# Patient Record
Sex: MALE | Race: WHITE | Employment: UNEMPLOYED | URBAN - METROPOLITAN AREA
[De-identification: names, ages, dates, MRNs, and addresses within clinical notes are randomized per-mention and may not be internally consistent; named-entity substitution may affect disease eponyms.]

---

## 2017-03-09 ENCOUNTER — TELEPHONE (OUTPATIENT)
Dept: FAMILY MEDICINE CLINIC | Age: 57
End: 2017-03-09

## 2017-03-21 ENCOUNTER — OFFICE VISIT (OUTPATIENT)
Dept: FAMILY MEDICINE CLINIC | Age: 57
End: 2017-03-21

## 2017-03-21 VITALS
HEART RATE: 68 BPM | DIASTOLIC BLOOD PRESSURE: 74 MMHG | RESPIRATION RATE: 16 BRPM | HEIGHT: 67 IN | WEIGHT: 213 LBS | SYSTOLIC BLOOD PRESSURE: 120 MMHG | BODY MASS INDEX: 33.43 KG/M2

## 2017-03-21 DIAGNOSIS — Z11.4 SCREENING FOR HIV (HUMAN IMMUNODEFICIENCY VIRUS): ICD-10-CM

## 2017-03-21 DIAGNOSIS — H61.23 BILATERAL IMPACTED CERUMEN: ICD-10-CM

## 2017-03-21 DIAGNOSIS — R73.01 IFG (IMPAIRED FASTING GLUCOSE): ICD-10-CM

## 2017-03-21 DIAGNOSIS — R25.2 MUSCLE CRAMPS: ICD-10-CM

## 2017-03-21 DIAGNOSIS — Z11.59 NEED FOR HEPATITIS C SCREENING TEST: ICD-10-CM

## 2017-03-21 DIAGNOSIS — Z86.010 HISTORY OF COLON POLYPS: ICD-10-CM

## 2017-03-21 DIAGNOSIS — Z87.891 HISTORY OF TOBACCO ABUSE: ICD-10-CM

## 2017-03-21 DIAGNOSIS — H91.93 HEARING LOSS, BILATERAL: Primary | ICD-10-CM

## 2017-03-21 DIAGNOSIS — Z12.5 SCREENING FOR PROSTATE CANCER: ICD-10-CM

## 2017-03-21 DIAGNOSIS — E78.5 HYPERLIPIDEMIA, UNSPECIFIED HYPERLIPIDEMIA TYPE: ICD-10-CM

## 2017-03-21 PROCEDURE — 99203 OFFICE O/P NEW LOW 30 MIN: CPT | Performed by: FAMILY MEDICINE

## 2017-03-22 ASSESSMENT — ENCOUNTER SYMPTOMS
RESPIRATORY NEGATIVE: 1
GASTROINTESTINAL NEGATIVE: 1

## 2017-04-12 ENCOUNTER — OFFICE VISIT (OUTPATIENT)
Dept: FAMILY MEDICINE CLINIC | Age: 57
End: 2017-04-12

## 2017-04-12 VITALS
BODY MASS INDEX: 33.65 KG/M2 | HEART RATE: 92 BPM | RESPIRATION RATE: 16 BRPM | WEIGHT: 214.4 LBS | SYSTOLIC BLOOD PRESSURE: 124 MMHG | DIASTOLIC BLOOD PRESSURE: 72 MMHG | HEIGHT: 67 IN

## 2017-04-12 DIAGNOSIS — E78.5 HYPERLIPIDEMIA, UNSPECIFIED HYPERLIPIDEMIA TYPE: ICD-10-CM

## 2017-04-12 DIAGNOSIS — H91.93 HEARING LOSS, BILATERAL: Primary | ICD-10-CM

## 2017-04-12 PROCEDURE — 99213 OFFICE O/P EST LOW 20 MIN: CPT | Performed by: FAMILY MEDICINE

## 2017-04-12 ASSESSMENT — PATIENT HEALTH QUESTIONNAIRE - PHQ9
2. FEELING DOWN, DEPRESSED OR HOPELESS: 0
SUM OF ALL RESPONSES TO PHQ QUESTIONS 1-9: 0
SUM OF ALL RESPONSES TO PHQ9 QUESTIONS 1 & 2: 0
1. LITTLE INTEREST OR PLEASURE IN DOING THINGS: 0

## 2017-04-12 ASSESSMENT — ENCOUNTER SYMPTOMS
GASTROINTESTINAL NEGATIVE: 1
RESPIRATORY NEGATIVE: 1

## 2017-08-22 ENCOUNTER — OFFICE VISIT (OUTPATIENT)
Dept: FAMILY MEDICINE CLINIC | Age: 57
End: 2017-08-22
Payer: COMMERCIAL

## 2017-08-22 VITALS
DIASTOLIC BLOOD PRESSURE: 80 MMHG | HEIGHT: 66 IN | HEART RATE: 72 BPM | SYSTOLIC BLOOD PRESSURE: 136 MMHG | RESPIRATION RATE: 16 BRPM | WEIGHT: 204.2 LBS | BODY MASS INDEX: 32.82 KG/M2

## 2017-08-22 DIAGNOSIS — R03.0 ELEVATED BP WITHOUT DIAGNOSIS OF HYPERTENSION: Primary | ICD-10-CM

## 2017-08-22 PROCEDURE — 99213 OFFICE O/P EST LOW 20 MIN: CPT | Performed by: FAMILY MEDICINE

## 2017-08-22 RX ORDER — RANITIDINE 150 MG/1
150 CAPSULE ORAL DAILY
COMMUNITY
End: 2018-03-01 | Stop reason: ALTCHOICE

## 2017-08-22 RX ORDER — CETIRIZINE HYDROCHLORIDE 10 MG/1
10 TABLET ORAL DAILY
COMMUNITY
End: 2018-03-01 | Stop reason: ALTCHOICE

## 2017-08-22 ASSESSMENT — ENCOUNTER SYMPTOMS
RESPIRATORY NEGATIVE: 1
GASTROINTESTINAL NEGATIVE: 1

## 2017-08-27 ENCOUNTER — APPOINTMENT (OUTPATIENT)
Dept: CT IMAGING | Age: 57
End: 2017-08-27
Payer: COMMERCIAL

## 2017-08-27 ENCOUNTER — HOSPITAL ENCOUNTER (EMERGENCY)
Age: 57
Discharge: HOME | End: 2017-08-27
Payer: COMMERCIAL

## 2017-08-27 VITALS
TEMPERATURE: 98.7 F | DIASTOLIC BLOOD PRESSURE: 90 MMHG | WEIGHT: 210 LBS | RESPIRATION RATE: 16 BRPM | BODY MASS INDEX: 33.75 KG/M2 | OXYGEN SATURATION: 100 % | SYSTOLIC BLOOD PRESSURE: 140 MMHG | HEIGHT: 66 IN | HEART RATE: 95 BPM
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
## 2017-08-27 PROCEDURE — 72125 CT NECK SPINE W/O DYE: CPT

## 2017-08-27 PROCEDURE — 99284 EMERGENCY DEPT VISIT MOD MDM: CPT

## 2017-08-27 RX ORDER — TRAMADOL HYDROCHLORIDE 50 MG/1
50 TABLET ORAL EVERY 6 HOURS PRN
Qty: 12 TABLET | Refills: 0 | Status: SHIPPED | OUTPATIENT
Start: 2017-08-27 | End: 2017-09-05 | Stop reason: ALTCHOICE

## 2017-08-27 ASSESSMENT — ENCOUNTER SYMPTOMS
DIARRHEA: 0
VOMITING: 0
SHORTNESS OF BREATH: 0
BLOOD IN STOOL: 0
NAUSEA: 0
COUGH: 0
ABDOMINAL PAIN: 0
BACK PAIN: 0
SORE THROAT: 0
WHEEZING: 0

## 2017-08-27 ASSESSMENT — PAIN DESCRIPTION - PAIN TYPE: TYPE: ACUTE PAIN

## 2017-08-27 ASSESSMENT — PAIN DESCRIPTION - DESCRIPTORS: DESCRIPTORS: ACHING

## 2017-08-27 ASSESSMENT — PAIN SCALES - GENERAL: PAINLEVEL_OUTOF10: 7

## 2017-08-27 ASSESSMENT — PAIN DESCRIPTION - FREQUENCY: FREQUENCY: CONTINUOUS

## 2017-08-27 ASSESSMENT — PAIN DESCRIPTION - LOCATION: LOCATION: NECK

## 2017-09-05 ENCOUNTER — OFFICE VISIT (OUTPATIENT)
Dept: FAMILY MEDICINE CLINIC | Age: 57
End: 2017-09-05
Payer: COMMERCIAL

## 2017-09-05 VITALS
TEMPERATURE: 98 F | RESPIRATION RATE: 13 BRPM | DIASTOLIC BLOOD PRESSURE: 70 MMHG | BODY MASS INDEX: 33.62 KG/M2 | SYSTOLIC BLOOD PRESSURE: 118 MMHG | HEART RATE: 67 BPM | HEIGHT: 66 IN | WEIGHT: 209.2 LBS | OXYGEN SATURATION: 98 %

## 2017-09-05 DIAGNOSIS — S29.019A THORACIC MYOFASCIAL STRAIN, INITIAL ENCOUNTER: ICD-10-CM
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
## 2017-09-05 DIAGNOSIS — S16.1XXA CERVICAL STRAIN, INITIAL ENCOUNTER: Primary | ICD-10-CM

## 2017-09-05 PROCEDURE — 99213 OFFICE O/P EST LOW 20 MIN: CPT | Performed by: FAMILY MEDICINE

## 2017-09-05 ASSESSMENT — ENCOUNTER SYMPTOMS
RESPIRATORY NEGATIVE: 1
GASTROINTESTINAL NEGATIVE: 1
BACK PAIN: 1

## 2017-11-13 ENCOUNTER — TELEPHONE (OUTPATIENT)
Dept: FAMILY MEDICINE CLINIC | Age: 57
End: 2017-11-13

## 2017-11-13 RX ORDER — ALPRAZOLAM 0.25 MG/1
0.25 TABLET ORAL DAILY PRN
Qty: 1 TABLET | Refills: 0 | Status: SHIPPED | OUTPATIENT
Start: 2017-11-13 | End: 2018-03-01 | Stop reason: ALTCHOICE

## 2017-11-13 NOTE — TELEPHONE ENCOUNTER
Momo's wife, Hillary Redmond, calls stating he failed his BP reading for his ODOT testing. He has to have it repeated again on 11/21/17. She said they discussed this at his last visit and Dr Meena Bautista said he could give him something to help calm him prior to the ODOT testing because his BP is always fine in the office. They think it is anxiety related by just getting nervous about passing the ODOT testing. Please advise if something can be called in for this or if he needs an appointment. Thank you!       ROMELIA 9/5/17    [place] on [employer]

## 2018-02-14 ENCOUNTER — TELEPHONE (OUTPATIENT)
Dept: FAMILY MEDICINE CLINIC | Age: 58
End: 2018-02-14

## 2018-03-01 ENCOUNTER — PROCEDURE VISIT (OUTPATIENT)
Dept: FAMILY MEDICINE CLINIC | Age: 58
End: 2018-03-01
Payer: COMMERCIAL

## 2018-03-01 VITALS
DIASTOLIC BLOOD PRESSURE: 70 MMHG | HEART RATE: 80 BPM | HEIGHT: 66 IN | WEIGHT: 214.4 LBS | RESPIRATION RATE: 16 BRPM | BODY MASS INDEX: 34.46 KG/M2 | SYSTOLIC BLOOD PRESSURE: 120 MMHG

## 2018-03-01 DIAGNOSIS — L91.8 CUTANEOUS SKIN TAGS: Primary | ICD-10-CM

## 2018-03-01 PROCEDURE — 11200 RMVL SKIN TAGS UP TO&INC 15: CPT | Performed by: FAMILY MEDICINE

## 2018-03-01 ASSESSMENT — ENCOUNTER SYMPTOMS: ROS SKIN COMMENTS: SKIN TAGS

## 2018-03-01 NOTE — PROGRESS NOTES
Subjective:      Patient ID: Raudel Arauz is a 65 y.o. male. HPI:    Chief Complaint   Patient presents with    Procedure     skin tag removal     Pt presents today w/ c/o multiple skin tags \"all over\". Located in the axillary, neck, groin area. Also one on each eyelid. There is no problem list on file for this patient. Past Surgical History:   Procedure Laterality Date    DENTAL SURGERY      25 teeth removed     Prior to Admission medications    Not on File         Review of Systems   Skin:        Skin tags       Objective:   Physical Exam   Skin:   Pt has multiple skin tags in the axillary region, 3 in the neck region, 1 right groin, and 1 on each upper eyelid. Assessment:      1. Cutaneous skin tags  44843 - [place] REMOVAL OF SKIN TAGS, UP TO 15           Plan:      -  Risks/benefits discussed, consent signed. 10 skin tags located on axillary region, neck, groin, eyelids were removed by hyfrecation without difficulty. Each skin tag was cleansed with Chloraprep and anesthetized with 1% Lidocaine and Epinephrine.   Wound care instructions were given to patient

## 2018-04-10 ENCOUNTER — OFFICE VISIT (OUTPATIENT)
Dept: FAMILY MEDICINE CLINIC | Age: 58
End: 2018-04-10
Payer: COMMERCIAL

## 2018-04-10 VITALS
OXYGEN SATURATION: 97 % | HEIGHT: 67 IN | WEIGHT: 216 LBS | BODY MASS INDEX: 33.9 KG/M2 | DIASTOLIC BLOOD PRESSURE: 84 MMHG | HEART RATE: 76 BPM | TEMPERATURE: 97.7 F | RESPIRATION RATE: 16 BRPM | SYSTOLIC BLOOD PRESSURE: 132 MMHG

## 2018-04-10 DIAGNOSIS — K21.9 GASTROESOPHAGEAL REFLUX DISEASE, ESOPHAGITIS PRESENCE NOT SPECIFIED: Primary | ICD-10-CM

## 2018-04-10 PROCEDURE — 1036F TOBACCO NON-USER: CPT | Performed by: FAMILY MEDICINE

## 2018-04-10 PROCEDURE — 3017F COLORECTAL CA SCREEN DOC REV: CPT | Performed by: FAMILY MEDICINE

## 2018-04-10 PROCEDURE — G8427 DOCREV CUR MEDS BY ELIG CLIN: HCPCS | Performed by: FAMILY MEDICINE

## 2018-04-10 PROCEDURE — 99213 OFFICE O/P EST LOW 20 MIN: CPT | Performed by: FAMILY MEDICINE

## 2018-04-10 PROCEDURE — G8417 CALC BMI ABV UP PARAM F/U: HCPCS | Performed by: FAMILY MEDICINE

## 2018-04-10 RX ORDER — PANTOPRAZOLE SODIUM 40 MG/1
40 TABLET, DELAYED RELEASE ORAL
Qty: 30 TABLET | Refills: 1 | Status: SHIPPED | OUTPATIENT
Start: 2018-04-10 | End: 2018-06-04 | Stop reason: SDUPTHER

## 2018-04-10 ASSESSMENT — ENCOUNTER SYMPTOMS
SORE THROAT: 1
RESPIRATORY NEGATIVE: 1
GASTROINTESTINAL NEGATIVE: 1

## 2018-06-04 DIAGNOSIS — K21.9 GASTROESOPHAGEAL REFLUX DISEASE, ESOPHAGITIS PRESENCE NOT SPECIFIED: ICD-10-CM

## 2018-06-05 RX ORDER — PANTOPRAZOLE SODIUM 40 MG/1
TABLET, DELAYED RELEASE ORAL
Qty: 30 TABLET | Refills: 0 | Status: SHIPPED | OUTPATIENT
Start: 2018-06-05 | End: 2018-07-03 | Stop reason: SDUPTHER

## 2018-09-19 ENCOUNTER — OFFICE VISIT (OUTPATIENT)
Dept: FAMILY MEDICINE CLINIC | Age: 58
End: 2018-09-19
Payer: COMMERCIAL

## 2018-09-19 VITALS
HEART RATE: 64 BPM | RESPIRATION RATE: 16 BRPM | SYSTOLIC BLOOD PRESSURE: 124 MMHG | DIASTOLIC BLOOD PRESSURE: 76 MMHG | BODY MASS INDEX: 33.82 KG/M2 | WEIGHT: 210.4 LBS | HEIGHT: 66 IN | OXYGEN SATURATION: 98 %

## 2018-09-19 DIAGNOSIS — R20.2 PARESTHESIA OF LEFT LEG: Primary | ICD-10-CM

## 2018-09-19 DIAGNOSIS — M54.16 LEFT LUMBAR RADICULITIS: ICD-10-CM

## 2018-09-19 PROCEDURE — G8427 DOCREV CUR MEDS BY ELIG CLIN: HCPCS | Performed by: FAMILY MEDICINE

## 2018-09-19 PROCEDURE — G8417 CALC BMI ABV UP PARAM F/U: HCPCS | Performed by: FAMILY MEDICINE

## 2018-09-19 PROCEDURE — 99213 OFFICE O/P EST LOW 20 MIN: CPT | Performed by: FAMILY MEDICINE

## 2018-09-19 PROCEDURE — 1036F TOBACCO NON-USER: CPT | Performed by: FAMILY MEDICINE

## 2018-09-19 PROCEDURE — 3017F COLORECTAL CA SCREEN DOC REV: CPT | Performed by: FAMILY MEDICINE

## 2018-09-19 RX ORDER — PREDNISONE 20 MG/1
TABLET ORAL
Qty: 25 TABLET | Refills: 0 | Status: SHIPPED | OUTPATIENT
Start: 2018-09-19 | End: 2019-03-29

## 2018-09-19 ASSESSMENT — PATIENT HEALTH QUESTIONNAIRE - PHQ9
SUM OF ALL RESPONSES TO PHQ QUESTIONS 1-9: 0
SUM OF ALL RESPONSES TO PHQ9 QUESTIONS 1 & 2: 0
1. LITTLE INTEREST OR PLEASURE IN DOING THINGS: 0
SUM OF ALL RESPONSES TO PHQ QUESTIONS 1-9: 0
2. FEELING DOWN, DEPRESSED OR HOPELESS: 0

## 2018-09-19 ASSESSMENT — ENCOUNTER SYMPTOMS
GASTROINTESTINAL NEGATIVE: 1
RESPIRATORY NEGATIVE: 1

## 2018-09-19 NOTE — PROGRESS NOTES
Visit Information    Have you changed or started any medications since your last visit including any over-the-counter medicines, vitamins, or herbal medicines? no   Are you having any side effects from any of your medications? -  no  Have you stopped taking any of your medications? Is so, why? -  no    Have you seen any other physician or provider since your last visit? No  Have you had any other diagnostic tests since your last visit? No  Have you been seen in the emergency room and/or had an admission to a hospital since we last saw you? No  Have you had your routine dental cleaning in the past 6 months? no    Have you activated your [employer] account? If not, what are your barriers?  Yes     Patient Care Team:  Bryce Castaneda DO as PCP - General (Family Medicine)    Medical History Review  Past Medical, Family, and Social History reviewed and does not contribute to the patient presenting condition    Health Maintenance   Topic Date Due    DTaP/Tdap/Td vaccine (1 - Tdap) 07/26/1980    Shingles Vaccine (1 of 2 - 2 Dose Series) 07/26/2011    Flu vaccine (1) 09/01/2018    Diabetes screen  03/22/2020    Lipid screen  03/22/2022    Colon cancer screen colonoscopy  05/05/2027    Hepatitis C screen  Completed    HIV screen  Completed

## 2018-09-19 NOTE — PATIENT INSTRUCTIONS
You may receive a survey about your visit with us today. The feedback from our patients helps us identify what is working well and where the service to all patients can be enhanced. Thank you! Patient Education        Low Back Pain: Exercises  Your Care Instructions  Here are some examples of typical rehabilitation exercises for your condition. Start each exercise slowly. Ease off the exercise if you start to have pain. Your doctor or physical therapist will tell you when you can start these exercises and which ones will work best for you. How to do the exercises  Press-up    1. Lie on your stomach, supporting your body with your forearms. 2. Press your elbows down into the floor to raise your upper back. As you do this, relax your stomach muscles and allow your back to arch without using your back muscles. As your press up, do not let your hips or pelvis come off the floor. 3. Hold for 15 to 30 seconds, then relax. 4. Repeat 2 to 4 times. Alternate arm and leg (bird dog) exercise    Do this exercise slowly. Try to keep your body straight at all times, and do not let one hip drop lower than the other. 1. Start on the floor, on your hands and knees. 2. Tighten your belly muscles. 3. Raise one leg off the floor, and hold it straight out behind you. Be careful not to let your hip drop down, because that will twist your trunk. 4. Hold for about 6 seconds, then lower your leg and switch to the other leg. 5. Repeat 8 to 12 times on each leg. 6. Over time, work up to holding for 10 to 30 seconds each time. 7. If you feel stable and secure with your leg raised, try raising the opposite arm straight out in front of you at the same time. Knee-to-chest exercise    1. Lie on your back with your knees bent and your feet flat on the floor. 2. Bring one knee to your chest, keeping the other foot flat on the floor (or keeping the other leg straight, whichever feels better on your lower back).   3. Keep your lower back pressed to the floor. Hold for at least 15 to 30 seconds. 4. Relax, and lower the knee to the starting position. 5. Repeat with the other leg. Repeat 2 to 4 times with each leg. 6. To get more stretch, put your other leg flat on the floor while pulling your knee to your chest.  Curl-ups    1. Lie on the floor on your back with your knees bent at a 90-degree angle. Your feet should be flat on the floor, about 12 inches from your buttocks. 2. Cross your arms over your chest. If this bothers your neck, try putting your hands behind your neck (not your head), with your elbows spread apart. 3. Slowly tighten your belly muscles and raise your shoulder blades off the floor. 4. Keep your head in line with your body, and do not press your chin to your chest.  5. Hold this position for 1 or 2 seconds, then slowly lower yourself back down to the floor. 6. Repeat 8 to 12 times. Pelvic tilt exercise    1. Lie on your back with your knees bent. 2. \"Brace\" your stomach. This means to tighten your muscles by pulling in and imagining your belly button moving toward your spine. You should feel like your back is pressing to the floor and your hips and pelvis are rocking back. 3. Hold for about 6 seconds while you breathe smoothly. 4. Repeat 8 to 12 times. Heel dig bridging    1. Lie on your back with both knees bent and your ankles bent so that only your heels are digging into the floor. Your knees should be bent about 90 degrees. 2. Then push your heels into the floor, squeeze your buttocks, and lift your hips off the floor until your shoulders, hips, and knees are all in a straight line. 3. Hold for about 6 seconds as you continue to breathe normally, and then slowly lower your hips back down to the floor and rest for up to 10 seconds. 4. Do 8 to 12 repetitions. Hamstring stretch in doorway    1. Lie on your back in a doorway, with one leg through the open door.   2. Slide your leg up the wall to straighten your

## 2018-09-19 NOTE — PROGRESS NOTES
Subjective:      Patient ID: Alejandra Sue is a 63 y.o. male. HPI:    Chief Complaint   Patient presents with    Leg Pain     upper right thigh-no know injury     Pt c/o left quad pain for the last 2-3 weeks. NKI. No redness or swelling. Taking Aleve with some relief. The pain comes and goes, worse in the am.  Moving seems to help. Pt does have some tingling in the area, \"burning\". No rash. Does have some left-sided hip pain also for the same time frame. No weakness. There is no problem list on file for this patient. Past Surgical History:   Procedure Laterality Date    DENTAL SURGERY      25 teeth removed     Prior to Admission medications    Medication Sig Start Date End Date Taking? Authorizing Provider   pantoprazole (PROTONIX) 40 MG tablet TAKE 1 TABLET BY MOUTH DAILY WITH BREAKFAST 7/3/18  Yes Selwyn Grate, DO       Review of Systems   Constitutional: Negative. HENT: Negative. Respiratory: Negative. Cardiovascular: Negative. Gastrointestinal: Negative. Musculoskeletal: Positive for arthralgias (left hip) and myalgias (left lower leg pain). Neurological: Positive for numbness. Negative for weakness. All other systems reviewed and are negative. Objective:   Physical Exam   Constitutional: He is oriented to person, place, and time. He appears well-developed and well-nourished. HENT:   Head: Normocephalic and atraumatic. Right Ear: Tympanic membrane normal.   Left Ear: Tympanic membrane normal.   Mouth/Throat: Oropharynx is clear and moist and mucous membranes are normal.   Cardiovascular: Normal rate, regular rhythm and normal heart sounds. No murmur heard. Pulmonary/Chest: Effort normal and breath sounds normal.   Abdominal: Soft. Bowel sounds are normal.   Musculoskeletal: He exhibits no edema. Left hip: He exhibits normal range of motion, normal strength and no tenderness.         Legs:  Neurological: He is alert and oriented to

## 2019-03-29 ENCOUNTER — HOSPITAL ENCOUNTER (EMERGENCY)
Age: 59
Discharge: HOME | End: 2019-03-29
Payer: COMMERCIAL

## 2019-03-29 VITALS
DIASTOLIC BLOOD PRESSURE: 88 MMHG | WEIGHT: 210 LBS | TEMPERATURE: 98.2 F | HEART RATE: 96 BPM | SYSTOLIC BLOOD PRESSURE: 144 MMHG | OXYGEN SATURATION: 95 % | RESPIRATION RATE: 18 BRPM | BODY MASS INDEX: 34.41 KG/M2

## 2019-03-29 DIAGNOSIS — R05.9 COUGH: ICD-10-CM

## 2019-03-29 DIAGNOSIS — B34.9 VIRAL ILLNESS: Primary | ICD-10-CM

## 2019-03-29 LAB
FLU A ANTIGEN: NEGATIVE
FLU B ANTIGEN: NEGATIVE

## 2019-03-29 PROCEDURE — 99213 OFFICE O/P EST LOW 20 MIN: CPT

## 2019-03-29 PROCEDURE — 87804 INFLUENZA ASSAY W/OPTIC: CPT

## 2019-03-29 PROCEDURE — 99212 OFFICE O/P EST SF 10 MIN: CPT | Performed by: NURSE PRACTITIONER

## 2019-03-29 SDOH — HEALTH STABILITY: MENTAL HEALTH: HOW OFTEN DO YOU HAVE A DRINK CONTAINING ALCOHOL?: NEVER

## 2019-03-29 ASSESSMENT — ENCOUNTER SYMPTOMS
STRIDOR: 0
CHEST TIGHTNESS: 0
SORE THROAT: 0
EYE DISCHARGE: 0
COUGH: 1
SINUS PRESSURE: 0
WHEEZING: 0
SHORTNESS OF BREATH: 0
SINUS CONGESTION: 0
SINUS PAIN: 0
NAUSEA: 0
VOMITING: 0
RHINORRHEA: 0
DIARRHEA: 0

## 2019-03-29 NOTE — ED PROVIDER NOTES
BHARTI Dai - MAU    (Please note that portions of this note were completed with a voice recognition program. Efforts were made to edit the dictations but occasionally words are mis-transcribed.)         BHARTI Salas - MAU  03/29/19 1940

## 2019-03-29 NOTE — ED TRIAGE NOTES
Pt walked to room 7. Pt here with complaints of a non productive cough, congestion. Started 3 days ago.

## 2019-03-29 NOTE — ED NOTES
Patient understood instructions verbally,  Follow up with PCP with any concerns,  ambulated self to lobby,stable condition.       Angel Murrieta LPN  40/73/90 [id number]

## 2019-04-01 ENCOUNTER — TELEPHONE (OUTPATIENT)
Dept: FAMILY MEDICINE CLINIC | Age: 59
End: 2019-04-01

## 2019-07-03 DIAGNOSIS — K21.9 GASTROESOPHAGEAL REFLUX DISEASE, ESOPHAGITIS PRESENCE NOT SPECIFIED: ICD-10-CM

## 2019-07-03 RX ORDER — PANTOPRAZOLE SODIUM 40 MG/1
TABLET, DELAYED RELEASE ORAL
Qty: 90 TABLET | Refills: 0 | Status: SHIPPED | OUTPATIENT
Start: 2019-07-03 | End: 2019-09-27 | Stop reason: SDUPTHER

## 2019-09-27 DIAGNOSIS — K21.9 GASTROESOPHAGEAL REFLUX DISEASE, ESOPHAGITIS PRESENCE NOT SPECIFIED: ICD-10-CM

## 2019-09-27 RX ORDER — PANTOPRAZOLE SODIUM 40 MG/1
TABLET, DELAYED RELEASE ORAL
Qty: 90 TABLET | Refills: 0 | Status: SHIPPED | OUTPATIENT
Start: 2019-09-27 | End: 2020-07-01

## 2020-07-01 ENCOUNTER — OFFICE VISIT (OUTPATIENT)
Dept: FAMILY MEDICINE CLINIC | Age: 60
End: 2020-07-01

## 2020-07-01 VITALS
TEMPERATURE: 97.8 F | WEIGHT: 204.7 LBS | HEART RATE: 72 BPM | BODY MASS INDEX: 32.9 KG/M2 | DIASTOLIC BLOOD PRESSURE: 66 MMHG | RESPIRATION RATE: 16 BRPM | HEIGHT: 66 IN | SYSTOLIC BLOOD PRESSURE: 130 MMHG

## 2020-07-01 PROCEDURE — 99213 OFFICE O/P EST LOW 20 MIN: CPT | Performed by: FAMILY MEDICINE

## 2020-07-01 RX ORDER — OMEPRAZOLE 20 MG/1
20 TABLET, DELAYED RELEASE ORAL DAILY
Status: ON HOLD | COMMUNITY
End: 2022-02-11 | Stop reason: HOSPADM

## 2020-07-01 SDOH — ECONOMIC STABILITY: TRANSPORTATION INSECURITY
IN THE PAST 12 MONTHS, HAS LACK OF TRANSPORTATION KEPT YOU FROM MEETINGS, WORK, OR FROM GETTING THINGS NEEDED FOR DAILY LIVING?: NO

## 2020-07-01 SDOH — ECONOMIC STABILITY: FOOD INSECURITY: WITHIN THE PAST 12 MONTHS, YOU WORRIED THAT YOUR FOOD WOULD RUN OUT BEFORE YOU GOT MONEY TO BUY MORE.: NEVER TRUE

## 2020-07-01 SDOH — ECONOMIC STABILITY: FOOD INSECURITY: WITHIN THE PAST 12 MONTHS, THE FOOD YOU BOUGHT JUST DIDN'T LAST AND YOU DIDN'T HAVE MONEY TO GET MORE.: NEVER TRUE

## 2020-07-01 SDOH — ECONOMIC STABILITY: INCOME INSECURITY: HOW HARD IS IT FOR YOU TO PAY FOR THE VERY BASICS LIKE FOOD, HOUSING, MEDICAL CARE, AND HEATING?: NOT HARD AT ALL

## 2020-07-01 SDOH — ECONOMIC STABILITY: TRANSPORTATION INSECURITY
IN THE PAST 12 MONTHS, HAS THE LACK OF TRANSPORTATION KEPT YOU FROM MEDICAL APPOINTMENTS OR FROM GETTING MEDICATIONS?: NO

## 2020-07-01 ASSESSMENT — ENCOUNTER SYMPTOMS
NAUSEA: 1
CHEST TIGHTNESS: 0
RESPIRATORY NEGATIVE: 1
SHORTNESS OF BREATH: 0

## 2020-07-01 ASSESSMENT — PATIENT HEALTH QUESTIONNAIRE - PHQ9
SUM OF ALL RESPONSES TO PHQ QUESTIONS 1-9: 0
SUM OF ALL RESPONSES TO PHQ9 QUESTIONS 1 & 2: 0
SUM OF ALL RESPONSES TO PHQ QUESTIONS 1-9: 0
2. FEELING DOWN, DEPRESSED OR HOPELESS: 0
1. LITTLE INTEREST OR PLEASURE IN DOING THINGS: 0

## 2020-07-01 NOTE — PROGRESS NOTES
Subjective:      Patient ID: Jil Humphrey is a 63 y.o. male. HPI:    Chief Complaint   Patient presents with    Hypertension     140's/100-110's at home on automatic cuff     Pt here to discuss his BP. Per pt, he had an episode of vertigo while lying in bed. This episode lasted about 10 minutes. Felt clammy at that time with some nausea. Pt denies CP or tightness at that time. Denies lightheadedness. No palpitations. Feels great now. BPs have been fluctuating on wrist cuff. BP today looks ok. BP Readings from Last 3 Encounters:   07/01/20 130/66   03/29/19 (!) 144/88   09/19/18 124/76       There is no problem list on file for this patient. Past Surgical History:   Procedure Laterality Date    DENTAL SURGERY      25 teeth removed     Prior to Admission medications    Medication Sig Start Date End Date Taking? Authorizing Provider   omeprazole (PRILOSEC OTC) 20 MG tablet Take 20 mg by mouth daily   Yes Historical Provider, MD   pantoprazole (PROTONIX) 40 MG tablet TAKE 1 TABLET BY MOUTH DAILY WITH BREAKFAST  Patient not taking: Reported on 7/1/2020 9/27/19   Bry Cee DO         Review of Systems   Constitutional: Negative. Negative for fever. HENT: Negative. Respiratory: Negative. Negative for chest tightness and shortness of breath. Cardiovascular: Negative. Negative for chest pain and palpitations. Gastrointestinal: Positive for nausea. Musculoskeletal: Negative. Negative for gait problem. Neurological: Positive for dizziness. Negative for facial asymmetry, speech difficulty, light-headedness and headaches. All other systems reviewed and are negative. Objective:   Physical Exam  Vitals signs and nursing note reviewed. Constitutional:       Appearance: He is well-developed. HENT:      Head: Normocephalic and atraumatic.       Right Ear: Tympanic membrane normal.      Left Ear: Tympanic membrane normal.   Eyes:      Extraocular Movements: Extraocular

## 2020-07-01 NOTE — PATIENT INSTRUCTIONS
gone.  When should you call for help? [id number] anytime you think you may need emergency care. For example, call if:  · You have symptoms of a stroke. These may include:  ? Sudden numbness, tingling, weakness, or loss of movement in your face, arm, or leg, especially on only one side of your body. ? Sudden vision changes. ? Sudden trouble speaking. ? Sudden confusion or trouble understanding simple statements. ? Sudden problems with walking or balance. ? A sudden, severe headache that is different from past headaches. Call your doctor now or seek immediate medical care if:  · You have new or worse nausea and vomiting. · You have new symptoms such as hearing loss or roaring in your ears. Watch closely for changes in your health, and be sure to contact your doctor if:  · You are not getting better as expected. · Your vertigo gets worse. Where can you learn more? Go to https://[employer] I[employer]ankoUV[employer]rx [employer]. org and sign in to your [employer] account. Enter  in the 4 the stars box to learn more about \"Benign Paroxysmal Positional Vertigo (BPPV): Care Instructions. \"     If you do not have an account, please click on the \"Sign Up Now\" link. Current as of: July 29, 2019               Content Version: 12.5  © 2442-8842 [employer]wise, Incorporated. Care instructions adapted under license by [facility] ([facility]). If you have questions about a medical condition or this instruction, always ask your healthcare professional. [place] [address] any warranty or liability for your use of this information.

## 2020-07-01 NOTE — PROGRESS NOTES
Visit Information    Have you changed or started any medications since your last visit including any over-the-counter medicines, vitamins, or herbal medicines? no   Are you having any side effects from any of your medications? -  no  Have you stopped taking any of your medications? Is so, why? -  no    Have you seen any other physician or provider since your last visit? No  Have you had any other diagnostic tests since your last visit? No  Have you been seen in the emergency room and/or had an admission to a hospital since we last saw you? No  Have you had your routine dental cleaning in the past 6 months? no    Have you activated your [employer] account? If not, what are your barriers?  Yes     Patient Care Team:  Em Schneider DO as PCP - General (Family Medicine)  Em Schneider DO as PCP - [facility]    Medical History Review  Past Medical, Family, and Social History reviewed and does contribute to the patient presenting condition    Health Maintenance   Topic Date Due    DTaP/Tdap/Td vaccine (1 - Tdap) 07/26/1980    Shingles Vaccine (1 of 2) 07/26/2011    Low dose CT lung screening  07/26/2016    Flu vaccine (1) 09/01/2020    Lipid screen  03/22/2022    Colon cancer screen colonoscopy  05/05/2027    Hepatitis C screen  Completed    HIV screen  Completed    Hepatitis A vaccine  Aged Out    Hepatitis B vaccine  Aged Out    Hib vaccine  Aged Out    Meningococcal (ACWY) vaccine  Aged Out    Pneumococcal 0-64 years Vaccine  Aged Out

## 2021-10-12 ENCOUNTER — VIRTUAL VISIT (OUTPATIENT)
Dept: FAMILY MEDICINE CLINIC | Age: 61
End: 2021-10-12

## 2021-10-12 DIAGNOSIS — J22 LRTI (LOWER RESPIRATORY TRACT INFECTION): Primary | ICD-10-CM

## 2021-10-12 PROCEDURE — 99213 OFFICE O/P EST LOW 20 MIN: CPT | Performed by: FAMILY MEDICINE

## 2021-10-12 RX ORDER — AZITHROMYCIN 250 MG/1
250 TABLET, FILM COATED ORAL SEE ADMIN INSTRUCTIONS
Qty: 6 TABLET | Refills: 0 | Status: SHIPPED | OUTPATIENT
Start: 2021-10-12 | End: 2021-10-17

## 2021-10-12 RX ORDER — PREDNISONE 20 MG/1
20 TABLET ORAL 2 TIMES DAILY
Qty: 10 TABLET | Refills: 0 | Status: SHIPPED | OUTPATIENT
Start: 2021-10-12 | End: 2021-10-17

## 2021-10-12 ASSESSMENT — ENCOUNTER SYMPTOMS
GASTROINTESTINAL NEGATIVE: 1
SINUS PRESSURE: 1
RHINORRHEA: 1
CHEST TIGHTNESS: 0
COUGH: 1
SHORTNESS OF BREATH: 0

## 2021-10-12 NOTE — PROGRESS NOTES
person, place, and time. Psychiatric:         Mood and Affect: Mood normal.         Behavior: Behavior normal.         Thought Content: Thought content normal.         Judgment: Judgment normal.     ASSESSMENT/PLAN:  1. LRTI (lower respiratory tract infection)  -     azithromycin (ZITHROMAX) 250 MG tablet; Take 1 tablet by mouth See Admin Instructions for 5 days 500mg on day 1 followed by 250mg on days 2 - 5, Disp-6 tablet, R-0Normal  -     predniSONE (DELTASONE) 20 MG tablet; Take 1 tablet by mouth 2 times daily for 5 days, Disp-10 tablet, R-0Normal    -  Orders above  -  Pt has been sick for over 2 weeks now, COVID testing unlikely to be helpful at this time  -  OTC decongestant    Return if symptoms worsen or fail to improve. Yolande Neville, was evaluated through a synchronous (real-time) audio-video encounter. The patient (or guardian if applicable) is aware that this is a billable service. Verbal consent to proceed has been obtained within the past 12 months. The visit was conducted pursuant to the emergency declaration under the [address], [address] [place] [address] authority and the [address] and [employer] General Act. Patient identification was verified, and a caregiver was present when appropriate. The patient was located in a state where the provider was credentialed to provide care. An electronic signature was used to authenticate this note.     --Honey Phillips, DO

## 2022-02-10 ENCOUNTER — NURSE TRIAGE (OUTPATIENT)
Dept: OTHER | Facility: CLINIC | Age: 62
End: 2022-02-10

## 2022-02-10 ENCOUNTER — APPOINTMENT (OUTPATIENT)
Dept: GENERAL RADIOLOGY | Age: 62
DRG: 392 | End: 2022-02-10

## 2022-02-10 ENCOUNTER — HOSPITAL ENCOUNTER (INPATIENT)
Age: 62
LOS: 1 days | Discharge: HOME | DRG: 392 | End: 2022-02-11
Attending: EMERGENCY MEDICINE | Admitting: HOSPITALIST
Payer: MEDICAID

## 2022-02-10 DIAGNOSIS — D64.9 ANEMIA, UNSPECIFIED TYPE: Primary | ICD-10-CM

## 2022-02-10 DIAGNOSIS — K92.2 GASTROINTESTINAL HEMORRHAGE, UNSPECIFIED GASTROINTESTINAL HEMORRHAGE TYPE: ICD-10-CM

## 2022-02-10 PROBLEM — R10.9 ABDOMINAL PAIN: Status: ACTIVE | Noted: 2022-02-10

## 2022-02-10 LAB
ALBUMIN SERPL-MCNC: 4.3 G/DL (ref 3.5–5.1)
ALP BLD-CCNC: 84 U/L (ref 38–126)
ALT SERPL-CCNC: 24 U/L (ref 11–66)
ANION GAP SERPL CALCULATED.3IONS-SCNC: 13 MEQ/L (ref 8–16)
AST SERPL-CCNC: 17 U/L (ref 5–40)
BASOPHILS # BLD: 0.1 %
BASOPHILS ABSOLUTE: 0 THOU/MM3 (ref 0–0.1)
BILIRUB SERPL-MCNC: 0.2 MG/DL (ref 0.3–1.2)
BILIRUBIN DIRECT: < 0.2 MG/DL (ref 0–0.3)
BUN BLDV-MCNC: 17 MG/DL (ref 7–22)
CALCIUM SERPL-MCNC: 10.6 MG/DL (ref 8.5–10.5)
CHLORIDE BLD-SCNC: 99 MEQ/L (ref 98–111)
CO2: 24 MEQ/L (ref 23–33)
CREAT SERPL-MCNC: 1 MG/DL (ref 0.4–1.2)
EKG ATRIAL RATE: 76 BPM
EKG ATRIAL RATE: 87 BPM
EKG P AXIS: 48 DEGREES
EKG P AXIS: 53 DEGREES
EKG P-R INTERVAL: 196 MS
EKG P-R INTERVAL: 204 MS
EKG Q-T INTERVAL: 342 MS
EKG Q-T INTERVAL: 366 MS
EKG QRS DURATION: 78 MS
EKG QRS DURATION: 78 MS
EKG QTC CALCULATION (BAZETT): 411 MS
EKG QTC CALCULATION (BAZETT): 411 MS
EKG R AXIS: -4 DEGREES
EKG R AXIS: 0 DEGREES
EKG T AXIS: 16 DEGREES
EKG T AXIS: 46 DEGREES
EKG VENTRICULAR RATE: 76 BPM
EKG VENTRICULAR RATE: 87 BPM
EOSINOPHIL # BLD: 0 %
EOSINOPHILS ABSOLUTE: 0 THOU/MM3 (ref 0–0.4)
ERYTHROCYTE [DISTWIDTH] IN BLOOD BY AUTOMATED COUNT: 14.5 % (ref 11.5–14.5)
ERYTHROCYTE [DISTWIDTH] IN BLOOD BY AUTOMATED COUNT: 43.1 FL (ref 35–45)
FERRITIN: 5 NG/ML (ref 22–322)
FLU A ANTIGEN: NEGATIVE
FLU B ANTIGEN: NEGATIVE
FOLATE: 15.6 NG/ML (ref 4.8–24.2)
GFR SERPL CREATININE-BSD FRML MDRD: 76 ML/MIN/1.73M2
GLUCOSE BLD-MCNC: 127 MG/DL (ref 70–108)
HCT VFR BLD CALC: 28.2 % (ref 42–52)
HCT VFR BLD CALC: 28.5 % (ref 42–52)
HEMOGLOBIN: 8.3 GM/DL (ref 14–18)
HEMOGLOBIN: 8.3 GM/DL (ref 14–18)
IMMATURE GRANS (ABS): 0.1 THOU/MM3 (ref 0–0.07)
IMMATURE GRANULOCYTES: 1.4 %
IRON SATURATION: 4 % (ref 20–50)
IRON: 18 UG/DL (ref 65–195)
LIPASE: 55.2 U/L (ref 5.6–51.3)
LYMPHOCYTES # BLD: 13.7 %
LYMPHOCYTES ABSOLUTE: 1 THOU/MM3 (ref 1–4.8)
MCH RBC QN AUTO: 24.2 PG (ref 26–33)
MCHC RBC AUTO-ENTMCNC: 29.4 GM/DL (ref 32.2–35.5)
MCV RBC AUTO: 82.2 FL (ref 80–94)
MONOCYTES # BLD: 7.4 %
MONOCYTES ABSOLUTE: 0.5 THOU/MM3 (ref 0.4–1.3)
NUCLEATED RED BLOOD CELLS: 0 /100 WBC
OSMOLALITY CALCULATION: 275.1 MOSMOL/KG (ref 275–300)
PLATELET # BLD: 196 THOU/MM3 (ref 130–400)
PMV BLD AUTO: 9.4 FL (ref 9.4–12.4)
POTASSIUM REFLEX MAGNESIUM: 4.2 MEQ/L (ref 3.5–5.2)
PRO-BNP: 51 PG/ML (ref 0–900)
RBC # BLD: 3.43 MILL/MM3 (ref 4.7–6.1)
SARS-COV-2, NAAT: NOT  DETECTED
SEG NEUTROPHILS: 77.4 %
SEGMENTED NEUTROPHILS ABSOLUTE COUNT: 5.5 THOU/MM3 (ref 1.8–7.7)
SODIUM BLD-SCNC: 136 MEQ/L (ref 135–145)
TOTAL IRON BINDING CAPACITY: 416 UG/DL (ref 171–450)
TOTAL PROTEIN: 7.4 G/DL (ref 6.1–8)
TROPONIN T: < 0.01 NG/ML
VITAMIN B-12: 431 PG/ML (ref 211–911)
WBC # BLD: 7.1 THOU/MM3 (ref 4.8–10.8)

## 2022-02-10 PROCEDURE — 2580000003 HC RX 258: Performed by: NURSE PRACTITIONER

## 2022-02-10 PROCEDURE — 99223 1ST HOSP IP/OBS HIGH 75: CPT

## 2022-02-10 PROCEDURE — 87804 INFLUENZA ASSAY W/OPTIC: CPT

## 2022-02-10 PROCEDURE — 93010 ELECTROCARDIOGRAM REPORT: CPT | Performed by: NUCLEAR MEDICINE

## 2022-02-10 PROCEDURE — 85025 COMPLETE CBC W/AUTO DIFF WBC: CPT

## 2022-02-10 PROCEDURE — 99285 EMERGENCY DEPT VISIT HI MDM: CPT

## 2022-02-10 PROCEDURE — 6360000002 HC RX W HCPCS

## 2022-02-10 PROCEDURE — 87635 SARS-COV-2 COVID-19 AMP PRB: CPT

## 2022-02-10 PROCEDURE — 82607 VITAMIN B-12: CPT

## 2022-02-10 PROCEDURE — 6360000002 HC RX W HCPCS: Performed by: NURSE PRACTITIONER

## 2022-02-10 PROCEDURE — 84484 ASSAY OF TROPONIN QUANT: CPT

## 2022-02-10 PROCEDURE — 83540 ASSAY OF IRON: CPT

## 2022-02-10 PROCEDURE — 6370000000 HC RX 637 (ALT 250 FOR IP): Performed by: STUDENT IN AN ORGANIZED HEALTH CARE EDUCATION/TRAINING PROGRAM

## 2022-02-10 PROCEDURE — 2580000003 HC RX 258

## 2022-02-10 PROCEDURE — 85014 HEMATOCRIT: CPT

## 2022-02-10 PROCEDURE — 80048 BASIC METABOLIC PNL TOTAL CA: CPT

## 2022-02-10 PROCEDURE — C9113 INJ PANTOPRAZOLE SODIUM, VIA: HCPCS

## 2022-02-10 PROCEDURE — 71045 X-RAY EXAM CHEST 1 VIEW: CPT

## 2022-02-10 PROCEDURE — 82746 ASSAY OF FOLIC ACID SERUM: CPT

## 2022-02-10 PROCEDURE — 36415 COLL VENOUS BLD VENIPUNCTURE: CPT

## 2022-02-10 PROCEDURE — 80076 HEPATIC FUNCTION PANEL: CPT

## 2022-02-10 PROCEDURE — 1200000003 HC TELEMETRY R&B

## 2022-02-10 PROCEDURE — 83690 ASSAY OF LIPASE: CPT

## 2022-02-10 PROCEDURE — 83880 ASSAY OF NATRIURETIC PEPTIDE: CPT

## 2022-02-10 PROCEDURE — 93005 ELECTROCARDIOGRAM TRACING: CPT

## 2022-02-10 PROCEDURE — 85018 HEMOGLOBIN: CPT

## 2022-02-10 PROCEDURE — 93005 ELECTROCARDIOGRAM TRACING: CPT | Performed by: STUDENT IN AN ORGANIZED HEALTH CARE EDUCATION/TRAINING PROGRAM

## 2022-02-10 PROCEDURE — 82728 ASSAY OF FERRITIN: CPT

## 2022-02-10 PROCEDURE — 83550 IRON BINDING TEST: CPT

## 2022-02-10 RX ORDER — ACETAMINOPHEN 650 MG/1
650 SUPPOSITORY RECTAL EVERY 6 HOURS PRN
Status: DISCONTINUED | OUTPATIENT
Start: 2022-02-10 | End: 2022-02-11 | Stop reason: HOSPADM

## 2022-02-10 RX ORDER — PANTOPRAZOLE SODIUM 40 MG/1
40 TABLET, DELAYED RELEASE ORAL
Status: DISCONTINUED | OUTPATIENT
Start: 2022-02-11 | End: 2022-02-11 | Stop reason: HOSPADM

## 2022-02-10 RX ORDER — PANTOPRAZOLE SODIUM 40 MG/1
40 TABLET, DELAYED RELEASE ORAL ONCE
Status: COMPLETED | OUTPATIENT
Start: 2022-02-10 | End: 2022-02-10

## 2022-02-10 RX ORDER — NITROGLYCERIN 0.4 MG/1
0.4 TABLET SUBLINGUAL ONCE
Status: COMPLETED | OUTPATIENT
Start: 2022-02-10 | End: 2022-02-10

## 2022-02-10 RX ORDER — SODIUM CHLORIDE 9 MG/ML
25 INJECTION, SOLUTION INTRAVENOUS PRN
Status: DISCONTINUED | OUTPATIENT
Start: 2022-02-10 | End: 2022-02-11 | Stop reason: HOSPADM

## 2022-02-10 RX ORDER — SODIUM CHLORIDE 0.9 % (FLUSH) 0.9 %
5-40 SYRINGE (ML) INJECTION PRN
Status: DISCONTINUED | OUTPATIENT
Start: 2022-02-10 | End: 2022-02-11 | Stop reason: HOSPADM

## 2022-02-10 RX ORDER — SODIUM CHLORIDE 9 MG/ML
INJECTION, SOLUTION INTRAVENOUS CONTINUOUS
Status: DISCONTINUED | OUTPATIENT
Start: 2022-02-10 | End: 2022-02-11 | Stop reason: HOSPADM

## 2022-02-10 RX ORDER — ACETAMINOPHEN 325 MG/1
650 TABLET ORAL EVERY 6 HOURS PRN
Status: DISCONTINUED | OUTPATIENT
Start: 2022-02-10 | End: 2022-02-11 | Stop reason: HOSPADM

## 2022-02-10 RX ORDER — ONDANSETRON 4 MG/1
4 TABLET, ORALLY DISINTEGRATING ORAL EVERY 8 HOURS PRN
Status: DISCONTINUED | OUTPATIENT
Start: 2022-02-10 | End: 2022-02-11 | Stop reason: HOSPADM

## 2022-02-10 RX ORDER — SODIUM CHLORIDE 0.9 % (FLUSH) 0.9 %
5-40 SYRINGE (ML) INJECTION EVERY 12 HOURS SCHEDULED
Status: DISCONTINUED | OUTPATIENT
Start: 2022-02-10 | End: 2022-02-11 | Stop reason: HOSPADM

## 2022-02-10 RX ORDER — ONDANSETRON 2 MG/ML
4 INJECTION INTRAMUSCULAR; INTRAVENOUS EVERY 6 HOURS PRN
Status: DISCONTINUED | OUTPATIENT
Start: 2022-02-10 | End: 2022-02-11 | Stop reason: HOSPADM

## 2022-02-10 RX ADMIN — SODIUM CHLORIDE 8 MG/HR: 9 INJECTION, SOLUTION INTRAVENOUS at 14:22

## 2022-02-10 RX ADMIN — SODIUM CHLORIDE 80 MG: 9 INJECTION, SOLUTION INTRAVENOUS at 14:23

## 2022-02-10 RX ADMIN — SODIUM CHLORIDE: 9 INJECTION, SOLUTION INTRAVENOUS at 13:00

## 2022-02-10 RX ADMIN — PANTOPRAZOLE SODIUM 40 MG: 40 TABLET, DELAYED RELEASE ORAL at 09:11

## 2022-02-10 RX ADMIN — NITROGLYCERIN 0.4 MG: 0.4 TABLET, ORALLY DISINTEGRATING SUBLINGUAL at 09:11

## 2022-02-10 RX ADMIN — LIDOCAINE HYDROCHLORIDE: 20 SOLUTION ORAL; TOPICAL at 09:12

## 2022-02-10 RX ADMIN — IRON SUCROSE 300 MG: 20 INJECTION, SOLUTION INTRAVENOUS at 17:08

## 2022-02-10 RX ADMIN — SODIUM CHLORIDE, PRESERVATIVE FREE 5 ML: 5 INJECTION INTRAVENOUS at 20:02

## 2022-02-10 ASSESSMENT — PAIN DESCRIPTION - ONSET: ONSET: ON-GOING

## 2022-02-10 ASSESSMENT — PAIN DESCRIPTION - ORIENTATION: ORIENTATION: MID

## 2022-02-10 ASSESSMENT — PAIN SCALES - GENERAL
PAINLEVEL_OUTOF10: 5
PAINLEVEL_OUTOF10: 0
PAINLEVEL_OUTOF10: 0

## 2022-02-10 ASSESSMENT — ENCOUNTER SYMPTOMS
SORE THROAT: 0
SINUS PAIN: 0
BACK PAIN: 0
SHORTNESS OF BREATH: 1
CHEST TIGHTNESS: 0
COLOR CHANGE: 0
ABDOMINAL DISTENTION: 0
COUGH: 0
BLOOD IN STOOL: 0
CONSTIPATION: 1
SINUS PRESSURE: 0
RHINORRHEA: 0
VOMITING: 1
DIARRHEA: 0
NAUSEA: 0
VOMITING: 0
ABDOMINAL PAIN: 1

## 2022-02-10 ASSESSMENT — PAIN DESCRIPTION - PAIN TYPE: TYPE: ACUTE PAIN

## 2022-02-10 ASSESSMENT — PAIN DESCRIPTION - DESCRIPTORS: DESCRIPTORS: BURNING

## 2022-02-10 ASSESSMENT — PAIN DESCRIPTION - FREQUENCY: FREQUENCY: CONTINUOUS

## 2022-02-10 ASSESSMENT — PAIN DESCRIPTION - LOCATION: LOCATION: CHEST

## 2022-02-10 ASSESSMENT — PAIN DESCRIPTION - PROGRESSION: CLINICAL_PROGRESSION: NOT CHANGED

## 2022-02-10 NOTE — ED NOTES
Pt transported to [facility] on cart in stable condition. Floor contacted before transport. Spoke with Marta Homans.      Megan Bethea  02/10/22 [id number]

## 2022-02-10 NOTE — ED TRIAGE NOTES
Patient arrived to room 18 with c/o chest pain/ gastric reflux. Patient stated this has been going on for the last couple of weeks. Patient stated he has been out of his Prilosec. Patient stated he has tried tums and not having any improvement. Patient stated last night he was unable to sleep due to the burning sensation.
[id number] Comprehensive

## 2022-02-10 NOTE — TELEPHONE ENCOUNTER
Received call from [id number] [facility] with [employer] Complaint. Subjective: Caller states \"It feels like stomach gas/bloating. The last few days I was working I got SOB. I really got out of breath bad and it felt like my lungs were burning. It didn't take a whole lot for me to get out of breath. I have no congestion or signs of a cold. I have even have trouble going to the bathroom, number 2. It feels like a heart burn or a big case of indigestion. \"     Current Symptoms: SOB, abdominal pain and chest pain. Onset: 2 weeks of abdominal pain/chest pain off and on. Pain Severity: pain below rib cage in center: 5/10, mild at this moment. Temperature: no     What has been tried: fleet enema (6 am this morning and had a BM),      Recommended disposition: ER, caller states he will go. Care advice provided, patient verbalizes understanding; denies any other questions or concerns; instructed to call back for any new or worsening symptoms. Patient/caller proceeding to closest Emergency Department     Attention Provider: Thank you for allowing me to participate in the care of your patient. The patient was connected to triage in response to information provided to the ECC/PSC. Please do not respond through this encounter as the response is not directed to a shared pool.       Reason for Disposition   Pain lasting > 10 minutes and over 48years old    Protocols used: ABDOMINAL PAIN - UPPER-ADULT-OH

## 2022-02-10 NOTE — CONSULTS
[address] CONSULT      Patient: Isma Campos  : 1960  Acct#: [de-identified]  Consult seen for:   Isma Campos is a 62 y.o. , male with \"possible UGI bleed\" but came to the emergency room with complaints of chest pain and epigastric pain. He had been on PPI but stopped taking it      ASSESSMENT:     1. Epigastric pain  2. Chest pain which was the reason he came into the emergency room. Normal troponin   3. Anemia; H&H 8.3/28. 2. Normocytic but iron level was low at 18 and saturation 4%  4. Family history of colonoscopy with his mother; patient is due for colonoscopy again in  (May)      PLAN:   1. Patient colonoscopy due May 2022 so he is already in recall status for that to be done  2. Follow labs recommend PPI medication since that helped with his symptoms and his symptoms came back when he stopped taking it  3. Consider EGD tomorrow morning with DR Marion Laughter -I tried to get for tomorrow am, but no availability in Endo. 4. EGD Monday be at office at 12:15 for 1:15 procedure with DR Urena  5. NPO  at midnight. 6. Antiemetics  7. On PPI gtt but if no sign of bleeding, could be po.   8. Needs IV Venofer for Iron studies so low; 4%-ordered   9. Soft diet now-ordered   10. Can go home tomorrow. Feels good at this time. IF anything changes overnight let us know      HISTORY OF PRESENT ILLNESS     patient sitting in bed. Feels good at this time and could go home. Stopped taking ppi prior to hospital. Had vomiting with GI cocktail because he does not like liquid meds. Discussed his iron is low and I am ordering IV iron. He has not had EGD in the past and is agreeable to having it done. I tried to get scheduled in endo but they had no availability. Patient is scheduled at our office/[facility] for procedure Monday. RN put information in Discharge note        Pain location had chest pain and epigastric pain.  Doing fine now      EGD: none   Colonoscopy: May 2017 and is due May 2022 due to mom with colon cancer hx  Family history of GI malignancy:  Mom; colon       PROBLEM LIST    does not have any pertinent problems on file. PAST MEDICAL HISTORY     has a past medical history of GERD (gastroesophageal reflux disease). PAST SURGICAL HISTORY      has a past surgical history that includes Dental surgery. LABS:    CBC: Recent Labs     02/10/22  0935   WBC 7.1   HGB 8.3*        BMP:    Recent Labs     02/10/22  0935      K 4.2   CL 99   CO2 24   BUN 17   CREATININE 1.0   GLUCOSE 127*     Hepatic:   Recent Labs     02/10/22  0935   ALKPHOS 84   ALT 24   AST 17   PROT 7.4   BILITOT 0.2*   BILIDIR <0.2   LABALBU 4.3     Amylase and Lipase:  Recent Labs     02/10/22  0935   LIPASE 55.2*     Lactic Acid: No results for input(s): LACTA in the last 72 hours. Calcium:  Recent Labs     02/10/22  0935   CALCIUM 10.6*     Ionized Calcium:No results for input(s): IONCA in the last 72 hours. Magnesium:No results for input(s): MG in the last 72 hours. Phosphorus:No results for input(s): PHOS in the last 72 hours. Troponin: No results for input(s): CKTOTAL, CKMB, TROPONINI in the last 72 hours. PT/INR:   No results for input(s): PROTIME, INR in the last 72 hours. REVIEW OF SYSTEMS:    GENERAL:  No fever, chills or weight loss. EYES:  No  blurred vision, double vision  CARDIOVASCULAR:  No chest pain or palpitations. RESPIRATORY:  No dyspnea or cough. GI: epigastric pain   MUSCULOSKELETAL:  No new painful or swollen joints or myalgias. :   No dysuria or hematuria. SKIN:  No rashes or jaundice. NEUROLOGIC:   No headaches or  seizures,  numbness or tingling of arms, or legs. PSYCH:  No anxiety or depression. ENDOCRINE:   No polyuria or polydipsia. BLOOD:  ++ new iron deficient anemia, no bleeding disorder, blood or blood product transfusion.           PHYSICAL EXAMINATION:      Vitals:    02/10/22 1515   BP: (!) 157/83   Pulse: 79 Resp: 18   Temp: 98.4 °F (36.9 °C)   SpO2: 95%     GEN: Well nourished, well developed. NOT ill appearing  LUNGS:  Clear to auscultation bilaterally. Chest rises equally on inspiration. CARDIOVASCULAR:  Regular rate and rhythm without murmurs, rubs or gallops. ABDOMEN:  Soft, nontender and nondistended with normal bowel sounds. EYES: SHANNON. ENT:  Ears symmetric, Neck supple, trachea midline, moist mucous membranes     EXTREMITIES:  No cyanosis, clubbing, or edema. DERM:  No rash or jaundice. NEURO:  Alert and oriented x4. Patient moves all extremities and has gross sensation in all extremities. PSYCHIATRIC: calm, pleasant    HOME MEDICATIONS      Prior to Admission medications    Medication Sig Start Date End Date Taking? Authorizing Provider   omeprazole (PRILOSEC OTC) 20 MG tablet Take 20 mg by mouth daily    Historical Provider, MD       MEDICATIONS    Scheduled Meds:   sodium chloride flush  5-40 mL IntraVENous 2 times per day     Continuous Infusions:   sodium chloride      sodium chloride 100 mL/hr at 02/10/22 1300    pantoprazole 8 mg/hr (02/10/22 1422)     PRN Meds:.sodium chloride flush, sodium chloride, ondansetron **OR** ondansetron, acetaminophen **OR** acetaminophen    ALLERGIES   has No Known Allergies.     SOCIAL HISTORY    Social History  Social History     Socioeconomic History    Marital status:      Spouse name: Dwight Lima Number of children: 11    Years of education: 12    Highest education level: High school graduate   Occupational History    Occupation: VENDOR     Comment: [address]   Tobacco Use    Smoking status: Former Smoker     Packs/day: 3.00     Years: 10.00     Pack years: 30.00     Types: Cigarettes     Quit date: 2013     Years since quittin.7    Smokeless tobacco: Never Used   Vaping Use    Vaping Use: None   Substance and Sexual Activity    Alcohol use: Never    Drug use: None    Sexual activity: Yes     Partners: Female Other Topics Concern    None   Social History Narrative    None     Social Determinants of Health     Financial Resource Strain:     Difficulty of Paying Living Expenses: Not on file   Food Insecurity:     Worried About Running Out of Food in the Last Year: Not on file    Brian of Food in the Last Year: Not on file   Transportation Needs:     Lack of Transportation (Medical): Not on file    Lack of Transportation (Non-Medical): Not on file   Physical Activity:     Days of Exercise per Week: Not on file    Minutes of Exercise per Session: Not on file   Stress:     Feeling of Stress : Not on file   Social Connections:     Frequency of Communication with Friends and Family: Not on file    Frequency of Social Gatherings with Friends and Family: Not on file    Attends Jew Services: Not on file    Active Member of [address] [place] [address] [employer] or Organizations: Not on file    Attends Club or Organization Meetings: Not on file    Marital Status: Not on file   Intimate Partner Violence:     Fear of Current or Ex-Partner: Not on file    Emotionally Abused: Not on file    Physically Abused: Not on file    Sexually Abused: Not on file   Housing Stability:     Unable to Pay for Housing in the Last Year: Not on file    Number of [place] in the Last Year: Not on file    Unstable Housing in the Last Year: Not on file       FAMILY HISTORY    family history includes Cancer in his brother; Colon Cancer in his mother; Heart Disease in his father. REVIEW OF DIAGNOSTIC TESTING AND LABS:        Hospitalist/Attending provider notes, consulting physician notes, laboratory results and procedure notes reviewed prior to seeing the patient. Note done in collaboration with DR Jensen Franco.    Electronically signed by BHARTI Roldan CNP on 2/10/22 at 3:43 PM EST

## 2022-02-10 NOTE — H&P
Hospitalist - History & Physical      Patient: Rhiannon Carter    Unit/Bed:18/018A  YOB: 1960  MRN: [id number]   Acct: [de-identified]   PCP: Sara Nicole DO    Date of Service: Pt seen/examined on 02/10/22  and Admitted to Inpatient with expected LOS greater than two midnights due to medical therapy. Chief Complaint:  Worsening stomach and chest pain x 1 week. Assessment and Plan:  1. Chest pain with epigastric abdominal pain:    Possible upper GI bleed. Pt history of GERD, PPI noncompliance x 2 years due to financial situation. Notes dark tarry stools, and presents with Hgb 8.3. VSS stable, no acute distress. Initial troponin and EKG negative. Start PPI IV, IVF. Continue NPO diet. Consult GI for possible EGD. 2. Normocytic Anemia, unknown chronicity:    Hgb 8.3 today. No prior labs to compare. Likely secondary to #1. Trend Hgb Q6H x 1 day. Continue to monitor with daily CBC. Anemia work up ordered. History Of Present Illness:    Virginie Ford is a 52-year-old  male non-smoker with a past medical history of GERD who presents to [facility] ED today for evaluation of worsening stomach pain and chest pain x1 week. The patient states that his pain has been worsening and has now started to affect his sleep, so he wanted to come to the ER to be evaluated. Patient states that he was worried he was having signs of heart attack. He states the pain is right below his chest bone, and radiates up his chest and occasionally to his back. Patient describes pain as constant and sharp. He states the pain is worse with laying down. He has also noticed his stool to be very dark and tarry looking. He states that he has used over-the-counter antacids for his pain with very mild relief. The patient was on a prescribed PPI in the past, but had to stop taking it 2 years ago due to financial reasons. He has had associated fatigue, tightness of breath, constipation, decreased appetite.   He denies fever, chills, nausea, vomiting at home, radiation of pain down his arm, headache change in vision, numbness and tingling in his hands and feet. Patient states he has no home medications that he takes regularly. He states he has never been diagnosed with anemia. Patient states that he normally likes to eat spicy foods, and very late into the evening, right before bed. The patient states that he vomited after receiving GI cocktail done in the ED, however his pain has improved since arriving to the ED and receiving Protonix and nitro. Patient denies alcohol use, smoking, illicit drug use. States he has a family history of colon cancer. Remarks that his last colonoscopy was 10 years ago, and that he has never had an EGD. Past Medical History:    History reviewed. No pertinent past medical history. Past Surgical History:        Procedure Laterality Date    DENTAL SURGERY      25 teeth removed       Home Medications:   No current facility-administered medications on file prior to encounter. Current Outpatient Medications on File Prior to Encounter   Medication Sig Dispense Refill    omeprazole (PRILOSEC OTC) 20 MG tablet Take 20 mg by mouth daily         Allergies:    Patient has no known allergies. Social History:    reports that he quit smoking about 8 years ago. His smoking use included cigarettes. He has a 30.00 pack-year smoking history. He has never used smokeless tobacco. He reports that he does not drink alcohol. Family History:       Problem Relation Age of Onset    Colon Cancer Mother     Heart Disease Father     Cancer Brother         lung       Diet:  No diet orders on file    Review of systems:     Review of Systems   Constitutional: Positive for activity change (fatigue) and fatigue. Negative for appetite change, chills and fever. HENT: Negative for congestion, rhinorrhea and sneezing. Eyes: Negative for visual disturbance.    Respiratory: Positive for shortness of breath. Negative for cough and chest tightness. Cardiovascular: Positive for chest pain. Negative for palpitations and leg swelling. Gastrointestinal: Positive for abdominal pain, constipation and vomiting (1 episode in the ED after given GI cocktail). Negative for abdominal distention, blood in stool (pt remarks his stool has been noticeably dark and tarry looking), diarrhea and nausea. Genitourinary: Negative for difficulty urinating, flank pain, frequency and urgency. Musculoskeletal: Negative for arthralgias, back pain and myalgias. Neurological: Negative for dizziness, weakness, light-headedness, numbness and headaches. PHYSICAL EXAM:  BP (!) 149/79   Pulse 87   Temp 98.1 °F (36.7 °C) (Oral)   Resp 17   Ht 5' 9\" (1.753 m)   Wt 180 lb (81.6 kg)   SpO2 95%   BMI 26.58 kg/m²   General appearance: No apparent distress. Appears stated age and cooperative. Skin: Skin color, texture, turgor normal.  No rashes or lesions. HEENT: Normal cephalic, atraumatic without obvious deformity. Pupils equal, round, and reactive to light. Extra-ocular muscles intact. Conjunctivae/corneas clear. Neck: Trachea midline. Supple, with full range of motion. No jugular venous distention. Cardiovascular: Regular rate and rhythm with normal S1/S2. No murmurs, rubs or gallops. Respiratory: On RA. Normal respiratory effort. Clear to auscultation, bilaterally without rales, wheezes, or rhonchi. Abdomen: Mild tenderness with deep palpation of the epigastric region. Soft, non-tender, non-distended. Normal bowel sounds. Musculoskeletal:  No weakness or instability noted. No edema, erythema, or gross deformity noted. Vascular: Capillary refill brisk,< 3 seconds. Pulses +2 palpable, equal bilaterally. Neurologic:  Neurovascularly intact without any focal sensory/motor deficits. Cranial nerves: II-XII grossly intact.    Psychiatric: Alert and oriented, thought content appropriate, normal insight      Labs: Recent Labs     02/10/22  0935   WBC 7.1   HGB 8.3*   HCT 28.2*        Recent Labs     02/10/22  0935      K 4.2   CL 99   CO2 24   BUN 17   CREATININE 1.0   CALCIUM 10.6*     Recent Labs     02/10/22  0935   AST 17   ALT 24   BILIDIR <0.2   BILITOT 0.2*   ALKPHOS 84     No results for input(s): INR in the last 72 hours. No results for input(s): Melvina [place] in the last 72 hours. Urinalysis:    No results found for: Sherri Young, BACTERIA, RBCUA, BLOODU, SPECGRAV, [address]    Radiology:   XR CHEST PORTABLE   Final Result   No acute intrathoracic process. Pulmonary hyperinflation and chronic findings are discussed. **This report has been created using voice recognition software. It may contain minor errors which are inherent in voice recognition technology. **      Final report electronically signed by Dr Jesika Leonard on 2/10/2022 9:27 AM        XR CHEST PORTABLE    Result Date: 2/10/2022  PROCEDURE: XR CHEST PORTABLE CLINICAL INFORMATION: Chest pain. Shortness of breath. COMPARISON: None. TECHNIQUE: AP portable chest radiograph performed. FINDINGS: Lines/tubes: None. Heart/mediastinum: The heart size is normal. The pulmonary vascularity is unremarkable. Lungs: The lungs are hyperinflated. A benign calcified granuloma projects over the right lower lobe. No focal consolidation, pleural effusion, or pneumothorax is observed. Bones: Multilevel degenerative disc disease is noted in the thoracic spine. The visualized skeletal structures appear intact. No acute intrathoracic process. Pulmonary hyperinflation and chronic findings are discussed. **This report has been created using voice recognition software. It may contain minor errors which are inherent in voice recognition technology. ** Final report electronically signed by Dr Jesika Leonard on 2/10/2022 9:27 AM        EKG: normal sinus rhythm, no blocks or conduction defects, no ischemic changes    Electronically signed by Nina Gomes
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
## 2022-02-10 NOTE — PROGRESS NOTES
Pt admitted to  [address] [place] [address] from ED. Complaints: abdominal pain. IV none. Vital signs obtained. Assessment and data collection initiated. Patient declined skin assessment. Patient states no open areas on skin. Oriented to room. Explained patients right to have family, representative or physician notified of their admission. Patient has Declined for physician to be notified. Patient has Declined for family/representative to be notified. The patient is interested in [facility]. Ishas meds to beds program?:  No    Policies and procedures for [facility] explained. All questions answered with no further questions at this time. Fall prevention and safety brochure discussed with patient. Bed alarm on. Call light in reach.

## 2022-02-10 NOTE — ED NOTES
ED to inpatient nurses report    Chief Complaint   Patient presents with    Chest Pain    Gastroesophageal Reflux      Present to ED from home  LOC: alert and orientated to name, place, date  Vital signs   Vitals:    02/10/22 0836 02/10/22 1028   BP: (!) 192/96 (!) 149/79   Pulse: 89 87   Resp: 18 17   Temp: 98.1 °F (36.7 °C)    TempSrc: Oral    SpO2: 98% 95%   Weight: 180 lb (81.6 kg)    Height: 5' 9\" (1.753 m)       Oxygen Baseline room air     Current needs required none Bipap/Cpap No  LDAs:    Mobility: Independent  Pending ED orders: n/a   Present condition: pt stable at this time    Electronically signed by Janis Jackson RN on 2/10/2022 at [place] [id number]  02/10/22 1047

## 2022-02-10 NOTE — ED NOTES
After giving patient GI Cocktail, he started to vomit. Patient stated after liquid medication he vomits and forgot to mention it to this nurse prior to getting the medication. Patient stated he does feel better since he has vomited. Will update Dr. Akosua Lopez.       Tahir Hoyt, RN  02/10/22 6732

## 2022-02-10 NOTE — ED PROVIDER NOTES
ATTENDING NOTE:    I supervised and discussed the history, physical exam and the management of this patient with the resident. I reviewed the resident's note and agree with the documented findings and plan of care. Please see my additional note. Patient presented for burning substernal and epigastric discomfort which is been ongoing for the past 2 weeks. He has tried Tums over-the-counter without relief. Labs, EKG, and studies reviewed. Patient found to be acutely anemic, question whether he may have bleeding ulcer or some other process. Patient will be admitted due to his anemia for further monitoring and GI evaluation. I personally saw and examined the patient. I have reviewed and agree with the resident's findings, including all diagnostic interpretations and treatment plans as written. I was present for the key portion of any procedures performed and the inclusive time noted in any critical care statement.     Electronically verified by Jodi Viramontes MD  02/10/22 7275

## 2022-02-10 NOTE — ED NOTES
ED nurse-to-nurse bedside report    Chief Complaint   Patient presents with    Chest Pain    Gastroesophageal Reflux      LOC: alert and orientated to name, place, date  Vital signs   Vitals:    02/10/22 0836 02/10/22 1028   BP: (!) 192/96 (!) 149/79   Pulse: 89 87   Resp: 18 17   Temp: 98.1 °F (36.7 °C)    TempSrc: Oral    SpO2: 98% 95%   Weight: 180 lb (81.6 kg)    Height: 5' 9\" (1.753 m)       Pain: 5   Pain Interventions: see mar   Pain Goal: 0   Oxygen: No    Current needs required none    Telemetry: Yes  LDAs:    Continuous Infusions:   Mobility: Independent  Young Fall Risk Score: No flowsheet data found.   Fall Interventions: call light at bedside, fall precautions discussed   Report given to: [facility] LISHA Easton RN  02/10/22 1127

## 2022-02-11 VITALS
SYSTOLIC BLOOD PRESSURE: 143 MMHG | HEIGHT: 69 IN | OXYGEN SATURATION: 97 % | TEMPERATURE: 98 F | BODY MASS INDEX: 26.66 KG/M2 | DIASTOLIC BLOOD PRESSURE: 74 MMHG | RESPIRATION RATE: 18 BRPM | HEART RATE: 87 BPM | WEIGHT: 180 LBS

## 2022-02-11 LAB
ERYTHROCYTE [DISTWIDTH] IN BLOOD BY AUTOMATED COUNT: 14.9 % (ref 11.5–14.5)
ERYTHROCYTE [DISTWIDTH] IN BLOOD BY AUTOMATED COUNT: 44.6 FL (ref 35–45)
HCT VFR BLD CALC: 24.7 % (ref 42–52)
HCT VFR BLD CALC: 26.1 % (ref 42–52)
HCT VFR BLD CALC: 26.2 % (ref 42–52)
HEMOGLOBIN: 7.2 GM/DL (ref 14–18)
HEMOGLOBIN: 7.7 GM/DL (ref 14–18)
HEMOGLOBIN: 7.7 GM/DL (ref 14–18)
IRON: 354 UG/DL (ref 65–195)
MCH RBC QN AUTO: 24.2 PG (ref 26–33)
MCHC RBC AUTO-ENTMCNC: 29.1 GM/DL (ref 32.2–35.5)
MCV RBC AUTO: 82.9 FL (ref 80–94)
PLATELET # BLD: 165 THOU/MM3 (ref 130–400)
PMV BLD AUTO: 9.4 FL (ref 9.4–12.4)
RBC # BLD: 2.98 MILL/MM3 (ref 4.7–6.1)
TOTAL IRON BINDING CAPACITY: < 371 UG/DL (ref 171–450)
TROPONIN T: < 0.01 NG/ML
WBC # BLD: 7.5 THOU/MM3 (ref 4.8–10.8)

## 2022-02-11 PROCEDURE — 36415 COLL VENOUS BLD VENIPUNCTURE: CPT

## 2022-02-11 PROCEDURE — 84484 ASSAY OF TROPONIN QUANT: CPT

## 2022-02-11 PROCEDURE — 99239 HOSP IP/OBS DSCHRG MGMT >30: CPT | Performed by: NURSE PRACTITIONER

## 2022-02-11 PROCEDURE — 83540 ASSAY OF IRON: CPT

## 2022-02-11 PROCEDURE — 83550 IRON BINDING TEST: CPT

## 2022-02-11 PROCEDURE — 85014 HEMATOCRIT: CPT

## 2022-02-11 PROCEDURE — 6370000000 HC RX 637 (ALT 250 FOR IP): Performed by: NURSE PRACTITIONER

## 2022-02-11 PROCEDURE — 85018 HEMOGLOBIN: CPT

## 2022-02-11 PROCEDURE — 85027 COMPLETE CBC AUTOMATED: CPT

## 2022-02-11 PROCEDURE — 2580000003 HC RX 258

## 2022-02-11 RX ORDER — PANTOPRAZOLE SODIUM 40 MG/1
40 TABLET, DELAYED RELEASE ORAL
Qty: 30 TABLET | Refills: 0 | Status: SHIPPED | OUTPATIENT
Start: 2022-02-12 | End: 2022-03-16 | Stop reason: SDUPTHER

## 2022-02-11 RX ADMIN — SODIUM CHLORIDE: 9 INJECTION, SOLUTION INTRAVENOUS at 10:35

## 2022-02-11 RX ADMIN — PANTOPRAZOLE SODIUM 40 MG: 40 TABLET, DELAYED RELEASE ORAL at 05:46

## 2022-02-11 NOTE — DISCHARGE SUMMARY
Hospital Medicine Discharge Summary      Patient Identification:   Isma Campos   : 1960  MRN: [id number]   Account: [de-identified]      Patient's PCP: Дмитрий Mclain DO    Admit Date: 2/10/2022     Discharge Date:   2022    Admitting Physician: Yoly Sesay MD     Discharging Nurse Practitioner: BHARTI Pena - CNP     Discharge Diagnoses with Assessment/Plan:  1. Chest pain/epigastric pain--initial troponin was negative so repeated troponin was negative so ACS ruled out, LFTs normal, lipase only mildly elevated at 55.2; appreciate GI input and patient is due for colonoscopy May 2022, planning EGD  at the office~is unable to get a time and endoscopy today per GI note; on Protonix 40 mg daily~per GI note patient stopped taking Protonix and that is when his symptoms reappeared; EKG showing sinus rhythm no acute ST or T wave changes are noted; resolved, eating without any issues  2. Acute on chronic blood loss anemia--hemoglobin on admission was 8.3 and this morning at 7.2; iron studies reveal a ferritin at 5 and an iron level of 18; received Venofer x1 dose on 2/10; hemodynamically stable; questioning if some could be hemodilution in nature; please see #1 for GI plans; needs to discuss Fe So4 with . Rodrick Zamora spoke with Karine Leger and they hold iron for 5 days with EGD so they will address in the office on Monday; I explained to the wife as well as Dr. Joan Deleon did also that we have strict guidelines for transfusion and his hemoglobin has to be less than 7 or hemodynamically unstable in which he is not, orthostatic vital signs completed today were normal  3.  GERD--patient was on Prilosec and stopped taking it, Protonix 40 mg orally resumed with prescription sent, patient had great results with resolution of his pain with GI cocktail yesterday in the ER     The patient was seen and examined on day of discharge and this discharge summary is in conjunction with any daily progress note from day of discharge. Hospital Course:   Jodee Gutierrez is a 62 y.o. male admitted to [address] [place] [address] on 2/10/2022 for chest and epigastric pain; Per H&P done 2/10: \"Niranjan is a 60-year-old  male non-smoker with a past medical history of GERD who presents to [facility] for evaluation of worsening stomach pain and chest pain x1 week.  The patient states that his pain has been worsening and has now started to affect his sleep, so he wanted to come to the ER to be evaluated. Sahil Mejia states that he was worried he was having signs of heart attack.  [facility] states the pain is right below his chest bone, and radiates up his chest and occasionally to his back.  Patient describes pain as constant and sharp. He states the pain is worse with laying down. [facility] has also noticed his stool to be very dark and tarry looking. [facility] states that he has used over-the-counter antacids for his pain with very mild relief.  The patient was on a prescribed PPI in the past, but had to stop taking it 2 years ago due to financial reasons. [facility] has had associated fatigue, tightness of breath, constipation, decreased appetite.  He denies fever, chills, nausea, vomiting at home, radiation of pain down his arm, headache change in vision, numbness and tingling in his hands and feet.  Patient states he has no home medications that he takes regularly. [facility] states he has never been diagnosed with anemia.  Patient states that he normally likes to eat spicy foods, and very late into the evening, right before bed.  The patient states that he vomited after receiving GI cocktail done in the ED, however his pain has improved since arriving to the ED and receiving Protonix and nitro.     Patient denies alcohol use, smoking, illicit drug use.  States he has a family history of colon cancer.  Remarks that his last colonoscopy was 10 years ago, and that he has never had an EGD. \"     2/11--> hemodynamically stable, hemoglobin noted to be 7.2 this morning down from 8.3 on admission; repeat hemoglobin at 7.7, repeat troponin is normal to rule out ACS with 2 (-) troponins; orthostatic vital signs stable, patient relates to feeling much better; I discussed with Shravan Carlin from GI regarding iron supplementation and that is to be on hold and they will discuss at the appointment on February 14 when he gets his EGD; patient will be discharged in stable condition. This case was discussed in detail with Dr. Aramis Adams and he also went in to assess and evaluate the patient and discuss with the wife the plan of care.           Exam:     Vitals:  Vitals:    02/11/22 0342 02/11/22 0835 02/11/22 1217 02/11/22 1238   BP: 129/72 129/65 (!) 146/71 (!) 143/74   Pulse: 77 90 86 87   Resp: 16 18 18 18   Temp: 98.3 °F (36.8 °C) 96.8 °F (36 °C) 99.3 °F (37.4 °C) 98 °F (36.7 °C)   TempSrc: Oral Oral Oral Oral   SpO2:  93% 97%    Weight:       Height:         Weight: Weight: 180 lb (81.6 kg)     24 hour intake/output:    Intake/Output Summary (Last 24 hours) at 2/11/2022 1246  Last data filed at 2/11/2022 0342  Gross per 24 hour   Intake 296.56 ml   Output --   Net 296.56 ml         General appearance:  No apparent distress, appears stated age and cooperative. HEENT:  Normal cephalic, atraumatic without obvious deformity. Pupils equal, round, and reactive to light. Conjunctivae/corneas clear. Neck: Supple, with full range of motion. No jugular venous distention. Trachea midline. Respiratory:  Normal respiratory effort. Clear to auscultation, bilaterally without Rales/Wheezes/Rhonchi. Cardiovascular:  Regular rate and rhythm with normal S1/S2 without murmurs, rubs or gallops. Abdomen: Soft, non-tender, non-distended with normal bowel sounds. Musculoskeletal:  No clubbing, cyanosis or edema bilaterally. Full range of motion without deformity.   Skin: Skin color, texture, turgor normal.    Neurologic:  Neurovascularly intact without any focal sensory/motor deficits. Cranial nerves: II-XII intact, grossly non-focal.  Psychiatric:  Alert and oriented, thought content appropriate  Capillary Refill: Brisk,< 3 seconds   Peripheral Pulses: +2 palpable, equal bilaterally       Labs: For convenience and continuity at follow-up the following most recent labs are provided:      CBC:    Lab Results   Component Value Date    WBC 7.5 02/11/2022    HGB 7.7 02/11/2022    HCT 26.2 02/11/2022     02/11/2022       Renal:    Lab Results   Component Value Date     02/10/2022    K 4.2 02/10/2022    CL 99 02/10/2022    CO2 24 02/10/2022    BUN 17 02/10/2022    CREATININE 1.0 02/10/2022    CALCIUM 10.6 02/10/2022       Cardiac:   Recent Labs     02/10/22  0935 02/11/22  1107   TROPONINT < 0.010 < 0.010       Significant Diagnostic Studies    Radiology:   XR CHEST PORTABLE   Final Result   No acute intrathoracic process. Pulmonary hyperinflation and chronic findings are discussed. **This report has been created using voice recognition software. It may contain minor errors which are inherent in voice recognition technology. **      Final report electronically signed by Dr Tristin Ashton on 2/10/2022 9:27 AM             Consults:     IP CONSULT TO GI    Disposition:    [x] Home       [] TCU       [] Rehab       [] Psych       [] SNF       [] [place]       [] Other-    Condition at Discharge: Stable    Code Status:  Full Code     Pending tests at discharge:      none    Patient Instructions:    Discharge lab work: none  Activity: activity as tolerated  Diet: ADULT DIET; Easy to Chew      Follow-up visits:   Odell Shultz MD  [address]  [phone]    On 2/14/2022  be at office at noon. nothing to eat or drink after midnight sunday night.  need  to take and bring home    [address] [facility]    In 1 week      Odell Shultz MD  [address] [place] [address]  [phone]      follow up on Monday Feb 14 for EGD         Discharge Medications:        Medication List      START taking these medications    pantoprazole 40 MG tablet  Commonly known as: PROTONIX  Take 1 tablet by mouth every morning (before breakfast)  Start taking on: February 12, 2022        STOP taking these medications    PriLOSEC OTC 20 MG tablet  Generic drug: omeprazole           Where to Get Your Medications      These medications were sent to [place] [address] #[id number] - [facility], [address] [facility] [phone]  . [place] , [facility] [address]    Phone: [phone]   · pantoprazole 40 MG tablet         Time Spent on discharge is more than 45 minutes in the examination, evaluation, counseling and review of medications and discharge plan. Signed: Thank you Sara Nicole DO for the opportunity to be involved in this patient's care.     Electronically signed by BHARTI Woods CNP on 2/11/2022 at 12:46 PM

## 2022-02-11 NOTE — CARE COORDINATION
2/11/22, 8:21 AM EST  DISCHARGE PLANNING EVALUATION:    Nancy Doshi       Admitted: 2/10/2022/ [facility] day: 1   Location: [facility]24/024-A Reason for admit: Abdominal pain [R10.9]  Gastrointestinal hemorrhage, unspecified gastrointestinal hemorrhage type [K92.2]  Anemia, unspecified type [D64.9]   PMH:  has a past medical history of GERD (gastroesophageal reflux disease). To ED for evaluation of 2 weeks worsening epigastric/lower chest pain and shortness of breath. 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 Procedure: na  Barriers to Discharge:  Repeat troponin is negative, GI consulted, possible EGD as OP. Follow hgb/hct closely. PCP: Makayla Jameson DO  Readmission Risk Score: 7.8 ( )%    Patient Goals/Plan/Treatment Preferences: Met with Tad Randle early this am; he lives home with wife. He was independent pta, has PCP, confirmed he has no insurance currently.  to see pt. Transportation/Food Security/Housekeeping Addressed:  No issues identified. 2/11/22, 3:31 PM EST    Patient goals/plan/ treatment preferences discussed by  and . Patient goals/plan/ treatment preferences reviewed with patient/ family. Patient/ family verbalize understanding of discharge plan and are in agreement with goal/plan/treatment preferences. Understanding was demonstrated using the teach back method. AVS provided by RN at time of discharge, which includes all necessary medical information pertaining to the patients current course of illness, treatment, post-discharge goals of care, and treatment preferences.

## 2022-02-11 NOTE — CARE COORDINATION
Meet with family at the request of CNP, explained hospital policy of blood transfusion, patient not symptomatic and not orthostatic, hemodynamically stable, explained that iv venofer does not work over [facility] to raise hgb to normal levels, explained dc instructions and follow up with prescriptions will be given at time of dc.

## 2022-02-14 ENCOUNTER — TELEPHONE (OUTPATIENT)
Dept: FAMILY MEDICINE CLINIC | Age: 62
End: 2022-02-14

## 2022-02-14 NOTE — TELEPHONE ENCOUNTER
[facility] Transitions Initial Follow Up Call    Outreach made within 2 business days of discharge: Yes    Patient: Isma Campos Patient : 1960   MRN: [id number]  Reason for Admission: There are no discharge diagnoses documented for the most recent discharge. Discharge Date: 22       Spoke with: patient    Discharge department/facility: [facility]    TCM Interactive Patient Contact:  Was patient able to fill all prescriptions: Yes  Was patient instructed to bring all medications to the follow-up visit: Yes  Is patient taking all medications as directed in the discharge summary?  Yes  Does patient understand their discharge instructions: Yes  Does patient have questions or concerns that need addressed prior to 7-14 day follow up office visit: no    Scheduled appointment with PCP within 7-14 days    Follow Up  Future Appointments   Date Time Provider Pedro Sorensen   2022 10:15 AM Дмитрий Mclain DO [address] [facility]       Antwon Yu LPN

## 2022-02-14 NOTE — TELEPHONE ENCOUNTER
[facility] Transitions Initial Follow Up Call    Outreach made within 2 business days of discharge: Yes    Patient: Mara Otero Patient : 1960   MRN: [id number]  Reason for Admission: There are no discharge diagnoses documented for the most recent discharge. Discharge Date: 22       Spoke with: KIRTI    Discharge department/facility: [address] [place] [address]        Scheduled appointment with PCP within 7-14 days    Follow Up  No future appointments.     Jonelle Will LPN

## 2022-02-17 ENCOUNTER — CARE COORDINATION (OUTPATIENT)
Dept: CARE COORDINATION | Age: 62
End: 2022-02-17

## 2022-02-17 ENCOUNTER — OFFICE VISIT (OUTPATIENT)
Dept: FAMILY MEDICINE CLINIC | Age: 62
End: 2022-02-17

## 2022-02-17 VITALS
DIASTOLIC BLOOD PRESSURE: 64 MMHG | HEART RATE: 84 BPM | RESPIRATION RATE: 20 BRPM | WEIGHT: 202.3 LBS | SYSTOLIC BLOOD PRESSURE: 124 MMHG | BODY MASS INDEX: 29.87 KG/M2

## 2022-02-17 DIAGNOSIS — K21.9 GASTROESOPHAGEAL REFLUX DISEASE, UNSPECIFIED WHETHER ESOPHAGITIS PRESENT: ICD-10-CM

## 2022-02-17 DIAGNOSIS — D62 ACUTE BLOOD LOSS ANEMIA: ICD-10-CM

## 2022-02-17 DIAGNOSIS — R10.9 ABDOMINAL PAIN, UNSPECIFIED ABDOMINAL LOCATION: Primary | ICD-10-CM

## 2022-02-17 PROCEDURE — 99495 TRANSJ CARE MGMT MOD F2F 14D: CPT | Performed by: FAMILY MEDICINE

## 2022-02-17 PROCEDURE — 1111F DSCHRG MED/CURRENT MED MERGE: CPT | Performed by: FAMILY MEDICINE

## 2022-02-17 RX ORDER — FAMOTIDINE 20 MG/1
20 TABLET, FILM COATED ORAL 2 TIMES DAILY
COMMUNITY

## 2022-02-17 SDOH — ECONOMIC STABILITY: FOOD INSECURITY: WITHIN THE PAST 12 MONTHS, YOU WORRIED THAT YOUR FOOD WOULD RUN OUT BEFORE YOU GOT MONEY TO BUY MORE.: OFTEN TRUE

## 2022-02-17 SDOH — ECONOMIC STABILITY: FOOD INSECURITY: WITHIN THE PAST 12 MONTHS, THE FOOD YOU BOUGHT JUST DIDN'T LAST AND YOU DIDN'T HAVE MONEY TO GET MORE.: SOMETIMES TRUE

## 2022-02-17 ASSESSMENT — SOCIAL DETERMINANTS OF HEALTH (SDOH): HOW HARD IS IT FOR YOU TO PAY FOR THE VERY BASICS LIKE FOOD, HOUSING, MEDICAL CARE, AND HEATING?: NOT HARD AT ALL

## 2022-02-17 NOTE — PROGRESS NOTES
Post-Discharge Transitional Care Management Services      Tala Mock   YOB: 1960    Date of Visit:  2/17/2022  30 Day Post-Discharge Date: 3/11/22  Chief Complaint   Patient presents with    Follow-Up from [facility] 2/12/22     Admit Date: 2/10/2022      Discharge Date:   2/11/2022     Admitting Physician: Carlo Wallis MD     Discharging Nurse Practitioner: BHARTI Puga CNP      Discharge Diagnoses with Assessment/Plan:  1. Chest pain/epigastric pain--initial troponin was negative so repeated troponin was negative so ACS ruled out, LFTs normal, lipase only mildly elevated at 55.2; appreciate GI input and patient is due for colonoscopy May 2022, planning EGD Monday, February 14 at the office~is unable to get a time and endoscopy today per GI note; on Protonix 40 mg daily~per GI note patient stopped taking Protonix and that is when his symptoms reappeared; EKG showing sinus rhythm no acute ST or T wave changes are noted; resolved, eating without any issues  2. Acute on chronic blood loss anemia--hemoglobin on admission was 8.3 and this morning at 7.2; iron studies reveal a ferritin at 5 and an iron level of 18; received Venofer x1 dose on 2/10; hemodynamically stable; questioning if some could be hemodilution in nature; please see #1 for GI plans; needs to discuss Fe So4 with Dr. Torrey Coronado spoke with Alli Pink and they hold iron for 5 days with EGD so they will address in the office on Monday; I explained to the wife as well as Dr. Binu Canales did also that we have strict guidelines for transfusion and his hemoglobin has to be less than 7 or hemodynamically unstable in which he is not, orthostatic vital signs completed today were normal  3.  GERD--patient was on Prilosec and stopped taking it, Protonix 40 mg orally resumed with prescription sent, patient had great results with resolution of his pain with GI cocktail yesterday in the ER     The patient was seen and examined on day of discharge and this discharge summary is in conjunction with any daily progress note from day of discharge.     Hospital Course:   Mabel Perez is a 62 y.o. male admitted to [facility] on 2/10/2022 for chest and epigastric pain; Per H&P done 2/10: \"Niranjan is a 71-year-old  male non-smoker with a past medical history of GERD who presents to [facility] for evaluation of worsening stomach pain and chest pain x1 week.  The patient states that his pain has been worsening and has now started to affect his sleep, so he wanted to come to the ER to be evaluated. Lenniharika Anika states that he was worried he was having signs of heart attack.  Kori Phillipskaren states the pain is right below his chest bone, and radiates up his chest and occasionally to his back.  Patient describes pain as constant and sharp.  He states the pain is worse with laying down. Kori Christy has also noticed his stool to be very dark and tarry looking. Kori Christy states that he has used over-the-counter antacids for his pain with very mild relief.  The patient was on a prescribed PPI in the past, but had to stop taking it 2 years ago due to financial reasons. Kori Christy has had associated fatigue, tightness of breath, constipation, decreased appetite.  He denies fever, chills, nausea, vomiting at home, radiation of pain down his arm, headache change in vision, numbness and tingling in his hands and feet.  Patient states he has no home medications that he takes regularly. Kori Beancelina states he has never been diagnosed with anemia.  Patient states that he normally likes to eat spicy foods, and very late into the evening, right before bed.  The patient states that he vomited after receiving GI cocktail done in the ED, however his pain has improved since arriving to the ED and receiving Protonix and nitro.     Patient denies alcohol use, smoking, illicit drug use.  States he has a family history of colon cancer.  Remarks that his last colonoscopy was 10 years ago, and that he has never had an EGD. \"     2/11--> hemodynamically stable, hemoglobin noted to be 7.2 this morning down from 8.3 on admission; repeat hemoglobin at 7.7, repeat troponin is normal to rule out ACS with 2 (-) troponins; orthostatic vital signs stable, patient relates to feeling much better; I discussed with Brooke Mcgee from GI regarding iron supplementation and that is to be on hold and they will discuss at the appointment on February 14 when he gets his EGD; patient will be discharged in stable condition. This case was discussed in detail with Dr. Fritz Perez and he also went in to assess and evaluate the patient and discuss with the wife the plan of care. BPs and weight stable.         BP Readings from Last 3 Encounters:   02/17/22 124/64   02/11/22 (!) 143/74   07/01/20 130/66     Wt Readings from Last 3 Encounters:   02/17/22 202 lb 4.8 oz (91.8 kg)   02/10/22 180 lb (81.6 kg)   07/01/20 204 lb 11.2 oz (92.9 kg)     He is on iron supplement which is causing him to be constipated. Taking Miralax prn. No Known Allergies  Outpatient Medications Marked as Taking for the 2/17/22 encounter (Office Visit) with Roge Noyola, DO   Medication Sig Dispense Refill    famotidine (PEPCID) 20 MG tablet Take 20 mg by mouth 2 times daily      NONFORMULARY Hema-Plex      pantoprazole (PROTONIX) 40 MG tablet Take 1 tablet by mouth every morning (before breakfast) 30 tablet 0         Vitals:    02/17/22 0957   BP: 124/64   Site: Left Upper Arm   Position: Sitting   Cuff Size: Large Adult   Pulse: 84   Resp: 20   Weight: 202 lb 4.8 oz (91.8 kg)     Body mass index is 29.87 kg/m².      Wt Readings from Last 3 Encounters:   02/17/22 202 lb 4.8 oz (91.8 kg)   02/10/22 180 lb (81.6 kg)   07/01/20 204 lb 11.2 oz (92.9 kg)     BP Readings from Last 3 Encounters:   02/17/22 124/64   02/11/22 (!) 143/74   07/01/20 130/66        Patient was admitted to [facility] from 2/10/22 to 2/11/22 for GI bleed.    Inpatient course: Discharge summary reviewed- see chart. Current status: improved    Review of Systems:  A comprehensive review of systems was negative except for what was noted in the HPI. Physical Exam:  General Appearance: alert and oriented to person, place and time, well developed and well- nourished, in no acute distress  Skin: warm and dry, no rash or erythema  Head: normocephalic and atraumatic  Eyes: pupils equal, round, and reactive to light, extraocular eye movements intact, conjunctivae normal  ENT: tympanic membrane, external ear and ear canal normal bilaterally, nose without deformity, nasal mucosa and turbinates normal without polyps  Neck: supple and non-tender without mass, no thyromegaly or thyroid nodules, no cervical lymphadenopathy  Pulmonary/Chest: clear to auscultation bilaterally- no wheezes, rales or rhonchi, normal air movement, no respiratory distress  Cardiovascular: normal rate, regular rhythm, normal S1 and S2, no murmurs, rubs, clicks, or gallops, distal pulses intact, no carotid bruits  Abdomen: soft, non-tender, non-distended, normal bowel sounds, no masses or organomegaly  Extremities: no cyanosis, clubbing or edema  Musculoskeletal: normal range of motion, no joint swelling, deformity or tenderness  Neurologic: reflexes normal and symmetric, no cranial nerve deficit, gait, coordination and speech normal    Initial post-discharge communication occurred between nurse and LVM on 2/14/22- see documentation in chart: telephone encounter.     Assessment/Plan:  Sheila Ridley was seen today for follow-up from hospital.    Diagnoses and all orders for this visit:    Abdominal pain, unspecified abdominal location    Acute blood loss anemia    Gastroesophageal reflux disease, unspecified whether esophagitis present    -  Doing well at this time  -  S/P EGD with biopsy, awaiting path report  -  Chronic medical problems otherwise stable  -  Continue current medications  -  Follow up with specialists as scheduled  -  RTO prn    Diagnostic test results reviewed: inpatient labs, EKG and chest x-ray    Patient risk of morbidity and mortality: moderate    Medical Decision Making: moderate complexity

## 2022-02-17 NOTE — CARE COORDINATION
Called and spoke with pt. Introduced self and my role. Inquired about pt needs/concens. Pt recently unemployed. Provided pt with the [place] Benefits # and advised him to call. Discussed other needs. Informed pt that I will mail out to him local resource information. Active listening provided. Plan of care:  Support pt in community  Provide resource listing for pt.

## 2022-02-22 ENCOUNTER — CARE COORDINATION (OUTPATIENT)
Dept: CARE COORDINATION | Age: 62
End: 2022-02-22

## 2022-02-22 NOTE — CARE COORDINATION
Mailed pt out [employer], [employer] booklet and local food pantry list.  Included my info and advised pt to contact me with any other needs.

## 2022-02-28 ENCOUNTER — CARE COORDINATION (OUTPATIENT)
Dept: CARE COORDINATION | Age: 62
End: 2022-02-28

## 2022-02-28 NOTE — CARE COORDINATION
Called pt to see if he received my mailing form last week. Pt stated he did receive and was appreciative. Advise pt to call me with any future  needs or concerns. Pt voiced understanding.

## 2022-03-16 NOTE — TELEPHONE ENCOUNTER
The patients wife called in and is requesting a refill on the patients Protonix 40mg to be sent to [address] [place] [address].. Order pended for your signature. The patient will check with Shakira tomorrow morning.

## 2022-03-17 RX ORDER — PANTOPRAZOLE SODIUM 40 MG/1
40 TABLET, DELAYED RELEASE ORAL
Qty: 90 TABLET | Refills: 3 | Status: SHIPPED | OUTPATIENT
Start: 2022-03-17 | End: 2022-10-24 | Stop reason: DRUGHIGH

## 2022-03-28 ENCOUNTER — NURSE ONLY (OUTPATIENT)
Dept: LAB | Age: 62
End: 2022-03-28

## 2022-03-28 ENCOUNTER — PATIENT MESSAGE (OUTPATIENT)
Dept: FAMILY MEDICINE CLINIC | Age: 62
End: 2022-03-28

## 2022-03-28 DIAGNOSIS — D64.9 ANEMIA, UNSPECIFIED TYPE: Primary | ICD-10-CM

## 2022-03-28 DIAGNOSIS — D64.9 ANEMIA, UNSPECIFIED TYPE: ICD-10-CM

## 2022-03-28 LAB
BASOPHILS # BLD: 0.8 %
BASOPHILS ABSOLUTE: 0 THOU/MM3 (ref 0–0.1)
EOSINOPHIL # BLD: 1.5 %
EOSINOPHILS ABSOLUTE: 0.1 THOU/MM3 (ref 0–0.4)
ERYTHROCYTE [DISTWIDTH] IN BLOOD BY AUTOMATED COUNT: 19.2 % (ref 11.5–14.5)
ERYTHROCYTE [DISTWIDTH] IN BLOOD BY AUTOMATED COUNT: 57.5 FL (ref 35–45)
HCT VFR BLD CALC: 46.6 % (ref 42–52)
HEMOGLOBIN: 14.1 GM/DL (ref 14–18)
IMMATURE GRANS (ABS): 0.03 THOU/MM3 (ref 0–0.07)
IMMATURE GRANULOCYTES: 0.5 %
LYMPHOCYTES # BLD: 32.7 %
LYMPHOCYTES ABSOLUTE: 2 THOU/MM3 (ref 1–4.8)
MCH RBC QN AUTO: 25.9 PG (ref 26–33)
MCHC RBC AUTO-ENTMCNC: 30.3 GM/DL (ref 32.2–35.5)
MCV RBC AUTO: 85.7 FL (ref 80–94)
MONOCYTES # BLD: 10.9 %
MONOCYTES ABSOLUTE: 0.7 THOU/MM3 (ref 0.4–1.3)
NUCLEATED RED BLOOD CELLS: 0 /100 WBC
PLATELET # BLD: 173 THOU/MM3 (ref 130–400)
PMV BLD AUTO: 8.7 FL (ref 9.4–12.4)
RBC # BLD: 5.44 MILL/MM3 (ref 4.7–6.1)
SEG NEUTROPHILS: 53.6 %
SEGMENTED NEUTROPHILS ABSOLUTE COUNT: 3.3 THOU/MM3 (ref 1.8–7.7)
WBC # BLD: 6.1 THOU/MM3 (ref 4.8–10.8)

## 2022-03-28 NOTE — TELEPHONE ENCOUNTER
From: Raj Yañez  To: Dr. Lei Baptist: 3/28/2022 11:36 AM EDT  Subject: My blood level    Can I get a blood level test order at [facility],please and thank you

## 2022-04-05 ENCOUNTER — HOSPITAL ENCOUNTER (OUTPATIENT)
Dept: NUCLEAR MEDICINE | Age: 62
Discharge: HOME | End: 2022-04-05

## 2022-04-05 DIAGNOSIS — Z00.6 EXAMINATION FOR NORMAL COMPARISON FOR CLINICAL RESEARCH: ICD-10-CM

## 2022-04-07 PROBLEM — C15.5 CANCER OF DISTAL THIRD OF ESOPHAGUS (HCC): Status: ACTIVE | Noted: 2022-04-07

## 2022-04-08 ENCOUNTER — CLINICAL DOCUMENTATION (OUTPATIENT)
Dept: CASE MANAGEMENT | Age: 62
End: 2022-04-08

## 2022-04-08 ENCOUNTER — HOSPITAL ENCOUNTER (OUTPATIENT)
Dept: RADIATION ONCOLOGY | Age: 62
Discharge: HOME | End: 2022-04-08
Payer: MEDICAID

## 2022-04-08 VITALS
WEIGHT: 208.6 LBS | HEART RATE: 90 BPM | TEMPERATURE: 98.3 F | DIASTOLIC BLOOD PRESSURE: 88 MMHG | HEIGHT: 67 IN | BODY MASS INDEX: 32.74 KG/M2 | SYSTOLIC BLOOD PRESSURE: 164 MMHG | OXYGEN SATURATION: 95 % | RESPIRATION RATE: 18 BRPM

## 2022-04-08 DIAGNOSIS — C15.5 CANCER OF DISTAL THIRD OF ESOPHAGUS (HCC): Primary | ICD-10-CM

## 2022-04-08 DIAGNOSIS — C15.5 CANCER OF DISTAL THIRD OF ESOPHAGUS (HCC): ICD-10-CM

## 2022-04-08 PROCEDURE — 99202 OFFICE O/P NEW SF 15 MIN: CPT | Performed by: RADIOLOGY

## 2022-04-08 PROCEDURE — 99205 OFFICE O/P NEW HI 60 MIN: CPT | Performed by: RADIOLOGY

## 2022-04-08 ASSESSMENT — ENCOUNTER SYMPTOMS
CHEST TIGHTNESS: 0
SHORTNESS OF BREATH: 0
WHEEZING: 0
COUGH: 0
ABDOMINAL PAIN: 0
VOMITING: 0
BLOOD IN STOOL: 0
NAUSEA: 0
SORE THROAT: 0
TROUBLE SWALLOWING: 0
APNEA: 0
BACK PAIN: 0

## 2022-04-08 NOTE — PROGRESS NOTES
[address] [facility] [address]        [place],  [facility] Ee: [phone]        F: [phone]       [email or web address]            INITIAL CONSULTATION    Date of Service: 2022  Patient ID: Kenyon Lewis   : 1960  MRN: [id number]   Acct Number: [de-identified]         Requesting Provider: Teresa Conde PA-C  Reason for request: Evaluation for the potential role of neoadjuvant radiation therapy. CONSULTANT: Migel Mckeon MD MS    CHIEF COMPLAINT: Evaluation for the potential role of neoadjuvant radiation therapy. ASSESSMENT:  Cancer Staging  Cancer of distal third of esophagus ([facility] [address]  Staging form: Esophagus - Adenocarcinoma, AJCC 8th Edition  - Clinical stage from 3/23/2022: Stage JOSE (cT2, cN2, cM0, G3) - Signed by Yael Gould MD on 2022      PLAN:  With regards to radiation to the esophagus (chest/abdomen), I discussed the possible short-term side effects which included skin irritation (causing redness, dryness or peeling), tiredness, low blood counts (causing infection or bleeding), inflammation of the lungs (causing shortness of breath and cough), trouble/pain with swallowing and hair loss in treated area, abdominal pain, nausea/vomiting and diarrhea. Possible long-term side effects discussed included hyperpigmentation of the skin, scarring of the lungs (causing shortness of breath and cough), damage to the nerves (causing numbness, weakness, or paralysis), damage to the heart, damage to the bowels or intestines (resulting in bleeding or obstruction that may require surgery), damage to the kidneys (resulting in decreased function), and damage to the liver (resulting in decreased function.     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 We discussed his most recent pathologic and radiographic findings as well as the indication for neoadjuvant therapy. We discussed the definitive treatment of his disease requires surgical resection, if possible, he has already been seen by thoracic surgery at [facility]. We discussed the logistics of neoadjuvant therapy, which would include approximately 6 weeks of radiotherapy with chemotherapy given concurrently. Will refer to medical oncology Dr. Abby Valle for further discussion of systemic therapy. We discussed the potential role of adjuvant systemic therapy following surgical resection of disease based upon pathologic findings, will defer further discussion to Dr. Maura Melo of medical oncology. The patient and family members present stated they understand this plan of care and are agreeable to move forward. We discussed the risks, benefits, and rationale for proceeding with radiation therapy and all of their questions and concerns were answered and addressed to their satisfaction. Consent was signed at today's visit with CT simulation for treatment planning to be performed in the next 2-3 weeks following initial consultation with medical oncology. They have our clinic number to call with any questions or concerns if they were to have any prior to next visit. Thank you for allowing my assistance in the care of your patient. HISTORY OF PRESENT ILLNESS:  Oncology History Overview Note   As per Thoracic Surgery (Dr. Sanjiv Garibay) 03/30/2022    Deon Henriquez is a 62 y.o. male former smoker with history of HTN, HLD, and GERD who was referred to our service by Dr. Nargis Tejeda for recently diagnosed esophageal adenocarcinoma who presents today for review of PET/CT and EUS for esophageal cancer staging. Patient reports he is doing well overall. He denies fever, hemoptysis, chest pain, or dysphagia.      He is currently on a full diet and is supplementing with one Ensure per day. 62 y.o. male former smoker with history of HTN, HLD, and GERD who was referred to our service by Dr. Nargis Tejeda for recently diagnosed esophageal adenocarcinoma who presents today for review of PET/CT and EUS for esophageal cancer staging. Patient was staged as stage JOSE (uT2N2) on recent EUS. Findings from recent PET/CT correlate with EUS findings. Plan to have patient complete neoadjuvant chemoradiation treatment. We will follow-up with patient in ~6 weeks with a telemedicine visit in order to discuss progress. Imaging  EUS on 3/23/2022:  Impression:              - Partially obstructing, malignant esophageal tumor was found in the lower third of the esophagus.                          - Esophagogastric landmarks identified.                          - Large hiatal hernia. - Normal stomach. - Normal examined duodenum.                          - There was diffuse abnormal echotexture in the left lobe of the liver and in the right lobe of the liver. This was characterized by a hyperechoic appearance. This is consistent with a fatty liver. - There was no sign of significant pathology in the pancreatic head, genu of the pancreas and pancreatic body. - There was no sign of significant pathology in the common bile duct. - There was no sign of significant pathology in the gallbladder body. - A mass was found in the lower third of the esophagus. A tissue diagnosis was obtained prior to this exam. This is consistent with adenocarcinoma. This was staged T2 (based on invasion into) N2 Mx by endosonographic criteria. - Two malignant-appearing lymph nodes were visualized and measured in the lower paraesophageal mediastinum (level 8L) adjacent to the mass.                           - Two malignant-appearing lymph nodes were visualized and measured in the subcarinal mediastinum (level 7). - No specimens collected. PET/CT on 3/30/2022:  IMPRESSION:     Intense hypermetabolic activity within the mid esophagus consistent with primary malignancy. Adjacent hypermetabolic left periesophageal lymph node worrisome for metastatic adenopathy. Cancer of distal third of esophagus ([address]   2022 Initial Diagnosis    Cancer of distal third of esophagus [facility])         Mr. Ilsa Guajardo is a 62 y.o. male with above mentioned oncologic history presenting today for initial consultation regarding the potential role for radiation therapy. Review of Systems   Constitutional: Negative for activity change, appetite change, fatigue and unexpected weight change. HENT: Negative for congestion, sore throat and trouble swallowing. Respiratory: Negative for apnea, cough, chest tightness, shortness of breath and wheezing. Cardiovascular: Negative for chest pain and leg swelling. Gastrointestinal: Negative for abdominal pain, blood in stool, nausea and vomiting. Genitourinary: Negative for dysuria, frequency and urgency. Musculoskeletal: Negative for back pain. Neurological: Negative for dizziness, weakness, numbness and headaches. Psychiatric/Behavioral: Negative for confusion. A complete review of systems was performed and found to be negative except as presented above. Advance Directives     Power of  Living Will ACP-Advance Directive ACP-Power of     Not on File Not on File Not on File Not on File          Chaperone: No    Mediport: no    Pacemaker/ICD: no    Previous XRT: no    PAIN: 0/10    ADDITIONAL COMMENTS: Dentures Upper, 7 teeth on lower.        PHYSICAL EXAMINATION:     VITAL SIGNS: BP (!) 164/88   Pulse 90   Temp 98.3 °F (36.8 °C) (Infrared)   Resp 18   Ht 5' 7\" (1.702 m)   Wt 208 lb 9.6 oz (94.6 kg)   SpO2 95%   BMI 32.67 kg/m²     ECO - Asymptomatic (Fully active, able to carry on all pre-disease activities without restriction)    Physical Exam  Constitutional:       General: He is not in acute distress. Appearance: Normal appearance. HENT:      Head: Normocephalic and atraumatic. Cardiovascular:      Rate and Rhythm: Normal rate and regular rhythm. Pulmonary:      Effort: Pulmonary effort is normal. No respiratory distress. Abdominal:      General: Abdomen is flat. There is no distension. Palpations: Abdomen is soft. Tenderness: There is no abdominal tenderness. Musculoskeletal:         General: Normal range of motion. Neurological:      General: No focal deficit present. Mental Status: He is alert and oriented to person, place, and time. Motor: No weakness.       Gait: Gait normal.         Past Medical History:   Diagnosis Date    Cancer of distal third of esophagus ([address] 2022    GERD (gastroesophageal reflux disease)        Past Surgical History:   Procedure Laterality Date    DENTAL SURGERY      25 teeth removed       Family History   Problem Relation Age of Onset    Colon Cancer Mother     Heart Disease Father     Cancer Brother         lung       Social History     Socioeconomic History    Marital status:      Spouse name: Len Philippe Number of children: 11    Years of education: 15    Highest education level: High school graduate   Occupational History    Occupation: VENDOR     Comment: [employer]   Tobacco Use    Smoking status: Former Smoker     Packs/day: 3.00     Years: 10.00     Pack years: 30.00     Types: Cigarettes     Quit date: 2013     Years since quittin.9    Smokeless tobacco: Never Used   Vaping Use    Vaping Use: Not on file   Substance and Sexual Activity    Alcohol use: Never    Drug use: Never    Sexual activity: Yes     Partners: Female   Other Topics Concern    Not on file   Social History Narrative    Not on file     Social Determinants of Health     Financial Resource Strain: Low Risk     Difficulty of Paying Living Expenses: Not hard at all   Food Insecurity: Food Insecurity Present    Worried About [facility] in the Last Year: Often true    Brian of Food in the Last Year: Sometimes true   Transportation Needs:     Lack of Transportation (Medical): Not on file    Lack of Transportation (Non-Medical): Not on file   Physical Activity:     Days of Exercise per Week: Not on file    Minutes of Exercise per Session: Not on file   Stress:     Feeling of Stress : Not on file   Social Connections:     Frequency of Communication with Friends and Family: Not on file    Frequency of Social Gatherings with Friends and Family: Not on file    Attends Druze Services: Not on file    Active Member of [address] [place] [address] or Organizations: Not on file    Attends Club or Organization Meetings: Not on file    Marital Status: Not on file   Intimate Partner Violence:     Fear of Current or Ex-Partner: Not on file    Emotionally Abused: Not on file    Physically Abused: Not on file    Sexually Abused: Not on file   Housing Stability:     Unable to Pay for Housing in the Last Year: Not on file    Number of [place] in the Last Year: Not on file    Unstable Housing in the Last Year: Not on file       No Known Allergies     Current Outpatient Medications   Medication Sig Dispense Refill    pantoprazole (PROTONIX) 40 MG tablet Take 1 tablet by mouth every morning (before breakfast) 90 tablet 3    famotidine (PEPCID) 20 MG tablet Take 20 mg by mouth 2 times daily      NONFORMULARY Hema-Plex (Iron Multi-vitamin)       No current facility-administered medications for this encounter.        No outpatient medications have been marked as taking for the 4/8/22 encounter [facility] Encounter) with Delsie Harada, MD.       LABORATORY STUDIES:   Onc labs:   Lab Results   Component Value Date    PSA 1.41 03/22/2017       Lab Results   Component Value Date    CREATININE 1.0 02/10/2022     Lab Results   Component Value Date    BUN 17 02/10/2022       PATHOLOGY: As per HPI above. RADIOLOGIC STUDIES: As per HPI above. Electronically signed by Nimo Richards on 4/8/22 at 10:26 AM EDT     ATTESTATION: 60 minutes were spent with the patient at today's visit reviewing pertinent information related to their oncologic diagnosis, including any recent labs, imaging, follow ups and plan of care going forward.     CC:Dr. Rose Hammans ([facility])   ACC:. [facility]

## 2022-04-08 NOTE — PROGRESS NOTES
Name: Rachael Lesches  : 1960  MRN: [id number]    Oncology Navigation- Initial Note:    Intake-  Contact Type: Radiation Oncology    Diagnosis: Head/Neck- malignant- esophageal    Home Disposition: Lives with other who is able to assist  -Ava Gerardo  off from work as  at [employer]; also  work    Patient needs and barriers to care: Coordination of Care, Knowledge deficit and Symptom Management     Referral Source: Outpatient    Receptive to Advanced Care Planning/ Palliative Care:  deferred    Interventions-   General Interventions: Fredy program discussed; welcome folder given & reviewed, including contact information. Education/Screenings:  yes -     Rad ONC discussed pt's dx & recommendations for tx.  -Plan is for XRT approx 30 txs. -Simulation scheduled 22     Referrals: Supportive Therapies  reviewed     Continuum of Care: Diagnosis/Active Treatment    Notes: Fredy is available to assist & support as needed. *Pt has interest in do advanced directives & will alert staff or Fredy on when he wishes to meet with  to complete.       Electronically signed by Frances Higgins RN on 2022 at 1:46 PM

## 2022-04-22 ENCOUNTER — HOSPITAL ENCOUNTER (OUTPATIENT)
Dept: RADIATION ONCOLOGY | Age: 62
Discharge: HOME | End: 2022-04-22
Attending: RADIOLOGY
Payer: MEDICAID

## 2022-04-22 ENCOUNTER — HOSPITAL ENCOUNTER (OUTPATIENT)
Dept: CT IMAGING | Age: 62
Discharge: HOME | End: 2022-04-22

## 2022-04-22 DIAGNOSIS — C15.5 MALIGNANT NEOPLASM OF LOWER THIRD OF ESOPHAGUS (HCC): ICD-10-CM

## 2022-04-22 PROCEDURE — 3209999900 CT GUIDE RADIATION THERAPY NO CHARGE

## 2022-04-22 PROCEDURE — 77470 SPECIAL RADIATION TREATMENT: CPT | Performed by: RADIOLOGY

## 2022-04-22 PROCEDURE — 77263 THER RADIOLOGY TX PLNG CPLX: CPT | Performed by: RADIOLOGY

## 2022-04-22 PROCEDURE — 77334 RADIATION TREATMENT AID(S): CPT | Performed by: RADIOLOGY

## 2022-04-27 ENCOUNTER — OFFICE VISIT (OUTPATIENT)
Dept: ONCOLOGY | Age: 62
End: 2022-04-27
Payer: MEDICAID

## 2022-04-27 ENCOUNTER — HOSPITAL ENCOUNTER (OUTPATIENT)
Dept: INFUSION THERAPY | Age: 62
Discharge: HOME | End: 2022-04-27
Payer: MEDICAID

## 2022-04-27 ENCOUNTER — CLINICAL DOCUMENTATION (OUTPATIENT)
Dept: CASE MANAGEMENT | Age: 62
End: 2022-04-27

## 2022-04-27 VITALS
RESPIRATION RATE: 16 BRPM | DIASTOLIC BLOOD PRESSURE: 87 MMHG | OXYGEN SATURATION: 96 % | HEIGHT: 67 IN | WEIGHT: 210 LBS | HEART RATE: 74 BPM | TEMPERATURE: 98.5 F | SYSTOLIC BLOOD PRESSURE: 157 MMHG | BODY MASS INDEX: 32.96 KG/M2

## 2022-04-27 DIAGNOSIS — D50.0 IRON DEFICIENCY ANEMIA DUE TO CHRONIC BLOOD LOSS: ICD-10-CM

## 2022-04-27 DIAGNOSIS — C15.5 CANCER OF DISTAL THIRD OF ESOPHAGUS (HCC): ICD-10-CM

## 2022-04-27 DIAGNOSIS — C15.5 CANCER OF DISTAL THIRD OF ESOPHAGUS (HCC): Primary | ICD-10-CM

## 2022-04-27 LAB
ABSOLUTE IMMATURE GRANULOCYTE: 0.02 THOU/MM3 (ref 0–0.07)
ALBUMIN SERPL-MCNC: 4.5 G/DL (ref 3.5–5.1)
ALP BLD-CCNC: 74 U/L (ref 38–126)
ALT SERPL-CCNC: 38 U/L (ref 11–66)
AST SERPL-CCNC: 29 U/L (ref 5–40)
BASINOPHIL, AUTOMATED: 0 % (ref 0–3)
BASOPHILS ABSOLUTE: 0 THOU/MM3 (ref 0–0.1)
BILIRUB SERPL-MCNC: 0.3 MG/DL (ref 0.3–1.2)
BILIRUBIN DIRECT: < 0.2 MG/DL (ref 0–0.3)
BUN, WHOLE BLOOD: 21 MG/DL (ref 8–26)
CHLORIDE, WHOLE BLOOD: 107 MEQ/L (ref 98–109)
CREATININE, WHOLE BLOOD: 1 MG/DL (ref 0.5–1.2)
EOSINOPHILS ABSOLUTE: 0.2 THOU/MM3 (ref 0–0.4)
EOSINOPHILS RELATIVE PERCENT: 4 % (ref 0–4)
FERRITIN: 28 NG/ML (ref 22–322)
GFR, ESTIMATED: 81 ML/MIN/1.73M2
GLUCOSE, WHOLE BLOOD: 92 MG/DL (ref 70–108)
HCT VFR BLD CALC: 43.7 % (ref 42–52)
HEMOGLOBIN: 14.3 GM/DL (ref 14–18)
IMMATURE GRANULOCYTES: 0 %
IONIZED CALCIUM, WHOLE BLOOD: 1.14 MMOL/L (ref 1.12–1.32)
IRON SATURATION: 17 % (ref 20–50)
IRON: 58 UG/DL (ref 65–195)
LYMPHOCYTES # BLD: 26 % (ref 15–47)
LYMPHOCYTES ABSOLUTE: 1.6 THOU/MM3 (ref 1–4.8)
MCH RBC QN AUTO: 26.9 PG (ref 26–33)
MCHC RBC AUTO-ENTMCNC: 32.7 GM/DL (ref 32.2–35.5)
MCV RBC AUTO: 82 FL (ref 80–94)
MONOCYTES ABSOLUTE: 0.6 THOU/MM3 (ref 0.4–1.3)
MONOCYTES: 9 % (ref 0–12)
PDW BLD-RTO: 16.8 % (ref 11.5–14.5)
PLATELET # BLD: 156 THOU/MM3 (ref 130–400)
PMV BLD AUTO: 8.3 FL (ref 9.4–12.4)
POTASSIUM, WHOLE BLOOD: 4.3 MEQ/L (ref 3.5–4.9)
RBC # BLD: 5.32 MILL/MM3 (ref 4.7–6.1)
SEG NEUTROPHILS: 61 % (ref 43–75)
SEGMENTED NEUTROPHILS ABSOLUTE COUNT: 3.6 THOU/MM3 (ref 1.8–7.7)
SODIUM, WHOLE BLOOD: 139 MEQ/L (ref 138–146)
TOTAL CO2, WHOLE BLOOD: 24 MEQ/L (ref 23–33)
TOTAL IRON BINDING CAPACITY: 341 UG/DL (ref 171–450)
TOTAL PROTEIN: 7.1 G/DL (ref 6.1–8)
WBC # BLD: 6 THOU/MM3 (ref 4.8–10.8)

## 2022-04-27 PROCEDURE — 99211 OFF/OP EST MAY X REQ PHY/QHP: CPT

## 2022-04-27 PROCEDURE — 85025 COMPLETE CBC W/AUTO DIFF WBC: CPT

## 2022-04-27 PROCEDURE — 83540 ASSAY OF IRON: CPT

## 2022-04-27 PROCEDURE — 36415 COLL VENOUS BLD VENIPUNCTURE: CPT

## 2022-04-27 PROCEDURE — 80047 BASIC METABLC PNL IONIZED CA: CPT

## 2022-04-27 PROCEDURE — 80076 HEPATIC FUNCTION PANEL: CPT

## 2022-04-27 PROCEDURE — 99205 OFFICE O/P NEW HI 60 MIN: CPT | Performed by: INTERNAL MEDICINE

## 2022-04-27 PROCEDURE — 83550 IRON BINDING TEST: CPT

## 2022-04-27 PROCEDURE — 82728 ASSAY OF FERRITIN: CPT

## 2022-04-27 RX ORDER — PROCHLORPERAZINE MALEATE 10 MG
10 TABLET ORAL EVERY 6 HOURS PRN
Qty: 30 TABLET | Refills: 1 | Status: SHIPPED | OUTPATIENT
Start: 2022-04-27

## 2022-04-27 RX ORDER — ONDANSETRON 8 MG/1
8 TABLET, ORALLY DISINTEGRATING ORAL EVERY 8 HOURS PRN
Qty: 10 TABLET | Refills: 1 | Status: SHIPPED | OUTPATIENT
Start: 2022-04-27

## 2022-04-27 NOTE — PROGRESS NOTES
Name: Casandra Sinclair  : 1960  MRN: [id number]    Oncology Navigation Follow-Up Note    Contact Type:  Medical Oncology    Subjective: appt with oncologist    Objective: WBC 6.0  HGB 14.3  Plt 156    K+ 4.3  BUN 21  Crea 1.0     Onc reviewed pathology & recommendations for care. POC:    -PICC  -Chemo teaching  -Radiation with weekly chemo x6-7 weeks.  -Plan chemo start 22    Assistance Needed: denies    Receptive to Advanced Care Planning / Palliative Care:  deferred    Referrals: N/A today    Education: ONC discussed POC     Notes: Fredy following to assist & support as needed.     Electronically signed by Timur Gilman RN on 2022 at 4:13 PM

## 2022-04-27 NOTE — PROGRESS NOTES
[address] CANCER [facility] 200  [address] [address]  Dept: [phone]  Loc: [phone]   Hematology/Oncology Consult (Clinic)        4/27/22     Tamela Jam Madora   1960     Rolanda Hubbard, *   Ana Lucas DO       Reason: Locally advanced adenocarcinoma the distal esophagus referred for neoadjuvant chemotherapy. 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 Clinical stage T2 N2 M0. Mass extends from 34 to 28 cm approximately 6 cm. Staging by EUS 3/23/2022 [facility] (T2 based on invasion and N2 based on 2 malignant appearing lymph nodes visualized and measured in the lower paraesophageal mediastinum level 8L adjacent to the mass. 2 malignant appearing lymph nodes visualized and measured in the subcarinal mediastinum level 7. PET/CT 3/30/2022 [facility]- hypermetabolic activity at mid esophagus consistent with primary malignancy with adjacent hypermetabolic left periesophageal lymph nodes. TREATMENT:  -Seen by Dr. Brianna Hatfield of thoracic surgery at [facility] 3/30/2022. Recommends neoadjuvant chemoradiation to be followed by surgery  -Neoadjuvant chemoradiation with Dr. Vikas Villasenor. Low-dose carboplatinum Taxol weekly x 6-7. Tentative start date: 5/10    PARAMETERS:  -EGD/EUS  -PET/CT    COMORBIDITIES:  Include 30-pack-year history quit in 2013, alcohol none, GERD, hypertension, hyperlipidemia    HPI: Vinicio uHa is a 43-year-old gentleman with a long history of GERD who presented to the ER on February 10 was admitted for 1 day because of chest heaviness. No cardiology because GI was consulted ultimately showed a mass in the distal esophagus. Outpatient EGD requested.   Diagnosed with adenocarcinoma the distal esophagus and referred to Dr. Brianna Hatfield of thoracic surgery at [facility].  EUS shows regional tone involvement. See scans below and ultrasound. No distant mets. Patient has no significant dysphagia and no weight loss. Patient referred back locally for chemoradiation neoadjuvant treatment before surgery. ROS:  Review of Systems 14 point negative except as above. PMH:   Past Medical History:   Diagnosis Date    Cancer of distal third of esophagus ([address] 2022    GERD (gastroesophageal reflux disease)         Social HX:   Social History     Socioeconomic History    Marital status:      Spouse name: Stephanie Faust Number of children: 11    Years of education: 15    Highest education level: High school graduate   Occupational History    Occupation: VENDOR     Comment: [employer]   Tobacco Use    Smoking status: Former Smoker     Packs/day: 3.00     Years: 10.00     Pack years: 30.00     Types: Cigarettes     Quit date: 2013     Years since quittin.9    Smokeless tobacco: Never Used   Vaping Use    Vaping Use: Not on file   Substance and Sexual Activity    Alcohol use: Never    Drug use: Never    Sexual activity: Yes     Partners: Female   Other Topics Concern    Not on file   Social History Narrative    Not on file     Social Determinants of Health     Financial Resource Strain: Low Risk     Difficulty of Paying Living Expenses: Not hard at all   Food Insecurity: [address] Worried About [address] in the Last Year: Often true    Brian of Food in the Last Year: Sometimes true   Transportation Needs:     Lack of Transportation (Medical): Not on file    Lack of Transportation (Non-Medical):  Not on file   Physical Activity:     Days of Exercise per Week: Not on file    Minutes of Exercise per Session: Not on file   Stress:     Feeling of Stress : Not on file   Social Connections:     Frequency of Communication with Friends and Family: Not on file    Frequency of Social Gatherings with Friends and Family: Not on file    Attends Yarsani Services: Not on file    Active Member of Clubs or Organizations: Not on file    Attends Club or Organization Meetings: Not on file    Marital Status: Not on file   Intimate Partner Violence:     Fear of Current or Ex-Partner: Not on file    Emotionally Abused: Not on file    Physically Abused: Not on file    Sexually Abused: Not on file   Housing Stability:     Unable to Pay for Housing in the Last Year: Not on file    Number of [place] in the Last Year: Not on file    Unstable Housing in the Last Year: Not on file        Spouse: Everette Ribeiro  [phone]    Phone: [phone]     [address] [facility]   [address]     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 [employer]    Immunizations:  Immunization History   Administered Date(s) Administered    Influenza Virus Vaccine 09/15/2015        Health Screenings:    C-Scope:  5 years ago  Prostate:   Lab Results   Component Value Date    PSA 1.41 03/22/2017            Health Maintenance Due   Topic Date Due    Pneumococcal 0-64 years Vaccine (1 - PCV) Never done    COVID-19 Vaccine (1) Never done    DTaP/Tdap/Td vaccine (1 - Tdap) Never done    Shingles Vaccine (1 of 2) Never done    Low dose CT lung screening  Never done    Diabetes screen  03/22/2020    Lipids  03/22/2022        Interests:   Cars. music family,    Fam HX:   Family History   Problem Relation Age of Onset    Colon Cancer Mother     Heart Disease Father     Cancer Brother         lung        Hospitalizations:   2/10/22    Allergies:  No Known Allergies     Adult Illness:  Patient Active Problem List   Diagnosis    Abdominal pain    Cancer of distal third of esophagus [facility])        Surgery:  Past Surgical History:   Procedure Laterality Date    DENTAL SURGERY      25 teeth removed        Medications:  Current Outpatient Medications   Medication Sig Dispense Refill    pantoprazole (PROTONIX) 40 MG tablet Take 1 tablet by mouth every morning (before breakfast) 90 tablet 3    famotidine (PEPCID) 20 MG tablet Take 20 mg by mouth 2 times daily      NONFORMULARY Hema-Plex (Iron Multi-vitamin)       No current facility-administered medications for this visit. Over-the-counter iron supplement 3 times a day      EXAM:   height is 5' 7\" (1.702 m) and weight is 210 lb (95.3 kg). His oral temperature is 98.5 °F (36.9 °C). His blood pressure is 157/87 (abnormal) and his pulse is 74. His respiration is 16 and oxygen saturation is 96%. Estimated body surface area is 2.12 meters squared as calculated from the following:    Height as of this encounter: 5' 7\" (1.702 m). Weight as of this encounter: 210 lb (95.3 kg). ECO  General: Non-ill appearing. Very pleasant and relaxed. Appears healthy. HEENT: NC/AT,nonicteric, perrla,eom intact, no mucosal lesions  Neck: normal thyroid, no masses. pulses nl, no bruits,   Nodes: No adenopathy  Lungs/chest: clear, no rales,rhonchi or wheezing, lung bases clear  CV: rrr, no rubs ,gallops or murmurs  Breasts: Not examined  Abd/Rectal: soft, non-tender,bowel sounds normal , no HSM,no masses  Back: normal curvature, No midline tenderness. flanks nontender  : Not Examined  Extremities: no cyanosis,clubbing or edema. Skin: unremarkable  Neuro: A and O x 4, CN exam nonfocal, Motor- no deficits, Sensory- no deficits, gait-nl, speech- fluent, no ataxia.   Devices: none      DATA:    LAB:     CBC with Differential:      Lab Results   Component Value Date    WBC 6.1 2022    RBC 5.44 2022    HGB 14.1 2022    HCT 46.6 2022     2022    MCV 85.7 2022    MCH 25.9 2022    MCHC 30.3 2022    NRBC 0 2022    SEGSPCT 53.6 2022    MONOPCT 10.9 2022    MONOSABS 0.7 2022    LYMPHSABS 2.0 2022    EOSABS 0.1 2022    BASOSABS 0.0 2022     Lab Results   Component Value Date/Time    SEGSABS 3.3 2022 01:39 PM       CMP:    Lab Results   Component Value Date  02/10/2022    K 4.2 02/10/2022    CL 99 02/10/2022    CO2 24 02/10/2022    BUN 17 02/10/2022    CREATININE 1.0 02/10/2022    LABGLOM 76 02/10/2022    GLUCOSE 127 02/10/2022    PROT 7.4 02/10/2022    LABALBU 4.3 02/10/2022    CALCIUM 10.6 02/10/2022    BILITOT 0.2 02/10/2022    ALKPHOS 84 02/10/2022    AST 17 02/10/2022    ALT 24 02/10/2022       BMP:    Lab Results   Component Value Date     02/10/2022    K 4.2 02/10/2022    CL 99 02/10/2022    CO2 24 02/10/2022    BUN 17 02/10/2022    LABALBU 4.3 02/10/2022    CREATININE 1.0 02/10/2022    CALCIUM 10.6 02/10/2022    LABGLOM 76 02/10/2022    GLUCOSE 127 02/10/2022       Magnesium:    Lab Results   Component Value Date    MG 2.2 03/22/2017     PT/INR:  No results found for: PROTIME, INR  TSH:    Lab Results   Component Value Date    TSH 2.800 03/22/2017     VITAMIN B12: No components found for: B12  FOLATE:    Lab Results   Component Value Date    FOLATE 15.6 02/10/2022     IRON:    Lab Results   Component Value Date    IRON 354 02/11/2022     Iron Saturation:  No components found for: PERCENTFE  TIBC:    Lab Results   Component Value Date    TIBC < 371 02/11/2022     FERRITIN:    Lab Results   Component Value Date    FERRITIN 5 02/10/2022     PSA:   Lab Results   Component Value Date    PSA 1.41 03/22/2017            IMAGING:  -PET/CT 3/30/2022  Intense hypermetabolic activity within the mid esophagus consistent with a primary malignancy. Adjacent hypermetabolic left periesophageal lymph node worrisome for metastatic adenopathy. PROCEDURES:  EGD 2/14/2022  Ulcerated mass distal esophagus    EUS 3/23/2022  Partially obstructing malignant esophageal tumor found in the lower third of the esophagus. Large hiatal hernia. Normal stomach and duodenum. Liver most consistent with fatty liver. 2 malignant appearing lymph nodes visualized and measured in the lower paraesophageal mediastinum level 8L adjacent to the mass.   2 malignant appearing lymph nodes visualized and measured in the subcarinal mediastinum level 7. PATHOLOGY:   EGD distal esophageal biopsy path report  Pathologic Diagnosis     Outside Slides  [id number] (02/15/22)  A. Esophagus, distal, biopsy:   · Invasive adenocarcinoma, moderately differentiated. See comment   · Alcian Blue/PAS performed at outside institution and submitted for review highlights Prescott's mucosa in the background      HER2/robina Gene Status: Amplified. GENETICS:  HER2 amplified. MOLECULAR:  None    ASSESSMENT/PLAN:    1: Diagnosis: 62-year-old gentleman with adenocarcinoma of the distal third of the esophagus. Clinical stage T2N2 by EUS and PET scan confirmed. No distant mets. No significant dysphagia. Presented with chest discomfort. Chronic history of GERD. Performance status is excellent. Seen by Dr. Kenny Cash thoracic surgery at [facility] referred back for neoadjuvant chemoradiation before surgery. 2) Prognosis / Disease Status: High risk node positive disease T2N2 clinical stage, locally advanced. HER2 amplified which has no bearing unless he develops metastatic disease. 3) Work-up:    Labs: CBC, CMP   Imaging: Done   Procedures: PICC line   Consults: Nurse PICC line team for PICC placement                               chemo teaching regarding low-dose weekly CarboTaxol x7 with concurrent radiation   Other:    4) Symptom Management: None needed      5) Supportive care provided. Level of care is appropriate. Teaching done today. Reviewed the NCCN guidelines and reviewed the con current regimen of low-dose carboplatin and Taxol including risks and benefits. Main risk of esophagitis from radiation. No pre-existing neuropathy. Did review the risk of a CarboTaxol hypersensitivity reaction. Patient understands and agrees to additional teaching and proceed with treatment.         6) Treatment goal: Cure      Treatment plan:     Neoadjuvant chemoradiation: Low-dose carboplatin and Taxol weekly x6-7 with radiation. Tentative start date for radiation next week on 5/9. Start date for first weekly CarboTaxol is 5/11      7) Medications reviewed. Prescriptions today: Compazine, Zofran ODT            No orders of the defined types were placed in this encounter. OARRS:  Controlled Substance Monitoring:    Acute and Chronic Pain Monitoring:   No flowsheet data found. 8) Research Options:   Not applicable      9) Other:        Follow-up with Dr. Lyudmila Bains after chemoradiation is done for surgery. 10) Follow Up: Follow-up with 5/11/2022 for week 1 of low-dose carboplatin Taxol with radiation. No provider necessary that day to be seen. Follow-up with nurse practitioner 5/17 labs and week 2 of treatment.       Jordin Greene MD

## 2022-05-02 ENCOUNTER — HOSPITAL ENCOUNTER (OUTPATIENT)
Dept: RADIATION ONCOLOGY | Age: 62
Discharge: HOME | End: 2022-05-02
Attending: RADIOLOGY
Payer: MEDICAID

## 2022-05-02 ENCOUNTER — HOSPITAL ENCOUNTER (OUTPATIENT)
Dept: RADIATION ONCOLOGY | Age: 62
End: 2022-05-02
Attending: RADIOLOGY
Payer: MEDICAID

## 2022-05-02 PROCEDURE — 77301 RADIOTHERAPY DOSE PLAN IMRT: CPT | Performed by: RADIOLOGY

## 2022-05-02 PROCEDURE — 77293 RESPIRATOR MOTION MGMT SIMUL: CPT | Performed by: RADIOLOGY

## 2022-05-02 PROCEDURE — 77338 DESIGN MLC DEVICE FOR IMRT: CPT | Performed by: RADIOLOGY

## 2022-05-02 PROCEDURE — 77300 RADIATION THERAPY DOSE PLAN: CPT | Performed by: RADIOLOGY

## 2022-05-03 DIAGNOSIS — C15.5 CANCER OF DISTAL THIRD OF ESOPHAGUS (HCC): Primary | ICD-10-CM

## 2022-05-03 RX ORDER — PALONOSETRON 0.05 MG/ML
0.25 INJECTION, SOLUTION INTRAVENOUS ONCE
Status: CANCELLED | OUTPATIENT
Start: 2022-05-11 | End: 2022-05-10

## 2022-05-03 RX ORDER — SODIUM CHLORIDE 9 MG/ML
5-40 INJECTION INTRAVENOUS PRN
Status: CANCELLED | OUTPATIENT
Start: 2022-05-11

## 2022-05-03 RX ORDER — DIPHENHYDRAMINE HYDROCHLORIDE 50 MG/ML
50 INJECTION INTRAMUSCULAR; INTRAVENOUS ONCE
Status: CANCELLED | OUTPATIENT
Start: 2022-05-11 | End: 2022-05-10

## 2022-05-03 RX ORDER — ACETAMINOPHEN 325 MG/1
650 TABLET ORAL
Status: CANCELLED | OUTPATIENT
Start: 2022-05-11

## 2022-05-03 RX ORDER — SODIUM CHLORIDE 9 MG/ML
5-250 INJECTION, SOLUTION INTRAVENOUS PRN
Status: CANCELLED | OUTPATIENT
Start: 2022-05-11

## 2022-05-03 RX ORDER — ONDANSETRON 2 MG/ML
8 INJECTION INTRAMUSCULAR; INTRAVENOUS
Status: CANCELLED | OUTPATIENT
Start: 2022-05-11

## 2022-05-03 RX ORDER — DIPHENHYDRAMINE HYDROCHLORIDE 50 MG/ML
50 INJECTION INTRAMUSCULAR; INTRAVENOUS
Status: CANCELLED | OUTPATIENT
Start: 2022-05-11

## 2022-05-03 RX ORDER — SODIUM CHLORIDE 9 MG/ML
INJECTION, SOLUTION INTRAVENOUS CONTINUOUS
Status: CANCELLED | OUTPATIENT
Start: 2022-05-11

## 2022-05-03 RX ORDER — FAMOTIDINE 10 MG/ML
20 INJECTION, SOLUTION INTRAVENOUS
Status: CANCELLED | OUTPATIENT
Start: 2022-05-11

## 2022-05-03 RX ORDER — MEPERIDINE HYDROCHLORIDE 50 MG/ML
12.5 INJECTION INTRAMUSCULAR; INTRAVENOUS; SUBCUTANEOUS PRN
Status: CANCELLED | OUTPATIENT
Start: 2022-05-11

## 2022-05-03 RX ORDER — EPINEPHRINE 1 MG/ML
0.3 INJECTION, SOLUTION, CONCENTRATE INTRAVENOUS PRN
Status: CANCELLED | OUTPATIENT
Start: 2022-05-11

## 2022-05-03 RX ORDER — ALBUTEROL SULFATE 90 UG/1
4 AEROSOL, METERED RESPIRATORY (INHALATION) PRN
Status: CANCELLED | OUTPATIENT
Start: 2022-05-11

## 2022-05-03 RX ORDER — SODIUM CHLORIDE 0.9 % (FLUSH) 0.9 %
5-40 SYRINGE (ML) INJECTION PRN
Status: CANCELLED | OUTPATIENT
Start: 2022-05-11

## 2022-05-03 RX ORDER — HEPARIN SODIUM (PORCINE) LOCK FLUSH IV SOLN 100 UNIT/ML 100 UNIT/ML
500 SOLUTION INTRAVENOUS PRN
Status: CANCELLED | OUTPATIENT
Start: 2022-05-11

## 2022-05-03 RX ORDER — FAMOTIDINE 10 MG/ML
20 INJECTION, SOLUTION INTRAVENOUS ONCE
Status: CANCELLED | OUTPATIENT
Start: 2022-05-11 | End: 2022-05-10

## 2022-05-03 NOTE — PROGRESS NOTES
[address] CANCER [address] [place] [address]  Dept: [phone]  Loc: [phone]   Hematology/Oncology Consult (Clinic)        4/27/22     Jennifer Jenkins Madora   1960     No ref. provider found   Vashti Mckeon DO       Reason: Locally advanced adenocarcinoma the distal esophagus referred for neoadjuvant chemotherapy. No chief complaint on file. 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 Clinical stage T2 N2 M0. Mass extends from 34 to 28 cm approximately 6 cm. Staging by EUS 3/23/2022 [facility] (T2 based on invasion and N2 based on 2 malignant appearing lymph nodes visualized and measured in the lower paraesophageal mediastinum level 8L adjacent to the mass. 2 malignant appearing lymph nodes visualized and measured in the subcarinal mediastinum level 7. PET/CT 3/30/2022 [facility]- hypermetabolic activity at mid esophagus consistent with primary malignancy with adjacent hypermetabolic left periesophageal lymph nodes. TREATMENT:  -Seen by Dr. Simone Griffith of thoracic surgery at [place] 3/30/2022. Recommends neoadjuvant chemoradiation to be followed by surgery  -Neoadjuvant chemoradiation with Dr. Candis Cabral. Low-dose carboplatinum Taxol weekly x 6-7. Tentative start date: 5/10    PARAMETERS:  -EGD/EUS  -PET/CT    COMORBIDITIES:  Include 30-pack-year history quit in 2013, alcohol none, GERD, hypertension, hyperlipidemia    HPI: Jo Lentz is a 62-year-old gentleman with a long history of GERD who presented to the ER on February 10 was admitted for 1 day because of chest heaviness. No cardiology because GI was consulted ultimately showed a mass in the distal esophagus. Outpatient EGD requested. Diagnosed with adenocarcinoma the distal esophagus and referred to Dr. Simone Griffith of thoracic surgery at [place].  EUS shows regional tone involvement. See scans below and ultrasound.   No distant mets.  Patient has no significant dysphagia and no weight loss. Patient referred back locally for chemoradiation neoadjuvant treatment before surgery. ROS:  Review of Systems 14 point negative except as above. PMH:   Past Medical History:   Diagnosis Date    Cancer of distal third of esophagus ([address] 2022    GERD (gastroesophageal reflux disease)         Social HX:   Social History     Socioeconomic History    Marital status:      Spouse name: Barnabas Bernheim Number of children: 11    Years of education: 15    Highest education level: High school graduate   Occupational History    Occupation: VENDOR     Comment: [address]   Tobacco Use    Smoking status: Former Smoker     Packs/day: 3.00     Years: 10.00     Pack years: 30.00     Types: Cigarettes     Quit date: 2013     Years since quittin.0    Smokeless tobacco: Never Used   Vaping Use    Vaping Use: Not on file   Substance and Sexual Activity    Alcohol use: Never    Drug use: Never    Sexual activity: Yes     Partners: Female   Other Topics Concern    Not on file   Social History Narrative    Not on file     Social Determinants of Health     Financial Resource Strain: Low Risk     Difficulty of Paying Living Expenses: Not hard at all   Food Insecurity: [address] [employer] Worried About [address] [employer] in the Last Year: Often true    [address] in the Last Year: Sometimes true   Transportation Needs:     Lack of Transportation (Medical): Not on file    Lack of Transportation (Non-Medical):  Not on file   Physical Activity:     Days of Exercise per Week: Not on file    Minutes of Exercise per Session: Not on file   Stress:     Feeling of Stress : Not on file   Social Connections:     Frequency of Communication with Friends and Family: Not on file    Frequency of Social Gatherings with Friends and Family: Not on file    Attends Alevism Services: Not on file   [employer] or Organizations: Not on file    Attends Club or Organization Meetings: Not on file    Marital Status: Not on file   Intimate Partner Violence:     Fear of Current or Ex-Partner: Not on file    Emotionally Abused: Not on file    Physically Abused: Not on file    Sexually Abused: Not on file   Housing Stability:     Unable to Pay for Housing in the Last Year: Not on file    Number of [place] in the Last Year: Not on file    Unstable Housing in the Last Year: Not on file        Spouse: Diana Cueto  [phone]    Phone: [phone]     [address] [place] [address] Dr GANN [address]     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 music family,    Fam HX:   Family History   Problem Relation Age of Onset    Colon Cancer Mother     Heart Disease Father     Cancer Brother         lung        Hospitalizations:   2/10/22    Allergies:  No Known Allergies     Adult Illness:  Patient Active Problem List   Diagnosis    Abdominal pain    Cancer of distal third of esophagus ([address]        Surgery:  Past Surgical History:   Procedure Laterality Date    DENTAL SURGERY      25 teeth removed        Medications:  Current Outpatient Medications   Medication Sig Dispense Refill    ondansetron (ZOFRAN-ODT) 8 MG TBDP disintegrating tablet Place 1 tablet under the tongue every 8 hours as needed for Nausea or Vomiting 10 tablet 1    prochlorperazine (COMPAZINE) 10 MG tablet Take 1 tablet by mouth every 6 hours as needed (nausea) 30 tablet 1    pantoprazole (PROTONIX) 40 MG tablet Take 1 tablet by mouth every morning (before breakfast) 90 tablet 3    famotidine (PEPCID) 20 MG tablet Take 20 mg by mouth 2 times daily      NONFORMULARY Hema-Plex (Iron Multi-vitamin)       No current facility-administered medications for this visit. Over-the-counter iron supplement 3 times a day      EXAM:   vitals were not taken for this visit. Estimated body surface area is 2.12 meters squared as calculated from the following:    Height as of 22: 5' 7\" (1.702 m). Weight as of 22: 210 lb (95.3 kg). ECO  General: Non-ill appearing. Very pleasant and relaxed. Appears healthy. HEENT: NC/AT,nonicteric, perrla,eom intact, no mucosal lesions  Neck: normal thyroid, no masses. pulses nl, no bruits,   Nodes: No adenopathy  Lungs/chest: clear, no rales,rhonchi or wheezing, lung bases clear  CV: rrr, no rubs ,gallops or murmurs  Breasts: Not examined  Abd/Rectal: soft, non-tender,bowel sounds normal , no HSM,no masses  Back: normal curvature, No midline tenderness. flanks nontender  : Not Examined  Extremities: no cyanosis,clubbing or edema. Skin: unremarkable  Neuro: A and O x 4, CN exam nonfocal, Motor- no deficits, Sensory- no deficits, gait-nl, speech- fluent, no ataxia.   Devices: none      DATA:    LAB:     CBC with Differential:      Lab Results   Component Value Date    WBC 6.0 2022    RBC 5.32 2022    HGB 14.3 2022    HCT 43.7 2022     2022    MCV 82 2022    MCH 26.9 2022    MCHC 32.7 2022    RDW 16.8 2022    NRBC 0 2022    SEGSPCT 53.6 2022    MONOPCT 10.9 2022    MONOSABS 0.6 2022    LYMPHSABS 1.6 2022    EOSABS 0.2 2022    BASOSABS 0.0 2022     Lab Results   Component Value Date/Time    SEGSABS 3.6 2022 01:47 PM       CMP: Lab Results   Component Value Date     04/27/2022     02/10/2022    K 4.3 04/27/2022    K 4.2 02/10/2022    CL 99 02/10/2022    CO2 24 02/10/2022    BUN 17 02/10/2022    CREATININE 1.0 04/27/2022    CREATININE 1.0 02/10/2022    LABGLOM 76 02/10/2022    GLUCOSE 127 02/10/2022    PROT 7.1 04/27/2022    LABALBU 4.5 04/27/2022    CALCIUM 10.6 02/10/2022    BILITOT 0.3 04/27/2022    ALKPHOS 74 04/27/2022    AST 29 04/27/2022    ALT 38 04/27/2022       BMP:    Lab Results   Component Value Date     04/27/2022     02/10/2022    K 4.3 04/27/2022    K 4.2 02/10/2022    CL 99 02/10/2022    CO2 24 02/10/2022    BUN 17 02/10/2022    LABALBU 4.5 04/27/2022    CREATININE 1.0 04/27/2022    CREATININE 1.0 02/10/2022    CALCIUM 10.6 02/10/2022    LABGLOM 76 02/10/2022    GLUCOSE 127 02/10/2022       Magnesium:    Lab Results   Component Value Date    MG 2.2 03/22/2017     PT/INR:  No results found for: PROTIME, INR  TSH:    Lab Results   Component Value Date    TSH 2.800 03/22/2017     VITAMIN B12: No components found for: B12  FOLATE:    Lab Results   Component Value Date    FOLATE 15.6 02/10/2022     IRON:    Lab Results   Component Value Date    IRON 58 04/27/2022     Iron Saturation:  No components found for: PERCENTFE  TIBC:    Lab Results   Component Value Date    TIBC 341 04/27/2022     FERRITIN:    Lab Results   Component Value Date    FERRITIN 28 04/27/2022     PSA:   Lab Results   Component Value Date    PSA 1.41 03/22/2017            IMAGING:  -PET/CT 3/30/2022  Intense hypermetabolic activity within the mid esophagus consistent with a primary malignancy. Adjacent hypermetabolic left periesophageal lymph node worrisome for metastatic adenopathy. PROCEDURES:  EGD 2/14/2022  Ulcerated mass distal esophagus    EUS 3/23/2022  Partially obstructing malignant esophageal tumor found in the lower third of the esophagus. Large hiatal hernia. Normal stomach and duodenum.   Liver most consistent with fatty liver. 2 malignant appearing lymph nodes visualized and measured in the lower paraesophageal mediastinum level 8L adjacent to the mass. 2 malignant appearing lymph nodes visualized and measured in the subcarinal mediastinum level 7. PATHOLOGY:   EGD distal esophageal biopsy path report  Pathologic Diagnosis     Outside Slides  [id number] (02/15/22)  A. Esophagus, distal, biopsy:   · Invasive adenocarcinoma, moderately differentiated. See comment   · Alcian Blue/PAS performed at outside institution and submitted for review highlights Prescott's mucosa in the background      HER2/robina Gene Status: Amplified. GENETICS:  HER2 amplified. MOLECULAR:  None    ASSESSMENT/PLAN:    1: Diagnosis: 81-year-old gentleman with adenocarcinoma of the distal third of the esophagus. Clinical stage T2N2 by EUS and PET scan confirmed. No distant mets. No significant dysphagia. Presented with chest discomfort. Chronic history of GERD. Performance status is excellent. Seen by Dr. Zander Acuna thoracic surgery at [address] [place] [address] referred back for neoadjuvant chemoradiation before surgery. 2) Prognosis / Disease Status: High risk node positive disease T2N2 clinical stage, locally advanced. HER2 amplified which has no bearing unless he develops metastatic disease. 3) Work-up:    Labs: CBC, CMP   Imaging: Done   Procedures: PICC line   Consults: Nurse PICC line team for PICC placement                               chemo teaching regarding low-dose weekly CarboTaxol x7 with concurrent radiation   Other:    4) Symptom Management: None needed      5) Supportive care provided. Level of care is appropriate. Teaching done today. Reviewed the NCCN guidelines and reviewed the con current regimen of low-dose carboplatin and Taxol including risks and benefits. Main risk of esophagitis from radiation. No pre-existing neuropathy. Did review the risk of a CarboTaxol hypersensitivity reaction.   Patient understands and agrees to additional teaching and proceed with treatment. 6) Treatment goal: Cure      Treatment plan:     Neoadjuvant chemoradiation: Low-dose carboplatin and Taxol weekly x6-7 with radiation. Tentative start date for radiation next week on 5/9. Start date for first weekly CarboTaxol is 5/11      7) Medications reviewed. Prescriptions today: Compazine, Zofran ODT            No orders of the defined types were placed in this encounter. OARRS:  Controlled Substance Monitoring:    Acute and Chronic Pain Monitoring:   No flowsheet data found. 8) Research Options:   Not applicable      9) Other:        Follow-up with Dr. Titus Fischer after chemoradiation is done for surgery. 10) Follow Up: Follow-up with 5/11/2022 for week 1 of low-dose carboplatin Taxol with radiation. No provider necessary that day to be seen. Follow-up with nurse practitioner 5/17 labs and week 2 of treatment.       Carolyn Garrison MD

## 2022-05-04 ENCOUNTER — HOSPITAL ENCOUNTER (OUTPATIENT)
Dept: OTHER | Age: 62
Discharge: HOME | End: 2022-05-04
Payer: MEDICAID

## 2022-05-04 ENCOUNTER — HOSPITAL ENCOUNTER (OUTPATIENT)
Dept: GENERAL RADIOLOGY | Age: 62
Discharge: HOME | End: 2022-05-04
Payer: MEDICAID

## 2022-05-04 VITALS
RESPIRATION RATE: 16 BRPM | OXYGEN SATURATION: 97 % | WEIGHT: 210 LBS | HEIGHT: 67 IN | DIASTOLIC BLOOD PRESSURE: 84 MMHG | BODY MASS INDEX: 32.96 KG/M2 | HEART RATE: 75 BPM | TEMPERATURE: 98.3 F | SYSTOLIC BLOOD PRESSURE: 148 MMHG

## 2022-05-04 LAB
CREAT SERPL-MCNC: 1 MG/DL (ref 0.4–1.2)
GFR SERPL CREATININE-BSD FRML MDRD: 76 ML/MIN/1.73M2

## 2022-05-04 PROCEDURE — 71045 X-RAY EXAM CHEST 1 VIEW: CPT

## 2022-05-04 PROCEDURE — C1751 CATH, INF, PER/CENT/MIDLINE: HCPCS

## 2022-05-04 PROCEDURE — 82565 ASSAY OF CREATININE: CPT

## 2022-05-04 PROCEDURE — 36569 INSJ PICC 5 YR+ W/O IMAGING: CPT

## 2022-05-04 PROCEDURE — 76937 US GUIDE VASCULAR ACCESS: CPT

## 2022-05-04 RX ORDER — LIDOCAINE HYDROCHLORIDE 10 MG/ML
5 INJECTION, SOLUTION EPIDURAL; INFILTRATION; INTRACAUDAL; PERINEURAL ONCE
Status: DISCONTINUED | OUTPATIENT
Start: 2022-05-04 | End: 2022-05-05 | Stop reason: HOSPADM

## 2022-05-04 RX ORDER — SODIUM CHLORIDE 9 MG/ML
25 INJECTION, SOLUTION INTRAVENOUS PRN
Status: DISCONTINUED | OUTPATIENT
Start: 2022-05-04 | End: 2022-05-04

## 2022-05-04 RX ORDER — SODIUM CHLORIDE 0.9 % (FLUSH) 0.9 %
10 SYRINGE (ML) INJECTION PRN
Status: DISCONTINUED | OUTPATIENT
Start: 2022-05-04 | End: 2022-05-05 | Stop reason: HOSPADM

## 2022-05-04 RX ORDER — SODIUM CHLORIDE 0.9 % (FLUSH) 0.9 %
10 SYRINGE (ML) INJECTION EVERY 12 HOURS SCHEDULED
Status: DISCONTINUED | OUTPATIENT
Start: 2022-05-04 | End: 2022-05-04

## 2022-05-04 NOTE — PROGRESS NOTES
0800: Patient arrived ambulatory for picc line placement. Wife at bedside. Patient rights and responsibilities offered to patient. Blood work collected and sent to lab. Waiting on results for creatinine clearance for picc line placement. 4924: Blood work results called to ALDARIUS Banerjee in IV therapy, states she will be done shortly for placement. 0319Therejnnifer Owens with IV team at bedside for picc placement. 5615: Picc in place, xray called to check a portable for placement. Wife back at bedside. 1015: Xray at bedside. 1100: Xray results called to Vazquez Sharif with IV team stated she will come down now to pull catheter back. 1130: Repeat xray placed. 1150: Received call from [place] with IV team states picc is good and ok to be discharged. AVS reviewed with patient, voiced understanding. Patient discharged ambulatory.                                  _m___ Safety:       (Environmental)   [place] to environment   Ensure ID band is correct and in place/ allergy band as needed   Assess for fall risk   Initiate fall precautions as applicable (fall band, side rails, etc.)   Call light within reach   Bed in low position/ wheels locked    _m___ Pain:        Assess pain level and characteristics   Administer analgesics as ordered   Assess effectiveness of pain management and report to MD as needed    _m___ Knowledge Deficit:   Assess baseline knowledge   Provide teaching at level of understanding   Provide teaching via preferred learning method   Evaluate teaching effectiveness    _m___ Hemodynamic/Respiratory Status:       (Pre and Post Procedure Monitoring)   Assess/Monitor vital signs and LOC   Assess Baseline SpO2 prior to any sedation   Obtain weight/height   Assess vital signs/ LOC until patient meets discharge criteria   Monitor procedure site and notify MD of any issues

## 2022-05-04 NOTE — PROGRESS NOTES
New chemotherapy validation note:    Diagnosis for chemotherapy: Cancer of distal third of esophagus    Regimen ordered: WEEKLY CARBOPLATIN AUC 2 + PACLITAXEL 45MG/M2 WITH CONCURRENT RADIATION FOR 7 CYCLES          Reference or literature used for validation: NCCN     Date literature or guideline last updated 3/31/2021     Deviation from literature or guideline used: N/A    Summary of any verbal or telephone information obtained: Shaista Zhou, [address] [facility], PharmD, BCPS  Clinical Pharmacist   5/4/2022 1:45 PM

## 2022-05-04 NOTE — PROGRESS NOTES
PICC Procedure Note    Alexander Zhang   Admitted- 5/4/2022  7:44 AM  Admission diagnosis- No admission diagnoses are documented for this encounter.       Attending Physician- No att. providers found  Ordering Physician-Dr Tanner Stephens  Indication for Insertion: Chemotherapy    Catheter Insertion Date- 5/4/2022   Lot Number- [id number]  Gauge-4  Lumen-single    Insertion Site- RAH Basilic  Vein Diameter- 6.80 cm  Catheter Length- 46 cm  Internal Length- 46 cm  Exposed Catheter Length- 0cm   Upper Arm Circumference- 32cm  Tip Confirmation System Bundle met- No: chest xray needed  If X-ray required, Tip Location- [place]  Radiologist- Dr Natalya Campos    PICC insertion successful- Yes  Ultrasound- yes    Okay To Use PICC- Yes    Electronically signed by Em Schulz, RN, RN on 5/4/2022 at 10:27 AM

## 2022-05-05 ENCOUNTER — HOSPITAL ENCOUNTER (OUTPATIENT)
Dept: INFUSION THERAPY | Age: 62
Discharge: HOME | End: 2022-05-05
Payer: MEDICAID

## 2022-05-05 VITALS
OXYGEN SATURATION: 97 % | RESPIRATION RATE: 18 BRPM | HEART RATE: 83 BPM | DIASTOLIC BLOOD PRESSURE: 95 MMHG | SYSTOLIC BLOOD PRESSURE: 136 MMHG | TEMPERATURE: 98 F

## 2022-05-05 PROCEDURE — 99212 OFFICE O/P EST SF 10 MIN: CPT

## 2022-05-05 NOTE — PROGRESS NOTES
Chemotherapy/Immunotherapy Teaching Checklist    Treatment Plan: taxol and carboplatin concurrent with radiation  Frequency: every week  Patient accompanied by wife and daughter      Day of chemo instructions:  [x] Must have    [x] Eat light breakfast  [x] Bring pain medications/other routine medications scheduled during appointment time  [] Hold blood pressure medications morning of treatment    Treatment process:  [x] Flow of appointment  [x] IV access Peripheral start  or  PORT access process [x] EMLA cream  [x] Premedication/ Hydration  [x] Length of treatment  [x] Tour of clinic    Diet and hydration:  [x] Discuss [place] diet    [x] Eating Hints book provided  [x] Importance of hydration 48- 64 ounces daily   [x] Hydration handout provided     Side Effects:  [x] Chemotherapy and you book provided  [x] Side effects and management discussed   [x] Diarrhea[x]Nausea/Vomiting [x]home antiemetic [x]hair loss[x]Neuropathy[x] Constipation[x] Fatigue[x]Mouth sores[x] Dehydration[x] Skin/Nail Changes []Pneumonitis []Colitis [] Hepatitis []Thyroid changes  []Fertility issues (birth control, sperm/egg banking issues)    Labs:  [x]BMP- renal function and electrolytes discussed  [x] CBC- RBC, WBC with ANC, platelet counts discussed  [x]Understanding your Blood hand out given   [x]Neutropenia handout/Reduce infection handout [x]Thrombocytopenia handout   [x]Mariano discussed    Complications that require immediate attention from physician:  [x]Fever 100.3 [x]signs of infection- chills, fever, burning with urination, worsening cough   [x]Uncontrolled vomiting [x]Uncontrolled diarrhea/constipation   [x]Unable to drink 48 ounces [x]Uncontrolled pain  [x] When to notify provider handout given  [x] After hours contact process discussed    Support Services:  [x] Financial navigator   []RN navigator explained  []Local cancer society  []     Patient and family verbalized understanding and all  questions answered. Encouraged to call for any concerns. Approximately 40 minutes spent with patient and family. Discharged in satisfactory condition. Ambulated off unit per self.

## 2022-05-05 NOTE — PLAN OF CARE
Problem: Chronic Conditions and Co-morbidities  Goal: Patient's chronic conditions and co-morbidity symptoms are monitored and maintained or improved  Outcome: Adequate for Discharge  Flowsheets (Taken 5/5/2022 1444)  Care Plan - Patient's Chronic Conditions and Co-Morbidity Symptoms are Monitored and Maintained or Improved:   Monitor and assess patient's chronic conditions and comorbid symptoms for stability, deterioration, or improvement   Update acute care plan with appropriate goals if chronic or comorbid symptoms are exacerbated and prevent overall improvement and discharge  Note: Patient verbalizes understanding to verbal information given on taxol and carboplatin ,action and possible side effects. Aware to call MD if develop complications. Chemotherapy Teaching     What is Chemotherapy   Drug action [x]   Method of Administration [x]   Handouts given [x]     Side Effects  Nausea/vomiting [x]   Diarrhea [x]   Fatigue [x]   Signs / Symptoms of infection [x]   Neutropenia [x]   Thrombocytopenia [x]   Alopecia [x]   neuropathy [x]   [place] diet &  the importance of fluids [x]       Micellaneous  Importance of nutrition [x]   Importance of oral hygiene [x]   When to call the MD [x]   Monitoring labs [x]   Use of supportive services [x]     Explanation of Drug Regimen / Frequency  Taxol and carboplatin     Comments  Verbalized understanding to drug,action,side effects and when to call MD         Problem: Safety - Adult  Goal: Free from fall injury  Outcome: Adequate for Discharge  Flowsheets (Taken 5/5/2022 1444)  Free From Fall Injury: Instruct family/caregiver on patient safety  Note: Fall risks assessed. Precautions discussed.       Problem: Discharge Planning  Goal: Discharge to home or other facility with appropriate resources  Outcome: Adequate for Discharge  Flowsheets (Taken 5/5/2022 1444)  Discharge to home or other facility with appropriate resources:   Identify barriers to discharge with patient and caregiver   Arrange for needed discharge resources and transportation as appropriate  Note: Patient verbalizes understanding of discharge instructions, follow up appointment, and when to call physician if needed      Care plan reviewed with patient. Patient verbalizes understanding of the plan of care and contributes to goal setting.

## 2022-05-09 ENCOUNTER — HOSPITAL ENCOUNTER (OUTPATIENT)
Dept: INFUSION THERAPY | Age: 62
Discharge: HOME | End: 2022-05-09
Payer: MEDICAID

## 2022-05-09 VITALS
TEMPERATURE: 98.6 F | OXYGEN SATURATION: 96 % | SYSTOLIC BLOOD PRESSURE: 137 MMHG | RESPIRATION RATE: 16 BRPM | HEART RATE: 99 BPM | DIASTOLIC BLOOD PRESSURE: 93 MMHG

## 2022-05-09 DIAGNOSIS — C15.5 CANCER OF DISTAL THIRD OF ESOPHAGUS (HCC): Primary | ICD-10-CM

## 2022-05-09 PROCEDURE — 2580000003 HC RX 258: Performed by: INTERNAL MEDICINE

## 2022-05-09 PROCEDURE — 99212 OFFICE O/P EST SF 10 MIN: CPT

## 2022-05-09 RX ORDER — SODIUM CHLORIDE 9 MG/ML
25 INJECTION, SOLUTION INTRAVENOUS PRN
Status: CANCELLED | OUTPATIENT
Start: 2022-05-09

## 2022-05-09 RX ORDER — SODIUM CHLORIDE 0.9 % (FLUSH) 0.9 %
5-40 SYRINGE (ML) INJECTION PRN
Status: DISCONTINUED | OUTPATIENT
Start: 2022-05-09 | End: 2022-05-10 | Stop reason: HOSPADM

## 2022-05-09 RX ORDER — HEPARIN SODIUM (PORCINE) LOCK FLUSH IV SOLN 100 UNIT/ML 100 UNIT/ML
500 SOLUTION INTRAVENOUS PRN
Status: CANCELLED | OUTPATIENT
Start: 2022-05-09

## 2022-05-09 RX ORDER — SODIUM CHLORIDE 0.9 % (FLUSH) 0.9 %
5-40 SYRINGE (ML) INJECTION PRN
Status: CANCELLED | OUTPATIENT
Start: 2022-05-09

## 2022-05-09 RX ADMIN — SODIUM CHLORIDE, PRESERVATIVE FREE 20 ML: 5 INJECTION INTRAVENOUS at 12:00

## 2022-05-09 NOTE — PLAN OF CARE
Problem: Chronic Conditions and Co-morbidities  Goal: Patient's chronic conditions and co-morbidity symptoms are monitored and maintained or improved  Outcome: Adequate for Discharge  Flowsheets (Taken 5/9/2022 1158)  Care Plan - Patient's Chronic Conditions and Co-Morbidity Symptoms are Monitored and Maintained or Improved: Monitor and assess patient's chronic conditions and comorbid symptoms for stability, deterioration, or improvement  Note: Patient verbalizes understanding to verbal information given on picc dressing change,action and possible side effects. Aware to call MD if develop complications. Problem: Safety - Adult  Goal: Free from fall injury  Outcome: Adequate for Discharge  Flowsheets (Taken 5/9/2022 1158)  Free From Fall Injury: Instruct family/caregiver on patient safety  Note: No falls occurred with visit today. Problem: Discharge Planning  Goal: Discharge to home or other facility with appropriate resources  Outcome: Adequate for Discharge  Flowsheets (Taken 5/9/2022 1158)  Discharge to home or other facility with appropriate resources: Identify barriers to discharge with patient and caregiver  Note: Verbalize understanding of discharge instructions, follow up appointments, and when to call Physician. Care plan reviewed with patient. Patient verbalizes understanding of the plan of care and contributes to goal setting.

## 2022-05-09 NOTE — PROGRESS NOTES
Patient tolerated  PICC  flush without any complications. Discharge instructions given to patient-verbalizes understanding. Ambulated off unit per self with belongings.

## 2022-05-11 ENCOUNTER — HOSPITAL ENCOUNTER (OUTPATIENT)
Dept: RADIATION ONCOLOGY | Age: 62
Discharge: HOME | End: 2022-05-11
Attending: RADIOLOGY
Payer: MEDICAID

## 2022-05-11 ENCOUNTER — HOSPITAL ENCOUNTER (OUTPATIENT)
Dept: INFUSION THERAPY | Age: 62
Discharge: HOME | End: 2022-05-11
Payer: MEDICAID

## 2022-05-11 VITALS
OXYGEN SATURATION: 95 % | HEART RATE: 66 BPM | TEMPERATURE: 97.9 F | HEIGHT: 67 IN | BODY MASS INDEX: 32.2 KG/M2 | RESPIRATION RATE: 16 BRPM | DIASTOLIC BLOOD PRESSURE: 78 MMHG | SYSTOLIC BLOOD PRESSURE: 126 MMHG | WEIGHT: 205.13 LBS

## 2022-05-11 DIAGNOSIS — C15.5 CANCER OF DISTAL THIRD OF ESOPHAGUS (HCC): Primary | ICD-10-CM

## 2022-05-11 LAB
ABSOLUTE IMMATURE GRANULOCYTE: 0.02 THOU/MM3 (ref 0–0.07)
ALBUMIN SERPL-MCNC: 4.2 G/DL (ref 3.5–5.1)
ALP BLD-CCNC: 83 U/L (ref 38–126)
ALT SERPL-CCNC: 31 U/L (ref 11–66)
AST SERPL-CCNC: 27 U/L (ref 5–40)
BASINOPHIL, AUTOMATED: 1 % (ref 0–3)
BASOPHILS ABSOLUTE: 0 THOU/MM3 (ref 0–0.1)
BILIRUB SERPL-MCNC: 0.3 MG/DL (ref 0.3–1.2)
BILIRUBIN DIRECT: < 0.2 MG/DL (ref 0–0.3)
BUN, WHOLE BLOOD: 12 MG/DL (ref 8–26)
CHLORIDE, WHOLE BLOOD: 104 MEQ/L (ref 98–109)
CREATININE, WHOLE BLOOD: 1 MG/DL (ref 0.5–1.2)
EOSINOPHILS ABSOLUTE: 0.1 THOU/MM3 (ref 0–0.4)
EOSINOPHILS RELATIVE PERCENT: 2 % (ref 0–4)
GFR, ESTIMATED: 81 ML/MIN/1.73M2
GLUCOSE, WHOLE BLOOD: 96 MG/DL (ref 70–108)
HCT VFR BLD CALC: 41.6 % (ref 42–52)
HEMOGLOBIN: 13.7 GM/DL (ref 14–18)
IMMATURE GRANULOCYTES: 0 %
IONIZED CALCIUM, WHOLE BLOOD: 1.18 MMOL/L (ref 1.12–1.32)
LYMPHOCYTES # BLD: 30 % (ref 15–47)
LYMPHOCYTES ABSOLUTE: 1.8 THOU/MM3 (ref 1–4.8)
MCH RBC QN AUTO: 27.3 PG (ref 26–33)
MCHC RBC AUTO-ENTMCNC: 32.9 GM/DL (ref 32.2–35.5)
MCV RBC AUTO: 83 FL (ref 80–94)
MONOCYTES ABSOLUTE: 0.6 THOU/MM3 (ref 0.4–1.3)
MONOCYTES: 10 % (ref 0–12)
PDW BLD-RTO: 15.3 % (ref 11.5–14.5)
PLATELET # BLD: 150 THOU/MM3 (ref 130–400)
PMV BLD AUTO: 8.3 FL (ref 9.4–12.4)
POTASSIUM, WHOLE BLOOD: 4.2 MEQ/L (ref 3.5–4.9)
RBC # BLD: 5.02 MILL/MM3 (ref 4.7–6.1)
SEG NEUTROPHILS: 58 % (ref 43–75)
SEGMENTED NEUTROPHILS ABSOLUTE COUNT: 3.5 THOU/MM3 (ref 1.8–7.7)
SODIUM, WHOLE BLOOD: 140 MEQ/L (ref 138–146)
TOTAL CO2, WHOLE BLOOD: 26 MEQ/L (ref 23–33)
TOTAL PROTEIN: 7.2 G/DL (ref 6.1–8)
WBC # BLD: 6 THOU/MM3 (ref 4.8–10.8)

## 2022-05-11 PROCEDURE — 77014 PR CT GUIDANCE PLACEMENT RAD THERAPY FIELDS: CPT | Performed by: RADIOLOGY

## 2022-05-11 PROCEDURE — 2580000003 HC RX 258: Performed by: INTERNAL MEDICINE

## 2022-05-11 PROCEDURE — 96367 TX/PROPH/DG ADDL SEQ IV INF: CPT

## 2022-05-11 PROCEDURE — A4216 STERILE WATER/SALINE, 10 ML: HCPCS | Performed by: INTERNAL MEDICINE

## 2022-05-11 PROCEDURE — 2500000003 HC RX 250 WO HCPCS: Performed by: INTERNAL MEDICINE

## 2022-05-11 PROCEDURE — 96375 TX/PRO/DX INJ NEW DRUG ADDON: CPT

## 2022-05-11 PROCEDURE — 80076 HEPATIC FUNCTION PANEL: CPT

## 2022-05-11 PROCEDURE — 80047 BASIC METABLC PNL IONIZED CA: CPT

## 2022-05-11 PROCEDURE — 77386 HC NTSTY MODUL RAD TX DLVR CPLX: CPT | Performed by: RADIOLOGY

## 2022-05-11 PROCEDURE — 36592 COLLECT BLOOD FROM PICC: CPT

## 2022-05-11 PROCEDURE — 85025 COMPLETE CBC W/AUTO DIFF WBC: CPT

## 2022-05-11 PROCEDURE — 6360000002 HC RX W HCPCS: Performed by: INTERNAL MEDICINE

## 2022-05-11 PROCEDURE — 96413 CHEMO IV INFUSION 1 HR: CPT

## 2022-05-11 PROCEDURE — 96417 CHEMO IV INFUS EACH ADDL SEQ: CPT

## 2022-05-11 PROCEDURE — 99212 OFFICE O/P EST SF 10 MIN: CPT

## 2022-05-11 RX ORDER — DIPHENHYDRAMINE HYDROCHLORIDE 50 MG/ML
50 INJECTION INTRAMUSCULAR; INTRAVENOUS ONCE
Status: COMPLETED | OUTPATIENT
Start: 2022-05-11 | End: 2022-05-11

## 2022-05-11 RX ORDER — SODIUM CHLORIDE 9 MG/ML
25 INJECTION, SOLUTION INTRAVENOUS PRN
Status: CANCELLED | OUTPATIENT
Start: 2022-05-11

## 2022-05-11 RX ORDER — SODIUM CHLORIDE 0.9 % (FLUSH) 0.9 %
5-40 SYRINGE (ML) INJECTION PRN
Status: CANCELLED | OUTPATIENT
Start: 2022-05-11

## 2022-05-11 RX ORDER — SODIUM CHLORIDE 9 MG/ML
5-250 INJECTION, SOLUTION INTRAVENOUS PRN
Status: DISCONTINUED | OUTPATIENT
Start: 2022-05-11 | End: 2022-05-12 | Stop reason: HOSPADM

## 2022-05-11 RX ORDER — HEPARIN SODIUM (PORCINE) LOCK FLUSH IV SOLN 100 UNIT/ML 100 UNIT/ML
500 SOLUTION INTRAVENOUS PRN
Status: CANCELLED | OUTPATIENT
Start: 2022-05-11

## 2022-05-11 RX ORDER — PALONOSETRON 0.05 MG/ML
0.25 INJECTION, SOLUTION INTRAVENOUS ONCE
Status: COMPLETED | OUTPATIENT
Start: 2022-05-11 | End: 2022-05-11

## 2022-05-11 RX ORDER — SODIUM CHLORIDE 0.9 % (FLUSH) 0.9 %
5-40 SYRINGE (ML) INJECTION PRN
Status: DISCONTINUED | OUTPATIENT
Start: 2022-05-11 | End: 2022-05-12 | Stop reason: HOSPADM

## 2022-05-11 RX ADMIN — DIPHENHYDRAMINE HYDROCHLORIDE 50 MG: 50 INJECTION INTRAMUSCULAR; INTRAVENOUS at 10:07

## 2022-05-11 RX ADMIN — SODIUM CHLORIDE, PRESERVATIVE FREE 10 ML: 5 INJECTION INTRAVENOUS at 12:34

## 2022-05-11 RX ADMIN — SODIUM CHLORIDE, PRESERVATIVE FREE 20 ML: 5 INJECTION INTRAVENOUS at 09:31

## 2022-05-11 RX ADMIN — PALONOSETRON 0.25 MG: 0.05 INJECTION, SOLUTION INTRAVENOUS at 10:09

## 2022-05-11 RX ADMIN — SODIUM CHLORIDE 20 ML/HR: 9 INJECTION, SOLUTION INTRAVENOUS at 09:59

## 2022-05-11 RX ADMIN — SODIUM CHLORIDE, PRESERVATIVE FREE 10 ML: 5 INJECTION INTRAVENOUS at 09:30

## 2022-05-11 RX ADMIN — CARBOPLATIN 220.8 MG: 10 INJECTION, SOLUTION INTRAVENOUS at 11:55

## 2022-05-11 RX ADMIN — DEXAMETHASONE SODIUM PHOSPHATE 12 MG: 4 INJECTION, SOLUTION INTRAMUSCULAR; INTRAVENOUS at 10:14

## 2022-05-11 RX ADMIN — SODIUM CHLORIDE, PRESERVATIVE FREE 10 ML: 5 INJECTION INTRAVENOUS at 09:58

## 2022-05-11 RX ADMIN — PACLITAXEL 96 MG: 6 INJECTION, SOLUTION, CONCENTRATE INTRAVENOUS at 10:42

## 2022-05-11 RX ADMIN — FAMOTIDINE 20 MG: 10 INJECTION, SOLUTION INTRAVENOUS at 10:03

## 2022-05-11 NOTE — PROGRESS NOTES
[facility] WERNER Menendez [address], [address]  Phone: [phone]   Toll Free: [phone]   Fax: [phone]    RADIATION ONCOLOGY EDUCATION    CHIEF COMPLAINT: Sabiha presents to radiation oncology today for education regarding treatment to the Esophagus. PLAN:   1. Expected and potential side effects were discussed in detail, along with written handouts. 2. Skin care and moisturization instructions were discussed in detail. 3. Patient was agreeable to physical therapy referral.   4. Patient was informed of dietician that is available weekly and as needed. 5. Educated on weekly on treatment visit to meet with Physician to monitor side effects and treatment course. 6. Informed patient that Physician is available at any time to discuss radiation side effects/concerns through out treatment course. 7. Sabiha had the opportunity to ask questions, and indicated that all questions were satisfactorily addressed.

## 2022-05-11 NOTE — PLAN OF CARE
Problem: Safety - Adult  Goal: Absence of falls  Outcome: Adequate for Discharge  Note: No falls occurred with visit today. Intervention: Provide fall prevention measures  Note: Verbalized understanding of fall prevention to ask for assistance with ambulation. Call light within reach. Problem: Discharge Planning  Goal: Knowledge of discharge instructions  Description: Knowledge of discharge instructions  Outcome: Adequate for Discharge  Note: Verbalized understanding of discharge instructions, follow-up appointments, and when to call the physician. Intervention: PROVIDE DISCHARGE TEACHING  Note: Discuss understanding of discharge instructions,follow-up appointments, and when to call the physician. Care plan reviewed with patient and family. Patient and family verbalize understanding of the plan of care and contribute to goal setting.

## 2022-05-11 NOTE — PLAN OF CARE
Problem: Infection - Adult  Goal: Will show no infection signs and symptoms  Description: Will show no infection signs and symptoms  Outcome: Adequate for Discharge  Note: Mediport site with no redness or warmth. Skin over port site intact with no signs of breakdown noted. Patient verbalizes signs/symptoms of port infection and when to notify the physician. Intervention: EDUCATE PATIENT ABOUT SIGNS AND SYMPTOMS OF INFECTION  Note: Discuss port maintenance,infection prevention, sign of infection and when to call MD.      Problem: Hematologic - Adult  Goal: Patient/Family Education  Outcome: Adequate for Discharge  Note: Patient verbalizes understanding to verbal information given on Taxol/Carboplatin,action and possible side effects. Aware to call MD if develop complications. Intervention: EDUCATE PATIENT/FAMILY ON MEDICATION REGIMEN  Note: Chemotherapy Teaching     What is Chemotherapy   Drug action [x]   Method of Administration [x]   Handouts given []     Side Effects  Nausea/vomiting [x]   Diarrhea [x]   Fatigue [x]   Signs / Symptoms of infection [x]   Neutropenia [x]   Thrombocytopenia [x]   Alopecia [x]   neuropathy [x]   Smith diet &  the importance of fluids [x]       Micellaneous  Importance of nutrition [x]   Importance of oral hygiene [x]   When to call the MD [x]   Monitoring labs [x]   Use of supportive services []     Explanation of Drug Regimen / Frequency  Taxol/Carboplatin     Comments  Verbalized understanding to drug,action,side effects and when to call MD       Care plan reviewed with patient and family. Patient and family verbalize understanding of the plan of care and contribute to goal setting.

## 2022-05-12 ENCOUNTER — HOSPITAL ENCOUNTER (OUTPATIENT)
Dept: RADIATION ONCOLOGY | Age: 62
Discharge: HOME | End: 2022-05-12
Attending: RADIOLOGY
Payer: MEDICAID

## 2022-05-12 VITALS
DIASTOLIC BLOOD PRESSURE: 82 MMHG | HEART RATE: 90 BPM | SYSTOLIC BLOOD PRESSURE: 145 MMHG | TEMPERATURE: 98.7 F | OXYGEN SATURATION: 97 % | WEIGHT: 206.13 LBS | BODY MASS INDEX: 32.28 KG/M2 | RESPIRATION RATE: 18 BRPM

## 2022-05-12 PROCEDURE — 77014 PR CT GUIDANCE PLACEMENT RAD THERAPY FIELDS: CPT | Performed by: RADIOLOGY

## 2022-05-12 PROCEDURE — 77386 HC NTSTY MODUL RAD TX DLVR CPLX: CPT | Performed by: RADIOLOGY

## 2022-05-12 NOTE — PROGRESS NOTES
[address] [facility] [address], [address]        [place] [facility] Porch: [phone]        F: [phone]       [facility]            Dr. Stephanie Gonzalez MD MS          Dr. Magnolia Redmond MD PhD    ON TREATMENT VISIT (OTV) NOTE     Date of Service: 2022  Patient ID: Jamal Stroud   : 1960  MRN: [id number]   Acct Number: [de-identified]     RADIATION ONCOLOGY ATTENDING:  Tod Prado MD MS    DIAGNOSIS:  Cancer Staging  Cancer of distal third of esophagus [facility])  Staging form: Esophagus - Adenocarcinoma, AJCC 8th Edition  - Clinical stage from 3/23/2022: Stage JOSE (cT2, cN2, cM0, G3) - Signed by Raj Veliz MD on 2022      Treatment Area: Thoracic    Current Total Dose(cGy): 360  Current Fraction:   Final/Cumulative Rx. Dose (cGy): 5040    Patient was seen today for weekly visit.      Wt Readings from Last 3 Encounters:   22 206 lb 2.1 oz (93.5 kg)   22 205 lb 2 oz (93 kg)   22 210 lb (95.3 kg)       BP (!) 145/82   Pulse 90   Temp 98.7 °F (37.1 °C) (Infrared)   Resp 18   Wt 206 lb 2.1 oz (93.5 kg)   SpO2 97%   BMI 32.28 kg/m²     Lab Results   Component Value Date    WBC 6.0 2022     2022       Comfort Alteration  Fatigue:None, able to perform daily activities    Pain Location: n/a  Pain Intensity (Current): 0 No Pain  Pain Treatment: No treatment scheduled  Pain Relief: n/a    Emotional Alteration:   Coping: effective    Nutritional Alteration  Anorexia: none   Nausea: No nausea noted  Vomiting: No vomiting   Dyspepsia/Heartburn: None  Dysphagia/Esophagitis: None    Skin Alteration   Skin reaction: No changes noted  Alopecia: No loss    Mucous Membrane Alteration  Mucositis XRT Related: None  Pharynx and Esophagus- Acute: No change over baseline  Voice Changes: Normal    Ventilation Alteration  Cough: None  Hemoptysis: None  Dyspnea: Normal  Mucous Quantity/Quality: n/a    Additional Comments: Started using lotion last night. Baking soda mouth rinses given. MEDICATIONS:     Current Outpatient Medications   Medication Sig Dispense Refill    ondansetron (ZOFRAN-ODT) 8 MG TBDP disintegrating tablet Place 1 tablet under the tongue every 8 hours as needed for Nausea or Vomiting 10 tablet 1    prochlorperazine (COMPAZINE) 10 MG tablet Take 1 tablet by mouth every 6 hours as needed (nausea) 30 tablet 1    pantoprazole (PROTONIX) 40 MG tablet Take 1 tablet by mouth every morning (before breakfast) 90 tablet 3    famotidine (PEPCID) 20 MG tablet Take 20 mg by mouth 2 times daily      NONFORMULARY Hema-Plex (Iron Multi-vitamin)       No current facility-administered medications for this encounter. * New    PHYSICAL EXAM:       ECO - Asymptomatic (Fully active, able to carry on all pre-disease activities without restriction)     General: NAD, AO x 3, Mentation is clear with appropriate affect. Chemotherapy Update: Concurrent chemotherapy    Treatment Imaging: CBCT    ASSESSMENT: No significant radiation side effects. Responding appropriately to symptomatic management. New medications, diagnostic results: Not applicable    PLAN: Again reviewed potential side effects of radiation for the patient's treatment. Continue local/topical care. Continue current radiation course as prescribed.

## 2022-05-13 ENCOUNTER — HOSPITAL ENCOUNTER (OUTPATIENT)
Dept: RADIATION ONCOLOGY | Age: 62
Discharge: HOME | End: 2022-05-13
Attending: RADIOLOGY
Payer: MEDICAID

## 2022-05-13 ENCOUNTER — CLINICAL DOCUMENTATION (OUTPATIENT)
Dept: NUTRITION | Age: 62
End: 2022-05-13

## 2022-05-13 PROCEDURE — 77386 HC NTSTY MODUL RAD TX DLVR CPLX: CPT | Performed by: RADIOLOGY

## 2022-05-13 PROCEDURE — 77014 PR CT GUIDANCE PLACEMENT RAD THERAPY FIELDS: CPT | Performed by: RADIOLOGY

## 2022-05-13 NOTE — PROGRESS NOTES
Nutrition Assessment    Type and Reason for Visit:   5/13/22 - cancer of distal third esophagus    Nutrition Recommendations:   Continue soft foods  Continue water for hydration  ONS BID  Weekly RD visits    Malnutrition Assessment:   Malnutrition Status: no malnutriton  Context: acute illness  Findings of the 6 clinical characteristics of malnutrition (minimum of 2 out of 6 clinical characteristics is required to make the dx of moderate or severe Protein Calorie Malnutrition based on AND/ASPEN Guidelines):   1. Energy Intake: good intake   2. Weight Loss: no loss   3. Fat Loss: no loss   4. Muscle Loss: no loss   5. Fluid Accumulation: none noted   6.  Strength: not measured    Nutrition Diagnosis:   Problem: increased nutrient needs  Etiology: catabolic illness  Signs and Symptoms: head and neck cancer    Nutrition Assessment:   Subjective: (former smoker)  5/13/22 - Patient seen and reports that his appetite is good. Patient does mention that he has changed his eating habits some. For example, patient does not eat spicy foods and is eating more soft foods (steamed veggies, fruits). Patient also mentions that he is chewing foods well and is drinking water after every other bite of food. Patient also reports that he used to drink coffee all day long but he now only drinks 1 cup in the morning and drinks water throughout the day. Patient says that he is drinking a couple ensure or boost daily. Patient admits that he feels better now then he has for awhile and contributes some of that to stress/anxiety regarding starting treatment. Patient mentions that he had chemo Wed 5/11/22 and feels well. Patient reports that now that he knows what to expect with treatment he feels much better. Patient also reports that he has a good outlook and is faithful.    Current Nutrition:   Oral Diet:    General/soft   Oral Diet Intake:    good    Oral Nutrition Supplement (ONS): ensure or boost   ONS intake: BID  Anthropometric Measures:   Ht:   5'7\"   Current Weight:   206# (EHR, 5/12/22)   Usual Weight:   202-208# noted in EHR   Ideal Weight:   148#        Nutrition Interventions:    -Encouraged to continue soft foods, to chew well, and continue to drink after with meals  -Encouraged to continue water throughout the day for hydration  -Continue ONS BID and also provided other ONS samples to try for variety (ensure clear and boost soothe). -ONS coupons provided for home use  -Patient agrees to weekly RD visits  -RD contact information provided and encouraged patient to call with needs     Nutrition Evaluation:          Evaluation: Goals set       Goals: Patient will consume 75% or greater of normal intake and maintain weight throughout treatment.          Monitoring: Oral intake, diet tolerance, weight, hydration, swallowing, ONS use, need for texture modification    Signed: Electronically signed by Leodan Bishop RD, LD on 5/13/2022 at 10:33 AM    Contact number: [phone]

## 2022-05-16 ENCOUNTER — HOSPITAL ENCOUNTER (OUTPATIENT)
Dept: RADIATION ONCOLOGY | Age: 62
Discharge: HOME | End: 2022-05-16
Attending: RADIOLOGY
Payer: MEDICAID

## 2022-05-16 PROCEDURE — 77014 PR CT GUIDANCE PLACEMENT RAD THERAPY FIELDS: CPT | Performed by: RADIOLOGY

## 2022-05-16 PROCEDURE — 77386 HC NTSTY MODUL RAD TX DLVR CPLX: CPT | Performed by: RADIOLOGY

## 2022-05-16 PROCEDURE — 77336 RADIATION PHYSICS CONSULT: CPT | Performed by: RADIOLOGY

## 2022-05-17 ENCOUNTER — HOSPITAL ENCOUNTER (OUTPATIENT)
Dept: RADIATION ONCOLOGY | Age: 62
Discharge: HOME | End: 2022-05-17
Attending: RADIOLOGY
Payer: MEDICAID

## 2022-05-17 DIAGNOSIS — C15.5 CANCER OF DISTAL THIRD OF ESOPHAGUS (HCC): Primary | ICD-10-CM

## 2022-05-17 PROCEDURE — 77014 PR CT GUIDANCE PLACEMENT RAD THERAPY FIELDS: CPT | Performed by: RADIOLOGY

## 2022-05-17 PROCEDURE — 77386 HC NTSTY MODUL RAD TX DLVR CPLX: CPT | Performed by: RADIOLOGY

## 2022-05-17 PROCEDURE — 77427 RADIATION TX MANAGEMENT X5: CPT | Performed by: RADIOLOGY

## 2022-05-18 ENCOUNTER — OFFICE VISIT (OUTPATIENT)
Dept: ONCOLOGY | Age: 62
End: 2022-05-18
Payer: MEDICAID

## 2022-05-18 ENCOUNTER — HOSPITAL ENCOUNTER (OUTPATIENT)
Dept: RADIATION ONCOLOGY | Age: 62
Discharge: HOME | End: 2022-05-18
Attending: RADIOLOGY
Payer: MEDICAID

## 2022-05-18 ENCOUNTER — CLINICAL DOCUMENTATION (OUTPATIENT)
Dept: CASE MANAGEMENT | Age: 62
End: 2022-05-18

## 2022-05-18 ENCOUNTER — HOSPITAL ENCOUNTER (OUTPATIENT)
Dept: INFUSION THERAPY | Age: 62
Discharge: HOME | End: 2022-05-18
Payer: MEDICAID

## 2022-05-18 VITALS
HEIGHT: 67 IN | RESPIRATION RATE: 18 BRPM | OXYGEN SATURATION: 95 % | DIASTOLIC BLOOD PRESSURE: 83 MMHG | BODY MASS INDEX: 32.33 KG/M2 | TEMPERATURE: 97.8 F | HEART RATE: 76 BPM | SYSTOLIC BLOOD PRESSURE: 156 MMHG | WEIGHT: 206 LBS

## 2022-05-18 VITALS
RESPIRATION RATE: 18 BRPM | SYSTOLIC BLOOD PRESSURE: 150 MMHG | HEIGHT: 67 IN | TEMPERATURE: 97.9 F | WEIGHT: 206 LBS | HEART RATE: 69 BPM | BODY MASS INDEX: 32.33 KG/M2 | OXYGEN SATURATION: 97 % | DIASTOLIC BLOOD PRESSURE: 89 MMHG

## 2022-05-18 DIAGNOSIS — C15.5 CANCER OF DISTAL THIRD OF ESOPHAGUS (HCC): Primary | ICD-10-CM

## 2022-05-18 DIAGNOSIS — C15.5 CANCER OF DISTAL THIRD OF ESOPHAGUS (HCC): ICD-10-CM

## 2022-05-18 LAB
ABSOLUTE IMMATURE GRANULOCYTE: 0.05 THOU/MM3 (ref 0–0.07)
ALBUMIN SERPL-MCNC: 4.2 G/DL (ref 3.5–5.1)
ALP BLD-CCNC: 74 U/L (ref 38–126)
ALT SERPL-CCNC: 33 U/L (ref 11–66)
AST SERPL-CCNC: 25 U/L (ref 5–40)
BASINOPHIL, AUTOMATED: 0 % (ref 0–3)
BASOPHILS ABSOLUTE: 0 THOU/MM3 (ref 0–0.1)
BILIRUB SERPL-MCNC: 0.3 MG/DL (ref 0.3–1.2)
BILIRUBIN DIRECT: < 0.2 MG/DL (ref 0–0.3)
BUN, WHOLE BLOOD: 16 MG/DL (ref 8–26)
CHLORIDE, WHOLE BLOOD: 103 MEQ/L (ref 98–109)
CREATININE, WHOLE BLOOD: 0.9 MG/DL (ref 0.5–1.2)
EOSINOPHILS ABSOLUTE: 0.1 THOU/MM3 (ref 0–0.4)
EOSINOPHILS RELATIVE PERCENT: 1 % (ref 0–4)
GFR, ESTIMATED: > 90 ML/MIN/1.73M2
GLUCOSE, WHOLE BLOOD: 84 MG/DL (ref 70–108)
HCT VFR BLD CALC: 41.1 % (ref 42–52)
HEMOGLOBIN: 13.4 GM/DL (ref 14–18)
IMMATURE GRANULOCYTES: 1 %
IONIZED CALCIUM, WHOLE BLOOD: 1.16 MMOL/L (ref 1.12–1.32)
LYMPHOCYTES # BLD: 21 % (ref 15–47)
LYMPHOCYTES ABSOLUTE: 0.9 THOU/MM3 (ref 1–4.8)
MCH RBC QN AUTO: 27.2 PG (ref 26–33)
MCHC RBC AUTO-ENTMCNC: 32.6 GM/DL (ref 32.2–35.5)
MCV RBC AUTO: 83 FL (ref 80–94)
MONOCYTES ABSOLUTE: 0.4 THOU/MM3 (ref 0.4–1.3)
MONOCYTES: 10 % (ref 0–12)
PDW BLD-RTO: 15.2 % (ref 11.5–14.5)
PLATELET # BLD: 157 THOU/MM3 (ref 130–400)
PMV BLD AUTO: 8.2 FL (ref 9.4–12.4)
POTASSIUM, WHOLE BLOOD: 4.2 MEQ/L (ref 3.5–4.9)
RBC # BLD: 4.93 MILL/MM3 (ref 4.7–6.1)
SEG NEUTROPHILS: 66 % (ref 43–75)
SEGMENTED NEUTROPHILS ABSOLUTE COUNT: 2.8 THOU/MM3 (ref 1.8–7.7)
SODIUM, WHOLE BLOOD: 139 MEQ/L (ref 138–146)
TOTAL CO2, WHOLE BLOOD: 26 MEQ/L (ref 23–33)
TOTAL PROTEIN: 7.1 G/DL (ref 6.1–8)
WBC # BLD: 4.2 THOU/MM3 (ref 4.8–10.8)

## 2022-05-18 PROCEDURE — 96376 TX/PRO/DX INJ SAME DRUG ADON: CPT

## 2022-05-18 PROCEDURE — 36592 COLLECT BLOOD FROM PICC: CPT

## 2022-05-18 PROCEDURE — 96367 TX/PROPH/DG ADDL SEQ IV INF: CPT

## 2022-05-18 PROCEDURE — 80076 HEPATIC FUNCTION PANEL: CPT

## 2022-05-18 PROCEDURE — 96375 TX/PRO/DX INJ NEW DRUG ADDON: CPT

## 2022-05-18 PROCEDURE — 96417 CHEMO IV INFUS EACH ADDL SEQ: CPT

## 2022-05-18 PROCEDURE — 6360000002 HC RX W HCPCS: Performed by: INTERNAL MEDICINE

## 2022-05-18 PROCEDURE — 80047 BASIC METABLC PNL IONIZED CA: CPT

## 2022-05-18 PROCEDURE — 99213 OFFICE O/P EST LOW 20 MIN: CPT | Performed by: INTERNAL MEDICINE

## 2022-05-18 PROCEDURE — 85025 COMPLETE CBC W/AUTO DIFF WBC: CPT

## 2022-05-18 PROCEDURE — 77014 PR CT GUIDANCE PLACEMENT RAD THERAPY FIELDS: CPT | Performed by: RADIOLOGY

## 2022-05-18 PROCEDURE — 96413 CHEMO IV INFUSION 1 HR: CPT

## 2022-05-18 PROCEDURE — 2500000003 HC RX 250 WO HCPCS: Performed by: INTERNAL MEDICINE

## 2022-05-18 PROCEDURE — 77386 HC NTSTY MODUL RAD TX DLVR CPLX: CPT | Performed by: RADIOLOGY

## 2022-05-18 PROCEDURE — 99213 OFFICE O/P EST LOW 20 MIN: CPT

## 2022-05-18 PROCEDURE — A4216 STERILE WATER/SALINE, 10 ML: HCPCS | Performed by: INTERNAL MEDICINE

## 2022-05-18 PROCEDURE — 2580000003 HC RX 258: Performed by: INTERNAL MEDICINE

## 2022-05-18 RX ORDER — SODIUM CHLORIDE 9 MG/ML
5-250 INJECTION, SOLUTION INTRAVENOUS PRN
Status: CANCELLED | OUTPATIENT
Start: 2022-05-18

## 2022-05-18 RX ORDER — SODIUM CHLORIDE 9 MG/ML
INJECTION, SOLUTION INTRAVENOUS CONTINUOUS
Status: CANCELLED | OUTPATIENT
Start: 2022-05-18

## 2022-05-18 RX ORDER — DIPHENHYDRAMINE HYDROCHLORIDE 50 MG/ML
50 INJECTION INTRAMUSCULAR; INTRAVENOUS ONCE
Status: CANCELLED | OUTPATIENT
Start: 2022-05-18 | End: 2022-05-18

## 2022-05-18 RX ORDER — ONDANSETRON 2 MG/ML
8 INJECTION INTRAMUSCULAR; INTRAVENOUS
Status: CANCELLED | OUTPATIENT
Start: 2022-05-18

## 2022-05-18 RX ORDER — HEPARIN SODIUM (PORCINE) LOCK FLUSH IV SOLN 100 UNIT/ML 100 UNIT/ML
500 SOLUTION INTRAVENOUS PRN
Status: CANCELLED | OUTPATIENT
Start: 2022-05-18

## 2022-05-18 RX ORDER — MEPERIDINE HYDROCHLORIDE 50 MG/ML
12.5 INJECTION INTRAMUSCULAR; INTRAVENOUS; SUBCUTANEOUS PRN
Status: CANCELLED | OUTPATIENT
Start: 2022-05-18

## 2022-05-18 RX ORDER — DIPHENHYDRAMINE HYDROCHLORIDE 50 MG/ML
50 INJECTION INTRAMUSCULAR; INTRAVENOUS ONCE
Status: COMPLETED | OUTPATIENT
Start: 2022-05-18 | End: 2022-05-18

## 2022-05-18 RX ORDER — ALBUTEROL SULFATE 90 UG/1
4 AEROSOL, METERED RESPIRATORY (INHALATION) PRN
Status: CANCELLED | OUTPATIENT
Start: 2022-05-18

## 2022-05-18 RX ORDER — SODIUM CHLORIDE 9 MG/ML
5-250 INJECTION, SOLUTION INTRAVENOUS PRN
Status: DISCONTINUED | OUTPATIENT
Start: 2022-05-18 | End: 2022-05-19 | Stop reason: HOSPADM

## 2022-05-18 RX ORDER — DIPHENHYDRAMINE HYDROCHLORIDE 50 MG/ML
50 INJECTION INTRAMUSCULAR; INTRAVENOUS
Status: COMPLETED | OUTPATIENT
Start: 2022-05-18 | End: 2022-05-18

## 2022-05-18 RX ORDER — SODIUM CHLORIDE 9 MG/ML
25 INJECTION, SOLUTION INTRAVENOUS PRN
Status: CANCELLED | OUTPATIENT
Start: 2022-05-18

## 2022-05-18 RX ORDER — EPINEPHRINE 1 MG/ML
0.3 INJECTION, SOLUTION, CONCENTRATE INTRAVENOUS PRN
Status: CANCELLED | OUTPATIENT
Start: 2022-05-18

## 2022-05-18 RX ORDER — SODIUM CHLORIDE 0.9 % (FLUSH) 0.9 %
5-40 SYRINGE (ML) INJECTION PRN
Status: DISCONTINUED | OUTPATIENT
Start: 2022-05-18 | End: 2022-05-19 | Stop reason: HOSPADM

## 2022-05-18 RX ORDER — SODIUM CHLORIDE 9 MG/ML
5-40 INJECTION INTRAVENOUS PRN
Status: CANCELLED | OUTPATIENT
Start: 2022-05-18

## 2022-05-18 RX ORDER — PALONOSETRON 0.05 MG/ML
0.25 INJECTION, SOLUTION INTRAVENOUS ONCE
Status: CANCELLED | OUTPATIENT
Start: 2022-05-18 | End: 2022-05-18

## 2022-05-18 RX ORDER — FAMOTIDINE 10 MG/ML
20 INJECTION, SOLUTION INTRAVENOUS ONCE
Status: CANCELLED | OUTPATIENT
Start: 2022-05-18 | End: 2022-05-18

## 2022-05-18 RX ORDER — ACETAMINOPHEN 325 MG/1
650 TABLET ORAL
Status: CANCELLED | OUTPATIENT
Start: 2022-05-18

## 2022-05-18 RX ORDER — DIPHENHYDRAMINE HYDROCHLORIDE 50 MG/ML
50 INJECTION INTRAMUSCULAR; INTRAVENOUS
Status: CANCELLED | OUTPATIENT
Start: 2022-05-18

## 2022-05-18 RX ORDER — FAMOTIDINE 10 MG/ML
20 INJECTION, SOLUTION INTRAVENOUS
Status: CANCELLED | OUTPATIENT
Start: 2022-05-18

## 2022-05-18 RX ORDER — SODIUM CHLORIDE 0.9 % (FLUSH) 0.9 %
5-40 SYRINGE (ML) INJECTION PRN
Status: CANCELLED | OUTPATIENT
Start: 2022-05-18

## 2022-05-18 RX ORDER — PALONOSETRON 0.05 MG/ML
0.25 INJECTION, SOLUTION INTRAVENOUS ONCE
Status: COMPLETED | OUTPATIENT
Start: 2022-05-18 | End: 2022-05-18

## 2022-05-18 RX ADMIN — SODIUM CHLORIDE, PRESERVATIVE FREE 10 ML: 5 INJECTION INTRAVENOUS at 15:15

## 2022-05-18 RX ADMIN — PALONOSETRON 0.25 MG: 0.05 INJECTION, SOLUTION INTRAVENOUS at 12:26

## 2022-05-18 RX ADMIN — SODIUM CHLORIDE, PRESERVATIVE FREE 10 ML: 5 INJECTION INTRAVENOUS at 10:30

## 2022-05-18 RX ADMIN — SODIUM CHLORIDE, PRESERVATIVE FREE 20 ML: 5 INJECTION INTRAVENOUS at 10:31

## 2022-05-18 RX ADMIN — CARBOPLATIN 240.2 MG: 10 INJECTION, SOLUTION INTRAVENOUS at 14:26

## 2022-05-18 RX ADMIN — FAMOTIDINE 20 MG: 10 INJECTION, SOLUTION INTRAVENOUS at 12:21

## 2022-05-18 RX ADMIN — DEXAMETHASONE SODIUM PHOSPHATE 12 MG: 4 INJECTION, SOLUTION INTRAMUSCULAR; INTRAVENOUS at 12:30

## 2022-05-18 RX ADMIN — PACLITAXEL 96 MG: 6 INJECTION, SOLUTION, CONCENTRATE INTRAVENOUS at 12:49

## 2022-05-18 RX ADMIN — DIPHENHYDRAMINE HYDROCHLORIDE 50 MG: 50 INJECTION, SOLUTION INTRAMUSCULAR; INTRAVENOUS at 13:00

## 2022-05-18 RX ADMIN — DIPHENHYDRAMINE HYDROCHLORIDE 50 MG: 50 INJECTION, SOLUTION INTRAMUSCULAR; INTRAVENOUS at 12:24

## 2022-05-18 RX ADMIN — SODIUM CHLORIDE, PRESERVATIVE FREE 10 ML: 5 INJECTION INTRAVENOUS at 12:19

## 2022-05-18 RX ADMIN — SODIUM CHLORIDE 20 ML/HR: 9 INJECTION, SOLUTION INTRAVENOUS at 12:21

## 2022-05-18 RX ADMIN — HYDROCORTISONE SODIUM SUCCINATE 100 MG: 100 INJECTION, POWDER, FOR SOLUTION INTRAMUSCULAR; INTRAVENOUS at 13:00

## 2022-05-18 NOTE — PATIENT INSTRUCTIONS
Proceed with week #2 of low-dose carboplatin Taxol with radiation  Follow-up next week May 25 for labs and week 3. Does not need to see a provider that day.   Follow-up June 1 with me labs and week for treatment

## 2022-05-18 NOTE — PROGRESS NOTES
Name: Derrick Ko  : 1960  MRN: [id number]    Oncology Navigation Follow-Up Note    Contact Type:  Medical Oncology    Subjective: appt with ONC & due for next chemo    Objective: WBC 4.2  HGB 13.4  Plt 157  ANC  2.8      Pt started neoadjuvant low dose chemo: Taxol/Carbo on  with XRT. Pt due for #2 today. Pt reports tolerating 1st cycle without any difficulty. ONC Plan is for 6-7 cycles. ONC POC:   -Proceed with cycle #2 today   -Cont XRT  -Return to OP Onc on  for Cycle #3  -See ONC  prior to cycle #4. Assistance Needed: denies    Receptive to Advanced Care Planning / Palliative Care:  deferred    Referrals: N/A today    Education: POC reinforced    Notes: Fredy available to assist as needed.     Electronically signed by Gretchen Perla RN on 2022 at 12:33 PM

## 2022-05-18 NOTE — PROGRESS NOTES
Patient called out stating he is having sever lower back pain. Taxol stopped. Iv .9NS infusing at 100ml/hr. Vitals 151/93-70-18 pulse ox 98%. Reaction medications benadryl and solu cortef given. Dr Cesar Ley at chairside. Patient states pain is easing up. Orders given to resume Taxol 30 minutes after medications given. Patient resting comfortably in chair. Son at chairside.

## 2022-05-18 NOTE — PLAN OF CARE
Problem: Safety - Adult  Goal: Absence of falls  Outcome: Adequate for Discharge  Note: No falls occurred with visit today. Intervention: Provide fall prevention measures  Note: Verbalized understanding of fall prevention to ask for assistance with ambulation. Call light within reach. Problem: Discharge Planning  Goal: Discharge to home or other facility with appropriate resources  Outcome: Adequate for Discharge  Goal: Knowledge of discharge instructions  Description: Knowledge of discharge instructions  Outcome: Adequate for Discharge  Note: Verbalized understanding of discharge instructions, follow-up appointments, and when to call the physician. Intervention: PROVIDE DISCHARGE TEACHING  Note: Discuss understanding of discharge instructions,follow-up appointments, and when to call the physician. Problem: Infection - Adult  Goal: Absence of infection at discharge  Outcome: Adequate for Discharge  Goal: Will show no infection signs and symptoms  Description: Will show no infection signs and symptoms  Outcome: Adequate for Discharge  Note: PICC site with no redness,warmth or drainage. Patient verbalizes signs/symptoms of port infection and when to notify the physician. Intervention: EDUCATE PATIENT ABOUT SIGNS AND SYMPTOMS OF INFECTION  Note: Discuss port maintenance,infection prevention, sign of infection and when to call MD.      Problem: Hematologic - Adult  Goal: Maintains hematologic stability  Outcome: Adequate for Discharge  Goal: Patient/Family Education  Outcome: Adequate for Discharge  Note: Patient verbalizes understanding to verbal information given on Taxol/Carboplatin,action and possible side effects. Aware to call MD if develop complications.     Intervention: EDUCATE PATIENT/FAMILY ON MEDICATION REGIMEN  Note: Chemotherapy Teaching     What is Chemotherapy   Drug action [x]   Method of Administration [x]   Handouts given []     Side Effects  Nausea/vomiting [x]   Diarrhea [x] Fatigue [x]   Signs / Symptoms of infection [x]   Neutropenia [x]   Thrombocytopenia [x]   Alopecia [x]   neuropathy [x]   [place] diet &  the importance of fluids [x]       Micellaneous  Importance of nutrition [x]   Importance of oral hygiene [x]   When to call the MD [x]   Monitoring labs [x]   Use of supportive services []     Explanation of Drug Regimen / Frequency  Taxol/Carboplatin     Comments  Verbalized understanding to drug,action,side effects and when to call MD      Care plan reviewed with patient and son. Patient and son verbalize understanding of the plan of care and contribute to goal setting.

## 2022-05-18 NOTE — PROGRESS NOTES
Patient assessed for the following post chemotherapy:    Dizziness   No  Lightheadedness  No      Acute nausea/vomiting No  Headache   No  Chest pain/pressure  No  Rash/itching   No  Shortness of breath  No    Patient kept for 20 minutes observation post infusion chemotherapy. Patient tolerated chemotherapy treatment Taxol/Carboplatin without any complications. Last vital signs:   BP (!) 156/83   Pulse 76   Temp 97.8 °F (36.6 °C) (Oral)   Resp 18   Ht 5' 7\" (1.702 m)   Wt 206 lb (93.4 kg)   SpO2 95%   BMI 32.26 kg/m²     Attending physician notified of Taxol reaction, orders received: {YES - resumed without any problems    Patient instructed if experience any of the above symptoms following today's infusion,he/she is to notify MD immediately or go to the emergency department. Discharge instructions given to patient. Verbalizes understanding. Ambulated off unit per self with belongings accompanied by son.

## 2022-05-18 NOTE — PROGRESS NOTES
[address] CANCER [address] [place] [address]  Dept: [phone]  Loc: [phone]   Hematology/Oncology Consult (Clinic)        4/27/22     Laurel Yañez   1960     No ref. provider found   Kanwal Lara DO       Reason: Locally advanced adenocarcinoma the distal esophagus referred for neoadjuvant chemotherapy. Chief Complaint   Patient presents with    Follow-up     Cancer of distal third of esophagus ([facility] [address]      DIAGNOSIS:  -Invasive adenocarcinoma moderately differentiated of the distal third of the esophagus. Clinical stage T2 N2 M0. Mass extends from 34 to 28 cm approximately 6 cm. Staging by EUS 3/23/2022 [facility] (T2 based on invasion and N2 based on 2 malignant appearing lymph nodes visualized and measured in the lower paraesophageal mediastinum level 8L adjacent to the mass. 2 malignant appearing lymph nodes visualized and measured in the subcarinal mediastinum level 7. PET/CT 3/30/2022 [facility]- hypermetabolic activity at mid esophagus consistent with primary malignancy with adjacent hypermetabolic left periesophageal lymph nodes. TREATMENT:  -Seen by Dr. Sb Ford of thoracic surgery at [facility] 3/30/2022. Recommends neoadjuvant chemoradiation to be followed by surgery  -Neoadjuvant chemoradiation with Dr. Card Foster. Low-dose carboplatinum Taxol weekly x 6-7. Chemo  Start date: 5/11. Day 1 XRT 5/11 5/18/2022 week #2 due    PARAMETERS:  -EGD/EUS  -PET/CT    COMORBIDITIES:  Include 30-pack-year history quit in 2013, alcohol none, GERD, hypertension, hyperlipidemia    SUBJECTIVE: Patient began chemoradiation for his esophageal cancer on May 11. He is here today due for week #2 of low-dose carboplatin Taxol with ongoing radiation. Very well-tolerated with no complaints.       HPI: Rick Blecher is a 64-year-old gentleman with a long history of GERD who presented to the ER on February 10 was admitted for 1 day because of chest heaviness. No cardiology because GI was consulted ultimately showed a mass in the distal esophagus. Outpatient EGD requested. Diagnosed with adenocarcinoma the distal esophagus and referred to Dr. Jeffrey Diggs of thoracic surgery at [facility].  EUS shows regional tone involvement. See scans below and ultrasound. No distant mets. Patient has no significant dysphagia and no weight loss. Patient referred back locally for chemoradiation neoadjuvant treatment before surgery. ROS:  Review of Systems 14 point negative except as above. PMH:   Past Medical History:   Diagnosis Date    Cancer of distal third of esophagus ([address] 2022    GERD (gastroesophageal reflux disease)         Social HX:   Social History     Socioeconomic History    Marital status:      Spouse name: Milton Johnson Number of children: 11    Years of education: 15    Highest education level: High school graduate   Occupational History    Occupation: VENDOR     Comment: [employer]   Tobacco Use    Smoking status: Former Smoker     Packs/day: 3.00     Years: 10.00     Pack years: 30.00     Types: Cigarettes     Quit date: 2013     Years since quittin.0    Smokeless tobacco: Never Used    Tobacco comment: does not smoke   Vaping Use    Vaping Use: Not on file   Substance and Sexual Activity    Alcohol use: Never    Drug use: Never    Sexual activity: Yes     Partners: Female   Other Topics Concern    Not on file   Social History Narrative    Not on file     Social Determinants of Health     Financial Resource Strain: Low Risk     Difficulty of Paying Living Expenses: Not hard at all   Food Insecurity: Food Insecurity Present    Worried About [address] in the Last Year: Often true    Brian of Food in the Last Year: Sometimes true   Transportation Needs:     Lack of Transportation (Medical): Not on file    Lack of Transportation (Non-Medical):  Not on file   Physical Activity:     Days of Exercise per [facility])        Surgery:  Past Surgical History:   Procedure Laterality Date    DENTAL SURGERY      25 teeth removed        Medications:  Current Outpatient Medications   Medication Sig Dispense Refill    pantoprazole (PROTONIX) 40 MG tablet Take 1 tablet by mouth every morning (before breakfast) 90 tablet 3    famotidine (PEPCID) 20 MG tablet Take 20 mg by mouth 2 times daily      NONFORMULARY Hema-Plex (Iron Multi-vitamin)      ondansetron (ZOFRAN-ODT) 8 MG TBDP disintegrating tablet Place 1 tablet under the tongue every 8 hours as needed for Nausea or Vomiting (Patient not taking: Reported on 2022) 10 tablet 1    prochlorperazine (COMPAZINE) 10 MG tablet Take 1 tablet by mouth every 6 hours as needed (nausea) (Patient not taking: Reported on 2022) 30 tablet 1     No current facility-administered medications for this visit. Facility-Administered Medications Ordered in Other Visits   Medication Dose Route Frequency Provider Last Rate Last Admin    sodium chloride flush 0.9 % injection 5-40 mL  5-40 mL IntraVENous PRN Shira Ely MD   20 mL at 22 1031   Over-the-counter iron supplement 3 times a day      EXAM:   height is 5' 7\" (1.702 m) and weight is 206 lb (93.4 kg). His oral temperature is 97.9 °F (36.6 °C). His blood pressure is 150/89 (abnormal) and his pulse is 69. His respiration is 18 and oxygen saturation is 97%. Estimated body surface area is 2.1 meters squared as calculated from the following:    Height as of this encounter: 5' 7\" (1.702 m). Weight as of this encounter: 206 lb (93.4 kg). ECO  General: Non-ill appearing. Very pleasant and relaxed. Appears healthy. HEENT: NC/AT,nonicteric, perrla,eom intact, no mucosal lesions  Neck: normal thyroid, no masses. pulses nl, no bruits,   Nodes: No adenopathy  Lungs/chest: clear, no rales,rhonchi or wheezing, lung bases clear  CV: rrr, no rubs ,gallops or murmurs  Breasts: Not examined  Abd/Rectal: soft, non-tender,bowel sounds normal , no HSM,no masses  Back: normal curvature, No midline tenderness. flanks nontender  : Not Examined  Extremities: no cyanosis,clubbing or edema. Skin: unremarkable  Neuro: A and O x 4, CN exam nonfocal, Motor- no deficits, Sensory- no deficits, gait-nl, speech- fluent, no ataxia. Devices: PICC line in right arm site unremarkable.       DATA:    LAB:     CBC with Differential:      Lab Results   Component Value Date    WBC 4.2 05/18/2022    RBC 4.93 05/18/2022    HGB 13.4 05/18/2022    HCT 41.1 05/18/2022     05/18/2022    MCV 83 05/18/2022    MCH 27.2 05/18/2022    MCHC 32.6 05/18/2022    RDW 15.2 05/18/2022    NRBC 0 03/28/2022    SEGSPCT 53.6 03/28/2022    MONOPCT 10.9 03/28/2022    MONOSABS 0.4 05/18/2022    LYMPHSABS 0.9 05/18/2022    EOSABS 0.1 05/18/2022    BASOSABS 0.0 05/18/2022     Lab Results   Component Value Date/Time    SEGSABS 2.8 05/18/2022 10:35 AM       CMP:    Lab Results   Component Value Date     05/18/2022     02/10/2022    K 4.2 05/18/2022    K 4.2 02/10/2022    CL 99 02/10/2022    CO2 24 02/10/2022    BUN 17 02/10/2022    CREATININE 0.9 05/18/2022    CREATININE 1.0 05/04/2022    LABGLOM 76 05/04/2022    GLUCOSE 127 02/10/2022    PROT 7.2 05/11/2022    LABALBU 4.2 05/11/2022    CALCIUM 10.6 02/10/2022    BILITOT 0.3 05/11/2022    ALKPHOS 83 05/11/2022    AST 27 05/11/2022    ALT 31 05/11/2022       BMP:    Lab Results   Component Value Date     05/18/2022     02/10/2022    K 4.2 05/18/2022    K 4.2 02/10/2022    CL 99 02/10/2022    CO2 24 02/10/2022    BUN 17 02/10/2022    LABALBU 4.2 05/11/2022    CREATININE 0.9 05/18/2022    CREATININE 1.0 05/04/2022    CALCIUM 10.6 02/10/2022    LABGLOM 76 05/04/2022    GLUCOSE 127 02/10/2022       Magnesium:    Lab Results   Component Value Date    MG 2.2 03/22/2017     PT/INR:  No results found for: PROTIME, INR  TSH:    Lab Results   Component Value Date    TSH 2.800 03/22/2017     VITAMIN B12: No components found for: B12  FOLATE:    Lab Results   Component Value Date    FOLATE 15.6 02/10/2022     IRON:    Lab Results   Component Value Date    IRON 58 04/27/2022     Iron Saturation:  No components found for: PERCENTFE  TIBC:    Lab Results   Component Value Date    TIBC 341 04/27/2022     FERRITIN:    Lab Results   Component Value Date    FERRITIN 28 04/27/2022     PSA:   Lab Results   Component Value Date    PSA 1.41 03/22/2017            IMAGING:  -PET/CT 3/30/2022  Intense hypermetabolic activity within the mid esophagus consistent with a primary malignancy. Adjacent hypermetabolic left periesophageal lymph node worrisome for metastatic adenopathy. PROCEDURES:  EGD 2/14/2022  Ulcerated mass distal esophagus    EUS 3/23/2022  Partially obstructing malignant esophageal tumor found in the lower third of the esophagus. Large hiatal hernia. Normal stomach and duodenum. Liver most consistent with fatty liver. 2 malignant appearing lymph nodes visualized and measured in the lower paraesophageal mediastinum level 8L adjacent to the mass. 2 malignant appearing lymph nodes visualized and measured in the subcarinal mediastinum level 7. PATHOLOGY:   EGD distal esophageal biopsy path report  Pathologic Diagnosis     Outside Slides  [id number] (02/15/22)  A. Esophagus, distal, biopsy:   · Invasive adenocarcinoma, moderately differentiated. See comment   · Alcian Blue/PAS performed at outside institution and submitted for review highlights Prescott's mucosa in the background      HER2/robina Gene Status: Amplified. GENETICS:  HER2 amplified. MOLECULAR:  None    ASSESSMENT/PLAN:    1: Diagnosis: 67-year-old gentleman with adenocarcinoma of the distal third of the esophagus. Clinical stage T2N2 by EUS and PET scan confirmed. No distant mets. No significant dysphagia. Presented with chest discomfort. Chronic history of GERD. Performance status is excellent.   Seen by Dr. Dugan Stains thoracic surgery at [facility] referred back for neoadjuvant chemoradiation before surgery. 2) Prognosis / Disease Status: High risk node positive disease T2N2 clinical stage, locally advanced. HER2 amplified which has no bearing unless he develops metastatic disease. 3) Work-up:    Labs: CBC, CMP   Imaging: Done   Procedures: PICC line   Consults: None new   Other:    4) Symptom Management: None needed      5) Supportive care provided. Level of care is appropriate. 6) Treatment goal: Cure      Treatment plan:     Neoadjuvant chemoradiation: Low-dose carboplatin and Taxol weekly x6-7 with radiation. Tentative start date for radiation next week on 5/9. Start date for first weekly CarboTaxol-5/11. Today 5/18 due for week #2 low-dose carboplatin Taxol with radiation      7) Medications reviewed. Prescriptions today: None, but recently Compazine, Zofran ODT            No orders of the defined types were placed in this encounter. OARRS:  Controlled Substance Monitoring:    Acute and Chronic Pain Monitoring:   No flowsheet data found. 8) Research Options:   Not applicable      9) Other:        Follow-up with Dr. Piter Mcarthur after chemoradiation is done for surgery. 10) Follow Up: Follow-up  5/25/2022 for week 3 of low-dose carboplatin Taxol with radiation. Does not need to see a provider that day. Follow-up with me will be June 1 for week 4 of treatment and labs.       Valentina Ingram MD

## 2022-05-19 ENCOUNTER — HOSPITAL ENCOUNTER (OUTPATIENT)
Dept: RADIATION ONCOLOGY | Age: 62
Discharge: HOME | End: 2022-05-19
Attending: RADIOLOGY
Payer: MEDICAID

## 2022-05-19 VITALS
TEMPERATURE: 98.6 F | OXYGEN SATURATION: 96 % | WEIGHT: 204.59 LBS | RESPIRATION RATE: 18 BRPM | BODY MASS INDEX: 32.04 KG/M2 | HEART RATE: 95 BPM | DIASTOLIC BLOOD PRESSURE: 85 MMHG | SYSTOLIC BLOOD PRESSURE: 151 MMHG

## 2022-05-19 PROCEDURE — 77386 HC NTSTY MODUL RAD TX DLVR CPLX: CPT | Performed by: RADIOLOGY

## 2022-05-19 PROCEDURE — 77014 PR CT GUIDANCE PLACEMENT RAD THERAPY FIELDS: CPT | Performed by: RADIOLOGY

## 2022-05-19 NOTE — PROGRESS NOTES
Alessandro Mccall [address] [place] [address] [address]        [place] [facility]        Cindi Asa: [phone]        F: [phone]       [email or web address]            Dr. Sarah Morataya MD MS          Dr. Vivian Del Cid MD PhD    ON TREATMENT VISIT (OTV) NOTE     Date of Service: 2022  Patient ID: Jaret Gonzalez   : 1960  MRN: [id number]   Acct Number: [de-identified]     RADIATION ONCOLOGY ATTENDING:  Deisy Gipson MD MS    DIAGNOSIS:  Cancer Staging  Cancer of distal third of esophagus [facility])  Staging form: Esophagus - Adenocarcinoma, AJCC 8th Edition  - Clinical stage from 3/23/2022: Stage JOSE (cT2, cN2, cM0, G3) - Signed by Lynsey Wagner MD on 2022      Treatment Area: Thoracic    Current Total Dose(cGy): 1260  Current Fraction:   Final/Cumulative Rx. Dose (cGy): 5040    Patient was seen today for weekly visit.      Wt Readings from Last 3 Encounters:   22 204 lb 9.4 oz (92.8 kg)   22 206 lb (93.4 kg)   22 206 lb (93.4 kg)       BP (!) 151/85   Pulse 95   Temp 98.6 °F (37 °C) (Infrared)   Resp 18   Wt 204 lb 9.4 oz (92.8 kg)   SpO2 96%   BMI 32.04 kg/m²     Lab Results   Component Value Date    WBC 4.2 (L) 2022     2022       Comfort Alteration  Fatigue:None, able to perform daily activities    Pain Location: n/a  Pain Intensity (Current): 0 No Pain  Pain Treatment: No treatment scheduled  Pain Relief: n/a    Emotional Alteration:   Coping: effective    Nutritional Alteration  Anorexia: none   Nausea: No nausea noted  Vomiting: No vomiting   Dyspepsia/Heartburn: None  Dysphagia/Esophagitis: None    Skin Alteration   Skin reaction: No changes noted  Alopecia: No loss    Mucous Membrane Alteration  Mucositis XRT Related: None  Pharynx and Esophagus- Acute: No change over baseline  Voice Changes: Mild or intermittent hoarseness    Ventilation Alteration  Cough: Productive  Hemoptysis:

## 2022-05-20 ENCOUNTER — CLINICAL DOCUMENTATION (OUTPATIENT)
Dept: CASE MANAGEMENT | Age: 62
End: 2022-05-20

## 2022-05-20 ENCOUNTER — CLINICAL DOCUMENTATION (OUTPATIENT)
Dept: SPIRITUAL SERVICES | Facility: CLINIC | Age: 62
End: 2022-05-20

## 2022-05-20 ENCOUNTER — HOSPITAL ENCOUNTER (OUTPATIENT)
Dept: RADIATION ONCOLOGY | Age: 62
Discharge: HOME | End: 2022-05-20
Attending: RADIOLOGY
Payer: MEDICAID

## 2022-05-20 DIAGNOSIS — C15.5 CANCER OF DISTAL THIRD OF ESOPHAGUS (HCC): Primary | ICD-10-CM

## 2022-05-20 PROCEDURE — 77386 HC NTSTY MODUL RAD TX DLVR CPLX: CPT | Performed by: RADIOLOGY

## 2022-05-20 PROCEDURE — 77014 PR CT GUIDANCE PLACEMENT RAD THERAPY FIELDS: CPT | Performed by: RADIOLOGY

## 2022-05-20 RX ORDER — LIDOCAINE HYDROCHLORIDE 20 MG/ML
15 SOLUTION OROPHARYNGEAL PRN
Qty: 100 ML | Refills: 0 | Status: SHIPPED | OUTPATIENT
Start: 2022-05-20 | End: 2022-10-24

## 2022-05-20 NOTE — PROGRESS NOTES
Name: Jil Gordon  : 1960  MRN: [id number]    Oncology Navigation Follow-Up Note    Contact Type:  Telephone    Notes:   Covering for Soledad RN Navigator:  Received a call from Kurtis Vargas dietician, that Bladimir Keller had spoke with her and she could let Soledad know that they were ready to talk to [address] [place] [address] regarding POA. Left message with [facility]. Left message with Bladimir Keller and that if he doesn't hear back by first part of week to let Soledad know, she will return on Monday.     Electronically signed by Scott Benavidez RN on 2022 at 10:24 AM

## 2022-05-20 NOTE — PROGRESS NOTES
Nutrition Assessment     Type and Reason for Visit:   5/20/22 - on treatment follow-up  5/13/22 - cancer of distal third esophagus     Nutrition Recommendations:   Continue soft foods  Chew well  Nutrition therapy for sore throat  Continue water for hydration  ONS BID  Weekly RD visits     Malnutrition Assessment: (5/13/22)  Malnutrition Status: no malnutriton  Context: acute illness  Findings of the 6 clinical characteristics of malnutrition (minimum of 2 out of 6 clinical characteristics is required to make the dx of moderate or severe Protein Calorie Malnutrition based on AND/ASPEN Guidelines):              1. Energy Intake: good intake              2. Weight Loss: no loss              3. Fat Loss: no loss              4. Muscle Loss: no loss              5. Fluid Accumulation: none noted              6.  Strength: not measured     Nutrition Diagnosis:   Problem: increased nutrient needs  Etiology: catabolic illness  Signs and Symptoms: head and neck cancer     Nutrition Assessment:   Subjective: (former smoker)  5/20/22 - Patient seen with wife. Patient reports that his throat is starting to get sore with swallowing. Patient aware to eat more soft foods and says that he is trying to chew well. Patient mentions that he did like the boost soothe samples. Note 2# weight loss in 2 weeks. 5/13/22 - Patient seen and reports that his appetite is good. Patient does mention that he has changed his eating habits some. For example, patient does not eat spicy foods and is eating more soft foods (steamed veggies, fruits). Patient also mentions that he is chewing foods well and is drinking water after every other bite of food. Patient also reports that he used to drink coffee all day long but he now only drinks 1 cup in the morning and drinks water throughout the day. Patient says that he is drinking a couple ensure or boost daily.  Patient admits that he feels better now then he has for awhile and contributes some of that to stress/anxiety regarding starting treatment. Patient mentions that he had chemo Wed 5/11/22 and feels well. Patient reports that now that he knows what to expect with treatment he feels much better. Patient also reports that he has a good outlook and is faithful. Current Nutrition:              Oral Diet:    General/soft              Oral Diet Intake:    good              Oral Nutrition Supplement (ONS): ensure or boost (likes boost soothe)              ONS intake:    BID  Anthropometric Measures:              Ht:   5'7\"              Current Weight:   206# (EHR, 5/12/22)              Usual Weight:   202-208# noted in EHR              Ideal Weight:   148#         Nutrition Interventions:    5/20/22:  -Encouraged to continue soft foods  -Continue to chew well  -Provided more boost soothe samples for home use  -Provided recipes for sore throat  -Provided and reviewed Nutrition Therapy handout for sore throat, including foods to choose list.  -Left message with nurse navigator per wife request to discuss their will.   -Continue weekly RD visit  5/13/22:  -Encouraged to continue soft foods, to chew well, and continue to drink after with meals  -Encouraged to continue water throughout the day for hydration  -Continue ONS BID and also provided other ONS samples to try for variety (ensure clear and boost soothe). -ONS coupons provided for home use  -Patient agrees to weekly RD visits  -RD contact information provided and encouraged patient to call with needs     Nutrition Evaluation:          Evaluation: limited progress toward goals       Goals: Patient will consume 75% or greater of normal intake and maintain weight throughout treatment.          Monitoring: Oral intake, diet tolerance, weight, hydration, swallowing, ONS use, need for texture modification     Signed: Electronically signed by Tomi Beard RD, LD on 5/20/2022 at 10:11 AM     Contact number: [phone]

## 2022-05-20 NOTE — ACP (ADVANCE CARE PLANNING)
Advance Care Planning   Ambulatory ACP Specialist Patient Outreach    Date:  5/20/2022  ACP Specialist:  Ace Aguilar    Outreach call to patient in follow-up to ACP Specialist referral from: Keshav Ribeiro RN    [] PCP  [] Provider   [] Ambulatory Care Management [x] Other for Reason:    [x] Advance Directive Assistance  [] Code Status Discussion  [] Complete Portable DNR Order  [x] Discuss Goals of Care  [] Complete POST/MOST  [] Early ACP Decision-Making  [] Other    Date Referral Received: 5/20/2022    Today's Outreach:  [x] First   [] Second  [] Third                               Third outreach made by []  phone  [] email []   MyChart     Intervention:  [x] Spoke with Patient  [] Left VM requesting return call      Outcome: Josémakenzie Isaiah made an initial attempt to contact Nahomi Banda via telephone call , as requested, to offer support for the completion of Advance Directives documents as part of an [address] conversation. Merced Darling was a referral via Oncology Nurse Navigator - Keshav Ribeiro RN. * Momo confirmed his desire for the completion of documents for self, as well as for his wife Isma Kwan. *  briefly explained documents for clarity and greater understanding, as well as information needed for completion. * An appointment is scheduled for both on 5/27 at [facility]). Next Step:   [x] ACP scheduled conversation  [] Outreach again in one week               [] Email / Mail ACP Info Sheets  [] Email / Mail Advance Directive            [] Close Referral. Routing closure to referring provider/staff and to ACP Specialist .      Thank you for this referral.

## 2022-05-23 ENCOUNTER — HOSPITAL ENCOUNTER (OUTPATIENT)
Dept: RADIATION ONCOLOGY | Age: 62
Discharge: HOME | End: 2022-05-23
Payer: MEDICAID

## 2022-05-23 PROCEDURE — 77014 PR CT GUIDANCE PLACEMENT RAD THERAPY FIELDS: CPT | Performed by: RADIOLOGY

## 2022-05-23 PROCEDURE — 77336 RADIATION PHYSICS CONSULT: CPT | Performed by: RADIOLOGY

## 2022-05-23 PROCEDURE — 77386 HC NTSTY MODUL RAD TX DLVR CPLX: CPT | Performed by: RADIOLOGY

## 2022-05-24 ENCOUNTER — HOSPITAL ENCOUNTER (OUTPATIENT)
Dept: RADIATION ONCOLOGY | Age: 62
Discharge: HOME | End: 2022-05-24
Payer: MEDICAID

## 2022-05-24 PROCEDURE — 77427 RADIATION TX MANAGEMENT X5: CPT | Performed by: RADIOLOGY

## 2022-05-24 PROCEDURE — 77386 HC NTSTY MODUL RAD TX DLVR CPLX: CPT | Performed by: RADIOLOGY

## 2022-05-24 PROCEDURE — 77014 PR CT GUIDANCE PLACEMENT RAD THERAPY FIELDS: CPT | Performed by: RADIOLOGY

## 2022-05-25 ENCOUNTER — HOSPITAL ENCOUNTER (OUTPATIENT)
Dept: RADIATION ONCOLOGY | Age: 62
Discharge: HOME | End: 2022-05-25
Payer: MEDICAID

## 2022-05-25 ENCOUNTER — HOSPITAL ENCOUNTER (OUTPATIENT)
Dept: INFUSION THERAPY | Age: 62
Discharge: HOME | End: 2022-05-25
Payer: MEDICAID

## 2022-05-25 VITALS
TEMPERATURE: 98.6 F | HEART RATE: 89 BPM | HEIGHT: 67 IN | WEIGHT: 206 LBS | RESPIRATION RATE: 16 BRPM | BODY MASS INDEX: 32.33 KG/M2 | DIASTOLIC BLOOD PRESSURE: 83 MMHG | OXYGEN SATURATION: 97 % | SYSTOLIC BLOOD PRESSURE: 139 MMHG

## 2022-05-25 DIAGNOSIS — C15.5 CANCER OF DISTAL THIRD OF ESOPHAGUS (HCC): Primary | ICD-10-CM

## 2022-05-25 LAB
ABSOLUTE IMMATURE GRANULOCYTE: 0.03 THOU/MM3 (ref 0–0.07)
ALBUMIN SERPL-MCNC: 4.2 G/DL (ref 3.5–5.1)
ALP BLD-CCNC: 85 U/L (ref 38–126)
ALT SERPL-CCNC: 38 U/L (ref 11–66)
AST SERPL-CCNC: 27 U/L (ref 5–40)
BASINOPHIL, AUTOMATED: 0 % (ref 0–3)
BASOPHILS ABSOLUTE: 0 THOU/MM3 (ref 0–0.1)
BILIRUB SERPL-MCNC: 0.2 MG/DL (ref 0.3–1.2)
BILIRUBIN DIRECT: < 0.2 MG/DL (ref 0–0.3)
BUN, WHOLE BLOOD: 13 MG/DL (ref 8–26)
CHLORIDE, WHOLE BLOOD: 104 MEQ/L (ref 98–109)
CREATININE, WHOLE BLOOD: 1 MG/DL (ref 0.5–1.2)
EOSINOPHILS ABSOLUTE: 0 THOU/MM3 (ref 0–0.4)
EOSINOPHILS RELATIVE PERCENT: 1 % (ref 0–4)
GFR, ESTIMATED: 81 ML/MIN/1.73M2
GLUCOSE, WHOLE BLOOD: 98 MG/DL (ref 70–108)
HCT VFR BLD CALC: 41.3 % (ref 42–52)
HEMOGLOBIN: 13.5 GM/DL (ref 14–18)
IMMATURE GRANULOCYTES: 1 %
IONIZED CALCIUM, WHOLE BLOOD: 1.21 MMOL/L (ref 1.12–1.32)
LYMPHOCYTES # BLD: 14 % (ref 15–47)
LYMPHOCYTES ABSOLUTE: 0.5 THOU/MM3 (ref 1–4.8)
MCH RBC QN AUTO: 27.4 PG (ref 26–33)
MCHC RBC AUTO-ENTMCNC: 32.7 GM/DL (ref 32.2–35.5)
MCV RBC AUTO: 84 FL (ref 80–94)
MONOCYTES ABSOLUTE: 0.4 THOU/MM3 (ref 0.4–1.3)
MONOCYTES: 11 % (ref 0–12)
PDW BLD-RTO: 15 % (ref 11.5–14.5)
PLATELET # BLD: 108 THOU/MM3 (ref 130–400)
PMV BLD AUTO: 8.5 FL (ref 9.4–12.4)
POTASSIUM, WHOLE BLOOD: 4.3 MEQ/L (ref 3.5–4.9)
RBC # BLD: 4.92 MILL/MM3 (ref 4.7–6.1)
SEG NEUTROPHILS: 73 % (ref 43–75)
SEGMENTED NEUTROPHILS ABSOLUTE COUNT: 2.6 THOU/MM3 (ref 1.8–7.7)
SODIUM, WHOLE BLOOD: 139 MEQ/L (ref 138–146)
TOTAL CO2, WHOLE BLOOD: 26 MEQ/L (ref 23–33)
TOTAL PROTEIN: 7 G/DL (ref 6.1–8)
WBC # BLD: 3.6 THOU/MM3 (ref 4.8–10.8)

## 2022-05-25 PROCEDURE — 96417 CHEMO IV INFUS EACH ADDL SEQ: CPT

## 2022-05-25 PROCEDURE — 2580000003 HC RX 258: Performed by: INTERNAL MEDICINE

## 2022-05-25 PROCEDURE — 2500000003 HC RX 250 WO HCPCS: Performed by: INTERNAL MEDICINE

## 2022-05-25 PROCEDURE — 85025 COMPLETE CBC W/AUTO DIFF WBC: CPT

## 2022-05-25 PROCEDURE — 96375 TX/PRO/DX INJ NEW DRUG ADDON: CPT

## 2022-05-25 PROCEDURE — 36592 COLLECT BLOOD FROM PICC: CPT

## 2022-05-25 PROCEDURE — 77386 HC NTSTY MODUL RAD TX DLVR CPLX: CPT | Performed by: RADIOLOGY

## 2022-05-25 PROCEDURE — 80076 HEPATIC FUNCTION PANEL: CPT

## 2022-05-25 PROCEDURE — 80047 BASIC METABLC PNL IONIZED CA: CPT

## 2022-05-25 PROCEDURE — 96367 TX/PROPH/DG ADDL SEQ IV INF: CPT

## 2022-05-25 PROCEDURE — 96413 CHEMO IV INFUSION 1 HR: CPT

## 2022-05-25 PROCEDURE — 77014 PR CT GUIDANCE PLACEMENT RAD THERAPY FIELDS: CPT | Performed by: RADIOLOGY

## 2022-05-25 PROCEDURE — 6360000002 HC RX W HCPCS: Performed by: INTERNAL MEDICINE

## 2022-05-25 RX ORDER — MEPERIDINE HYDROCHLORIDE 25 MG/ML
12.5 INJECTION INTRAMUSCULAR; INTRAVENOUS; SUBCUTANEOUS PRN
Status: CANCELLED | OUTPATIENT
Start: 2022-05-25

## 2022-05-25 RX ORDER — SODIUM CHLORIDE 9 MG/ML
INJECTION, SOLUTION INTRAVENOUS CONTINUOUS
Status: CANCELLED | OUTPATIENT
Start: 2022-05-25

## 2022-05-25 RX ORDER — SODIUM CHLORIDE 0.9 % (FLUSH) 0.9 %
5-40 SYRINGE (ML) INJECTION PRN
Status: CANCELLED | OUTPATIENT
Start: 2022-05-25

## 2022-05-25 RX ORDER — HEPARIN SODIUM (PORCINE) LOCK FLUSH IV SOLN 100 UNIT/ML 100 UNIT/ML
500 SOLUTION INTRAVENOUS PRN
Status: CANCELLED | OUTPATIENT
Start: 2022-05-25

## 2022-05-25 RX ORDER — DIPHENHYDRAMINE HYDROCHLORIDE 50 MG/ML
50 INJECTION INTRAMUSCULAR; INTRAVENOUS
Status: CANCELLED | OUTPATIENT
Start: 2022-05-25

## 2022-05-25 RX ORDER — ACETAMINOPHEN 325 MG/1
650 TABLET ORAL
Status: CANCELLED | OUTPATIENT
Start: 2022-05-25

## 2022-05-25 RX ORDER — DIPHENHYDRAMINE HYDROCHLORIDE 50 MG/ML
50 INJECTION INTRAMUSCULAR; INTRAVENOUS ONCE
Status: COMPLETED | OUTPATIENT
Start: 2022-05-25 | End: 2022-05-25

## 2022-05-25 RX ORDER — HEPARIN SODIUM (PORCINE) LOCK FLUSH IV SOLN 100 UNIT/ML 100 UNIT/ML
500 SOLUTION INTRAVENOUS PRN
Status: DISCONTINUED | OUTPATIENT
Start: 2022-05-25 | End: 2022-05-26 | Stop reason: HOSPADM

## 2022-05-25 RX ORDER — SODIUM CHLORIDE 9 MG/ML
5-250 INJECTION, SOLUTION INTRAVENOUS PRN
Status: CANCELLED | OUTPATIENT
Start: 2022-05-25

## 2022-05-25 RX ORDER — PALONOSETRON 0.05 MG/ML
0.25 INJECTION, SOLUTION INTRAVENOUS ONCE
Status: COMPLETED | OUTPATIENT
Start: 2022-05-25 | End: 2022-05-25

## 2022-05-25 RX ORDER — SODIUM CHLORIDE 9 MG/ML
5-40 INJECTION INTRAVENOUS PRN
Status: CANCELLED | OUTPATIENT
Start: 2022-05-25

## 2022-05-25 RX ORDER — SODIUM CHLORIDE 0.9 % (FLUSH) 0.9 %
5-40 SYRINGE (ML) INJECTION PRN
Status: DISCONTINUED | OUTPATIENT
Start: 2022-05-25 | End: 2022-05-26 | Stop reason: HOSPADM

## 2022-05-25 RX ORDER — METHYLPREDNISOLONE SODIUM SUCCINATE 125 MG/2ML
125 INJECTION, POWDER, LYOPHILIZED, FOR SOLUTION INTRAMUSCULAR; INTRAVENOUS DAILY
Status: DISCONTINUED | OUTPATIENT
Start: 2022-05-25 | End: 2022-05-26 | Stop reason: HOSPADM

## 2022-05-25 RX ORDER — SODIUM CHLORIDE 9 MG/ML
5-250 INJECTION, SOLUTION INTRAVENOUS PRN
Status: DISCONTINUED | OUTPATIENT
Start: 2022-05-25 | End: 2022-05-26 | Stop reason: HOSPADM

## 2022-05-25 RX ORDER — SODIUM CHLORIDE 9 MG/ML
25 INJECTION, SOLUTION INTRAVENOUS PRN
Status: CANCELLED | OUTPATIENT
Start: 2022-05-25

## 2022-05-25 RX ORDER — ONDANSETRON 2 MG/ML
8 INJECTION INTRAMUSCULAR; INTRAVENOUS
Status: CANCELLED | OUTPATIENT
Start: 2022-05-25

## 2022-05-25 RX ORDER — ALBUTEROL SULFATE 90 UG/1
4 AEROSOL, METERED RESPIRATORY (INHALATION) PRN
Status: CANCELLED | OUTPATIENT
Start: 2022-05-25

## 2022-05-25 RX ADMIN — SODIUM CHLORIDE 20 ML/HR: 9 INJECTION, SOLUTION INTRAVENOUS at 10:29

## 2022-05-25 RX ADMIN — CARBOPLATIN 221.2 MG: 10 INJECTION, SOLUTION INTRAVENOUS at 12:40

## 2022-05-25 RX ADMIN — SODIUM CHLORIDE, PRESERVATIVE FREE 10 ML: 5 INJECTION INTRAVENOUS at 14:00

## 2022-05-25 RX ADMIN — PACLITAXEL 96 MG: 6 INJECTION, SOLUTION, CONCENTRATE INTRAVENOUS at 11:28

## 2022-05-25 RX ADMIN — SODIUM CHLORIDE, PRESERVATIVE FREE 20 ML: 5 INJECTION INTRAVENOUS at 09:59

## 2022-05-25 RX ADMIN — METHYLPREDNISOLONE SODIUM SUCCINATE 125 MG: 125 INJECTION, POWDER, FOR SOLUTION INTRAMUSCULAR; INTRAVENOUS at 11:00

## 2022-05-25 RX ADMIN — DIPHENHYDRAMINE HYDROCHLORIDE 50 MG: 50 INJECTION INTRAMUSCULAR; INTRAVENOUS at 10:38

## 2022-05-25 RX ADMIN — DEXAMETHASONE SODIUM PHOSPHATE 12 MG: 4 INJECTION, SOLUTION INTRAMUSCULAR; INTRAVENOUS at 10:41

## 2022-05-25 RX ADMIN — PALONOSETRON 0.25 MG: 0.05 INJECTION, SOLUTION INTRAVENOUS at 10:33

## 2022-05-25 RX ADMIN — SODIUM CHLORIDE, PRESERVATIVE FREE 10 ML: 5 INJECTION INTRAVENOUS at 09:58

## 2022-05-25 RX ADMIN — SODIUM CHLORIDE, PRESERVATIVE FREE 20 MG: 5 INJECTION INTRAVENOUS at 10:31

## 2022-05-25 NOTE — PROGRESS NOTES
Patient tolerated Taxol and carboplatin without any complications. Patient kept for 20 minuets observation post chemotherapy. Denies dizziness, lightheadedness, acute nausea or vomiting, headache, heart palpitations, rash/itching or increased SOB. Last vital signs  /83   Pulse 89   Temp 98.6 °F (37 °C) (Oral)   Resp 16   Ht 5' 7\" (1.702 m)   Wt 206 lb (93.4 kg)   SpO2 97%   BMI 32.26 kg/m²     Patient instructed if they experience any of the above symptoms following today's visit, he/she is to notify the Physician or go to the Emergency Dept. Discharge instructions given to patient, Verbalizes understanding. Ambulated off unit per self in stable condition with all belongings.

## 2022-05-25 NOTE — PLAN OF CARE
Problem: Safety - Adult  Goal: Free from fall injury  Outcome: Adequate for Discharge  Flowsheets (Taken 5/25/2022 1720)  Free From Fall Injury: Instruct family/caregiver on patient safety  Note: Free from falls while in O.P. Oncology. Goal: Absence of falls  Outcome: Adequate for Discharge  Intervention: Provide fall prevention measures  Note: Discussed the need to use the call light for assistance when getting up to ambulate. Problem: Discharge Planning  Goal: Discharge to home or other facility with appropriate resources  Outcome: Adequate for Discharge  Goal: Knowledge of discharge instructions  Description: Knowledge of discharge instructions  Note: Verbalize understanding of discharge instructions, follow up appointments, and when to call Physician. Intervention: PROVIDE DISCHARGE TEACHING  Note: Discuss understanding of discharge instructions, follow up appointments and when to call Physician. Problem: Infection - Adult  Goal: Absence of infection at discharge  Outcome: Adequate for Discharge  Flowsheets (Taken 5/25/2022 1720)  Absence of infection at discharge: Assess and monitor for signs and symptoms of infection  Note: Discuss port maintenance,infection prevention, sign of infection and when to call MD.   Goal: Will show no infection signs and symptoms  Description: Will show no infection signs and symptoms  Outcome: Adequate for Discharge  Intervention: EDUCATE PATIENT ABOUT SIGNS AND SYMPTOMS OF INFECTION  Note: PICC site with no redness,warmth or drainage. Patient verbalizes signs/symptoms of port infection and when to notify the physician. Problem: Hematologic - Adult  Goal: Maintains hematologic stability  Outcome: Adequate for Discharge  Note: Patient verbalizes understanding to verbal information given on Taxol and Carboplatin,action and possible side effects. Aware to call MD if develop complications.     Goal: Patient/Family Education  Outcome: Adequate for Discharge  Intervention: EDUCATE PATIENT/FAMILY ON MEDICATION REGIMEN  Note: Chemotherapy Teaching     What is Chemotherapy   Drug action [x]   Method of Administration [x]   Handouts given []     Side Effects  Nausea/vomiting [x]   Diarrhea [x]   Fatigue [x]   Signs / Symptoms of infection [x]   Neutropenia [x]   Thrombocytopenia [x]   Alopecia [x]   neuropathy [x]   Buchanan diet &  the importance of fluids [x]       Micellaneous  Importance of nutrition [x]   Importance of oral hygiene [x]   When to call the MD [x]   Monitoring labs [x]   Use of supportive services []     Explanation of Drug Regimen / Frequency  Taxol and carboplatin     Comments  Verbalized understanding to drug,action,side effects and when to call MD       Care plan reviewed with patient and family. Patient and family verbalize understanding of the plan of care and contribute to goal setting.

## 2022-05-25 NOTE — ONCOLOGY
Chemotherapy Administration    Pre-assessment Data: Antineoplastic Agents  See toxicity flow sheet for assessment                                          [x]         Interventions:   Chemotherapy SQ injection given []   Taxol administered-VS per protocol []   Blood pressure meds held 12 hours prior to Rituxan/Ruxience []   Rituxan/Ruxience administered- VS and precautions per guidelines []   Emergency drugs available as appropriate [x]   Anaphylaxis assessment completed [x]   Pre-medications administered as ordered [x]   Blood return noted upon initiation of chemotherapy [x]   Blood return noted each 1-2ml of a vesicant medication if given IV push []   Navelbine, Vincristine and Velban given as a monitored wide open drip, blood return noted before during and after infusion.  []   Blood return noted each 2-3ml of a non-vesicant medication if given IV push []   Patient aware of potential Immunotherapy toxicities []   Monitor for signs / symptoms of hypersensitivity reaction [x]   Chemotherapy orders (drug/dose/rate) verified by 2 Chemo certified RNs [x]   Monitor IV site and blood return throughout the infusion of the medication [x]   Document IV site checks on the IV assessment form [x]   Document chemotherapy teaching on the Patient Education tab [x]   Document patient verbalizes understanding of medications being administered- Taxol and Carboplatin [x]   If IV infiltration, see ONS Guidelines []   Other:      []

## 2022-05-26 ENCOUNTER — HOSPITAL ENCOUNTER (OUTPATIENT)
Dept: RADIATION ONCOLOGY | Age: 62
Discharge: HOME | End: 2022-05-26
Attending: RADIOLOGY
Payer: MEDICAID

## 2022-05-26 ENCOUNTER — HOSPITAL ENCOUNTER (OUTPATIENT)
Dept: RADIATION ONCOLOGY | Age: 62
Discharge: HOME | End: 2022-05-26
Payer: MEDICAID

## 2022-05-26 VITALS
SYSTOLIC BLOOD PRESSURE: 145 MMHG | HEART RATE: 94 BPM | TEMPERATURE: 98.7 F | RESPIRATION RATE: 16 BRPM | DIASTOLIC BLOOD PRESSURE: 82 MMHG | OXYGEN SATURATION: 96 % | BODY MASS INDEX: 32.66 KG/M2 | WEIGHT: 208.56 LBS

## 2022-05-26 DIAGNOSIS — C15.5 CANCER OF DISTAL THIRD OF ESOPHAGUS (HCC): Primary | ICD-10-CM

## 2022-05-26 PROCEDURE — 77014 PR CT GUIDANCE PLACEMENT RAD THERAPY FIELDS: CPT | Performed by: RADIOLOGY

## 2022-05-26 PROCEDURE — 77386 HC NTSTY MODUL RAD TX DLVR CPLX: CPT | Performed by: RADIOLOGY

## 2022-05-26 RX ORDER — SODIUM CHLORIDE 9 MG/ML
5-250 INJECTION, SOLUTION INTRAVENOUS PRN
Status: CANCELLED | OUTPATIENT
Start: 2022-06-02

## 2022-05-26 RX ORDER — DIPHENHYDRAMINE HYDROCHLORIDE 50 MG/ML
50 INJECTION INTRAMUSCULAR; INTRAVENOUS ONCE
Status: CANCELLED | OUTPATIENT
Start: 2022-06-02 | End: 2022-06-02

## 2022-05-26 RX ORDER — ONDANSETRON 2 MG/ML
8 INJECTION INTRAMUSCULAR; INTRAVENOUS
Status: CANCELLED | OUTPATIENT
Start: 2022-06-02

## 2022-05-26 RX ORDER — FAMOTIDINE 10 MG/ML
20 INJECTION, SOLUTION INTRAVENOUS
Status: CANCELLED | OUTPATIENT
Start: 2022-06-02

## 2022-05-26 RX ORDER — DIPHENHYDRAMINE HYDROCHLORIDE 50 MG/ML
50 INJECTION INTRAMUSCULAR; INTRAVENOUS
Status: CANCELLED | OUTPATIENT
Start: 2022-06-02

## 2022-05-26 RX ORDER — SODIUM CHLORIDE 9 MG/ML
INJECTION, SOLUTION INTRAVENOUS CONTINUOUS
Status: CANCELLED | OUTPATIENT
Start: 2022-06-02

## 2022-05-26 RX ORDER — SODIUM CHLORIDE 0.9 % (FLUSH) 0.9 %
5-40 SYRINGE (ML) INJECTION PRN
Status: CANCELLED | OUTPATIENT
Start: 2022-06-02

## 2022-05-26 RX ORDER — ALBUTEROL SULFATE 90 UG/1
4 AEROSOL, METERED RESPIRATORY (INHALATION) PRN
Status: CANCELLED | OUTPATIENT
Start: 2022-06-02

## 2022-05-26 RX ORDER — ACETAMINOPHEN 325 MG/1
650 TABLET ORAL
Status: CANCELLED | OUTPATIENT
Start: 2022-06-02

## 2022-05-26 RX ORDER — MEPERIDINE HYDROCHLORIDE 50 MG/ML
12.5 INJECTION INTRAMUSCULAR; INTRAVENOUS; SUBCUTANEOUS PRN
Status: CANCELLED | OUTPATIENT
Start: 2022-06-02

## 2022-05-26 RX ORDER — SODIUM CHLORIDE 9 MG/ML
5-40 INJECTION INTRAVENOUS PRN
Status: CANCELLED | OUTPATIENT
Start: 2022-06-02

## 2022-05-26 RX ORDER — EPINEPHRINE 1 MG/ML
0.3 INJECTION, SOLUTION, CONCENTRATE INTRAVENOUS PRN
Status: CANCELLED | OUTPATIENT
Start: 2022-06-02

## 2022-05-26 RX ORDER — FAMOTIDINE 10 MG/ML
20 INJECTION, SOLUTION INTRAVENOUS ONCE
Status: CANCELLED | OUTPATIENT
Start: 2022-06-02 | End: 2022-06-02

## 2022-05-26 RX ORDER — HEPARIN SODIUM (PORCINE) LOCK FLUSH IV SOLN 100 UNIT/ML 100 UNIT/ML
500 SOLUTION INTRAVENOUS PRN
Status: CANCELLED | OUTPATIENT
Start: 2022-06-02

## 2022-05-26 RX ORDER — PALONOSETRON 0.05 MG/ML
0.25 INJECTION, SOLUTION INTRAVENOUS ONCE
Status: CANCELLED | OUTPATIENT
Start: 2022-06-02 | End: 2022-06-02

## 2022-05-26 NOTE — PROGRESS NOTES
[place] [address] [facility]          [address], [address] [facility],[address]        [address] [facility], [address] [place] [address]        Carlos Eduardo Oliver: [phone]        F: [phone]       [place]            Dr. Jade Rose MD MS          Dr. Carli Bolivar MD PhD    ON TREATMENT VISIT (OTV) NOTE     Date of Service: 2022  Patient ID: Derrick Ko   : 1960  MRN: [id number]   Acct Number: [de-identified]     RADIATION ONCOLOGY ATTENDING:  Pauline Faust MD MS    DIAGNOSIS:  Cancer Staging  Cancer of distal third of esophagus [facility])  Staging form: Esophagus - Adenocarcinoma, AJCC 8th Edition  - Clinical stage from 3/23/2022: Stage JOSE (cT2, cN2, cM0, G3) - Signed by Delsie Harada, MD on 2022      Treatment Area: Thoracic    Current Total Dose(cGy): 2160  Current Fraction:   Final/Cumulative Rx. Dose (cGy): 5040    Patient was seen today for weekly visit.      Wt Readings from Last 3 Encounters:   22 208 lb 8.9 oz (94.6 kg)   22 206 lb (93.4 kg)   22 204 lb 9.4 oz (92.8 kg)       BP (!) 145/82   Pulse 94   Temp 98.7 °F (37.1 °C) (Infrared)   Resp 16   Wt 208 lb 8.9 oz (94.6 kg)   SpO2 96%   BMI 32.66 kg/m²     Lab Results   Component Value Date    WBC 3.6 (L) 2022     (L) 2022       Comfort Alteration  Fatigue:Able to perform daily activities with rest periods    Pain Location: n/a  Pain Intensity (Current): 0 No Pain  Pain Treatment: No treatment scheduled  Pain Relief: n/a    Emotional Alteration:   Coping: effective    Nutritional Alteration  Anorexia: none   Nausea: No nausea noted  Vomiting: No vomiting   Dyspepsia/Heartburn: None  Dysphagia/Esophagitis: None    Skin Alteration   Skin reaction: Faint or dull erythema; follicular reaction  Alopecia: No loss    Mucous Membrane Alteration  Mucositis XRT Related: None  Pharynx and Esophagus- Acute: No change over baseline  Voice Changes: Mild or intermittent hoarseness    Ventilation Alteration  Cough: Nonproductive  Hemoptysis: None  Dyspnea: Normal  Mucous Quantity/Quality: n/a    Additional Comments: CeraVe daily, encouraged increasing to BID. Baking soda mouth when remembers. MEDICATIONS:     Current Outpatient Medications   Medication Sig Dispense Refill    lidocaine viscous hcl (XYLOCAINE) 2 % SOLN solution Take 15 mLs by mouth as needed for Irritation (As needed for irritation related to chemoradiation treatment) Follow instructions given in clinic. 100 mL 0    ondansetron (ZOFRAN-ODT) 8 MG TBDP disintegrating tablet Place 1 tablet under the tongue every 8 hours as needed for Nausea or Vomiting (Patient not taking: Reported on 2022) 10 tablet 1    prochlorperazine (COMPAZINE) 10 MG tablet Take 1 tablet by mouth every 6 hours as needed (nausea) (Patient not taking: Reported on 2022) 30 tablet 1    pantoprazole (PROTONIX) 40 MG tablet Take 1 tablet by mouth every morning (before breakfast) 90 tablet 3    famotidine (PEPCID) 20 MG tablet Take 20 mg by mouth 2 times daily      NONFORMULARY Hema-Plex (Iron Multi-vitamin)       No current facility-administered medications for this encounter. * New    PHYSICAL EXAM:       ECO - Symptomatic but completely ambulatory (Restricted in physically strenuous activity but ambulatory and able to carry out work of a light or sedentary nature. For example, light housework, office work)     General: NAD, AO x 3, Mentation is clear with appropriate affect. HEENT: Normocephalic, atraumatic  Thorax:  Unlabored  Abdomen:  Non-distended  Neuro:  Cranial nerves grossly intact; no focal deficits    Chemotherapy Update: Concurrent chemotherapy    Treatment Imaging: CBCT and All imaging reviewed and approved by Dr. Aric Medina: Minimal radiation side effects. Responding appropriately to symptomatic management.     New medications, diagnostic results: Not applicable    PLAN: Again reviewed potential side effects of radiation for the patient's treatment. Continue local/topical care. Continue current radiation course as prescribed.

## 2022-05-27 ENCOUNTER — HOSPITAL ENCOUNTER (OUTPATIENT)
Dept: RADIATION ONCOLOGY | Age: 62
Discharge: HOME | End: 2022-05-27
Payer: MEDICAID

## 2022-05-27 ENCOUNTER — CLINICAL DOCUMENTATION (OUTPATIENT)
Dept: NUTRITION | Age: 62
End: 2022-05-27

## 2022-05-27 ENCOUNTER — CLINICAL DOCUMENTATION (OUTPATIENT)
Dept: SPIRITUAL SERVICES | Facility: CLINIC | Age: 62
End: 2022-05-27

## 2022-05-27 PROCEDURE — 77014 PR CT GUIDANCE PLACEMENT RAD THERAPY FIELDS: CPT | Performed by: RADIOLOGY

## 2022-05-27 PROCEDURE — 77386 HC NTSTY MODUL RAD TX DLVR CPLX: CPT | Performed by: RADIOLOGY

## 2022-05-27 NOTE — ACP (ADVANCE CARE PLANNING)
Advance Care Planning   Advance Care Planning Note  Ambulatory Spiritual Care Services    Date:  5/27/2022    Received request from IDT member. Consultation conversation participants:   Patient who understands ACP conversation  Healthcare Decision Maker  Spouse     Goals of ACP Conversation:  Discuss advance care planning documents  Facilitate a discussion related to patient's goals of care as they align with the patient's values and beliefs. Health Care Decision Makers:      Primary Decision Maker: Laina Nevarez - Spouse - [phone]    Secondary Decision Maker: Kaleb Romo - Child - [phone]    Supplemental (Other) Decision Maker: Drew Tyler - Child - [phone]     Summary:  Completed New Documents  Updated Healthcare Decision Maker    Advance Care Planning Documents (Patient Wishes)  Currently on file:   Healthcare Power of /Advance Directive Appointment of [address]  Living Will/Advance Directive    Assessment:    met with Janiya Bailon, as well as his wife - Sussy Lambert, to provide support for the completion of Advance Directives documents as part of an Advance Care Planning conversation. * He was alert and oriented with decision-making capacity to express his wishes at this time. Interventions:  Provided education on documents for clarity and greater understanding  Assisted in the completion of documents according to patient's wishes at this time  Encouraged ongoing ACP conversation with future decision makers and loved ones    Care Preferences Communicated:  Ventilation:   If the patient, in their present state of health, suddenly became very ill and unable to breathe on their own,     the patient would desire the use of a ventilator (breathing machine). If their health worsens and it becomes clear that the change of recovery is unlikely,     the patient would desire the use of a ventilator (breathing machine).     Resuscitation:  In the event the patient's heart stopped as a result of an underlying serious health condition, the patient communicates a preference for      resuscitative attempts (CPR). Outcomes:  ACP Discussion: Completed  New advance directive completed. Returned original document(s) to patient, as well as copies for distribution to appointed agents  Copy of advance directive given to staff to scan into medical record. Routed ACP note to attending provider or other IDT member.     Electronically signed by Merari Loya on 5/27/2022 at 2:00 PM. Fall precautions/Side rails

## 2022-05-27 NOTE — PROGRESS NOTES
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 Energy Intake: good intake              2. Weight Loss: no loss              3. Fat Loss: no loss              4. Muscle Loss: no loss              5. Fluid Accumulation: none noted              6.  Strength: not measured     Nutrition Diagnosis:   Problem: increased nutrient needs  Etiology: catabolic illness  Signs and Symptoms: head and neck cancer     Nutrition Assessment:   Subjective: (former smoker)  5/27/22 - Patient seen with wife. Patient remains positive and continues to have a good outlook. Patient states that he is having a little pain in throat that lasted about 10 minutes. Patient has bottle of water with him and sips on water throughout visit. Patient reports that he continues to eat well and that his weight is stable. Patient is drinking ONS. Patient denies questions/concerns today. 5/20/22 - Patient seen with wife. Patient reports that his throat is starting to get sore with swallowing. Patient aware to eat more soft foods and says that he is trying to chew well. Patient mentions that he did like the boost soothe samples. Note 2# weight loss in 2 weeks. 5/13/22 - Patient seen and reports that his appetite is good. Patient does mention that he has changed his eating habits some.  For example, patient does not eat spicy foods and is eating more soft foods (steamed veggies, fruits). Patient also mentions that he is chewing foods well and is drinking water after every other bite of food. Patient also reports that he used to drink coffee all day long but he now only drinks 1 cup in the morning and drinks water throughout the day. Patient says that he is drinking a couple ensure or boost daily. Patient admits that he feels better now then he has for awhile and contributes some of that to stress/anxiety regarding starting treatment. Patient mentions that he had chemo Wed 5/11/22 and feels well. Patient reports that now that he knows what to expect with treatment he feels much better. Patient also reports that he has a good outlook and is faithful.   Current Nutrition:              EQSF Diet:    General/soft              Oral Diet Intake:    good              Oral Nutrition Supplement (ONS): ensure or boost (likes boost soothe)              ONS intake:    BID  Anthropometric Measures:              Ht:   5'7\"              DEVBHRK Weight:   208# (EHR, 5/26/22), 206# (EHR, 5/12/22)              Usual Weight:   202-208# noted in EHR              Ideal Weight:   148#         Nutrition Interventions:    5/27/22:  -Encouraged to continue PO at best efforts  -Provided more boost soothe and ensure complete samples and encouraged use of ONS. -Encouraged to continue good nutrition for energy, healing, and in preparation for planned surgery.   5/20/22:  -Encouraged to continue soft foods  -Continue to chew well  -Provided more boost soothe samples for home use  -Provided recipes for sore throat  -Provided and reviewed Nutrition Therapy handout for sore throat, including foods to choose list.  -Left message with nurse navigator per wife request to discuss their will.   -Continue weekly RD visit  5/13/22:  -Encouraged to continue soft foods, to chew well, and continue to drink after with meals  -Encouraged to continue water throughout the day for hydration  -Continue ONS BID and also provided other ONS samples to try for variety (ensure clear and boost soothe).    -ONS coupons provided for home use  -Patient agrees to weekly RD visits  -RD contact information provided and encouraged patient to call with needs     Nutrition Evaluation:          Evaluation: progressing toward goals       Goals: Patient will consume 75% or greater of normal intake and maintain weight throughout treatment.         Monitoring: Oral intake, diet tolerance, weight, hydration, swallowing, ONS use, need for texture modification     Signed: Electronically signed by Parvez Salas RD, LD on 5/27/2022 at 11:11 AM       Contact number: [phone]

## 2022-05-31 ENCOUNTER — HOSPITAL ENCOUNTER (OUTPATIENT)
Dept: RADIATION ONCOLOGY | Age: 62
Discharge: HOME | End: 2022-05-31
Payer: MEDICAID

## 2022-05-31 PROCEDURE — 77014 PR CT GUIDANCE PLACEMENT RAD THERAPY FIELDS: CPT | Performed by: RADIOLOGY

## 2022-05-31 PROCEDURE — 77336 RADIATION PHYSICS CONSULT: CPT | Performed by: RADIOLOGY

## 2022-05-31 PROCEDURE — 77386 HC NTSTY MODUL RAD TX DLVR CPLX: CPT | Performed by: RADIOLOGY

## 2022-06-01 ENCOUNTER — HOSPITAL ENCOUNTER (OUTPATIENT)
Dept: RADIATION ONCOLOGY | Age: 62
Discharge: HOME | End: 2022-06-01
Payer: MEDICAID

## 2022-06-01 PROCEDURE — 77427 RADIATION TX MANAGEMENT X5: CPT | Performed by: RADIOLOGY

## 2022-06-01 PROCEDURE — 77014 PR CT GUIDANCE PLACEMENT RAD THERAPY FIELDS: CPT | Performed by: RADIOLOGY

## 2022-06-01 PROCEDURE — 77386 HC NTSTY MODUL RAD TX DLVR CPLX: CPT | Performed by: RADIOLOGY

## 2022-06-02 ENCOUNTER — HOSPITAL ENCOUNTER (OUTPATIENT)
Dept: RADIATION ONCOLOGY | Age: 62
Discharge: HOME | End: 2022-06-02
Payer: MEDICAID

## 2022-06-02 ENCOUNTER — OFFICE VISIT (OUTPATIENT)
Dept: ONCOLOGY | Age: 62
End: 2022-06-02
Payer: MEDICAID

## 2022-06-02 ENCOUNTER — HOSPITAL ENCOUNTER (OUTPATIENT)
Dept: INFUSION THERAPY | Age: 62
Discharge: HOME | End: 2022-06-02
Payer: MEDICAID

## 2022-06-02 VITALS
SYSTOLIC BLOOD PRESSURE: 135 MMHG | RESPIRATION RATE: 16 BRPM | HEIGHT: 67 IN | DIASTOLIC BLOOD PRESSURE: 79 MMHG | HEART RATE: 77 BPM | OXYGEN SATURATION: 96 % | TEMPERATURE: 98.4 F | WEIGHT: 206 LBS | BODY MASS INDEX: 32.33 KG/M2

## 2022-06-02 VITALS
TEMPERATURE: 98.2 F | HEIGHT: 67 IN | SYSTOLIC BLOOD PRESSURE: 129 MMHG | OXYGEN SATURATION: 97 % | WEIGHT: 206 LBS | BODY MASS INDEX: 32.33 KG/M2 | RESPIRATION RATE: 18 BRPM | HEART RATE: 94 BPM | DIASTOLIC BLOOD PRESSURE: 81 MMHG

## 2022-06-02 VITALS
OXYGEN SATURATION: 97 % | WEIGHT: 206.13 LBS | BODY MASS INDEX: 32.28 KG/M2 | HEART RATE: 77 BPM | SYSTOLIC BLOOD PRESSURE: 138 MMHG | TEMPERATURE: 98.1 F | DIASTOLIC BLOOD PRESSURE: 77 MMHG | RESPIRATION RATE: 18 BRPM

## 2022-06-02 DIAGNOSIS — C15.5 CANCER OF DISTAL THIRD OF ESOPHAGUS (HCC): Primary | ICD-10-CM

## 2022-06-02 LAB
ABSOLUTE IMMATURE GRANULOCYTE: 0.05 THOU/MM3 (ref 0–0.07)
ALBUMIN SERPL-MCNC: 4.3 G/DL (ref 3.5–5.1)
ALP BLD-CCNC: 82 U/L (ref 38–126)
ALT SERPL-CCNC: 43 U/L (ref 11–66)
AST SERPL-CCNC: 37 U/L (ref 5–40)
BASINOPHIL, AUTOMATED: 0 % (ref 0–3)
BASOPHILS ABSOLUTE: 0 THOU/MM3 (ref 0–0.1)
BILIRUB SERPL-MCNC: 0.3 MG/DL (ref 0.3–1.2)
BILIRUBIN DIRECT: < 0.2 MG/DL (ref 0–0.3)
BUN, WHOLE BLOOD: 15 MG/DL (ref 8–26)
CHLORIDE, WHOLE BLOOD: 106 MEQ/L (ref 98–109)
CREATININE, WHOLE BLOOD: 1.1 MG/DL (ref 0.5–1.2)
EOSINOPHILS ABSOLUTE: 0 THOU/MM3 (ref 0–0.4)
EOSINOPHILS RELATIVE PERCENT: 1 % (ref 0–4)
GFR, ESTIMATED: 72 ML/MIN/1.73M2
GLUCOSE, WHOLE BLOOD: 117 MG/DL (ref 70–108)
HCT VFR BLD CALC: 41.9 % (ref 42–52)
HEMOGLOBIN: 13.8 GM/DL (ref 14–18)
IMMATURE GRANULOCYTES: 2 %
IONIZED CALCIUM, WHOLE BLOOD: 1.15 MMOL/L (ref 1.12–1.32)
LYMPHOCYTES # BLD: 15 % (ref 15–47)
LYMPHOCYTES ABSOLUTE: 0.4 THOU/MM3 (ref 1–4.8)
MCH RBC QN AUTO: 28.2 PG (ref 26–33)
MCHC RBC AUTO-ENTMCNC: 32.9 GM/DL (ref 32.2–35.5)
MCV RBC AUTO: 86 FL (ref 80–94)
MONOCYTES ABSOLUTE: 0.5 THOU/MM3 (ref 0.4–1.3)
MONOCYTES: 17 % (ref 0–12)
PDW BLD-RTO: 15.3 % (ref 11.5–14.5)
PLATELET # BLD: 75 THOU/MM3 (ref 130–400)
PMV BLD AUTO: 8.6 FL (ref 9.4–12.4)
POTASSIUM, WHOLE BLOOD: 3.9 MEQ/L (ref 3.5–4.9)
RBC # BLD: 4.89 MILL/MM3 (ref 4.7–6.1)
SEG NEUTROPHILS: 65 % (ref 43–75)
SEGMENTED NEUTROPHILS ABSOLUTE COUNT: 1.8 THOU/MM3 (ref 1.8–7.7)
SODIUM, WHOLE BLOOD: 138 MEQ/L (ref 138–146)
TOTAL CO2, WHOLE BLOOD: 23 MEQ/L (ref 23–33)
TOTAL PROTEIN: 6.9 G/DL (ref 6.1–8)
WBC # BLD: 2.7 THOU/MM3 (ref 4.8–10.8)

## 2022-06-02 PROCEDURE — 2500000003 HC RX 250 WO HCPCS: Performed by: INTERNAL MEDICINE

## 2022-06-02 PROCEDURE — 99213 OFFICE O/P EST LOW 20 MIN: CPT

## 2022-06-02 PROCEDURE — 96367 TX/PROPH/DG ADDL SEQ IV INF: CPT

## 2022-06-02 PROCEDURE — 6360000002 HC RX W HCPCS: Performed by: INTERNAL MEDICINE

## 2022-06-02 PROCEDURE — 80076 HEPATIC FUNCTION PANEL: CPT

## 2022-06-02 PROCEDURE — 2580000003 HC RX 258: Performed by: INTERNAL MEDICINE

## 2022-06-02 PROCEDURE — 96417 CHEMO IV INFUS EACH ADDL SEQ: CPT

## 2022-06-02 PROCEDURE — 77014 PR CT GUIDANCE PLACEMENT RAD THERAPY FIELDS: CPT | Performed by: RADIOLOGY

## 2022-06-02 PROCEDURE — 80047 BASIC METABLC PNL IONIZED CA: CPT

## 2022-06-02 PROCEDURE — 36592 COLLECT BLOOD FROM PICC: CPT

## 2022-06-02 PROCEDURE — 96375 TX/PRO/DX INJ NEW DRUG ADDON: CPT

## 2022-06-02 PROCEDURE — 99213 OFFICE O/P EST LOW 20 MIN: CPT | Performed by: INTERNAL MEDICINE

## 2022-06-02 PROCEDURE — A4216 STERILE WATER/SALINE, 10 ML: HCPCS | Performed by: INTERNAL MEDICINE

## 2022-06-02 PROCEDURE — 77386 HC NTSTY MODUL RAD TX DLVR CPLX: CPT | Performed by: RADIOLOGY

## 2022-06-02 PROCEDURE — 96413 CHEMO IV INFUSION 1 HR: CPT

## 2022-06-02 PROCEDURE — 85025 COMPLETE CBC W/AUTO DIFF WBC: CPT

## 2022-06-02 RX ORDER — LOSARTAN POTASSIUM 100 MG/1
100 TABLET ORAL DAILY
COMMUNITY
End: 2022-06-02

## 2022-06-02 RX ORDER — DIPHENHYDRAMINE HYDROCHLORIDE 50 MG/ML
50 INJECTION INTRAMUSCULAR; INTRAVENOUS ONCE
Status: COMPLETED | OUTPATIENT
Start: 2022-06-02 | End: 2022-06-02

## 2022-06-02 RX ORDER — ASPIRIN 325 MG
325 TABLET ORAL DAILY
COMMUNITY
End: 2022-09-13

## 2022-06-02 RX ORDER — AMOXICILLIN 500 MG/1
500 CAPSULE ORAL 2 TIMES DAILY
COMMUNITY
End: 2022-06-02

## 2022-06-02 RX ORDER — AMLODIPINE BESYLATE 5 MG/1
5 TABLET ORAL DAILY
COMMUNITY
End: 2022-06-02

## 2022-06-02 RX ORDER — SODIUM CHLORIDE 9 MG/ML
25 INJECTION, SOLUTION INTRAVENOUS PRN
Status: CANCELLED | OUTPATIENT
Start: 2022-06-02

## 2022-06-02 RX ORDER — HEPARIN SODIUM (PORCINE) LOCK FLUSH IV SOLN 100 UNIT/ML 100 UNIT/ML
500 SOLUTION INTRAVENOUS PRN
Status: CANCELLED | OUTPATIENT
Start: 2022-06-02

## 2022-06-02 RX ORDER — SODIUM CHLORIDE 0.9 % (FLUSH) 0.9 %
5-40 SYRINGE (ML) INJECTION PRN
Status: CANCELLED | OUTPATIENT
Start: 2022-06-02

## 2022-06-02 RX ORDER — SODIUM CHLORIDE 9 MG/ML
5-250 INJECTION, SOLUTION INTRAVENOUS PRN
Status: DISCONTINUED | OUTPATIENT
Start: 2022-06-02 | End: 2022-06-03 | Stop reason: HOSPADM

## 2022-06-02 RX ORDER — SODIUM CHLORIDE 0.9 % (FLUSH) 0.9 %
5-40 SYRINGE (ML) INJECTION PRN
Status: DISCONTINUED | OUTPATIENT
Start: 2022-06-02 | End: 2022-06-03 | Stop reason: HOSPADM

## 2022-06-02 RX ORDER — PALONOSETRON 0.05 MG/ML
0.25 INJECTION, SOLUTION INTRAVENOUS ONCE
Status: COMPLETED | OUTPATIENT
Start: 2022-06-02 | End: 2022-06-02

## 2022-06-02 RX ADMIN — SODIUM CHLORIDE 20 ML/HR: 9 INJECTION, SOLUTION INTRAVENOUS at 13:30

## 2022-06-02 RX ADMIN — CARBOPLATIN 205.6 MG: 10 INJECTION, SOLUTION INTRAVENOUS at 15:14

## 2022-06-02 RX ADMIN — SODIUM CHLORIDE, PRESERVATIVE FREE 10 ML: 5 INJECTION INTRAVENOUS at 13:29

## 2022-06-02 RX ADMIN — DIPHENHYDRAMINE HYDROCHLORIDE 50 MG: 50 INJECTION, SOLUTION INTRAMUSCULAR; INTRAVENOUS at 13:35

## 2022-06-02 RX ADMIN — FAMOTIDINE 20 MG: 10 INJECTION, SOLUTION INTRAVENOUS at 13:32

## 2022-06-02 RX ADMIN — SODIUM CHLORIDE, PRESERVATIVE FREE 20 ML: 5 INJECTION INTRAVENOUS at 11:41

## 2022-06-02 RX ADMIN — DEXAMETHASONE SODIUM PHOSPHATE 12 MG: 4 INJECTION, SOLUTION INTRAMUSCULAR; INTRAVENOUS at 13:42

## 2022-06-02 RX ADMIN — PALONOSETRON 0.25 MG: 0.05 INJECTION, SOLUTION INTRAVENOUS at 13:38

## 2022-06-02 RX ADMIN — SODIUM CHLORIDE, PRESERVATIVE FREE 10 ML: 5 INJECTION INTRAVENOUS at 11:40

## 2022-06-02 RX ADMIN — PACLITAXEL 96 MG: 6 INJECTION, SOLUTION, CONCENTRATE INTRAVENOUS at 14:11

## 2022-06-02 RX ADMIN — SODIUM CHLORIDE, PRESERVATIVE FREE 20 ML: 5 INJECTION INTRAVENOUS at 16:02

## 2022-06-02 NOTE — PROGRESS NOTES
Patient assessed for the following post chemotherapy:    Dizziness   No  Lightheadedness  No      Acute nausea/vomiting No  Headache   No  Chest pain/pressure  No  Rash/itching   No  Shortness of breath  No    Patient kept for 20 minutes observation post infusion chemotherapy. Patient tolerated chemotherapy treatment Taxol/Carboplatin without any complications. Last vital signs:   /79   Pulse 77   Temp 98.4 °F (36.9 °C) (Oral)   Resp 16   Ht 5' 7\" (1.702 m)   Wt 206 lb (93.4 kg)   SpO2 96%   BMI 32.26 kg/m²     Physician instructed patients:  Today 6/2 due week #4 low dose carboplatin/taxol with radiation. Follow-up next week with nurse practitioner and labs for week 5 of treatment. Follow-up 2 weeks with me for week 6. Patient instructed if experience any of the above symptoms following today's infusion, he is to notify MD immediately or go to the emergency department. Discharge instructions given to patient. Verbalizes understanding. Ambulated off unit per self, accompanied by family, with belongings.

## 2022-06-02 NOTE — PLAN OF CARE
Problem: Safety - Adult  Goal: Free from fall injury  Outcome: Adequate for Discharge  Flowsheets (Taken 6/2/2022 1636)  Free From Fall Injury: Instruct family/caregiver on patient safety  Note: Patient verbalizes understanding of fall precautions. Patient free from falls this visit. Goal: Absence of falls  Outcome: Adequate for Discharge  Note: Discussed fall precautions, call light within reach. Problem: Discharge Planning  Goal: Discharge to home or other facility with appropriate resources  Outcome: Adequate for Discharge  Flowsheets (Taken 6/2/2022 1636)  Discharge to home or other facility with appropriate resources: Identify barriers to discharge with patient and caregiver  Note: Verbalized understanding of discharge instructions, follow-up appointments, and when to call the physician. Goal: Knowledge of discharge instructions  Description: Knowledge of discharge instructions  Outcome: Adequate for Discharge  Intervention: PROVIDE DISCHARGE TEACHING  Note: Discuss discharge instructions, follow ups, and when to call the doctor. Problem: Infection - Adult  Goal: Absence of infection at discharge  Outcome: Adequate for Discharge  Flowsheets (Taken 6/2/2022 1636)  Absence of infection at discharge: Assess and monitor for signs and symptoms of infection  Note: Picc site with no redness or warmth. Patient verbalizes signs/symptoms of picc infection and when to notify the physician. Goal: Will show no infection signs and symptoms  Description: Will show no infection signs and symptoms  Outcome: Adequate for Discharge  Note: Discussed picc maintenance, infection prevention, signs and when to call the doctor     Problem: Hematologic - Adult  Goal: Maintains hematologic stability  Outcome: Adequate for Discharge  Note: Patient verbalizes understanding to verbal information given on taxol and carboplatin,action and possible side effects. Aware to call MD if develop complications.      Goal: Patient/Family Education  Outcome: Adequate for Discharge  Intervention: EDUCATE PATIENT/FAMILY ON MEDICATION REGIMEN  Note: Chemotherapy Teaching     What is Chemotherapy   Drug action [x]   Method of Administration [x]   Handouts given []     Side Effects  Nausea/vomiting [x]   Diarrhea [x]   Fatigue [x]   Signs / Symptoms of infection [x]   Neutropenia [x]   Thrombocytopenia [x]   Alopecia [x]   neuropathy [x]   Tensas diet &  the importance of fluids [x]       Micellaneous  Importance of nutrition [x]   Importance of oral hygiene [x]   When to call the MD [x]   Monitoring labs [x]   Use of supportive services []     Explanation of Drug Regimen / Frequency  Taxol and carboplatin     Comments  Verbalized understanding to drug,action,side effects and when to call MD      Care plan reviewed with patient. Patient verbalizes understanding of the plan of care and contribute to goal setting.

## 2022-06-02 NOTE — PROGRESS NOTES
Lab draw completed from PICC on arrival to unit. Patient off the unit at this time to office appointment, accompanied by office staff, per self.

## 2022-06-02 NOTE — PROGRESS NOTES
[place] [address] [facility] [address], [address]        [facility], [address] [facility]        [facility]: [phone]        F: [phone]       [facility]            Dr. Hanna Hernandez MD MS          Dr. Nolan Ulrich MD PhD    ON TREATMENT VISIT (OTV) NOTE     Date of Service: 2022  Patient ID: Odell Tobias   : 1960  MRN: [id number]   Acct Number: [de-identified]     RADIATION ONCOLOGY ATTENDING:  Nishant Mendes MD MS    DIAGNOSIS:  Cancer Staging  Cancer of distal third of esophagus [facility])  Staging form: Esophagus - Adenocarcinoma, AJCC 8th Edition  - Clinical stage from 3/23/2022: Stage JOSE (cT2, cN2, cM0, G3) - Signed by Brendan Harden MD on 2022      Treatment Area: Thoracic    Current Total Dose(cGy): 7997  Current Fraction:   Final/Cumulative Rx. Dose (cGy): 5040    Patient was seen today for weekly visit.      Wt Readings from Last 3 Encounters:   22 206 lb 2.1 oz (93.5 kg)   22 208 lb 8.9 oz (94.6 kg)   22 206 lb (93.4 kg)       /77   Pulse 77   Temp 98.1 °F (36.7 °C) (Infrared)   Resp 18   Wt 206 lb 2.1 oz (93.5 kg)   SpO2 97%   BMI 32.28 kg/m²     Lab Results   Component Value Date    WBC 3.6 (L) 2022     (L) 2022       Comfort Alteration  Fatigue:Able to perform daily activities with rest periods    Pain Location: n/a  Pain Intensity (Current): 0 No Pain  Pain Treatment: No treatment scheduled  Pain Relief: n/a    Emotional Alteration:   Coping: effective    Nutritional Alteration  Anorexia: none   Nausea: No nausea noted  Vomiting: No vomiting   Dyspepsia/Heartburn: None  Dysphagia/Esophagitis: None    Skin Alteration   Skin reaction: Faint or dull erythema; follicular reaction  Alopecia: No loss    Mucous Membrane Alteration  Mucositis XRT Related: None  Pharynx and Esophagus- Acute: No change over baseline  Voice Changes: Mild or intermittent hoarseness    Ventilation Alteration  Cough: Nonproductive  Hemoptysis: None  Dyspnea: Normal  Mucous Quantity/Quality: n/a    Additional Comments: CeraVe BID. Baking soda mouth when remembers. MEDICATIONS:     Current Outpatient Medications   Medication Sig Dispense Refill    lidocaine viscous hcl (XYLOCAINE) 2 % SOLN solution Take 15 mLs by mouth as needed for Irritation (As needed for irritation related to chemoradiation treatment) Follow instructions given in clinic. 100 mL 0    ondansetron (ZOFRAN-ODT) 8 MG TBDP disintegrating tablet Place 1 tablet under the tongue every 8 hours as needed for Nausea or Vomiting (Patient not taking: Reported on 2022) 10 tablet 1    prochlorperazine (COMPAZINE) 10 MG tablet Take 1 tablet by mouth every 6 hours as needed (nausea) (Patient not taking: Reported on 2022) 30 tablet 1    pantoprazole (PROTONIX) 40 MG tablet Take 1 tablet by mouth every morning (before breakfast) 90 tablet 3    famotidine (PEPCID) 20 MG tablet Take 20 mg by mouth 2 times daily      NONFORMULARY Hema-Plex (Iron Multi-vitamin)       No current facility-administered medications for this encounter. * New    PHYSICAL EXAM:       ECO - Symptomatic but completely ambulatory (Restricted in physically strenuous activity but ambulatory and able to carry out work of a light or sedentary nature. For example, light housework, office work)     General: NAD, AO x 3, Mentation is clear with appropriate affect. HEENT: Normocephalic, atraumatic  Thorax:  Unlabored  Abdomen:  Non-distended  Neuro:  Cranial nerves grossly intact; no focal deficits  Skin - treatment portal: SEE above. Skin intact with no treatment effects noted. Chemotherapy Update: Concurrent chemotherapy    Treatment Imaging: CBCT and All imaging reviewed and approved by Dr. Juma Blum: Minimal radiation side effects. Responding appropriately to symptomatic management.     New medications, diagnostic results: Not applicable    PLAN: Again reviewed potential side effects of radiation for the patient's treatment. Continue local/topical care. Continue current radiation course as prescribed.

## 2022-06-02 NOTE — ONCOLOGY
Chemotherapy Administration    Pre-assessment Data: Antineoplastic Agents  See toxicity flow sheet for assessment                                          [x]         Interventions:   Chemotherapy SQ injection given []   Taxol administered-VS per protocol [x]   Blood pressure meds held 12 hours prior to Rituxan/Ruxience []   Rituxan/Ruxience administered- VS and precautions per guidelines []   Emergency drugs available as appropriate [x]   Anaphylaxis assessment completed [x]   Pre-medications administered as ordered [x]   Blood return noted upon initiation of chemotherapy [x]   Blood return noted each 1-2ml of a vesicant medication if given IV push []   Navelbine, Vincristine and Velban given as a monitored wide open drip, blood return noted before during and after infusion.  []   Blood return noted each 2-3ml of a non-vesicant medication if given IV push []   Patient aware of potential Immunotherapy toxicities []   Monitor for signs / symptoms of hypersensitivity reaction [x]   Chemotherapy orders (drug/dose/rate) verified by 2 Chemo certified RNs [x]   Monitor IV site and blood return throughout the infusion of the medication [x]   Document IV site checks on the IV assessment form [x]   Document chemotherapy teaching on the Patient Education tab [x]   Document patient verbalizes understanding of medications being administered [x]   If IV infiltration, see ONS Guidelines []   Other:     Taxol/Carboplatin []

## 2022-06-02 NOTE — PROGRESS NOTES
[address] CANCER [address] [place] [address]  Dept: [phone]  Loc: [phone]   Hematology/Oncology Consult (Clinic)        6/2/22     Francy Joana Villeladora   1960     No ref. 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 Clinical stage T2 N2 M0. Mass extends from 34 to 28 cm approximately 6 cm. Staging by EUS 3/23/2022 [facility] (T2 based on invasion and N2 based on 2 malignant appearing lymph nodes visualized and measured in the lower paraesophageal mediastinum level 8L adjacent to the mass. 2 malignant appearing lymph nodes visualized and measured in the subcarinal mediastinum level 7. PET/CT 3/30/2022 [facility]- hypermetabolic activity at mid esophagus consistent with primary malignancy with adjacent hypermetabolic left periesophageal lymph nodes. TREATMENT:  -Seen by Dr. Kailee Cantu of thoracic surgery at [facility] 3/30/2022. Recommends neoadjuvant chemoradiation to be followed by surgery  -Neoadjuvant chemoradiation with Dr. Ramos Greene. Low-dose carboplatinum Taxol weekly x 6-7. Chemo  Start date: 5/11. Day 1 XRT 5/11 6/2/2022 week #4 due. PARAMETERS:  -EGD/EUS  -PET/CT    COMORBIDITIES:  Include 30-pack-year history quit in 2013, alcohol none, GERD, hypertension, hyperlipidemia    SUBJECTIVE: Patient began chemoradiation for his esophageal cancer on May 11. He is here today due for week #4 of low-dose carboplatin Taxol with ongoing radiation. Very well-tolerated with no complaints. No GI complaints no neuropathy. Denies any esophagitis. Appetite and weight remain stable. HPI: Michelle Ashford is a 81-year-old gentleman with a long history of GERD who presented to the ER on February 10 was admitted for 1 day because of chest heaviness. No cardiology because GI was consulted ultimately showed a mass in the distal esophagus.   Outpatient EGD requested. Diagnosed with adenocarcinoma the distal esophagus and referred to Dr. Rosario Benavides of thoracic surgery at [facility].  EUS shows regional tone involvement. See scans below and ultrasound. No distant mets. Patient has no significant dysphagia and no weight loss. Patient referred back locally for chemoradiation neoadjuvant treatment before surgery. ROS:  Review of Systems 14 point negative except as above. PMH:   Past Medical History:   Diagnosis Date    Cancer of distal third of esophagus ([address] 2022    GERD (gastroesophageal reflux disease)         Social HX:   Social History     Socioeconomic History    Marital status:      Spouse name: Joshua Hutton Number of children: 11    Years of education: 15    Highest education level: High school graduate   Occupational History    Occupation: VENDOR     Comment: [address]   Tobacco Use    Smoking status: Former Smoker     Packs/day: 3.00     Years: 10.00     Pack years: 30.00     Types: Cigarettes     Quit date: 2013     Years since quittin.0    Smokeless tobacco: Never Used    Tobacco comment: does not smoke   Vaping Use    Vaping Use: Not on file   Substance and Sexual Activity    Alcohol use: Never    Drug use: Never    Sexual activity: Yes     Partners: Female   Other Topics Concern    Not on file   Social History Narrative    Not on file     Social Determinants of Health     Financial Resource Strain: Low Risk     Difficulty of Paying Living Expenses: Not hard at all   Food Insecurity: Food Insecurity Present    Worried About [address] in the Last Year: Often true    Brian of Food in the Last Year: Sometimes true   Transportation Needs:     Lack of Transportation (Medical): Not on file    Lack of Transportation (Non-Medical):  Not on file   Physical Activity:     Days of Exercise per Week: Not on file    Minutes of Exercise per Session: Not on file   Stress:     Feeling of Stress : Not on file Social Connections:     Frequency of Communication with Friends and Family: Not on file    Frequency of Social Gatherings with Friends and Family: Not on file    Attends Congregation Services: Not on file    Active Member of Clubs or Organizations: Not on file    Attends Club or Organization Meetings: Not on file    Marital Status: Not on file   Intimate Partner Violence:     Fear of Current or Ex-Partner: Not on file    Emotionally Abused: Not on file    Physically Abused: Not on file    Sexually Abused: Not on file   Housing Stability:     Unable to Pay for Housing in the Last Year: Not on file    Number of [place] in the Last Year: Not on file    Unstable Housing in the Last Year: Not on file        Spouse: Diana Cueto  [phone]    Phone: [phone]     [address] [place] [address] Dr GANN [address]     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    Immunizations:  Immunization History   Administered Date(s) Administered    Influenza Virus Vaccine 09/15/2015        Health Screenings:    C-Scope:  5 years ago  Prostate:   Lab Results   Component Value Date    PSA 1.41 03/22/2017            Health Maintenance Due   Topic Date Due    Pneumococcal 0-64 years Vaccine (1 - PCV) Never done    COVID-19 Vaccine (1) Never done    DTaP/Tdap/Td vaccine (1 - Tdap) Never done    Shingles vaccine (1 of 2) Never done    Low dose CT lung screening  Never done    Prostate Specific Antigen (PSA) Screening or Monitoring  03/22/2018    Diabetes screen  03/22/2020    Lipids  03/22/2022        Interests:   Cars. music family,    Fam HX:   Family History   Problem Relation Age of Onset    Colon Cancer Mother     Heart Disease Father     Cancer Brother         lung        Hospitalizations:   2/10/22    Allergies:   Allergies   Allergen Reactions    Paclitaxel Other (See Comments)     Severe lower back pain- able to resume after medications given        Adult Illness:  Patient Active Problem List Diagnosis    Abdominal pain    Cancer of distal third of esophagus [facility])        Surgery:  Past Surgical History:   Procedure Laterality Date    DENTAL SURGERY      25 teeth removed        Medications:  Current Outpatient Medications   Medication Sig Dispense Refill    aspirin 325 mg tablet Take 325 mg by mouth daily      lidocaine viscous hcl (XYLOCAINE) 2 % SOLN solution Take 15 mLs by mouth as needed for Irritation (As needed for irritation related to chemoradiation treatment) Follow instructions given in clinic. 100 mL 0    pantoprazole (PROTONIX) 40 MG tablet Take 1 tablet by mouth every morning (before breakfast) 90 tablet 3    ondansetron (ZOFRAN-ODT) 8 MG TBDP disintegrating tablet Place 1 tablet under the tongue every 8 hours as needed for Nausea or Vomiting (Patient not taking: Reported on 2022) 10 tablet 1    prochlorperazine (COMPAZINE) 10 MG tablet Take 1 tablet by mouth every 6 hours as needed (nausea) (Patient not taking: Reported on 2022) 30 tablet 1    famotidine (PEPCID) 20 MG tablet Take 20 mg by mouth 2 times daily (Patient not taking: Reported on 2022)      NONFORMULARY Hema-Plex (Iron Multi-vitamin)       No current facility-administered medications for this visit. Facility-Administered Medications Ordered in Other Visits   Medication Dose Route Frequency Provider Last Rate Last Admin    sodium chloride flush 0.9 % injection 5-40 mL  5-40 mL IntraVENous PRN Clemente Huerta MD   20 mL at 22 1141   Over-the-counter iron supplement 3 times a day      EXAM:   height is 5' 7\" (1.702 m) and weight is 206 lb (93.4 kg). His oral temperature is 98.2 °F (36.8 °C). His blood pressure is 129/81 and his pulse is 94. His respiration is 18 and oxygen saturation is 97%. Estimated body surface area is 2.1 meters squared as calculated from the following:    Height as of this encounter: 5' 7\" (1.702 m). Weight as of this encounter: 206 lb (93.4 kg).      ECO  General: Non-ill appearing. Very pleasant and relaxed. Appears healthy. HEENT: NC/AT,nonicteric, perrla,eom intact, no mucosal lesions  Neck: normal thyroid, no masses. pulses nl, no bruits,   Nodes: No adenopathy  Lungs/chest: clear, no rales,rhonchi or wheezing, lung bases clear  CV: rrr, no rubs ,gallops or murmurs  Breasts: Not examined  Abd/Rectal: soft, non-tender,bowel sounds normal , no HSM,no masses  Back: normal curvature, No midline tenderness. flanks nontender  : Not Examined  Extremities: no cyanosis,clubbing or edema. Skin: unremarkable  Neuro: A and O x 4, CN exam nonfocal, Motor- no deficits, Sensory- no deficits, gait-nl, speech- fluent, no ataxia. Devices: PICC line in right arm site unremarkable.       DATA:    LAB:     CBC with Differential:      Lab Results   Component Value Date    WBC 2.7 06/02/2022    RBC 4.89 06/02/2022    HGB 13.8 06/02/2022    HCT 41.9 06/02/2022    PLT 75 06/02/2022    MCV 86 06/02/2022    MCH 28.2 06/02/2022    MCHC 32.9 06/02/2022    RDW 15.3 06/02/2022    NRBC 0 03/28/2022    SEGSPCT 53.6 03/28/2022    MONOPCT 10.9 03/28/2022    MONOSABS 0.5 06/02/2022    LYMPHSABS 0.4 06/02/2022    EOSABS 0.0 06/02/2022    BASOSABS 0.0 06/02/2022     Lab Results   Component Value Date/Time    SEGSABS 1.8 06/02/2022 11:41 AM       CMP:    Lab Results   Component Value Date     06/02/2022     02/10/2022    K 3.9 06/02/2022    K 4.2 02/10/2022    CL 99 02/10/2022    CO2 24 02/10/2022    BUN 17 02/10/2022    CREATININE 1.1 06/02/2022    CREATININE 1.0 05/04/2022    LABGLOM 76 05/04/2022    GLUCOSE 127 02/10/2022    PROT 7.0 05/25/2022    LABALBU 4.2 05/25/2022    CALCIUM 10.6 02/10/2022    BILITOT 0.2 05/25/2022    ALKPHOS 85 05/25/2022    AST 27 05/25/2022    ALT 38 05/25/2022       BMP:    Lab Results   Component Value Date     06/02/2022     02/10/2022    K 3.9 06/02/2022    K 4.2 02/10/2022    CL 99 02/10/2022    CO2 24 02/10/2022    BUN 17 02/10/2022    LABALBU 4.2 05/25/2022    CREATININE 1.1 06/02/2022    CREATININE 1.0 05/04/2022    CALCIUM 10.6 02/10/2022    LABGLOM 76 05/04/2022    GLUCOSE 127 02/10/2022       Magnesium:    Lab Results   Component Value Date    MG 2.2 03/22/2017     PT/INR:  No results found for: PROTIME, INR  TSH:    Lab Results   Component Value Date    TSH 2.800 03/22/2017     VITAMIN B12: No components found for: B12  FOLATE:    Lab Results   Component Value Date    FOLATE 15.6 02/10/2022     IRON:    Lab Results   Component Value Date    IRON 58 04/27/2022     Iron Saturation:  No components found for: PERCENTFE  TIBC:    Lab Results   Component Value Date    TIBC 341 04/27/2022     FERRITIN:    Lab Results   Component Value Date    FERRITIN 28 04/27/2022     PSA:   Lab Results   Component Value Date    PSA 1.41 03/22/2017            IMAGING:  -PET/CT 3/30/2022  Intense hypermetabolic activity within the mid esophagus consistent with a primary malignancy. Adjacent hypermetabolic left periesophageal lymph node worrisome for metastatic adenopathy. PROCEDURES:  EGD 2/14/2022  Ulcerated mass distal esophagus    EUS 3/23/2022  Partially obstructing malignant esophageal tumor found in the lower third of the esophagus. Large hiatal hernia. Normal stomach and duodenum. Liver most consistent with fatty liver. 2 malignant appearing lymph nodes visualized and measured in the lower paraesophageal mediastinum level 8L adjacent to the mass. 2 malignant appearing lymph nodes visualized and measured in the subcarinal mediastinum level 7. PATHOLOGY:   EGD distal esophageal biopsy path report  Pathologic Diagnosis     Outside Slides  [id number] (02/15/22)  A. Esophagus, distal, biopsy:   · Invasive adenocarcinoma, moderately differentiated. See comment   · Alcian Blue/PAS performed at outside institution and submitted for review highlights Prescott's mucosa in the background      HER2/robina Gene Status: Amplified.     GENETICS:  HER2 amplified. MOLECULAR:  None    ASSESSMENT/PLAN:    1: Diagnosis: 56-year-old gentleman with adenocarcinoma of the distal third of the esophagus. Clinical stage T2N2 by EUS and PET scan confirmed. No distant mets. No significant dysphagia. Presented with chest discomfort. Chronic history of GERD. Performance status is excellent. Seen by Dr. Homero Portillo thoracic surgery at [facility] referred back for neoadjuvant chemoradiation before surgery. 2) Prognosis / Disease Status: High risk node positive disease T2N2 clinical stage, locally advanced. HER2 amplified which has no bearing unless he develops metastatic disease. 3) Work-up:    Labs: CBC, CMP   Imaging: Done   Procedures: PICC line   Consults: None new   Other:    4) Symptom Management: None needed      5) Supportive care provided. Level of care is appropriate. 6) Treatment goal: Cure      Treatment plan:     Neoadjuvant chemoradiation: Low-dose carboplatin and Taxol weekly x6-7 with radiation. Patient start date for radiation next week on 5/9. Start date for first weekly CarboTaxol-5/11. Today 6/2 due for week #4 low-dose carboplatin Taxol with radiation      7) Medications reviewed. Prescriptions today: None, but recently Compazine, Zofran ODT            No orders of the defined types were placed in this encounter. OARRS:  Controlled Substance Monitoring:    Acute and Chronic Pain Monitoring:   No flowsheet data found. 8) Research Options:   Not applicable      9) Other:        Follow-up with Dr. Homero Portillo after chemoradiation is done for surgery. 10) Follow Up: Follow-up next week with nurse practitioner and labs for week 5 of treatment. Follow-up 2 weeks with me for week 6.     Zahira Winters MD

## 2022-06-03 ENCOUNTER — HOSPITAL ENCOUNTER (OUTPATIENT)
Dept: RADIATION ONCOLOGY | Age: 62
Discharge: HOME | End: 2022-06-03
Payer: MEDICAID

## 2022-06-03 PROCEDURE — 77386 HC NTSTY MODUL RAD TX DLVR CPLX: CPT | Performed by: RADIOLOGY

## 2022-06-03 PROCEDURE — 77014 PR CT GUIDANCE PLACEMENT RAD THERAPY FIELDS: CPT | Performed by: RADIOLOGY

## 2022-06-06 ENCOUNTER — HOSPITAL ENCOUNTER (OUTPATIENT)
Dept: RADIATION ONCOLOGY | Age: 62
Discharge: HOME | End: 2022-06-06
Payer: MEDICAID

## 2022-06-06 PROCEDURE — 77336 RADIATION PHYSICS CONSULT: CPT | Performed by: RADIOLOGY

## 2022-06-06 PROCEDURE — 77014 PR CT GUIDANCE PLACEMENT RAD THERAPY FIELDS: CPT | Performed by: RADIOLOGY

## 2022-06-06 PROCEDURE — 77386 HC NTSTY MODUL RAD TX DLVR CPLX: CPT | Performed by: RADIOLOGY

## 2022-06-07 ENCOUNTER — HOSPITAL ENCOUNTER (OUTPATIENT)
Dept: RADIATION ONCOLOGY | Age: 62
Discharge: HOME | End: 2022-06-07
Payer: MEDICAID

## 2022-06-07 PROCEDURE — 77386 HC NTSTY MODUL RAD TX DLVR CPLX: CPT | Performed by: RADIOLOGY

## 2022-06-07 PROCEDURE — 77014 PR CT GUIDANCE PLACEMENT RAD THERAPY FIELDS: CPT | Performed by: RADIOLOGY

## 2022-06-08 ENCOUNTER — HOSPITAL ENCOUNTER (OUTPATIENT)
Dept: RADIATION ONCOLOGY | Age: 62
Discharge: HOME | End: 2022-06-08
Payer: MEDICAID

## 2022-06-08 PROCEDURE — 77427 RADIATION TX MANAGEMENT X5: CPT | Performed by: RADIOLOGY

## 2022-06-08 PROCEDURE — 77386 HC NTSTY MODUL RAD TX DLVR CPLX: CPT | Performed by: RADIOLOGY

## 2022-06-08 PROCEDURE — 77014 PR CT GUIDANCE PLACEMENT RAD THERAPY FIELDS: CPT | Performed by: RADIOLOGY

## 2022-06-09 ENCOUNTER — HOSPITAL ENCOUNTER (OUTPATIENT)
Dept: RADIATION ONCOLOGY | Age: 62
Discharge: HOME | End: 2022-06-09
Payer: MEDICAID

## 2022-06-09 ENCOUNTER — HOSPITAL ENCOUNTER (OUTPATIENT)
Dept: INFUSION THERAPY | Age: 62
Discharge: HOME | End: 2022-06-09
Payer: MEDICAID

## 2022-06-09 ENCOUNTER — OFFICE VISIT (OUTPATIENT)
Dept: ONCOLOGY | Age: 62
End: 2022-06-09
Payer: MEDICAID

## 2022-06-09 VITALS
RESPIRATION RATE: 18 BRPM | BODY MASS INDEX: 32.18 KG/M2 | DIASTOLIC BLOOD PRESSURE: 83 MMHG | OXYGEN SATURATION: 94 % | SYSTOLIC BLOOD PRESSURE: 130 MMHG | HEART RATE: 86 BPM | WEIGHT: 205 LBS | TEMPERATURE: 98.4 F | HEIGHT: 67 IN

## 2022-06-09 VITALS
TEMPERATURE: 98.1 F | RESPIRATION RATE: 18 BRPM | OXYGEN SATURATION: 97 % | HEIGHT: 67 IN | WEIGHT: 205 LBS | SYSTOLIC BLOOD PRESSURE: 134 MMHG | DIASTOLIC BLOOD PRESSURE: 85 MMHG | HEART RATE: 86 BPM | BODY MASS INDEX: 32.18 KG/M2

## 2022-06-09 VITALS
BODY MASS INDEX: 32.18 KG/M2 | OXYGEN SATURATION: 96 % | TEMPERATURE: 98.5 F | SYSTOLIC BLOOD PRESSURE: 128 MMHG | DIASTOLIC BLOOD PRESSURE: 87 MMHG | HEART RATE: 92 BPM | RESPIRATION RATE: 18 BRPM | WEIGHT: 205.47 LBS

## 2022-06-09 DIAGNOSIS — C15.5 CANCER OF DISTAL THIRD OF ESOPHAGUS (HCC): Primary | ICD-10-CM

## 2022-06-09 DIAGNOSIS — D72.819 LEUKOPENIA, UNSPECIFIED TYPE: ICD-10-CM

## 2022-06-09 DIAGNOSIS — D69.6 THROMBOCYTOPENIA (HCC): ICD-10-CM

## 2022-06-09 DIAGNOSIS — Z51.11 ENCOUNTER FOR CHEMOTHERAPY MANAGEMENT: ICD-10-CM

## 2022-06-09 LAB
ABSOLUTE IMMATURE GRANULOCYTE: 0.06 THOU/MM3 (ref 0–0.07)
ALBUMIN SERPL-MCNC: 4 G/DL (ref 3.5–5.1)
ALP BLD-CCNC: 68 U/L (ref 38–126)
ALT SERPL-CCNC: 36 U/L (ref 11–66)
AST SERPL-CCNC: 24 U/L (ref 5–40)
BASINOPHIL, AUTOMATED: 0 % (ref 0–3)
BASOPHILS ABSOLUTE: 0 THOU/MM3 (ref 0–0.1)
BILIRUB SERPL-MCNC: 0.4 MG/DL (ref 0.3–1.2)
BILIRUBIN DIRECT: < 0.2 MG/DL (ref 0–0.3)
BUN, WHOLE BLOOD: 14 MG/DL (ref 8–26)
CHLORIDE, WHOLE BLOOD: 102 MEQ/L (ref 98–109)
CREATININE, WHOLE BLOOD: 1 MG/DL (ref 0.5–1.2)
EOSINOPHILS ABSOLUTE: 0 THOU/MM3 (ref 0–0.4)
EOSINOPHILS RELATIVE PERCENT: 0 % (ref 0–4)
GFR, ESTIMATED: 81 ML/MIN/1.73M2
GLUCOSE, WHOLE BLOOD: 104 MG/DL (ref 70–108)
HCT VFR BLD CALC: 41.6 % (ref 42–52)
HEMOGLOBIN: 14 GM/DL (ref 14–18)
IMMATURE GRANULOCYTES: 2 %
IONIZED CALCIUM, WHOLE BLOOD: 1.17 MMOL/L (ref 1.12–1.32)
LYMPHOCYTES # BLD: 12 % (ref 15–47)
LYMPHOCYTES ABSOLUTE: 0.3 THOU/MM3 (ref 1–4.8)
MAGNESIUM: 2 MG/DL (ref 1.6–2.4)
MCH RBC QN AUTO: 28.3 PG (ref 26–33)
MCHC RBC AUTO-ENTMCNC: 33.7 GM/DL (ref 32.2–35.5)
MCV RBC AUTO: 84 FL (ref 80–94)
MONOCYTES ABSOLUTE: 0.4 THOU/MM3 (ref 0.4–1.3)
MONOCYTES: 17 % (ref 0–12)
PDW BLD-RTO: 15.3 % (ref 11.5–14.5)
PLATELET # BLD: 69 THOU/MM3 (ref 130–400)
PMV BLD AUTO: 8.5 FL (ref 9.4–12.4)
POTASSIUM, WHOLE BLOOD: 4.3 MEQ/L (ref 3.5–4.9)
RBC # BLD: 4.95 MILL/MM3 (ref 4.7–6.1)
SEG NEUTROPHILS: 68 % (ref 43–75)
SEGMENTED NEUTROPHILS ABSOLUTE COUNT: 1.7 THOU/MM3 (ref 1.8–7.7)
SODIUM, WHOLE BLOOD: 136 MEQ/L (ref 138–146)
TOTAL CO2, WHOLE BLOOD: 24 MEQ/L (ref 23–33)
TOTAL PROTEIN: 7 G/DL (ref 6.1–8)
WBC # BLD: 2.6 THOU/MM3 (ref 4.8–10.8)

## 2022-06-09 PROCEDURE — 96375 TX/PRO/DX INJ NEW DRUG ADDON: CPT

## 2022-06-09 PROCEDURE — 99213 OFFICE O/P EST LOW 20 MIN: CPT

## 2022-06-09 PROCEDURE — 83735 ASSAY OF MAGNESIUM: CPT

## 2022-06-09 PROCEDURE — 36592 COLLECT BLOOD FROM PICC: CPT

## 2022-06-09 PROCEDURE — 85025 COMPLETE CBC W/AUTO DIFF WBC: CPT

## 2022-06-09 PROCEDURE — 80047 BASIC METABLC PNL IONIZED CA: CPT

## 2022-06-09 PROCEDURE — 6360000002 HC RX W HCPCS: Performed by: INTERNAL MEDICINE

## 2022-06-09 PROCEDURE — 96367 TX/PROPH/DG ADDL SEQ IV INF: CPT

## 2022-06-09 PROCEDURE — 2500000003 HC RX 250 WO HCPCS: Performed by: INTERNAL MEDICINE

## 2022-06-09 PROCEDURE — 77014 PR CT GUIDANCE PLACEMENT RAD THERAPY FIELDS: CPT | Performed by: RADIOLOGY

## 2022-06-09 PROCEDURE — 96417 CHEMO IV INFUS EACH ADDL SEQ: CPT

## 2022-06-09 PROCEDURE — 2580000003 HC RX 258: Performed by: INTERNAL MEDICINE

## 2022-06-09 PROCEDURE — 99214 OFFICE O/P EST MOD 30 MIN: CPT | Performed by: NURSE PRACTITIONER

## 2022-06-09 PROCEDURE — 36591 DRAW BLOOD OFF VENOUS DEVICE: CPT

## 2022-06-09 PROCEDURE — 77386 HC NTSTY MODUL RAD TX DLVR CPLX: CPT | Performed by: RADIOLOGY

## 2022-06-09 PROCEDURE — 99211 OFF/OP EST MAY X REQ PHY/QHP: CPT

## 2022-06-09 PROCEDURE — 96413 CHEMO IV INFUSION 1 HR: CPT

## 2022-06-09 PROCEDURE — 80076 HEPATIC FUNCTION PANEL: CPT

## 2022-06-09 PROCEDURE — A4216 STERILE WATER/SALINE, 10 ML: HCPCS | Performed by: INTERNAL MEDICINE

## 2022-06-09 RX ORDER — FAMOTIDINE 10 MG/ML
20 INJECTION, SOLUTION INTRAVENOUS
Status: CANCELLED | OUTPATIENT
Start: 2022-06-09

## 2022-06-09 RX ORDER — SODIUM CHLORIDE 9 MG/ML
5-40 INJECTION INTRAVENOUS PRN
Status: CANCELLED | OUTPATIENT
Start: 2022-06-23

## 2022-06-09 RX ORDER — HEPARIN SODIUM (PORCINE) LOCK FLUSH IV SOLN 100 UNIT/ML 100 UNIT/ML
500 SOLUTION INTRAVENOUS PRN
Status: CANCELLED | OUTPATIENT
Start: 2022-06-23

## 2022-06-09 RX ORDER — EPINEPHRINE 1 MG/ML
0.3 INJECTION, SOLUTION, CONCENTRATE INTRAVENOUS PRN
Status: CANCELLED | OUTPATIENT
Start: 2022-06-23

## 2022-06-09 RX ORDER — MEPERIDINE HYDROCHLORIDE 50 MG/ML
12.5 INJECTION INTRAMUSCULAR; INTRAVENOUS; SUBCUTANEOUS PRN
Status: CANCELLED | OUTPATIENT
Start: 2022-06-16

## 2022-06-09 RX ORDER — MEPERIDINE HYDROCHLORIDE 50 MG/ML
12.5 INJECTION INTRAMUSCULAR; INTRAVENOUS; SUBCUTANEOUS PRN
Status: CANCELLED | OUTPATIENT
Start: 2022-06-09

## 2022-06-09 RX ORDER — EPINEPHRINE 1 MG/ML
0.3 INJECTION, SOLUTION, CONCENTRATE INTRAVENOUS PRN
Status: CANCELLED | OUTPATIENT
Start: 2022-06-16

## 2022-06-09 RX ORDER — DIPHENHYDRAMINE HYDROCHLORIDE 50 MG/ML
50 INJECTION INTRAMUSCULAR; INTRAVENOUS ONCE
Status: CANCELLED | OUTPATIENT
Start: 2022-06-16 | End: 2022-06-16

## 2022-06-09 RX ORDER — PALONOSETRON 0.05 MG/ML
0.25 INJECTION, SOLUTION INTRAVENOUS ONCE
Status: CANCELLED | OUTPATIENT
Start: 2022-06-23 | End: 2022-06-23

## 2022-06-09 RX ORDER — SODIUM CHLORIDE 9 MG/ML
5-250 INJECTION, SOLUTION INTRAVENOUS PRN
Status: CANCELLED | OUTPATIENT
Start: 2022-06-09

## 2022-06-09 RX ORDER — ACETAMINOPHEN 325 MG/1
650 TABLET ORAL
Status: CANCELLED | OUTPATIENT
Start: 2022-06-23

## 2022-06-09 RX ORDER — SODIUM CHLORIDE 0.9 % (FLUSH) 0.9 %
5-40 SYRINGE (ML) INJECTION PRN
Status: DISCONTINUED | OUTPATIENT
Start: 2022-06-09 | End: 2022-06-10 | Stop reason: HOSPADM

## 2022-06-09 RX ORDER — SODIUM CHLORIDE 0.9 % (FLUSH) 0.9 %
5-40 SYRINGE (ML) INJECTION PRN
Status: CANCELLED | OUTPATIENT
Start: 2022-06-09

## 2022-06-09 RX ORDER — FAMOTIDINE 10 MG/ML
20 INJECTION, SOLUTION INTRAVENOUS ONCE
Status: CANCELLED | OUTPATIENT
Start: 2022-06-23 | End: 2022-06-23

## 2022-06-09 RX ORDER — PALONOSETRON 0.05 MG/ML
0.25 INJECTION, SOLUTION INTRAVENOUS ONCE
Status: CANCELLED | OUTPATIENT
Start: 2022-06-09 | End: 2022-06-09

## 2022-06-09 RX ORDER — DIPHENHYDRAMINE HYDROCHLORIDE 50 MG/ML
50 INJECTION INTRAMUSCULAR; INTRAVENOUS ONCE
Status: COMPLETED | OUTPATIENT
Start: 2022-06-09 | End: 2022-06-09

## 2022-06-09 RX ORDER — DIPHENHYDRAMINE HYDROCHLORIDE 50 MG/ML
50 INJECTION INTRAMUSCULAR; INTRAVENOUS
Status: CANCELLED | OUTPATIENT
Start: 2022-06-09

## 2022-06-09 RX ORDER — ONDANSETRON 2 MG/ML
8 INJECTION INTRAMUSCULAR; INTRAVENOUS
Status: CANCELLED | OUTPATIENT
Start: 2022-06-16

## 2022-06-09 RX ORDER — SODIUM CHLORIDE 9 MG/ML
25 INJECTION, SOLUTION INTRAVENOUS PRN
OUTPATIENT
Start: 2022-06-09

## 2022-06-09 RX ORDER — ALBUTEROL SULFATE 90 UG/1
4 AEROSOL, METERED RESPIRATORY (INHALATION) PRN
Status: CANCELLED | OUTPATIENT
Start: 2022-06-16

## 2022-06-09 RX ORDER — SODIUM CHLORIDE 0.9 % (FLUSH) 0.9 %
5-40 SYRINGE (ML) INJECTION PRN
Status: CANCELLED | OUTPATIENT
Start: 2022-06-16

## 2022-06-09 RX ORDER — SODIUM CHLORIDE 9 MG/ML
5-250 INJECTION, SOLUTION INTRAVENOUS PRN
Status: CANCELLED | OUTPATIENT
Start: 2022-06-23

## 2022-06-09 RX ORDER — HEPARIN SODIUM (PORCINE) LOCK FLUSH IV SOLN 100 UNIT/ML 100 UNIT/ML
500 SOLUTION INTRAVENOUS PRN
OUTPATIENT
Start: 2022-06-09

## 2022-06-09 RX ORDER — HEPARIN SODIUM (PORCINE) LOCK FLUSH IV SOLN 100 UNIT/ML 100 UNIT/ML
500 SOLUTION INTRAVENOUS PRN
Status: DISCONTINUED | OUTPATIENT
Start: 2022-06-09 | End: 2022-06-10 | Stop reason: HOSPADM

## 2022-06-09 RX ORDER — DIPHENHYDRAMINE HYDROCHLORIDE 50 MG/ML
50 INJECTION INTRAMUSCULAR; INTRAVENOUS
Status: CANCELLED | OUTPATIENT
Start: 2022-06-16

## 2022-06-09 RX ORDER — ACETAMINOPHEN 325 MG/1
650 TABLET ORAL
Status: CANCELLED | OUTPATIENT
Start: 2022-06-16

## 2022-06-09 RX ORDER — HEPARIN SODIUM (PORCINE) LOCK FLUSH IV SOLN 100 UNIT/ML 100 UNIT/ML
500 SOLUTION INTRAVENOUS PRN
Status: CANCELLED | OUTPATIENT
Start: 2022-06-09

## 2022-06-09 RX ORDER — ONDANSETRON 2 MG/ML
8 INJECTION INTRAMUSCULAR; INTRAVENOUS
Status: CANCELLED | OUTPATIENT
Start: 2022-06-09

## 2022-06-09 RX ORDER — PALONOSETRON 0.05 MG/ML
0.25 INJECTION, SOLUTION INTRAVENOUS ONCE
Status: COMPLETED | OUTPATIENT
Start: 2022-06-09 | End: 2022-06-09

## 2022-06-09 RX ORDER — ALBUTEROL SULFATE 90 UG/1
4 AEROSOL, METERED RESPIRATORY (INHALATION) PRN
Status: CANCELLED | OUTPATIENT
Start: 2022-06-23

## 2022-06-09 RX ORDER — ACETAMINOPHEN 325 MG/1
650 TABLET ORAL
Status: CANCELLED | OUTPATIENT
Start: 2022-06-09

## 2022-06-09 RX ORDER — FAMOTIDINE 10 MG/ML
20 INJECTION, SOLUTION INTRAVENOUS ONCE
Status: CANCELLED | OUTPATIENT
Start: 2022-06-09 | End: 2022-06-09

## 2022-06-09 RX ORDER — SODIUM CHLORIDE 0.9 % (FLUSH) 0.9 %
5-40 SYRINGE (ML) INJECTION PRN
Status: CANCELLED | OUTPATIENT
Start: 2022-06-23

## 2022-06-09 RX ORDER — ONDANSETRON 2 MG/ML
8 INJECTION INTRAMUSCULAR; INTRAVENOUS
Status: CANCELLED | OUTPATIENT
Start: 2022-06-23

## 2022-06-09 RX ORDER — SODIUM CHLORIDE 9 MG/ML
5-40 INJECTION INTRAVENOUS PRN
Status: CANCELLED | OUTPATIENT
Start: 2022-06-09

## 2022-06-09 RX ORDER — SODIUM CHLORIDE 9 MG/ML
5-250 INJECTION, SOLUTION INTRAVENOUS PRN
Status: DISCONTINUED | OUTPATIENT
Start: 2022-06-09 | End: 2022-06-10 | Stop reason: HOSPADM

## 2022-06-09 RX ORDER — MEPERIDINE HYDROCHLORIDE 50 MG/ML
12.5 INJECTION INTRAMUSCULAR; INTRAVENOUS; SUBCUTANEOUS PRN
Status: CANCELLED | OUTPATIENT
Start: 2022-06-23

## 2022-06-09 RX ORDER — SODIUM CHLORIDE 9 MG/ML
5-40 INJECTION INTRAVENOUS PRN
Status: CANCELLED | OUTPATIENT
Start: 2022-06-16

## 2022-06-09 RX ORDER — DIPHENHYDRAMINE HYDROCHLORIDE 50 MG/ML
50 INJECTION INTRAMUSCULAR; INTRAVENOUS
Status: CANCELLED | OUTPATIENT
Start: 2022-06-23

## 2022-06-09 RX ORDER — FAMOTIDINE 10 MG/ML
20 INJECTION, SOLUTION INTRAVENOUS
Status: CANCELLED | OUTPATIENT
Start: 2022-06-16

## 2022-06-09 RX ORDER — DIPHENHYDRAMINE HYDROCHLORIDE 50 MG/ML
50 INJECTION INTRAMUSCULAR; INTRAVENOUS ONCE
Status: CANCELLED | OUTPATIENT
Start: 2022-06-23 | End: 2022-06-23

## 2022-06-09 RX ORDER — PALONOSETRON 0.05 MG/ML
0.25 INJECTION, SOLUTION INTRAVENOUS ONCE
Status: CANCELLED | OUTPATIENT
Start: 2022-06-16 | End: 2022-06-16

## 2022-06-09 RX ORDER — SODIUM CHLORIDE 9 MG/ML
INJECTION, SOLUTION INTRAVENOUS CONTINUOUS
Status: CANCELLED | OUTPATIENT
Start: 2022-06-16

## 2022-06-09 RX ORDER — SODIUM CHLORIDE 9 MG/ML
INJECTION, SOLUTION INTRAVENOUS CONTINUOUS
Status: CANCELLED | OUTPATIENT
Start: 2022-06-09

## 2022-06-09 RX ORDER — FAMOTIDINE 10 MG/ML
20 INJECTION, SOLUTION INTRAVENOUS ONCE
Status: CANCELLED | OUTPATIENT
Start: 2022-06-16 | End: 2022-06-16

## 2022-06-09 RX ORDER — SODIUM CHLORIDE 9 MG/ML
5-250 INJECTION, SOLUTION INTRAVENOUS PRN
Status: CANCELLED | OUTPATIENT
Start: 2022-06-16

## 2022-06-09 RX ORDER — FAMOTIDINE 10 MG/ML
20 INJECTION, SOLUTION INTRAVENOUS
Status: CANCELLED | OUTPATIENT
Start: 2022-06-23

## 2022-06-09 RX ORDER — DIPHENHYDRAMINE HYDROCHLORIDE 50 MG/ML
50 INJECTION INTRAMUSCULAR; INTRAVENOUS ONCE
Status: CANCELLED | OUTPATIENT
Start: 2022-06-09 | End: 2022-06-09

## 2022-06-09 RX ORDER — SODIUM CHLORIDE 9 MG/ML
INJECTION, SOLUTION INTRAVENOUS CONTINUOUS
Status: CANCELLED | OUTPATIENT
Start: 2022-06-23

## 2022-06-09 RX ORDER — HEPARIN SODIUM (PORCINE) LOCK FLUSH IV SOLN 100 UNIT/ML 100 UNIT/ML
500 SOLUTION INTRAVENOUS PRN
Status: CANCELLED | OUTPATIENT
Start: 2022-06-16

## 2022-06-09 RX ORDER — EPINEPHRINE 1 MG/ML
0.3 INJECTION, SOLUTION, CONCENTRATE INTRAVENOUS PRN
Status: CANCELLED | OUTPATIENT
Start: 2022-06-09

## 2022-06-09 RX ORDER — ALBUTEROL SULFATE 90 UG/1
4 AEROSOL, METERED RESPIRATORY (INHALATION) PRN
Status: CANCELLED | OUTPATIENT
Start: 2022-06-09

## 2022-06-09 RX ADMIN — DEXAMETHASONE SODIUM PHOSPHATE 12 MG: 4 INJECTION, SOLUTION INTRAMUSCULAR; INTRAVENOUS at 10:46

## 2022-06-09 RX ADMIN — SODIUM CHLORIDE 20 ML/HR: 9 INJECTION, SOLUTION INTRAVENOUS at 10:42

## 2022-06-09 RX ADMIN — FAMOTIDINE 20 MG: 10 INJECTION, SOLUTION INTRAVENOUS at 11:06

## 2022-06-09 RX ADMIN — PACLITAXEL 96 MG: 6 INJECTION, SOLUTION, CONCENTRATE INTRAVENOUS at 11:29

## 2022-06-09 RX ADMIN — CARBOPLATIN 220.8 MG: 10 INJECTION, SOLUTION INTRAVENOUS at 12:37

## 2022-06-09 RX ADMIN — DIPHENHYDRAMINE HYDROCHLORIDE 50 MG: 50 INJECTION, SOLUTION INTRAMUSCULAR; INTRAVENOUS at 11:07

## 2022-06-09 RX ADMIN — SODIUM CHLORIDE, PRESERVATIVE FREE 20 ML: 5 INJECTION INTRAVENOUS at 10:13

## 2022-06-09 RX ADMIN — SODIUM CHLORIDE, PRESERVATIVE FREE 10 ML: 5 INJECTION INTRAVENOUS at 10:12

## 2022-06-09 RX ADMIN — PALONOSETRON 0.25 MG: 0.05 INJECTION, SOLUTION INTRAVENOUS at 11:04

## 2022-06-09 NOTE — PROGRESS NOTES
Patient assessed for the following post chemotherapy:    Dizziness   No  Lightheadedness  No      Acute nausea/vomiting No  Headache   No  Chest pain/pressure  No  Rash/itching   No  Shortness of breath  No    Patient kept for 20 minutes observation post infusion chemotherapy. Patient tolerated chemotherapy treatment Carbo and Taxol without any complications. Last vital signs:   /83   Pulse 86   Temp 98.4 °F (36.9 °C) (Oral)   Resp 18   Ht 5' 7\" (1.702 m)   Wt 205 lb (93 kg)   SpO2 94%   BMI 32.11 kg/m²       Patient instructed if experience any of the above symptoms following today's infusion,he/she is to notify MD immediately or go to the emergency department. Discharge instructions given to patient. Verbalizes understanding. Ambulated off unit per self with belongings.

## 2022-06-09 NOTE — PROGRESS NOTES
[facility], [address] [facility] [address]  [address] [address]  Dept: [phone]  Dept Fax: [phone] Loc: [phone]      Visit Date:6/9/2022     Jason Morataya is a 62 y.o. male who presents today for:   Chief Complaint   Patient presents with    Follow-up     Cancer of distal third of esophagus        HPI:   Jason Morataya is a 62 y.o. male who follows in our office with Dr. Leary Kanner with esophageal cancer. HPI per Dr. Leary Kanner' note on 6/2/2022:  Roberto Vega is a 61-year-old gentleman with a long history of GERD who presented to the ER on February 10 was admitted for 1 day because of chest heaviness. No cardiology because GI was consulted ultimately showed a mass in the distal esophagus. Outpatient EGD requested. Diagnosed with adenocarcinoma the distal esophagus and referred to Dr. Rosario Benavides of thoracic surgery at [facility].  EUS shows regional tone involvement. See scans below and ultrasound. No distant mets. Patient has no significant dysphagia and no weight loss. Patient referred back locally for chemoradiation neoadjuvant treatment before surgery. Interval History 6/9/2022:   The patient presents to the office today for follow up and evaluation of esophageal cancer, as well as next cycle of treatment with carboplatin/paclitaxel. The patient reports feeling some irritation in esophagus from radiation like \"lumps\". He denies fever/chills, s/s infections, headaches, dizziness, cough, SOB, CP, heart palpitations, abdominal pain, N/V/C/D, peripheral edema, peripheral neuropathy, s/s bleeding, mouth sores, skin rash. Platelet count 69 today, after discussion with Dr. Leary Kanner ok to proceed with treatment today. Weight stable, eating/drinking ok at home. PMH, SH, and FH:  I reviewed the patient's medication and allergy lists as noted on the electronic medical record. The PMH, SH, and FH were also reviewed as noted on the EMR.         Past Medical History:   Diagnosis Date    Cancer of distal third of esophagus ([facility] 2022    GERD (gastroesophageal reflux disease)       Past Surgical History:   Procedure Laterality Date    DENTAL SURGERY      25 teeth removed      Family History   Problem Relation Age of Onset    Colon Cancer Mother     Heart Disease Father     Cancer Brother         lung      Social History     Tobacco Use    Smoking status: Former Smoker     Packs/day: 3.00     Years: 10.00     Pack years: 30.00     Types: Cigarettes     Quit date: 2013     Years since quittin.1    Smokeless tobacco: Never Used    Tobacco comment: does not smoke   Substance Use Topics    Alcohol use: Never      Current Outpatient Medications   Medication Sig Dispense Refill    aspirin 325 mg tablet Take 325 mg by mouth daily      lidocaine viscous hcl (XYLOCAINE) 2 % SOLN solution Take 15 mLs by mouth as needed for Irritation (As needed for irritation related to chemoradiation treatment) Follow instructions given in clinic. 100 mL 0    pantoprazole (PROTONIX) 40 MG tablet Take 1 tablet by mouth every morning (before breakfast) 90 tablet 3    NONFORMULARY Hema-Plex (Iron Multi-vitamin)      ondansetron (ZOFRAN-ODT) 8 MG TBDP disintegrating tablet Place 1 tablet under the tongue every 8 hours as needed for Nausea or Vomiting (Patient not taking: Reported on 2022) 10 tablet 1    prochlorperazine (COMPAZINE) 10 MG tablet Take 1 tablet by mouth every 6 hours as needed (nausea) (Patient not taking: Reported on 2022) 30 tablet 1    famotidine (PEPCID) 20 MG tablet Take 20 mg by mouth 2 times daily (Patient not taking: Reported on 2022)       No current facility-administered medications for this visit.      Facility-Administered Medications Ordered in Other Visits   Medication Dose Route Frequency Provider Last Rate Last Admin    sodium chloride flush 0.9 % injection 5-40 mL  5-40 mL IntraVENous PRN Timoteo Renteria MD   20 mL at 22 1013    heparin flush 100 UNIT/ML injection 500 Units  500 Units IntraCATHeter PRN Clemetine MD Moi          Allergies   Allergen Reactions    Paclitaxel Other (See Comments)     Severe lower back pain- able to resume after medications given          Review of Systems:   Review of Systems   Pertinent review of systems noted in HPI, all other ROS negative. Objective:   Physical Exam   /85 (Site: Left Upper Arm, Position: Sitting, Cuff Size: Medium Adult)   Pulse 86   Temp 98.1 °F (36.7 °C) (Oral)   Resp 18   Ht 5' 7\" (1.702 m)   Wt 205 lb (93 kg)   SpO2 97%   BMI 32.11 kg/m²    General appearance: No apparent distress, calm and cooperative. HEENT: Pupils equal, round, and reactive to light. Conjunctivae/corneas clear. Oral mucosa moist.  Neck: Supple, with full range of motion. Trachea midline. Respiratory:  Normal respiratory effort. Clear to auscultation all lung fields. Cardiovascular:  RRR, S1/S2. Abdomen: Soft, non-tender, non-distended with active BS x 4. Musculoskeletal: No clubbing, cyanosis or edema bilaterally. He is able to ambulate in office. Skin: Skin color, texture, turgor normal.  No visible rashes or lesions. Neurologic:  Neurovascularly intact without any focal sensory/motor deficits.  Cranial nerves: II-XII intact, grossly non-focal.  Psychiatric: Alert and oriented x 3, thought content appropriate, normal insight  Capillary Refill: < 3 seconds   Peripheral Pulses: +2 palpable, equal bilaterally       Imaging Studies and Labs:   CBC:   Lab Results   Component Value Date    WBC 2.6 (L) 06/09/2022    HGB 14.0 06/09/2022    HCT 41.6 (L) 06/09/2022    MCV 84 06/09/2022    PLT 69 (L) 06/09/2022     BMP:   Lab Results   Component Value Date     06/09/2022     02/10/2022    K 4.3 06/09/2022    K 4.2 02/10/2022    CL 99 02/10/2022    CO2 24 02/10/2022    BUN 17 02/10/2022    CREATININE 1.0 06/09/2022    CREATININE 1.0 05/04/2022    GLUCOSE 127 02/10/2022    CALCIUM 10.6 02/10/2022      LFT: Lab Results   Component Value Date    ALT 43 06/02/2022    AST 37 06/02/2022    ALKPHOS 82 06/02/2022    BILITOT 0.3 06/02/2022         Assessment and Plan:     1. 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 Clinical stage T2 N2 M0. Mass extends from 34 to 28 cm approximately 6 cm. Staging by EUS 3/23/2022 [facility] (T2 based on invasion and N2 based on 2 malignant appearing lymph nodes visualized and measured in the lower paraesophageal mediastinum level 8L adjacent to the mass. 2 malignant appearing lymph nodes visualized and measured in the subcarinal mediastinum level 7. PET/CT 3/30/2022 [facility]- hypermetabolic activity at mid esophagus consistent with primary malignancy with adjacent hypermetabolic left periesophageal lymph nodes. 2. Encounter for chemotherapy management  Current treatment recommendations include concurrent chemoradiation with carbo/taxol. He tolerates treatment well without significant side effects. Labs today:  2.6, ANC ~ 1768. H/H 14/41. 6. Platelets 69. He denies s/s bleeding. After discussion with Dr. Maura Mleo, ok to proceed with treatment today. 3. Leukopenia, unspecified type  WBC 2.6, ANC ~ 1768. He denies fever/chills, s/s infections. Trend. 4. Thrombocytopenia ([address]  Platelets 69 today. He denies s/s bleeding. Chemotherapy related. Trend. No follow-ups on file. All patient questions answered. Pt voiced understanding. Patient agreed with treatment plan. Follow up as directed. Patient instructed to call for questions or concerns.       Electronically signed by   BHARTI Sumner - KENROY

## 2022-06-09 NOTE — PLAN OF CARE
Problem: Safety - Adult  Goal: Free from fall injury  Outcome: Adequate for Discharge  Flowsheets (Taken 6/9/2022 1632)  Free From Fall Injury:   Instruct family/caregiver on patient safety   Based on caregiver fall risk screen, instruct family/caregiver to ask for assistance with transferring infant if caregiver noted to have fall risk factors  Note: Free from falls while in O.P. Oncology. Goal: Absence of falls  Outcome: Adequate for Discharge  Note: Discussed the need to use the call light for assistance when getting up to ambulate. Problem: Discharge Planning  Goal: Discharge to home or other facility with appropriate resources  Outcome: Adequate for Discharge  Flowsheets (Taken 6/9/2022 1632)  Discharge to home or other facility with appropriate resources:   Identify barriers to discharge with patient and caregiver   Arrange for needed discharge resources and transportation as appropriate   Identify discharge learning needs (meds, wound care, etc)  Note: Verbalize understanding of discharge instructions, follow up appointments, and when to call Physician. Goal: Knowledge of discharge instructions  Description: Knowledge of discharge instructions  Outcome: Adequate for Discharge  Note: Discuss understanding of discharge instructions, follow up appointments and when to call Physician. Problem: Infection - Adult  Goal: Absence of infection at discharge  Outcome: Adequate for Discharge  Flowsheets (Taken 6/9/2022 1632)  Absence of infection at discharge:   Assess and monitor for signs and symptoms of infection   Monitor lab/diagnostic results   Monitor all insertion sites i.e., indwelling lines, tubes and drains  Note: Mediport site with no redness or warmth. Skin over port site intact with no signs of breakdown noted. Patient verbalizes signs/symptoms of port infection and when to notify the physician.    Goal: Will show no infection signs and symptoms  Description: Will show no infection signs and symptoms  Outcome: Adequate for Discharge  Note: Discuss port maintenance,infection prevention, sign of infection and when to call MD.      Problem: Chronic Conditions and Co-morbidities  Goal: Patient's chronic conditions and co-morbidity symptoms are monitored and maintained or improved  Outcome: Adequate for Discharge  Flowsheets (Taken 6/9/2022 1632)  Care Plan - Patient's Chronic Conditions and Co-Morbidity Symptoms are Monitored and Maintained or Improved:   Monitor and assess patient's chronic conditions and comorbid symptoms for stability, deterioration, or improvement   Collaborate with multidisciplinary team to address chronic and comorbid conditions and prevent exacerbation or deterioration  Note: Patient verbalizes understanding to verbal information given on Taxol and carboplatin,action and possible side effects. Aware to call MD if develop complications. Care plan reviewed with patient. Patient  verbalize understanding of the plan of care and contribute to goal setting.

## 2022-06-09 NOTE — PATIENT INSTRUCTIONS
Chitra for treatment today   2. Continue weekly treatment  3.   Return to clinic as scheduled with Dr. Lc Coreas

## 2022-06-09 NOTE — PROGRESS NOTES
Lionel Ron [address] [facility] WERNER [address], [address]        [address] [facility], [address] [place] [address]        Reina Gonzalez: [phone]        F: [phone]       [employer]            Dr. Reinaldo Potter MD MS          Dr. Romie Barragan MD PhD    ON TREATMENT VISIT (OTV) NOTE     Date of Service: 2022  Patient ID: Rock Sánchez   : 1960  MRN: [id number]   Acct Number: [de-identified]     RADIATION ONCOLOGY ATTENDING:  Kaylen Bradley MD MS    DIAGNOSIS:  Cancer Staging  Cancer of distal third of esophagus [facility])  Staging form: Esophagus - Adenocarcinoma, AJCC 8th Edition  - Clinical stage from 3/23/2022: Stage JOSE (cT2, cN2, cM0, G3) - Signed by Valery Ruiz MD on 2022      Treatment Area: Thoracic    Current Total Dose(cGy): 3780  Current Fraction:   Final/Cumulative Rx. Dose (cGy): 5040    Patient was seen today for weekly visit.      Wt Readings from Last 3 Encounters:   22 205 lb 7.5 oz (93.2 kg)   22 206 lb 2.1 oz (93.5 kg)   22 206 lb (93.4 kg)       /87   Pulse 92   Temp 98.5 °F (36.9 °C) (Infrared)   Resp 18   Wt 205 lb 7.5 oz (93.2 kg)   SpO2 96%   BMI 32.18 kg/m²     Lab Results   Component Value Date    WBC 2.7 (L) 2022    PLT 75 (L) 2022       Comfort Alteration  Fatigue:Able to perform daily activities with rest periods    Pain Location: n/a  Pain Intensity (Current): 0 No Pain  Pain Treatment: No treatment scheduled  Pain Relief: n/a    Emotional Alteration:   Coping: effective    Nutritional Alteration  Anorexia: none   Nausea: No nausea noted  Vomiting: No vomiting   Dyspepsia/Heartburn: None  Dysphagia/Esophagitis: None    Skin Alteration   Skin reaction: Faint or dull erythema; follicular reaction  Alopecia: No loss    Mucous Membrane Alteration  Mucositis XRT Related: None  Pharynx and Esophagus- Acute: No change over baseline  Voice Changes: Mild or intermittent hoarseness    Ventilation Alteration  Cough: None  Hemoptysis: None  Dyspnea: Normal  Mucous Quantity/Quality: n/a    Additional Comments: CeraVe BID. Baking soda mouth when remembers. MEDICATIONS:     Current Outpatient Medications   Medication Sig Dispense Refill    aspirin 325 mg tablet Take 325 mg by mouth daily      lidocaine viscous hcl (XYLOCAINE) 2 % SOLN solution Take 15 mLs by mouth as needed for Irritation (As needed for irritation related to chemoradiation treatment) Follow instructions given in clinic. 100 mL 0    ondansetron (ZOFRAN-ODT) 8 MG TBDP disintegrating tablet Place 1 tablet under the tongue every 8 hours as needed for Nausea or Vomiting (Patient not taking: Reported on 2022) 10 tablet 1    prochlorperazine (COMPAZINE) 10 MG tablet Take 1 tablet by mouth every 6 hours as needed (nausea) (Patient not taking: Reported on 2022) 30 tablet 1    pantoprazole (PROTONIX) 40 MG tablet Take 1 tablet by mouth every morning (before breakfast) 90 tablet 3    famotidine (PEPCID) 20 MG tablet Take 20 mg by mouth 2 times daily (Patient not taking: Reported on 2022)      NONFORMULARY Hema-Plex (Iron Multi-vitamin)       No current facility-administered medications for this encounter. * New    PHYSICAL EXAM:       ECO - Symptomatic but completely ambulatory (Restricted in physically strenuous activity but ambulatory and able to carry out work of a light or sedentary nature. For example, light housework, office work)     General: NAD, AO x 3, Mentation is clear with appropriate affect. HEENT: Normocephalic, atraumatic  Thorax:  Unlabored  Abdomen:  Non-distended  Neuro:  Cranial nerves grossly intact; no focal deficits  Skin - treatment portal: SEE above. Skin intact with no treatment effects noted. Chemotherapy Update: Concurrent chemotherapy    Treatment Imaging: CBCT and All imaging reviewed and approved by Dr. Elaina Delgado: Minimal radiation side effects.  Responding appropriately to symptomatic management. New medications, diagnostic results: Not applicable    PLAN: Again reviewed potential side effects of radiation for the patient's treatment. Continue local/topical care. Continue current radiation course as prescribed.

## 2022-06-10 ENCOUNTER — HOSPITAL ENCOUNTER (OUTPATIENT)
Dept: RADIATION ONCOLOGY | Age: 62
Discharge: HOME | End: 2022-06-10
Payer: MEDICAID

## 2022-06-10 PROCEDURE — 77014 PR CT GUIDANCE PLACEMENT RAD THERAPY FIELDS: CPT | Performed by: RADIOLOGY

## 2022-06-10 PROCEDURE — 77386 HC NTSTY MODUL RAD TX DLVR CPLX: CPT | Performed by: RADIOLOGY

## 2022-06-13 ENCOUNTER — HOSPITAL ENCOUNTER (OUTPATIENT)
Dept: RADIATION ONCOLOGY | Age: 62
Discharge: HOME | End: 2022-06-13
Payer: MEDICAID

## 2022-06-13 ENCOUNTER — HOSPITAL ENCOUNTER (OUTPATIENT)
Dept: PHYSICAL THERAPY | Age: 62
Setting detail: THERAPIES SERIES
Discharge: HOME | End: 2022-06-13
Payer: MEDICAID

## 2022-06-13 PROCEDURE — 77386 HC NTSTY MODUL RAD TX DLVR CPLX: CPT | Performed by: RADIOLOGY

## 2022-06-13 PROCEDURE — 77014 PR CT GUIDANCE PLACEMENT RAD THERAPY FIELDS: CPT | Performed by: RADIOLOGY

## 2022-06-13 PROCEDURE — 97161 PT EVAL LOW COMPLEX 20 MIN: CPT

## 2022-06-13 PROCEDURE — 77336 RADIATION PHYSICS CONSULT: CPT | Performed by: RADIOLOGY

## 2022-06-13 NOTE — DISCHARGE SUMMARY
** PLEASE SIGN, DATE AND TIME CERTIFICATION BELOW AND RETURN TO [facility] OUTPATIENT REHABILITATION (FAX #: [phone]). ATTEST/CO-SIGN IF ACCESSING VIA [employer] - [place]. THANK YOU.**    I certify that I have examined the patient below and determined that Physical Medicine and Rehabilitation service is necessary and that I approve the established plan of care for up to 90 days or as specifically noted. Attestation, signature or co-signature of physician indicates approval of certification requirements.    ________________________ ____________ __________  Physician Signature   Date   Time  [facility]  PHYSICAL THERAPY  [x] EVALUATION and DISCHARGE    [x]  [facility]     Date: 2022  Patient Name:  Ilsa Guajardo  : 1960  MRN: [id number]  CSN: [id number]      Referring Practitioner Sabrina Barnes, *   Diagnosis Malignant neoplasm of lower third of esophagus [C15.5]    Treatment Diagnosis Esophageal cancer   Date of Evaluation 22    Additional Pertinent History Esophagus Cancer       Functional Outcome Measure Used O: QuickDASH   Functional Outcome Score 0 (22)       Insurance: Primary: Payor: [place]ICA[place] /  /  / ,   Secondary:    Authorization Information: Traditional Medicaid - Unlimited Physical/Occupational/Speech Therapy Benefits. FCE-covered, no precert is required  Benefit will not cover maintenance or preventative treatment. AQUATIC THERAPY COVERED:   Yes  MODALITIES COVERED:  Yes. Hot/Cold Packs are not covered. TELEHEALTH COVERED: Yes   Visit # 1, 1/10 for progress note   Visits Allowed: Unlimited   Recertification Date: 3/42/14   Physician Follow-Up: 22   Physician Orders: Allegra Pilling and treat   History of Present Illness: Patient was diagnosed with Esophageal cancer in 2022. He has undergone chemo and radiation which will be ending next week.   He reports he has been doing well, is able to [facility] grass, paint rooms. On July 22nd he goes back to Oncologist in [place]. If things are going the way they should he may have surgery to have part of esophagus removed. SUBJECTIVE: Patient was diagnosed with Esophageal cancer in February 2022. He has undergone chemo and radiation which will be ending next week. He reports he has been doing well, is able to mow grass, paint rooms. On July 22nd he goes back to Oncologist in [place]. If things are going the way they should he may have surgery to have part of esophagus removed. Reports he is having no difficulties with things and is not sure why he is being sent to therapy. Social/Functional History and Current Status:  Medications and Allergies have been reviewed and are listed on Medical History Questionnaire. Shana Guajardo lives with spouse in a single story home with stairs and a handrail to enter. Task Previous Current   ADLs  Independent Independent   IADL's Independent Independent   Ambulation Independent Independent   Transfers Independent Independent   Recreation Independent Independent   Community Integration Independent Independent   Driving Active  Active    Work Unemployed  Unemployed     OBJECTIVE:   Posture: Good    Range of Motion/Strength (Range of Motion in degrees)    Right Left Comments   Shoulder Flexion PR[facility]    Shoulder ABDuction PROM [facility]    Shoulder Strength 5/5 5/5    Elbow Strength 5/5 5/5    Hip Flexion AROM [facility]    Hip Strength 5/5 5/5    Knee Extension/Flexion AROM [facility]    Knee Strength 5/5 5/5    Ankle AROM [facility]    Ankle Strength 5/5 5/5        Brief Fatigue Inventory: 0.22 (0: none, 1-3: mild, 4-6: moderate, 7-10: severe)  Quick Dash: 0% impaired    Treatment Initiated: No treatment initiated. No further treatment needed.       TREATMENT   Precautions: Esophageal cancer 2/2022   Pain: No pain    X in shaded column indicates activity completed today   Modalities Parameters/  Location  Notes         Manual Therapy Time/Technique  Notes         Exercise/Intervention   Notes              Specific Interventions Next Treatment: No further treatment. Cassidy Morgan is at [facility]. Activity Tolerance: Patient tolerated treatment well    ASSESSMENT:  Assessment: 62year old male presents to therapy after diagnosis of Esophageal Cancer. He is doing well, scored well on Brief Fatigue Inventory and QuickDASH. Is demonstrating good ROM and strength. At this time he does not feel like he needs therapy as he is able to do everything he always did. Plan for discharge today. Body Structures/Functions/Activity Limitations:No further therapy needed at this time. He is demonstrating good ROM, strength, walking. Good score on BFI and QuickDASH. May be having surgery at a later date, but no concerns at this time. Prognosis: good    GOALS:  Patient Goal: NA-No further therapy needed at this time      Short Term Goals to be met in NA weeks:  1. NA    Long Term Goals to be met in NA weeks:   1. NA    Patient Education: Plan of care, goals. See \"Treatment Initiated\" for further details. Education Outcome: Verbalized understanding  Education Barriers: None    PLAN:  Treatment Recommendations: No further therapy needed at this time. Please see above for further details. Plan of care initiated. Plan to see patient 0 times per week for 0 weeks to address the treatment planned outlined above.     Time In 0945   Time Out 1025   Timed Code Minutes: 0 min   Total Treatment Time: 40 min       Electronically Signed by: Naif Wood, PT   6/13/2022

## 2022-06-14 ENCOUNTER — HOSPITAL ENCOUNTER (OUTPATIENT)
Dept: RADIATION ONCOLOGY | Age: 62
Discharge: HOME | End: 2022-06-14
Payer: MEDICAID

## 2022-06-14 PROCEDURE — 77014 PR CT GUIDANCE PLACEMENT RAD THERAPY FIELDS: CPT | Performed by: RADIOLOGY

## 2022-06-14 PROCEDURE — 77386 HC NTSTY MODUL RAD TX DLVR CPLX: CPT | Performed by: RADIOLOGY

## 2022-06-15 ENCOUNTER — HOSPITAL ENCOUNTER (OUTPATIENT)
Dept: RADIATION ONCOLOGY | Age: 62
Discharge: HOME | End: 2022-06-15
Payer: MEDICAID

## 2022-06-15 PROCEDURE — 77427 RADIATION TX MANAGEMENT X5: CPT | Performed by: RADIOLOGY

## 2022-06-15 PROCEDURE — 77386 HC NTSTY MODUL RAD TX DLVR CPLX: CPT | Performed by: RADIOLOGY

## 2022-06-15 PROCEDURE — 77014 PR CT GUIDANCE PLACEMENT RAD THERAPY FIELDS: CPT | Performed by: RADIOLOGY

## 2022-06-16 ENCOUNTER — HOSPITAL ENCOUNTER (OUTPATIENT)
Dept: INFUSION THERAPY | Age: 62
Discharge: HOME | End: 2022-06-16
Payer: MEDICAID

## 2022-06-16 ENCOUNTER — HOSPITAL ENCOUNTER (OUTPATIENT)
Dept: RADIATION ONCOLOGY | Age: 62
Discharge: HOME | End: 2022-06-16
Payer: MEDICAID

## 2022-06-16 ENCOUNTER — OFFICE VISIT (OUTPATIENT)
Dept: ONCOLOGY | Age: 62
End: 2022-06-16
Payer: MEDICAID

## 2022-06-16 VITALS
SYSTOLIC BLOOD PRESSURE: 127 MMHG | HEART RATE: 89 BPM | BODY MASS INDEX: 31.55 KG/M2 | DIASTOLIC BLOOD PRESSURE: 82 MMHG | OXYGEN SATURATION: 95 % | HEIGHT: 67 IN | TEMPERATURE: 98.2 F | RESPIRATION RATE: 18 BRPM | WEIGHT: 201 LBS

## 2022-06-16 VITALS
TEMPERATURE: 97.6 F | SYSTOLIC BLOOD PRESSURE: 135 MMHG | RESPIRATION RATE: 18 BRPM | HEART RATE: 96 BPM | BODY MASS INDEX: 31.73 KG/M2 | OXYGEN SATURATION: 94 % | DIASTOLIC BLOOD PRESSURE: 87 MMHG | WEIGHT: 202.6 LBS

## 2022-06-16 VITALS
BODY MASS INDEX: 31.55 KG/M2 | HEART RATE: 90 BPM | SYSTOLIC BLOOD PRESSURE: 141 MMHG | WEIGHT: 201 LBS | TEMPERATURE: 98.9 F | OXYGEN SATURATION: 95 % | HEIGHT: 67 IN | RESPIRATION RATE: 18 BRPM | DIASTOLIC BLOOD PRESSURE: 87 MMHG

## 2022-06-16 DIAGNOSIS — C15.5 CANCER OF DISTAL THIRD OF ESOPHAGUS (HCC): Primary | ICD-10-CM

## 2022-06-16 LAB
ABSOLUTE IMMATURE GRANULOCYTE: 0.02 THOU/MM3 (ref 0–0.07)
ALBUMIN SERPL-MCNC: 4.2 G/DL (ref 3.5–5.1)
ALP BLD-CCNC: 71 U/L (ref 38–126)
ALT SERPL-CCNC: 34 U/L (ref 11–66)
AST SERPL-CCNC: 26 U/L (ref 5–40)
BASINOPHIL, AUTOMATED: 0 % (ref 0–3)
BASOPHILS ABSOLUTE: 0 THOU/MM3 (ref 0–0.1)
BILIRUB SERPL-MCNC: 0.7 MG/DL (ref 0.3–1.2)
BILIRUBIN DIRECT: < 0.2 MG/DL (ref 0–0.3)
BUN, WHOLE BLOOD: 13 MG/DL (ref 8–26)
CHLORIDE, WHOLE BLOOD: 101 MEQ/L (ref 98–109)
CREATININE, WHOLE BLOOD: 1 MG/DL (ref 0.5–1.2)
EOSINOPHILS ABSOLUTE: 0 THOU/MM3 (ref 0–0.4)
EOSINOPHILS RELATIVE PERCENT: 1 % (ref 0–4)
GFR, ESTIMATED: 81 ML/MIN/1.73M2
GLUCOSE, WHOLE BLOOD: 112 MG/DL (ref 70–108)
HCT VFR BLD CALC: 39.8 % (ref 42–52)
HEMOGLOBIN: 13.7 GM/DL (ref 14–18)
IMMATURE GRANULOCYTES: 1 %
IONIZED CALCIUM, WHOLE BLOOD: 1.17 MMOL/L (ref 1.12–1.32)
LYMPHOCYTES # BLD: 10 % (ref 15–47)
LYMPHOCYTES ABSOLUTE: 0.2 THOU/MM3 (ref 1–4.8)
MCH RBC QN AUTO: 29 PG (ref 26–33)
MCHC RBC AUTO-ENTMCNC: 34.4 GM/DL (ref 32.2–35.5)
MCV RBC AUTO: 84 FL (ref 80–94)
MONOCYTES ABSOLUTE: 0.4 THOU/MM3 (ref 0.4–1.3)
MONOCYTES: 17 % (ref 0–12)
PDW BLD-RTO: 15.3 % (ref 11.5–14.5)
PLATELET # BLD: 66 THOU/MM3 (ref 130–400)
PMV BLD AUTO: 8.4 FL (ref 9.4–12.4)
POTASSIUM, WHOLE BLOOD: 4.1 MEQ/L (ref 3.5–4.9)
RBC # BLD: 4.73 MILL/MM3 (ref 4.7–6.1)
SEG NEUTROPHILS: 71 % (ref 43–75)
SEGMENTED NEUTROPHILS ABSOLUTE COUNT: 1.7 THOU/MM3 (ref 1.8–7.7)
SODIUM, WHOLE BLOOD: 135 MEQ/L (ref 138–146)
TOTAL CO2, WHOLE BLOOD: 25 MEQ/L (ref 23–33)
TOTAL PROTEIN: 7.2 G/DL (ref 6.1–8)
WBC # BLD: 2.3 THOU/MM3 (ref 4.8–10.8)

## 2022-06-16 PROCEDURE — 80076 HEPATIC FUNCTION PANEL: CPT

## 2022-06-16 PROCEDURE — A4216 STERILE WATER/SALINE, 10 ML: HCPCS | Performed by: INTERNAL MEDICINE

## 2022-06-16 PROCEDURE — 96417 CHEMO IV INFUS EACH ADDL SEQ: CPT

## 2022-06-16 PROCEDURE — 6360000002 HC RX W HCPCS: Performed by: INTERNAL MEDICINE

## 2022-06-16 PROCEDURE — 80047 BASIC METABLC PNL IONIZED CA: CPT

## 2022-06-16 PROCEDURE — 96413 CHEMO IV INFUSION 1 HR: CPT

## 2022-06-16 PROCEDURE — 2580000003 HC RX 258: Performed by: INTERNAL MEDICINE

## 2022-06-16 PROCEDURE — 99214 OFFICE O/P EST MOD 30 MIN: CPT | Performed by: INTERNAL MEDICINE

## 2022-06-16 PROCEDURE — 96375 TX/PRO/DX INJ NEW DRUG ADDON: CPT

## 2022-06-16 PROCEDURE — 96367 TX/PROPH/DG ADDL SEQ IV INF: CPT

## 2022-06-16 PROCEDURE — 77427 RADIATION TX MANAGEMENT X5: CPT | Performed by: RADIOLOGY

## 2022-06-16 PROCEDURE — 36592 COLLECT BLOOD FROM PICC: CPT

## 2022-06-16 PROCEDURE — 77014 PR CT GUIDANCE PLACEMENT RAD THERAPY FIELDS: CPT | Performed by: RADIOLOGY

## 2022-06-16 PROCEDURE — 85025 COMPLETE CBC W/AUTO DIFF WBC: CPT

## 2022-06-16 PROCEDURE — 2500000003 HC RX 250 WO HCPCS: Performed by: INTERNAL MEDICINE

## 2022-06-16 PROCEDURE — 99212 OFFICE O/P EST SF 10 MIN: CPT

## 2022-06-16 PROCEDURE — 77386 HC NTSTY MODUL RAD TX DLVR CPLX: CPT | Performed by: RADIOLOGY

## 2022-06-16 RX ORDER — PALONOSETRON 0.05 MG/ML
0.25 INJECTION, SOLUTION INTRAVENOUS ONCE
Status: COMPLETED | OUTPATIENT
Start: 2022-06-16 | End: 2022-06-16

## 2022-06-16 RX ORDER — DIPHENHYDRAMINE HYDROCHLORIDE 50 MG/ML
50 INJECTION INTRAMUSCULAR; INTRAVENOUS ONCE
Status: COMPLETED | OUTPATIENT
Start: 2022-06-16 | End: 2022-06-16

## 2022-06-16 RX ORDER — HEPARIN SODIUM (PORCINE) LOCK FLUSH IV SOLN 100 UNIT/ML 100 UNIT/ML
500 SOLUTION INTRAVENOUS PRN
OUTPATIENT
Start: 2022-06-16

## 2022-06-16 RX ORDER — SODIUM CHLORIDE 0.9 % (FLUSH) 0.9 %
5-40 SYRINGE (ML) INJECTION PRN
OUTPATIENT
Start: 2022-06-16

## 2022-06-16 RX ORDER — SODIUM CHLORIDE 9 MG/ML
5-250 INJECTION, SOLUTION INTRAVENOUS PRN
Status: DISCONTINUED | OUTPATIENT
Start: 2022-06-16 | End: 2022-06-17 | Stop reason: HOSPADM

## 2022-06-16 RX ORDER — SODIUM CHLORIDE 9 MG/ML
25 INJECTION, SOLUTION INTRAVENOUS PRN
OUTPATIENT
Start: 2022-06-16

## 2022-06-16 RX ORDER — SODIUM CHLORIDE 0.9 % (FLUSH) 0.9 %
5-40 SYRINGE (ML) INJECTION PRN
Status: DISCONTINUED | OUTPATIENT
Start: 2022-06-16 | End: 2022-06-17 | Stop reason: HOSPADM

## 2022-06-16 RX ADMIN — SODIUM CHLORIDE, PRESERVATIVE FREE 10 ML: 5 INJECTION INTRAVENOUS at 10:05

## 2022-06-16 RX ADMIN — DIPHENHYDRAMINE HYDROCHLORIDE 50 MG: 50 INJECTION, SOLUTION INTRAMUSCULAR; INTRAVENOUS at 11:13

## 2022-06-16 RX ADMIN — PALONOSETRON 0.25 MG: 0.25 INJECTION, SOLUTION INTRAVENOUS at 11:11

## 2022-06-16 RX ADMIN — PACLITAXEL 96 MG: 6 INJECTION, SOLUTION, CONCENTRATE INTRAVENOUS at 11:42

## 2022-06-16 RX ADMIN — SODIUM CHLORIDE, PRESERVATIVE FREE 10 ML: 5 INJECTION INTRAVENOUS at 11:04

## 2022-06-16 RX ADMIN — SODIUM CHLORIDE 20 ML/HR: 9 INJECTION, SOLUTION INTRAVENOUS at 11:04

## 2022-06-16 RX ADMIN — DEXAMETHASONE SODIUM PHOSPHATE 12 MG: 4 INJECTION, SOLUTION INTRAMUSCULAR; INTRAVENOUS at 11:15

## 2022-06-16 RX ADMIN — CARBOPLATIN 219.2 MG: 10 INJECTION, SOLUTION INTRAVENOUS at 12:52

## 2022-06-16 RX ADMIN — SODIUM CHLORIDE, PRESERVATIVE FREE 20 ML: 5 INJECTION INTRAVENOUS at 10:06

## 2022-06-16 RX ADMIN — SODIUM CHLORIDE, PRESERVATIVE FREE 10 ML: 5 INJECTION INTRAVENOUS at 13:35

## 2022-06-16 RX ADMIN — FAMOTIDINE 20 MG: 10 INJECTION, SOLUTION INTRAVENOUS at 11:07

## 2022-06-16 NOTE — PATIENT INSTRUCTIONS
Today is week 6 which is his last week of weekly carboplatin with Taxol. Last day of radiation will be Monday, June 20. Keep follow-up with Dr. Roverto Sage thoracic surgery [facility] July 22. Please remove PICC line today after treatment is done. Follow-up 2 weeks with nurse practitioner on June 29 and labs  Follow-up with me in 3 months.

## 2022-06-16 NOTE — PROGRESS NOTES
Laney Money [address] [facility]          [address] [place] [address], [address] [place] [address],[address]        [facility] BERNADETTEJESSANDRY JOSÉ LUIS HANLEY II.VIERTEL,  [facility]        Kei Obrien: [phone]        F: [phone]       [email or web address]            Dr. Willow St MD MS          Dr. Nalini Patten MD PhD    ON TREATMENT VISIT (OTV) NOTE     Date of Service: 2022  Patient ID: Flores Fernandez   : 1960  MRN: [id number]   Acct Number: [de-identified]     RADIATION ONCOLOGY ATTENDING:  Niranjan Zaragoza MD MS    DIAGNOSIS:  Cancer Staging  Cancer of distal third of esophagus [facility])  Staging form: Esophagus - Adenocarcinoma, AJCC 8th Edition  - Clinical stage from 3/23/2022: Stage JOSE (cT2, cN2, cM0, G3) - Signed by Te Galdamez MD on 2022      Treatment Area: Thoracic    Current Total Dose(cGy): 4680  Current Fraction:   Final/Cumulative Rx. Dose (cGy): 5040    Patient was seen today for weekly visit.      Wt Readings from Last 3 Encounters:   22 202 lb 9.6 oz (91.9 kg)   22 205 lb 7.5 oz (93.2 kg)   22 205 lb (93 kg)       /87   Pulse 96   Temp 97.6 °F (36.4 °C) (Infrared)   Resp 18   Wt 202 lb 9.6 oz (91.9 kg)   SpO2 94%   BMI 31.73 kg/m²     Lab Results   Component Value Date    WBC 2.6 (L) 2022    PLT 69 (L) 2022       Comfort Alteration  Fatigue:Able to perform daily activities with rest periods    Pain Location: n/a  Pain Intensity (Current): 0 No Pain  Pain Treatment: No treatment scheduled  Pain Relief: n/a    Emotional Alteration:   Coping: effective    Nutritional Alteration  Anorexia: none   Nausea: No nausea noted  Vomiting: No vomiting   Dyspepsia/Heartburn: None  Dysphagia/Esophagitis: Dysphagia, requires pureed, soft or liquid diet    Skin Alteration   Skin reaction: Faint or dull erythema; follicular reaction  Alopecia: No loss    Mucous Membrane Alteration  Mucositis XRT Related: None  Pharynx and Esophagus- Acute: Mild dysphagia or odynophagia, topical anesthetic, soft diet  Voice Changes: Mild or intermittent hoarseness    Ventilation Alteration  Cough: Productive  Hemoptysis: None  Dyspnea: Normal  Mucous Quantity/Quality: Clear Pleghm    Additional Comments: CeraVe BID. Baking soda mouth when remembers. MEDICATIONS:     Current Outpatient Medications   Medication Sig Dispense Refill    aspirin 325 mg tablet Take 325 mg by mouth daily      lidocaine viscous hcl (XYLOCAINE) 2 % SOLN solution Take 15 mLs by mouth as needed for Irritation (As needed for irritation related to chemoradiation treatment) Follow instructions given in clinic. 100 mL 0    ondansetron (ZOFRAN-ODT) 8 MG TBDP disintegrating tablet Place 1 tablet under the tongue every 8 hours as needed for Nausea or Vomiting (Patient not taking: Reported on 2022) 10 tablet 1    prochlorperazine (COMPAZINE) 10 MG tablet Take 1 tablet by mouth every 6 hours as needed (nausea) (Patient not taking: Reported on 2022) 30 tablet 1    pantoprazole (PROTONIX) 40 MG tablet Take 1 tablet by mouth every morning (before breakfast) 90 tablet 3    famotidine (PEPCID) 20 MG tablet Take 20 mg by mouth 2 times daily (Patient not taking: Reported on 2022)      NONFORMULARY Hema-Plex (Iron Multi-vitamin)       No current facility-administered medications for this encounter. * New    PHYSICAL EXAM:       ECO - Symptomatic but completely ambulatory (Restricted in physically strenuous activity but ambulatory and able to carry out work of a light or sedentary nature. For example, light housework, office work)     General: NAD, AO x 3, Mentation is clear with appropriate affect. HEENT: Normocephalic, atraumatic  Thorax:  Unlabored  Abdomen:  Non-distended  Neuro:  Cranial nerves grossly intact; no focal deficits  Skin - treatment portal: SEE above. Skin intact with no treatment effects noted.     Chemotherapy Update: Concurrent chemotherapy    Treatment Imaging: CBCT and All imaging reviewed and approved by  Naomi/Hubbard    ASSESSMENT: Minimal radiation side effects. Responding appropriately to symptomatic management. New medications, diagnostic results: Not applicable    PLAN: Again reviewed potential side effects of radiation for the patient's treatment. Continue local/topical care. Continue current radiation course as prescribed.

## 2022-06-16 NOTE — PLAN OF CARE
Problem: Hematologic - Adult  Goal: Patient/Family Education  Outcome: Adequate for Discharge  Note: Patient verbalizes understanding to verbal information given on Taxol/Carboplatin,action and possible side effects. Aware to call MD if develop complications. Intervention: EDUCATE PATIENT/FAMILY ON MEDICATION REGIMEN  Note: Chemotherapy Teaching     What is Chemotherapy   Drug action [x]   Method of Administration [x]   Handouts given []     Side Effects  Nausea/vomiting [x]   Diarrhea [x]   Fatigue [x]   Signs / Symptoms of infection [x]   Neutropenia [x]   Thrombocytopenia [x]   Alopecia [x]   neuropathy [x]   [place] diet &  the importance of fluids [x]       Micellaneous  Importance of nutrition [x]   Importance of oral hygiene [x]   When to call the MD [x]   Monitoring labs [x]   Use of supportive services []     Explanation of Drug Regimen / Frequency  Taxol/Carboplatin     Comments  Verbalized understanding to drug,action,side effects and when to call MD       Care plan reviewed with patient and spouse. Patient and spouse verbalize understanding of the plan of care and contribute to goal setting.

## 2022-06-16 NOTE — PROGRESS NOTES
The patient is a 71y Male complaining of epistaxis. for 1 day because of chest heaviness. No cardiology because GI was consulted ultimately showed a mass in the distal esophagus. Outpatient EGD requested. Diagnosed with adenocarcinoma the distal esophagus and referred to Dr. Gutierrez Jack of thoracic surgery at [address] [place] [address].  EUS shows regional tone involvement. See scans below and ultrasound. No distant mets. Patient has no significant dysphagia and no weight loss. Patient referred back locally for chemoradiation neoadjuvant treatment before surgery. ROS:  Review of Systems 14 point negative except as above. PMH:   Past Medical History:   Diagnosis Date    Cancer of distal third of esophagus ([address] 2022    GERD (gastroesophageal reflux disease)         Social HX:   Social History     Socioeconomic History    Marital status:      Spouse name: Kiah Hoang Number of children: 11    Years of education: 15    Highest education level: High school graduate   Occupational History    Occupation: VENDOR     Comment: [address]   Tobacco Use    Smoking status: Former Smoker     Packs/day: 3.00     Years: 10.00     Pack years: 30.00     Types: Cigarettes     Quit date: 2013     Years since quittin.1    Smokeless tobacco: Never Used    Tobacco comment: does not smoke   Vaping Use    Vaping Use: Not on file   Substance and Sexual Activity    Alcohol use: Never    Drug use: Never    Sexual activity: Yes     Partners: Female   Other Topics Concern    Not on file   Social History Narrative    Not on file     Social Determinants of Health     Financial Resource Strain: Low Risk     Difficulty of Paying Living Expenses: Not hard at all   Food Insecurity: [address] Worried About [address] [employer] in the Last Year: Often true    Brian of Food in the Last Year: Sometimes true   Transportation Needs:     Lack of Transportation (Medical): Not on file    Lack of Transportation (Non-Medical):  Not on file   Physical Activity:  Days of Exercise per Week: Not on file    Minutes of Exercise per Session: Not on file   Stress:     Feeling of Stress : Not on file   Social Connections:     Frequency of Communication with Friends and Family: Not on file    Frequency of Social Gatherings with Friends and Family: Not on file    Attends Judaism Services: Not on file    Active Member of [address] [place] [address] or Organizations: Not on file    Attends Club or Organization Meetings: Not on file    Marital Status: Not on file   Intimate Partner Violence:     Fear of Current or Ex-Partner: Not on file    Emotionally Abused: Not on file    Physically Abused: Not on file    Sexually Abused: Not on file   Housing Stability:     Unable to Pay for Housing in the Last Year: Not on file    Number of [place] in the Last Year: Not on file    Unstable Housing in the Last Year: Not on file        Spouse: Harshad Emmanuel  [phone]    Phone: [phone]     [address] [place] [address] Dr GANN [address]     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 [employer]    Immunizations:  Immunization History   Administered Date(s) Administered    Influenza Virus Vaccine 09/15/2015        Health Screenings:    C-Scope:  5 years ago  Prostate:   Lab Results   Component Value Date    PSA 1.41 03/22/2017            Health Maintenance Due   Topic Date Due    Pneumococcal 0-64 years Vaccine (1 - PCV) Never done    COVID-19 Vaccine (1) Never done    DTaP/Tdap/Td vaccine (1 - Tdap) Never done    Shingles vaccine (1 of 2) Never done    Low dose CT lung screening  Never done    Prostate Specific Antigen (PSA) Screening or Monitoring  03/22/2018    Diabetes screen  03/22/2020    Lipids  03/22/2022        Interests:   Cars. music family,    Fam HX:   Family History   Problem Relation Age of Onset    Colon Cancer Mother     Heart Disease Father     Cancer Brother         lung        Hospitalizations:   2/10/22    Allergies:   Allergies   Allergen Reactions    Paclitaxel Other (See Comments)     Severe lower back pain- able to resume after medications given        Adult Illness:  Patient Active Problem List   Diagnosis    Abdominal pain    Cancer of distal third of esophagus [facility])        Surgery:  Past Surgical History:   Procedure Laterality Date    DENTAL SURGERY      25 teeth removed        Medications:  Current Outpatient Medications   Medication Sig Dispense Refill    pantoprazole (PROTONIX) 40 MG tablet Take 1 tablet by mouth every morning (before breakfast) 90 tablet 3    famotidine (PEPCID) 20 MG tablet Take 20 mg by mouth 2 times daily       NONFORMULARY Hema-Plex (Iron Multi-vitamin)      aspirin 325 mg tablet Take 325 mg by mouth daily (Patient not taking: Reported on 6/16/2022)      lidocaine viscous hcl (XYLOCAINE) 2 % SOLN solution Take 15 mLs by mouth as needed for Irritation (As needed for irritation related to chemoradiation treatment) Follow instructions given in clinic. (Patient not taking: Reported on 6/16/2022) 100 mL 0    ondansetron (ZOFRAN-ODT) 8 MG TBDP disintegrating tablet Place 1 tablet under the tongue every 8 hours as needed for Nausea or Vomiting (Patient not taking: Reported on 5/18/2022) 10 tablet 1    prochlorperazine (COMPAZINE) 10 MG tablet Take 1 tablet by mouth every 6 hours as needed (nausea) (Patient not taking: Reported on 5/18/2022) 30 tablet 1     No current facility-administered medications for this visit. Facility-Administered Medications Ordered in Other Visits   Medication Dose Route Frequency Provider Last Rate Last Admin    sodium chloride flush 0.9 % injection 5-40 mL  5-40 mL IntraVENous PRN Alessio Ray MD   20 mL at 06/16/22 1006   Over-the-counter iron supplement 3 times a day      EXAM:   height is 5' 7\" (1.702 m) and weight is 201 lb (91.2 kg). His oral temperature is 98.9 °F (37.2 °C). His blood pressure is 141/87 (abnormal) and his pulse is 90. His respiration is 18 and oxygen saturation is 95%. Estimated body surface area is 2.08 meters squared as calculated from the following:    Height as of this encounter: 5' 7\" (1.702 m). Weight as of this encounter: 201 lb (91.2 kg). ECO  General: Non-ill appearing. Very pleasant and relaxed. Appears healthy. HEENT: NC/AT,nonicteric, perrla,eom intact, no mucosal lesions  Neck: normal thyroid, no masses. pulses nl, no bruits,   Nodes: No adenopathy  Lungs/chest: clear, no rales,rhonchi or wheezing, lung bases clear  CV: rrr, no rubs ,gallops or murmurs  Breasts: Not examined  Abd/Rectal: soft, non-tender,bowel sounds normal , no HSM,no masses  Back: normal curvature, No midline tenderness. flanks nontender  : Not Examined  Extremities: no cyanosis,clubbing or edema. Skin: unremarkable  Neuro: A and O x 4, CN exam nonfocal, Motor- no deficits, Sensory- no deficits, gait-nl, speech- fluent, no ataxia. Devices: PICC line in right arm site unremarkable.       DATA:    LAB:     CBC with Differential:      Lab Results   Component Value Date    WBC 2.3 2022    RBC 4.73 2022    HGB 13.7 2022    HCT 39.8 2022    PLT 66 2022    MCV 84 2022    MCH 29.0 2022    MCHC 34.4 2022    RDW 15.3 2022    NRBC 0 2022    SEGSPCT 53.6 2022    MONOPCT 10.9 2022    MONOSABS 0.4 2022    LYMPHSABS 0.2 2022    EOSABS 0.0 2022    BASOSABS 0.0 2022     Lab Results   Component Value Date/Time    SEGSABS 1.7 (L) 2022 10:08 AM       CMP:    Lab Results   Component Value Date     2022     02/10/2022    K 4.1 2022    K 4.2 02/10/2022    CL 99 02/10/2022    CO2 24 02/10/2022    BUN 17 02/10/2022    CREATININE 1.0 2022    CREATININE 1.0 2022    LABGLOM 76 2022    GLUCOSE 127 02/10/2022    PROT 7.0 2022    LABALBU 4.0 2022    CALCIUM 10.6 02/10/2022    BILITOT 0.4 2022    ALKPHOS 68 2022    AST 24 2022    ALT 36 06/09/2022       BMP:    Lab Results   Component Value Date     06/16/2022     02/10/2022    K 4.1 06/16/2022    K 4.2 02/10/2022    CL 99 02/10/2022    CO2 24 02/10/2022    BUN 17 02/10/2022    LABALBU 4.0 06/09/2022    CREATININE 1.0 06/16/2022    CREATININE 1.0 05/04/2022    CALCIUM 10.6 02/10/2022    LABGLOM 76 05/04/2022    GLUCOSE 127 02/10/2022       Magnesium:    Lab Results   Component Value Date    MG 2.0 06/09/2022     PT/INR:  No results found for: PROTIME, INR  TSH:    Lab Results   Component Value Date    TSH 2.800 03/22/2017     VITAMIN B12: No components found for: B12  FOLATE:    Lab Results   Component Value Date    FOLATE 15.6 02/10/2022     IRON:    Lab Results   Component Value Date    IRON 58 04/27/2022     Iron Saturation:  No components found for: PERCENTFE  TIBC:    Lab Results   Component Value Date    TIBC 341 04/27/2022     FERRITIN:    Lab Results   Component Value Date    FERRITIN 28 04/27/2022     PSA:   Lab Results   Component Value Date    PSA 1.41 03/22/2017            IMAGING:  -PET/CT 3/30/2022  Intense hypermetabolic activity within the mid esophagus consistent with a primary malignancy. Adjacent hypermetabolic left periesophageal lymph node worrisome for metastatic adenopathy. PROCEDURES:  EGD 2/14/2022  Ulcerated mass distal esophagus    EUS 3/23/2022  Partially obstructing malignant esophageal tumor found in the lower third of the esophagus. Large hiatal hernia. Normal stomach and duodenum. Liver most consistent with fatty liver. 2 malignant appearing lymph nodes visualized and measured in the lower paraesophageal mediastinum level 8L adjacent to the mass. 2 malignant appearing lymph nodes visualized and measured in the subcarinal mediastinum level 7. PATHOLOGY:   EGD distal esophageal biopsy path report  Pathologic Diagnosis     Outside Slides  [id number] (02/15/22)  A. Esophagus, distal, biopsy:   · Invasive adenocarcinoma, moderately differentiated. See comment   · Alcian Blue/PAS performed at outside institution and submitted for review highlights Prescott's mucosa in the background      HER2/robina Gene Status: Amplified. GENETICS:  HER2 amplified. MOLECULAR:  None    ASSESSMENT/PLAN:    1: Diagnosis: 52-year-old gentleman with adenocarcinoma of the distal third of the esophagus. Clinical stage T2N2 by EUS and PET scan confirmed. No distant mets. No significant dysphagia. Presented with chest discomfort. Chronic history of GERD. Performance status is excellent. Seen by Dr. Vijaya Cortez thoracic surgery at [facility] referred back for neoadjuvant chemoradiation before surgery. 2) Prognosis / Disease Status: High risk node positive disease T2N2 clinical stage, locally advanced. HER2 amplified which has no bearing unless he develops metastatic disease. 3) Work-up:    Labs: CBC, CMP   Imaging: Done   Procedures: PICC line can be removed today after today's treatment 6/16-week #6 chemo with radiation   Consults: None new   Other:    4) Symptom Management: None needed      5) Supportive care provided. Level of care is appropriate. 6) Treatment goal: Cure      Treatment plan:     Neoadjuvant chemoradiation: Low-dose carboplatin and Taxol weekly x6-7 with radiation. Patient start date for radiation next week on 5/9. Start date for first weekly CarboTaxol-5/11. Today 6/16 due for week # 6 low-dose carboplatin Taxol with radiation. This will complete his chemotherapy today. Radiation will be completed 6/20  Follow-up with Dr. Vijaya Cortez late July [facility]      7) Medications reviewed. Prescriptions today: None            No orders of the defined types were placed in this encounter. OARRS:  Controlled Substance Monitoring:    Acute and Chronic Pain Monitoring:   No flowsheet data found. 8) Research Options:   Not applicable      9) Other:        Follow-up with Dr. Vijaya Cortez after chemoradiation is done for surgery.   Appointment is 7/20    10) Follow Up: Follow-up 2 weeks with nurse practitioner recheck posttreatment labs. Keep follow-up with Dr. Inez Kyle of thoracic surgery 7/27 and scheduling of esophagectomy  Follow-up with me routinely in 3 months.     Jamila Canales MD

## 2022-06-16 NOTE — PLAN OF CARE
Problem: Safety - Adult  Goal: Absence of falls  Outcome: Adequate for Discharge  Note: No falls occurred with visit today. Intervention: Provide fall prevention measures  Note: Verbalized understanding of fall prevention to ask for assistance with ambulation. Call light within reach. Problem: Discharge Planning  Goal: Discharge to home or other facility with appropriate resources  Outcome: Adequate for Discharge  Goal: Knowledge of discharge instructions  Description: Knowledge of discharge instructions  Outcome: Adequate for Discharge  Note: Verbalized understanding of discharge instructions, follow-up appointments, and when to call the physician. Intervention: PROVIDE DISCHARGE TEACHING  Note: Discuss understanding of discharge instructions,follow-up appointments, and when to call the physician. Problem: Infection - Adult  Goal: Absence of infection at discharge  Outcome: Adequate for Discharge  Goal: Will show no infection signs and symptoms  Description: Will show no infection signs and symptoms  Outcome: Adequate for Discharge  Note: PICC site with no redness,warmth or drainage. Patient verbalizes signs/symptoms of port infection and when to notify the physician. Intervention: EDUCATE PATIENT ABOUT SIGNS AND SYMPTOMS OF INFECTION  Note: Picc line removed as ordered. Care plan reviewed with patient and spouse. Patient and spouse verbalize understanding of the plan of care and contribute to goal setting.

## 2022-06-16 NOTE — PROGRESS NOTES
Patient assessed for the following post chemotherapy:    Dizziness   No  Lightheadedness  No      Acute nausea/vomiting No  Headache   No  Chest pain/pressure  No  Rash/itching   No  Shortness of breath  No    Patient kept for 20 minutes observation post infusion chemotherapy. Patient tolerated chemotherapy treatment Taxol/Carboplatin without any complications. Last vital signs:   /82   Pulse 89   Temp 98.2 °F (36.8 °C) (Oral)   Resp 18   Ht 5' 7\" (1.702 m)   Wt 201 lb (91.2 kg)   SpO2 95%   BMI 31.48 kg/m²         Patient instructed if experience any of the above symptoms following today's infusion,he/she is to notify MD immediately or go to the emergency department. Discharge instructions given to patient. Verbalizes understanding. Ambulated off unit per self with belongings accompanied by spouse.

## 2022-06-17 ENCOUNTER — HOSPITAL ENCOUNTER (OUTPATIENT)
Dept: RADIATION ONCOLOGY | Age: 62
Discharge: HOME | End: 2022-06-17
Payer: MEDICAID

## 2022-06-17 ENCOUNTER — CLINICAL DOCUMENTATION (OUTPATIENT)
Dept: NUTRITION | Age: 62
End: 2022-06-17

## 2022-06-17 PROCEDURE — 77386 HC NTSTY MODUL RAD TX DLVR CPLX: CPT | Performed by: RADIOLOGY

## 2022-06-17 PROCEDURE — 77014 PR CT GUIDANCE PLACEMENT RAD THERAPY FIELDS: CPT | Performed by: RADIOLOGY

## 2022-06-17 NOTE — PROGRESS NOTES
Nutrition Assessment     Type and Reason for Visit:   6/17/22 - on treatment follow-up  5/27/22 - on treatment follow-up  5/20/22 - on treatment follow-up  5/13/22 - cancer of distal third esophagus     Nutrition Recommendations:   Continue soft foods  Continue to chew well  Nutrition therapy for sore throat   Continue water for hydration  ONS BID  Weekly RD visits     Malnutrition Assessment: (5/13/22)  Malnutrition Status: no malnutriton  Context: acute illness  Findings of the 6 clinical characteristics of malnutrition (minimum of 2 out of 6 clinical characteristics is required to make the dx of moderate or severe Protein Calorie Malnutrition based on AND/ASPEN Guidelines):              1. Energy Intake: good intake              2. Weight Loss: no loss              3. Fat Loss: no loss              4. Muscle Loss: no loss              5. Fluid Accumulation: none noted              6.  Strength: not measured     Nutrition Diagnosis:   Problem: increased nutrient needs  Etiology: catabolic illness  Signs and Symptoms: head and neck cancer     Nutrition Assessment:   Subjective: (former smoker)  6/17/22 - Patient seen with wife. Patient reports that he is sticking with soft and slippery foods. Patient does like the Boost Soothe ONS. Patient is aware of recent weight loss (7# in 3 weeks). Patient says that his last week has hit him hard. Patient states that chemo is complete. Patient remains positive and pleasant. 5/27/22 - Patient seen with wife. Patient remains positive and continues to have a good outlook. Patient states that he is having a little pain in throat that lasted about 10 minutes. Patient has bottle of water with him and sips on water throughout visit. Patient reports that he continues to eat well and that his weight is stable. Patient is drinking ONS. Patient denies questions/concerns today. 5/20/22 - Patient seen with wife.  Patient reports that his throat is starting to get sore with nutrition for energy, healing, and in preparation for planned surgery. 5/20/22:  -Encouraged to continue soft foods  -Continue to chew well  -Provided more boost soothe samples for home use  -Provided recipes for sore throat  -Provided and reviewed Nutrition Therapy handout for sore throat, including foods to choose list.  -Left message with nurse navigator per wife request to discuss their will.   -Continue weekly RD visit  5/13/22:  -Encouraged to continue soft foods, to chew well, and continue to drink after with meals  -Encouraged to continue water throughout the day for hydration  -Continue ONS BID and also provided other ONS samples to try for variety (ensure clear and boost soothe).    -ONS coupons provided for home use  -Patient agrees to weekly RD visits  -RD contact information provided and encouraged patient to call with needs     Nutrition Evaluation:          Evaluation: no progress toward goals       Goals: Patient will consume 75% or greater of normal intake and maintain weight throughout treatment.         Monitoring: Oral intake, diet tolerance, weight, hydration, swallowing, ONS use, need for texture modification     Signed: Electronically signed by Aida Dsouza RD, LD on 6/17/2022      Contact number: [phone]

## 2022-06-20 ENCOUNTER — HOSPITAL ENCOUNTER (OUTPATIENT)
Dept: RADIATION ONCOLOGY | Age: 62
Discharge: HOME | End: 2022-06-20
Payer: MEDICAID

## 2022-06-20 DIAGNOSIS — C15.5 CANCER OF DISTAL THIRD OF ESOPHAGUS (HCC): Primary | ICD-10-CM

## 2022-06-20 PROCEDURE — 77014 PR CT GUIDANCE PLACEMENT RAD THERAPY FIELDS: CPT | Performed by: RADIOLOGY

## 2022-06-20 PROCEDURE — 77386 HC NTSTY MODUL RAD TX DLVR CPLX: CPT | Performed by: RADIOLOGY

## 2022-06-20 PROCEDURE — 77336 RADIATION PHYSICS CONSULT: CPT | Performed by: RADIOLOGY

## 2022-06-20 RX ORDER — HYDROCODONE BITARTRATE AND ACETAMINOPHEN 5; 325 MG/1; MG/1
1 TABLET ORAL EVERY 4 HOURS PRN
Qty: 42 TABLET | Refills: 0 | Status: SHIPPED | OUTPATIENT
Start: 2022-06-20 | End: 2022-06-27

## 2022-06-23 NOTE — PROGRESS NOTES
[address] [facility] [address], [address]        [place],  [facility]        Beth Walton: [phone]        F: [phone]       [email or web address]     END OF TREATMENT SUMMARY    Date of Service: 2022  Patient ID: Jose Nichols   : 1960  MRN: [id number]   Acct Number: [de-identified]           DIAGNOSIS:   Cancer Staging  Cancer of distal third of esophagus [facility])  Staging form: Esophagus - Adenocarcinoma, AJCC 8th Edition  - Clinical stage from 3/23/2022: Stage JOSE (cT2, cN2, cM0, G3) - Signed by Rico Parker MD on 2022      HISTORY OF PRESENT ILLNESS:  Oncology History Overview Note   As per Thoracic Surgery (Dr. Hector Medrano) 2022    Jose Nichols is a 62 y.o. male former smoker with history of HTN, HLD, and GERD who was referred to our service by Dr. Amparo Patel for recently diagnosed esophageal adenocarcinoma who presents today for review of PET/CT and EUS for esophageal cancer staging. Patient reports he is doing well overall. He denies fever, hemoptysis, chest pain, or dysphagia. He is currently on a full diet and is supplementing with one Ensure per day. 62 y.o. male former smoker with history of HTN, HLD, and GERD who was referred to our service by Dr. Amparo Patel for recently diagnosed esophageal adenocarcinoma who presents today for review of PET/CT and EUS for esophageal cancer staging. Patient was staged as stage JOSE (uT2N2) on recent EUS. Findings from recent PET/CT correlate with EUS findings. Plan to have patient complete neoadjuvant chemoradiation treatment. We will follow-up with patient in ~6 weeks with a telemedicine visit in order to discuss progress.       Imaging  EUS on 3/23/2022:  Impression:              - Partially obstructing, malignant esophageal tumor was found in the lower third of the esophagus.                          - Esophagogastric landmarks identified.                          - Large hiatal hernia. - Normal stomach. - Normal examined duodenum.                          - There was diffuse abnormal echotexture in the left lobe of the liver and in the right lobe of the liver. This was characterized by a hyperechoic appearance. This is consistent with a fatty liver. - There was no sign of significant pathology in the pancreatic head, genu of the pancreas and pancreatic body. - There was no sign of significant pathology in the common bile duct. - There was no sign of significant pathology in the gallbladder body. - A mass was found in the lower third of the esophagus. A tissue diagnosis was obtained prior to this exam. This is consistent with adenocarcinoma. This was staged T2 (based on invasion into) N2 Mx by endosonographic criteria. - Two malignant-appearing lymph nodes were visualized and measured in the lower paraesophageal mediastinum (level 8L) adjacent to the mass. - Two malignant-appearing lymph nodes were visualized and measured in the subcarinal mediastinum (level 7). - No specimens collected. PET/CT on 3/30/2022:  IMPRESSION:     Intense hypermetabolic activity within the mid esophagus consistent with primary malignancy. Adjacent hypermetabolic left periesophageal lymph node worrisome for metastatic adenopathy.       Cancer of distal third of esophagus ([address]   4/7/2022 Initial Diagnosis    Cancer of distal third of esophagus ([address]     5/11/2022 -  Chemotherapy    OP CARBOplatin AUC 2 + PACLitaxel 45 mg/m2 Q7D  Plan Provider: Lavinia Aceves MD  Treatment goal: Neoadjuvant  Line of treatment: Neoadjuvant     5/11/2022 - 6/20/2022 Radiation    Treatment Course Number: 1    Treatment Site (s) Modality Dose (cGy) Fractions Elapsed Days   Esophageal Primary, regional lymph nodes IMRT 5040 28 40     Cumulative Dose: 5040 cGy    Radiation therapy delivered under the care of Dr. Fawn Mccall MD MS ([facility])           Treatment Tolerance/Response:     Comfort Alteration  Fatigue:Able to perform daily activities with rest periods    Pain Location: n/a  Pain Intensity (Current): 0 No Pain  Pain Treatment: No treatment scheduled  Pain Relief: n/a    Emotional Alteration:   Coping: effective    Nutritional Alteration  Anorexia: none   Nausea: No nausea noted  Vomiting: No vomiting   Dyspepsia/Heartburn: None  Dysphagia/Esophagitis: Dysphagia, requires pureed, soft or liquid diet    Skin Alteration   Skin reaction: Faint or dull erythema; follicular reaction  Alopecia: No loss    Mucous Membrane Alteration  Mucositis XRT Related: None  Pharynx and Esophagus- Acute: Mild dysphagia or odynophagia, topical anesthetic, soft diet  Voice Changes: Mild or intermittent hoarseness    Ventilation Alteration  Cough: Productive  Hemoptysis: None  Dyspnea: Normal  Mucous Quantity/Quality: Clear Pleghm    Additional Comments: CeraVe BID. Baking soda mouth when remembers. ECO - Symptomatic but completely ambulatory (Restricted in physically strenuous activity but ambulatory and able to carry out work of a light or sedentary nature. For example, light housework, office work)    [facility] Cords tolerated radiation therapy well with only mild treatment related symptoms as detailed above. Radiation therapy was completed as planned without any significant treatment breaks or suspensions. Any treatment effects experienced at completion of therapy should improve or resolve in the next 2-3 weeks. Continue symptom management, if required, as instructed on the last day of treatment. Systemic Therapy: Concurrent chemotherapy      Follow Up Plan: Patient tolerated radiation therapy well overall with only mild side effects noted above.  Continue regular follow up with all other treating physicians. The patient will return to clinic in 1 month or sooner if clinically indicated. They have our clinic number to call with any questions or concerns if needed. Thank you for allowing us to be a part of their care.     Electronically signed by Katie Brown MD on 6/23/2022 at 5:50 PM  Radiation Oncology    CC:  Dr. Larry Fajardo ([facility])   ACC: Tumor Registry: [facility]

## 2022-06-28 ENCOUNTER — TELEPHONE (OUTPATIENT)
Dept: ONCOLOGY | Age: 62
End: 2022-06-28

## 2022-06-30 ENCOUNTER — OFFICE VISIT (OUTPATIENT)
Dept: ONCOLOGY | Age: 62
End: 2022-06-30
Payer: MEDICAID

## 2022-06-30 ENCOUNTER — HOSPITAL ENCOUNTER (OUTPATIENT)
Dept: INFUSION THERAPY | Age: 62
Discharge: HOME | End: 2022-06-30
Payer: MEDICAID

## 2022-06-30 VITALS
RESPIRATION RATE: 16 BRPM | WEIGHT: 196.6 LBS | SYSTOLIC BLOOD PRESSURE: 148 MMHG | HEART RATE: 90 BPM | BODY MASS INDEX: 30.86 KG/M2 | OXYGEN SATURATION: 97 % | HEIGHT: 67 IN | DIASTOLIC BLOOD PRESSURE: 79 MMHG | TEMPERATURE: 98.1 F

## 2022-06-30 VITALS
DIASTOLIC BLOOD PRESSURE: 79 MMHG | TEMPERATURE: 98.1 F | SYSTOLIC BLOOD PRESSURE: 148 MMHG | RESPIRATION RATE: 16 BRPM | HEART RATE: 90 BPM

## 2022-06-30 DIAGNOSIS — C15.5 CANCER OF DISTAL THIRD OF ESOPHAGUS (HCC): ICD-10-CM

## 2022-06-30 DIAGNOSIS — C15.5 CANCER OF DISTAL THIRD OF ESOPHAGUS (HCC): Primary | ICD-10-CM

## 2022-06-30 LAB
ABSOLUTE IMMATURE GRANULOCYTE: 0.06 THOU/MM3 (ref 0–0.07)
ALBUMIN SERPL-MCNC: 4.2 G/DL (ref 3.5–5.1)
ALP BLD-CCNC: 94 U/L (ref 38–126)
ALT SERPL-CCNC: 31 U/L (ref 11–66)
AST SERPL-CCNC: 25 U/L (ref 5–40)
BASINOPHIL, AUTOMATED: 0 % (ref 0–3)
BASOPHILS ABSOLUTE: 0 THOU/MM3 (ref 0–0.1)
BILIRUB SERPL-MCNC: 0.5 MG/DL (ref 0.3–1.2)
BILIRUBIN DIRECT: < 0.2 MG/DL (ref 0–0.3)
BUN, WHOLE BLOOD: 7 MG/DL (ref 8–26)
CHLORIDE, WHOLE BLOOD: 104 MEQ/L (ref 98–109)
CREATININE, WHOLE BLOOD: 1 MG/DL (ref 0.5–1.2)
EOSINOPHILS ABSOLUTE: 0 THOU/MM3 (ref 0–0.4)
EOSINOPHILS RELATIVE PERCENT: 0 % (ref 0–4)
GFR, ESTIMATED: 81 ML/MIN/1.73M2
GLUCOSE, WHOLE BLOOD: 97 MG/DL (ref 70–108)
HCT VFR BLD CALC: 39.9 % (ref 42–52)
HEMOGLOBIN: 13.6 GM/DL (ref 14–18)
IMMATURE GRANULOCYTES: 2 %
IONIZED CALCIUM, WHOLE BLOOD: 1.19 MMOL/L (ref 1.12–1.32)
LYMPHOCYTES # BLD: 12 % (ref 15–47)
LYMPHOCYTES ABSOLUTE: 0.4 THOU/MM3 (ref 1–4.8)
MCH RBC QN AUTO: 29.4 PG (ref 26–33)
MCHC RBC AUTO-ENTMCNC: 34.1 GM/DL (ref 32.2–35.5)
MCV RBC AUTO: 86 FL (ref 80–94)
MONOCYTES ABSOLUTE: 0.5 THOU/MM3 (ref 0.4–1.3)
MONOCYTES: 17 % (ref 0–12)
PDW BLD-RTO: 17.2 % (ref 11.5–14.5)
PLATELET # BLD: 122 THOU/MM3 (ref 130–400)
PMV BLD AUTO: 8.5 FL (ref 9.4–12.4)
POTASSIUM, WHOLE BLOOD: 3.9 MEQ/L (ref 3.5–4.9)
RBC # BLD: 4.62 MILL/MM3 (ref 4.7–6.1)
SEG NEUTROPHILS: 68 % (ref 43–75)
SEGMENTED NEUTROPHILS ABSOLUTE COUNT: 2 THOU/MM3 (ref 1.8–7.7)
SODIUM, WHOLE BLOOD: 141 MEQ/L (ref 138–146)
TOTAL CO2, WHOLE BLOOD: 26 MEQ/L (ref 23–33)
TOTAL PROTEIN: 7.3 G/DL (ref 6.1–8)
WBC # BLD: 3 THOU/MM3 (ref 4.8–10.8)

## 2022-06-30 PROCEDURE — 99211 OFF/OP EST MAY X REQ PHY/QHP: CPT

## 2022-06-30 PROCEDURE — 99213 OFFICE O/P EST LOW 20 MIN: CPT | Performed by: PHYSICIAN ASSISTANT

## 2022-06-30 PROCEDURE — 36415 COLL VENOUS BLD VENIPUNCTURE: CPT

## 2022-06-30 PROCEDURE — 80047 BASIC METABLC PNL IONIZED CA: CPT

## 2022-06-30 PROCEDURE — 85025 COMPLETE CBC W/AUTO DIFF WBC: CPT

## 2022-06-30 PROCEDURE — 80076 HEPATIC FUNCTION PANEL: CPT

## 2022-06-30 NOTE — PROGRESS NOTES
week #6 due/last treatment. Last XRT 6/20.     PARAMETERS:  -EGD/EUS  -PET/CT     COMORBIDITIES:  Include 30-pack-year history quit in 2013, alcohol none, GERD, hypertension, hyperlipidemia      Interval History 6/30/2022:   The patient is here for follow-up evaluation. He was evaluated by Dr. Lina Israel on 6/16/2022 and received last dose of weekly Taxol and carbo. He is here for supportive care visit. The patient states he has been feeling well. He affirms fatigue. Denies fever, chills, or signs/symptoms of infection. Denies chest pain, SOB, cough, abdominal pain, vomiting, bowel or urinary changes. Denies peripheral edema or neuropathy. He has follow-up scheduled with Dr. Diana Garcia at [facility] on 7/22/2022      PMH, SH, and FH:  I reviewed the patients medication list and allergy list as noted on the electronic medical record. The PMH, SH and FH were also reviewed as noted on the EMR. Review of Systems:   Review of Systems   Pertinent review of systems noted in HPI, all other ROS negative. Objective:   Physical Exam   BP (!) 148/79 (Site: Left Upper Arm, Position: Sitting, Cuff Size: Medium Adult)   Pulse 90   Temp 98.1 °F (36.7 °C) (Oral)   Resp 16   Ht 5' 7\" (1.702 m)   Wt 196 lb 9.6 oz (89.2 kg)   SpO2 97%   BMI 30.79 kg/m²    General appearance: No apparent distress, well developed, and cooperative. HEENT: Pupils equal, round, and reactive to light. Conjunctivae/corneas clear. Oral mucosa moist.  Neck: Supple, with full range of motion. Trachea midline. Respiratory:  Normal respiratory effort. Clear to auscultation, bilaterally without Rales/Wheezes/Rhonchi. Cardiovascular: Regular rate and rhythm with normal S1/S2   Abdomen: Soft, active bowel sounds. Musculoskeletal: No clubbing, cyanosis or edema bilaterally. Skin: Skin color, texture, turgor normal.  No visible rashes or lesions. Neurologic:  Neurovascularly intact without any focal sensory/motor deficits.    Psychiatric: Alert and oriented    Imaging Studies and Labs:   CBC:   Lab Results   Component Value Date    WBC 3.0 (L) 06/30/2022    HGB 13.6 (L) 06/30/2022    HCT 39.9 (L) 06/30/2022    MCV 86 06/30/2022     (L) 06/30/2022     BMP:   Lab Results   Component Value Date/Time     06/30/2022 12:47 PM     02/10/2022 09:35 AM    K 3.9 06/30/2022 12:47 PM    K 4.2 02/10/2022 09:35 AM    CL 99 02/10/2022 09:35 AM    CO2 24 02/10/2022 09:35 AM    BUN 17 02/10/2022 09:35 AM    CREATININE 1.0 06/30/2022 12:47 PM    CREATININE 1.0 05/04/2022 08:10 AM    GLUCOSE 127 02/10/2022 09:35 AM    CALCIUM 10.6 02/10/2022 09:35 AM      LFT:   Lab Results   Component Value Date    ALT 34 06/16/2022    AST 26 06/16/2022    ALKPHOS 71 06/16/2022    BILITOT 0.7 06/16/2022         Assessment and Plan:   1. 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 No distant metastasis. Presented with chest discomfort. Hx of chronic GERD. Evaluated by [facility] thoracic surgery, Dr. Dionna Barth, and recommended neoadjuvant chemoradiation. 2.  Neoadjuvant concurrent chemoradiation  He began weekly chemotherapy on 5/11/2022 and finished with cycle #6 of carboplatin and Taxol on 6/16/22. He completed radiation therapy on 6/20/22 with total cumulative dose 5040 cGy. Patient is doing well following completion of neoadjuvant concurrent chemoradiation. Vitals and labs reviewed with patient. No need for supplemental IV fluids today. CBC shows improvement in counts with platelet 107,231, Hgb 13.6, and WBC count 3.0. Instructed to continue to monitor for signs/symptoms of infection. Follow up as planned with Dr. Dionna Barth at [facility] on 7/22/22  Return to clinic as planned 9/13/22. All patient questions answered. Pt voiced understanding. Patient agreed with treatment plan. Follow up as directed. Patient instructed to call for questions or concerns.           Electronically signed by   Darwin Vincent KIMBERLY

## 2022-07-29 ENCOUNTER — HOSPITAL ENCOUNTER (OUTPATIENT)
Dept: RADIATION ONCOLOGY | Age: 62
End: 2022-07-29
Attending: RADIOLOGY
Payer: MEDICAID

## 2022-08-12 ENCOUNTER — HOSPITAL ENCOUNTER (OUTPATIENT)
Dept: RADIATION ONCOLOGY | Age: 62
Discharge: HOME | End: 2022-08-12
Attending: RADIOLOGY
Payer: MEDICAID

## 2022-08-12 VITALS
OXYGEN SATURATION: 95 % | WEIGHT: 200 LBS | BODY MASS INDEX: 31.32 KG/M2 | SYSTOLIC BLOOD PRESSURE: 128 MMHG | DIASTOLIC BLOOD PRESSURE: 81 MMHG | TEMPERATURE: 98.2 F | HEART RATE: 99 BPM | RESPIRATION RATE: 18 BRPM

## 2022-08-12 PROCEDURE — 99212 OFFICE O/P EST SF 10 MIN: CPT | Performed by: RADIOLOGY

## 2022-08-12 PROCEDURE — 99213 OFFICE O/P EST LOW 20 MIN: CPT | Performed by: RADIOLOGY

## 2022-08-12 ASSESSMENT — ENCOUNTER SYMPTOMS
BLOOD IN STOOL: 0
VOMITING: 0
NAUSEA: 0
COUGH: 0
WHEEZING: 0
BACK PAIN: 0
TROUBLE SWALLOWING: 0
ABDOMINAL PAIN: 0
SORE THROAT: 0
CHEST TIGHTNESS: 0
APNEA: 0
SHORTNESS OF BREATH: 0

## 2022-08-12 NOTE — PROGRESS NOTES
[facility] [address], [address]        [address] [facility], [address] [place] [address]        Jesse Lee: [phone]        F: [phone]       [email or web address]            FOLLOW UP NOTE    Date of Service: 2022  Patient ID: Keshia Montana   : 1960  MRN: [id number]   Acct Number: [de-identified]       DATE OF SERVICE: 2022   LOCATION: [facility]  PROVIDER: Sherry Ashford MD MS    FOLLOW UP PHYSICIANS: Dr. Juan Carlos Tucker, Leonel Haley PA-C, Tiny Smith PA-C, Dr. Yo Barrera:  Cancer Staging  Cancer of distal third of esophagus [facility])  Staging form: Esophagus - Adenocarcinoma, AJCC 8th Edition  - Clinical stage from 3/23/2022: Stage JOSE (cT2, cN2, cM0, G3) - Signed by Ceasar Borrero MD on 2022    Mr. Keira Dyer presents today for follow-up for his adenocarcinoma of the esophagus. The patient reports he has been doing well since his last radiation treatment and is able to eat all foods without difficulty. His weight has stabilized and he denies recent sore throat, dysphagia, abdominal pain, odynophagia, hoarseness, regurgitation, nausea, vomiting, hematemesis, shortness of breath, cough, chest pain, fatigue, fevers, and new pain. No concerning findings on exam today. The patient is scheduled for EGD and J-tube placement on 22 and then laparoscopy and esophagectomy on 22 through [facility] Thoracic Surgery. The patient will continue to follow with medical oncology (Tiny Smith PA-C). We will defer to [facility] for continued ordering of imaging, labs, and EGD for surveillance. The patient will return to clinic in 3 months or sooner if clinically indicated. They have our clinic number to call with any questions or concerns if needed. Thank you for allowing us to be a part of their care.     HPI:  Oncology History Overview Note   As per Thoracic Surgery (Dr. Anderson Rhodes) 2022    Keshia Montana is a 62 y.o. male former smoker with history of HTN, HLD, and GERD who was referred to our service by Dr. Rosalind Hinson for recently diagnosed esophageal adenocarcinoma who presents today for review of PET/CT and EUS for esophageal cancer staging. Patient reports he is doing well overall. He denies fever, hemoptysis, chest pain, or dysphagia. He is currently on a full diet and is supplementing with one Ensure per day. 62 y.o. male former smoker with history of HTN, HLD, and GERD who was referred to our service by Dr. Rosalind Hinson for recently diagnosed esophageal adenocarcinoma who presents today for review of PET/CT and EUS for esophageal cancer staging. Patient was staged as stage JOSE (uT2N2) on recent EUS. Findings from recent PET/CT correlate with EUS findings. Plan to have patient complete neoadjuvant chemoradiation treatment. We will follow-up with patient in ~6 weeks with a telemedicine visit in order to discuss progress. Imaging  EUS on 3/23/2022:  Impression:              - Partially obstructing, malignant esophageal tumor was found in the lower third of the esophagus.                          - Esophagogastric landmarks identified.                          - Large hiatal hernia. - Normal stomach. - Normal examined duodenum.                          - There was diffuse abnormal echotexture in the left lobe of the liver and in the right lobe of the liver. This was characterized by a hyperechoic appearance. This is consistent with a fatty liver. - There was no sign of significant pathology in the pancreatic head, genu of the pancreas and pancreatic body. - There was no sign of significant pathology in the common bile duct. - There was no sign of significant pathology in the gallbladder body. - A mass was found in the lower third of the esophagus.  A tissue diagnosis was obtained prior to this exam. This is consistent with adenocarcinoma. This was staged T2 (based on invasion into) N2 Mx by endosonographic criteria. - Two malignant-appearing lymph nodes were visualized and measured in the lower paraesophageal mediastinum (level 8L) adjacent to the mass. - Two malignant-appearing lymph nodes were visualized and measured in the subcarinal mediastinum (level 7). - No specimens collected. PET/CT on 3/30/2022:  IMPRESSION:     Intense hypermetabolic activity within the mid esophagus consistent with primary malignancy. Adjacent hypermetabolic left periesophageal lymph node worrisome for metastatic adenopathy. 07/20/2022 PET SCAN WHOLE BODY       7/20/2022 As per thoracic surgery,  Plan to proceed with a two staged operation with an EGD and robotic-assisted gastric devascularization with possible J-tube placement followed ~2 weeks later by a robotic-assisted laparoscopy and right thoracoscopy for minimally invasive [place] esophagectomy. Cancer of distal third of esophagus ([address]   2/14/2022 Surgery         4/7/2022 Initial Diagnosis    Cancer of distal third of esophagus ([address]     5/11/2022 -  Chemotherapy    OP CARBOplatin AUC 2 + PACLitaxel 45 mg/m2 Q7D  Plan Provider: Tawnya Lee MD  Treatment goal: Neoadjuvant  Line of treatment: Neoadjuvant       5/11/2022 - 6/20/2022 Radiation    Treatment Course Number: 1    Treatment Site (s) Modality Dose (cGy) Fractions Elapsed Days   Esophageal Primary, regional lymph nodes IMRT 5040 28 40   Cumulative Dose: 5040 cGy    Radiation therapy delivered under the care of Dr. Hollie Juan MD MS ([facility])           INTERVAL HISTORY:  Martha Casarez is a 65 y.o. male is presenting today for regularly scheduled follow up regarding the above mentioned oncologic history.     Review of Systems   Constitutional:  Negative for activity change, appetite change, fatigue and unexpected weight change. HENT:  Negative for congestion, sore throat and trouble swallowing. Respiratory:  Negative for apnea, cough, chest tightness, shortness of breath and wheezing. Cardiovascular:  Negative for chest pain and leg swelling. Gastrointestinal:  Negative for abdominal pain, blood in stool, nausea and vomiting. Genitourinary:  Negative for dysuria, frequency and urgency. Musculoskeletal:  Negative for back pain. Neurological:  Negative for dizziness, weakness, numbness and headaches. Psychiatric/Behavioral:  Negative for confusion. A complete review of systems was performed and found to be negative except as presented above. CHAPERONE: N/A    PAIN: 0/10    LABORATORY STUDIES:  Onc labs:   Lab Results   Component Value Date/Time    PSA 1.41 03/22/2017 07:45 AM       MEDICATIONS:   Current Outpatient Medications   Medication Sig Dispense Refill    aspirin 325 mg tablet Take 325 mg by mouth daily (Patient not taking: Reported on 6/16/2022)      lidocaine viscous hcl (XYLOCAINE) 2 % SOLN solution Take 15 mLs by mouth as needed for Irritation (As needed for irritation related to chemoradiation treatment) Follow instructions given in clinic. 100 mL 0    ondansetron (ZOFRAN-ODT) 8 MG TBDP disintegrating tablet Place 1 tablet under the tongue every 8 hours as needed for Nausea or Vomiting (Patient not taking: Reported on 5/18/2022) 10 tablet 1    prochlorperazine (COMPAZINE) 10 MG tablet Take 1 tablet by mouth every 6 hours as needed (nausea) (Patient not taking: Reported on 5/18/2022) 30 tablet 1    pantoprazole (PROTONIX) 40 MG tablet Take 1 tablet by mouth every morning (before breakfast) 90 tablet 3    famotidine (PEPCID) 20 MG tablet Take 20 mg by mouth 2 times daily       NONFORMULARY Hema-Plex (Iron Multi-vitamin)       No current facility-administered medications for this encounter.        PHYSICAL EXAMINATION:  VITAL SIGNS: /81   Pulse 99   Temp 98.2 °F (36.8 °C) (Infrared)   Resp 18   Wt 200 lb (90.7 kg)   SpO2 95%   BMI 31.32 kg/m²     ECO - Asymptomatic (Fully active, able to carry on all pre-disease activities without restriction)    Physical Exam  Constitutional:       General: He is not in acute distress. Appearance: Normal appearance. HENT:      Head: Normocephalic and atraumatic. Mouth/Throat:      Comments: Oral mucosa moist, without sores or lesions  Pulmonary:      Effort: Pulmonary effort is normal. No respiratory distress. Abdominal:      General: Abdomen is flat. There is no distension. Palpations: Abdomen is soft. Tenderness: There is no abdominal tenderness. Musculoskeletal:         General: Normal range of motion. Neurological:      General: No focal deficit present. Mental Status: He is alert and oriented to person, place, and time. Motor: No weakness. Gait: Gait normal.       Electronically signed by Philomena Khan MD MS on 22 at 12:59 PM EDT     ATTESTATION: 20 minutes were spent with the patient at today's visit reviewing pertinent information related to their oncologic diagnosis, including any recent labs, imaging, follow ups and plan of care going forward.     CC: Dr. Jose Daniel Starks, Annemarie Hernandez PA-C, Julieann Severe PA-C, Dr. Rico Ibarra  ACC:[facility]

## 2022-09-13 ENCOUNTER — CLINICAL DOCUMENTATION (OUTPATIENT)
Dept: CASE MANAGEMENT | Age: 62
End: 2022-09-13

## 2022-09-13 ENCOUNTER — OFFICE VISIT (OUTPATIENT)
Dept: ONCOLOGY | Age: 62
End: 2022-09-13
Payer: MEDICAID

## 2022-09-13 ENCOUNTER — HOSPITAL ENCOUNTER (OUTPATIENT)
Dept: INFUSION THERAPY | Age: 62
Discharge: HOME | End: 2022-09-13
Payer: MEDICAID

## 2022-09-13 VITALS
SYSTOLIC BLOOD PRESSURE: 130 MMHG | RESPIRATION RATE: 16 BRPM | DIASTOLIC BLOOD PRESSURE: 64 MMHG | HEART RATE: 72 BPM | TEMPERATURE: 98.3 F

## 2022-09-13 VITALS
BODY MASS INDEX: 29.51 KG/M2 | SYSTOLIC BLOOD PRESSURE: 130 MMHG | HEART RATE: 72 BPM | RESPIRATION RATE: 16 BRPM | WEIGHT: 188 LBS | HEIGHT: 67 IN | OXYGEN SATURATION: 98 % | DIASTOLIC BLOOD PRESSURE: 64 MMHG | TEMPERATURE: 98.3 F

## 2022-09-13 DIAGNOSIS — C15.5 CANCER OF DISTAL THIRD OF ESOPHAGUS (HCC): ICD-10-CM

## 2022-09-13 DIAGNOSIS — G47.30 SLEEP APNEA, UNSPECIFIED TYPE: Primary | ICD-10-CM

## 2022-09-13 LAB
ABSOLUTE IMMATURE GRANULOCYTE: 0.02 THOU/MM3 (ref 0–0.07)
ALBUMIN SERPL-MCNC: 4.1 G/DL (ref 3.5–5.1)
ALP BLD-CCNC: 85 U/L (ref 38–126)
ALT SERPL-CCNC: 23 U/L (ref 11–66)
AST SERPL-CCNC: 26 U/L (ref 5–40)
BASINOPHIL, AUTOMATED: 1 % (ref 0–3)
BASOPHILS ABSOLUTE: 0 THOU/MM3 (ref 0–0.1)
BILIRUB SERPL-MCNC: 0.4 MG/DL (ref 0.3–1.2)
BILIRUBIN DIRECT: < 0.2 MG/DL (ref 0–0.3)
BUN, WHOLE BLOOD: 12 MG/DL (ref 8–26)
CHLORIDE, WHOLE BLOOD: 104 MEQ/L (ref 98–109)
CREATININE, WHOLE BLOOD: 1 MG/DL (ref 0.5–1.2)
EOSINOPHILS ABSOLUTE: 0.4 THOU/MM3 (ref 0–0.4)
EOSINOPHILS RELATIVE PERCENT: 8 % (ref 0–4)
GFR, ESTIMATED: 81 ML/MIN/1.73M2
GLUCOSE, WHOLE BLOOD: 121 MG/DL (ref 70–108)
HCT VFR BLD CALC: 38 % (ref 42–52)
HEMOGLOBIN: 12.4 GM/DL (ref 14–18)
IMMATURE GRANULOCYTES: 0 %
IONIZED CALCIUM, WHOLE BLOOD: 1.18 MMOL/L (ref 1.12–1.32)
LYMPHOCYTES # BLD: 9 % (ref 15–47)
LYMPHOCYTES ABSOLUTE: 0.5 THOU/MM3 (ref 1–4.8)
MCH RBC QN AUTO: 31.2 PG (ref 26–33)
MCHC RBC AUTO-ENTMCNC: 32.6 GM/DL (ref 32.2–35.5)
MCV RBC AUTO: 96 FL (ref 80–94)
MONOCYTES ABSOLUTE: 0.3 THOU/MM3 (ref 0.4–1.3)
MONOCYTES: 5 % (ref 0–12)
PDW BLD-RTO: 12 % (ref 11.5–14.5)
PLATELET # BLD: 194 THOU/MM3 (ref 130–400)
PMV BLD AUTO: 8.5 FL (ref 9.4–12.4)
POTASSIUM, WHOLE BLOOD: 4.3 MEQ/L (ref 3.5–4.9)
RBC # BLD: 3.97 MILL/MM3 (ref 4.7–6.1)
SEG NEUTROPHILS: 78 % (ref 43–75)
SEGMENTED NEUTROPHILS ABSOLUTE COUNT: 4.5 THOU/MM3 (ref 1.8–7.7)
SODIUM, WHOLE BLOOD: 138 MEQ/L (ref 138–146)
TOTAL CO2, WHOLE BLOOD: 24 MEQ/L (ref 23–33)
TOTAL PROTEIN: 7.3 G/DL (ref 6.1–8)
WBC # BLD: 5.7 THOU/MM3 (ref 4.8–10.8)

## 2022-09-13 PROCEDURE — 85025 COMPLETE CBC W/AUTO DIFF WBC: CPT

## 2022-09-13 PROCEDURE — 80047 BASIC METABLC PNL IONIZED CA: CPT

## 2022-09-13 PROCEDURE — 36415 COLL VENOUS BLD VENIPUNCTURE: CPT

## 2022-09-13 PROCEDURE — 99211 OFF/OP EST MAY X REQ PHY/QHP: CPT

## 2022-09-13 PROCEDURE — 99214 OFFICE O/P EST MOD 30 MIN: CPT | Performed by: INTERNAL MEDICINE

## 2022-09-13 PROCEDURE — 80076 HEPATIC FUNCTION PANEL: CPT

## 2022-09-13 RX ORDER — OXYCODONE HYDROCHLORIDE 5 MG/1
5 TABLET ORAL EVERY 6 HOURS PRN
COMMUNITY
Start: 2022-09-05 | End: 2022-10-24

## 2022-09-13 RX ORDER — ASPIRIN 81 MG/1
81 TABLET ORAL DAILY
COMMUNITY
End: 2022-10-24

## 2022-09-13 RX ORDER — AMIODARONE HYDROCHLORIDE 200 MG/1
200 TABLET ORAL DAILY
COMMUNITY
Start: 2022-09-06 | End: 2022-10-24

## 2022-09-13 NOTE — PROGRESS NOTES
Name: Julia Bell  : 1960  MRN: [id number]    Oncology Navigation Follow-Up Note    Contact Type:  Medical Oncology    Subjective: appt with ONC; surgery at [facility] 22- able to eat liquid diet. Objective: WBC 5.7 HGB 12.4    ANC 4.5  BUN 12 Crea 1.0    ONC POC:  -Pathology from esophagectomy at [facility] is not currently available. -Onc discussed NCCN guidelines with pt & family  -Discussed possible need for Nivolumab, pending path results  -Keep FU appt at [facility] with Dr Maral Sainz  -Get a referral for sleep study for possible sleep apnea  -Return in 2 weeks to review surgical path & determine next step for care. Assistance Needed: denies    Receptive to Advanced Care Planning / Palliative Care:  deferred    Referrals: N/A today    Education: POC reiterated    Notes: Fredy following to assist & support as needed.     Electronically signed by Seth Mensah RN on 2022 at 6:21 PM

## 2022-09-13 NOTE — PROGRESS NOTES
[address] CANCER [address] [place] [address]  Dept: [phone]  Loc: [phone]   Hematology/Oncology Consult (Clinic)        9/13/22     Paula Yañez   1960     No ref. 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 Clinical stage T2 N2 M0. Mass extends from 34 to 28 cm approximately 6 cm. Staging by EUS 3/23/2022 [facility] (T2 based on invasion and N2 based on 2 malignant appearing lymph nodes visualized and measured in the lower paraesophageal mediastinum level 8L adjacent to the mass. 2 malignant appearing lymph nodes visualized and measured in the subcarinal mediastinum level 7. PET/CT 3/30/2022 [facility]- hypermetabolic activity at mid esophagus consistent with primary malignancy with adjacent hypermetabolic left periesophageal lymph nodes. TREATMENT:  -Seen by Dr. Sergo Colin of thoracic surgery at [facility] 3/30/2022. Recommends neoadjuvant chemoradiation to be followed by surgery  -Neoadjuvant chemoradiation with Dr. Yohan Donahue. Low-dose carboplatinum Taxol weekly x 6-7. Chemo  Start date: 5/11. Day 1 XRT 5/11 6/16/2022 week #6 due/last treatment. Last XRT 6/20/22.  -Robotic assisted laparoscopic and right thoracoscopic Kwesi Ruma Kehr esophagectomy ;   additional gastric margin, gastropathic lymph nodes and hernia sac per Dr. Sergo Colin 8/30/2022  No evidence of peritoneal metastatic disease. (Path report requested and pending)    PARAMETERS:  -EGD/EUS  -PET/CT    COMORBIDITIES:  Include 30-pack-year history quit in 2013, alcohol none, GERD, hypertension, hyperlipidemia    SUBJECTIVE: Patient underwent his esophagectomy 2 weeks ago on August 30 and has healed very nicely. He is up and walking denies any significant pain. Path report is still not out and he has follow-up with Dr. Sergo Colin of surgery tomorrow 14 September.   Reviewed NCCN guidelines for possible therapy after his esophagectomy which largely depends on the results of his path report which is not yet available. Still on a liquid diet. HPI: Jane Christie is a 80-year-old gentleman with a long history of GERD who presented to the ER on February 10 was admitted for 1 day because of chest heaviness. No cardiology because GI was consulted ultimately showed a mass in the distal esophagus. Outpatient EGD requested. Diagnosed with adenocarcinoma the distal esophagus and referred to Dr. Maxim Wilde of thoracic surgery at [facility].  EUS shows regional tone involvement. See scans below and ultrasound. No distant mets. Patient has no significant dysphagia and no weight loss. Patient referred back locally for chemoradiation neoadjuvant treatment before surgery. ROS:  Review of Systems 14 point negative except as above.     PMH:   Past Medical History:   Diagnosis Date    Atrial fibrillation ([facility]     Cancer of distal third of esophagus ([facility] 2022    GERD (gastroesophageal reflux disease)     Sleep apnea         Social HX:   Social History     Socioeconomic History    Marital status:      Spouse name: Lashell Chaudhari    Number of children: 5    Years of education: 12    Highest education level: High school graduate   Occupational History    Occupation: VENDOR     Comment: [address]   Tobacco Use    Smoking status: Former     Packs/day: 3.00     Years: 10.00     Pack years: 30.00     Types: Cigarettes     Quit date: 2013     Years since quittin.3    Smokeless tobacco: Never    Tobacco comments:     does not smoke   Vaping Use    Vaping Use: Not on file   Substance and Sexual Activity    Alcohol use: Never    Drug use: Never    Sexual activity: Yes     Partners: Female   Other Topics Concern    Not on file   Social History Narrative    Not on file     Social Determinants of Health     Financial Resource Strain: Low Risk     Difficulty of Paying Living Expenses: Not hard at all Food Insecurity: Food Insecurity Present    Worried About Running Out of Food in the Last Year: Often true    Ran Out of Food in the Last Year: Sometimes true   Transportation Needs: Not on file   Physical Activity: Not on file   Stress: Not on file   Social Connections: Not on file   Intimate Partner Violence: Not on file   Housing Stability: Not on file        Cecy Ambrose  [phone]    Phone: [phone]     [address] [place] [address] [address]     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 [employer] and [employer]ing [employer]    Immunizations:  Immunization History   Administered Date(s) Administered    Influenza Virus Vaccine 09/15/2015        Health Screenings:    C-Scope:  5 years ago  Prostate:   Lab Results   Component Value Date    PSA 1.41 03/22/2017            Health Maintenance Due   Topic Date Due    COVID-19 Vaccine (1) Never done    Pneumococcal 0-64 years Vaccine (1 - PCV) Never done    DTaP/Tdap/Td vaccine (1 - Tdap) Never done    Shingles vaccine (1 of 2) Never done    Low dose CT lung screening  Never done    Diabetes screen  03/22/2020    Lipids  03/22/2022    Flu vaccine (1) 09/01/2022        Interests:   Cars. [employer] family,    Fam HX:   Family History   Problem Relation Age of Onset    Colon Cancer Mother     Heart Disease Father     Cancer Brother         lung        Hospitalizations:   2/10/22    Allergies:   Allergies   Allergen Reactions    Paclitaxel Other (See Comments)     Severe lower back pain- able to resume after medications given    Aleve [Naproxen] Hives        Adult Illness:  Patient Active Problem List   Diagnosis    Abdominal pain    Cancer of distal third of esophagus [facility])        Surgery:  Past Surgical History:   Procedure Laterality Date    ABDOMEN SURGERY  08/16/2022    GASTRIC DEVASCULARIZATON-[facility]    ABDOMEN SURGERY  08/30/2022    ESOPHAGOGASTRODUODENOSCOPY TRANSORAL DIAGNOSTIC ESOPHAGECTOMY W/ THORACOTOMY-[facility]    DENTAL SURGERY      25 teeth removed        Medications:  Current Outpatient Medications   Medication Sig Dispense Refill    aspirin 81 MG EC tablet Take 81 mg by mouth daily      amiodarone (CORDARONE) 200 MG tablet Take 200 mg by mouth daily      oxyCODONE (ROXICODONE) 5 MG immediate release tablet Take 5 mg by mouth every 6 hours as needed. famotidine (PEPCID) 20 MG tablet Take 20 mg by mouth 2 times daily       lidocaine viscous hcl (XYLOCAINE) 2 % SOLN solution Take 15 mLs by mouth as needed for Irritation (As needed for irritation related to chemoradiation treatment) Follow instructions given in clinic. (Patient not taking: Reported on 2022) 100 mL 0    ondansetron (ZOFRAN-ODT) 8 MG TBDP disintegrating tablet Place 1 tablet under the tongue every 8 hours as needed for Nausea or Vomiting (Patient not taking: No sig reported) 10 tablet 1    prochlorperazine (COMPAZINE) 10 MG tablet Take 1 tablet by mouth every 6 hours as needed (nausea) (Patient not taking: No sig reported) 30 tablet 1    pantoprazole (PROTONIX) 40 MG tablet Take 1 tablet by mouth every morning (before breakfast) (Patient not taking: Reported on 2022) 90 tablet 3    NONFORMULARY Hema-Plex (Iron Multi-vitamin) (Patient not taking: Reported on 2022)       No current facility-administered medications for this visit. Over-the-counter iron supplement 3 times a day      EXAM:   height is 5' 7\" (1.702 m) and weight is 188 lb (85.3 kg). His oral temperature is 98.3 °F (36.8 °C). His blood pressure is 130/64 and his pulse is 72. His respiration is 16 and oxygen saturation is 98%. Estimated body surface area is 2.01 meters squared as calculated from the following:    Height as of this encounter: 5' 7\" (1.702 m). Weight as of this encounter: 188 lb (85.3 kg). ECO  General: Non-ill appearing. Very pleasant and relaxed. Appears healthy. HEENT: NC/AT,nonicteric, perrla,eom intact, no mucosal lesions  Neck: normal thyroid, no masses. pulses nl, no bruits,   Nodes: No adenopathy  Lungs/chest: clear, no rales,rhonchi or wheezing, lung bases clear  CV: rrr, no rubs ,gallops or murmurs  Breasts: Not examined  Abd/Rectal: Soft, nontender ,multiple puncture sites from his recent esophagectomy all healing nicely. Back: normal curvature, No midline tenderness. flanks nontender  : Not Examined  Extremities: no cyanosis,clubbing or edema. Skin: unremarkable  Neuro: A and O x 4, CN exam nonfocal, Motor- no deficits, Sensory- no deficits, gait-nl, speech- fluent, no ataxia.   Devices: None      DATA:    LAB:     CBC with Differential:      Lab Results   Component Value Date/Time    WBC 5.7 09/13/2022 10:24 AM    RBC 3.97 09/13/2022 10:24 AM    HGB 12.4 09/13/2022 10:24 AM    HCT 38.0 09/13/2022 10:24 AM     09/13/2022 10:24 AM    MCV 96 09/13/2022 10:24 AM    MCH 31.2 09/13/2022 10:24 AM    MCHC 32.6 09/13/2022 10:24 AM    RDW 12.0 09/13/2022 10:24 AM    NRBC 0 03/28/2022 01:39 PM    SEGSPCT 53.6 03/28/2022 01:39 PM    MONOPCT 10.9 03/28/2022 01:39 PM    MONOSABS 0.3 09/13/2022 10:24 AM    LYMPHSABS 0.5 09/13/2022 10:24 AM    EOSABS 0.4 09/13/2022 10:24 AM    BASOSABS 0.0 09/13/2022 10:24 AM     Lab Results   Component Value Date/Time    SEGSABS 4.5 09/13/2022 10:24 AM       CMP:    Lab Results   Component Value Date/Time     06/30/2022 12:47 PM     02/10/2022 09:35 AM    K 3.9 06/30/2022 12:47 PM    K 4.2 02/10/2022 09:35 AM    CL 99 02/10/2022 09:35 AM    CO2 24 02/10/2022 09:35 AM    BUN 17 02/10/2022 09:35 AM    CREATININE 1.0 06/30/2022 12:47 PM    CREATININE 1.0 05/04/2022 08:10 AM    LABGLOM 76 05/04/2022 08:10 AM    GLUCOSE 127 02/10/2022 09:35 AM    PROT 7.3 06/30/2022 12:47 PM    LABALBU 4.2 06/30/2022 12:47 PM    CALCIUM 10.6 02/10/2022 09:35 AM    BILITOT 0.5 06/30/2022 12:47 PM    ALKPHOS 94 06/30/2022 12:47 PM    AST 25 06/30/2022 12:47 PM    ALT 31 06/30/2022 12:47 PM       BMP:    Lab Results   Component Value Date/Time     06/30/2022 12:47 PM Outside Slides  [id number] (02/15/22)  A. Esophagus, distal, biopsy:   Invasive adenocarcinoma, moderately differentiated. See comment   Alcian Blue/PAS performed at outside institution and submitted for review highlights Prescott's mucosa in the background      HER2/robina Gene Status: Amplified. ___________________________________________________________    8/30/2022 [facility] esophagectomy pathology-pending    GENETICS:  HER2 amplified. MOLECULAR:  None    ASSESSMENT/PLAN:    1: Diagnosis: 61-year-old gentleman with adenocarcinoma of the distal third of the esophagus. Clinical stage T2N2 by EUS and PET scan confirmed. No distant mets. No significant dysphagia. Presented with chest discomfort. Chronic history of GERD. Performance status is excellent. Seen by Dr. Wallie Goodpasture thoracic surgery at [address] [place] [address] and status post neoadjuvant chemoradiation followed by recent esophagectomy on 8/30/2022. Path report pending. 2) Prognosis / Disease Status: High risk node positive disease T2N2 clinical stage, locally advanced. HER2 amplified which has no bearing unless he develops metastatic disease. 3) Work-up:    Labs: CBC, CMP   Imaging: Done   Procedures: 8/30/2022 esophagectomy. See above. Consults: None new   Other: Liquid diet. Advance per Dr. Wallie Goodpasture    4) Symptom Management: None needed      5) Supportive care provided. Level of care is appropriate. 6) Treatment goal: Cure      Treatment plan:     Neoadjuvant chemoradiation: Low-dose carboplatin and Taxol weekly x 6-7 with radiation. Radiation will be completed 6/20 8/30/2022 esophagectomy at [address] [place] [address] per Dr. Wallie Goodpasture . Path Report not yet available and has been requested. NCCN guidelines reviewed for patients with adenocarcinoma the esophagus who have received preoperative chemoradiation.   -R0 resection i.e. no cancer resection margins is YPT0N0 options include category 1 chemotherapy  -If YP T+) positive recommendations category 1 is nivolumab in patients who received preoperative chemoradiation. Other options is chemotherapy if received preoperatively or even observation. I would recommend nivolumab if he fits in this category. If so he will need a port. We will review data on duration of nivolumab which is likely a year. 7) Medications reviewed. Prescriptions today: None            No orders of the defined types were placed in this encounter. OARRS:  Controlled Substance Monitoring:    Acute and Chronic Pain Monitoring:   No flowsheet data found. 8) Research Options:   Not applicable      9) Other:        Follow-up with Dr. Alverto Ibarra after chemoradiation is done for surgery. Appointment is 7/20    10) Follow Up: Follow-up 2 weeks .     Jame Patten MD

## 2022-09-13 NOTE — PATIENT INSTRUCTIONS
Keep follow-up with Dr. Delmy Cook at [facility] surgery tomorrow 9/14  Follow-up with me in 2 weeks. A path report from August 30 to decide on future therapy.

## 2022-09-26 ENCOUNTER — TELEPHONE (OUTPATIENT)
Dept: CASE MANAGEMENT | Age: 62
End: 2022-09-26

## 2022-09-26 NOTE — TELEPHONE ENCOUNTER
Fredy call to Dr Ben Sabillon's ofce with request for Path report from 8/30/22 surgical procedure. Path report being faxed in preparation for pt's appt tomorrow with [facility].

## 2022-09-26 NOTE — TELEPHONE ENCOUNTER
Fredy received return call from [facility] at Dr Víctor Monique she has no path report to fax, & that it is still considered, \"in process from 8/30\". Fredy requested [facility] Pathology dept number: [phone]    Fredy phoned [facility] Pathology Dept; dept closed, VM message left requesting return phone call in reference to the timeline for a final pathology report from pt's 8/30/22 surgical procedure at [facility]. Fredy updated Priscilla HINOJOSA Triage of the above. Pt has an appt with Dr Isai Jones tomorrow @ 3pm.  Appt may need rescheduled IF [facility] has not finalized the path report for [facility] reference.

## 2022-09-28 ENCOUNTER — TELEPHONE (OUTPATIENT)
Dept: CASE MANAGEMENT | Age: 62
End: 2022-09-28

## 2022-09-28 NOTE — TELEPHONE ENCOUNTER
Fredy to [facility] Pathology for 8/30/22 pathology report status. Spoke with Kalee Bauman. She provided Dr Mariah Marrero- pathologist email for communication. Fredy sent email query to Dr Олег Stallworth with request for prelim path report for Dr Kerline Leroy reference for consideration of adjuvant tx. Dr. Олег Stallworth responded promptly, informing final path will be ready momentarily, while also offering [facility] the ability to call him directly on mobile:  [phone]    The above info shared with Dr. Kerline Leroy.

## 2022-09-29 ENCOUNTER — TELEPHONE (OUTPATIENT)
Dept: CASE MANAGEMENT | Age: 62
End: 2022-09-29

## 2022-09-29 NOTE — TELEPHONE ENCOUNTER
Fredy received notification from Dr Monica Meier at [facility], pt's pathology case is completed; report accessed. Report shared with [facility]. Requested pt be scheduled next week. Pt's appt changed to Monday 10-3 at 1020. Fredy phoned pt & updated him accordingly.

## 2022-10-03 ENCOUNTER — OFFICE VISIT (OUTPATIENT)
Dept: ONCOLOGY | Age: 62
End: 2022-10-03
Payer: MEDICAID

## 2022-10-03 ENCOUNTER — INITIAL CONSULT (OUTPATIENT)
Dept: PULMONOLOGY | Age: 62
End: 2022-10-03
Payer: MEDICAID

## 2022-10-03 ENCOUNTER — HOSPITAL ENCOUNTER (OUTPATIENT)
Dept: INFUSION THERAPY | Age: 62
Discharge: HOME | End: 2022-10-03
Payer: MEDICAID

## 2022-10-03 VITALS
OXYGEN SATURATION: 99 % | RESPIRATION RATE: 16 BRPM | HEIGHT: 67 IN | DIASTOLIC BLOOD PRESSURE: 78 MMHG | SYSTOLIC BLOOD PRESSURE: 120 MMHG | HEART RATE: 90 BPM | TEMPERATURE: 98.3 F | WEIGHT: 178 LBS | BODY MASS INDEX: 27.94 KG/M2

## 2022-10-03 VITALS
SYSTOLIC BLOOD PRESSURE: 120 MMHG | HEART RATE: 81 BPM | RESPIRATION RATE: 16 BRPM | TEMPERATURE: 97.8 F | DIASTOLIC BLOOD PRESSURE: 78 MMHG

## 2022-10-03 VITALS
BODY MASS INDEX: 28.09 KG/M2 | OXYGEN SATURATION: 98 % | HEART RATE: 81 BPM | TEMPERATURE: 97.8 F | WEIGHT: 179 LBS | HEIGHT: 67 IN

## 2022-10-03 DIAGNOSIS — R06.83 LOUD SNORING: ICD-10-CM

## 2022-10-03 DIAGNOSIS — G47.19 EXCESSIVE DAYTIME SLEEPINESS: ICD-10-CM

## 2022-10-03 DIAGNOSIS — R06.81 WITNESSED EPISODE OF APNEA: Primary | ICD-10-CM

## 2022-10-03 DIAGNOSIS — C15.5 MALIGNANT NEOPLASM OF LOWER THIRD OF ESOPHAGUS (HCC): ICD-10-CM

## 2022-10-03 DIAGNOSIS — C15.5 CANCER OF DISTAL THIRD OF ESOPHAGUS (HCC): Primary | ICD-10-CM

## 2022-10-03 PROCEDURE — 99213 OFFICE O/P EST LOW 20 MIN: CPT | Performed by: INTERNAL MEDICINE

## 2022-10-03 PROCEDURE — 99211 OFF/OP EST MAY X REQ PHY/QHP: CPT

## 2022-10-03 PROCEDURE — 99203 OFFICE O/P NEW LOW 30 MIN: CPT | Performed by: INTERNAL MEDICINE

## 2022-10-03 NOTE — PATIENT INSTRUCTIONS
Follow-up with me October 13. Is make copy of the path report and give this to the patient. Return the original to me please.

## 2022-10-03 NOTE — PROGRESS NOTES
New Sleep Patient H/P    Presentation:  Andrey Pereira is referred by Dr Harini Crews for witnessed episodes of apnea during sleep. Alaina Brown unfortunately was diagnosed with esophageal adenocarcinoma, during the perioperative period Alaina Brown was noted to stop breathing during sleep and sedation, his wife Kim Cruz  is present endorses that Alaina Brown has done that for a few years. Momo snores loudly and falls sleep during the day. Has hospital bed and sleeps with HOB elevated  Has lost over 30 lbs since diagnosis of esophageal cancer  H/O PAF        Time in Bed:   Bedtime: 11p.m. Awakens  10 a.m. Different on weekends? No       Niranjan falls asleep in 5  minutes. Any awakenings? Yes  Difficulty Falling back to sleep? No  Symptoms include: snoring, periods of not breathing, excessive daytime sleepiness    Previous evaluation and treatment? No        He denies any history of sleep walking or sleep talking. No history of seizures activity. No history suggestive of restless legs syndrome. No history of bruxism. No history of head injury. Naps:  Any naps? Yes and are they helpful Yes    Snoring and Apneas:  Do you snore or been told you a snore? Yes  How long have known about your snoring? years  Any witnessed apneas? No  Any awakenings with choking or gasping? Yes    Dreams:  Any recurring dreams? No  Hallucinations? No  Sleep Paralysis? No  Symptoms of Cataplexy? No    Driving History:  Do you have a CDL or drive long distances for work? No  Any driving accidents in the past year? No  Any sleepiness while driving? No    Weight:  Any change in weight over the past year? Yes   How about past 5 years? Yes  How much?  30 ls lighter       Past Medical History:   Diagnosis Date    Atrial fibrillation ([address]     Cancer of distal third of esophagus ([facility] [address] 04/07/2022    GERD (gastroesophageal reflux disease)     Sleep apnea        Past Surgical History:   Procedure Laterality Date    ABDOMEN SURGERY 2022    [facility]IC [facility]IZATON[facility]    ABDOMEN SURGERY  2022    ESOPHAGOGASTRODUODENOSCOPY TRANSORAL DIAGNOSTIC ESOPHAGECTOMY W/ THORACOTOMY-[facility]    DENTAL SURGERY      25 teeth removed       Social History     Tobacco Use    Smoking status: Former     Packs/day: 3.00     Years: 10.00     Pack years: 30.00     Types: Cigarettes     Quit date: 2013     Years since quittin.4    Smokeless tobacco: Never    Tobacco comments:     does not smoke   Substance Use Topics    Alcohol use: Never    Drug use: Never       Allergies   Allergen Reactions    Paclitaxel Other (See Comments)     Severe lower back pain- able to resume after medications given    Aleve [Naproxen] Hives       Current Outpatient Medications   Medication Sig Dispense Refill    aspirin 81 MG EC tablet Take 81 mg by mouth daily      amiodarone (CORDARONE) 200 MG tablet Take 200 mg by mouth daily      oxyCODONE (ROXICODONE) 5 MG immediate release tablet Take 5 mg by mouth every 6 hours as needed. lidocaine viscous hcl (XYLOCAINE) 2 % SOLN solution Take 15 mLs by mouth as needed for Irritation (As needed for irritation related to chemoradiation treatment) Follow instructions given in clinic. 100 mL 0    ondansetron (ZOFRAN-ODT) 8 MG TBDP disintegrating tablet Place 1 tablet under the tongue every 8 hours as needed for Nausea or Vomiting 10 tablet 1    prochlorperazine (COMPAZINE) 10 MG tablet Take 1 tablet by mouth every 6 hours as needed (nausea) 30 tablet 1    pantoprazole (PROTONIX) 40 MG tablet Take 1 tablet by mouth every morning (before breakfast) 90 tablet 3    famotidine (PEPCID) 20 MG tablet Take 20 mg by mouth 2 times daily       NONFORMULARY Hema-Plex (Iron Multi-vitamin)       No current facility-administered medications for this visit.        Family History   Problem Relation Age of Onset    Colon Cancer Mother     Heart Disease Father     Cancer Brother         lung        Any family history of any sleep problems or any one in your family on CPAP? Yes    Social History     Tobacco Use    Smoking status: Former     Packs/day: 3.00     Years: 10.00     Pack years: 30.00     Types: Cigarettes     Quit date: 2013     Years since quittin.4    Smokeless tobacco: Never    Tobacco comments:     does not smoke   Substance Use Topics    Alcohol use: Never    Drug use: Never     Caffeine Intake: How much soda (pop), coffee, tea, power drinks do you ingest per day? 1 per day. Employment History:  Where do you work? Retired      Review of Systems:   General/Constitutional: No recent loss of weight or appetite changes. No fever or chills. HENT: Negative. Eyes: Negative. Upper respiratory tract: No nasal stuffiness or post nasal drip. Lower respiratory tract/ lungs: No cough or sputum production. No hemoptysis. Cardiovascular: No palpitations or chest pain. Gastrointestinal: No nausea or vomiting. Neurological: No focal neurologiacal weakness. Extremities: No edema. Musculoskeletal: No complaints. Genitourinary: No complaints. Hematological: Negative. Psychiatric/Behavioral: Negative. Skin: No itching. Physical Exam:    HEIGHTHeight: 5' 7\" (170.2 cm) WEIGHTWeight: 179 lb (81.2 kg)    BMI:  There is no height or weight on file to calculate BMI. Neck Size: 15    ESS: 5  Vitals:   Vitals:    10/03/22 1523   Pulse: 81   Temp: 97.8 °F (36.6 °C)   SpO2: 98%   Weight: 179 lb (81.2 kg)   Height: 5' 7\" (1.702 m)         Mallampati Score: 2    Physical Exam :  Constitutional: BMI 28  HENT:   Head: Normocephalic and atraumatic. Mouth/Throat: Oropharynx is clear and moist. No oral thrush. Mallampati   Mallampati class 2  Eyes: Conjunctivae are normal. PERRLA. No scleral icterus. Neck: Neck supple. No JVD present. No tracheal deviation present. 15 in circumference  Cardiovascular: Normal rate, regular rhythm, normal heart sounds. No murmur heard. Pulmonary/Chest: Effort normal and breath sounds normal. No stridor.  No respiratory distress. No wheezes. No rales. Abdominal: Soft. No distension. No tenderness. Musculoskeletal: Normal range of motion. Lymphadenopathy:  No cervical adenopathy. Neurological: Alert and oriented to person, place, and time. No focal deficits. Skin: Skin is warm and dry. Patient is not diaphoretic. Psychiatric: Normal behavior with normal mood and affect. Diagnostic Data:    Assessment    Diagnosis Orders   1. Witnessed episode of apnea  Home Sleep Study      2. Excessive daytime sleepiness  Home Sleep Study      3. Loud snoring  Home Sleep Study      4. Malignant neoplasm of lower third of esophagus ([address]  Home Sleep Study          Plan     Orders Placed This Encounter   Procedures    Home Sleep Study     Standing Status:   Future     Standing Expiration Date:   10/3/2023     Order Specific Question:   Location For Sleep Study     Answer:   [facility]     Order Specific Question:   Select Sleep Lab Location     Answer:   [address]          Mask Desensitization and Pre study teaching? No  Weight Loss Information Given? No  Sleep Hygiene Discussed? No    -He was advised to call [facility] regarding supplies if needed.  -He call my office for earlier appointment if needed for worsening of sleep symptoms.  -Ángela Reyes [address] educated about my impression and plan. Patient verbalizes understanding.

## 2022-10-03 NOTE — PROGRESS NOTES
[address] CANCER [address] [place] [address]  Dept: [phone]  Loc: [phone]   Hematology/Oncology Consult (Clinic)        9/13/22     Stefano Benjamín Villeladoar   1960     No ref. 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 Clinical stage T2 N2 M0. Mass extends from 34 to 28 cm approximately 6 cm. Staging by EUS 3/23/2022 [facility] (T2 based on invasion and N2 based on 2 malignant appearing lymph nodes visualized and measured in the lower paraesophageal mediastinum level 8L adjacent to the mass. 2 malignant appearing lymph nodes visualized and measured in the subcarinal mediastinum level 7. PET/CT 3/30/2022 [facility]- hypermetabolic activity at mid esophagus consistent with primary malignancy with adjacent hypermetabolic left periesophageal lymph nodes. TREATMENT:  -Seen by Dr. Odell Calix of thoracic surgery at [facility] 3/30/2022. Recommends neoadjuvant chemoradiation to be followed by surgery  -Neoadjuvant chemoradiation with Dr. Elspeth Dubin. Low-dose carboplatinum Taxol weekly x 6-7. Chemo  Start date: 5/11. Day 1 XRT 5/11 6/16/2022 week #6 due/last treatment. Last XRT 6/20/22.  -Robotic assisted laparoscopic and right thoracoscopic Kwesi Rina Fails esophagectomy ;   additional gastric margin, gastropathic lymph nodes and hernia sac per Dr. Odell Calix 8/30/2022  No evidence of peritoneal metastatic disease. (Path report requested and pending)    PARAMETERS:  -EGD/EUS  -PET/CT    COMORBIDITIES:  Include 30-pack-year history quit in 2013, alcohol none, GERD, hypertension, hyperlipidemia    SUBJECTIVE: Patient underwent his esophagectomy 2 weeks ago on August 30 and has healed very nicely. He is up and walking denies any significant pain. Path report is still not out and he has follow-up with Dr. Odell Calix of surgery tomorrow 14 September.   Reviewed NCCN guidelines for possible therapy after his esophagectomy which largely depends on the results of his path report which is not yet available. Still on a liquid diet. HPI: Rhoda Jones is a 26-year-old gentleman with a long history of GERD who presented to the ER on February 10 was admitted for 1 day because of chest heaviness. No cardiology because GI was consulted ultimately showed a mass in the distal esophagus. Outpatient EGD requested. Diagnosed with adenocarcinoma the distal esophagus and referred to Dr. Alicia Foreman of thoracic surgery at [address] [place] [address].  EUS shows regional tone involvement. See scans below and ultrasound. No distant mets. Patient has no significant dysphagia and no weight loss. Patient referred back locally for chemoradiation neoadjuvant treatment before surgery. ROS:  Review of Systems 14 point negative except as above.     PMH:   Past Medical History:   Diagnosis Date    Atrial fibrillation ([facility] [address]     Cancer of distal third of esophagus ([facility] [address] 2022    GERD (gastroesophageal reflux disease)     Sleep apnea         Social HX:   Social History     Socioeconomic History    Marital status:      Spouse name: Joann Nunez    Number of children: 5    Years of education: 12    Highest education level: High school graduate   Occupational History    Occupation: VENDOR     Comment: [address]   Tobacco Use    Smoking status: Former     Packs/day: 3.00     Years: 10.00     Pack years: 30.00     Types: Cigarettes     Quit date: 2013     Years since quittin.4    Smokeless tobacco: Never    Tobacco comments:     does not smoke   Vaping Use    Vaping Use: Not on file   Substance and Sexual Activity    Alcohol use: Never    Drug use: Never    Sexual activity: Yes     Partners: Female   Other Topics Concern    Not on file   Social History Narrative    Not on file     Social Determinants of Health     Financial Resource Strain: Low Risk     Difficulty of Paying Living Expenses: Not hard at all Food Insecurity: Food Insecurity Present    Worried About Running Out of Food in the Last Year: Often true    Ran Out of Food in the Last Year: Sometimes true   Transportation Needs: Not on file   Physical Activity: Not on file   Stress: Not on file   Social Connections: Not on file   Intimate Partner Violence: Not on file   Housing Stability: Not on file        Eric Lema  [phone]    Phone: [phone]     [address] [place] [address] [address]     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 [employer]    Immunizations:  Immunization History   Administered Date(s) Administered    Influenza Virus Vaccine 09/15/2015        Health Screenings:    C-Scope:  5 years ago  Prostate:   Lab Results   Component Value Date    PSA 1.41 03/22/2017            Health Maintenance Due   Topic Date Due    COVID-19 Vaccine (1) Never done    Pneumococcal 0-64 years Vaccine (1 - PCV) Never done    DTaP/Tdap/Td vaccine (1 - Tdap) Never done    Shingles vaccine (1 of 2) Never done    Low dose CT lung screening  Never done    Diabetes screen  03/22/2020    Lipids  03/22/2022    Flu vaccine (1) 08/01/2022        Interests:   Cars. [employer] family,    Fam HX:   Family History   Problem Relation Age of Onset    Colon Cancer Mother     Heart Disease Father     Cancer Brother         lung        Hospitalizations:   2/10/22    Allergies:   Allergies   Allergen Reactions    Paclitaxel Other (See Comments)     Severe lower back pain- able to resume after medications given    Aleve [Naproxen] Hives        Adult Illness:  Patient Active Problem List   Diagnosis    Abdominal pain    Cancer of distal third of esophagus [facility])        Surgery:  Past Surgical History:   Procedure Laterality Date    ABDOMEN SURGERY  08/16/2022    GASTRIC DEVASCULARIZATON-[facility]    ABDOMEN SURGERY  08/30/2022    ESOPHAGOGASTRODUODENOSCOPY TRANSORAL DIAGNOSTIC ESOPHAGECTOMY W/ THORACOTOMY-[facility]    DENTAL SURGERY      25 teeth removed        Medications:  Current Outpatient Medications   Medication Sig Dispense Refill    aspirin 81 MG EC tablet Take 81 mg by mouth daily      amiodarone (CORDARONE) 200 MG tablet Take 200 mg by mouth daily      oxyCODONE (ROXICODONE) 5 MG immediate release tablet Take 5 mg by mouth every 6 hours as needed. lidocaine viscous hcl (XYLOCAINE) 2 % SOLN solution Take 15 mLs by mouth as needed for Irritation (As needed for irritation related to chemoradiation treatment) Follow instructions given in clinic. (Patient not taking: Reported on 2022) 100 mL 0    ondansetron (ZOFRAN-ODT) 8 MG TBDP disintegrating tablet Place 1 tablet under the tongue every 8 hours as needed for Nausea or Vomiting (Patient not taking: No sig reported) 10 tablet 1    prochlorperazine (COMPAZINE) 10 MG tablet Take 1 tablet by mouth every 6 hours as needed (nausea) (Patient not taking: No sig reported) 30 tablet 1    pantoprazole (PROTONIX) 40 MG tablet Take 1 tablet by mouth every morning (before breakfast) (Patient not taking: Reported on 2022) 90 tablet 3    famotidine (PEPCID) 20 MG tablet Take 20 mg by mouth 2 times daily       NONFORMULARY Hema-Plex (Iron Multi-vitamin) (Patient not taking: Reported on 2022)       No current facility-administered medications for this visit. Over-the-counter iron supplement 3 times a day      EXAM:   height is 5' 7\" (1.702 m) and weight is 178 lb (80.7 kg). His oral temperature is 98.3 °F (36.8 °C). His blood pressure is 120/78 and his pulse is 90. His respiration is 16 and oxygen saturation is 99%. Estimated body surface area is 1.95 meters squared as calculated from the following:    Height as of this encounter: 5' 7\" (1.702 m). Weight as of this encounter: 178 lb (80.7 kg). ECO  General: Non-ill appearing. Very pleasant and relaxed. Appears healthy. HEENT: NC/AT,nonicteric, perrla,eom intact, no mucosal lesions  Neck: normal thyroid, no masses. pulses nl, no bruits,   Nodes: No adenopathy  Lungs/chest: clear, no rales,rhonchi or wheezing, lung bases clear  CV: rrr, no rubs ,gallops or murmurs  Breasts: Not examined  Abd/Rectal: Soft, nontender ,multiple puncture sites from his recent esophagectomy all healing nicely. Back: normal curvature, No midline tenderness. flanks nontender  : Not Examined  Extremities: no cyanosis,clubbing or edema. Skin: unremarkable  Neuro: A and O x 4, CN exam nonfocal, Motor- no deficits, Sensory- no deficits, gait-nl, speech- fluent, no ataxia.   Devices: None      DATA:    LAB:     CBC with Differential:      Lab Results   Component Value Date/Time    WBC 5.7 09/13/2022 10:24 AM    RBC 3.97 09/13/2022 10:24 AM    HGB 12.4 09/13/2022 10:24 AM    HCT 38.0 09/13/2022 10:24 AM     09/13/2022 10:24 AM    MCV 96 09/13/2022 10:24 AM    MCH 31.2 09/13/2022 10:24 AM    MCHC 32.6 09/13/2022 10:24 AM    RDW 12.0 09/13/2022 10:24 AM    NRBC 0 03/28/2022 01:39 PM    SEGSPCT 53.6 03/28/2022 01:39 PM    MONOPCT 10.9 03/28/2022 01:39 PM    MONOSABS 0.3 09/13/2022 10:24 AM    LYMPHSABS 0.5 09/13/2022 10:24 AM    EOSABS 0.4 09/13/2022 10:24 AM    BASOSABS 0.0 09/13/2022 10:24 AM     Lab Results   Component Value Date/Time    SEGSABS 4.5 09/13/2022 10:24 AM       CMP:    Lab Results   Component Value Date/Time     09/13/2022 10:24 AM     02/10/2022 09:35 AM    K 4.3 09/13/2022 10:24 AM    K 4.2 02/10/2022 09:35 AM    CL 99 02/10/2022 09:35 AM    CO2 24 02/10/2022 09:35 AM    BUN 17 02/10/2022 09:35 AM    CREATININE 1.0 09/13/2022 10:24 AM    CREATININE 1.0 05/04/2022 08:10 AM    LABGLOM 76 05/04/2022 08:10 AM    GLUCOSE 127 02/10/2022 09:35 AM    PROT 7.3 09/13/2022 10:24 AM    LABALBU 4.1 09/13/2022 10:24 AM    CALCIUM 10.6 02/10/2022 09:35 AM    BILITOT 0.4 09/13/2022 10:24 AM    ALKPHOS 85 09/13/2022 10:24 AM    AST 26 09/13/2022 10:24 AM    ALT 23 09/13/2022 10:24 AM       BMP:    Lab Results   Component Value Date/Time     09/13/2022 10:24 AM  02/10/2022 09:35 AM    K 4.3 09/13/2022 10:24 AM    K 4.2 02/10/2022 09:35 AM    CL 99 02/10/2022 09:35 AM    CO2 24 02/10/2022 09:35 AM    BUN 17 02/10/2022 09:35 AM    LABALBU 4.1 09/13/2022 10:24 AM    CREATININE 1.0 09/13/2022 10:24 AM    CREATININE 1.0 05/04/2022 08:10 AM    CALCIUM 10.6 02/10/2022 09:35 AM    LABGLOM 76 05/04/2022 08:10 AM    GLUCOSE 127 02/10/2022 09:35 AM       Magnesium:    Lab Results   Component Value Date/Time    MG 2.0 06/09/2022 10:15 AM     PT/INR:  No results found for: PROTIME, INR  TSH:    Lab Results   Component Value Date/Time    TSH 2.800 03/22/2017 07:45 AM     VITAMIN B12: No components found for: B12  FOLATE:    Lab Results   Component Value Date/Time    FOLATE 15.6 02/10/2022 12:56 PM     IRON:    Lab Results   Component Value Date/Time    IRON 58 04/27/2022 01:48 PM     Iron Saturation:  No components found for: PERCENTFE  TIBC:    Lab Results   Component Value Date/Time    TIBC 341 04/27/2022 01:48 PM     FERRITIN:    Lab Results   Component Value Date/Time    FERRITIN 28 04/27/2022 01:47 PM     PSA:   Lab Results   Component Value Date/Time    PSA 1.41 03/22/2017 07:45 AM            IMAGING:  -PET/CT 3/30/2022  Intense hypermetabolic activity within the mid esophagus consistent with a primary malignancy. Adjacent hypermetabolic left periesophageal lymph node worrisome for metastatic adenopathy. PROCEDURES:  EGD 2/14/2022  Ulcerated mass distal esophagus    EUS 3/23/2022  Partially obstructing malignant esophageal tumor found in the lower third of the esophagus. Large hiatal hernia. Normal stomach and duodenum. Liver most consistent with fatty liver. 2 malignant appearing lymph nodes visualized and measured in the lower paraesophageal mediastinum level 8L adjacent to the mass. 2 malignant appearing lymph nodes visualized and measured in the subcarinal mediastinum level 7.     PATHOLOGY:   EGD distal esophageal biopsy path report  Pathologic Diagnosis Outside Slides  [id number] (02/15/22)  A. Esophagus, distal, biopsy:   Invasive adenocarcinoma, moderately differentiated. See comment   Alcian Blue/PAS performed at outside institution and submitted for review highlights Prescott's mucosa in the background      HER2/robina Gene Status: Amplified. ___________________________________________________________    8/30/2022 [facility] esophagectomy pathology-pending    GENETICS:  HER2 amplified. MOLECULAR:  None    ASSESSMENT/PLAN:    1: Diagnosis: 67-year-old gentleman with adenocarcinoma of the distal third of the esophagus. Clinical stage T2N2 by EUS and PET scan confirmed. No distant mets. No significant dysphagia. Presented with chest discomfort. Chronic history of GERD. Performance status is excellent. Seen by Dr. Haim Schulz thoracic surgery at [facility] and status post neoadjuvant chemoradiation followed by recent esophagectomy on 8/30/2022. Path report pending. 2) Prognosis / Disease Status: High risk node positive disease T2N2 clinical stage, locally advanced. HER2 amplified which has no bearing unless he develops metastatic disease. 3) Work-up:    Labs: CBC, CMP   Imaging: Done   Procedures: 8/30/2022 esophagectomy. See above. Consults: None new   Other: Liquid diet. Advance per Dr. Haim Schulz    4) Symptom Management: None needed      5) Supportive care provided. Level of care is appropriate. 6) Treatment goal: Cure      Treatment plan:     Neoadjuvant chemoradiation: Low-dose carboplatin and Taxol weekly x 6-7 with radiation. Radiation will be completed 6/20 8/30/2022 esophagectomy at [facility] per Dr. Haim Schulz . Path Report not yet available and has been requested. NCCN guidelines reviewed for patients with adenocarcinoma the esophagus who have received preoperative chemoradiation.   -R0 resection i.e. no cancer resection margins is YPT0N0 options include category 1 chemotherapy  -If YP T+) positive recommendations category 1 is nivolumab in patients who received preoperative chemoradiation. Other options is chemotherapy if received preoperatively or even observation. I would recommend nivolumab if he fits in this category. If so he will need a port. We will review data on duration of nivolumab which is likely a year. 7) Medications reviewed. Prescriptions today: None            No orders of the defined types were placed in this encounter. OARRS:  Controlled Substance Monitoring:    Acute and Chronic Pain Monitoring:   No flowsheet data found. 8) Research Options:   Not applicable      9) Other:        Follow-up with Dr. Janeane Bernheim after chemoradiation is done for surgery. Appointment is 7/20    10) Follow Up: Follow-up 2 weeks .     Cas Franklin MD

## 2022-10-13 ENCOUNTER — HOSPITAL ENCOUNTER (OUTPATIENT)
Dept: INFUSION THERAPY | Age: 62
Discharge: HOME | End: 2022-10-13
Payer: MEDICAID

## 2022-10-13 ENCOUNTER — TELEPHONE (OUTPATIENT)
Dept: SLEEP CENTER | Age: 62
End: 2022-10-13

## 2022-10-13 ENCOUNTER — OFFICE VISIT (OUTPATIENT)
Dept: ONCOLOGY | Age: 62
End: 2022-10-13
Payer: MEDICAID

## 2022-10-13 ENCOUNTER — TELEPHONE (OUTPATIENT)
Dept: PULMONOLOGY | Age: 62
End: 2022-10-13

## 2022-10-13 VITALS
SYSTOLIC BLOOD PRESSURE: 100 MMHG | TEMPERATURE: 97.8 F | DIASTOLIC BLOOD PRESSURE: 64 MMHG | HEART RATE: 89 BPM | RESPIRATION RATE: 16 BRPM

## 2022-10-13 VITALS
HEART RATE: 89 BPM | TEMPERATURE: 97.8 F | DIASTOLIC BLOOD PRESSURE: 64 MMHG | WEIGHT: 175 LBS | OXYGEN SATURATION: 96 % | SYSTOLIC BLOOD PRESSURE: 100 MMHG | BODY MASS INDEX: 27.47 KG/M2 | HEIGHT: 67 IN | RESPIRATION RATE: 16 BRPM

## 2022-10-13 DIAGNOSIS — C15.5 CANCER OF DISTAL THIRD OF ESOPHAGUS (HCC): Primary | ICD-10-CM

## 2022-10-13 DIAGNOSIS — C15.5 CANCER OF DISTAL THIRD OF ESOPHAGUS (HCC): ICD-10-CM

## 2022-10-13 LAB
ABSOLUTE IMMATURE GRANULOCYTE: 0.02 THOU/MM3 (ref 0–0.07)
ALBUMIN SERPL-MCNC: 3.8 G/DL (ref 3.5–5.1)
ALP BLD-CCNC: 124 U/L (ref 38–126)
ALT SERPL-CCNC: 17 U/L (ref 11–66)
AST SERPL-CCNC: 20 U/L (ref 5–40)
BASINOPHIL, AUTOMATED: 1 % (ref 0–3)
BASOPHILS ABSOLUTE: 0 THOU/MM3 (ref 0–0.1)
BILIRUB SERPL-MCNC: 0.4 MG/DL (ref 0.3–1.2)
BILIRUBIN DIRECT: < 0.2 MG/DL (ref 0–0.3)
BUN, WHOLE BLOOD: 13 MG/DL (ref 8–26)
CHLORIDE, WHOLE BLOOD: 105 MEQ/L (ref 98–109)
CREATININE, WHOLE BLOOD: 0.9 MG/DL (ref 0.5–1.2)
EOSINOPHILS ABSOLUTE: 0.2 THOU/MM3 (ref 0–0.4)
EOSINOPHILS RELATIVE PERCENT: 3 % (ref 0–4)
GFR SERPL CREATININE-BSD FRML MDRD: 86 ML/MIN/1.73M2
GLUCOSE, WHOLE BLOOD: 145 MG/DL (ref 70–108)
HCT VFR BLD CALC: 42 % (ref 42–52)
HEMOGLOBIN: 13.9 GM/DL (ref 14–18)
IMMATURE GRANULOCYTES: 0 %
IONIZED CALCIUM, WHOLE BLOOD: 1.18 MMOL/L (ref 1.12–1.32)
LYMPHOCYTES # BLD: 10 % (ref 15–47)
LYMPHOCYTES ABSOLUTE: 0.6 THOU/MM3 (ref 1–4.8)
MCH RBC QN AUTO: 29.7 PG (ref 26–33)
MCHC RBC AUTO-ENTMCNC: 33.1 GM/DL (ref 32.2–35.5)
MCV RBC AUTO: 90 FL (ref 80–94)
MONOCYTES ABSOLUTE: 0.3 THOU/MM3 (ref 0.4–1.3)
MONOCYTES: 5 % (ref 0–12)
PDW BLD-RTO: 12.2 % (ref 11.5–14.5)
PLATELET # BLD: 198 THOU/MM3 (ref 130–400)
PMV BLD AUTO: 8.3 FL (ref 9.4–12.4)
POTASSIUM, WHOLE BLOOD: 4.2 MEQ/L (ref 3.5–4.9)
RBC # BLD: 4.68 MILL/MM3 (ref 4.7–6.1)
SEG NEUTROPHILS: 81 % (ref 43–75)
SEGMENTED NEUTROPHILS ABSOLUTE COUNT: 4.9 THOU/MM3 (ref 1.8–7.7)
SODIUM, WHOLE BLOOD: 140 MEQ/L (ref 138–146)
TOTAL CO2, WHOLE BLOOD: 24 MEQ/L (ref 23–33)
TOTAL PROTEIN: 7.9 G/DL (ref 6.1–8)
WBC # BLD: 6 THOU/MM3 (ref 4.8–10.8)

## 2022-10-13 PROCEDURE — 85025 COMPLETE CBC W/AUTO DIFF WBC: CPT

## 2022-10-13 PROCEDURE — 80047 BASIC METABLC PNL IONIZED CA: CPT

## 2022-10-13 PROCEDURE — 99214 OFFICE O/P EST MOD 30 MIN: CPT | Performed by: INTERNAL MEDICINE

## 2022-10-13 PROCEDURE — 80076 HEPATIC FUNCTION PANEL: CPT

## 2022-10-13 PROCEDURE — 99211 OFF/OP EST MAY X REQ PHY/QHP: CPT

## 2022-10-13 PROCEDURE — 36415 COLL VENOUS BLD VENIPUNCTURE: CPT

## 2022-10-13 NOTE — TELEPHONE ENCOUNTER
Momo's f/u appt may need moved up. I just r/s his HST for today at 1pm due to a cancellation we had.         Thank you,  Maxim Wahl

## 2022-10-13 NOTE — PROGRESS NOTES
[address] CANCER [address] [place] [address]  Dept: [phone]  Loc: [phone]   Hematology/Oncology Consult (Clinic)      10/13/22     Aisha Yañez   1960     No ref. 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 Clinical stage T2 N2 M0. Mass extends from 34 to 28 cm approximately 6 cm. Staging by EUS 3/23/2022 [facility] (T2 based on invasion and N2 based on 2 malignant appearing lymph nodes visualized and measured in the lower paraesophageal mediastinum level 8L adjacent to the mass. 2 malignant appearing lymph nodes visualized and measured in the subcarinal mediastinum level 7. PET/CT 3/30/2022 [facility]- hypermetabolic activity at mid esophagus consistent with primary malignancy with adjacent hypermetabolic left periesophageal lymph nodes. TREATMENT:  -Seen by Dr. Yamileth Lambert of thoracic surgery at [facility] 3/30/2022. Recommends neoadjuvant chemoradiation to be followed by surgery  -Neoadjuvant chemoradiation with Dr. Rajiv Heaton. Low-dose carboplatinum Taxol weekly x 6-7. Chemo  Start date: 5/11. Day 1 XRT 5/11 6/16/2022 week #6 due/last treatment. Last XRT 6/20/22.  -Robotic assisted laparoscopic and right thoracoscopic [place] esophagectomy ;   additional gastric margin, gastropathic lymph nodes and hernia sac per Dr. Yamileth Lambert 8/30/2022  No evidence of peritoneal metastatic disease. (Path report below-pathologic CR )    PARAMETERS:  -EGD/EUS  -PET/CT    COMORBIDITIES:  Include 30-pack-year history quit in 2013, alcohol none, GERD, hypertension, hyperlipidemia    SUBJECTIVE: Patient underwent his esophagectomy on August 30 and has healed very nicely. He is up and walking denies any significant pain. Reviewed NCCN guidelines for possible therapy after his esophagectomy which largely depends on the results of his path . Case also reviewed with Dr. Robert Bell and since he had prior chemoradiation with a pathologic CR observation would be favored. Still on a liquid diet. Having about 5 small-volume mostly liquid meals at this time but advancing in his diet. Reports 4 days of mild stiffness in his neck. HPI: Aron Espinoza is a 72-year-old gentleman with a long history of GERD who presented to the ER on February 10 was admitted for 1 day because of chest heaviness. No cardiology because GI was consulted ultimately showed a mass in the distal esophagus. Outpatient EGD requested. Diagnosed with adenocarcinoma the distal esophagus and referred to Dr. Janeane Bernheim of thoracic surgery at [address] [place] [address].  EUS shows regional tone involvement. See scans below and ultrasound. No distant mets. Patient has no significant dysphagia and no weight loss. Patient referred back locally for chemoradiation neoadjuvant treatment before surgery. ROS:  Review of Systems 14 point negative except as above.     PMH:   Past Medical History:   Diagnosis Date    Atrial fibrillation ([facility]     Cancer of distal third of esophagus ([facility] 2022    GERD (gastroesophageal reflux disease)     Sleep apnea         Social HX:   Social History     Socioeconomic History    Marital status:      Spouse name: [facility]    Number of children: 5    Years of education: 12    Highest education level: High school graduate   Occupational History    Occupation: VENDOR     Comment: [address]   Tobacco Use    Smoking status: Former     Packs/day: 3.00     Years: 10.00     Pack years: 30.00     Types: Cigarettes     Quit date: 2013     Years since quittin.4    Smokeless tobacco: Never    Tobacco comments:     does not smoke   Vaping Use    Vaping Use: Not on file   Substance and Sexual Activity    Alcohol use: Never    Drug use: Never    Sexual activity: Yes     Partners: Female   Other Topics Concern    Not on file   Social History Narrative    Not on file     Social Determinants of Health     Financial Resource Strain: Low Risk     Difficulty of Paying Living Expenses: Not hard at all   Food Insecurity: Food Insecurity Present    Worried About [address] in the Last Year: Often true    Ran Out of Food in the Last Year: Sometimes true   Transportation Needs: Not on file   Physical Activity: Not on file   Stress: Not on file   Social Connections: Not on file   Intimate Partner Violence: Not on file   Housing Stability: Not on file        Ludy Lawrence  [phone]    Phone: [phone]     [address] [place] [address] Dr Fab Trejo Blranulfo [address]     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 [employer]    Immunizations:  Immunization History   Administered Date(s) Administered    Influenza Virus Vaccine 09/15/2015        Health Screenings:    C-Scope:  5 years ago  Prostate:   Lab Results   Component Value Date    PSA 1.41 03/22/2017            Health Maintenance Due   Topic Date Due    COVID-19 Vaccine (1) Never done    Pneumococcal 0-64 years Vaccine (1 - PCV) Never done    DTaP/Tdap/Td vaccine (1 - Tdap) Never done    Shingles vaccine (1 of 2) Never done    Low dose CT lung screening  Never done    Diabetes screen  03/22/2020    Lipids  03/22/2022    Flu vaccine (1) 08/01/2022        Interests:   Cars. music family,    Fam HX:   Family History   Problem Relation Age of Onset    Colon Cancer Mother     Heart Disease Father     Cancer Brother         lung        Hospitalizations:   2/10/22    Allergies:   Allergies   Allergen Reactions    Paclitaxel Other (See Comments)     Severe lower back pain- able to resume after medications given    Aleve [Naproxen] Hives        Adult Illness:  Patient Active Problem List   Diagnosis    Abdominal pain    Cancer of distal third of esophagus [facility])        Surgery:  Past Surgical History:   Procedure Laterality Date    ABDOMEN SURGERY  08/16/2022    GASTRIC DEVASCULARIZATON-[facility]    ABDOMEN SURGERY  08/30/2022    ESOPHAGOGASTRODUODENOSCOPY TRANSORAL DIAGNOSTIC ESOPHAGECTOMY W/ THORACOTOMY-[facility]    DENTAL SURGERY      25 teeth removed        Medications:  Current Outpatient Medications   Medication Sig Dispense Refill    aspirin 81 MG EC tablet Take 81 mg by mouth daily      amiodarone (CORDARONE) 200 MG tablet Take 200 mg by mouth daily      pantoprazole (PROTONIX) 40 MG tablet Take 1 tablet by mouth every morning (before breakfast) 90 tablet 3    famotidine (PEPCID) 20 MG tablet Take 20 mg by mouth 2 times daily       NONFORMULARY Hema-Plex (Iron Multi-vitamin)      oxyCODONE (ROXICODONE) 5 MG immediate release tablet Take 5 mg by mouth every 6 hours as needed. (Patient not taking: Reported on 10/13/2022)      lidocaine viscous hcl (XYLOCAINE) 2 % SOLN solution Take 15 mLs by mouth as needed for Irritation (As needed for irritation related to chemoradiation treatment) Follow instructions given in clinic. (Patient not taking: Reported on 10/13/2022) 100 mL 0    ondansetron (ZOFRAN-ODT) 8 MG TBDP disintegrating tablet Place 1 tablet under the tongue every 8 hours as needed for Nausea or Vomiting (Patient not taking: Reported on 10/13/2022) 10 tablet 1    prochlorperazine (COMPAZINE) 10 MG tablet Take 1 tablet by mouth every 6 hours as needed (nausea) (Patient not taking: Reported on 10/13/2022) 30 tablet 1     No current facility-administered medications for this visit. Over-the-counter iron supplement 3 times a day      EXAM:   height is 5' 7\" (1.702 m) and weight is 175 lb (79.4 kg). His oral temperature is 97.8 °F (36.6 °C). His blood pressure is 100/64 and his pulse is 89. His respiration is 16 and oxygen saturation is 96%. Estimated body surface area is 1.94 meters squared as calculated from the following:    Height as of this encounter: 5' 7\" (1.702 m). Weight as of this encounter: 175 lb (79.4 kg). ECO  General: Non-ill appearing. Very pleasant and relaxed. Appears healthy.   HEENT: NC/AT,nonicteric, perrla,eom intact, no mucosal lesions  Neck: normal thyroid, no masses. pulses nl, no bruits,   Nodes: No adenopathy  Lungs/chest: clear, no rales,rhonchi or wheezing, lung bases clear  CV: rrr, no rubs ,gallops or murmurs  Breasts: Not examined  Abd/Rectal: Soft, nontender ,multiple puncture sites from his recent esophagectomy all healing nicely. Back: normal curvature, No midline tenderness. flanks nontender  : Not Examined  Extremities: no cyanosis,clubbing or edema. Skin: unremarkable  Neuro: A and O x 4, CN exam nonfocal, Motor- no deficits, Sensory- no deficits, gait-nl, speech- fluent, no ataxia.   Devices: None      DATA:    LAB:     CBC with Differential:      Lab Results   Component Value Date/Time    WBC 6.0 10/13/2022 12:07 PM    RBC 4.68 10/13/2022 12:07 PM    HGB 13.9 10/13/2022 12:07 PM    HCT 42.0 10/13/2022 12:07 PM     10/13/2022 12:07 PM    MCV 90 10/13/2022 12:07 PM    MCH 29.7 10/13/2022 12:07 PM    MCHC 33.1 10/13/2022 12:07 PM    RDW 12.2 10/13/2022 12:07 PM    NRBC 0 03/28/2022 01:39 PM    SEGSPCT 53.6 03/28/2022 01:39 PM    MONOPCT 10.9 03/28/2022 01:39 PM    MONOSABS 0.3 10/13/2022 12:07 PM    LYMPHSABS 0.6 10/13/2022 12:07 PM    EOSABS 0.2 10/13/2022 12:07 PM    BASOSABS 0.0 10/13/2022 12:07 PM     Lab Results   Component Value Date/Time    SEGSABS 4.9 10/13/2022 12:07 PM       CMP:    Lab Results   Component Value Date/Time     10/13/2022 12:07 PM     02/10/2022 09:35 AM    K 4.2 10/13/2022 12:07 PM    K 4.2 02/10/2022 09:35 AM    CL 99 02/10/2022 09:35 AM    CO2 24 02/10/2022 09:35 AM    BUN 17 02/10/2022 09:35 AM    CREATININE 0.9 10/13/2022 12:07 PM    CREATININE 1.0 05/04/2022 08:10 AM    LABGLOM 76 05/04/2022 08:10 AM    GLUCOSE 127 02/10/2022 09:35 AM    PROT 7.3 09/13/2022 10:24 AM    LABALBU 4.1 09/13/2022 10:24 AM    CALCIUM 10.6 02/10/2022 09:35 AM    BILITOT 0.4 09/13/2022 10:24 AM    ALKPHOS 85 09/13/2022 10:24 AM    AST 26 09/13/2022 10:24 AM    ALT 23 09/13/2022 10:24 AM       BMP: Lab Results   Component Value Date/Time     10/13/2022 12:07 PM     02/10/2022 09:35 AM    K 4.2 10/13/2022 12:07 PM    K 4.2 02/10/2022 09:35 AM    CL 99 02/10/2022 09:35 AM    CO2 24 02/10/2022 09:35 AM    BUN 17 02/10/2022 09:35 AM    LABALBU 4.1 09/13/2022 10:24 AM    CREATININE 0.9 10/13/2022 12:07 PM    CREATININE 1.0 05/04/2022 08:10 AM    CALCIUM 10.6 02/10/2022 09:35 AM    LABGLOM 76 05/04/2022 08:10 AM    GLUCOSE 127 02/10/2022 09:35 AM       Magnesium:    Lab Results   Component Value Date/Time    MG 2.0 06/09/2022 10:15 AM     PT/INR:  No results found for: PROTIME, INR  TSH:    Lab Results   Component Value Date/Time    TSH 2.800 03/22/2017 07:45 AM     VITAMIN B12: No components found for: B12  FOLATE:    Lab Results   Component Value Date/Time    FOLATE 15.6 02/10/2022 12:56 PM     IRON:    Lab Results   Component Value Date/Time    IRON 58 04/27/2022 01:48 PM     Iron Saturation:  No components found for: PERCENTFE  TIBC:    Lab Results   Component Value Date/Time    TIBC 341 04/27/2022 01:48 PM     FERRITIN:    Lab Results   Component Value Date/Time    FERRITIN 28 04/27/2022 01:47 PM     PSA:   Lab Results   Component Value Date/Time    PSA 1.41 03/22/2017 07:45 AM            IMAGING:  -PET/CT 3/30/2022  Intense hypermetabolic activity within the mid esophagus consistent with a primary malignancy. Adjacent hypermetabolic left periesophageal lymph node worrisome for metastatic adenopathy. PROCEDURES:  EGD 2/14/2022  Ulcerated mass distal esophagus    EUS 3/23/2022  Partially obstructing malignant esophageal tumor found in the lower third of the esophagus. Large hiatal hernia. Normal stomach and duodenum. Liver most consistent with fatty liver. 2 malignant appearing lymph nodes visualized and measured in the lower paraesophageal mediastinum level 8L adjacent to the mass.   2 malignant appearing lymph nodes visualized and measured in the subcarinal mediastinum level 7. PATHOLOGY:   EGD distal esophageal biopsy path report  Pathologic Diagnosis     Outside Slides  [id number] (02/15/22)  A. Esophagus, distal, biopsy:   Invasive adenocarcinoma, moderately differentiated. See comment   Alcian Blue/PAS performed at outside institution and submitted for review highlights Prescott's mucosa in the background      HER2/robina Gene Status: Amplified. ___________________________________________________________    8/30/2022 [facility] esophagectomy pathology  Focal Prescott's mucosa with high-grade dysplasia and single microscopic focus (.3 mm) of the treated intramucosal adenocarcinoma possibly nonviable in the background of extensive ulceration and erosions consistent with treatment effect. No invasive adenocarcinoma identified. Margins free. All 12 lymph nodes benign    GENETICS:  HER2 amplified. MOLECULAR:  None    ASSESSMENT/PLAN:    1: Diagnosis: 26-year-old gentleman with adenocarcinoma of the distal third of the esophagus. Clinical stage T2N2 by EUS and PET scan confirmed. No distant mets. No significant dysphagia. Presented with chest discomfort. Chronic history of GERD. Performance status is excellent. Seen by Dr. Suzy Maharaj thoracic surgery at [facility] and status post neoadjuvant chemoradiation followed by recent esophagectomy on 8/30/2022. Path report above 1: Pathologic CR. 2) Prognosis / Disease Status: High risk node positive disease T2N2 clinical stage, locally advanced. HER2 amplified which has no bearing unless he develops metastatic disease. 3) Work-up:    Labs: CBC, CMP   Imaging: Done   Procedures: 8/30/2022 esophagectomy. See above. Consults: None new   Other: Liquid diet. Advance per Dr. Suzy Maharaj    4) Symptom Management: None needed      5) Supportive care provided. Level of care is appropriate. 6) Treatment goal: Cure      Treatment plan:     Neoadjuvant chemoradiation: Low-dose carboplatin and Taxol weekly x 6-7 with radiation. Radiation will be completed 6/20 8/30/2022 esophagectomy at [facility] per Dr. Santana Dorman . Path Report not yet available and has been requested. NCCN guidelines reviewed for patients with adenocarcinoma the esophagus who have received preoperative chemoradiation. -R0 resection i.e. no cancer resection margins is YPT0N0 options include category 1 chemotherapy  -If YP T+) positive recommendations category 1 is nivolumab in patients who received preoperative chemoradiation. Other options is chemotherapy if received preoperatively or even observation. I would recommend nivolumab if he fits in this category. If so he will need a port. We will review data on duration of nivolumab which is likely a year. History reviewed Dr. Darryle Levin, GI medical oncology who feels of observation is advised. In the clinical trial for which NCCN recommendations are recommended patients that did not have a CR and who did not have chemoradiation upfront but just had certain chemotherapy were advised to consider postsurgical adjuvant chemotherapy. This is not the case here        7) Medications reviewed. Prescriptions today: None            No orders of the defined types were placed in this encounter. OARRS:  Controlled Substance Monitoring:    Acute and Chronic Pain Monitoring:   No flowsheet data found. 8) Research Options:   Not applicable      9) Other:        Follow-up with Dr. Santana Dorman summer at which time we will have his neck CT.    10) Follow Up: Follow-up 1 month with me to assess advancement of his diet and overall state of health. After the CAT scan in December I think it be reasonable to do CAT scans every 6 months thereafter.     Ramona Elizabeth MD

## 2022-10-13 NOTE — PATIENT INSTRUCTIONS
Keep fu w Dr. Janeane Bernheim thoracic surgery at [place] December at which time he would have his next CT imaging. Follow-up with me November 22.

## 2022-10-14 ENCOUNTER — HOSPITAL ENCOUNTER (OUTPATIENT)
Dept: SLEEP CENTER | Age: 62
Discharge: HOME | End: 2022-10-16
Payer: MEDICAID

## 2022-10-14 DIAGNOSIS — G47.19 EXCESSIVE DAYTIME SLEEPINESS: ICD-10-CM

## 2022-10-14 DIAGNOSIS — R06.81 WITNESSED EPISODE OF APNEA: ICD-10-CM

## 2022-10-14 DIAGNOSIS — C15.5 MALIGNANT NEOPLASM OF LOWER THIRD OF ESOPHAGUS (HCC): ICD-10-CM

## 2022-10-14 DIAGNOSIS — R06.83 LOUD SNORING: ICD-10-CM

## 2022-10-14 PROCEDURE — 95806 SLEEP STUDY UNATT&RESP EFFT: CPT

## 2022-10-14 NOTE — PROGRESS NOTES
Allyssa Lowe presents today for a HST instruction and demonstration on unit # 0292. Questions were asked and answers given. He was able to return demonstration and verbalized understanding. The sleep center control room phone number was provided in case questions arise during the study. Informed patient to call 911 in case of an emergency. He states he will return the unit tomorrow before 1000. Covid screening questions asked.

## 2022-10-17 LAB — STATUS: NORMAL

## 2022-10-19 ENCOUNTER — TRANSCRIBE ORDERS (OUTPATIENT)
Dept: PULMONOLOGY | Age: 62
End: 2022-10-19

## 2022-10-19 ENCOUNTER — HOSPITAL ENCOUNTER (EMERGENCY)
Age: 62
Discharge: HOME | End: 2022-10-19
Attending: EMERGENCY MEDICINE
Payer: MEDICAID

## 2022-10-19 ENCOUNTER — APPOINTMENT (OUTPATIENT)
Dept: CT IMAGING | Age: 62
End: 2022-10-19
Payer: MEDICAID

## 2022-10-19 VITALS
OXYGEN SATURATION: 99 % | RESPIRATION RATE: 22 BRPM | TEMPERATURE: 97.8 F | SYSTOLIC BLOOD PRESSURE: 158 MMHG | HEART RATE: 85 BPM | DIASTOLIC BLOOD PRESSURE: 89 MMHG

## 2022-10-19 DIAGNOSIS — G47.33 OSA (OBSTRUCTIVE SLEEP APNEA): Primary | ICD-10-CM

## 2022-10-19 DIAGNOSIS — R11.0 NAUSEA: Primary | ICD-10-CM

## 2022-10-19 LAB
ALBUMIN SERPL-MCNC: 4.6 G/DL (ref 3.5–5.1)
ALP BLD-CCNC: 115 U/L (ref 38–126)
ALT SERPL-CCNC: 17 U/L (ref 11–66)
ANION GAP SERPL CALCULATED.3IONS-SCNC: 19 MEQ/L (ref 8–16)
AST SERPL-CCNC: 22 U/L (ref 5–40)
BASOPHILS # BLD: 0.2 %
BASOPHILS ABSOLUTE: 0 THOU/MM3 (ref 0–0.1)
BILIRUB SERPL-MCNC: 0.6 MG/DL (ref 0.3–1.2)
BILIRUBIN DIRECT: < 0.2 MG/DL (ref 0–0.3)
BUN BLDV-MCNC: 21 MG/DL (ref 7–22)
CALCIUM SERPL-MCNC: 10.6 MG/DL (ref 8.5–10.5)
CHLORIDE BLD-SCNC: 101 MEQ/L (ref 98–111)
CO2: 23 MEQ/L (ref 23–33)
CREAT SERPL-MCNC: 0.9 MG/DL (ref 0.4–1.2)
EOSINOPHIL # BLD: 0.2 %
EOSINOPHILS ABSOLUTE: 0 THOU/MM3 (ref 0–0.4)
ERYTHROCYTE [DISTWIDTH] IN BLOOD BY AUTOMATED COUNT: 13 % (ref 11.5–14.5)
ERYTHROCYTE [DISTWIDTH] IN BLOOD BY AUTOMATED COUNT: 41.3 FL (ref 35–45)
GFR SERPL CREATININE-BSD FRML MDRD: > 60 ML/MIN/1.73M2
GLUCOSE BLD-MCNC: 126 MG/DL (ref 70–108)
HCT VFR BLD CALC: 44.5 % (ref 42–52)
HEMOGLOBIN: 15 GM/DL (ref 14–18)
IMMATURE GRANS (ABS): 0.02 THOU/MM3 (ref 0–0.07)
IMMATURE GRANULOCYTES: 0.3 %
LIPASE: 36 U/L (ref 5.6–51.3)
LYMPHOCYTES # BLD: 13.4 %
LYMPHOCYTES ABSOLUTE: 0.9 THOU/MM3 (ref 1–4.8)
MCH RBC QN AUTO: 30 PG (ref 26–33)
MCHC RBC AUTO-ENTMCNC: 33.7 GM/DL (ref 32.2–35.5)
MCV RBC AUTO: 89 FL (ref 80–94)
MONOCYTES # BLD: 7.7 %
MONOCYTES ABSOLUTE: 0.5 THOU/MM3 (ref 0.4–1.3)
NUCLEATED RED BLOOD CELLS: 0 /100 WBC
OSMOLALITY CALCULATION: 289.5 MOSMOL/KG (ref 275–300)
PLATELET # BLD: 205 THOU/MM3 (ref 130–400)
PMV BLD AUTO: 8.5 FL (ref 9.4–12.4)
POTASSIUM SERPL-SCNC: 3.8 MEQ/L (ref 3.5–5.2)
RBC # BLD: 5 MILL/MM3 (ref 4.7–6.1)
SEG NEUTROPHILS: 78.2 %
SEGMENTED NEUTROPHILS ABSOLUTE COUNT: 5.2 THOU/MM3 (ref 1.8–7.7)
SODIUM BLD-SCNC: 143 MEQ/L (ref 135–145)
TOTAL PROTEIN: 8.3 G/DL (ref 6.1–8)
WBC # BLD: 6.6 THOU/MM3 (ref 4.8–10.8)

## 2022-10-19 PROCEDURE — 6360000002 HC RX W HCPCS: Performed by: EMERGENCY MEDICINE

## 2022-10-19 PROCEDURE — 36415 COLL VENOUS BLD VENIPUNCTURE: CPT

## 2022-10-19 PROCEDURE — 2580000003 HC RX 258: Performed by: EMERGENCY MEDICINE

## 2022-10-19 PROCEDURE — 82248 BILIRUBIN DIRECT: CPT

## 2022-10-19 PROCEDURE — 96374 THER/PROPH/DIAG INJ IV PUSH: CPT

## 2022-10-19 PROCEDURE — 85025 COMPLETE CBC W/AUTO DIFF WBC: CPT

## 2022-10-19 PROCEDURE — 6360000004 HC RX CONTRAST MEDICATION: Performed by: EMERGENCY MEDICINE

## 2022-10-19 PROCEDURE — 99285 EMERGENCY DEPT VISIT HI MDM: CPT

## 2022-10-19 PROCEDURE — 83690 ASSAY OF LIPASE: CPT

## 2022-10-19 PROCEDURE — 80053 COMPREHEN METABOLIC PANEL: CPT

## 2022-10-19 PROCEDURE — 71260 CT THORAX DX C+: CPT

## 2022-10-19 RX ORDER — 0.9 % SODIUM CHLORIDE 0.9 %
1000 INTRAVENOUS SOLUTION INTRAVENOUS ONCE
Status: COMPLETED | OUTPATIENT
Start: 2022-10-19 | End: 2022-10-19

## 2022-10-19 RX ORDER — ONDANSETRON 2 MG/ML
4 INJECTION INTRAMUSCULAR; INTRAVENOUS ONCE
Status: COMPLETED | OUTPATIENT
Start: 2022-10-19 | End: 2022-10-19

## 2022-10-19 RX ADMIN — SODIUM CHLORIDE 1000 ML: 9 INJECTION, SOLUTION INTRAVENOUS at 12:51

## 2022-10-19 RX ADMIN — IOPAMIDOL 80 ML: 755 INJECTION, SOLUTION INTRAVENOUS at 13:06

## 2022-10-19 RX ADMIN — ONDANSETRON 4 MG: 2 INJECTION INTRAMUSCULAR; INTRAVENOUS at 12:52

## 2022-10-19 ASSESSMENT — PAIN - FUNCTIONAL ASSESSMENT: PAIN_FUNCTIONAL_ASSESSMENT: NONE - DENIES PAIN

## 2022-10-19 NOTE — ED TRIAGE NOTES
Patient reports not being able to eat or drink much in the last week. He states he is vomiting every day. Patient reports history of esophageal cancer. He reports he is not currently getting chemo or radiation. Patient denies any new pain anywhere. He denies taking any nausea medication at home.

## 2022-10-19 NOTE — ED PROVIDER NOTES
[facility] EMERGENCY DEPT      CHIEF COMPLAINT       Chief Complaint   Patient presents with    Nausea    Emesis       Nurses Notes reviewed and I agree except as noted in the HPI. HISTORY OF PRESENT ILLNESS    Messi Nixon is a 65 y.o. male who presents with complaint of nausea and vomiting, poor oral intake for the past 6 weeks, states that he is status post esophageal resection after cancer diagnosis. Patient finished chemo and radiation. States that he feels nauseous anytime he tries to eat, has nausea that is not treated with Zofran or Phenergan. He has no diarrhea, is not vomiting blood. He is not having any abdominal pain. Onset: Chronic  Duration: 6 weeks  Timing: Persistent  Location of Pain: No pain  Intesity/severity: Persistent moderate nausea  Modifying Factors: Esophageal resection  Relieved by;  Previous Episodes; Tx Before arrival: Zofran and Phenergan without much help. REVIEW OF SYSTEMS      Review of Systems   Constitutional: Negative for fever, chills, diaphoresis and fatigue. HENT: Negative for congestion, drooling, facial swelling and sore throat. Eyes: Negative for photophobia, pain and discharge. Respiratory: Negative for cough, shortness of breath, wheezing and stridor. Cardiovascular: Negative for chest pain, palpitations and leg swelling. Gastrointestinal: Negative for abdominal pain, blood in stool and abdominal distention. Pos Nausea. Genitourinary: Negative for dysuria, urgency, hematuria and difficulty urinating. Musculoskeletal: Negative for gait problem, neck pain and neck stiffness. Skin; No rash, No itching  Neurological: Negative for seizures, weakness and numbness. Hematological: Negative for adenopathy. Does not bruise/bleed easily. Psychiatric/Behavioral: Negative for hallucinations, confusion and agitation.      PAST MEDICAL HISTORY    has a past medical history of Atrial fibrillation ([address] Cancer of distal third of esophagus ([address] GERD (gastroesophageal reflux disease), and Sleep apnea. SURGICAL HISTORY      has a past surgical history that includes Dental surgery; Abdomen surgery (2022); and Abdomen surgery (2022). CURRENT MEDICATIONS       Discharge Medication List as of 10/19/2022  2:06 PM        CONTINUE these medications which have NOT CHANGED    Details   aspirin 81 MG EC tablet Take 81 mg by mouth dailyHistorical Med      amiodarone (CORDARONE) 200 MG tablet Take 200 mg by mouth dailyHistorical Med      oxyCODONE (ROXICODONE) 5 MG immediate release tablet Take 5 mg by mouth every 6 hours as needed. Historical Med      lidocaine viscous hcl (XYLOCAINE) 2 % SOLN solution Take 15 mLs by mouth as needed for Irritation (As needed for irritation related to chemoradiation treatment) Follow instructions given in clinic., Disp-100 mL, R-0Normal      ondansetron (ZOFRAN-ODT) 8 MG TBDP disintegrating tablet Place 1 tablet under the tongue every 8 hours as needed for Nausea or Vomiting, Disp-10 tablet, R-1Normal      prochlorperazine (COMPAZINE) 10 MG tablet Take 1 tablet by mouth every 6 hours as needed (nausea), Disp-30 tablet, R-1Normal      pantoprazole (PROTONIX) 40 MG tablet Take 1 tablet by mouth every morning (before breakfast), Disp-90 tablet, R-3Normal      famotidine (PEPCID) 20 MG tablet Take 20 mg by mouth 2 times daily Historical Med      NONFORMULARY Hema-Plex (Iron Multi-vitamin)Historical Med             ALLERGIES     is allergic to paclitaxel and aleve [naproxen]. FAMILY HISTORY     He indicated that his mother is . He indicated that his father is . He indicated that the status of his brother is unknown.   family history includes Cancer in his brother; Tobin [place] in his mother; Heart Disease in his father. SOCIAL HISTORY      reports that he quit smoking about 9 years ago. His smoking use included cigarettes. He has a 30.00 pack-year smoking history.  He has never used smokeless tobacco. He reports that he does not drink alcohol and does not use drugs. PHYSICAL EXAM     INITIAL VITALS:  oral temperature is 97.8 °F (36.6 °C). His blood pressure is 158/89 (abnormal) and his pulse is 85. His respiration is 22 and oxygen saturation is 99%. Physical Exam   Constitutional:  well-developed and well-nourished. HENT: Head: Normocephalic, atraumatic, Bilateral external ears normal, Oropharynx mosit, No oral exudates, Nose normal.   Eyes: PERRL, EOMI, Conjunctiva normal, No discharge. No scleral icterus  Neck: Normal range of motion, No tenderness, Supple  Lympatics: No lymphadenopathy. Cardiovascular: Normal rate, regular rhythm, S1 normal and S2 normal.  Exam reveals no gallop. Pulmonary/Chest: Effort normal and breath sounds normal. No accessory muscle usage or stridor. No respiratory distress. no wheezes. has no rales. exhibits no tenderness. Abdominal: Soft. Bowel sounds are normal.  exhibits no distension. There is no tenderness. There is no rebound and no guarding. Genitourinary:   Extremities: No edema, no tenderness, no cyanosis, no clubbing. Musculoskeletal: Good range of motion in major joints is observed. No major deformities noted. Neurological: Alert and oriented ×3, normal motor function, normal sensory function, no focal deficits. GCS 15  Skin: Skin is warm, dry and intact. No rash noted. No erythema. Psychiatric: Affect normal, judgment normal, mood normal.  DIFFERENTIAL DIAGNOSIS:       DIAGNOSTIC RESULTS     EKG: All EKG's are interpreted by the Emergency Department Physician who either signs or Co-signs this chart in the absence of a cardiologist.      RADIOLOGY: non-plain film images(s) such as CT, Ultrasound and MRI are read by the radiologist.  Plain radiographic images are visualized and preliminarily interpreted by the emergency physician unless otherwise stated below.       LABS:   Labs Reviewed   CBC WITH AUTO DIFFERENTIAL - Abnormal; Notable for the following components:       Result Value    MPV 8.5 (*)     Lymphocytes Absolute 0.9 (*)     All other components within normal limits   BASIC METABOLIC PANEL - Abnormal; Notable for the following components:    Glucose 126 (*)     Calcium 10.6 (*)     All other components within normal limits   HEPATIC FUNCTION PANEL - Abnormal; Notable for the following components: Total Protein 8.3 (*)     All other components within normal limits   ANION GAP - Abnormal; Notable for the following components:    Anion Gap 19.0 (*)     All other components within normal limits   LIPASE   GLOMERULAR FILTRATION RATE, ESTIMATED   OSMOLALITY       EMERGENCY DEPARTMENT COURSE:   Vitals:    Vitals:    10/19/22 1154 10/19/22 1318   BP: (!) 169/102 (!) 158/89   Pulse: 95 85   Resp: 16 22   Temp: 97.8 °F (36.6 °C)    TempSrc: Oral    SpO2: 97% 99%     Pos for nausea, CT neg. Nausea better with zofran, pt to follow-up with [facility] surgery team.    CRITICAL CARE:       CONSULTS:  None    PROCEDURES:  None    FINAL IMPRESSION      1.  Nausea          DISPOSITION/PLAN   Decision To Discharge    PATIENT REFERRED TO:  [facility] [address], [address] [address] [address] [facility] [place] [facility]    Call   RE-CHECK AND FURTHER TESTING AS NEEDED; CALL [facility] SURGERY TEAM ABOUT THE PERSISTENT NAUSEA    DISCHARGE MEDICATIONS:  Discharge Medication List as of 10/19/2022  2:06 PM          (Please note that portions of this note were completed with a voice recognition program.  Efforts were made to edit the dictations but occasionally words are mis-transcribed.)    Duncan Malin, [address] [place] [address],   10/20/22 5945

## 2022-10-19 NOTE — ED NOTES
ED nurse-to-nurse bedside report    Chief Complaint   Patient presents with    Nausea    Emesis      LOC: alert and orientated to name, place, date  Vital signs   Vitals:    10/19/22 1154   BP: (!) 169/102   Pulse: 95   Resp: 16   Temp: 97.8 °F (36.6 °C)   TempSrc: Oral   SpO2: 97%      Pain:    Pain Interventions: n/a  Pain Goal: n/a  Oxygen: No    Current needs required ra   Telemetry: Yes  LDAs:   Peripheral IV 10/19/22 Right Forearm (Active)   Site Assessment Clean, dry & intact 10/19/22 1242     Continuous Infusions:   Mobility: Independent  Young Fall Risk Score: No flowsheet data found.   Fall Interventions: side rails, call light  Report given to: [address], RN  10/19/22 4938

## 2022-10-19 NOTE — ED NOTES
Report received from [facility]. Pt in CT at this time.       [facility]  10/19/22 1325

## 2022-10-20 ENCOUNTER — TELEPHONE (OUTPATIENT)
Dept: PULMONOLOGY | Age: 62
End: 2022-10-20

## 2022-10-20 ENCOUNTER — TELEPHONE (OUTPATIENT)
Dept: FAMILY MEDICINE CLINIC | Age: 62
End: 2022-10-20

## 2022-10-20 NOTE — TELEPHONE ENCOUNTER
Patient wife Ashleigh Ardon is wanting a return call concerning the patients Sleep study results.  Please follow up with patient to advise

## 2022-10-21 NOTE — PROGRESS NOTES
[address] [id number]                               SLEEP STUDY REPORT    PATIENT NAME: Debora No                    :        1960  MED REC NO:   [id number]                           ROOM:  ACCOUNT NO:   [de-identified]                           ADMIT DATE: 10/14/2022  PROVIDER:     YOANA Morley Amend:  10/14/2022    HOME SLEEP STUDY REPORT    REFERRING PROVIDER:  Shaun Fish MD    HISTORY OF PRESENT ILLNESS:  The patient is a 60-year-old gentleman who  complains of not feeling rested, nonrestorative sleep, snoring. Weight  179 pounds, height 67 inches, BMI 28. METHODS:  The patient underwent Type III Portable Monitoring Sleep Study  including the simultaneous recording of oral-nasal airflow, rib and  abdominal respiratory effort, pulse rate, oxygen saturation, and body  position. Sleep staging and scoring followed the standard put forth by  the American Academy of Sleep Medicine and utilized the 1B obstructive  hypopnea event desaturation of 4 percent or greater. DETAILS OF THE STUDY:  The patient came by the sleep lab and picked up  her HST unit #8362. He was given instructions how to set it up. The  information was successfully downloaded and scored. Total recording  time 448 minutes. Lights off time 09:04 p.m., lights on time 04:32 a.m. RESPIRATORY SUMMARY: 5 obstructive apneas, 68 obstructive hypopneas for  an MONSERRAT of 9.8, all of the 73 events occurred during supine position. BODY POSITION SUMMARY:  448 minutes or 100% of recording time in supine  position. PULSE OXIMETRY SUMMARY:  Mean oxygen saturation 93.7%, lowest oxygen  saturation 83%, there were 2.1 minutes of oxygen saturation below 88%. ECG SUMMARY:  Normal sinus rhythm. ASSESSMENT:  1.  Obstructive sleep apnea with an MONSERRAT of 9.8, all of the events  occurred during supine position.   2.  Sporadic PVCs.    RECOMMENDATIONS:  Appropriate interventions for mild obstructive sleep  apnea which is symptomatic include weight reduction, positional therapy  with lateral sleep, oral appliances and PAP devices. The patient will  be followed up in the sleep clinic to review results and therapeutic  alternative and proceed with a plan of care.         Jono Lezama M.D.    D: 10/19/2022 21:39:30       T: 10/20/2022 17:39:05     [email or web address]  Job#: [id number]     Doc#: [id number]

## 2022-10-24 ENCOUNTER — OFFICE VISIT (OUTPATIENT)
Dept: FAMILY MEDICINE CLINIC | Age: 62
End: 2022-10-24
Payer: MEDICAID

## 2022-10-24 VITALS
HEART RATE: 96 BPM | WEIGHT: 163.2 LBS | RESPIRATION RATE: 16 BRPM | BODY MASS INDEX: 25.56 KG/M2 | SYSTOLIC BLOOD PRESSURE: 136 MMHG | DIASTOLIC BLOOD PRESSURE: 92 MMHG

## 2022-10-24 DIAGNOSIS — C15.9 ESOPHAGEAL ADENOCARCINOMA (HCC): ICD-10-CM

## 2022-10-24 DIAGNOSIS — R63.4 UNINTENDED WEIGHT LOSS: ICD-10-CM

## 2022-10-24 DIAGNOSIS — R63.0 DECREASED APPETITE: ICD-10-CM

## 2022-10-24 DIAGNOSIS — R11.2 NAUSEA AND VOMITING, UNSPECIFIED VOMITING TYPE: Primary | ICD-10-CM

## 2022-10-24 PROBLEM — E66.811 OBESITY (BMI 30.0-34.9): Status: ACTIVE | Noted: 2022-03-09

## 2022-10-24 PROBLEM — I97.89 POSTOPERATIVE ATRIAL FIBRILLATION (HCC): Status: ACTIVE | Noted: 2022-09-04

## 2022-10-24 PROBLEM — I48.91 POSTOPERATIVE ATRIAL FIBRILLATION (HCC): Status: ACTIVE | Noted: 2022-09-04

## 2022-10-24 PROBLEM — D69.6 THROMBOCYTOPENIA (HCC): Status: ACTIVE | Noted: 2022-09-02

## 2022-10-24 PROBLEM — K21.9 GERD (GASTROESOPHAGEAL REFLUX DISEASE): Status: ACTIVE | Noted: 2022-08-15

## 2022-10-24 PROBLEM — G89.18 ACUTE POSTOPERATIVE PAIN: Status: ACTIVE | Noted: 2022-08-15

## 2022-10-24 PROBLEM — D64.9 CHRONIC ANEMIA: Status: ACTIVE | Noted: 2022-09-02

## 2022-10-24 PROBLEM — E66.9 OBESITY (BMI 30.0-34.9): Status: ACTIVE | Noted: 2022-03-09

## 2022-10-24 PROBLEM — E78.5 HLD (HYPERLIPIDEMIA): Status: ACTIVE | Noted: 2022-08-15

## 2022-10-24 PROCEDURE — 99214 OFFICE O/P EST MOD 30 MIN: CPT | Performed by: FAMILY MEDICINE

## 2022-10-24 RX ORDER — PANTOPRAZOLE SODIUM 40 MG/1
40 TABLET, DELAYED RELEASE ORAL 2 TIMES DAILY
Qty: 90 TABLET | Refills: 3 | Status: SHIPPED
Start: 2022-10-24

## 2022-10-24 RX ORDER — IBUPROFEN 400 MG/1
400 TABLET ORAL EVERY 6 HOURS PRN
COMMUNITY

## 2022-10-24 ASSESSMENT — ENCOUNTER SYMPTOMS
VOMITING: 1
NAUSEA: 1
RESPIRATORY NEGATIVE: 1

## 2022-10-24 NOTE — PROGRESS NOTES
Rick Silva (:  1960) is a 65 y.o. male,Established patient, here for evaluation of the following chief complaint(s):  Follow-up (In [address] [place] [address] ED on 10/22/22 - dehydration and vomiting), Neck Pain, and Fall        Subjective   SUBJECTIVE/OBJECTIVE:  HPI:    Chief Complaint   Patient presents with    Follow-up     In [address] [place] [address] ED on 10/22/22 - dehydration and vomiting    Neck Pain    Fall     ED follow up. Consult Notes  - documented in this encounter  Trent Solis MD - 10/22/2022 6:08 PM EDT  Associated Order(s): IP CONSULT TO SURGERY - THORACIC  Formatting of this note is different from the original.  [address] [place] [address] NOTE     Patient: Rcik Silva  MRN: [id number]  Date: 10/22/2022 6:08 PM  Attending Physician: Collin Granados MD     Reason for consult: emesis s/p esophagectomy    HPI: Rick Silva is a 65 y.o. male with history of HTN, HLD, GERD, and stage JOSE (uT2N2) mid esophageal adenocarcinoma s/p [place] alan esophagectomy with Dr. Aj Harris on 2022 presenting with 16 days of daily nausea and vomiting with meals. He states he has no difficulty or discomfort with swallowing. He gets nauseated within an hour of eating 2-3 times per day and vomits. After emesis he feels better. No chest pain, shortness of breath or abdominal pain. He has a normal appetite. The emesis has been clear up until 3 days ago, and now it is more brown in color intermittently, but no dark color or red/blood in the emesis. He is not coughing or having hemoptysis. Wife states that sometimes he overeats. Currently on a soft diet, eating apple sauce and mash potatoes. He was seen at [facility]SH in [facility] 3 days ago where he had a reassuring CT chest, however, his vomiting has been worsening over the past 3 days, and has been unable to tolerate PO, and so is re-presenting to [address] [place] [address] ED.      Past Medical/Surgical History  He has a past medical history of Anemia, Essential hypertension, benign, Exposure to hazardous chemical, GERD (gastroesophageal reflux disease), GIB (gastrointestinal bleeding) (2022), History of cancer, and Hyperlipidemia. He has no past medical history of Arrhythmia, Asthma, Bleeding disorder, CAD (coronary artery disease), Congestive heart failure, COPD (chronic obstructive pulmonary disease), Depression, Diabetes mellitus, Hepatitis, HIV (human immunodeficiency virus infection), Hyperthyroidism, Hypothyroidism, Liver disease, MI (myocardial infarction), Pacemaker, Renal disease, Seizure, or Stroke. He has a past surgical history that includes extraction tooth ; egd w/ ultrasound (N/A, 3/23/2022); [id number] artery four-vessel stomach (gastric ei (N/A, 8/16/2022); egd diagnostic (N/A, 8/16/2022); and esophagectomy w/ thoracotomy & abdominal approach robotic (Right, 8/30/2022). Family History  His family history includes Cancer- Other in his brother; Colorectal Cancer in his mother; Diabetes in his paternal uncle; Heart Disease - Other in his father. Social History  He reports that he quit smoking about 14 years ago. His smoking use included cigarettes. He started smoking about 20 years ago. He smoked an average of 2.5 packs per day. He has never used smokeless tobacco. He reports that he does not drink alcohol and does not use drugs. Medications  He has a current medication list which includes the following prescription(s): acetaminophen, amiodarone, aspirin, famotidine, metoclopramide, oxycodone, ranitidine, sennosides, and [DISCONTINUED] pantoprazole, and the following Facility-Administered Medications: lidocaine 1% (pf) and pantoprazole.     Allergies/Immunizations  Allergies: Paclitaxel and Aleve [naproxen]  Immunizations:   Immunization History   Administered Date(s) Administered    Influenza Vaccine 0.25ml 09/15/2015    Influenza Vaccine 0.5ml 09/15/2015       Review of Systems   (Positive findings are in bold)  Constitutional: fever, chills,  Cardiovascular: chest pain, palpitations  Respiratory: cough, sputum production, shortness of breath, hemoptysis  Gastrointestinal: abdominal pain, constipation, diarrhea, bloody stools, nausea, vomiting, loss of appetite  Genitourinary: bladder incontinence, dysuria, urgency, frequency, hesitancy  Musculoskeletal: back pain, joint pain, falls, edema  Neurological: dizziness, numbness/tingling, focal weakness, loss of consciousness, headaches    OBJECTIVE:  Physical Exam   Blood pressure 151/84, pulse 71, temperature 97.9 °F (36.6 °C), temperature source Infrared, resp. rate 19, height 1.702 m (5' 7\"), SpO2 96 %. Constitutional: Lying in bed in no acute distress   HEENT: Normocephalic, atraumatic,  Neurological: alert, follows commands, moves all extremities to commands, no gross motor or sensory deficits  Cardiovascular: Normal rate, HDS. Pulmonary: Respirations easy and regular, no accessory muscle use, room air. GI: Abdomen rounded, soft, non-distended, non-tender to palpation - no guarding or peritonitis. : No suprapubic tenderness with palpation. Musculoskeletal: No deformities noted  Integumentary: Skin, pink, warm and dry  Psych: Normal affect, asks appropriate questions. Diagnostic Testing:  CT chest with oral contrast recommended     Assessment and Plan   Ole Gao is a 65 y.o. male with history of HTN, HLD, GERD, and stage JOSE (uT2N2) mid esophageal adenocarcinoma s/p [place] alan esophagectomy with Dr. Alicia Foreman on 8/30/2022 presenting with 16 days of nausea and vomiting with meals. Chemistry and CBC are reassuring. No electrolyte abnormalities. Hemodynamically stable. Breathing comfortably on room air. Benign abdominal exam with no pain. Recommendations:  -CT chest with oral contrast which will assess anastomosis and for stricture or other abnormalities. Plan was discussed with thoracic surgery fellow, Dr. Merari Loera and thoracic surgery attending, Dr. Dk Dunn.      Thank you for allowing us to participate in the care of your patient. Should you have any further questions please do not hesitate to contact the surgery consult team.    Blanca Mosley MD  PGY-1 General Surgery      Electronically signed by Sosa Harrison MD at 10/24/2022 7:52 AM EDT    Associated attestation - Stephanie Stanton MD - 10/24/2022 7:52 AM EDT  Formatting of this note might be different from the original.  Thoracic Surgery Attending Note    I have reviewed all pertinent data and imaging. I agree with the history, examination, assessment and plan. Please see below for further details. Recommendations:   66 yo male s/p KAILEY on 8/30/22 who presents with emesis. I reviewed the CT chest and there is no evidence of leak/ stricture/ torsion of the conduit or paraesophageal hernia. Patient can resume full liquid diet and advance as tolerated. Thank you for the opportunity to participate in the care of this patient. Please do not hesitate to call with questions. Still with daily nausea and vomiting, worse in the am.    Wt Readings from Last 3 Encounters:   10/24/22 163 lb 3.2 oz (74 kg)   10/13/22 175 lb (79.4 kg)   10/03/22 179 lb (81.2 kg)       Patient Active Problem List   Diagnosis    Abdominal pain    Cancer of distal third of esophagus (HCC)    Acute postoperative pain    Chronic anemia    Esophageal adenocarcinoma (HCC)    GERD (gastroesophageal reflux disease)    HLD (hyperlipidemia)    Obesity (BMI 30.0-34. 9)    Postoperative atrial fibrillation (HCC)    Thrombocytopenia (HCC)     Past Surgical History:   Procedure Laterality Date    ABDOMEN SURGERY  08/16/2022    GASTRIC DEVASCULARIZATON-[facility]    ABDOMEN SURGERY  08/30/2022    ESOPHAGOGASTRODUODENOSCOPY TRANSORAL DIAGNOSTIC ESOPHAGECTOMY W/ THORACOTOMY-[facility]    DENTAL SURGERY      25 teeth removed     Social History     Tobacco Use    Smoking status: Former     Packs/day: 3.00     Years: 10.00     Pack years: 30.00     Types: Cigarettes     Quit date: 5/1/2013     Years since quittin.4    Smokeless tobacco: Never    Tobacco comments:     does not smoke   Substance Use Topics    Alcohol use: Never    Drug use: Never       Review of Systems   Constitutional:  Positive for appetite change, fatigue and unexpected weight change. HENT: Negative. Respiratory: Negative. Cardiovascular: Negative. Gastrointestinal:  Positive for nausea and vomiting. Musculoskeletal: Negative. Neurological:  Positive for weakness. All other systems reviewed and are negative. Objective   Physical Exam  Vitals and nursing note reviewed. Constitutional:       General: He is not in acute distress. Appearance: Normal appearance. He is well-developed. HENT:      Head: Normocephalic and atraumatic. Right Ear: Tympanic membrane normal.      Left Ear: Tympanic membrane normal.   Eyes:      Conjunctiva/sclera: Conjunctivae normal.   Cardiovascular:      Rate and Rhythm: Normal rate and regular rhythm. Heart sounds: Normal heart sounds. No murmur heard. Pulmonary:      Effort: Pulmonary effort is normal.      Breath sounds: Normal breath sounds. No wheezing, rhonchi or rales. Abdominal:      General: There is no distension. Musculoskeletal:      Cervical back: Neck supple. Skin:     General: Skin is warm and dry. Findings: No rash (on exposed surfaces). Neurological:      General: No focal deficit present. Mental Status: He is alert. Psychiatric:         Attention and Perception: Attention normal.         Mood and Affect: Mood normal.         Speech: Speech normal.         Behavior: Behavior normal. Behavior is cooperative. Thought Content: Thought content normal.         Judgment: Judgment normal.             ASSESSMENT/PLAN:  1. Nausea and vomiting, unspecified vomiting type  2. Decreased appetite  3. Unintended weight loss  4.  Esophageal adenocarcinoma ([facility] [address]    -  ED reports reviewed  -  Increase Protonix to BID  -  Continue Pepcid 40 mg QHS  - Follow with Surgeon as scheduled    Return if symptoms worsen or fail to improve. An electronic signature was used to authenticate this note.     --Anibal Cartwright, DO

## 2022-10-27 ENCOUNTER — TELEPHONE (OUTPATIENT)
Dept: PULMONOLOGY | Age: 62
End: 2022-10-27

## 2022-11-07 ENCOUNTER — TELEPHONE (OUTPATIENT)
Dept: FAMILY MEDICINE CLINIC | Age: 62
End: 2022-11-07

## 2022-11-07 NOTE — TELEPHONE ENCOUNTER
[facility] Transitions Initial Follow Up Call    Outreach made within 2 business days of discharge: Yes    Patient: Abiola Menjivar Patient : 1960   MRN: [id number]  Reason for Admission: There are no discharge diagnoses documented for the most recent discharge. Discharge Date: 10/19/22       Spoke with: Wife Andrea Pinto on  HIPPA, permission given from patient    Discharge department/facility: [facility]    TCM Interactive Patient Contact:  Was patient able to fill all prescriptions: Yes  Was patient instructed to bring all medications to the follow-up visit: Yes  Is patient taking all medications as directed in the discharge summary?  Yes  Does patient understand their discharge instructions: Yes  Does patient have questions or concerns that need addressed prior to 7-14 day follow up office visit: no    Scheduled appointment with PCP within 7-14 days    Follow Up  Future Appointments   Date Time Provider Pedro Sorensen   2022 11:30 AM Christine Canseco DO [address] [facility]   11/10/2022  9:20 AM BHARTI Linares - [facility] - [facility] [address]   2022  9:30 AM Jovita Kanner, MD [address] [facility]   2022 11:20 AM MD [facility] - Emili Petit LPN

## 2022-11-08 ENCOUNTER — OFFICE VISIT (OUTPATIENT)
Dept: FAMILY MEDICINE CLINIC | Age: 62
End: 2022-11-08
Payer: MEDICAID

## 2022-11-08 VITALS
HEART RATE: 54 BPM | OXYGEN SATURATION: 98 % | DIASTOLIC BLOOD PRESSURE: 72 MMHG | SYSTOLIC BLOOD PRESSURE: 118 MMHG | WEIGHT: 154.2 LBS | RESPIRATION RATE: 14 BRPM | TEMPERATURE: 97.7 F | BODY MASS INDEX: 24.15 KG/M2

## 2022-11-08 DIAGNOSIS — Z98.890 S/P CRANIOTOMY: ICD-10-CM

## 2022-11-08 DIAGNOSIS — G93.89 BRAIN MASS: Primary | ICD-10-CM

## 2022-11-08 DIAGNOSIS — C15.5 CANCER OF DISTAL THIRD OF ESOPHAGUS (HCC): ICD-10-CM

## 2022-11-08 DIAGNOSIS — R26.81 UNSTABLE GAIT: ICD-10-CM

## 2022-11-08 DIAGNOSIS — R11.2 NAUSEA AND VOMITING, UNSPECIFIED VOMITING TYPE: ICD-10-CM

## 2022-11-08 DIAGNOSIS — C15.9 ESOPHAGEAL ADENOCARCINOMA (HCC): ICD-10-CM

## 2022-11-08 DIAGNOSIS — D64.9 ANEMIA, UNSPECIFIED TYPE: ICD-10-CM

## 2022-11-08 PROCEDURE — 99215 OFFICE O/P EST HI 40 MIN: CPT | Performed by: FAMILY MEDICINE

## 2022-11-08 RX ORDER — OXYCODONE HYDROCHLORIDE 5 MG/1
5 TABLET ORAL EVERY 4 HOURS PRN
COMMUNITY
Start: 2022-11-06 | End: 2022-11-13

## 2022-11-08 RX ORDER — MIRTAZAPINE 15 MG/1
15 TABLET, ORALLY DISINTEGRATING ORAL NIGHTLY
Status: ON HOLD | COMMUNITY
Start: 2022-11-02 | End: 2022-12-01 | Stop reason: HOSPADM

## 2022-11-08 RX ORDER — POLYETHYLENE GLYCOL 3350 17 G/17G
17 POWDER, FOR SOLUTION ORAL NIGHTLY
COMMUNITY
Start: 2022-11-06 | End: 2022-11-20

## 2022-11-08 NOTE — PROGRESS NOTES
Post-Discharge Transitional Care Management Services      David Valladares   YOB: 1960    Date of Visit:  11/8/2022  30 Day Post-Discharge Date: 11/6/22  Chief Complaint   Patient presents with    Follow-Up from [address] [place] [address] 11/6/22 - dx of brain lesion- per pt/wife 48 lesions,  1 was biopsy. Primary esophagus cancer. PET scan clear 7/2022-     Orders     BP kit manual, stethoscope and pulse OX- will go to [address] [place] [address] follow up. Feeling much better after his brain surgery. NV much improved. PET scan clear in July, now has 48 different spots on his scan. They biopsied 1 lesion upon resection, path report pending. Plan is for radiation if metastatic. BP Readings from Last 3 Encounters:   11/08/22 118/72   10/24/22 (!) 136/92   10/19/22 (!) 158/89     Was down to 140 lbs. Wt Readings from Last 3 Encounters:   11/08/22 154 lb 3.2 oz (69.9 kg)   10/24/22 163 lb 3.2 oz (74 kg)   10/13/22 175 lb (79.4 kg)       Allergies   Allergen Reactions    Paclitaxel Other (See Comments)     Severe lower back pain- able to resume after medications given    Aleve [Naproxen] Hives     Outpatient Medications Marked as Taking for the 11/8/22 encounter (Office Visit) with Aris Corrales, DO   Medication Sig Dispense Refill    mirtazapine (REMERON SOL-TAB) 15 MG disintegrating tablet Take 15 mg by mouth nightly      oxyCODONE (ROXICODONE) 5 MG immediate release tablet Take 5 mg by mouth every 4 hours as needed. polyethylene glycol (GLYCOLAX) 17 GM/SCOOP powder Take 17 g by mouth nightly      Blood Pressure Monitoring (BP MONITOR-STETHOSCOPE) KIT Dx: HTN 1 kit 0    Misc.  Devices (PULSE OXIMETER FOR FINGER) MISC Dx:  hypoxemia 1 each 0    ibuprofen (ADVIL;MOTRIN) 400 MG tablet Take 400 mg by mouth every 6 hours as needed for Pain      pantoprazole (PROTONIX) 40 MG tablet Take 1 tablet by mouth in the morning and at bedtime 90 tablet 3    ondansetron (ZOFRAN-ODT) 8 MG TBDP disintegrating tablet Place 1 tablet under the tongue every 8 hours as needed for Nausea or Vomiting 10 tablet 1    prochlorperazine (COMPAZINE) 10 MG tablet Take 1 tablet by mouth every 6 hours as needed (nausea) 30 tablet 1    famotidine (PEPCID) 20 MG tablet Take 20 mg by mouth 2 times daily       NONFORMULARY Hema-Plex (Iron Multi-vitamin)           Vitals:    11/08/22 1131   BP: 118/72   Pulse: 54   Resp: 14   Temp: 97.7 °F (36.5 °C)   TempSrc: Oral   SpO2: 98%   Weight: 154 lb 3.2 oz (69.9 kg)     Body mass index is 24.15 kg/m². Wt Readings from Last 3 Encounters:   11/08/22 154 lb 3.2 oz (69.9 kg)   10/24/22 163 lb 3.2 oz (74 kg)   10/13/22 175 lb (79.4 kg)     BP Readings from Last 3 Encounters:   11/08/22 118/72   10/24/22 (!) 136/92   10/19/22 (!) 158/89        Patient was admitted to [address] [place] [address] from 10/31/22 to 11/6/22 for brain mass. Inpatient course: Discharge summary reviewed- see chart. Current status: improving    Review of Systems:  A comprehensive review of systems was negative except for what was noted in the HPI.     Physical Exam:  General Appearance: alert and oriented to person, place and time, well developed and well- nourished, in no acute distress  Skin: warm and dry, no rash or erythema  Head: normocephalic and atraumatic  Eyes: pupils equal, round, and reactive to light, extraocular eye movements intact, conjunctivae normal  ENT: tympanic membrane, external ear and ear canal normal bilaterally, nose without deformity, nasal mucosa and turbinates normal without polyps  Neck: supple and non-tender without mass, no thyromegaly or thyroid nodules, no cervical lymphadenopathy  Pulmonary/Chest: clear to auscultation bilaterally- no wheezes, rales or rhonchi, normal air movement, no respiratory distress  Cardiovascular: normal rate, regular rhythm, normal S1 and S2, no murmurs, rubs, clicks, or gallops, distal pulses intact, no carotid bruits  Abdomen: soft, non-tender, non-distended, normal bowel sounds, no masses or organomegaly  Extremities: no cyanosis, clubbing or edema  Musculoskeletal: normal range of motion, no joint swelling, deformity or tenderness  Neurologic: reflexes normal and symmetric, no cranial nerve deficit, gait, coordination and speech normal    Initial post-discharge communication occurred between nurse and  wife  on 11/7/22- see documentation in chart: telephone encounter. Assessment/Plan:  Kamala La was seen today for follow-up from hospital and orders. Diagnoses and all orders for this visit:    Brain mass    Unstable gait  -     Walker rolling    Nausea and vomiting, unspecified vomiting type    Anemia, unspecified type    Cancer of distal third of esophagus ([address]    Esophageal adenocarcinoma ([address]    S/P craniotomy    Other orders  -     Blood Pressure Monitoring (BP MONITOR-STETHOSCOPE) KIT; Dx: HTN  -     Misc. Devices (PULSE OXIMETER FOR FINGER) MISC; Dx:  hypoxemia      -  Chronic medical problems stable  -  Continue current medications  -  Follow up with specialists as scheduled  -  rx for BP monitor given. Momo needs a blood pressure monitor to monitor his BP on a regular basis  -  Michaela Belts was evaluated today and a DME order was entered for a wheeled walker with seat because he requires this to successfully complete daily living tasks of eating, bathing, toileting, personal cares, ambulating, and grooming. A wheeled walker with seat is necessary due to the patient's unsteady gait, upper body weakness, inability to  and ambulation device, ambulating only short distances by pushing a walker, and the need to sit for a short time before resuming ambulation. These tasks cannot be completed with a lesser ambulation device such as a cane, crutch, or standard walker.   The need for this equipment was discussed with the patient and he understands and is in agreement  -  RTO prn      Diagnostic test results reviewed: inpatient labs, EKG, CT-head, and MRI-brain    Patient risk of morbidity and mortality: moderate    Medical Decision Making: high complexity

## 2022-11-09 RX ORDER — ESCITALOPRAM OXALATE 10 MG/1
10 TABLET ORAL DAILY
Qty: 30 TABLET | Refills: 1 | Status: SHIPPED | OUTPATIENT
Start: 2022-11-09

## 2022-11-10 ENCOUNTER — OFFICE VISIT (OUTPATIENT)
Dept: PULMONOLOGY | Age: 62
End: 2022-11-10
Payer: MEDICAID

## 2022-11-10 VITALS
SYSTOLIC BLOOD PRESSURE: 132 MMHG | HEART RATE: 68 BPM | TEMPERATURE: 97.6 F | DIASTOLIC BLOOD PRESSURE: 66 MMHG | HEIGHT: 67 IN | WEIGHT: 155.4 LBS | OXYGEN SATURATION: 100 % | BODY MASS INDEX: 24.39 KG/M2

## 2022-11-10 DIAGNOSIS — C15.9 ESOPHAGEAL ADENOCARCINOMA (HCC): ICD-10-CM

## 2022-11-10 DIAGNOSIS — G93.9 BRAIN LESION: ICD-10-CM

## 2022-11-10 DIAGNOSIS — G47.33 OSA (OBSTRUCTIVE SLEEP APNEA): Primary | ICD-10-CM

## 2022-11-10 DIAGNOSIS — E43 SEVERE PROTEIN-CALORIE MALNUTRITION (HCC): ICD-10-CM

## 2022-11-10 PROBLEM — K30 DELAYED GASTRIC EMPTYING: Status: ACTIVE | Noted: 2022-10-31

## 2022-11-10 PROBLEM — R79.89 SERUM CREATININE RAISED: Status: ACTIVE | Noted: 2022-10-31

## 2022-11-10 PROBLEM — I10 BENIGN HYPERTENSION: Status: ACTIVE | Noted: 2022-11-03

## 2022-11-10 PROCEDURE — 99214 OFFICE O/P EST MOD 30 MIN: CPT | Performed by: NURSE PRACTITIONER

## 2022-11-10 PROCEDURE — 3074F SYST BP LT 130 MM HG: CPT | Performed by: NURSE PRACTITIONER

## 2022-11-10 PROCEDURE — 3078F DIAST BP <80 MM HG: CPT | Performed by: NURSE PRACTITIONER

## 2022-11-10 ASSESSMENT — ENCOUNTER SYMPTOMS
DIARRHEA: 0
CHEST TIGHTNESS: 0
APNEA: 1
SHORTNESS OF BREATH: 0
EYES NEGATIVE: 1
VOMITING: 0
COUGH: 0
NAUSEA: 0
ABDOMINAL PAIN: 0
WHEEZING: 0

## 2022-11-17 NOTE — PROGRESS NOTES
[address] [place] [address]  Dept: [phone]  Loc: [phone]   Hematology/Oncology Consult (Clinic)        11/22/22     Juanpablo Yañez   1960     No ref. 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 Mass extends from 34 to 28 cm approximately 6 cm. Staging by EUS 3/23/2022 [facility] (T2 based on invasion and N2 based on 2 malignant appearing lymph nodes visualized and measured in the lower paraesophageal mediastinum level 8L adjacent to the mass. 2 malignant appearing lymph nodes visualized and measured in the subcarinal mediastinum level 7. PET/CT 3/30/2022 [facility]- hypermetabolic activity at mid esophagus consistent with primary malignancy with adjacent hypermetabolic left periesophageal lymph nodes. Brain Mets; extensive and bulky. 10/31/22. On Decadron 2 mg bid 11/22/22    (11/21/22) F1 and PDL-1 requested for me  by Dr Jocelyne Tam rad Onc at [place] [address].:  -Seen by Dr. Sherry Ho of thoracic surgery at [facility] 3/30/2022. Recommends neoadjuvant chemoradiation to be followed by surgery  -Neoadjuvant chemoradiation with Dr. Margot Renteria. Low-dose carboplatinum Taxol weekly x 6-7. Chemo  Start date: 5/11. Day 1 XRT 5/11 6/16/2022 week #6 due/last treatment. Last XRT 6/20/22.  -Robotic assisted laparoscopic and right thoracoscopic [place] [facility] esophagectomy ;   additional gastric margin, gastropathic lymph nodes and hernia sac per Dr. Sherry Ho 8/30/2022  No evidence of peritoneal metastatic disease. (Path report requested and pending)  - Craniotomy [facility] debulked largest cerebellar met  - WB XRT soon per Dr Tatyana Hernandze ( Dr Pablo Falcon)  - 1st Line Metastatic regimen: pending WB xrt completion and NGS results from [facility] brain Bx.   Day 1 whole brain XRT- 11/22/22.  -Herceptin FOLFOX tentative start date 12/13 if radiation to the brain complete    PARAMETERS:  -EGD/EUS  -PET/CT    COMORBIDITIES:  Include 30-pack-year history quit in 2013, alcohol none, GERD, hypertension, hyperlipidemia    SUBJECTIVE: Patient underwent his esophagectomy  on August 30 . Now with extensive brain mets. He Will urgently start WB XRT by Dr Tatyana Hernandez today 11/22/   Retage with PET. GERD. Systemic tx to follow; F1/PDL!-1  to be done at [facility] and requested 11/21/22 by Dr Nish Holden. We will plan to start Herceptin with FOLFOX on 12/13 once whole brain radiation is completed. Current Decadron dose today 11/22 was 2 mg twice daily. Patient is smoking some marijuana. Overall sense of wellbeing is fairly decent. He denies any pain. Briefly reviewed risks and benefits of the proposed Herceptin FOLFOX regimen including neuropathy. Toxicity. Agrees to get echocardiogram and teaching. HPI: Cary Navarrete is a 69-year-old gentleman with a long history of GERD who presented to the ER on February 10 was admitted for 1 day because of chest heaviness. No cardiology because GI was consulted ultimately showed a mass in the distal esophagus. Outpatient EGD requested. Diagnosed with adenocarcinoma the distal esophagus and referred to Dr. Sherry Ho of thoracic surgery at [facility].  EUS shows regional tone involvement. See scans below and ultrasound. No distant mets. Patient has no significant dysphagia and no weight loss. Patient referred back locally for chemoradiation neoadjuvant treatment before surgery. ROS:  Review of Systems 14 point negative except as above.     PMH:   Past Medical History:   Diagnosis Date    Atrial fibrillation ([address]     Benign hypertension 11/3/2022    Cancer of distal third of esophagus ([address] 04/07/2022    GERD (gastroesophageal reflux disease)     HLD (hyperlipidemia) 8/15/2022    Sleep apnea         Social HX:   Social History     Socioeconomic History    Marital status:      Spouse name: Arley Amezcua    Number of children: 5    Years of education: 12    Highest education level: High school graduate   Occupational History    Occupation: VENDOR     Comment: [address] [employer]   Tobacco Use    Smoking status: Former     Packs/day: 3.00     Years: 10.00     Pack years: 30.00     Types: Cigarettes     Quit date: 2013     Years since quittin.5    Smokeless tobacco: Never    Tobacco comments:     does not smoke, quit    Vaping Use    Vaping Use: Never used   Substance and Sexual Activity    Alcohol use: Never    Drug use: Never    Sexual activity: Yes     Partners: Female   Other Topics Concern    Not on file   Social History Narrative    Not on file     Social Determinants of Health     Financial Resource Strain: Low Risk     Difficulty of Paying Living Expenses: Not hard at all   Food Insecurity: Food Insecurity Present    Worried About [employer] in the Last Year: Often true    Ran Out of Food in the Last Year: Sometimes true   Transportation Needs: Not on file   Physical Activity: Not on file   Stress: Not on file   Social Connections: Not on file   Intimate Partner Violence: Not on file   Housing Stability: Not on file        SpouseDachrao Zhang  [phone]    Phone: [phone]     [address] [place] [address] [address] GANN [address] [employer] [facility]     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 music family,    Fam HX:   Family History   Problem Relation Age of Onset    Colon Cancer Mother     Heart Disease Father     Cancer Brother         lung        Hospitalizations:   2/10/22    Allergies: Allergies   Allergen Reactions    Paclitaxel Other (See Comments)     Severe lower back pain- able to resume after medications given    Aleve [Naproxen] Hives        Adult Illness:  Patient Active Problem List   Diagnosis    Abdominal pain    Cancer of distal third of esophagus (HCC)    Acute postoperative pain    Chronic anemia    Esophageal adenocarcinoma (HCC)    GERD (gastroesophageal reflux disease)    HLD (hyperlipidemia)    Obesity (BMI 30.0-34. 9)    Postoperative atrial fibrillation (HCC)    Thrombocytopenia (HCC)    Brain lesion    Delayed gastric emptying    Serum creatinine raised    Severe protein-calorie malnutrition (HCC)    Benign hypertension        Surgery:  Past Surgical History:   Procedure Laterality Date    ABDOMEN SURGERY  08/16/2022    GASTRIC DEVASCULARIZATON-[facility]    ABDOMEN SURGERY  08/30/2022    ESOPHAGOGASTRODUODENOSCOPY TRANSORAL DIAGNOSTIC ESOPHAGECTOMY W/ THORACOTOMY-[facility]    DENTAL SURGERY      25 teeth removed        Medications:  Current Outpatient Medications   Medication Sig Dispense Refill    dexamethasone (DECADRON) 4 MG tablet Take 2 mg by mouth 2 times daily (with meals)      escitalopram (LEXAPRO) 10 MG tablet Take 1 tablet by mouth daily 30 tablet 1    ibuprofen (ADVIL;MOTRIN) 400 MG tablet Take 400 mg by mouth every 6 hours as needed for Pain      pantoprazole (PROTONIX) 40 MG tablet Take 1 tablet by mouth in the morning and at bedtime 90 tablet 3    NONFORMULARY Hema-Plex (Iron Multi-vitamin)      mirtazapine (REMERON SOL-TAB) 15 MG disintegrating tablet Take 15 mg by mouth nightly (Patient not taking: Reported on 11/22/2022)      Blood Pressure Monitoring (BP MONITOR-STETHOSCOPE) KIT Dx: HTN 1 kit 0    Misc.  Devices (PULSE OXIMETER FOR FINGER) MISC Dx:  hypoxemia 1 each 0 ondansetron (ZOFRAN-ODT) 8 MG TBDP disintegrating tablet Place 1 tablet under the tongue every 8 hours as needed for Nausea or Vomiting (Patient not taking: Reported on 2022) 10 tablet 1    prochlorperazine (COMPAZINE) 10 MG tablet Take 1 tablet by mouth every 6 hours as needed (nausea) (Patient not taking: Reported on 2022) 30 tablet 1    famotidine (PEPCID) 20 MG tablet Take 20 mg by mouth 2 times daily  (Patient not taking: Reported on 2022)       No current facility-administered medications for this visit. Over-the-counter iron supplement 3 times a day      EXAM:   height is 5' 7\" (1.702 m) and weight is 156 lb 14.4 oz (71.2 kg). His oral temperature is 97.8 °F (36.6 °C). His blood pressure is 108/60 and his pulse is 47 (abnormal). His respiration is 18 and oxygen saturation is 99%. Estimated body surface area is 1.83 meters squared as calculated from the following:    Height as of this encounter: 5' 7\" (1.702 m). Weight as of this encounter: 156 lb 14.4 oz (71.2 kg). ECO  General: Non-ill appearing. Very pleasant and relaxed. HEENT: NC/AT,nonicteric, perrla,eom intact, no mucosal lesions, no thrush. Neck: normal thyroid, no masses. pulses nl, no bruits,   Nodes: No adenopathy  Lungs/chest: clear, no rales,rhonchi or wheezing, lung bases clear  CV: rrr, no rubs ,gallops or murmurs  Breasts: Not examined  Abd/Rectal: Soft, nontender ,multiple puncture sites from his recent esophagectomy all healing nicely. Back: normal curvature, No midline tenderness. flanks nontender  : Not Examined  Extremities: no cyanosis,clubbing or edema. Skin: unremarkable  Neuro: A and O x 4, CN exam nonfocal, Motor- no deficits, Sensory- no deficits, gait-nl, speech- fluent, no ataxia.   Devices: None      DATA:    LAB:     CBC with Differential:      Lab Results   Component Value Date/Time    WBC 6.7 2022 12:24 PM    RBC 4.28 2022 12:24 PM    HGB 12.8 2022 12:24 PM    HCT 38.5 11/22/2022 12:24 PM    PLT 89 11/22/2022 12:24 PM    MCV 90 11/22/2022 12:24 PM    MCH 29.9 11/22/2022 12:24 PM    MCHC 33.2 11/22/2022 12:24 PM    RDW 16.2 11/22/2022 12:24 PM    NRBC 0 10/19/2022 12:00 PM    SEGSPCT 78.2 10/19/2022 12:00 PM    MONOPCT 7.7 10/19/2022 12:00 PM    MONOSABS 0.4 11/22/2022 12:24 PM    LYMPHSABS 0.4 11/22/2022 12:24 PM    EOSABS 0.0 11/22/2022 12:24 PM    BASOSABS 0.0 11/22/2022 12:24 PM     Lab Results   Component Value Date/Time    SEGSABS 5.8 11/22/2022 12:24 PM       CMP:    Lab Results   Component Value Date/Time     11/22/2022 12:24 PM     10/19/2022 12:00 PM    K 4.1 11/22/2022 12:24 PM    K 3.8 10/19/2022 12:00 PM    K 4.2 02/10/2022 09:35 AM     10/19/2022 12:00 PM    CO2 23 10/19/2022 12:00 PM    BUN 21 10/19/2022 12:00 PM    CREATININE 0.8 11/22/2022 12:24 PM    CREATININE 0.9 10/19/2022 12:00 PM    LABGLOM >60 11/22/2022 12:23 PM    GLUCOSE 126 10/19/2022 12:00 PM    PROT 8.3 10/19/2022 12:00 PM    LABALBU 4.6 10/19/2022 12:00 PM    CALCIUM 10.6 10/19/2022 12:00 PM    BILITOT 0.6 10/19/2022 12:00 PM    ALKPHOS 115 10/19/2022 12:00 PM    AST 22 10/19/2022 12:00 PM    ALT 17 10/19/2022 12:00 PM       BMP:    Lab Results   Component Value Date/Time     11/22/2022 12:24 PM     10/19/2022 12:00 PM    K 4.1 11/22/2022 12:24 PM    K 3.8 10/19/2022 12:00 PM    K 4.2 02/10/2022 09:35 AM     10/19/2022 12:00 PM    CO2 23 10/19/2022 12:00 PM    BUN 21 10/19/2022 12:00 PM    LABALBU 4.6 10/19/2022 12:00 PM    CREATININE 0.8 11/22/2022 12:24 PM    CREATININE 0.9 10/19/2022 12:00 PM    CALCIUM 10.6 10/19/2022 12:00 PM    LABGLOM >60 11/22/2022 12:23 PM    GLUCOSE 126 10/19/2022 12:00 PM       Magnesium:    Lab Results   Component Value Date/Time    MG 2.0 06/09/2022 10:15 AM     PT/INR:  No results found for: PROTIME, INR  TSH:    Lab Results   Component Value Date/Time    TSH 2.800 03/22/2017 07:45 AM     VITAMIN B12: No components found for: B12  FOLATE:    Lab Results   Component Value Date/Time    FOLATE 15.6 02/10/2022 12:56 PM     IRON:    Lab Results   Component Value Date/Time    IRON 58 04/27/2022 01:48 PM     Iron Saturation:  No components found for: PERCENTFE  TIBC:    Lab Results   Component Value Date/Time    TIBC 341 04/27/2022 01:48 PM     FERRITIN:    Lab Results   Component Value Date/Time    FERRITIN 28 04/27/2022 01:47 PM     PSA:   Lab Results   Component Value Date/Time    PSA 1.41 03/22/2017 07:45 AM            IMAGING:    MRI Brain [facility]    -PET/CT 3/30/2022  Intense hypermetabolic activity within the mid esophagus consistent with a primary malignancy. Adjacent hypermetabolic left periesophageal lymph node worrisome for metastatic adenopathy. PROCEDURES:  EGD 2/14/2022  Ulcerated mass distal esophagus    EUS 3/23/2022  Partially obstructing malignant esophageal tumor found in the lower third of the esophagus. Large hiatal hernia. Normal stomach and duodenum. Liver most consistent with fatty liver. 2 malignant appearing lymph nodes visualized and measured in the lower paraesophageal mediastinum level 8L adjacent to the mass. 2 malignant appearing lymph nodes visualized and measured in the subcarinal mediastinum level 7. PATHOLOGY:   EGD distal esophageal biopsy path report  Pathologic Diagnosis     Outside Slides  [id number] (02/15/22)  A. Esophagus, distal, biopsy:   Invasive adenocarcinoma, moderately differentiated. See comment   Alcian Blue/PAS performed at outside institution and submitted for review highlights Prescott's mucosa in the background      HER2/robina Gene Status: Amplified. ___________________________________________________________    8/30/2022 [facility] esophagectomy pathology-pending    Nov 2022- Craniotomy [facility]- Brain mets- REQUESTED    GENETICS:  HER2 amplified. On original esoph bx.     MOLECULAR:  Requested by phone conversation on 11/21/22 that Dr Lucho Bhatia order Manolo Howell and PDFL-1 on the recent Brain bx.    ASSESSMENT/PLAN:    1: Diagnosis: 72-year-old gentleman with adenocarcinoma of the distal third of the esophagus. Clinical stage T2N2 by EUS and PET scan confirmed. No distant mets. No significant dysphagia. Presented with chest discomfort. Chronic history of GERD. Performance status is excellent. Seen by Dr. Flor Stevens thoracic surgery at [facility] and status post neoadjuvant chemoradiation followed by recent esophagectomy on 8/30/2022. Essentially a near Path CR. Now with extensive Brain mets. 2) Prognosis / Disease Status: High risk node positive disease T2N2 clinical stage, locally advanced. HER2 amplified which now has bearing with developing  metastatic disease. 3) Work-up:    Labs: CBC, CMP   Imaging: Get PET/CT (ordered). Pretx Echocardiogram ordered   Procedures: 8/30/2022 esophagectomy. See above. Consults:Dr Salgado XRT for WB XRT                             Consult surgery for port                             Chemo teaching regarding Herceptin with FOLFOX   Other: Cont decadron current dose is 2 mg twice daily effective 11/22/2022    4) Symptom Management: None needed      5) Supportive care provided. Level of care is appropriate. 6) Treatment goal: Cure      Treatment plan:     Neoadjuvant chemoradiation: Low-dose carboplatin and Taxol weekly x 6-7 with radiation. Radiation will be completed 6/20 8/30/2022 esophagectomy at [facility] per Dr. Flor Stevens . Path Report not yet available and has been requested. NCCN guidelines reviewed for patients with adenocarcinoma the esophagus who have received preoperative chemoradiation. -R0 resection i.e. no cancer resection margins is YPT0N0 options include category 1 chemotherapy  -If YP T+) positive recommendations category 1 is nivolumab in patients who received preoperative chemoradiation. Other options is chemotherapy if received preoperatively or even observation. I would recommend nivolumab if he fits in this category. If so he will need a port. We will review data on duration of nivolumab which is likely a year. Urgent WB XRT per Dr Yolanda Perkins. Day 80/22/20    Restage w PET and await NGS etc and plan first line met systemic tx. Previously HER2 amplified thus genet up a Herceptin based regimen . Start once WB xrt done. -Modified Folfox q 2 weeks + Herceptin 6 mk/kg LD on day 1 then, 4mg/kg iv q 2 weeks. Preliminary plan entered by me 11/21/22; Tentative start- 12/13/22  Need Pharmacy to add Herceptin to the plan. 7) Medications reviewed. Prescriptions today: None. Patient already has Compazine and Zofran at home            No orders of the defined types were placed in this encounter. OARRS:  Controlled Substance Monitoring:    Acute and Chronic Pain Monitoring:   No flowsheet data found. 8) Research Options:   Not applicable      9) Other:        Case discussed by me with Dr Ronaldo Bashir on 11/21/22. I d/w Dr Yolanda Perkins as well. 10) Follow Up: Follow-up 12/13 with nurse practitioner and tentative start date of Herceptin with Isela Rosa MD     Patient was hospitalized at [facility] from October 31 and discharged on November 6. Admitted by Dr. Esme Jerry thoracic surgery who did his esophagectomy in August.  Admitted with intractable nausea and vomiting. He was found to have multifocal CNS mets with obstructive hydrocephalus. On October 31 he underwent a right craniotomy and had at least 3 foci of tumor removed including a cerebellar lesion. Discharged on Decadron 4 mg twice daily with instructions for taper. Patient has multiple other lesions after his craniotomy the plan is for XRT  for these lesions. Patient seen by Dr. Abner Kruse radiation oncology  Pt wants tx close to me and will see Dr Stacey Riley w me on 11/22/22. WB XRT asap. .    Seen by me 10/13 having undergone his esophagectomy August 30 at [facility] per Dr. Otto Browning.   At that time he was doing quite well and had still been on a liquid diet but denied any nausea or vomiting at that time. Interestingly at the time of his esophagectomy in August 30 he did have a pathologic CR. He was HER2 amplified. This now of significance with his metastatic disease to brain. I did review the case with Dr. Hellen Simons who felt that that he did not need adjuvant therapy at that point. Clearly now things have changed and he is a candidate for systemic therapy certainly if restaging shows disease elsewhere. I would get a PET/CT to look for disease outside the brain. And I would request foundation 1 next generation sequencing please be done on the brain tissue biopsy at [address] [place] [address]. Dr Rhoda Arauz said he would order 11/21/22 at [address] [place] [address] on the recent brain tissue.

## 2022-11-21 DIAGNOSIS — C15.5 CANCER OF DISTAL THIRD OF ESOPHAGUS (HCC): Primary | ICD-10-CM

## 2022-11-21 RX ORDER — DEXTROSE MONOHYDRATE 50 MG/ML
5-250 INJECTION, SOLUTION INTRAVENOUS PRN
OUTPATIENT
Start: 2022-12-13

## 2022-11-21 RX ORDER — HEPARIN SODIUM (PORCINE) LOCK FLUSH IV SOLN 100 UNIT/ML 100 UNIT/ML
500 SOLUTION INTRAVENOUS PRN
OUTPATIENT
Start: 2022-12-13

## 2022-11-21 RX ORDER — ONDANSETRON 2 MG/ML
8 INJECTION INTRAMUSCULAR; INTRAVENOUS
OUTPATIENT
Start: 2022-12-13

## 2022-11-21 RX ORDER — MEPERIDINE HYDROCHLORIDE 50 MG/ML
12.5 INJECTION INTRAMUSCULAR; INTRAVENOUS; SUBCUTANEOUS PRN
OUTPATIENT
Start: 2022-12-13

## 2022-11-21 RX ORDER — SODIUM CHLORIDE 9 MG/ML
5-40 INJECTION INTRAVENOUS PRN
OUTPATIENT
Start: 2022-12-13

## 2022-11-21 RX ORDER — EPINEPHRINE 1 MG/ML
0.3 INJECTION, SOLUTION, CONCENTRATE INTRAVENOUS PRN
OUTPATIENT
Start: 2022-12-13

## 2022-11-21 RX ORDER — SODIUM CHLORIDE 9 MG/ML
5-250 INJECTION, SOLUTION INTRAVENOUS PRN
OUTPATIENT
Start: 2022-12-13

## 2022-11-21 RX ORDER — FLUOROURACIL 50 MG/ML
400 INJECTION, SOLUTION INTRAVENOUS ONCE
OUTPATIENT
Start: 2022-12-13 | End: 2022-12-13

## 2022-11-21 RX ORDER — ACETAMINOPHEN 325 MG/1
650 TABLET ORAL
OUTPATIENT
Start: 2022-12-13

## 2022-11-21 RX ORDER — HEPARIN SODIUM (PORCINE) LOCK FLUSH IV SOLN 100 UNIT/ML 100 UNIT/ML
500 SOLUTION INTRAVENOUS PRN
OUTPATIENT
Start: 2022-12-15

## 2022-11-21 RX ORDER — FAMOTIDINE 10 MG/ML
20 INJECTION, SOLUTION INTRAVENOUS
OUTPATIENT
Start: 2022-12-13

## 2022-11-21 RX ORDER — SODIUM CHLORIDE 9 MG/ML
INJECTION, SOLUTION INTRAVENOUS CONTINUOUS
OUTPATIENT
Start: 2022-12-13

## 2022-11-21 RX ORDER — SODIUM CHLORIDE 9 MG/ML
5-40 INJECTION INTRAVENOUS PRN
OUTPATIENT
Start: 2022-12-15

## 2022-11-21 RX ORDER — SODIUM CHLORIDE 9 MG/ML
5-250 INJECTION, SOLUTION INTRAVENOUS PRN
OUTPATIENT
Start: 2022-12-15

## 2022-11-21 RX ORDER — ALBUTEROL SULFATE 90 UG/1
4 AEROSOL, METERED RESPIRATORY (INHALATION) PRN
OUTPATIENT
Start: 2022-12-13

## 2022-11-21 RX ORDER — DIPHENHYDRAMINE HYDROCHLORIDE 50 MG/ML
50 INJECTION INTRAMUSCULAR; INTRAVENOUS
OUTPATIENT
Start: 2022-12-13

## 2022-11-21 RX ORDER — PALONOSETRON 0.05 MG/ML
0.25 INJECTION, SOLUTION INTRAVENOUS ONCE
OUTPATIENT
Start: 2022-12-13 | End: 2022-12-13

## 2022-11-22 ENCOUNTER — HOSPITAL ENCOUNTER (OUTPATIENT)
Dept: CT IMAGING | Age: 62
Discharge: HOME | End: 2022-11-22

## 2022-11-22 ENCOUNTER — HOSPITAL ENCOUNTER (OUTPATIENT)
Dept: RADIATION ONCOLOGY | Age: 62
Discharge: HOME | End: 2022-11-22
Payer: MEDICAID

## 2022-11-22 ENCOUNTER — HOSPITAL ENCOUNTER (OUTPATIENT)
Dept: INFUSION THERAPY | Age: 62
Discharge: HOME | End: 2022-11-22
Payer: MEDICAID

## 2022-11-22 ENCOUNTER — OFFICE VISIT (OUTPATIENT)
Dept: ONCOLOGY | Age: 62
End: 2022-11-22
Payer: MEDICAID

## 2022-11-22 ENCOUNTER — HOSPITAL ENCOUNTER (OUTPATIENT)
Dept: MRI IMAGING | Age: 62
Discharge: HOME | End: 2022-11-22

## 2022-11-22 VITALS
DIASTOLIC BLOOD PRESSURE: 60 MMHG | TEMPERATURE: 97.8 F | HEART RATE: 47 BPM | BODY MASS INDEX: 24.55 KG/M2 | OXYGEN SATURATION: 99 % | WEIGHT: 156.4 LBS | SYSTOLIC BLOOD PRESSURE: 108 MMHG | RESPIRATION RATE: 18 BRPM | HEIGHT: 67 IN

## 2022-11-22 VITALS
RESPIRATION RATE: 18 BRPM | SYSTOLIC BLOOD PRESSURE: 108 MMHG | OXYGEN SATURATION: 99 % | BODY MASS INDEX: 24.55 KG/M2 | HEART RATE: 47 BPM | DIASTOLIC BLOOD PRESSURE: 60 MMHG | WEIGHT: 156.4 LBS | TEMPERATURE: 97.8 F | HEIGHT: 67 IN

## 2022-11-22 VITALS
RESPIRATION RATE: 18 BRPM | WEIGHT: 156.9 LBS | DIASTOLIC BLOOD PRESSURE: 60 MMHG | SYSTOLIC BLOOD PRESSURE: 108 MMHG | HEART RATE: 47 BPM | BODY MASS INDEX: 24.63 KG/M2 | OXYGEN SATURATION: 99 % | TEMPERATURE: 97.8 F | HEIGHT: 67 IN

## 2022-11-22 DIAGNOSIS — C79.9 METASTASIS FROM ESOPHAGEAL CANCER (HCC): Primary | ICD-10-CM

## 2022-11-22 DIAGNOSIS — C15.5 CANCER OF DISTAL THIRD OF ESOPHAGUS (HCC): ICD-10-CM

## 2022-11-22 DIAGNOSIS — C79.31 SECONDARY MALIGNANT NEOPLASM OF BRAIN (HCC): ICD-10-CM

## 2022-11-22 DIAGNOSIS — Z00.6 EXAMINATION FOR NORMAL COMPARISON OR CONTROL IN CLINICAL RESEARCH: ICD-10-CM

## 2022-11-22 DIAGNOSIS — C15.9 METASTASIS FROM ESOPHAGEAL CANCER (HCC): Primary | ICD-10-CM

## 2022-11-22 LAB
ABSOLUTE IMMATURE GRANULOCYTE: 0.04 THOU/MM3 (ref 0–0.07)
ALBUMIN SERPL-MCNC: 4.1 G/DL (ref 3.5–5.1)
ALP BLD-CCNC: 76 U/L (ref 38–126)
ALT SERPL-CCNC: 42 U/L (ref 11–66)
AST SERPL-CCNC: 25 U/L (ref 5–40)
BASINOPHIL, AUTOMATED: 0 % (ref 0–3)
BASOPHILS ABSOLUTE: 0 THOU/MM3 (ref 0–0.1)
BILIRUB SERPL-MCNC: 0.5 MG/DL (ref 0.3–1.2)
BILIRUBIN DIRECT: < 0.2 MG/DL (ref 0–0.3)
BUN, WHOLE BLOOD: 18 MG/DL (ref 8–26)
CHLORIDE, WHOLE BLOOD: 103 MEQ/L (ref 98–109)
CREATININE, WHOLE BLOOD: 0.8 MG/DL (ref 0.5–1.2)
EOSINOPHILS ABSOLUTE: 0 THOU/MM3 (ref 0–0.4)
EOSINOPHILS RELATIVE PERCENT: 0 % (ref 0–4)
GFR SERPL CREATININE-BSD FRML MDRD: > 60 ML/MIN/1.73M2
GLUCOSE, WHOLE BLOOD: 97 MG/DL (ref 70–108)
HCT VFR BLD CALC: 38.5 % (ref 42–52)
HEMOGLOBIN: 12.8 GM/DL (ref 14–18)
IMMATURE GRANULOCYTES: 1 %
IONIZED CALCIUM, WHOLE BLOOD: 1.18 MMOL/L (ref 1.12–1.32)
LYMPHOCYTES # BLD: 6 % (ref 15–47)
LYMPHOCYTES ABSOLUTE: 0.4 THOU/MM3 (ref 1–4.8)
MCH RBC QN AUTO: 29.9 PG (ref 26–33)
MCHC RBC AUTO-ENTMCNC: 33.2 GM/DL (ref 32.2–35.5)
MCV RBC AUTO: 90 FL (ref 80–94)
MONOCYTES ABSOLUTE: 0.4 THOU/MM3 (ref 0.4–1.3)
MONOCYTES: 6 % (ref 0–12)
PDW BLD-RTO: 16.2 % (ref 11.5–14.5)
PLATELET # BLD: 89 THOU/MM3 (ref 130–400)
PMV BLD AUTO: 8.5 FL (ref 9.4–12.4)
POTASSIUM, WHOLE BLOOD: 4.1 MEQ/L (ref 3.5–4.9)
RBC # BLD: 4.28 MILL/MM3 (ref 4.7–6.1)
SEG NEUTROPHILS: 88 % (ref 43–75)
SEGMENTED NEUTROPHILS ABSOLUTE COUNT: 5.8 THOU/MM3 (ref 1.8–7.7)
SODIUM, WHOLE BLOOD: 136 MEQ/L (ref 138–146)
TOTAL CO2, WHOLE BLOOD: 24 MEQ/L (ref 23–33)
TOTAL PROTEIN: 6.8 G/DL (ref 6.1–8)
WBC # BLD: 6.7 THOU/MM3 (ref 4.8–10.8)

## 2022-11-22 PROCEDURE — 36415 COLL VENOUS BLD VENIPUNCTURE: CPT

## 2022-11-22 PROCEDURE — 77300 RADIATION THERAPY DOSE PLAN: CPT | Performed by: RADIOLOGY

## 2022-11-22 PROCEDURE — 85025 COMPLETE CBC W/AUTO DIFF WBC: CPT

## 2022-11-22 PROCEDURE — 77334 RADIATION TREATMENT AID(S): CPT | Performed by: RADIOLOGY

## 2022-11-22 PROCEDURE — 80047 BASIC METABLC PNL IONIZED CA: CPT

## 2022-11-22 PROCEDURE — 3209999900 CT GUIDE RADIATION THERAPY NO CHARGE

## 2022-11-22 PROCEDURE — 99211 OFF/OP EST MAY X REQ PHY/QHP: CPT

## 2022-11-22 PROCEDURE — 99215 OFFICE O/P EST HI 40 MIN: CPT | Performed by: INTERNAL MEDICINE

## 2022-11-22 PROCEDURE — 3078F DIAST BP <80 MM HG: CPT | Performed by: INTERNAL MEDICINE

## 2022-11-22 PROCEDURE — 77280 THER RAD SIMULAJ FIELD SMPL: CPT | Performed by: RADIOLOGY

## 2022-11-22 PROCEDURE — 77412 RADIATION TX DELIVERY LVL 3: CPT | Performed by: RADIOLOGY

## 2022-11-22 PROCEDURE — 3074F SYST BP LT 130 MM HG: CPT | Performed by: INTERNAL MEDICINE

## 2022-11-22 PROCEDURE — 77290 THER RAD SIMULAJ FIELD CPLX: CPT | Performed by: RADIOLOGY

## 2022-11-22 PROCEDURE — 80076 HEPATIC FUNCTION PANEL: CPT

## 2022-11-22 RX ORDER — DEXAMETHASONE 4 MG/1
2 TABLET ORAL 2 TIMES DAILY WITH MEALS
COMMUNITY

## 2022-11-22 ASSESSMENT — ENCOUNTER SYMPTOMS
TROUBLE SWALLOWING: 0
ABDOMINAL PAIN: 0
SHORTNESS OF BREATH: 0
RECTAL PAIN: 0
SORE THROAT: 0
COUGH: 0
FACIAL SWELLING: 0
BLOOD IN STOOL: 0
NAUSEA: 0

## 2022-11-22 NOTE — PATIENT INSTRUCTIONS
Follow-up December 13 with nurse practitioner and start FOLFOX with Herceptin  Pharmacy to add Herceptin to FOLFOX backbone. Schedule PET/CT  Brain XRT will start today 11/22 per Dr. Bernard Truong today include CBC and CMP  Port by surgery; consult.   Pre Herceptin echocardiogram.  NP teaching re herceptin- folfox  [facility] the [facility] brain biopsy path report

## 2022-11-22 NOTE — PROGRESS NOTES
Radiation Oncology Extended Follow Up  Encounter Date: 2022     Mr. Delmy Woodard is a 65 y.o. male  : 1960  MRN: [id number]  [facility]  Requesting Provider: Dr. 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 The diagnosis was reviewed as were representative images. For non-limited brain metastases, the recommendation is for whole brain radiation therapy using hippocampal avoidance when possible, with memantine for 6 months. At least on the right side, there are multiple lesions that appear to be within 5 mm of the hippocampus. The MRI that is currently available was 4 mm slice thickness and thin slice MRI would be required for hippocampal avoidance and this may reveal additional lesions that preclude hippocampal avoidance technique. I reviewed various aspects of whole brain radiation therapy with or without hippocampal avoidance technique. This included review of the nature/mode of action of external beam radiation therapy multiple steps involved in set up/simulation, planning, initiation and performance of the treatment. I reviewed logistics including expected number of treatments (10) and amount of time usually spent in the department for each treatment. Having previously undergone neoadjuvant radiation therapy for his esophageal primary, Adonay Cooley is familiar with the overall process of treatment.   I further reviewed the amount of time Required for planning standard whole brain radiation therapy versus planning for hippocampal avoidance technique which utilizes intensity modulated radiation therapy. Allyssa Lowe and his family members had the opportunity to ask questions and indicated their questions were satisfactorily addressed. After considering his current situation (he remains remarkably asymptomatic), clinical practice guidelines and other factors, they have elected to proceed with standard whole brain radiation therapy with the addition of Namenda. PLAN:  Schedule CT simulation  Initiate radiation therapy (whole brain) after completion of treatment planning  Schedule teaching  Provide instructions for Namenda (Memantine), start medication when prescription picked up  Continue care with other physicians/providers  Continue surveillance and basic/supportive care in accordance with clinical practice guidelines      RADIATION THERAPY TREATMENT HISTORY:  Treatment Course Number: 1    Treatment Site (s) Modality Dose (cGy) From To Fractions/  Elapsed Days   Esophageal Primary, Mediastinal Lymph Nodes 6MV photons 5040 cGy 5/11/2022 6/20/2022 28/ 40         HISTORY OF PRESENT ILLNESS:   Sondra Mendez is a 76-year-old gentleman known to Radiation Oncology. He was seen for his initial radiation oncology consultation with Dr. Lupe Ruiz in April 2022. According to Dr. Verneita Mohs initial consultation:  As per Thoracic Surgery (Dr. Solomon Chung) 03/30/2022     Dyan Perez is a 62 y.o. male former smoker with history of HTN, HLD, and GERD who was referred to our service by Dr. Skinner [facility] for recently diagnosed esophageal adenocarcinoma who presents today for review of PET/CT and EUS for esophageal cancer staging. Patient reports he is doing well overall. He denies fever, hemoptysis, chest pain, or dysphagia. He is currently on a full diet and is supplementing with one Ensure per day.        62 y.o. male former smoker with history of HTN, HLD, and GERD who was referred to our service by Dr. Wilfred Murphy for recently diagnosed esophageal adenocarcinoma who presents today for review of PET/CT and EUS for esophageal cancer staging. Patient was staged as stage JOSE (uT2N2) on recent EUS. Findings from recent PET/CT correlate with EUS findings. Plan to have patient complete neoadjuvant chemoradiation treatment. We will follow-up with patient in ~6 weeks with a telemedicine visit in order to discuss progress. Imaging  EUS on 3/23/2022:  Impression:              - Partially obstructing, malignant esophageal tumor was found in the lower third of the esophagus.                          - Esophagogastric landmarks identified.                          - Large hiatal hernia. - Normal stomach. - Normal examined duodenum.                          - There was diffuse abnormal echotexture in the left lobe of the liver and in the right lobe of the liver. This was characterized by a hyperechoic appearance. This is consistent with a fatty liver. - There was no sign of significant pathology in the pancreatic head, genu of the pancreas and pancreatic body. - There was no sign of significant pathology in the common bile duct. - There was no sign of significant pathology in the gallbladder body. - A mass was found in the lower third of the esophagus. A tissue diagnosis was obtained prior to this exam. This is consistent with adenocarcinoma. This was staged T2 (based on invasion into) N2 Mx by endosonographic criteria. - Two malignant-appearing lymph nodes were visualized and measured in the lower paraesophageal mediastinum (level 8L) adjacent to the mass. - Two malignant-appearing lymph nodes were visualized and measured in the subcarinal mediastinum (level 7). - No specimens collected. PET/CT on 3/30/2022:  IMPRESSION:     Intense hypermetabolic activity within the mid esophagus consistent with primary malignancy. Adjacent hypermetabolic left periesophageal lymph node worrisome for metastatic adenopathy. After reviewing the diagnosis and treatment recommendations with Dr. Mayela Quinn was agreeable to proceeding with radiation therapy. He completed his course of treatment in June 2022 as shown above. Anni Vidal was seen for radiation oncology posttreatment check in August 2022. He was feeling well at that time and able to eat without dysphagia. He underwent esophagectomy 8/30/2022 With final pathology showing a 0.3 mm focus of intramucosal adenocarcinoma with background of extensive ulceration and erosion consistent with treatment effect. 12 lymph nodes were sampled none of which had evidence of metastatic adenocarcinoma. Surgical margins were free of malignancy. Anni Vidal subsequently underwent Botox injection of the pylorus due to pyloric stenosis. INTERVAL HISTORY:  Anni Vidal was seen for follow-up at [facility] 11/3/2022. He had a history of recent fall so Brain imaging was obtained (see reports and representative images below). CT scan showed multiple heterogeneous supratentorial and infratentorial masses highly suspicious for metastatic disease. MRI scan of the brain Confirmed the presence of intracranial metastatic disease. The largest metastatic deposit in the posterior fossa was resected 11/4/2022 providing histologic confirmation of metastatic adenocarcinoma. He received a recommendation for hippocampal avoidance whole brain radiation therapy with \"boost\" to the operative bed at [facility]. Anni Vidal wishes to undergo his radiation therapy closer to home. He is being seen to late 11/22/2022 for further evaluation and discussion regarding the role of palliative brain irradiation in the management of his metastatic esophageal adenocarcinoma.       Past Medical History:   Diagnosis Date    Atrial fibrillation ([facility]     Benign hypertension 11/3/2022    Cancer of distal third of esophagus ([facility] 2022    GERD (gastroesophageal reflux disease)     HLD (hyperlipidemia) 8/15/2022    Sleep apnea         Past Surgical History:   Procedure Laterality Date    ABDOMEN SURGERY  2022    GASTRIC DEVASCULARIZATON-[facility]    ABDOMEN SURGERY  2022    ESOPHAGOGASTRODUODENOSCOPY TRANSORAL DIAGNOSTIC ESOPHAGECTOMY W/ THORACOTOMY-[facility]    DENTAL SURGERY      25 teeth removed       Family History   Problem Relation Age of Onset    Colon Cancer Mother     Heart Disease Father     Cancer Brother         lung       Social History     Socioeconomic History    Marital status:      Spouse name: Jhonatan Devi    Number of children: 5    Years of education: 12    Highest education level: High school graduate   Occupational History    Occupation: VENDOR     Comment: [employer]   Tobacco Use    Smoking status: Former     Packs/day: 3.00     Years: 10.00     Pack years: 30.00     Types: Cigarettes     Quit date: 2013     Years since quittin.5    Smokeless tobacco: Never    Tobacco comments:     does not smoke, quit    Vaping Use    Vaping Use: Never used   Substance and Sexual Activity    Alcohol use: Never    Drug use: Never    Sexual activity: Yes     Partners: Female   Other Topics Concern    Not on file   Social History Narrative    Not on file     Social Determinants of Health     Financial Resource Strain: Low Risk     Difficulty of Paying Living Expenses: Not hard at all   Food Insecurity: Food Insecurity Present    Worried About Running Out of Food in the Last Year: Often true    Ran Out of Food in the Last Year: Sometimes true   Transportation Needs: Not on file   Physical Activity: Not on file   Stress: Not on file   Social Connections: Not on file   Intimate Partner Violence: Not on file   Housing Stability: Not on file     Exposure to Industrial/ environmental Carcinogens: no      ALLERGIES:   Allergies   Allergen Reactions    Paclitaxel Other (See Comments)     Severe lower back pain- able to resume after medications given    Aleve [Naproxen] Hives          Current Outpatient Medications   Medication Sig Dispense Refill    escitalopram (LEXAPRO) 10 MG tablet Take 1 tablet by mouth daily 30 tablet 1    mirtazapine (REMERON SOL-TAB) 15 MG disintegrating tablet Take 15 mg by mouth nightly      Blood Pressure Monitoring (BP MONITOR-STETHOSCOPE) KIT Dx: HTN 1 kit 0    Misc. Devices (PULSE OXIMETER FOR FINGER) MISC Dx:  hypoxemia 1 each 0    ibuprofen (ADVIL;MOTRIN) 400 MG tablet Take 400 mg by mouth every 6 hours as needed for Pain      pantoprazole (PROTONIX) 40 MG tablet Take 1 tablet by mouth in the morning and at bedtime 90 tablet 3    ondansetron (ZOFRAN-ODT) 8 MG TBDP disintegrating tablet Place 1 tablet under the tongue every 8 hours as needed for Nausea or Vomiting 10 tablet 1    prochlorperazine (COMPAZINE) 10 MG tablet Take 1 tablet by mouth every 6 hours as needed (nausea) 30 tablet 1    famotidine (PEPCID) 20 MG tablet Take 20 mg by mouth 2 times daily       NONFORMULARY Hema-Plex (Iron Multi-vitamin)       No current facility-administered medications for this encounter.      No outpatient medications have been marked as taking for the 11/22/22 encounter Psychiatric Encounter) with Jj Terry MD.          LABORATORY STUDIES:   11/6/2022: CBC:   Ref Range & Units 2 wk ago   WBC Count 3.73 - 10.10 K/uL 8.62    RBC Count 4.38 - 5.83 M/uL 3.89 Low     Hemoglobin 13.4 - 16.8 g/dL 11.3 Low     Hematocrit 39.6 - 48.8 % 34.7 Low     Mean Cell Volume 79.0 - 94.5 fL 89.2    Mean Cell Hgb 26.1 - 33.3 pg 29.0    Mean Cell Hgb Conc 31.9 - 36.5 g/dL 32.6    RBC Distribution 10.9 - 14.3 % 14.1    Platelet Count 168 - 337 K/uL 106 Low     Mean Platelet Volume 8.7 - 12.3 fL 9.6     11/6/2022: Chem-7:   Ref Range & Units 2 wk ago   Sodium 135 - 145 mmol/L 136 Potassium 3.5 - 5.0 mmol/L 3.8    Chloride 98 - 108 mmol/L 106    CO2 21 - 31 mmol/L 20 Low     Glucose 70 - 99 mg/dL 95    BUN 7 - 25 mg/dL 15    Creatinine 0.70 - 1.30 mg/dL 0.68 Low     Bun/Crea Ratio  22    Osmolality (Calculated) 278 - 305 mOsm/kg 285    Anion Gap 7 - 17 mmol/L 14    eGFR, CKD-EPI, Male >=60 mL/min/1.73m2 >90          PATHOLOGY:   11/4/2022: Surgical pathology:  Pathologic Diagnosis     A. Posterior fossa tumor, excision:  Metastatic adenocarcinoma, See Comment     Diagnosis Comments     We note the patient's history of metastatic esophageal adenocarcinoma. The current sample shows an epithelial neoplasm composed of cells with prominent nucleoli, nuclear pleomorphism, and vesicular chromatin. Frequent mitoses and necrosis are noted. Immunohistochemistry on block A1 shows the neoplastic cells are focally positive for CK7 while negative for CK20. The histologic and immunophenotypic findings support the above diagnosis. 8/30/2022: Surgical pathology:  Pathologic Diagnosis     A. Gastrohepatic lymph node #1, excision:  Benign fibroadipose tissue  No lymph nodes identified      B. Gastrohepatic lymph node #2, excision:   Benign fibroadipose tissue  No lymph nodes identified      C. Soft tissue, Hernia sac, repair:   Fibroadipose tissue with partial mesothelial lining consistent with hernia sac     D. Esophagus, excision:  Ulcerated esophageal wall with detached strips of columnar epithelium  No evidence of dysplasia or malignancy      E. Gastric margin, resection:   Segment of gastric wall lined by eroded gastric oxyntic mucosa  No evidence of dysplasia or malignancy. F. Additional gastric margin, resection:   Segment of gastric wall lined by gastric oxyntic mucosa with reactive changes  No evidence of dysplasia or malignancy.       G. Esophagus and stomach, esophagogastrectomy:     Focal Prescott's mucosa with high grade dysplasia and single microscopic focus (0.3 mm) of treated intramucosal adenocarcinoma (possibly non viable) in background of extensive ulceration and erosions, consistent with treatment effect  No invasive adenocarcinoma identified  Margins are free of dysplasia and adenocarcinoma   Twelve benign lymph nodes 0/12 2/14/2022: Surgical pathology:        RADIOLOGIC STUDIES:   11/3/2022: CT head without contrast:  IMPRESSION:   Multiple heterogeneous supratentorial and infratentorial masses very   suspicious for metastatic disease. The larger lesions, such as in the right   cerebellum, are associated with significant vasogenic edema. No focal parenchymal hematomas are present, though a suspected metastasis in   the left frontoparietal junction may be slightly hemorrhagic. Partially obstructed fourth ventricle with early obstructive hydrocephalus,   neurosurgical consultation is advised. Critical finding of intracranial mass lesions and obstructive hydrocephalus   was discussed with Arun Dobson NP at 10:16 AM on November 3rd, 2022.     11/3/2022: MRI brain with and without contrast:  IMPRESSION: Numerous supratentorial and infratentorial enhancing parenchymal   lesions (approximately 45), likely related to metastatic disease with the   largest lesion in the right cerebellum. Significant edema in the] and left   cerebellar hemispheres, with mass effect, partial effacement of the fourth   ventricle and moderate obstructive hydrocephalus. Several of the lesions   demonstrate intralesional blood products. 11/5/2022: CT head without contrast:  IMPRESSION:   Postoperative changes following right cerebellar mass resection with similar   blood products and pneumocephalus. Stable hemorrhagic metastatic lesion in the   left cerebellum. Compared to the preoperative examination, mass effect on the fourth ventricle   is improved and compared from the earlier CT examination the ventricles are   slightly smaller.      Numerous other brain metastases, some with some internal hemorrhage, better   seen on prior CT                Review of Systems   Constitutional:  Negative for activity change, appetite change, fatigue and unexpected weight change. HENT:  Negative for ear pain, facial swelling, hearing loss, sore throat, tinnitus and trouble swallowing. Eyes:  Negative for visual disturbance. Respiratory:  Negative for cough and shortness of breath. Cardiovascular:  Negative for chest pain. Gastrointestinal:  Negative for abdominal pain, blood in stool, nausea and rectal pain. Genitourinary:  Negative for difficulty urinating, frequency, hematuria and urgency. Musculoskeletal:  Negative for arthralgias and myalgias. Neurological:  Negative for dizziness, tremors, seizures, syncope, facial asymmetry, speech difficulty, weakness, light-headedness, numbness and headaches. Psychiatric/Behavioral:  Negative for confusion. A complete review of systems was performed and found to be negative except as presented above. PHYSICAL EXAMINATION:   VITAL SIGNS: /60   Pulse (!) 47   Temp 97.8 °F (36.6 °C)   Resp 18   Ht 5' 7\" (1.702 m)   Wt 156 lb 6.4 oz (70.9 kg)   SpO2 99%   BMI 24.50 kg/m²   ECOG PERFORMANCE STATUS: 1  PAIN RATIN  GENERAL: Pleasant well-developed adult male. In no acute distress. Alert and oriented ×3; clear mentation with appropriate affect  SKIN: Warm and dry, without jaundice, ecchymoses, or petechiae. HEENT: Normocephalic, atraumatic. PERRL/EOMI; ears, nose and lips unremarkable on external examination; oral cavity/oropharyngeal mucosa moist without lesion or exudate. Craniotomy incision well-healed with out any findings suggestive of postoperative infection or other surgical complication  NECK:  No JVD; no palpable cervical adenopathy.   THORAX: No palpable supraclavicular or axillary adenopathy;  LUNGS: clear   CARDIAC: Constitutional bradycardia, mild; Regular   ABDOMEN: Soft, nontender, bowel sounds present  EXTREMITIES: No clubbing or cyanosis  NEUROLOGIC: No grossly apparent focal deficits. Cranial nerves grossly intact      Jj Terry PhD MD  Radiation Oncology     ATTESTATION: 60 minutes face-to-face time,  >50% spent in counseling/discussion/education. CC: Rashad Khan; Bing Trejo  ACC: Tumor Registry: Dahiana Gomez [address]      This document was created using a voice-recognition program.  Computer generated transcription errors may be present.

## 2022-11-23 ENCOUNTER — HOSPITAL ENCOUNTER (OUTPATIENT)
Dept: RADIATION ONCOLOGY | Age: 62
Discharge: HOME | End: 2022-11-23
Payer: MEDICAID

## 2022-11-23 VITALS
WEIGHT: 157.63 LBS | SYSTOLIC BLOOD PRESSURE: 123 MMHG | RESPIRATION RATE: 18 BRPM | BODY MASS INDEX: 24.69 KG/M2 | TEMPERATURE: 97.8 F | DIASTOLIC BLOOD PRESSURE: 72 MMHG | OXYGEN SATURATION: 99 % | HEART RATE: 59 BPM

## 2022-11-23 PROCEDURE — 77387 GUIDANCE FOR RADJ TX DLVR: CPT | Performed by: RADIOLOGY

## 2022-11-23 PROCEDURE — 77412 RADIATION TX DELIVERY LVL 3: CPT | Performed by: RADIOLOGY

## 2022-11-23 NOTE — PROGRESS NOTES
[facility] [address], [address]        [facility] [facility], [address] [place] [address]        Harinder Randall: [phone]        F: [phone]       [email or web address]            Dr. William Haynes MD MS          Dr. Sai Wheeler MD PhD    ON TREATMENT VISIT (OTV) NOTE     Date of Service: 2022  Patient ID: Eric Cueto   : 1960  MRN: [id number]   Acct Number: [de-identified]     RADIATION ONCOLOGY ATTENDING:  Ritika Ponce MD MS    DIAGNOSIS:  Cancer Staging  Cancer of distal third of esophagus [facility])  Staging form: Esophagus - Adenocarcinoma, AJCC 8th Edition  - Clinical stage from 3/23/2022: Stage JOSE (cT2, cN2, cM0, G3) - Signed by José Miguel Croft MD on 2022      Treatment Area: Whole Brain    Current Total Dose(cGy): 600  Current Fraction: 2/10  Final/Cumulative Rx. Dose (cGy): 3000    Patient was seen today for weekly visit. Wt Readings from Last 3 Encounters:   22 156 lb 6.4 oz (70.9 kg)   22 156 lb 6.4 oz (70.9 kg)   22 156 lb 14.4 oz (71.2 kg)       There were no vitals taken for this visit.     Lab Results   Component Value Date    WBC 6.7 2022    PLT 89 (L) 2022       Comfort Alteration  Fatigue:Able to perform daily activities with rest periods    Pain Location: n/a  Pain Intensity (Current): 0 No Pain  Pain Treatment: N/A  Pain Relief: n/a    Emotional Alteration:   Coping: effective    Nutritional Alteration  Anorexia: none   Nausea: 1-2 episodes of nausea in 24 hours  Vomitin episode in 24 hours  Salivary Glands- Acute: No changes over baseline  Taste Disturbance (Dysgeusia): Normal   Dyspepsia/Heartburn: None  Dysphagia/Esophagitis: None    Skin Alteration   Skin reaction: No changes noted  Alopecia: No loss    Mucous Membrane Alteration  Mucositis XRT Related: None  Pharynx and Esophagus- Acute: No change over baseline  Voice Changes: Normal    Ventilation Alteration  Cough: None  Hemoptysis: None  Dyspnea: Normal  Mucous Quantity/Quality:     CNS Alteration  Level of Consciousness: Alert, responds briskly, appropriately with minimal stimulus  Seizure Activity: None  Insomnia: Normal   Orientation: Oriented in 3 spheres of person, place, and time  Comment:   Speech Impairment: No  Ataxia: Normal    Additional Comments: Started RT yesterday. Will have teaching done next week. MEDICATIONS:     Current Outpatient Medications   Medication Sig Dispense Refill    dexamethasone (DECADRON) 4 MG tablet Take 2 mg by mouth 2 times daily (with meals)      escitalopram (LEXAPRO) 10 MG tablet Take 1 tablet by mouth daily 30 tablet 1    mirtazapine (REMERON SOL-TAB) 15 MG disintegrating tablet Take 15 mg by mouth nightly (Patient not taking: Reported on 2022)      Blood Pressure Monitoring (BP MONITOR-STETHOSCOPE) KIT Dx: HTN 1 kit 0    Misc. Devices (PULSE OXIMETER FOR FINGER) MISC Dx:  hypoxemia 1 each 0    ibuprofen (ADVIL;MOTRIN) 400 MG tablet Take 400 mg by mouth every 6 hours as needed for Pain      pantoprazole (PROTONIX) 40 MG tablet Take 1 tablet by mouth in the morning and at bedtime 90 tablet 3    ondansetron (ZOFRAN-ODT) 8 MG TBDP disintegrating tablet Place 1 tablet under the tongue every 8 hours as needed for Nausea or Vomiting (Patient not taking: Reported on 2022) 10 tablet 1    prochlorperazine (COMPAZINE) 10 MG tablet Take 1 tablet by mouth every 6 hours as needed (nausea) (Patient not taking: Reported on 2022) 30 tablet 1    famotidine (PEPCID) 20 MG tablet Take 20 mg by mouth 2 times daily  (Patient not taking: Reported on 2022)      NONFORMULARY Hema-Plex (Iron Multi-vitamin)       No current facility-administered medications for this encounter. PHYSICAL EXAM:       ECO - Asymptomatic (Fully active, able to carry on all pre-disease activities without restriction)    General: NAD, AO x 3, Mentation is clear with appropriate affect.   HEENT:  Healing surgical scar of the posterior right scalp   Thorax:  Unlabored  Abdomen:  Non-distended  Neuro:  Cranial nerves grossly intact. Gait symmetric. Skin - treatment portal: SEE above. Chemotherapy Update: None    Treatment Imaging: Kv Pair, Port Film, and All imaging reviewed and approved by Dr. Lui Old: No significant radiation side effects. Responding appropriately to symptomatic management. Patient denies any headaches, nausea, vomiting, syncope, loss of appetite, weakness, numbness/tingling, gait instability, seizures, vision changes since his cerebellar surgery. Wife has noted only mild memory deficits and repetitive speech since surgery. Patient saw neurosurgery Monday and staples were removed    New medications, diagnostic results: Continue treatment as planned    PLAN: Again reviewed potential side effects of radiation for the patient's treatment. Continue local/topical care. Continue decadron. Discussed in detail his treatment schedule and the potential for treatment over the holiday period. At this time the patient and his family would prefer to have the holiday period to spend time with family and would prefer to have their next treatment next Monday after thanksgiving holiday. Continue current radiation course as prescribed.

## 2022-11-28 ENCOUNTER — HOSPITAL ENCOUNTER (OUTPATIENT)
Dept: RADIATION ONCOLOGY | Age: 62
Discharge: HOME | End: 2022-11-28
Payer: MEDICAID

## 2022-11-28 ENCOUNTER — OFFICE VISIT (OUTPATIENT)
Dept: SURGERY | Age: 62
End: 2022-11-28
Payer: MEDICAID

## 2022-11-28 ENCOUNTER — TELEPHONE (OUTPATIENT)
Dept: SURGERY | Age: 62
End: 2022-11-28

## 2022-11-28 VITALS
BODY MASS INDEX: 23.89 KG/M2 | RESPIRATION RATE: 20 BRPM | WEIGHT: 152.2 LBS | OXYGEN SATURATION: 99 % | DIASTOLIC BLOOD PRESSURE: 62 MMHG | HEART RATE: 52 BPM | TEMPERATURE: 96.6 F | SYSTOLIC BLOOD PRESSURE: 120 MMHG | HEIGHT: 67 IN

## 2022-11-28 DIAGNOSIS — I10 BENIGN HYPERTENSION: Primary | ICD-10-CM

## 2022-11-28 DIAGNOSIS — C15.5 CANCER OF DISTAL THIRD OF ESOPHAGUS (HCC): ICD-10-CM

## 2022-11-28 DIAGNOSIS — G93.9 BRAIN LESION: ICD-10-CM

## 2022-11-28 DIAGNOSIS — K21.00 GASTROESOPHAGEAL REFLUX DISEASE WITH ESOPHAGITIS WITHOUT HEMORRHAGE: ICD-10-CM

## 2022-11-28 DIAGNOSIS — D64.9 CHRONIC ANEMIA: ICD-10-CM

## 2022-11-28 PROCEDURE — 77387 GUIDANCE FOR RADJ TX DLVR: CPT | Performed by: RADIOLOGY

## 2022-11-28 PROCEDURE — 77412 RADIATION TX DELIVERY LVL 3: CPT | Performed by: RADIOLOGY

## 2022-11-28 PROCEDURE — 99203 OFFICE O/P NEW LOW 30 MIN: CPT | Performed by: SURGERY

## 2022-11-28 PROCEDURE — 77336 RADIATION PHYSICS CONSULT: CPT | Performed by: RADIOLOGY

## 2022-11-28 PROCEDURE — 3074F SYST BP LT 130 MM HG: CPT | Performed by: SURGERY

## 2022-11-28 PROCEDURE — 3078F DIAST BP <80 MM HG: CPT | Performed by: SURGERY

## 2022-11-28 ASSESSMENT — ENCOUNTER SYMPTOMS
WHEEZING: 0
CONSTIPATION: 0
BLOOD IN STOOL: 0
CHEST TIGHTNESS: 0
ABDOMINAL DISTENTION: 0
TROUBLE SWALLOWING: 0
BACK PAIN: 0
CHOKING: 0
NAUSEA: 1
STRIDOR: 0
SORE THROAT: 0
EYE REDNESS: 0
RECTAL PAIN: 0
DIARRHEA: 0
APNEA: 0
ABDOMINAL PAIN: 0
VOMITING: 1
SINUS PRESSURE: 0
FACIAL SWELLING: 0
PHOTOPHOBIA: 0
ANAL BLEEDING: 0
VOICE CHANGE: 0
RHINORRHEA: 0
EYE PAIN: 0
COLOR CHANGE: 0
EYE DISCHARGE: 0
EYE ITCHING: 0
SHORTNESS OF BREATH: 0
COUGH: 0

## 2022-11-28 NOTE — TELEPHONE ENCOUNTER
Patient scheduled for surgery with Dr. Carol Sheppard on 12- at 12:00pm with an arrival of 10:30am.  (Dr. Carol Sheppard scheduled this surgery)  Surgery instructions and antibacterial soap given. Surgery consent signed.

## 2022-11-28 NOTE — PROGRESS NOTES
New chemotherapy validation note:    Diagnosis for chemotherapy: Metastatic Esophageal Cancer (HER2+)     Regimen ordered: Trastuzumab + mFOLFOX       Reference or literature used for validation: NCCN      Date literature or guideline last updated 9/2022     Deviation from literature or guideline used: N/A    Summary of any verbal or telephone information obtained: Maribel Winchester [facility], PharmD, BCPS  Clinical Pharmacist   11/28/2022 9:08 AM

## 2022-11-28 NOTE — LETTER
[address] [facility] Surgery  [place] [id number] [facility] [address]  Phone: [phone]  Fax: [phone]           Niya Mishra MD      November 28, 2022     Patient: Sujatha Maki   MR Number: [id number]   YOB: 1960   Date of Visit: 11/28/2022       Dear Dr. Dominguez Pitch: Thank you for referring Jojo Resendez to me for evaluation/treatment. Below are the relevant portions of my assessment and plan of care. Problem List Items Addressed This Visit       Cancer of distal third of esophagus ([address]    Relevant Orders    INSERTION CENTRAL VENOUS ACCESS DEVICE W/ SUBCUTANEOUS PORT    FL VASCULAR ACCESS GUIDANCE    Chronic anemia    GERD (gastroesophageal reflux disease)    Benign hypertension - Primary    Brain lesion     Karley Sawyer  has a past medical history of Atrial fibrillation ([address] Benign hypertension, Cancer of distal third of esophagus ([address] GERD (gastroesophageal reflux disease), HLD (hyperlipidemia), and Sleep apnea. Schedule Karley Sawyer for Left  single Smart Port  Status: Outpatient  Planned anesthesia: MAC  He will undergo pre-operative clearance per anesthesia guidelines with risk factors listed under the past medical history diagnosis & problem list.  Perioperative discontinuation of ASA, Plavix, warfarin, Brillinta, Effient, Pradaxa, Eliquis, and Xarelto. Continuation of 81 mg Aspirin is acceptable. Perioperative medical clearance is not required   Techniques for long term IV access were discussed. Risks, complications and benefits of each were reviewed including bleeding, infection, thrombosis and lung injury were reviewed. The patient was given the opportunity to ask questions. Once answered, informed consent was obtained and the patient scheduled for the procedure.     Orders Placed This Encounter:  Orders Placed This Encounter   Procedures    INSERTION CENTRAL VENOUS ACCESS DEVICE W/ SUBCUTANEOUS PORT     Standing Status:   Future     Standing Expiration Date:   11/28/2023    [facility] VASCULAR ACCESS GUIDANCE     Standing Status:   Future     Standing Expiration Date:   11/28/2023         If you have questions, please do not hesitate to call me. I look forward to following Adonay Cooley along with you.     Sincerely,        Drew Cosme MD    CC providers:  MD Denisse Hernández [address]  [address] [address]  [place] [place]

## 2022-11-28 NOTE — PROGRESS NOTES
Darnell Dinero MD [facility]  General Surgery  New Patient Evaluation in Office  Pt Name: Rachel Aaron  Date of Birth 1960   Today's Date: 11/28/2022  Medical Record Number: [id number]  Referring Provider: Elizabeth Gilliland MD  Primary Care Provider: Cortney Smallwood DO  Chief Complaint   Patient presents with    Surgical Consult     New patient-referred by Dr Lopez-Metastatic esophageal cancer-Needs port placement     ASSESSMENT      Problem List Items Addressed This Visit       Cancer of distal third of esophagus [facility])    Relevant Orders    INSERTION CENTRAL VENOUS ACCESS DEVICE W/ SUBCUTANEOUS PORT    FL VASCULAR ACCESS GUIDANCE    Chronic anemia    GERD (gastroesophageal reflux disease)    Benign hypertension - Primary    Brain lesion     Anni Vidal  has a past medical history of Atrial fibrillation ([address] Benign hypertension, Cancer of distal third of es[address] [address] GERD (gastroesophageal reflux disease), HLD (hyperlipidemia), and Sleep apnea. PLANS      Schedule Anni Vidal for Left  single Smart Port  Status: Outpatient  Planned anesthesia: MAC  He will undergo pre-operative clearance per anesthesia guidelines with risk factors listed under the past medical history diagnosis & problem list.  Perioperative discontinuation of ASA, Plavix, warfarin, Brillinta, Effient, Pradaxa, Eliquis, and Xarelto. Continuation of 81 mg Aspirin is acceptable. Perioperative medical clearance is not required   Techniques for long term IV access were discussed. Risks, complications and benefits of each were reviewed including bleeding, infection, thrombosis and lung injury were reviewed. The patient was given the opportunity to ask questions. Once answered, informed consent was obtained and the patient scheduled for the procedure.     Orders Placed This Encounter:  Orders Placed This Encounter   Procedures    INSERTION CENTRAL VENOUS ACCESS DEVICE W/ SUBCUTANEOUS PORT     Standing Status:   Future     Standing Expiration Date:   11/28/2023    [facility] VASCULAR ACCESS GUIDANCE     Standing Status:   Future     Standing Expiration Date:   11/28/2023          Abby Hicks is a 65 y.o. male seen in the office for evaluation for single lumen powerport placement. He has a diagnosis of esophageal adenocarcinoma with brain lesion and requires semi permanent IV access for planned therapy. He denies any previous history of vascular injury, procedures or medical problems involving the upper extremities. He denies history of clavicle fracture. He is right hand dominant. Pt denies any history of bleeding problems. Past Medical History  Past Medical History:   Diagnosis Date    Atrial fibrillation ([facility] [address]     Benign hypertension 11/3/2022    Cancer of distal third of esophagus ([facility] [address] 04/07/2022    GERD (gastroesophageal reflux disease)     HLD (hyperlipidemia) 8/15/2022    Sleep apnea      Past Surgical History  Past Surgical History:   Procedure Laterality Date    ABDOMEN SURGERY  08/16/2022    GASTRIC DEVASCULARIZATON-[facility]    ABDOMEN SURGERY  08/30/2022    ESOPHAGOGASTRODUODENOSCOPY TRANSORAL DIAGNOSTIC ESOPHAGECTOMY W/ THORACOTOMY-[facility]    DENTAL SURGERY      25 teeth removed     Medications  Current Outpatient Medications on File Prior to Visit   Medication Sig Dispense Refill    dexamethasone (DECADRON) 4 MG tablet Take 2 mg by mouth 2 times daily (with meals)      escitalopram (LEXAPRO) 10 MG tablet Take 1 tablet by mouth daily 30 tablet 1    mirtazapine (REMERON SOL-TAB) 15 MG disintegrating tablet Take 15 mg by mouth nightly      Blood Pressure Monitoring (BP MONITOR-STETHOSCOPE) KIT Dx: HTN 1 kit 0    Misc.  Devices (PULSE OXIMETER FOR FINGER) MISC Dx:  hypoxemia 1 each 0    ibuprofen (ADVIL;MOTRIN) 400 MG tablet Take 400 mg by mouth every 6 hours as needed for Pain      pantoprazole (PROTONIX) 40 MG tablet Take 1 tablet by mouth in the morning and at bedtime 90 tablet 3    ondansetron (ZOFRAN-ODT) 8 MG TBDP disintegrating tablet Place 1 tablet under the tongue every 8 hours as needed for Nausea or Vomiting 10 tablet 1    prochlorperazine (COMPAZINE) 10 MG tablet Take 1 tablet by mouth every 6 hours as needed (nausea) 30 tablet 1    famotidine (PEPCID) 20 MG tablet Take 20 mg by mouth 2 times daily      NONFORMULARY Hema-Plex (Iron Multi-vitamin)       No current facility-administered medications on file prior to visit.      Allergies  Allergies   Allergen Reactions    Paclitaxel Other (See Comments)     Severe lower back pain- able to resume after medications given    Aleve [Naproxen] Hives     Family History  Family History   Problem Relation Age of Onset    Colon Cancer Mother     Heart Disease Father     Cancer Brother         lung     Social History  Social History     Socioeconomic History    Marital status:      Spouse name: Melody Morel    Number of children: 5    Years of education: 12    Highest education level: High school graduate   Occupational History    Occupation: VENDOR     Comment: [employer]   Tobacco Use    Smoking status: Former     Packs/day: 3.00     Years: 10.00     Pack years: 30.00     Types: Cigarettes     Quit date: 2013     Years since quittin.5    Smokeless tobacco: Never    Tobacco comments:     does not smoke, quit    Vaping Use    Vaping Use: Never used   Substance and Sexual Activity    Alcohol use: Never    Drug use: Never    Sexual activity: Yes     Partners: Female   Other Topics Concern    Not on file   Social History Narrative    Not on file     Social Determinants of Health     Financial Resource Strain: Low Risk     Difficulty of Paying Living Expenses: Not hard at all   Food Insecurity: Food Insecurity Present    Worried About [address] [employer] in the Last Year: Often true    Ran Out of Food in the Last Year: Sometimes true   Transportation Needs: Not on file   Physical Activity: Not on file   Stress: Not on file   Social Connections: Not on file   Intimate Partner Violence: Not on file   Housing Stability: Not on file     [address] Maintenance   Topic Date Due    COVID-19 Vaccine (1) Never done    Pneumococcal 0-64 years Vaccine (1 - PCV) Never done    DTaP/Tdap/Td vaccine (1 - Tdap) Never done    Shingles vaccine (1 of 2) Never done    Low dose CT lung screening  Never done    Lipids  03/22/2022    Flu vaccine (1) 08/01/2022    Depression Screen  02/17/2023    Colorectal Cancer Screen  05/05/2027    Hepatitis C screen  Completed    HIV screen  Completed    Hepatitis A vaccine  Aged Out    Hib vaccine  Aged Out    Meningococcal (ACWY) vaccine  Aged Out     Review of Systems  Constitutional:  Negative for activity change, appetite change, chills, diaphoresis, fatigue, fever and unexpected weight change. HENT:  Negative for congestion, dental problem, drooling, ear discharge, ear pain, facial swelling, hearing loss, mouth sores, nosebleeds, postnasal drip, rhinorrhea, sinus pressure, sneezing, sore throat, tinnitus, trouble swallowing and voice change. Eyes:  Negative for photophobia, pain, discharge, redness, itching and visual disturbance. Respiratory:  Negative for apnea, cough, choking, chest tightness, shortness of breath, wheezing and stridor. Cardiovascular:  Negative for chest pain, palpitations and leg swelling. Gastrointestinal:  Positive for nausea and vomiting. Negative for abdominal distention, abdominal pain, anal bleeding, blood in stool, constipation, diarrhea and rectal pain. Endocrine: Negative for cold intolerance, heat intolerance, polydipsia, polyphagia and polyuria. Genitourinary:  Negative for decreased urine volume, difficulty urinating, dysuria, enuresis, flank pain, frequency, genital sores, hematuria and urgency. Musculoskeletal:  Negative for arthralgias, back pain, gait problem, joint swelling, myalgias, neck pain and neck stiffness. Skin:  Negative for color change, pallor, rash and wound.    Allergic/Immunologic: Negative for environmental allergies, food allergies and immunocompromised state. Neurological:  Negative for dizziness, tremors, seizures, syncope, facial asymmetry, speech difficulty, weakness, light-headedness, numbness and headaches. Hematological:  Negative for adenopathy. Does not bruise/bleed easily. Psychiatric/Behavioral:  Negative for agitation, behavioral problems, confusion, decreased concentration, dysphoric mood, hallucinations, self-injury, sleep disturbance and suicidal ideas. The patient is not nervous/anxious and is not hyperactive. OBJECTIVE      VITALS:  height is 5' 7\" (1.702 m) and weight is 152 lb 3.2 oz (69 kg). His temporal temperature is 96.6 °F (35.9 °C) (abnormal). His blood pressure is 120/62 and his pulse is 52. His respiration is 20 and oxygen saturation is 99%. CONSTITUTIONAL: Alert and oriented times 3, no acute distress and cooperative to examination with proper mood and affect. SKIN: Skin color, texture, turgor normal. No rashes or lesions. LYMPH: no cervical nodes, no inguinal nodes  HEENT: Head is normocephalic, atraumatic. EOMI, PERRLA. NECK: Supple, symmetrical, trachea midline, no adenopathy, thyroid symmetric, not enlarged and no tenderness, skin normal.  CHEST/LUNGS: chest symmetric with normal A/P diameter, normal respiratory rate and rhythm, lungs clear to auscultation without wheezes, rales or rhonchi. No accessory muscle use. Scars None   CARDIOVASCULAR: Heart sounds are normal.  Regular rate and rhythm without murmur, gallop or rub. Normal S1 and S2. . Carotid and femoral pulses 2+/4 and equal bilaterally. ABDOMEN: Normal shape. large midline scar scar(s) present. Normal bowel sounds. No bruits. Celestia Addi \"soft, nontender, nondistended, no masses or organomegaly. Percussion: Normal without hepatosplenomegally. Tenderness: absent. RECTAL: deferred, not clinically indicated  NEUROLOGIC: There are no focalizing motor or sensory deficits. CN II-XII are grossly intact. Celestia Addi EXTREMITIES: no cyanosis, no clubbing, and no edema.              Lacie Waterman MD FACS  Electronically signed by Lacie Waterman MD on 11/28/2022 at 4:49 PM

## 2022-11-28 NOTE — PROGRESS NOTES
Subjective:      Patient ID: Rachel Aaron is a 65 y.o. male. Chief Complaint   Patient presents with    Surgical Consult     New patient-referred by Dr Lopez-Metastatic esophageal cancer-Needs port placement       HPI    Review of Systems   Constitutional:  Negative for activity change, appetite change, chills, diaphoresis, fatigue, fever and unexpected weight change. HENT:  Negative for congestion, dental problem, drooling, ear discharge, ear pain, facial swelling, hearing loss, mouth sores, nosebleeds, postnasal drip, rhinorrhea, sinus pressure, sneezing, sore throat, tinnitus, trouble swallowing and voice change. Eyes:  Negative for photophobia, pain, discharge, redness, itching and visual disturbance. Respiratory:  Negative for apnea, cough, choking, chest tightness, shortness of breath, wheezing and stridor. Cardiovascular:  Negative for chest pain, palpitations and leg swelling. Gastrointestinal:  Positive for nausea and vomiting. Negative for abdominal distention, abdominal pain, anal bleeding, blood in stool, constipation, diarrhea and rectal pain. Endocrine: Negative for cold intolerance, heat intolerance, polydipsia, polyphagia and polyuria. Genitourinary:  Negative for decreased urine volume, difficulty urinating, dysuria, enuresis, flank pain, frequency, genital sores, hematuria and urgency. Musculoskeletal:  Negative for arthralgias, back pain, gait problem, joint swelling, myalgias, neck pain and neck stiffness. Skin:  Negative for color change, pallor, rash and wound. Allergic/Immunologic: Negative for environmental allergies, food allergies and immunocompromised state. Neurological:  Negative for dizziness, tremors, seizures, syncope, facial asymmetry, speech difficulty, weakness, light-headedness, numbness and headaches. Hematological:  Negative for adenopathy. Does not bruise/bleed easily.    Psychiatric/Behavioral:  Negative for agitation, behavioral problems, confusion, decreased concentration, dysphoric mood, hallucinations, self-injury, sleep disturbance and suicidal ideas. The patient is not nervous/anxious and is not hyperactive.     /62 (Site: Right Upper Arm, Position: Sitting, Cuff Size: Medium Adult)   Pulse 52   Temp (!) 96.6 °F (35.9 °C) (Temporal)   Resp 20   Ht 5' 7\" (1.702 m)   Wt 152 lb 3.2 oz (69 kg)   SpO2 99%   BMI 23.84 kg/m²     Objective:   Physical Exam    Assessment:            Plan:              Starla Robertson LPN

## 2022-11-29 ENCOUNTER — HOSPITAL ENCOUNTER (OUTPATIENT)
Dept: RADIATION ONCOLOGY | Age: 62
Discharge: HOME | End: 2022-11-29
Payer: MEDICAID

## 2022-11-29 PROCEDURE — 77387 GUIDANCE FOR RADJ TX DLVR: CPT | Performed by: RADIOLOGY

## 2022-11-29 PROCEDURE — 77412 RADIATION TX DELIVERY LVL 3: CPT | Performed by: RADIOLOGY

## 2022-11-29 NOTE — H&P
Timothy Hughes MD [facility]  General Surgery  H and P for Surgery   Pt Name: David Valladares  Date of Birth 1960   Today's Date: 11/29/2022  Medical Record Number: [id number]  Referring Provider: No ref. provider found  Primary Care Provider: Aris Corrales DO  No chief complaint on file. ASSESSMENT      Problem List Items Addressed This Visit    None    Ivanna Cousins  has a past medical history of Atrial fibrillation ([address] Benign hypertension, Cancer of distal third of esophagus ([address] GERD (gastroesophageal reflux disease), HLD (hyperlipidemia), and Sleep apnea. PLANS      Schedule Davyue Cousins for Left  single Smart Port  Status: Outpatient  Planned anesthesia: MAC  He will undergo pre-operative clearance per anesthesia guidelines with risk factors listed under the past medical history diagnosis & problem list.  Perioperative discontinuation of ASA, Plavix, warfarin, Brillinta, Effient, Pradaxa, Eliquis, and Xarelto. Continuation of 81 mg Aspirin is acceptable. Perioperative medical clearance is not required   Techniques for long term IV access were discussed. Risks, complications and benefits of each were reviewed including bleeding, infection, thrombosis and lung injury were reviewed. The patient was given the opportunity to ask questions. Once answered, informed consent was obtained and the patient scheduled for the procedure. Orders Placed This Encounter:  No orders of the defined types were placed in this encounter. Wayne Augustine is a 65 y.o. male seen in the office for evaluation for single lumen powerport placement. He has a diagnosis of esophageal adenocarcinoma with brain lesion and requires semi permanent IV access for planned therapy. He denies any previous history of vascular injury, procedures or medical problems involving the upper extremities. He denies history of clavicle fracture. He is right hand dominant. Pt denies any history of bleeding problems.   Past Medical History  Past Medical History:   Diagnosis Date    Atrial fibrillation ([address]     Benign hypertension 11/3/2022    Cancer of distal third of esophagus ([address] 04/07/2022    GERD (gastroesophageal reflux disease)     HLD (hyperlipidemia) 8/15/2022    Sleep apnea      Past Surgical History  Past Surgical History:   Procedure Laterality Date    ABDOMEN SURGERY  08/16/2022    [facility]IC [facility]IZATON[facility]    ABDOMEN SURGERY  08/30/2022    ESOPHAGOGASTRODUODENOSCOPY TRANSORAL DIAGNOSTIC ESOPHAGECTOMY W/ THORACOTOMY-[facility]    DENTAL SURGERY      25 teeth removed     Medications  No current facility-administered medications on file prior to encounter. Current Outpatient Medications on File Prior to Encounter   Medication Sig Dispense Refill    dexamethasone (DECADRON) 4 MG tablet Take 2 mg by mouth 2 times daily (with meals)      escitalopram (LEXAPRO) 10 MG tablet Take 1 tablet by mouth daily 30 tablet 1    mirtazapine (REMERON SOL-TAB) 15 MG disintegrating tablet Take 15 mg by mouth nightly      Blood Pressure Monitoring (BP MONITOR-STETHOSCOPE) KIT Dx: HTN 1 kit 0    Misc.  Devices (PULSE OXIMETER FOR FINGER) MISC Dx:  hypoxemia 1 each 0    ibuprofen (ADVIL;MOTRIN) 400 MG tablet Take 400 mg by mouth every 6 hours as needed for Pain      pantoprazole (PROTONIX) 40 MG tablet Take 1 tablet by mouth in the morning and at bedtime 90 tablet 3    ondansetron (ZOFRAN-ODT) 8 MG TBDP disintegrating tablet Place 1 tablet under the tongue every 8 hours as needed for Nausea or Vomiting 10 tablet 1    prochlorperazine (COMPAZINE) 10 MG tablet Take 1 tablet by mouth every 6 hours as needed (nausea) 30 tablet 1    famotidine (PEPCID) 20 MG tablet Take 20 mg by mouth 2 times daily      NONFORMULARY Hema-Plex (Iron Multi-vitamin)       Allergies  Allergies   Allergen Reactions    Paclitaxel Other (See Comments)     Severe lower back pain- able to resume after medications given    Aleve [Naproxen] Hives     Family History  Family History Problem Relation Age of Onset    Colon Cancer Mother     Heart Disease Father     Cancer Brother         lung     Social History  Social History     Socioeconomic History    Marital status:      Spouse name: Andrea Pinto    Number of children: 5    Years of education: 12    Highest education level: High school graduate   Occupational History    Occupation: VENDOR     Comment: [employer] [address] [employer]   Tobacco Use    Smoking status: Former     Packs/day: 3.00     Years: 10.00     Pack years: 30.00     Types: Cigarettes     Quit date: 2013     Years since quittin.5    Smokeless tobacco: Never    Tobacco comments:     does not smoke, quit    Vaping Use    Vaping Use: Never used   Substance and Sexual Activity    Alcohol use: Never    Drug use: Never    Sexual activity: Yes     Partners: Female   Other Topics Concern    Not on file   Social History Narrative    Not on file     Social Determinants of Health     Financial Resource Strain: Low Risk     Difficulty of Paying Living Expenses: Not hard at all   Food Insecurity: Food Insecurity Present    Worried About [address] in the Last Year: Often true    Ran Out of Food in the Last Year: Sometimes true   Transportation Needs: Not on file   Physical Activity: Not on file   Stress: Not on file   Social Connections: Not on file   Intimate Partner Violence: Not on file   Housing Stability: Not on [address] Maintenance   Topic Date Due    COVID-19 Vaccine (1) Never done    Pneumococcal 0-64 years Vaccine (1 - PCV) Never done    DTaP/Tdap/Td vaccine (1 - Tdap) Never done    Shingles vaccine (1 of 2) Never done    Low dose CT lung screening  Never done    Lipids  2022    Flu vaccine (1) 2022    Depression Screen  2023    Colorectal Cancer Screen  2027    Hepatitis C screen  Completed    HIV screen  Completed    Hepatitis A vaccine  Aged Out    Hib vaccine  Aged Out    Meningococcal (ACWY) vaccine  Aged Out     Review of Systems  Constitutional:  Negative for activity change, appetite change, chills, diaphoresis, fatigue, fever and unexpected weight change. HENT:  Negative for congestion, dental problem, drooling, ear discharge, ear pain, facial swelling, hearing loss, mouth sores, nosebleeds, postnasal drip, rhinorrhea, sinus pressure, sneezing, sore throat, tinnitus, trouble swallowing and voice change. Eyes:  Negative for photophobia, pain, discharge, redness, itching and visual disturbance. Respiratory:  Negative for apnea, cough, choking, chest tightness, shortness of breath, wheezing and stridor. Cardiovascular:  Negative for chest pain, palpitations and leg swelling. Gastrointestinal:  Positive for nausea and vomiting. Negative for abdominal distention, abdominal pain, anal bleeding, blood in stool, constipation, diarrhea and rectal pain. Endocrine: Negative for cold intolerance, heat intolerance, polydipsia, polyphagia and polyuria. Genitourinary:  Negative for decreased urine volume, difficulty urinating, dysuria, enuresis, flank pain, frequency, genital sores, hematuria and urgency. Musculoskeletal:  Negative for arthralgias, back pain, gait problem, joint swelling, myalgias, neck pain and neck stiffness. Skin:  Negative for color change, pallor, rash and wound. Allergic/Immunologic: Negative for environmental allergies, food allergies and immunocompromised state. Neurological:  Negative for dizziness, tremors, seizures, syncope, facial asymmetry, speech difficulty, weakness, light-headedness, numbness and headaches. Hematological:  Negative for adenopathy. Does not bruise/bleed easily. Psychiatric/Behavioral:  Negative for agitation, behavioral problems, confusion, decreased concentration, dysphoric mood, hallucinations, self-injury, sleep disturbance and suicidal ideas. The patient is not nervous/anxious and is not hyperactive.      OBJECTIVE      VITALS:  vitals were not taken for this visit. CONSTITUTIONAL: Alert and oriented times 3, no acute distress and cooperative to examination with proper mood and affect. SKIN: Skin color, texture, turgor normal. No rashes or lesions. LYMPH: no cervical nodes, no inguinal nodes  HEENT: Head is normocephalic, atraumatic. EOMI, PERRLA. NECK: Supple, symmetrical, trachea midline, no adenopathy, thyroid symmetric, not enlarged and no tenderness, skin normal.  CHEST/LUNGS: chest symmetric with normal A/P diameter, normal respiratory rate and rhythm, lungs clear to auscultation without wheezes, rales or rhonchi. No accessory muscle use. Scars None   CARDIOVASCULAR: Heart sounds are normal.  Regular rate and rhythm without murmur, gallop or rub. Normal S1 and S2. . Carotid and femoral pulses 2+/4 and equal bilaterally. ABDOMEN: Normal shape. large midline scar scar(s) present. Normal bowel sounds. No bruits. Santiago Potters \"soft, nontender, nondistended, no masses or organomegaly. Percussion: Normal without hepatosplenomegally. Tenderness: absent. RECTAL: deferred, not clinically indicated  NEUROLOGIC: There are no focalizing motor or sensory deficits. CN II-XII are grossly intact. Santiago Potters EXTREMITIES: no cyanosis, no clubbing, and no edema.              Pankaj Lane MD FACS  Electronically signed by Pankaj Lane MD on 11/29/2022 at 3:09 PM

## 2022-11-30 ENCOUNTER — HOSPITAL ENCOUNTER (OUTPATIENT)
Dept: RADIATION ONCOLOGY | Age: 62
Discharge: HOME | End: 2022-11-30
Payer: MEDICAID

## 2022-11-30 PROCEDURE — 77387 GUIDANCE FOR RADJ TX DLVR: CPT | Performed by: RADIOLOGY

## 2022-11-30 PROCEDURE — 77412 RADIATION TX DELIVERY LVL 3: CPT | Performed by: RADIOLOGY

## 2022-12-01 ENCOUNTER — APPOINTMENT (OUTPATIENT)
Dept: GENERAL RADIOLOGY | Age: 62
End: 2022-12-01
Attending: SURGERY
Payer: MEDICAID

## 2022-12-01 ENCOUNTER — HOSPITAL ENCOUNTER (OUTPATIENT)
Dept: GENERAL RADIOLOGY | Age: 62
Setting detail: OUTPATIENT SURGERY
Discharge: HOME | End: 2022-12-01
Attending: SURGERY
Payer: MEDICAID

## 2022-12-01 ENCOUNTER — HOSPITAL ENCOUNTER (OUTPATIENT)
Age: 62
Setting detail: OUTPATIENT SURGERY
Discharge: HOME | End: 2022-12-01
Attending: SURGERY | Admitting: SURGERY
Payer: MEDICAID

## 2022-12-01 ENCOUNTER — ANESTHESIA (OUTPATIENT)
Dept: OPERATING ROOM | Age: 62
End: 2022-12-01
Payer: MEDICAID

## 2022-12-01 ENCOUNTER — HOSPITAL ENCOUNTER (OUTPATIENT)
Dept: RADIATION ONCOLOGY | Age: 62
Discharge: HOME | End: 2022-12-01
Payer: MEDICAID

## 2022-12-01 ENCOUNTER — ANESTHESIA EVENT (OUTPATIENT)
Dept: OPERATING ROOM | Age: 62
End: 2022-12-01
Payer: MEDICAID

## 2022-12-01 VITALS
HEIGHT: 67 IN | RESPIRATION RATE: 16 BRPM | TEMPERATURE: 97.1 F | HEART RATE: 55 BPM | BODY MASS INDEX: 23.54 KG/M2 | SYSTOLIC BLOOD PRESSURE: 116 MMHG | DIASTOLIC BLOOD PRESSURE: 72 MMHG | WEIGHT: 150 LBS | OXYGEN SATURATION: 98 %

## 2022-12-01 VITALS
OXYGEN SATURATION: 99 % | TEMPERATURE: 97.9 F | HEART RATE: 52 BPM | SYSTOLIC BLOOD PRESSURE: 132 MMHG | WEIGHT: 151.68 LBS | DIASTOLIC BLOOD PRESSURE: 72 MMHG | RESPIRATION RATE: 18 BRPM | BODY MASS INDEX: 23.76 KG/M2

## 2022-12-01 DIAGNOSIS — C15.5 CANCER OF DISTAL THIRD OF ESOPHAGUS (HCC): ICD-10-CM

## 2022-12-01 DIAGNOSIS — Z45.2 ENCOUNTER FOR INSERTION OF VENOUS ACCESS PORT: Primary | ICD-10-CM

## 2022-12-01 LAB — GLUCOSE BLD-MCNC: 91 MG/DL (ref 70–108)

## 2022-12-01 PROCEDURE — 7100000010 HC PHASE II RECOVERY - FIRST 15 MIN: Performed by: SURGERY

## 2022-12-01 PROCEDURE — 6360000002 HC RX W HCPCS: Performed by: SURGERY

## 2022-12-01 PROCEDURE — 3700000001 HC ADD 15 MINUTES (ANESTHESIA): Performed by: SURGERY

## 2022-12-01 PROCEDURE — 77412 RADIATION TX DELIVERY LVL 3: CPT | Performed by: RADIOLOGY

## 2022-12-01 PROCEDURE — 77387 GUIDANCE FOR RADJ TX DLVR: CPT | Performed by: RADIOLOGY

## 2022-12-01 PROCEDURE — 2580000003 HC RX 258: Performed by: SURGERY

## 2022-12-01 PROCEDURE — 82948 REAGENT STRIP/BLOOD GLUCOSE: CPT

## 2022-12-01 PROCEDURE — 3700000000 HC ANESTHESIA ATTENDED CARE: Performed by: SURGERY

## 2022-12-01 PROCEDURE — 7100000011 HC PHASE II RECOVERY - ADDTL 15 MIN: Performed by: SURGERY

## 2022-12-01 PROCEDURE — 77001 FLUOROGUIDE FOR VEIN DEVICE: CPT

## 2022-12-01 PROCEDURE — 2500000003 HC RX 250 WO HCPCS: Performed by: NURSE ANESTHETIST, CERTIFIED REGISTERED

## 2022-12-01 PROCEDURE — 71045 X-RAY EXAM CHEST 1 VIEW: CPT

## 2022-12-01 PROCEDURE — 36561 INSERT TUNNELED CV CATH: CPT | Performed by: SURGERY

## 2022-12-01 PROCEDURE — 2709999900 HC NON-CHARGEABLE SUPPLY: Performed by: SURGERY

## 2022-12-01 PROCEDURE — 2500000003 HC RX 250 WO HCPCS: Performed by: SURGERY

## 2022-12-01 PROCEDURE — 3600000002 HC SURGERY LEVEL 2 BASE: Performed by: SURGERY

## 2022-12-01 PROCEDURE — 6370000000 HC RX 637 (ALT 250 FOR IP): Performed by: SURGERY

## 2022-12-01 PROCEDURE — 3600000012 HC SURGERY LEVEL 2 ADDTL 15MIN: Performed by: SURGERY

## 2022-12-01 PROCEDURE — C1788 PORT, INDWELLING, IMP: HCPCS | Performed by: SURGERY

## 2022-12-01 PROCEDURE — 6360000002 HC RX W HCPCS: Performed by: NURSE ANESTHETIST, CERTIFIED REGISTERED

## 2022-12-01 PROCEDURE — 77001 FLUOROGUIDE FOR VEIN DEVICE: CPT | Performed by: SURGERY

## 2022-12-01 DEVICE — PORT INFUS 6FR SLIM POLYUR ATTCH OPN END SGL LUMN VEN CATH: Type: IMPLANTABLE DEVICE | Site: SUBCLAVIAN | Status: FUNCTIONAL

## 2022-12-01 RX ORDER — LIDOCAINE HYDROCHLORIDE 20 MG/ML
INJECTION, SOLUTION EPIDURAL; INFILTRATION; INTRACAUDAL; PERINEURAL PRN
Status: DISCONTINUED | OUTPATIENT
Start: 2022-12-01 | End: 2022-12-01 | Stop reason: SDUPTHER

## 2022-12-01 RX ORDER — ACETAMINOPHEN 500 MG
1000 TABLET ORAL ONCE
Status: COMPLETED | OUTPATIENT
Start: 2022-12-01 | End: 2022-12-01

## 2022-12-01 RX ORDER — SODIUM CHLORIDE 9 MG/ML
INJECTION, SOLUTION INTRAVENOUS CONTINUOUS
Status: DISCONTINUED | OUTPATIENT
Start: 2022-12-01 | End: 2022-12-01 | Stop reason: HOSPADM

## 2022-12-01 RX ORDER — MIDAZOLAM HYDROCHLORIDE 1 MG/ML
INJECTION INTRAMUSCULAR; INTRAVENOUS PRN
Status: DISCONTINUED | OUTPATIENT
Start: 2022-12-01 | End: 2022-12-01 | Stop reason: SDUPTHER

## 2022-12-01 RX ORDER — SODIUM CHLORIDE 9 MG/ML
INJECTION, SOLUTION INTRAVENOUS PRN
Status: DISCONTINUED | OUTPATIENT
Start: 2022-12-01 | End: 2022-12-01 | Stop reason: HOSPADM

## 2022-12-01 RX ORDER — HYDROCODONE BITARTRATE AND ACETAMINOPHEN 5; 325 MG/1; MG/1
1 TABLET ORAL EVERY 6 HOURS PRN
Qty: 20 TABLET | Refills: 0 | Status: SHIPPED | OUTPATIENT
Start: 2022-12-01 | End: 2022-12-06

## 2022-12-01 RX ORDER — PROPOFOL 10 MG/ML
INJECTION, EMULSION INTRAVENOUS CONTINUOUS PRN
Status: DISCONTINUED | OUTPATIENT
Start: 2022-12-01 | End: 2022-12-01 | Stop reason: SDUPTHER

## 2022-12-01 RX ORDER — SODIUM CHLORIDE 0.9 % (FLUSH) 0.9 %
5-40 SYRINGE (ML) INJECTION EVERY 12 HOURS SCHEDULED
Status: DISCONTINUED | OUTPATIENT
Start: 2022-12-01 | End: 2022-12-01 | Stop reason: HOSPADM

## 2022-12-01 RX ORDER — SODIUM CHLORIDE 0.9 % (FLUSH) 0.9 %
5-40 SYRINGE (ML) INJECTION PRN
Status: DISCONTINUED | OUTPATIENT
Start: 2022-12-01 | End: 2022-12-01 | Stop reason: HOSPADM

## 2022-12-01 RX ORDER — FENTANYL CITRATE 50 UG/ML
INJECTION, SOLUTION INTRAMUSCULAR; INTRAVENOUS PRN
Status: DISCONTINUED | OUTPATIENT
Start: 2022-12-01 | End: 2022-12-01 | Stop reason: SDUPTHER

## 2022-12-01 RX ORDER — HEPARIN SODIUM (PORCINE) LOCK FLUSH IV SOLN 100 UNIT/ML 100 UNIT/ML
SOLUTION INTRAVENOUS PRN
Status: DISCONTINUED | OUTPATIENT
Start: 2022-12-01 | End: 2022-12-01 | Stop reason: ALTCHOICE

## 2022-12-01 RX ADMIN — ACETAMINOPHEN 1000 MG: 500 TABLET, FILM COATED ORAL at 10:37

## 2022-12-01 RX ADMIN — LIDOCAINE HYDROCHLORIDE 3 ML: 20 INJECTION, SOLUTION EPIDURAL; INFILTRATION; INTRACAUDAL; PERINEURAL at 11:20

## 2022-12-01 RX ADMIN — FENTANYL CITRATE 50 MCG: 50 INJECTION, SOLUTION INTRAMUSCULAR; INTRAVENOUS at 11:20

## 2022-12-01 RX ADMIN — SODIUM CHLORIDE: 9 INJECTION, SOLUTION INTRAVENOUS at 10:37

## 2022-12-01 RX ADMIN — CEFAZOLIN 2000 MG: 10 INJECTION, POWDER, FOR SOLUTION INTRAVENOUS at 11:26

## 2022-12-01 RX ADMIN — MIDAZOLAM 2 MG: 1 INJECTION INTRAMUSCULAR; INTRAVENOUS at 11:20

## 2022-12-01 RX ADMIN — PROPOFOL 100 MCG/KG/MIN: 10 INJECTION, EMULSION INTRAVENOUS at 11:20

## 2022-12-01 ASSESSMENT — PAIN SCALES - GENERAL
PAINLEVEL_OUTOF10: 0

## 2022-12-01 NOTE — PROGRESS NOTES
[facility] [address], [address]        [address], [address] [place] [address]        Joanna Pillait: [phone]        F: [phone]       [employer] [facility]            Dr. Loly Mccabe MD MS          Dr. Rodney Yusuf MD PhD    ON TREATMENT VISIT (OTV) NOTE     Date of Service: 2022  Patient ID: Pedro Anderson   : 1960  MRN: [id number]   Acct Number: [de-identified]     RADIATION ONCOLOGY ATTENDING:  Mary Perez MD MS    DIAGNOSIS:  Cancer Staging  Cancer of distal third of esophagus [facility])  Staging form: Esophagus - Adenocarcinoma, AJCC 8th Edition  - Clinical stage from 3/23/2022: Stage JOSE (cT2, cN2, cM0, G3) - Signed by Yoon Noyola MD on 2022      Treatment Area: Whole Brain    Current Total Dose(cGy): 1800  Current Fraction: 6/10  Final/Cumulative Rx. Dose (cGy): 3000    Patient was seen today for weekly visit.      Wt Readings from Last 3 Encounters:   22 151 lb 10.8 oz (68.8 kg)   22 150 lb (68 kg)   22 152 lb 3.2 oz (69 kg)       /72   Pulse 52   Temp 97.9 °F (36.6 °C) (Infrared)   Resp 18   Wt 151 lb 10.8 oz (68.8 kg)   SpO2 99%   BMI 23.76 kg/m²     Lab Results   Component Value Date    WBC 6.7 2022    PLT 89 (L) 2022       Comfort Alteration  Fatigue:Able to perform daily activities with rest periods    Pain Location: n/a  Pain Intensity (Current): 0 No Pain  Pain Treatment: N/A  Pain Relief: n/a    Emotional Alteration:   Coping: effective    Nutritional Alteration  Anorexia: Loss of appetite but able to eat smaller portions of food and/or liquids  Nausea: No nausea noted  Vomiting: No vomiting   Salivary Glands- Acute: No changes over baseline  Taste Disturbance (Dysgeusia): Normal   Dyspepsia/Heartburn: None  Dysphagia/Esophagitis: None    Skin Alteration   Skin reaction: No changes noted  Alopecia: No loss    Mucous Membrane Alteration  Mucositis XRT Related: None  Pharynx and Esophagus- Acute: No change over baseline  Voice Changes: Normal    Ventilation Alteration  Cough: None  Hemoptysis: None  Dyspnea: Normal  Mucous Quantity/Quality:     CNS Alteration  Level of Consciousness: Alert, responds briskly, appropriately with minimal stimulus  Seizure Activity: None  Insomnia: Normal   Orientation: Oriented in 3 spheres of person, place, and time  Comment:   Speech Impairment: No  Ataxia: Normal    Additional Comments: Had port placed today. POC Glucose reading taken today after treatment - 91 mg/dl. MEDICATIONS:     Current Outpatient Medications   Medication Sig Dispense Refill    HYDROcodone-acetaminophen (NORCO) 5-325 MG per tablet Take 1 tablet by mouth every 6 hours as needed for Pain for up to 5 days. Intended supply: 5 days. Take lowest dose possible to manage pain 20 tablet 0    dexamethasone (DECADRON) 4 MG tablet Take 2 mg by mouth 2 times daily (with meals)      escitalopram (LEXAPRO) 10 MG tablet Take 1 tablet by mouth daily 30 tablet 1    Blood Pressure Monitoring (BP MONITOR-STETHOSCOPE) KIT Dx: HTN 1 kit 0    Misc. Devices (PULSE OXIMETER FOR FINGER) MISC Dx:  hypoxemia 1 each 0    ibuprofen (ADVIL;MOTRIN) 400 MG tablet Take 400 mg by mouth every 6 hours as needed for Pain      pantoprazole (PROTONIX) 40 MG tablet Take 1 tablet by mouth in the morning and at bedtime 90 tablet 3    famotidine (PEPCID) 20 MG tablet Take 20 mg by mouth 2 times daily      NONFORMULARY Hema-Plex (Iron Multi-vitamin)       No current facility-administered medications for this encounter. PHYSICAL EXAM:       ECO - Asymptomatic (Fully active, able to carry on all pre-disease activities without restriction)    General: NAD, AO x 3, Mentation is clear with appropriate affect. HEENT:  Healing surgical scar of the posterior right scalp   Thorax:  Unlabored  Abdomen:  Non-distended  Neuro:  Cranial nerves grossly intact. Skin - treatment portal: SEE above.        Chemotherapy Update: None    Treatment Imaging: Kv Pair, Port Film, and All imaging reviewed and approved by Dr. Candis Rice: No significant radiation side effects. Responding appropriately to symptomatic management. On discussion of possible prescription of Namenda, patient declined at this juncture as he does not want to take any more medications    New medications, diagnostic results: Continue treatment as planned    PLAN: Again reviewed potential side effects of radiation for the patient's treatment. Continue local/topical care. Continue decadron. Continue current radiation course as prescribed.

## 2022-12-01 NOTE — H&P
[facility]  History and Physical Update    Pt Name: Pedro Anderson  MRN: [id number]  YOB: 1960  Date of evaluation: 12/1/2022    [x] I have examined the patient and reviewed the H&P/Consult and there are no changes to the patient or plans.     [] I have examined the patient and reviewed the H&P/Consult and have noted the following changes:        Delilah Cuevas MD  Electronically signed 12/1/2022 at 11:49 AM

## 2022-12-01 NOTE — ANESTHESIA POSTPROCEDURE EVALUATION
Department of Anesthesiology  Postprocedure Note    Patient: Eric Cueto  MRN: [id number]  YOB: 1960  Date of evaluation: 12/1/2022      Procedure Summary     Date: 12/01/22 Room / Location: [address] MARTHA Pierce    Anesthesia Start: 1119 Anesthesia Stop: 5398    Procedure: LEFT SINGLE LUMEN MEDIPORT (Left: Chest) Diagnosis:       Malignant neoplasm of esophagus, unspecified location ([address]      (Malignant neoplasm of esophagus, unspecified location ([address] [C15.9])    Surgeons: Marshall Osborne MD Responsible Provider: Nick Mock DO    Anesthesia Type: General, MAC ASA Status: 2          Anesthesia Type: General, MAC    Patsy Phase I: Patsy Score: 10    Patsy Phase II:        Anesthesia Post Evaluation    Patient location during evaluation: bedside  Patient participation: complete - patient participated  Level of consciousness: awake  Airway patency: patent  Nausea & Vomiting: no nausea  Complications: no  Cardiovascular status: hemodynamically stable  Respiratory status: acceptable  Hydration status: stable

## 2022-12-01 NOTE — PROGRESS NOTES
Patient admitted to [facility] with family at bedside. Bed in low position side rails up call light in reach. Patient denies questions at this time.

## 2022-12-01 NOTE — ANESTHESIA PRE PROCEDURE
Department of Anesthesiology  Preprocedure Note       Name:  Messi Nixon   Age:  65 y.o.  :  1960                                          MRN:  [id number]         Date:  2022      Surgeon: Mindy Heath):  Jessica Verduzco MD    Procedure: Procedure(s):  LEFT SINGLE LUMEN MEDIPORT    Medications prior to admission:   Prior to Admission medications    Medication Sig Start Date End Date Taking? Authorizing Provider   dexamethasone (DECADRON) 4 MG tablet Take 2 mg by mouth 2 times daily (with meals)    Historical Provider, MD   escitalopram (LEXAPRO) 10 MG tablet Take 1 tablet by mouth daily 22   Amari Starkey,    mirtazapine (REMERON SOL-TAB) 15 MG disintegrating tablet Take 15 mg by mouth nightly  Patient not taking: Reported on 2022   Historical Provider, MD   Blood Pressure Monitoring (BP MONITOR-STETHOSCOPE) KIT Dx: HTN 22   Amari Starkey DO   Misc.  Devices (PULSE OXIMETER FOR FINGER) MISC Dx:  hypoxemia 22   Amari Starkey DO   ibuprofen (ADVIL;MOTRIN) 400 MG tablet Take 400 mg by mouth every 6 hours as needed for Pain    Historical Provider, MD   pantoprazole (PROTONIX) 40 MG tablet Take 1 tablet by mouth in the morning and at bedtime 10/24/22   Amari Starkey DO   ondansetron (ZOFRAN-ODT) 8 MG TBDP disintegrating tablet Place 1 tablet under the tongue every 8 hours as needed for Nausea or Vomiting  Patient not taking: Reported on 2022   Luther Guy MD   prochlorperazine (COMPAZINE) 10 MG tablet Take 1 tablet by mouth every 6 hours as needed (nausea)  Patient not taking: Reported on 2022   Luther Guy MD   famotidine (PEPCID) 20 MG tablet Take 20 mg by mouth 2 times daily    Historical Provider, MD   NONFORMULARY Hema-Plex (Iron Multi-vitamin)    Historical Provider, MD       Current medications:    Current Facility-Administered Medications   Medication Dose Route Frequency Provider Last Rate Last Admin    0.9 % sodium chloride infusion   IntraVENous Continuous Precious Sal  mL/hr at 12/01/22 1037 New Bag at 12/01/22 1037    sodium chloride flush 0.9 % injection 5-40 mL  5-40 mL IntraVENous 2 times per day Precious Sal MD        sodium chloride flush 0.9 % injection 5-40 mL  5-40 mL IntraVENous PRN Precious Sal MD        0.9 % sodium chloride infusion   IntraVENous PRN Precious Sal MD        ceFAZolin (ANCEF) 2000 mg in dextrose 5 % 50 mL IVPB  2,000 mg IntraVENous On Call to MD Pari           Allergies: Allergies   Allergen Reactions    Paclitaxel Other (See Comments)     Severe lower back pain- able to resume after medications given    Aleve [Naproxen] Hives       Problem List:    Patient Active Problem List   Diagnosis Code    Abdominal pain R10.9    Cancer of distal third of esophagus ([address] C15.5    Acute postoperative pain G89.18    Chronic anemia D64.9    Esophageal adenocarcinoma ([facility] C15.9    GERD (gastroesophageal reflux disease) K21.9    HLD (hyperlipidemia) E78.5    Obesity (BMI 30.0-34. 9) E66.9    Postoperative atrial fibrillation (HCC) I97.89, I48.91    Thrombocytopenia (HCC) D69.6    Brain lesion G93.9    Delayed gastric emptying K30    Serum creatinine raised R79.89    Severe protein-calorie malnutrition (HCC) E43    Benign hypertension I10       Past Medical History:        Diagnosis Date    Atrial fibrillation ([address]     Benign hypertension 11/3/2022    Cancer of distal third of esophagus ([facility] 04/07/2022    GERD (gastroesophageal reflux disease)     HLD (hyperlipidemia) 8/15/2022    Sleep apnea        Past Surgical History:        Procedure Laterality Date    ABDOMEN SURGERY  08/16/2022    [facility]    ABDOMEN SURGERY  08/30/2022    ESOPHAGOGASTRODUODENOSCOPY TRANSORAL DIAGNOSTIC ESOPHAGECTOMY W/ [facility]ACOTOMY-[facility]    DENTAL SURGERY      25 teeth removed       Social History:    Social History     Tobacco Use    Smoking status: Former     Packs/day: 3.00     Years: 10.00     Pack years: 30.00     Types: Cigarettes     Quit date: 2013     Years since quittin.5    Smokeless tobacco: Never    Tobacco comments:     does not smoke, quit    Substance Use Topics    Alcohol use: Never                                Counseling given: Not Answered  Tobacco comments: does not smoke, quit       Vital Signs (Current):   Vitals:    22 1014   BP: 127/83   Pulse: 60   Resp: 16   Temp: 96.8 °F (36 °C)   TempSrc: Temporal   SpO2: 98%   Weight: 150 lb (68 kg)   Height: 5' 7\" (1.702 m)                                              BP Readings from Last 3 Encounters:   22 127/83   22 120/62   22 123/72       NPO Status: Time of last liquid consumption:                         Time of last solid consumption:                         Date of last liquid consumption: 22                        Date of last solid food consumption: 22    BMI:   Wt Readings from Last 3 Encounters:   22 150 lb (68 kg)   22 152 lb 3.2 oz (69 kg)   22 157 lb 10.1 oz (71.5 kg)     Body mass index is 23.49 kg/m².     CBC:   Lab Results   Component Value Date/Time    WBC 6.7 2022 12:24 PM    RBC 4.28 2022 12:24 PM    HGB 12.8 2022 12:24 PM    HCT 38.5 2022 12:24 PM    MCV 90 2022 12:24 PM    RDW 16.2 2022 12:24 PM    PLT 89 2022 12:24 PM       CMP:   Lab Results   Component Value Date/Time     2022 12:24 PM     10/19/2022 12:00 PM    K 4.1 2022 12:24 PM    K 3.8 10/19/2022 12:00 PM    K 4.2 02/10/2022 09:35 AM     10/19/2022 12:00 PM    CO2 23 10/19/2022 12:00 PM    BUN 21 10/19/2022 12:00 PM    CREATININE 0.8 2022 12:24 PM    CREATININE 0.9 10/19/2022 12:00 PM    LABGLOM >60 2022 12:23 PM    GLUCOSE 126 10/19/2022 12:00 PM    PROT 6.8 2022 12:24 PM    CALCIUM 10.6 10/19/2022 12:00 PM    BILITOT 0.5 11/22/2022 12:24 PM    ALKPHOS 76 11/22/2022 12:24 PM    AST 25 11/22/2022 12:24 PM    ALT 42 11/22/2022 12:24 PM       POC Tests: No results for input(s): POCGLU, POCNA, POCK, POCCL, POCBUN, POCHEMO, POCHCT in the last 72 hours. Coags: No results found for: PROTIME, INR, APTT    HCG (If Applicable): No results found for: PREGTESTUR, PREGSERUM, HCG, HCGQUANT     ABGs: No results found for: PHART, PO2ART, ROC3MBA, WUR5FTZ, BEART, N9EGTUHJ     Type & Screen (If Applicable):  No results found for: LABABO, LABRH    Drug/Infectious Status (If Applicable):  Lab Results   Component Value Date/Time    HEPCAB Negative 03/22/2017 07:45 AM       COVID-19 Screening (If Applicable):   Lab Results   Component Value Date/Time    COVID19 NOT  DETECTED 02/10/2022 09:10 AM           Anesthesia Evaluation  Patient summary reviewed and Nursing notes reviewed  Airway: Mallampati: II          Dental:          Pulmonary: breath sounds clear to auscultation  (+) sleep apnea:                             Cardiovascular:    (+) hypertension:,         Rhythm: regular  Rate: normal                    Neuro/Psych:               GI/Hepatic/Renal:   (+) GERD:,           Endo/Other:                     Abdominal:             Vascular: Other Findings:           Anesthesia Plan      MAC     ASA 2       Induction: intravenous. MIPS: Postoperative opioids intended and Prophylactic antiemetics administered. Anesthetic plan and risks discussed with patient. Plan discussed with CRNA.                     Eda Diallo DO   12/1/2022

## 2022-12-01 NOTE — OP NOTE
[address] [place] [address]  Operative Report    PATIENT NAME: Mercedes Cheney  MEDICAL RECORD NO. [id number]  SURGEON: Kristina Cho MD MD FACS  Primary Care Physician: Alray Gilford, DO  Date: 12/1/2022, 11:50 AM     PROCEDURE PERFORMED: left Single Lumen Smart Port Placement   PREOPERATIVE DIAGNOSIS: Active Problems:    Cancer of distal third of esophagus ([address]  Resolved Problems:    * No resolved hospital problems. *    POSTOPERATIVE DIAGNOSIS: Same  SURGEON:  Kristina Cho MD MD FACS  ANESTHESIA:  By anesthesia  ESTIMATED BLOOD LOSS:  5  ml  SPECIMEN:  None  COMPLICATIONS:  None; patient tolerated the procedure well. DISPOSITION: Outpatient Surgery  CONDITION: stable      BRIEF HISTORY: Mercedes Cheney is a 65 y.o.  male  who was recently diagnosed with Active Problems:    Cancer of distal third of esophagus ([address]  Resolved Problems:    * No resolved hospital problems. *  . He  will require a MediPort for long-term IV access. I discussed \"placement of MediPort\" with them including   the procedure , risks and alternatives, and they agreed. PROCEDURE IN DETAIL: The patient was taken to the operative suite and was placed on the table in a supine position . With the arms tucked at their side, and the head turned to the right. The neck, chest, and shoulders were clipped, prepped with Betadine and draped in a sterile fashion. The patient  was placed into Trendelenburg position and was left in Trendelenburg position during the entire procedure. The skin, subcutaneous tissue, and periosteum of the left clavicle were anesthetized with the local mixture. After this, a left subclavian venipuncture was performed and a guidewire was passed through the needle and into the superior vena cava. Its position was confirmed using the fluoroscopy unit. The dilator/tear away sheath was placed over the guidewire under fluoroscopic guidance and the guidewire and dilator removed leaving the sheath in place.  The catheter was passed through the introducer and into the superior vena cava, and it was positioned just above the level of the heart using the fluoroscopy unit. The tip was positioned in the SVC and the erin noted at the entrance site. Attention was turned to creation of the port pocket. The port pocket was marked in the upper medial left chest wall, and then the skin and subcutaneous tissue were anesthetized with additional local mixture. A transverse linear incision was made and was carried down through the subcutaneous tissue using the electrocautery. A combination of sharp dissection with the electrocautery and blunt finger dissection was used to create a pocket for the port, just large enough to accommodate a single-lumen Smart Port in the upper medial right chest wall. Once complete hemostasis was seen, the tract between the venipuncture site and the port site was anesthetized below using the local mixture. The supplied straight tunneler was used to tunnel the tubing from the venipuncture site to the port site on a gentle curve to prevent kinking of the tubing. Following this, the approximate length of tubing required to place the tip of the tubing at the junction between the superior vena cava and the right atrium was measured against the angle of Camilo externally. The excess catheter was then cut at the port incision and then the supplied connector was used to connect the tubing to the port itself. Once again, there was good blood return from the port and the port flushed easily with heparinized saline. The port was then placed into the port pocket and was sutured to the fascia using a 2 sutures of 2-0 prolene stitch to prevent rolling and flipping of the port. The patient was then placed flat. The skin was closed with 4-0 Vicryl subcuticular sutures, and skin affix glue was applied. The patient tolerated the procedure well. Sponge, needle, and instrument counts were correct.  The patient had a stat portable upright chest x-ray done as soon as Ishmael Jerry  was transferred to the cart for transport to the Same Day Surgery Unit.  This revealed No pneumothorax, Central line in proper location    Omkar Melvin MD, MD FACS  Electronically signed 12/1/2022 at 11:50 AM

## 2022-12-01 NOTE — H&P
[facility]  History and Physical Update    Pt Name: Ole Gao  MRN: [id number]  YOB: 1960  Date of evaluation: 12/1/2022    [x] I have examined the patient and reviewed the H&P/Consult and there are no changes to the patient or plans.     [] I have examined the patient and reviewed the H&P/Consult and have noted the following changes:        Renee Martinez MD  Electronically signed 12/1/2022 at 10:44 AM

## 2022-12-02 ENCOUNTER — HOSPITAL ENCOUNTER (OUTPATIENT)
Dept: RADIATION ONCOLOGY | Age: 62
Discharge: HOME | End: 2022-12-02
Payer: MEDICAID

## 2022-12-02 ENCOUNTER — TELEPHONE (OUTPATIENT)
Dept: SURGERY | Age: 62
End: 2022-12-02

## 2022-12-02 PROCEDURE — 77387 GUIDANCE FOR RADJ TX DLVR: CPT | Performed by: RADIOLOGY

## 2022-12-02 PROCEDURE — 77412 RADIATION TX DELIVERY LVL 3: CPT | Performed by: RADIOLOGY

## 2022-12-02 NOTE — TELEPHONE ENCOUNTER
S/p left Single Lumen Smart Port Placement -12/1/2022  Called and spoke with wife this am, patient was resting. No complaints of shortness of breath. No arm pain. No drainage. He ate and drank fine. Urinating fine. No issues with nausea or vomiting. Instructed to call with any issues or concerns.

## 2022-12-05 ENCOUNTER — HOSPITAL ENCOUNTER (OUTPATIENT)
Dept: RADIATION ONCOLOGY | Age: 62
Discharge: HOME | End: 2022-12-05
Payer: MEDICAID

## 2022-12-05 PROCEDURE — 77387 GUIDANCE FOR RADJ TX DLVR: CPT | Performed by: RADIOLOGY

## 2022-12-05 PROCEDURE — 77412 RADIATION TX DELIVERY LVL 3: CPT | Performed by: RADIOLOGY

## 2022-12-06 ENCOUNTER — HOSPITAL ENCOUNTER (OUTPATIENT)
Dept: INFUSION THERAPY | Age: 62
Discharge: HOME | End: 2022-12-06
Payer: MEDICAID

## 2022-12-06 ENCOUNTER — HOSPITAL ENCOUNTER (OUTPATIENT)
Dept: RADIATION ONCOLOGY | Age: 62
Discharge: HOME | End: 2022-12-06
Payer: MEDICAID

## 2022-12-06 PROCEDURE — 77387 GUIDANCE FOR RADJ TX DLVR: CPT | Performed by: RADIOLOGY

## 2022-12-06 PROCEDURE — 77412 RADIATION TX DELIVERY LVL 3: CPT | Performed by: RADIOLOGY

## 2022-12-06 PROCEDURE — 99212 OFFICE O/P EST SF 10 MIN: CPT

## 2022-12-06 NOTE — PLAN OF CARE
Problem: Discharge Planning  Goal: Discharge to home or other facility with appropriate resources  Outcome: Adequate for Discharge  Flowsheets (Taken 12/6/2022 1509)  Discharge to home or other facility with appropriate resources: Identify barriers to discharge with patient and caregiver  Note: Verbalize understanding of discharge instructions, follow up appointments, and when to call Physician. Problem: Safety - Adult  Goal: Free from fall injury  Outcome: Adequate for Discharge  Flowsheets (Taken 12/6/2022 1509)  Free From Fall Injury: Instruct family/caregiver on patient safety  Note: No falls occurred with visit today. Care plan reviewed with patient. Patient verbalizes understanding of the plan of care and contributes to goal setting.

## 2022-12-06 NOTE — PROGRESS NOTES
Chemotherapy/Immunotherapy Teaching Checklist    Treatment Plan: Oxaliplatin/leucovorin/5FU/HERCEPTIN  Frequency: Every two weeks  Patient accompanied by  Wife      Day of chemo instructions:  [x] Must have    [x] Eat light breakfast  [x] Bring pain medications/other routine medications scheduled during appointment time  [] Hold blood pressure medications morning of treatment    Treatment process:  [x] Flow of appointment  [x] IV access Peripheral start  or  PORT access process [x] EMLA cream  [x] Premedication/ Hydration  [x] Length of treatment  [x] Tour of clinic    Diet and hydration:  [x] Discuss [place] diet    [x] Eating Hints book provided  [x] Importance of hydration 48- 64 ounces daily   [x] Hydration handout provided     Side Effects:  [x] Chemotherapy and you book provided  [x] Side effects and management discussed   [x] Diarrhea[x]Nausea/Vomiting []home antiemetic [x]hair loss[x]Neuropathy[x] Constipation[x] Fatigue[x]Mouth sores[x] Dehydration[x] Skin/Nail Changes []Pneumonitis []Colitis [] Hepatitis []Thyroid changes  []Fertility issues (birth control, sperm/egg banking issues)    Labs:  [x]BMP- renal function and electrolytes discussed  [x] CBC- RBC, WBC with ANC, platelet counts discussed  [x]Understanding your Blood hand out given   [x]Neutropenia handout/Reduce infection handout [x]Thrombocytopenia handout   [x]Mariano discussed    Complications that require immediate attention from physician:  [x]Fever 100.3 [x]signs of infection- chills, fever, burning with urination, worsening cough   [x]Uncontrolled vomiting [x]Uncontrolled diarrhea/constipation   [x]Unable to drink 48 ounces [x]Uncontrolled pain  [x] When to notify provider handout given  [x] After hours contact process discussed    Support Services:  [] Financial navigator   []RN navigator explained  []Local cancer society  []     Patient and family verbalized understanding and all  questions answered.  Encouraged to call for any concerns. Approximately 60 minutes spent with patient and family. Discharged in satisfactory condition. Ambulated off unit per self.     Electronically signed by Monty Fleischer, RN on 12/6/2022 at 3:11 PM

## 2022-12-07 ENCOUNTER — HOSPITAL ENCOUNTER (OUTPATIENT)
Dept: RADIATION ONCOLOGY | Age: 62
Discharge: HOME | End: 2022-12-07
Payer: MEDICAID

## 2022-12-07 PROCEDURE — 77412 RADIATION TX DELIVERY LVL 3: CPT | Performed by: RADIOLOGY

## 2022-12-07 PROCEDURE — 77387 GUIDANCE FOR RADJ TX DLVR: CPT | Performed by: RADIOLOGY

## 2022-12-07 NOTE — DISCHARGE INSTRUCTIONS
PATIENT DISCHARGE INSTRUCTIONS    Remember that side effects present at the end of your treatments will improve within a few weeks after the last treatment. Eat well balanced meals even though your treatments are finished. This will help speed the healing process. Continue any special diets prescribed to control side effects until these side effects have been resolved. Get plenty of rest.  If you have experienced fatigue and/or weakness, this may continue for several weeks after your last treatment. Continue with your daily activities according to the way you feel. Continue to be gentle with your skin. Follow your present skin care instructions until your follow-up visit. IF YOU DEVELOP ANY CHANGES IN YOUR SKIN IN THE AREA TREATED WITH RADIATION, PLEASE CALL THE RADIATION ONCOLOGY NURSE -[phone]. Protect your skin from any injury and avoid direct sun exposure in the treatment area. The skin in the treated area may always be more sensitive than the rest of your skin. Always use SPF 27 or higher sun block if you will be in the sun and cannot avoid exposure. Please contact your referring physician for a follow-up appointment in addition to your Radiation Oncology appointment. Presence of pain: No  Medication Taper: Yes    See Instructions Dated: Please contact [facility] surgeon's office for taper instructions  Follow up orders: Will discuss at follow up.

## 2022-12-08 ENCOUNTER — HOSPITAL ENCOUNTER (OUTPATIENT)
Dept: PET IMAGING | Age: 62
Discharge: HOME | End: 2022-12-08
Payer: MEDICAID

## 2022-12-08 ENCOUNTER — HOSPITAL ENCOUNTER (OUTPATIENT)
Dept: NON INVASIVE DIAGNOSTICS | Age: 62
Discharge: HOME | End: 2022-12-08
Payer: MEDICAID

## 2022-12-08 DIAGNOSIS — C79.9 METASTASIS FROM ESOPHAGEAL CANCER (HCC): ICD-10-CM

## 2022-12-08 DIAGNOSIS — C15.9 METASTASIS FROM ESOPHAGEAL CANCER (HCC): ICD-10-CM

## 2022-12-08 PROCEDURE — A9552 F18 FDG: HCPCS | Performed by: INTERNAL MEDICINE

## 2022-12-08 PROCEDURE — 6360000002 HC RX W HCPCS: Performed by: RADIOLOGY

## 2022-12-08 PROCEDURE — 78815 PET IMAGE W/CT SKULL-THIGH: CPT

## 2022-12-08 PROCEDURE — 93356 MYOCRD STRAIN IMG SPCKL TRCK: CPT

## 2022-12-08 PROCEDURE — 93306 TTE W/DOPPLER COMPLETE: CPT

## 2022-12-08 PROCEDURE — 3430000000 HC RX DIAGNOSTIC RADIOPHARMACEUTICAL: Performed by: INTERNAL MEDICINE

## 2022-12-08 RX ORDER — HEPARIN SODIUM (PORCINE) LOCK FLUSH IV SOLN 100 UNIT/ML 100 UNIT/ML
500 SOLUTION INTRAVENOUS ONCE
Status: COMPLETED | OUTPATIENT
Start: 2022-12-08 | End: 2022-12-08

## 2022-12-08 RX ORDER — FLUDEOXYGLUCOSE F 18 200 MCI/ML
15.9 INJECTION, SOLUTION INTRAVENOUS
Status: COMPLETED | OUTPATIENT
Start: 2022-12-08 | End: 2022-12-08

## 2022-12-08 RX ADMIN — FLUDEOXYGLUCOSE F 18 15.9 MILLICURIE: 200 INJECTION, SOLUTION INTRAVENOUS at 12:24

## 2022-12-08 RX ADMIN — HEPARIN SODIUM (PORCINE) LOCK FLUSH IV SOLN 100 UNIT/ML 500 UNITS: 100 SOLUTION at 13:21

## 2022-12-13 ENCOUNTER — OFFICE VISIT (OUTPATIENT)
Dept: ONCOLOGY | Age: 62
End: 2022-12-13
Payer: MEDICAID

## 2022-12-13 ENCOUNTER — HOSPITAL ENCOUNTER (OUTPATIENT)
Dept: INFUSION THERAPY | Age: 62
Discharge: HOME | End: 2022-12-13
Payer: MEDICAID

## 2022-12-13 VITALS
RESPIRATION RATE: 16 BRPM | HEART RATE: 71 BPM | SYSTOLIC BLOOD PRESSURE: 122 MMHG | HEIGHT: 67 IN | DIASTOLIC BLOOD PRESSURE: 77 MMHG | WEIGHT: 151 LBS | TEMPERATURE: 97.9 F | BODY MASS INDEX: 23.7 KG/M2 | OXYGEN SATURATION: 98 %

## 2022-12-13 VITALS
HEIGHT: 67 IN | WEIGHT: 151.13 LBS | TEMPERATURE: 97.9 F | RESPIRATION RATE: 16 BRPM | BODY MASS INDEX: 23.72 KG/M2 | DIASTOLIC BLOOD PRESSURE: 77 MMHG | HEART RATE: 71 BPM | SYSTOLIC BLOOD PRESSURE: 122 MMHG | OXYGEN SATURATION: 98 %

## 2022-12-13 DIAGNOSIS — C15.5 CANCER OF DISTAL THIRD OF ESOPHAGUS (HCC): ICD-10-CM

## 2022-12-13 DIAGNOSIS — C15.5 CANCER OF DISTAL THIRD OF ESOPHAGUS (HCC): Primary | ICD-10-CM

## 2022-12-13 DIAGNOSIS — D69.6 THROMBOCYTOPENIA (HCC): ICD-10-CM

## 2022-12-13 DIAGNOSIS — C15.9 METASTASIS FROM ESOPHAGEAL CANCER (HCC): ICD-10-CM

## 2022-12-13 DIAGNOSIS — C79.9 METASTASIS FROM ESOPHAGEAL CANCER (HCC): ICD-10-CM

## 2022-12-13 DIAGNOSIS — D64.9 ANEMIA, UNSPECIFIED TYPE: ICD-10-CM

## 2022-12-13 DIAGNOSIS — Z51.11 ENCOUNTER FOR CHEMOTHERAPY MANAGEMENT: Primary | ICD-10-CM

## 2022-12-13 LAB
ABSOLUTE IMMATURE GRANULOCYTE: 0.14 THOU/MM3 (ref 0–0.07)
ALBUMIN SERPL-MCNC: 3.9 G/DL (ref 3.5–5.1)
ALP BLD-CCNC: 71 U/L (ref 38–126)
ALT SERPL-CCNC: 33 U/L (ref 11–66)
AST SERPL-CCNC: 27 U/L (ref 5–40)
BASINOPHIL, AUTOMATED: 0 % (ref 0–3)
BASOPHILS ABSOLUTE: 0 THOU/MM3 (ref 0–0.1)
BILIRUB SERPL-MCNC: 0.6 MG/DL (ref 0.3–1.2)
BILIRUBIN DIRECT: < 0.2 MG/DL (ref 0–0.3)
BUN, WHOLE BLOOD: 14 MG/DL (ref 8–26)
CHLORIDE, WHOLE BLOOD: 103 MEQ/L (ref 98–109)
CREATININE, WHOLE BLOOD: 0.8 MG/DL (ref 0.5–1.2)
EOSINOPHILS ABSOLUTE: 0 THOU/MM3 (ref 0–0.4)
EOSINOPHILS RELATIVE PERCENT: 0 % (ref 0–4)
GFR SERPL CREATININE-BSD FRML MDRD: > 60 ML/MIN/1.73M2
GLUCOSE, WHOLE BLOOD: 141 MG/DL (ref 70–108)
HCT VFR BLD CALC: 40.7 % (ref 42–52)
HEMOGLOBIN: 13.7 GM/DL (ref 14–18)
IMMATURE GRANULOCYTES: 2 %
IONIZED CALCIUM, WHOLE BLOOD: 1.23 MMOL/L (ref 1.12–1.32)
LYMPHOCYTES # BLD: 8 % (ref 15–47)
LYMPHOCYTES ABSOLUTE: 0.5 THOU/MM3 (ref 1–4.8)
MCH RBC QN AUTO: 30.4 PG (ref 26–33)
MCHC RBC AUTO-ENTMCNC: 33.7 GM/DL (ref 32.2–35.5)
MCV RBC AUTO: 90 FL (ref 80–94)
MONOCYTES ABSOLUTE: 0.3 THOU/MM3 (ref 0.4–1.3)
MONOCYTES: 4 % (ref 0–12)
PDW BLD-RTO: 16.2 % (ref 11.5–14.5)
PLATELET # BLD: 78 THOU/MM3 (ref 130–400)
PMV BLD AUTO: 7.9 FL (ref 9.4–12.4)
POTASSIUM, WHOLE BLOOD: 3.6 MEQ/L (ref 3.5–4.9)
RBC # BLD: 4.51 MILL/MM3 (ref 4.7–6.1)
SEG NEUTROPHILS: 86 % (ref 43–75)
SEGMENTED NEUTROPHILS ABSOLUTE COUNT: 5.3 THOU/MM3 (ref 1.8–7.7)
SODIUM, WHOLE BLOOD: 138 MEQ/L (ref 138–146)
TOTAL CO2, WHOLE BLOOD: 25 MEQ/L (ref 23–33)
TOTAL PROTEIN: 6.3 G/DL (ref 6.1–8)
WBC # BLD: 6.1 THOU/MM3 (ref 4.8–10.8)

## 2022-12-13 PROCEDURE — 96367 TX/PROPH/DG ADDL SEQ IV INF: CPT

## 2022-12-13 PROCEDURE — 6360000002 HC RX W HCPCS: Performed by: INTERNAL MEDICINE

## 2022-12-13 PROCEDURE — 96417 CHEMO IV INFUS EACH ADDL SEQ: CPT

## 2022-12-13 PROCEDURE — 2580000003 HC RX 258: Performed by: INTERNAL MEDICINE

## 2022-12-13 PROCEDURE — 96413 CHEMO IV INFUSION 1 HR: CPT

## 2022-12-13 PROCEDURE — 96415 CHEMO IV INFUSION ADDL HR: CPT

## 2022-12-13 PROCEDURE — 36591 DRAW BLOOD OFF VENOUS DEVICE: CPT

## 2022-12-13 PROCEDURE — 96368 THER/DIAG CONCURRENT INF: CPT

## 2022-12-13 PROCEDURE — 80047 BASIC METABLC PNL IONIZED CA: CPT

## 2022-12-13 PROCEDURE — 96416 CHEMO PROLONG INFUSE W/PUMP: CPT

## 2022-12-13 PROCEDURE — 80076 HEPATIC FUNCTION PANEL: CPT

## 2022-12-13 PROCEDURE — 96375 TX/PRO/DX INJ NEW DRUG ADDON: CPT

## 2022-12-13 PROCEDURE — 96411 CHEMO IV PUSH ADDL DRUG: CPT

## 2022-12-13 PROCEDURE — 3074F SYST BP LT 130 MM HG: CPT | Performed by: NURSE PRACTITIONER

## 2022-12-13 PROCEDURE — 3078F DIAST BP <80 MM HG: CPT | Performed by: NURSE PRACTITIONER

## 2022-12-13 PROCEDURE — 85025 COMPLETE CBC W/AUTO DIFF WBC: CPT

## 2022-12-13 PROCEDURE — 99211 OFF/OP EST MAY X REQ PHY/QHP: CPT

## 2022-12-13 PROCEDURE — 99214 OFFICE O/P EST MOD 30 MIN: CPT | Performed by: NURSE PRACTITIONER

## 2022-12-13 RX ORDER — SODIUM CHLORIDE 0.9 % (FLUSH) 0.9 %
5-40 SYRINGE (ML) INJECTION PRN
Status: DISCONTINUED | OUTPATIENT
Start: 2022-12-13 | End: 2022-12-14 | Stop reason: HOSPADM

## 2022-12-13 RX ORDER — SODIUM CHLORIDE 9 MG/ML
25 INJECTION, SOLUTION INTRAVENOUS PRN
Status: CANCELLED | OUTPATIENT
Start: 2022-12-13

## 2022-12-13 RX ORDER — PALONOSETRON 0.05 MG/ML
0.25 INJECTION, SOLUTION INTRAVENOUS ONCE
Status: COMPLETED | OUTPATIENT
Start: 2022-12-13 | End: 2022-12-13

## 2022-12-13 RX ORDER — DEXTROSE MONOHYDRATE 50 MG/ML
5-250 INJECTION, SOLUTION INTRAVENOUS PRN
Status: DISCONTINUED | OUTPATIENT
Start: 2022-12-13 | End: 2022-12-14 | Stop reason: HOSPADM

## 2022-12-13 RX ORDER — SODIUM CHLORIDE 0.9 % (FLUSH) 0.9 %
5-40 SYRINGE (ML) INJECTION PRN
Status: CANCELLED | OUTPATIENT
Start: 2022-12-13

## 2022-12-13 RX ORDER — WATER 1000 ML/1000ML
2.2 INJECTION, SOLUTION INTRAVENOUS ONCE
Status: CANCELLED | OUTPATIENT
Start: 2022-12-13 | End: 2022-12-13

## 2022-12-13 RX ORDER — FLUOROURACIL 50 MG/ML
400 INJECTION, SOLUTION INTRAVENOUS ONCE
Status: COMPLETED | OUTPATIENT
Start: 2022-12-13 | End: 2022-12-13

## 2022-12-13 RX ORDER — HEPARIN SODIUM (PORCINE) LOCK FLUSH IV SOLN 100 UNIT/ML 100 UNIT/ML
500 SOLUTION INTRAVENOUS PRN
Status: CANCELLED | OUTPATIENT
Start: 2022-12-13

## 2022-12-13 RX ORDER — HEPARIN SODIUM (PORCINE) LOCK FLUSH IV SOLN 100 UNIT/ML 100 UNIT/ML
500 SOLUTION INTRAVENOUS PRN
Status: DISCONTINUED | OUTPATIENT
Start: 2022-12-13 | End: 2022-12-14 | Stop reason: HOSPADM

## 2022-12-13 RX ORDER — PROCHLORPERAZINE MALEATE 10 MG
10 TABLET ORAL EVERY 6 HOURS PRN
Qty: 120 TABLET | Refills: 1 | Status: SHIPPED | OUTPATIENT
Start: 2022-12-13

## 2022-12-13 RX ORDER — ONDANSETRON 8 MG/1
8 TABLET, ORALLY DISINTEGRATING ORAL EVERY 8 HOURS PRN
Qty: 90 TABLET | Refills: 1 | Status: SHIPPED | OUTPATIENT
Start: 2022-12-13

## 2022-12-13 RX ORDER — LIDOCAINE AND PRILOCAINE 25; 25 MG/G; MG/G
CREAM TOPICAL
Qty: 1 EACH | Refills: 3 | Status: SHIPPED | OUTPATIENT
Start: 2022-12-13

## 2022-12-13 RX ADMIN — SODIUM CHLORIDE, PRESERVATIVE FREE 10 ML: 5 INJECTION INTRAVENOUS at 10:52

## 2022-12-13 RX ADMIN — DEXTROSE MONOHYDRATE 20 ML/HR: 50 INJECTION, SOLUTION INTRAVENOUS at 11:25

## 2022-12-13 RX ADMIN — LEUCOVORIN CALCIUM 750 MG: 350 INJECTION, POWDER, LYOPHILIZED, FOR SUSPENSION INTRAMUSCULAR; INTRAVENOUS at 14:07

## 2022-12-13 RX ADMIN — SODIUM CHLORIDE 420 MG: 9 INJECTION, SOLUTION INTRAVENOUS at 12:01

## 2022-12-13 RX ADMIN — FLUOROURACIL 4400 MG: 50 INJECTION, SOLUTION INTRAVENOUS at 16:46

## 2022-12-13 RX ADMIN — DEXAMETHASONE SODIUM PHOSPHATE 12 MG: 4 INJECTION, SOLUTION INTRA-ARTICULAR; INTRALESIONAL; INTRAMUSCULAR; INTRAVENOUS; SOFT TISSUE at 11:32

## 2022-12-13 RX ADMIN — OXALIPLATIN 155 MG: 5 INJECTION, SOLUTION INTRAVENOUS at 14:06

## 2022-12-13 RX ADMIN — PALONOSETRON 0.25 MG: 0.05 INJECTION, SOLUTION INTRAVENOUS at 11:25

## 2022-12-13 RX ADMIN — SODIUM CHLORIDE, PRESERVATIVE FREE 20 ML: 5 INJECTION INTRAVENOUS at 10:53

## 2022-12-13 RX ADMIN — FLUOROURACIL 725 MG: 50 INJECTION, SOLUTION INTRAVENOUS at 16:35

## 2022-12-13 NOTE — PROGRESS NOTES
Patient assessed for the following post chemotherapy:    Dizziness   No  Lightheadedness  No      Acute nausea/vomiting No  Headache   No  Chest pain/pressure  No  Rash/itching   No  Shortness of breath  No     Patient tolerated chemotherapy treatment Trazimera/Oxaliplatin/Leucovorin/Fluorouracil without any complications. CADD pump 5FU added via mediport to infuse at 5.4ml/hr over 46 hours. Connections secured and tape to chest. Patient and family instructed on pump- it's usage,what to do if alarm goes off, and who to call if any questions. Verified pump running before discharge. Last vital signs:   /77   Pulse 71   Temp 97.9 °F (36.6 °C) (Oral)   Resp 16   Ht 5' 7\" (1.702 m)   Wt 151 lb 2 oz (68.5 kg)   SpO2 98%   BMI 23.67 kg/m²     Patient instructed if experience any of the above symptoms following today's infusion,he/she is to notify MD immediately or go to the emergency department. Discharge instructions given to patient. Verbalizes understanding. Ambulated off unit with spouse and with belongings.

## 2022-12-13 NOTE — ONCOLOGY
Chemotherapy Administration    Pre-assessment Data: Antineoplastic Agents  See toxicity flow sheet for assessment                                          [x]         Interventions:   Chemotherapy SQ injection given []   Taxol administered-VS per protocol []   Blood pressure meds held 12 hours prior to Rituxan/Ruxience []   Rituxan/Ruxience administered- VS and precautions per guidelines []   Emergency drugs available as appropriate [x]   Anaphylaxis assessment completed [x]   Pre-medications administered as ordered [x]   Blood return noted upon initiation of chemotherapy [x]   Blood return noted each 1-2ml of a vesicant medication if given IV push []   Navelbine, Vincristine and Velban given as a monitored wide open drip, blood return noted before during and after infusion.  []   Blood return noted each 2-3ml of a non-vesicant medication if given IV push [x]   Patient aware of potential Immunotherapy toxicities [x]   Monitor for signs / symptoms of hypersensitivity reaction [x]   Chemotherapy orders (drug/dose/rate) verified by 2 Chemo certified RNs [x]   Monitor IV site and blood return throughout the infusion of the medication [x]   Document IV site checks on the IV assessment form [x]   Document chemotherapy teaching on the Patient Education tab [x]   Document patient verbalizes understanding of medications being administered [x]   If IV infiltration, see ONS Guidelines []   Other:      []

## 2022-12-13 NOTE — PLAN OF CARE
Problem: Discharge Planning  Goal: Discharge to home or other facility with appropriate resources  Outcome: Progressing  Flowsheets (Taken 12/13/2022 1726)  Discharge to home or other facility with appropriate resources: Identify barriers to discharge with patient and caregiver  Note: Verbalized understanding of discharge instructions, follow-up appointments, and when to call the physician. Problem: Safety - Adult  Goal: Free from fall injury  Outcome: Progressing  Flowsheets (Taken 12/13/2022 1726)  Free From Fall Injury: Instruct family/caregiver on patient safety  Note: No falls occurred with visit today. Problem: Chronic Conditions and Co-morbidities  Goal: Patient's chronic conditions and co-morbidity symptoms are monitored and maintained or improved  Outcome: Progressing  Flowsheets (Taken 12/13/2022 1726)  Care Plan - Patient's Chronic Conditions and Co-Morbidity Symptoms are Monitored and Maintained or Improved: Monitor and assess patient's chronic conditions and comorbid symptoms for stability, deterioration, or improvement  Note: Patient verbalizes understanding to verbal information given on Trazimera, Oxaliplatin, Leucovorin, and Fluorouracil action and possible side effects. Aware to call MD if develop complications. Problem: Infection - Adult  Goal: Absence of infection at discharge  Outcome: Progressing  Flowsheets (Taken 12/13/2022 1726)  Absence of infection at discharge: Assess and monitor for signs and symptoms of infection  Note: Mediport site with no redness or warmth. Skin over port site intact with no signs of breakdown noted. Patient verbalizes signs/symptoms of port infection and when to notify the physician.    Goal: Will show no infection signs and symptoms  Description: Will show no infection signs and symptoms  Outcome: Progressing  Intervention: EDUCATE PATIENT ABOUT SIGNS AND SYMPTOMS OF INFECTION  Note: Discuss port maintenance,infection prevention, sign of infection and when to call MD.      Care plan reviewed with patient and spouse. Patient and spouse verbalize understanding of the plan of care and contribute to goal setting.

## 2022-12-13 NOTE — PROGRESS NOTES
[facility], [address] [facility],[address]Th [address]  [address] [address]  Dept: [phone]  Dept Fax: [phone]: [phone]      Visit Date:12/13/2022     Delmy Woodard is a 65 y.o. male who presents today for:   Chief Complaint   Patient presents with    Follow-up     Cancer of distal third of esophagus ([address]        HPI:   Delmy Woodard is a 65 y.o. male who follows in our office with esophageal cancer. HPI per previous note in our office:  Sarah Whitaker is a 58-year-old gentleman with a long history of GERD who presented to the ER on February 10 was admitted for 1 day because of chest heaviness. No cardiology because GI was consulted ultimately showed a mass in the distal esophagus. Outpatient EGD requested. Diagnosed with adenocarcinoma the distal esophagus and referred to Dr. Nadira Mendez of thoracic surgery at [facility].  EUS shows regional tone involvement. See scans below and ultrasound. No distant mets. Patient has no significant dysphagia and no weight loss. Patient referred back locally for chemoradiation neoadjuvant treatment before surgery. Patient was hospitalized at [facility] from October 31 and discharged on November 6. Admitted by Dr. Taty White thoracic surgery who did his esophagectomy in August.  Admitted with intractable nausea and vomiting. He was found to have multifocal CNS mets with obstructive hydrocephalus. On October 31 he underwent a right craniotomy and had at least 3 foci of tumor removed including a cerebellar lesion. Discharged on Decadron 4 mg twice daily with instructions for taper. Patient has multiple other lesions after his craniotomy the plan is for XRT  for these lesions. Patient seen by Dr. Anup Morel radiation oncology  Pt wants tx close to me and will see Dr Nikki weaver me on 11/22/22. WB XRT asap. .     Seen by me 10/13 having undergone his esophagectomy August 30 at [facility] per Dr. Nadira Mendez.   At that time he was doing quite well and had still been on a liquid diet but denied any nausea or vomiting at that time. Interestingly at the time of his esophagectomy in August 30 he did have a pathologic CR. He was HER2 amplified. This now of significance with his metastatic disease to brain. I did review the case with Dr. Robert Bell who felt that that he did not need adjuvant therapy at that point. Clearly now things have changed and he is a candidate for systemic therapy certainly if restaging shows disease elsewhere. I would get a PET/CT to look for disease outside the brain. And I would request foundation 1 next generation sequencing please be done on the brain tissue biopsy at [address] [place] [address]. Dr Garth Ortiz said he would order 11/21/22 at [address] [place] [address] on the recent brain tissue. Interval History 12/13/2022:   The patient presents to the office today for follow up and evaluation of esophageal cancer, as well as treatment with FOLFOX and Herceptin. The patient reports he feels well today. He denies fever/chills, s/s infections, headaches, dizziness, cough, SOB, CP, heart palpitations, abdominal pain, N/V/C/D, peripheral edema, peripheral neuropathy, s/s bleeding. PET scan reviewd (-) FDG avid malignancy. PMH, SH, and FH:  I reviewed the patient's medication and allergy lists as noted on the electronic medical record. The PMH, SH, and FH were also reviewed as noted on the EMR.         Past Medical History:   Diagnosis Date    Atrial fibrillation ([address]     Benign hypertension 11/3/2022    Cancer of distal third of esophagus ([facility] 04/07/2022    GERD (gastroesophageal reflux disease)     HLD (hyperlipidemia) 8/15/2022    Sleep apnea       Past Surgical History:   Procedure Laterality Date    ABDOMEN SURGERY  08/16/2022    GASTRIC DEVASCULARIZATON-[facility]    ABDOMEN SURGERY  08/30/2022    ESOPHAGOGASTRODUODENOSCOPY TRANSORAL DIAGNOSTIC ESOPHAGECTOMY W/ THORACOTOMY-[facility]    DENTAL SURGERY      25 teeth removed    PORT SURGERY Left 12/1/2022    LEFT SINGLE LUMEN MEDIPORT performed by Nicolás Scott Jose Vogel MD at [facility] History   Problem Relation Age of Onset    Colon Cancer Mother     Heart Disease Father     Cancer Brother         lung      Social History     Tobacco Use    Smoking status: Former     Packs/day: 3.00     Years: 10.00     Pack years: 30.00     Types: Cigarettes     Quit date: 2013     Years since quittin.6    Smokeless tobacco: Never    Tobacco comments:     does not smoke, quit 2013   Substance Use Topics    Alcohol use: Never      Current Outpatient Medications   Medication Sig Dispense Refill    ondansetron (ZOFRAN-ODT) 8 MG TBDP disintegrating tablet Place 1 tablet under the tongue every 8 hours as needed for Nausea or Vomiting 90 tablet 1    prochlorperazine (COMPAZINE) 10 MG tablet Take 1 tablet by mouth every 6 hours as needed (nausea) 120 tablet 1    lidocaine-prilocaine (EMLA) 2.5-2.5 % cream Apply topically as needed. 1 each 3    dexamethasone (DECADRON) 4 MG tablet Take 2 mg by mouth 2 times daily (with meals)      escitalopram (LEXAPRO) 10 MG tablet Take 1 tablet by mouth daily 30 tablet 1    Blood Pressure Monitoring (BP MONITOR-STETHOSCOPE) KIT Dx: HTN 1 kit 0    Misc. Devices (PULSE OXIMETER FOR FINGER) MISC Dx:  hypoxemia 1 each 0    ibuprofen (ADVIL;MOTRIN) 400 MG tablet Take 400 mg by mouth every 6 hours as needed for Pain      pantoprazole (PROTONIX) 40 MG tablet Take 1 tablet by mouth in the morning and at bedtime 90 tablet 3    famotidine (PEPCID) 20 MG tablet Take 20 mg by mouth 2 times daily      NONFORMULARY Hema-Plex (Iron Multi-vitamin)       No current facility-administered medications for this visit.      Facility-Administered Medications Ordered in Other Visits   Medication Dose Route Frequency Provider Last Rate Last Admin    sodium chloride flush 0.9 % injection 5-40 mL  5-40 mL IntraVENous PRN Estee Regan MD        heparin flush 100 UNIT/ML injection 500 Units  500 Units IntraCATHeter PRN Estee Regan MD          Allergies   Allergen Reactions    Paclitaxel Other (See Comments)     Severe lower back pain- able to resume after medications given    Aleve [Naproxen] Hives          Review of Systems:   Review of Systems   Pertinent review of systems noted in HPI, all other ROS negative. Objective:   Physical Exam   /77 (Site: Left Upper Arm, Position: Sitting, Cuff Size: Medium Adult)   Pulse 71   Temp 97.9 °F (36.6 °C) (Oral)   Resp 16   Ht 5' 7\" (1.702 m)   Wt 151 lb (68.5 kg)   SpO2 98%   BMI 23.65 kg/m²    General appearance: No apparent distress, calm and cooperative. HEENT: Pupils equal, round, and reactive to light. Conjunctivae/corneas clear. Oral mucosa moist  Neck: Supple, with full range of motion. Trachea midline. Respiratory:  Normal respiratory effort. Clear to auscultation all lung fields. Cardiovascular: RRR, S1/S2  Abdomen: Soft, non-tender, non-distended with active BS  Musculoskeletal: No clubbing, cyanosis or edema bilaterally. He is able to ambulate in office without difficulty or use of assistive device  Skin: Skin color, texture, turgor normal.  No visible rashes or lesions. Neurologic:  Neurovascularly intact without any focal sensory/motor deficits.  Cranial nerves: II-XII intact, grossly non-focal.  Psychiatric: Alert and oriented x 3, thought content appropriate, normal insight  Capillary Refill:  < 3 seconds   Peripheral Pulses: +2 palpable      Imaging Studies and Labs:   CBC:   Lab Results   Component Value Date    WBC 6.1 12/13/2022    HGB 13.7 (L) 12/13/2022    HCT 40.7 (L) 12/13/2022    MCV 90 12/13/2022    PLT 78 (L) 12/13/2022     BMP:   Lab Results   Component Value Date/Time     12/13/2022 10:05 AM     10/19/2022 12:00 PM    K 3.6 12/13/2022 10:05 AM    K 3.8 10/19/2022 12:00 PM    K 4.2 02/10/2022 09:35 AM     10/19/2022 12:00 PM    CO2 23 10/19/2022 12:00 PM    BUN 21 10/19/2022 12:00 PM    CREATININE 0.8 12/13/2022 10:05 AM    CREATININE 0.9 10/19/2022 12:00 PM    GLUCOSE 126 10/19/2022 12:00 PM    CALCIUM 10.6 10/19/2022 12:00 PM      LFT:   Lab Results   Component Value Date    ALT 42 11/22/2022    AST 25 11/22/2022    ALKPHOS 76 11/22/2022    BILITOT 0.5 11/22/2022         Assessment and Plan:     1. 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 Clinical stage T2 N2 M0. Mass extends from 34 to 28 cm approximately 6 cm. Staging by EUS 3/23/2022 [facility] (T2 based on invasion and N2 based on 2 malignant appearing lymph nodes visualized and measured in the lower paraesophageal mediastinum level 8L adjacent to the mass. 2 malignant appearing lymph nodes visualized and measured in the subcarinal mediastinum level 7. PET/CT 3/30/2022 [facility]- hypermetabolic activity at mid esophagus consistent with primary malignancy with adjacent hypermetabolic left periesophageal lymph nodes. Brain Mets; extensive and bulky. 10/31/22, On Decadron 2 mg bid 11/22/22, taper started. He completed WBRT per Dr. Kathryn Salazar. He starts adjuvant chemotherapy today with FOLFOX + Herceptin. - ondansetron (ZOFRAN-ODT) 8 MG TBDP disintegrating tablet; Place 1 tablet under the tongue every 8 hours as needed for Nausea or Vomiting  Dispense: 90 tablet; Refill: 1    - prochlorperazine (COMPAZINE) 10 MG tablet; Take 1 tablet by mouth every 6 hours as needed (nausea)  Dispense: 120 tablet; Refill: 1    2. Encounter for chemotherapy management  Current treatment recommendations include FOLFOX + herceptin. He feels well for treatment today. Labs:  BMP stable. WBC 6.1, H/H 13.7/40.7. Platelets 78. Lab parameters met, ok for treatment today. Sent prescriptions for zofran, compazine and emla cream.      3. Anemia, unspecified type  H/H improved at 13.7/40.7 today. He denies s/s bleeding. Trend. 4. Thrombocytopenia (HCC)  Platelet count 78. He denies s/s bleeding. Trend. No follow-ups on file.    Will return on Thursday for pump removal.   Will return next week for nurse eval + labs to check tolerance for treatment and trend platelet count. All patient questions answered. Pt voiced understanding. Patient agreed with treatment plan. Follow up as directed. Patient instructed to call for questions or concerns. Electronically signed by   BHARTI Gross CNP     I spent a total of 37 minutes on the day of the visit.

## 2022-12-13 NOTE — PATIENT INSTRUCTIONS
[id number] Jayla Pierce for treatment today    Return to clinic on Thursday for pump removal   Return to clinic in 1 week for nurse eval + labs:  CBC, CMP   Return to clinic in 2 weeks to see Maryam Burkett or [address] [place] [address] with labs:  CBC, CMP   Treatment in 2 weeks

## 2022-12-13 NOTE — DISCHARGE INSTRUCTIONS
Will return on Thursday for pump removal.   Will return next week for nurse eval + labs to check tolerance for treatment and trend platelet count. Please contact your Oncologist if you have any questions regarding the chemotherapy Oxaliplatin/Fluorouracil that you received today. Patient instructed if experience any of the symptoms following today's chemotherapy / to notify MD immediately or go to emergency department. * dizziness/lightheadedness  *acute nausea/vomiting - not relieved with medication  *headache - not relieved from Tylenol/pain medication  *chest pain/pressure  *rash/itching  *shortness of breath        Drink fluids - 48oz fluids daily  Call if develop fever/ chills/ signs or symptoms of infection     IF chemo pump alarms, check for occlusions/clamps in tubing. If alarm cannot be resolved, please call the infusion center @ [phone].

## 2022-12-14 ENCOUNTER — TELEPHONE (OUTPATIENT)
Dept: ONCOLOGY | Age: 62
End: 2022-12-14

## 2022-12-14 NOTE — TELEPHONE ENCOUNTER
Pt's wife called in stating they had a conversation with you about getting a prescription to \"increase his appetite\", would you be able to send script. Thanks.

## 2022-12-15 ENCOUNTER — HOSPITAL ENCOUNTER (OUTPATIENT)
Dept: INFUSION THERAPY | Age: 62
Discharge: HOME | End: 2022-12-15
Payer: MEDICAID

## 2022-12-15 VITALS
RESPIRATION RATE: 16 BRPM | DIASTOLIC BLOOD PRESSURE: 75 MMHG | HEART RATE: 83 BPM | OXYGEN SATURATION: 96 % | TEMPERATURE: 98.4 F | SYSTOLIC BLOOD PRESSURE: 111 MMHG

## 2022-12-15 DIAGNOSIS — C15.5 CANCER OF DISTAL THIRD OF ESOPHAGUS (HCC): Primary | ICD-10-CM

## 2022-12-15 PROCEDURE — 96523 IRRIG DRUG DELIVERY DEVICE: CPT

## 2022-12-15 PROCEDURE — 6360000002 HC RX W HCPCS: Performed by: INTERNAL MEDICINE

## 2022-12-15 PROCEDURE — 2580000003 HC RX 258: Performed by: INTERNAL MEDICINE

## 2022-12-15 RX ORDER — HEPARIN SODIUM (PORCINE) LOCK FLUSH IV SOLN 100 UNIT/ML 100 UNIT/ML
500 SOLUTION INTRAVENOUS PRN
Status: DISCONTINUED | OUTPATIENT
Start: 2022-12-15 | End: 2022-12-16 | Stop reason: HOSPADM

## 2022-12-15 RX ORDER — SODIUM CHLORIDE 9 MG/ML
5-40 INJECTION INTRAVENOUS PRN
Status: DISCONTINUED | OUTPATIENT
Start: 2022-12-15 | End: 2022-12-16 | Stop reason: HOSPADM

## 2022-12-15 RX ORDER — MEGESTROL ACETATE 40 MG/ML
200 SUSPENSION ORAL
Qty: 450 ML | Refills: 1 | Status: SHIPPED | OUTPATIENT
Start: 2022-12-15 | End: 2023-02-13

## 2022-12-15 RX ADMIN — Medication 500 UNITS: at 14:49

## 2022-12-15 RX ADMIN — SODIUM CHLORIDE, PRESERVATIVE FREE 10 ML: 5 INJECTION INTRAVENOUS at 14:49

## 2022-12-15 NOTE — PLAN OF CARE
Problem: Infection - Adult  Goal: Absence of infection at discharge  Outcome: Adequate for Discharge  Flowsheets (Taken 12/15/2022 1641)  Absence of infection at discharge:   Assess and monitor for signs and symptoms of infection   Monitor all insertion sites i.e., indwelling lines, tubes and drains  Note: Mediport site with no redness or warmth. Skin over port site intact with no signs of breakdown noted. Patient verbalizes signs/symptoms of port infection and when to notify the physician. Intervention: EDUCATE PATIENT ABOUT SIGNS AND SYMPTOMS OF INFECTION  Note: Discuss port maintenance, infection prevention, signs of infection, and when to call the doctor. Problem: Discharge Planning  Goal: Discharge to home or other facility with appropriate resources  Outcome: Adequate for Discharge  Flowsheets (Taken 12/15/2022 1641)  Discharge to home or other facility with appropriate resources:   Identify barriers to discharge with patient and caregiver   Arrange for needed discharge resources and transportation as appropriate  Note: Patient verbalizes understanding of discharge instructions, follow up appointment, and when to call physician if needed      Problem: Safety - Adult  Goal: Free from fall injury  Outcome: Adequate for Discharge  Flowsheets (Taken 12/15/2022 1641)  Free From Fall Injury: Instruct family/caregiver on patient safety  Note: Patient free of falls this visit. Fall risks assessed. Precautions discussed. Care plan reviewed with patient. Patient verbalizes understanding of the plan of care and contributes to goal setting.

## 2022-12-15 NOTE — PROGRESS NOTES
Pt requests appetite stimulant d/t poor appetite. After discussion with Dr. Dalia Wise, will send prescription for megace. No known history of blood clots. Will send low-dose.     Electronically signed by BHARTI Cao CNP on 12/15/2022 at 3:49 PM

## 2022-12-15 NOTE — PROGRESS NOTES
Patient assessed for the following post continuous 5FU:    Dizziness   No  Lightheadedness  No      Acute nausea/vomiting No  Headache   No  Chest pain/pressure  No  Rash/itching   No  Shortness of breath  No    Patient tolerated chemotherapy treatment continuous 5FU without any complications. Last vital signs:   /75   Pulse 83   Temp 98.4 °F (36.9 °C) (Oral)   Resp 16   SpO2 96%       Patient instructed if experience any of the above symptoms following today's infusion, he is to notify MD immediately or go to the emergency department. Discharge instructions given to patient. Verbalizes understanding. Ambulated off unit accompanied by wife with belongings.

## 2022-12-15 NOTE — DISCHARGE INSTRUCTIONS
Call if any uncontrolled nausea or vomiting   Call if any fever,chills, problems or concerns  Call if any questions  Drink at least 6 - 8 ounces glasses of fluids a day    Patient to watch for any:    Dizziness     Lightheadedness        Acute nausea/vomiting   Headache     Chest pain/pressure    Rash/itching     Shortness of breath      Patient instructed if experience any of the above symptoms following today's pump off, he is to notify MD immediately or go to the emergency department.

## 2022-12-19 NOTE — PROGRESS NOTES
[address] [facility] [address], [address]        HO[place] FLEMINGJESSANDRY RIVERANAYELI REGALADO,  [facility]        Linda Becky: [phone]        F: [phone]       [employer]     END OF TREATMENT SUMMARY    Date of Service: 2022  Patient ID: David Valladares   : 1960  MRN: [id number]   Acct Number: [de-identified]           DIAGNOSIS:   Cancer Staging  Cancer of distal third of esophagus [facility])  Staging form: Esophagus - Adenocarcinoma, AJCC 8th Edition  - Clinical stage from 3/23/2022: Stage IVB (cT2, cN2, cM1, G3) - Signed by Astrid Babin MD on 2022      HISTORY OF PRESENT ILLNESS:  Oncology History Overview Note   As per Thoracic Surgery (Dr. Ashanti Medel) 2022    David Valladares is a 62 y.o. male former smoker with history of HTN, HLD, and GERD who was referred to our service by Dr. Jose A Olguin for recently diagnosed esophageal adenocarcinoma who presents today for review of PET/CT and EUS for esophageal cancer staging. Patient reports he is doing well overall. He denies fever, hemoptysis, chest pain, or dysphagia. He is currently on a full diet and is supplementing with one Ensure per day. 62 y.o. male former smoker with history of HTN, HLD, and GERD who was referred to our service by Dr. Jose A Olguin for recently diagnosed esophageal adenocarcinoma who presents today for review of PET/CT and EUS for esophageal cancer staging. Patient was staged as stage JOSE (uT2N2) on recent EUS. Findings from recent PET/CT correlate with EUS findings. Plan to have patient complete neoadjuvant chemoradiation treatment. We will follow-up with patient in ~6 weeks with a telemedicine visit in order to discuss progress. Imaging  EUS on 3/23/2022:  Impression:              - Partially obstructing, malignant esophageal tumor was found in the lower third of the esophagus.                          - Esophagogastric landmarks identified.                           - Large hiatal hernia. - Normal stomach. - Normal examined duodenum.                          - There was diffuse abnormal echotexture in the left lobe of the liver and in the right lobe of the liver. This was characterized by a hyperechoic appearance. This is consistent with a fatty liver. - There was no sign of significant pathology in the pancreatic head, genu of the pancreas and pancreatic body. - There was no sign of significant pathology in the common bile duct. - There was no sign of significant pathology in the gallbladder body. - A mass was found in the lower third of the esophagus. A tissue diagnosis was obtained prior to this exam. This is consistent with adenocarcinoma. This was staged T2 (based on invasion into) N2 Mx by endosonographic criteria. - Two malignant-appearing lymph nodes were visualized and measured in the lower paraesophageal mediastinum (level 8L) adjacent to the mass. - Two malignant-appearing lymph nodes were visualized and measured in the subcarinal mediastinum (level 7). - No specimens collected. PET/CT on 3/30/2022:  IMPRESSION:     Intense hypermetabolic activity within the mid esophagus consistent with primary malignancy. Adjacent hypermetabolic left periesophageal lymph node worrisome for metastatic adenopathy. 07/20/2022 PET SCAN WHOLE BODY       7/20/2022 As per thoracic surgery,  Plan to proceed with a two staged operation with an EGD and robotic-assisted gastric devascularization with possible J-tube placement followed ~2 weeks later by a robotic-assisted laparoscopy and right thoracoscopy for minimally invasive Kwesi-Venu esophagectomy.     11/03/2022 MRI Brain with and without Cons         Cancer of distal third of esophagus [address]   2/14/2022 Surgery         4/7/2022 Initial Diagnosis    Cancer of distal third of esophagus ([address]     5/11/2022 - 6/16/2022 Chemotherapy    OP CARBOplatin AUC 2 + PACLitaxel 45 mg/m2 Q7D  Plan Provider: Mark Vogel MD  Treatment goal: Neoadjuvant  Line of treatment: Neoadjuvant       5/11/2022 - 6/20/2022 Radiation    Treatment Course Number: 1    Treatment Site (s) Modality Dose (cGy) Fractions Elapsed Days   Esophageal Primary, regional lymph nodes IMRT 5040 28 40   Cumulative Dose: 5040 cGy    Radiation therapy delivered under the care of Dr. Michael Elizalde MD MS ([facility])       11/4/2022 Surgery         11/22/2022 - 12/7/2022 Radiation    Treatment Course Number: 2    Treatment Site (s) Modality Dose (cGy) Fractions Elapsed Days   Whole Brain Radiation Therapy Lateral 3000 10 15   Cumulative Dose: 3000 cGy    Radiation therapy delivered under the care of Dr. Michael Elizalde MD MS ([facility])       12/13/2022 -  Chemotherapy    OP OXALiplatin + leucovorin + fluorouracil IVP & CIVI ([id number]) + trastuzumab   Plan Provider: Mark Vogel MD  Treatment goal: Control  Line of treatment: 1st Line           Treatment Tolerance/Response:     Comfort Alteration  Fatigue:Able to perform daily activities with rest periods    Pain Location: n/a  Pain Intensity (Current): 0 No Pain  Pain Treatment: N/A  Pain Relief: n/a    Emotional Alteration:   Coping: effective    Nutritional Alteration  Anorexia: Loss of appetite but able to eat smaller portions of food and/or liquids  Nausea: No nausea noted  Vomiting: No vomiting   Salivary Glands- Acute: No changes over baseline  Taste Disturbance (Dysgeusia): Normal   Dyspepsia/Heartburn: None  Dysphagia/Esophagitis: None    Skin Alteration   Skin reaction: No changes noted  Alopecia: No loss    Mucous Membrane Alteration  Mucositis XRT Related: None  Pharynx and Esophagus- Acute: No change over baseline  Voice Changes: Normal    Ventilation Alteration  Cough: None  Hemoptysis: None  Dyspnea: Normal  Mucous Quantity/Quality:     CNS Alteration  Level of Consciousness: Alert, responds briskly, appropriately with minimal stimulus  Seizure Activity: None  Insomnia: Normal   Orientation: Oriented in 3 spheres of person, place, and time  Comment:   Speech Impairment: No  Ataxia: Normal    ECO - Symptomatic but completely ambulatory (Restricted in physically strenuous activity but ambulatory and able to carry out work of a light or sedentary nature. For example, light housework, office work)    Clarita Councilman tolerated radiation therapy well with only mild treatment related symptoms as detailed above. Radiation therapy was completed as planned without any significant treatment breaks or suspensions. Any treatment effects experienced at completion of therapy should improve or resolve in the next 2-3 weeks. Continue symptom management, if required, as instructed on the last day of treatment. Systemic Therapy: None      Follow Up Plan: Patient tolerated radiation therapy well overall with only mild side effects noted above. Continue regular follow up with all other treating physicians. The patient will return to clinic in 1 month or sooner if clinically indicated. They have our clinic number to call with any questions or concerns if needed. Thank you for allowing us to be a part of their care.     Electronically signed by Nimisha Hyman MD on 2022 at 5:52 PM  Radiation Oncology    CC:  Dr. Murray Schaefer ([id number])   [facility]: Tumor Registry: \A Chronology of [place] [facility]\""

## 2022-12-20 ENCOUNTER — HOSPITAL ENCOUNTER (OUTPATIENT)
Dept: INFUSION THERAPY | Age: 62
Discharge: HOME | End: 2022-12-20
Payer: MEDICAID

## 2022-12-20 VITALS
TEMPERATURE: 98.2 F | RESPIRATION RATE: 16 BRPM | DIASTOLIC BLOOD PRESSURE: 84 MMHG | OXYGEN SATURATION: 96 % | SYSTOLIC BLOOD PRESSURE: 136 MMHG | HEART RATE: 64 BPM

## 2022-12-20 DIAGNOSIS — Z51.11 ENCOUNTER FOR CHEMOTHERAPY MANAGEMENT: Primary | ICD-10-CM

## 2022-12-20 DIAGNOSIS — C15.5 CANCER OF DISTAL THIRD OF ESOPHAGUS (HCC): ICD-10-CM

## 2022-12-20 LAB
ABSOLUTE IMMATURE GRANULOCYTE: 0.01 THOU/MM3 (ref 0–0.07)
ALBUMIN SERPL-MCNC: 3.6 G/DL (ref 3.5–5.1)
ALP BLD-CCNC: 63 U/L (ref 38–126)
ALT SERPL-CCNC: 22 U/L (ref 11–66)
AST SERPL-CCNC: 17 U/L (ref 5–40)
BASINOPHIL, AUTOMATED: 0 % (ref 0–3)
BASOPHILS ABSOLUTE: 0 THOU/MM3 (ref 0–0.1)
BILIRUB SERPL-MCNC: 0.5 MG/DL (ref 0.3–1.2)
BILIRUBIN DIRECT: < 0.2 MG/DL (ref 0–0.3)
BUN, WHOLE BLOOD: 14 MG/DL (ref 8–26)
CHLORIDE, WHOLE BLOOD: 103 MEQ/L (ref 98–109)
CREATININE, WHOLE BLOOD: 0.8 MG/DL (ref 0.5–1.2)
EOSINOPHILS ABSOLUTE: 0 THOU/MM3 (ref 0–0.4)
EOSINOPHILS RELATIVE PERCENT: 0 % (ref 0–4)
GFR SERPL CREATININE-BSD FRML MDRD: > 60 ML/MIN/1.73M2
GLUCOSE, WHOLE BLOOD: 106 MG/DL (ref 70–108)
HCT VFR BLD CALC: 33.8 % (ref 42–52)
HEMOGLOBIN: 11.9 GM/DL (ref 14–18)
IMMATURE GRANULOCYTES: 0 %
IONIZED CALCIUM, WHOLE BLOOD: 1.2 MMOL/L (ref 1.12–1.32)
LYMPHOCYTES # BLD: 18 % (ref 15–47)
LYMPHOCYTES ABSOLUTE: 0.4 THOU/MM3 (ref 1–4.8)
MCH RBC QN AUTO: 30.7 PG (ref 26–33)
MCHC RBC AUTO-ENTMCNC: 35.2 GM/DL (ref 32.2–35.5)
MCV RBC AUTO: 87 FL (ref 80–94)
MONOCYTES ABSOLUTE: 0 THOU/MM3 (ref 0.4–1.3)
MONOCYTES: 2 % (ref 0–12)
PDW BLD-RTO: 14.9 % (ref 11.5–14.5)
PLATELET # BLD: 51 THOU/MM3 (ref 130–400)
PMV BLD AUTO: 9.3 FL (ref 9.4–12.4)
POTASSIUM, WHOLE BLOOD: 3.2 MEQ/L (ref 3.5–4.9)
RBC # BLD: 3.88 MILL/MM3 (ref 4.7–6.1)
SEG NEUTROPHILS: 80 % (ref 43–75)
SEGMENTED NEUTROPHILS ABSOLUTE COUNT: 1.9 THOU/MM3 (ref 1.8–7.7)
SODIUM, WHOLE BLOOD: 137 MEQ/L (ref 138–146)
TOTAL CO2, WHOLE BLOOD: 22 MEQ/L (ref 23–33)
TOTAL PROTEIN: 5.7 G/DL (ref 6.1–8)
WBC # BLD: 2.4 THOU/MM3 (ref 4.8–10.8)

## 2022-12-20 PROCEDURE — 2580000003 HC RX 258: Performed by: INTERNAL MEDICINE

## 2022-12-20 PROCEDURE — 6360000002 HC RX W HCPCS: Performed by: INTERNAL MEDICINE

## 2022-12-20 PROCEDURE — 36591 DRAW BLOOD OFF VENOUS DEVICE: CPT

## 2022-12-20 PROCEDURE — 80076 HEPATIC FUNCTION PANEL: CPT

## 2022-12-20 PROCEDURE — 99211 OFF/OP EST MAY X REQ PHY/QHP: CPT

## 2022-12-20 PROCEDURE — 80047 BASIC METABLC PNL IONIZED CA: CPT

## 2022-12-20 PROCEDURE — 85025 COMPLETE CBC W/AUTO DIFF WBC: CPT

## 2022-12-20 RX ORDER — HEPARIN SODIUM (PORCINE) LOCK FLUSH IV SOLN 100 UNIT/ML 100 UNIT/ML
500 SOLUTION INTRAVENOUS PRN
Status: DISCONTINUED | OUTPATIENT
Start: 2022-12-20 | End: 2022-12-21 | Stop reason: HOSPADM

## 2022-12-20 RX ORDER — WATER 1000 ML/1000ML
2.2 INJECTION, SOLUTION INTRAVENOUS ONCE
Status: CANCELLED | OUTPATIENT
Start: 2022-12-20 | End: 2022-12-20

## 2022-12-20 RX ORDER — SODIUM CHLORIDE 0.9 % (FLUSH) 0.9 %
5-40 SYRINGE (ML) INJECTION PRN
Status: CANCELLED | OUTPATIENT
Start: 2022-12-20

## 2022-12-20 RX ORDER — MEGESTROL ACETATE 40 MG/1
40 TABLET ORAL DAILY
Qty: 30 TABLET | Refills: 0 | Status: SHIPPED | OUTPATIENT
Start: 2022-12-20

## 2022-12-20 RX ORDER — SODIUM CHLORIDE 9 MG/ML
25 INJECTION, SOLUTION INTRAVENOUS PRN
Status: CANCELLED | OUTPATIENT
Start: 2022-12-20

## 2022-12-20 RX ORDER — SODIUM CHLORIDE 0.9 % (FLUSH) 0.9 %
5-40 SYRINGE (ML) INJECTION PRN
Status: DISCONTINUED | OUTPATIENT
Start: 2022-12-20 | End: 2022-12-21 | Stop reason: HOSPADM

## 2022-12-20 RX ORDER — HEPARIN SODIUM (PORCINE) LOCK FLUSH IV SOLN 100 UNIT/ML 100 UNIT/ML
500 SOLUTION INTRAVENOUS PRN
Status: CANCELLED | OUTPATIENT
Start: 2022-12-20

## 2022-12-20 RX ADMIN — SODIUM CHLORIDE, PRESERVATIVE FREE 10 ML: 5 INJECTION INTRAVENOUS at 10:34

## 2022-12-20 RX ADMIN — SODIUM CHLORIDE, PRESERVATIVE FREE 10 ML: 5 INJECTION INTRAVENOUS at 11:14

## 2022-12-20 RX ADMIN — HEPARIN 500 UNITS: 100 SYRINGE at 11:14

## 2022-12-20 RX ADMIN — SODIUM CHLORIDE, PRESERVATIVE FREE 20 ML: 5 INJECTION INTRAVENOUS at 10:35

## 2022-12-20 NOTE — TELEPHONE ENCOUNTER
Patient requesting Megace pills (has liquid form) and magic mouthwash. Medication pended. Pharmacy verified. Please advise.

## 2022-12-20 NOTE — DISCHARGE INSTRUCTIONS
Please contact your Oncologist if you have any questions regarding your visit today. You are instructed to call the office or go to the Emergency Dept. If you experience any of the following symptoms:    Dizziness/lightheadedness   Acute nausea or vomiting-not relieved by medications  Headaches-not relieved by medications  New chest pain or pressure  New rash /itching  New shortness of breath  Fever,chills or signs or symptoms of infection    Make sure you are drinking 48 to 64 ounces of water daily-if you are unable to drink fluids let us know right away.     Take in foods high in Potassium

## 2022-12-20 NOTE — PLAN OF CARE
Problem: Infection - Adult  Goal: Absence of infection at discharge  Outcome: Adequate for Discharge  Flowsheets (Taken 12/20/2022 1557)  Absence of infection at discharge: Assess and monitor for signs and symptoms of infection  Note: Mediport site with no redness or warmth. Skin over port site intact with no signs of breakdown noted. Patient verbalizes signs/symptoms of port infection and when to notify the physician. Goal: Will show no infection signs and symptoms  Description: Will show no infection signs and symptoms  Outcome: Adequate for Discharge  Note:    Intervention: EDUCATE PATIENT ABOUT SIGNS AND SYMPTOMS OF INFECTION  Note: Discuss port maintenance,infection prevention, sign of infection and when to call MD.      Problem: Discharge Planning  Goal: Discharge to home or other facility with appropriate resources  Outcome: Adequate for Discharge  Flowsheets (Taken 12/20/2022 1557)  Discharge to home or other facility with appropriate resources: Identify barriers to discharge with patient and caregiver  Note: Verbalize understanding of discharge instructions, follow up appointments, and when to call Physician. Problem: Safety - Adult  Goal: Free from fall injury  Outcome: Adequate for Discharge  Flowsheets (Taken 12/20/2022 1557)  Free From Fall Injury: Instruct family/caregiver on patient safety  Note: Free from falls while in [facility].P. Oncology. Care plan reviewed with patient. Patient verbalize understanding of the plan of care and contribute to goal setting.

## 2022-12-21 DIAGNOSIS — C15.5 CANCER OF DISTAL THIRD OF ESOPHAGUS (HCC): Primary | ICD-10-CM

## 2022-12-21 RX ORDER — FLUOROURACIL 50 MG/ML
400 INJECTION, SOLUTION INTRAVENOUS ONCE
OUTPATIENT
Start: 2022-12-27 | End: 2022-12-27

## 2022-12-21 RX ORDER — DEXTROSE MONOHYDRATE 50 MG/ML
5-250 INJECTION, SOLUTION INTRAVENOUS PRN
OUTPATIENT
Start: 2022-12-27

## 2022-12-21 RX ORDER — SODIUM CHLORIDE 9 MG/ML
5-40 INJECTION INTRAVENOUS PRN
OUTPATIENT
Start: 2022-12-29

## 2022-12-21 RX ORDER — ACETAMINOPHEN 325 MG/1
650 TABLET ORAL
OUTPATIENT
Start: 2022-12-27

## 2022-12-21 RX ORDER — HEPARIN SODIUM (PORCINE) LOCK FLUSH IV SOLN 100 UNIT/ML 100 UNIT/ML
500 SOLUTION INTRAVENOUS PRN
OUTPATIENT
Start: 2022-12-29

## 2022-12-21 RX ORDER — SODIUM CHLORIDE 9 MG/ML
5-250 INJECTION, SOLUTION INTRAVENOUS PRN
OUTPATIENT
Start: 2022-12-27

## 2022-12-21 RX ORDER — DIPHENHYDRAMINE HYDROCHLORIDE 50 MG/ML
50 INJECTION INTRAMUSCULAR; INTRAVENOUS
OUTPATIENT
Start: 2022-12-27

## 2022-12-21 RX ORDER — HEPARIN SODIUM (PORCINE) LOCK FLUSH IV SOLN 100 UNIT/ML 100 UNIT/ML
500 SOLUTION INTRAVENOUS PRN
OUTPATIENT
Start: 2022-12-27

## 2022-12-21 RX ORDER — SODIUM CHLORIDE 9 MG/ML
5-250 INJECTION, SOLUTION INTRAVENOUS PRN
OUTPATIENT
Start: 2022-12-29

## 2022-12-21 RX ORDER — FAMOTIDINE 10 MG/ML
20 INJECTION, SOLUTION INTRAVENOUS
OUTPATIENT
Start: 2022-12-27

## 2022-12-21 RX ORDER — SODIUM CHLORIDE 9 MG/ML
5-40 INJECTION INTRAVENOUS PRN
OUTPATIENT
Start: 2022-12-27

## 2022-12-21 RX ORDER — MEPERIDINE HYDROCHLORIDE 50 MG/ML
12.5 INJECTION INTRAMUSCULAR; INTRAVENOUS; SUBCUTANEOUS PRN
OUTPATIENT
Start: 2022-12-27

## 2022-12-21 RX ORDER — PALONOSETRON 0.05 MG/ML
0.25 INJECTION, SOLUTION INTRAVENOUS ONCE
OUTPATIENT
Start: 2022-12-27 | End: 2022-12-27

## 2022-12-21 RX ORDER — ALBUTEROL SULFATE 90 UG/1
4 AEROSOL, METERED RESPIRATORY (INHALATION) PRN
OUTPATIENT
Start: 2022-12-27

## 2022-12-21 RX ORDER — SODIUM CHLORIDE 9 MG/ML
INJECTION, SOLUTION INTRAVENOUS CONTINUOUS
OUTPATIENT
Start: 2022-12-27

## 2022-12-21 RX ORDER — EPINEPHRINE 1 MG/ML
0.3 INJECTION, SOLUTION, CONCENTRATE INTRAVENOUS PRN
OUTPATIENT
Start: 2022-12-27

## 2022-12-21 RX ORDER — ONDANSETRON 2 MG/ML
8 INJECTION INTRAMUSCULAR; INTRAVENOUS
OUTPATIENT
Start: 2022-12-27

## 2022-12-27 ENCOUNTER — HOSPITAL ENCOUNTER (OUTPATIENT)
Dept: INFUSION THERAPY | Age: 62
Discharge: HOME | End: 2022-12-27
Payer: MEDICAID

## 2022-12-27 ENCOUNTER — OFFICE VISIT (OUTPATIENT)
Dept: ONCOLOGY | Age: 62
End: 2022-12-27
Payer: MEDICAID

## 2022-12-27 VITALS
OXYGEN SATURATION: 98 % | HEART RATE: 7 BPM | BODY MASS INDEX: 23.51 KG/M2 | RESPIRATION RATE: 16 BRPM | HEIGHT: 67 IN | SYSTOLIC BLOOD PRESSURE: 140 MMHG | TEMPERATURE: 98.9 F | WEIGHT: 149.8 LBS | DIASTOLIC BLOOD PRESSURE: 78 MMHG

## 2022-12-27 VITALS
TEMPERATURE: 98.9 F | DIASTOLIC BLOOD PRESSURE: 78 MMHG | HEIGHT: 67 IN | RESPIRATION RATE: 16 BRPM | BODY MASS INDEX: 23.39 KG/M2 | OXYGEN SATURATION: 98 % | HEART RATE: 72 BPM | SYSTOLIC BLOOD PRESSURE: 140 MMHG | WEIGHT: 149 LBS

## 2022-12-27 DIAGNOSIS — Z51.11 ENCOUNTER FOR CHEMOTHERAPY MANAGEMENT: ICD-10-CM

## 2022-12-27 DIAGNOSIS — E87.6 HYPOKALEMIA: ICD-10-CM

## 2022-12-27 DIAGNOSIS — C15.9 METASTASIS FROM ESOPHAGEAL CANCER (HCC): ICD-10-CM

## 2022-12-27 DIAGNOSIS — C15.5 CANCER OF DISTAL THIRD OF ESOPHAGUS (HCC): Primary | ICD-10-CM

## 2022-12-27 DIAGNOSIS — D70.1 CHEMOTHERAPY INDUCED NEUTROPENIA (HCC): ICD-10-CM

## 2022-12-27 DIAGNOSIS — D69.6 THROMBOCYTOPENIA (HCC): ICD-10-CM

## 2022-12-27 DIAGNOSIS — T45.1X5A CHEMOTHERAPY INDUCED NEUTROPENIA (HCC): ICD-10-CM

## 2022-12-27 DIAGNOSIS — C79.9 METASTASIS FROM ESOPHAGEAL CANCER (HCC): ICD-10-CM

## 2022-12-27 LAB
ALBUMIN SERPL-MCNC: 3.6 G/DL (ref 3.5–5.1)
ALP BLD-CCNC: 66 U/L (ref 38–126)
ALT SERPL-CCNC: 17 U/L (ref 11–66)
AST SERPL-CCNC: 15 U/L (ref 5–40)
BILIRUB SERPL-MCNC: 0.8 MG/DL (ref 0.3–1.2)
BILIRUBIN DIRECT: < 0.2 MG/DL (ref 0–0.3)
BUN, WHOLE BLOOD: 9 MG/DL (ref 8–26)
CHLORIDE, WHOLE BLOOD: 106 MEQ/L (ref 98–109)
CREATININE, WHOLE BLOOD: 0.6 MG/DL (ref 0.5–1.2)
GFR SERPL CREATININE-BSD FRML MDRD: > 60 ML/MIN/1.73M2
GLUCOSE, WHOLE BLOOD: 114 MG/DL (ref 70–108)
IONIZED CALCIUM, WHOLE BLOOD: 1.17 MMOL/L (ref 1.12–1.32)
POTASSIUM, WHOLE BLOOD: 3.1 MEQ/L (ref 3.5–4.9)
SCAN OF BLOOD SMEAR: NORMAL
SODIUM, WHOLE BLOOD: 139 MEQ/L (ref 138–146)
TOTAL CO2, WHOLE BLOOD: 21 MEQ/L (ref 23–33)
TOTAL PROTEIN: 6.2 G/DL (ref 6.1–8)

## 2022-12-27 PROCEDURE — 2580000003 HC RX 258: Performed by: INTERNAL MEDICINE

## 2022-12-27 PROCEDURE — 80076 HEPATIC FUNCTION PANEL: CPT

## 2022-12-27 PROCEDURE — 99211 OFF/OP EST MAY X REQ PHY/QHP: CPT

## 2022-12-27 PROCEDURE — 85025 COMPLETE CBC W/AUTO DIFF WBC: CPT

## 2022-12-27 PROCEDURE — 36591 DRAW BLOOD OFF VENOUS DEVICE: CPT

## 2022-12-27 PROCEDURE — 3074F SYST BP LT 130 MM HG: CPT | Performed by: PHYSICIAN ASSISTANT

## 2022-12-27 PROCEDURE — 80047 BASIC METABLC PNL IONIZED CA: CPT

## 2022-12-27 PROCEDURE — 99214 OFFICE O/P EST MOD 30 MIN: CPT | Performed by: PHYSICIAN ASSISTANT

## 2022-12-27 PROCEDURE — 3078F DIAST BP <80 MM HG: CPT | Performed by: PHYSICIAN ASSISTANT

## 2022-12-27 PROCEDURE — 6360000002 HC RX W HCPCS: Performed by: INTERNAL MEDICINE

## 2022-12-27 RX ORDER — WATER 1000 ML/1000ML
2.2 INJECTION, SOLUTION INTRAVENOUS ONCE
OUTPATIENT
Start: 2022-12-27 | End: 2022-12-27

## 2022-12-27 RX ORDER — HEPARIN SODIUM (PORCINE) LOCK FLUSH IV SOLN 100 UNIT/ML 100 UNIT/ML
500 SOLUTION INTRAVENOUS PRN
Status: DISCONTINUED | OUTPATIENT
Start: 2022-12-27 | End: 2022-12-28 | Stop reason: HOSPADM

## 2022-12-27 RX ORDER — SODIUM CHLORIDE 9 MG/ML
25 INJECTION, SOLUTION INTRAVENOUS PRN
OUTPATIENT
Start: 2022-12-27

## 2022-12-27 RX ORDER — POTASSIUM BICARBONATE 25 MEQ/1
25 TABLET, EFFERVESCENT ORAL DAILY
Qty: 30 TABLET | Refills: 1 | Status: SHIPPED | OUTPATIENT
Start: 2022-12-27 | End: 2023-02-25

## 2022-12-27 RX ORDER — SODIUM CHLORIDE 0.9 % (FLUSH) 0.9 %
5-40 SYRINGE (ML) INJECTION PRN
Status: CANCELLED | OUTPATIENT
Start: 2022-12-27

## 2022-12-27 RX ORDER — SODIUM CHLORIDE 0.9 % (FLUSH) 0.9 %
5-40 SYRINGE (ML) INJECTION PRN
Status: DISCONTINUED | OUTPATIENT
Start: 2022-12-27 | End: 2022-12-28 | Stop reason: HOSPADM

## 2022-12-27 RX ORDER — HEPARIN SODIUM (PORCINE) LOCK FLUSH IV SOLN 100 UNIT/ML 100 UNIT/ML
500 SOLUTION INTRAVENOUS PRN
Status: CANCELLED | OUTPATIENT
Start: 2022-12-27

## 2022-12-27 RX ADMIN — SODIUM CHLORIDE, PRESERVATIVE FREE 10 ML: 5 INJECTION INTRAVENOUS at 08:05

## 2022-12-27 RX ADMIN — HEPARIN 500 UNITS: 100 SYRINGE at 09:15

## 2022-12-27 RX ADMIN — SODIUM CHLORIDE, PRESERVATIVE FREE 20 ML: 5 INJECTION INTRAVENOUS at 08:06

## 2022-12-27 NOTE — PROGRESS NOTES
Patient tolerated port flush with lab draw without any complications. Last vital signs:   BP (!) 140/78   Pulse (!) 7   Temp 98.9 °F (37.2 °C) (Oral)   Resp 16   Ht 5' 7\" (1.702 m)   Wt 149 lb 12.8 oz (67.9 kg)   SpO2 98%   BMI 23.46 kg/m²     Physician's instructions:  Hold chemotherapy today. Return to clinic with Dr. Primo Jaimes on 1/4/2023 and for chemotherapy. Labs on RTC: CBC, BMP, LFT  Please call for questions or concerns. Patient instructed if experience any symptoms following today's port flush with lab draw, he is to notify MD immediately or go to the emergency department. Discharge instructions given to patient. Verbalizes understanding. Ambulated off unit per self, accompanied by spouse, with belongings.

## 2022-12-27 NOTE — PATIENT INSTRUCTIONS
Hold chemotherapy today. Return to clinic with Dr. Nuzhat Doran on 1/4/2023 and for chemotherapy. Labs on RTC: CBC, BMP, LFT  Please call for questions or concerns.

## 2022-12-27 NOTE — DISCHARGE INSTRUCTIONS
Please contact your Oncologist if you have any questions regarding the port flush with lab draw that you received today. Patient instructed if experience any of the symptoms following today's port flush with lab draw to notify MD immediately or go to emergency department. * dizziness/lightheadedness  *acute nausea/vomiting - not relieved with medication  *headache - not relieved from Tylenol/pain medication  *chest pain/pressure  *rash/itching  *shortness of breath        Drink fluids - 48oz fluids daily  Call if develop fever/ chills/ signs or symptoms of infection     Physician's instructions:  Hold chemotherapy today. Return to clinic with Dr. Francseco Bhandari on 1/5/2023 and for chemotherapy. Labs on RTC: CBC, BMP, LFT  Please call for questions or concerns. Oral potassium prescribed and potassium enriched diet instructed by RN.

## 2022-12-27 NOTE — PLAN OF CARE
Problem: Infection - Adult  Goal: Absence of infection at discharge  Outcome: Adequate for Discharge  Flowsheets (Taken 12/27/2022 1445)  Absence of infection at discharge: Assess and monitor for signs and symptoms of infection  Note: Mediport site with no redness or warmth. Skin over port site intact with no signs of breakdown noted. Patient verbalizes signs/symptoms of port infection and when to notify the physician. Intervention: EDUCATE PATIENT ABOUT SIGNS AND SYMPTOMS OF INFECTION  Note: Discuss port maintenance,infection prevention, sign of infection and when to call MD.      Problem: Discharge Planning  Goal: Discharge to home or other facility with appropriate resources  Outcome: Adequate for Discharge  Flowsheets (Taken 12/27/2022 1445)  Discharge to home or other facility with appropriate resources: Identify barriers to discharge with patient and caregiver  Note: Verbalize understanding of discharge instructions, follow up appointments, and when to call Physician. Problem: Safety - Adult  Goal: Free from fall injury  Outcome: Adequate for Discharge  Flowsheets (Taken 12/27/2022 1445)  Free From Fall Injury: Instruct family/caregiver on patient safety  Note: Free from falls while in [facility]. Oncology. Care plan reviewed with patient. Patient verbalize understanding of the plan of care and contribute to goal setting.

## 2022-12-27 NOTE — PROGRESS NOTES
[facility], [address] [facility] [address]  [address] [address]  Dept: [phone]  Dept Fax: [phone]: [phone]      Visit Date:12/27/2022     Sonu Bailon is a 65 y.o. male who presents today for:   Chief Complaint   Patient presents with    Follow-up     Cancer of distal third of esophagus ([address]        HPI:   Sonu Bailon is a 65 y.o. male followed by Dr. Nuzhat Doran for metastatic esophageal cancer. Per Dr. Nuzhat Doran' note on 11/22/22:   long history of GERD who presented to the ER on February 10 was admitted for 1 day because of chest heaviness. No cardiology because GI was consulted ultimately showed a mass in the distal esophagus. Outpatient EGD requested. Diagnosed with adenocarcinoma the distal esophagus and referred to Dr. Erna Vega of thoracic surgery at [facility]  EUS shows regional tone involvement. 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 HER2 Amplified  Clinical stage T2 N2 M0. Mass extends from 34 to 28 cm approximately 6 cm. Staging by EUS 3/23/2022 [facility] (T2 based on invasion and N2 based on 2 malignant appearing lymph nodes visualized and measured in the lower paraesophageal mediastinum level 8L adjacent to the mass. 2 malignant appearing lymph nodes visualized and measured in the subcarinal mediastinum level 7. PET/CT 3/30/2022 [facility]- hypermetabolic activity at mid esophagus consistent with primary malignancy with adjacent hypermetabolic left periesophageal lymph nodes. Brain Mets; extensive and bulky. 10/31/22. On Decadron 2 mg bid 11/22/22     (11/21/22) F1 and PDL-1 requested for me  by Dr Yelena Alcantar rad Onc at [facility]:  -Seen by Dr. Erna Vega of thoracic surgery at [facility] 3/30/2022. Recommends neoadjuvant chemoradiation to be followed by surgery  -Neoadjuvant chemoradiation with Dr. Enedina Georges. Low-dose carboplatinum Taxol weekly x 6-7. Chemo  Start date: 5/11.  Day 1 XRT 5/11 6/16/2022 week #6 due/last treatment. Last XRT 6/20/22.  -Robotic assisted laparoscopic and right thoracoscopic [place] esophagectomy ;   additional gastric margin, gastropathic lymph nodes and hernia sac per Dr. Elian Casey 8/30/2022  No evidence of peritoneal metastatic disease. (Path report requested and pending)  - Craniotomy [facility] debulked largest cerebellar met  - WB XRT soon per Dr Bill Bowman ( Dr Haley Alexandra)  - 1st Line Metastatic regimen: pending WB xrt completion and NGS results from [address] [place] [address] brain Bx. Day 1 whole brain XRT- 11/22/22.  -Herceptin FOLFOX tentative start date 12/13 if radiation to the brain complete     PARAMETERS:  -EGD/EUS  -PET/CT    Interval History 12/27/2022:   The patient is here for follow up evaluation. He was evaluated by Dr. Primo Jaimes on 11/22/22 and recommended chemotherapy with Herceptin and FOLFOX. He began cycle #1 on 12/13/22 after completion of whole brain radiation. The patient is here with his wife today. He states overall he tolerated chemotherapy well. He denies fever, chills, or signs/symptoms of infection. He denies chest pain, worsening shortness of breath, cough, abdominal pain, nausea, vomiting, or urinary changes. He affirms sores in the commissures bilaterally. He is currently using magic mouthwash with relief. He affirms few loose stools. Denies rashes, peripheral edema. PMH, SH, and FH:  I reviewed the patients medication list and allergy list as noted on the electronic medical record. The PMH, SH and FH were also reviewed as noted on the EMR. Review of Systems:   Review of Systems   Pertinent review of systems noted in HPI, all other ROS negative.    Objective:   Physical Exam   BP (!) 140/78 (Site: Left Upper Arm, Position: Sitting, Cuff Size: Medium Adult)   Pulse 72   Temp 98.9 °F (37.2 °C) (Oral)   Resp 16   Ht 5' 7\" (1.702 m)   Wt 149 lb (67.6 kg)   SpO2 98%   BMI 23.34 kg/m²    General appearance: No apparent distress, well developed, chronically ill appearing, and cooperative. HEENT: Pupils equal, round, and reactive to light. Conjunctivae/corneas clear. Oral mucosa moist. Small sores noted in bilateral oral commissures. Neck: Supple, with full range of motion. Trachea midline. Respiratory:  Normal respiratory effort. Faint expiratory wheezes anterior cleared with deep breathing. No rhonchi or rales. Cardiovascular: Regular rate and rhythm with normal S1/S2   Abdomen: Soft, active bowel sounds. Musculoskeletal: No clubbing, cyanosis or edema bilaterally. Ambulates in office. Skin: Skin color, texture, turgor normal.  No visible rashes or lesions. Neurologic:  Neurovascularly intact without any focal sensory/motor deficits. Psychiatric: Alert and oriented      Imaging Studies and Labs:   CBC:   Lab Results   Component Value Date    WBC 2.4 (L) 12/20/2022    HGB 11.9 (L) 12/20/2022    HCT 33.8 (L) 12/20/2022    MCV 87 12/20/2022    PLT 51 (L) 12/20/2022     BMP:   Lab Results   Component Value Date/Time     12/27/2022 08:05 AM     10/19/2022 12:00 PM    K 3.1 12/27/2022 08:05 AM    K 3.8 10/19/2022 12:00 PM    K 4.2 02/10/2022 09:35 AM     10/19/2022 12:00 PM    CO2 23 10/19/2022 12:00 PM    BUN 21 10/19/2022 12:00 PM    CREATININE 0.6 12/27/2022 08:05 AM    CREATININE 0.9 10/19/2022 12:00 PM    GLUCOSE 126 10/19/2022 12:00 PM    CALCIUM 10.6 10/19/2022 12:00 PM      LFT:   Lab Results   Component Value Date    ALT 22 12/20/2022    AST 17 12/20/2022    ALKPHOS 63 12/20/2022    BILITOT 0.5 12/20/2022      PET scan on 12/8/2022  FINDINGS:        Neck: No FDG avid cervical lymphadenopathy. Craniotomy changes are partially visualized. Chest: Cardiac size is normal. There is no pleural or pericardial effusion. There is a calcified granuloma in the right lung. No FDG avid pulmonary nodules are identified. There is no hypermetabolic mediastinal, hilar or axillary lymphadenopathy.  There    are surgical changes of prior esophagectomy and gastric pull-through. Degenerative changes are present in the thoracic spine without evidence of hypermetabolic osseous metastatic disease. Abdomen/pelvis: Physiologic activity is present in the liver, spleen, urinary collecting system and gastrointestinal tract. There are calcified granulomas in the spleen. Atherosclerotic calcifications are present in the abdominal aorta without evidence    of aneurysm. The prostate gland is enlarged and contains calcifications. There are phleboliths in the pelvis. There is no FDG avid mesenteric, retroperitoneal, pelvic or inguinal lymphadenopathy. Degenerative changes are present in the lumbar spine and    pelvis without evidence of hypermetabolic osseous metastatic disease. Impression       No FDG avid malignancy. Assessment and Plan:   Metastatic Esophageal Cancer   Initially diagnosed in March 2022. He was recommended neoadjuvant chemoradiation at [facility]. He completed weekly carboplatin and Taxol 5/11/2022 with week #6 on 6/16/22. He completed XRT on 6/20/22. He underwent robotic assisted laparoscopic thorascopic [place] esophagectomy, additional gastric margin gastric lymph nodes and hernia sac per Dr. Juana Hobbs on 8/30/2022. No evidence of peritoneal metastatic disease. The patient developed brain metastasis in October 2022. He is status postcraniotomy at [facility] with debulking of large cerebellar met. He completed whole brain radiation. Patient recommended Herceptin and FOLFOX. PET scan for restaging on 12/8/2022 showed no FDG avid malignancy. 2. Systemic Therapy with Herceptin and FOLFOX  Echocardiogram completed on 12/8/2022 showed normal LV size and normal LV systolic, EF 09%. He began cycle #1 on 12/13/22. Overall he tolerated well. He is due for cycle #2 today. Will hold treatment due to thrombocytopenia and neutropenia. WBC count 700, .5, platelet count 87,039.  Potassium 3.1.    -reviewed neutropenic and thrombocytopenia precautions with the patient and his wife. -Reviewed to inform office if having greater than 3 loose bowel per day. Reviewed bowel regimen with Imodium as needed. -Return to clinic on 1/4/2023 for possible chemotherapy   -Labs on RTC: CBC, BMP, LFT    3. Hypokalemia  Likely due to poor oral intake, loose stools. Will begin Effer-K 25 mEq daily. Return in about 8 days (around 1/4/2023). All patient questions answered. Pt voiced understanding. Patient agreed with treatment plan. Follow up as directed. Patient instructed to call for questions or concerns.           Electronically signed by   Roslyn Persaud PA-C

## 2022-12-28 ENCOUNTER — TELEPHONE (OUTPATIENT)
Dept: ONCOLOGY | Age: 62
End: 2022-12-28

## 2022-12-28 LAB
ANISOCYTOSIS: PRESENT
BASOPHILIA: ABNORMAL
BASOPHILS # BLD: 0 %
BASOPHILS ABSOLUTE: 0 THOU/MM3 (ref 0–0.1)
DIFFERENTIAL TYPE: ABNORMAL
EOSINOPHIL # BLD: 1.5 %
EOSINOPHILS ABSOLUTE: 0 THOU/MM3 (ref 0–0.4)
ERYTHROCYTE [DISTWIDTH] IN BLOOD BY AUTOMATED COUNT: 16.4 % (ref 11.5–14.5)
ERYTHROCYTE [DISTWIDTH] IN BLOOD BY AUTOMATED COUNT: 49.2 FL (ref 35–45)
HCT VFR BLD CALC: 32.7 % (ref 42–52)
HEMOGLOBIN: 11.6 GM/DL (ref 14–18)
IMMATURE GRANS (ABS): 0 THOU/MM3 (ref 0–0.07)
IMMATURE GRANULOCYTES: 0 %
LYMPHOCYTES # BLD: 64.6 %
LYMPHOCYTES ABSOLUTE: 0.5 THOU/MM3 (ref 1–4.8)
MCH RBC QN AUTO: 31.3 PG (ref 26–33)
MCHC RBC AUTO-ENTMCNC: 35.5 GM/DL (ref 32.2–35.5)
MCV RBC AUTO: 88.1 FL (ref 80–94)
MONOCYTES # BLD: 15.4 %
MONOCYTES ABSOLUTE: 0.1 THOU/MM3 (ref 0.4–1.3)
NUCLEATED RED BLOOD CELLS: 5 /100 WBC
PATHOLOGIST REVIEW: ABNORMAL
PLATELET # BLD: 58 THOU/MM3 (ref 130–400)
PLATELET ESTIMATE: ABNORMAL
PMV BLD AUTO: 10.2 FL (ref 9.4–12.4)
RBC # BLD: 3.71 MILL/MM3 (ref 4.7–6.1)
SEG NEUTROPHILS: 18.5 %
SEGMENTED NEUTROPHILS ABSOLUTE COUNT: 0.1 THOU/MM3 (ref 1.8–7.7)
WBC # BLD: 0.7 THOU/MM3 (ref 4.8–10.8)

## 2022-12-28 NOTE — TELEPHONE ENCOUNTER
----- Message from Vriginie Frances MD sent at 12/27/2022  5:29 PM EST -----  Potasium called in by another provider today.

## 2023-01-04 ENCOUNTER — OFFICE VISIT (OUTPATIENT)
Dept: ONCOLOGY | Age: 63
End: 2023-01-04
Payer: MEDICAID

## 2023-01-04 ENCOUNTER — HOSPITAL ENCOUNTER (OUTPATIENT)
Dept: INFUSION THERAPY | Age: 63
Discharge: HOME | End: 2023-01-04
Payer: MEDICAID

## 2023-01-04 VITALS
HEART RATE: 89 BPM | RESPIRATION RATE: 16 BRPM | WEIGHT: 148 LBS | OXYGEN SATURATION: 98 % | HEIGHT: 67 IN | BODY MASS INDEX: 23.23 KG/M2 | DIASTOLIC BLOOD PRESSURE: 86 MMHG | TEMPERATURE: 98.5 F | SYSTOLIC BLOOD PRESSURE: 121 MMHG

## 2023-01-04 VITALS
RESPIRATION RATE: 16 BRPM | DIASTOLIC BLOOD PRESSURE: 71 MMHG | HEIGHT: 67 IN | TEMPERATURE: 98.7 F | WEIGHT: 148.5 LBS | OXYGEN SATURATION: 97 % | BODY MASS INDEX: 23.31 KG/M2 | HEART RATE: 89 BPM | SYSTOLIC BLOOD PRESSURE: 123 MMHG

## 2023-01-04 DIAGNOSIS — D70.1 CHEMOTHERAPY INDUCED NEUTROPENIA (HCC): ICD-10-CM

## 2023-01-04 DIAGNOSIS — Z51.11 ENCOUNTER FOR CHEMOTHERAPY MANAGEMENT: ICD-10-CM

## 2023-01-04 DIAGNOSIS — C15.9 METASTASIS FROM ESOPHAGEAL CANCER (HCC): ICD-10-CM

## 2023-01-04 DIAGNOSIS — C79.9 METASTASIS FROM ESOPHAGEAL CANCER (HCC): ICD-10-CM

## 2023-01-04 DIAGNOSIS — C15.9 METASTASIS FROM ESOPHAGEAL CANCER (HCC): Primary | ICD-10-CM

## 2023-01-04 DIAGNOSIS — T45.1X5A CHEMOTHERAPY INDUCED NEUTROPENIA (HCC): ICD-10-CM

## 2023-01-04 DIAGNOSIS — C15.5 CANCER OF DISTAL THIRD OF ESOPHAGUS (HCC): Primary | ICD-10-CM

## 2023-01-04 DIAGNOSIS — D69.6 THROMBOCYTOPENIA (HCC): ICD-10-CM

## 2023-01-04 DIAGNOSIS — C79.9 METASTASIS FROM ESOPHAGEAL CANCER (HCC): Primary | ICD-10-CM

## 2023-01-04 DIAGNOSIS — E87.6 HYPOKALEMIA: ICD-10-CM

## 2023-01-04 LAB
ALBUMIN SERPL-MCNC: 3.2 G/DL (ref 3.5–5.1)
ALP BLD-CCNC: 72 U/L (ref 38–126)
ALT SERPL-CCNC: 11 U/L (ref 11–66)
AST SERPL-CCNC: 19 U/L (ref 5–40)
BILIRUB SERPL-MCNC: 0.5 MG/DL (ref 0.3–1.2)
BILIRUBIN DIRECT: < 0.2 MG/DL (ref 0–0.3)
BUN, WHOLE BLOOD: 6 MG/DL (ref 8–26)
CHLORIDE, WHOLE BLOOD: 104 MEQ/L (ref 98–109)
CREATININE, WHOLE BLOOD: 0.8 MG/DL (ref 0.5–1.2)
DIFFERENTIAL, MANUAL: NORMAL
GFR SERPL CREATININE-BSD FRML MDRD: > 60 ML/MIN/1.73M2
GLUCOSE, WHOLE BLOOD: 111 MG/DL (ref 70–108)
IONIZED CALCIUM, WHOLE BLOOD: 1.2 MMOL/L (ref 1.12–1.32)
POTASSIUM, WHOLE BLOOD: 3.9 MEQ/L (ref 3.5–4.9)
SODIUM, WHOLE BLOOD: 139 MEQ/L (ref 138–146)
TOTAL CO2, WHOLE BLOOD: 23 MEQ/L (ref 23–33)
TOTAL PROTEIN: 6.4 G/DL (ref 6.1–8)

## 2023-01-04 PROCEDURE — 96368 THER/DIAG CONCURRENT INF: CPT

## 2023-01-04 PROCEDURE — 36591 DRAW BLOOD OFF VENOUS DEVICE: CPT

## 2023-01-04 PROCEDURE — 96413 CHEMO IV INFUSION 1 HR: CPT

## 2023-01-04 PROCEDURE — 99211 OFF/OP EST MAY X REQ PHY/QHP: CPT

## 2023-01-04 PROCEDURE — 80076 HEPATIC FUNCTION PANEL: CPT

## 2023-01-04 PROCEDURE — 3078F DIAST BP <80 MM HG: CPT | Performed by: PHYSICIAN ASSISTANT

## 2023-01-04 PROCEDURE — 96367 TX/PROPH/DG ADDL SEQ IV INF: CPT

## 2023-01-04 PROCEDURE — 6360000002 HC RX W HCPCS: Performed by: INTERNAL MEDICINE

## 2023-01-04 PROCEDURE — 2580000003 HC RX 258: Performed by: INTERNAL MEDICINE

## 2023-01-04 PROCEDURE — 96417 CHEMO IV INFUS EACH ADDL SEQ: CPT

## 2023-01-04 PROCEDURE — 96416 CHEMO PROLONG INFUSE W/PUMP: CPT

## 2023-01-04 PROCEDURE — 96415 CHEMO IV INFUSION ADDL HR: CPT

## 2023-01-04 PROCEDURE — 96411 CHEMO IV PUSH ADDL DRUG: CPT

## 2023-01-04 PROCEDURE — 96375 TX/PRO/DX INJ NEW DRUG ADDON: CPT

## 2023-01-04 PROCEDURE — 99214 OFFICE O/P EST MOD 30 MIN: CPT | Performed by: PHYSICIAN ASSISTANT

## 2023-01-04 PROCEDURE — 80047 BASIC METABLC PNL IONIZED CA: CPT

## 2023-01-04 PROCEDURE — 3074F SYST BP LT 130 MM HG: CPT | Performed by: PHYSICIAN ASSISTANT

## 2023-01-04 PROCEDURE — 85027 COMPLETE CBC AUTOMATED: CPT

## 2023-01-04 RX ORDER — HEPARIN SODIUM (PORCINE) LOCK FLUSH IV SOLN 100 UNIT/ML 100 UNIT/ML
500 SOLUTION INTRAVENOUS PRN
OUTPATIENT
Start: 2023-01-04

## 2023-01-04 RX ORDER — SODIUM CHLORIDE 0.9 % (FLUSH) 0.9 %
5-40 SYRINGE (ML) INJECTION PRN
Status: DISCONTINUED | OUTPATIENT
Start: 2023-01-04 | End: 2023-01-05 | Stop reason: HOSPADM

## 2023-01-04 RX ORDER — FLUOROURACIL 50 MG/ML
400 INJECTION, SOLUTION INTRAVENOUS ONCE
Status: COMPLETED | OUTPATIENT
Start: 2023-01-04 | End: 2023-01-04

## 2023-01-04 RX ORDER — WATER 1000 ML/1000ML
2.2 INJECTION, SOLUTION INTRAVENOUS ONCE
OUTPATIENT
Start: 2023-01-04 | End: 2023-01-04

## 2023-01-04 RX ORDER — SODIUM CHLORIDE 9 MG/ML
25 INJECTION, SOLUTION INTRAVENOUS PRN
OUTPATIENT
Start: 2023-01-04

## 2023-01-04 RX ORDER — HEPARIN SODIUM (PORCINE) LOCK FLUSH IV SOLN 100 UNIT/ML 100 UNIT/ML
500 SOLUTION INTRAVENOUS PRN
Status: DISCONTINUED | OUTPATIENT
Start: 2023-01-04 | End: 2023-01-05 | Stop reason: HOSPADM

## 2023-01-04 RX ORDER — SODIUM CHLORIDE 0.9 % (FLUSH) 0.9 %
5-40 SYRINGE (ML) INJECTION PRN
OUTPATIENT
Start: 2023-01-04

## 2023-01-04 RX ORDER — PALONOSETRON 0.05 MG/ML
0.25 INJECTION, SOLUTION INTRAVENOUS ONCE
Status: COMPLETED | OUTPATIENT
Start: 2023-01-04 | End: 2023-01-04

## 2023-01-04 RX ORDER — DEXTROSE MONOHYDRATE 50 MG/ML
5-250 INJECTION, SOLUTION INTRAVENOUS PRN
Status: DISCONTINUED | OUTPATIENT
Start: 2023-01-04 | End: 2023-01-05 | Stop reason: HOSPADM

## 2023-01-04 RX ADMIN — FLUOROURACIL 4400 MG: 50 INJECTION, SOLUTION INTRAVENOUS at 15:07

## 2023-01-04 RX ADMIN — SODIUM CHLORIDE, PRESERVATIVE FREE 20 ML: 5 INJECTION INTRAVENOUS at 15:07

## 2023-01-04 RX ADMIN — DEXAMETHASONE SODIUM PHOSPHATE 12 MG: 4 INJECTION, SOLUTION INTRA-ARTICULAR; INTRALESIONAL; INTRAMUSCULAR; INTRAVENOUS; SOFT TISSUE at 11:34

## 2023-01-04 RX ADMIN — FLUOROURACIL 725 MG: 50 INJECTION, SOLUTION INTRAVENOUS at 14:59

## 2023-01-04 RX ADMIN — SODIUM CHLORIDE, PRESERVATIVE FREE 10 ML: 5 INJECTION INTRAVENOUS at 10:03

## 2023-01-04 RX ADMIN — DEXTROSE MONOHYDRATE 20 ML/HR: 50 INJECTION, SOLUTION INTRAVENOUS at 11:28

## 2023-01-04 RX ADMIN — SODIUM CHLORIDE, PRESERVATIVE FREE 20 ML: 5 INJECTION INTRAVENOUS at 10:04

## 2023-01-04 RX ADMIN — SODIUM CHLORIDE 273 MG: 9 INJECTION, SOLUTION INTRAVENOUS at 11:59

## 2023-01-04 RX ADMIN — OXALIPLATIN 150 MG: 5 INJECTION, SOLUTION INTRAVENOUS at 12:44

## 2023-01-04 RX ADMIN — LEUCOVORIN CALCIUM 750 MG: 350 INJECTION, POWDER, LYOPHILIZED, FOR SUSPENSION INTRAMUSCULAR; INTRAVENOUS at 12:46

## 2023-01-04 RX ADMIN — PALONOSETRON 0.25 MG: 0.05 INJECTION, SOLUTION INTRAVENOUS at 11:30

## 2023-01-04 NOTE — PLAN OF CARE
Problem: Infection - Adult  Goal: Absence of infection at discharge  Outcome: Adequate for Discharge  Flowsheets (Taken 1/4/2023 1414)  Absence of infection at discharge: Assess and monitor for signs and symptoms of infection  Note: Verbalize understanding of discharge instructions, follow up appointments, and when to call Physician. Goal: Will show no infection signs and symptoms  Description: Will show no infection signs and symptoms  Outcome: Adequate for Discharge  Intervention: EDUCATE PATIENT ABOUT SIGNS AND SYMPTOMS OF INFECTION  Note: Discuss understanding of discharge instructions, follow up appointments and when to call Physician. Problem: Discharge Planning  Goal: Discharge to home or other facility with appropriate resources  Outcome: Adequate for Discharge  Flowsheets (Taken 1/4/2023 1414)  Discharge to home or other facility with appropriate resources: Identify barriers to discharge with patient and caregiver  Note: Verbalize understanding of discharge instructions, follow up appointments, and when to call Physician. Problem: Safety - Adult  Goal: Free from fall injury  Outcome: Adequate for Discharge  Flowsheets (Taken 1/4/2023 1414)  Free From Fall Injury: Instruct family/caregiver on patient safety  Note: No falls occurred with visit today. Care plan reviewed with patient. Patient verbalizes understanding of the plan of care and contributes to goal setting.

## 2023-01-04 NOTE — PATIENT INSTRUCTIONS
Proceed with chemotherapy today. Return to clinic in 2 weeks for next cycle of chemotherapy and follow up with Dr. Dario Fall on RTC: CBC, BMP, LFT  Please call for questions or concerns.

## 2023-01-04 NOTE — DISCHARGE INSTRUCTIONS
Proceed with chemotherapy today. Return to clinic in 2 weeks for next cycle of chemotherapy and follow up with Dr. Candace Kelley on RTC: CBC, BMP, LFT  Please call for questions or concerns. Please contact your Oncologist if you have any questions regarding the chemotherapy Trazimera/Oxaliplatin/Leucovorin/5-FU that you received today. Patient instructed if experience any of the symptoms following today's chemotherapy / to notify MD immediately or go to emergency department.     * dizziness/lightheadedness  *acute nausea/vomiting - not relieved with medication  *headache - not relieved from Tylenol/pain medication  *chest pain/pressure  *rash/itching  *shortness of breath        Drink fluids - 48oz fluids daily  Call if develop fever/ chills/ signs or symptoms of infection

## 2023-01-04 NOTE — PROGRESS NOTES
[facility], [address] [address],[address]  [address] [address]  Dept: [phone]  Dept Fax: [phone]: [phone]      Visit Date:1/4/2023     David Brown is a 65 y.o. male who presents today for:   Chief Complaint   Patient presents with    Follow-up     Cancer of distal third of esophagus ([address]        HPI:   David Brown is a 65 y.o. male followed by Dr. Trevor Drake for metastatic esophageal cancer. Per Dr. Trevor Drake' note on 11/22/22:   long history of GERD who presented to the ER on February 10 was admitted for 1 day because of chest heaviness. No cardiology because GI was consulted ultimately showed a mass in the distal esophagus. Outpatient EGD requested. Diagnosed with adenocarcinoma the distal esophagus and referred to Dr. Cueto Staff of thoracic surgery at [facility].  EUS shows regional tone involvement. 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 HER2 Amplified  Clinical stage T2 N2 M0. Mass extends from 34 to 28 cm approximately 6 cm. Staging by EUS 3/23/2022 [facility] (T2 based on invasion and N2 based on 2 malignant appearing lymph nodes visualized and measured in the lower paraesophageal mediastinum level 8L adjacent to the mass. 2 malignant appearing lymph nodes visualized and measured in the subcarinal mediastinum level 7. PET/CT 3/30/2022 [facility]- hypermetabolic activity at mid esophagus consistent with primary malignancy with adjacent hypermetabolic left periesophageal lymph nodes. Brain Mets; extensive and bulky. 10/31/22. On Decadron 2 mg bid 11/22/22     (11/21/22) F1 and PDL-1 requested for me  [facility]:  -Seen by Dr. Cueto Staff of thoracic surgery at [facility] 3/30/2022. Recommends neoadjuvant chemoradiation to be followed by surgery  -Neoadjuvant chemoradiation with Dr. Gerardo Lane. Low-dose carboplatinum Taxol weekly x 6-7. Chemo  Start date: 5/11.  Day 1 XRT 5/11 6/16/2022 week #6 due/last treatment. Last XRT 6/20/22.  -Robotic assisted laparoscopic and right thoracoscopic Kwesi Michael Kirks esophagectomy ;   additional gastric margin, gastropathic lymph nodes and hernia sac per Dr. Pasty Wynn 8/30/2022  No evidence of peritoneal metastatic disease. (Path report requested and pending)  - Craniotomy [facility] debulked largest cerebellar met  - WB XRT soon per Dr Eric Olson ( Dr Adriana Monroy)  - 1st Line Metastatic regimen: pending WB xrt completion and NGS results from [email or web address] brain Bx. Day 1 whole brain XRT- 11/22/22.  -Herceptin FOLFOX tentative start date 12/13 if radiation to the brain complete     PARAMETERS:  -EGD/EUS  -PET/CT    Interval History 1/4/2023:   The patient is here for follow up evaluation. He was evaluated by Dr. Geovanny Luna on 11/22/22 and recommended chemotherapy with Herceptin and FOLFOX. He began cycle #1 on 12/13/22 after completion of whole brain radiation. Patient was seen on 12/27/22 for cycle #2 and held due to neutropenia, thrombocytopenia. He is here with his wife and daughter today. The patient states he is feeling well. He denies chills or signs of infection. Denies chest pain, SOB, cough, abdominal pain, nausea, vomiting or urinary changes. Mouth sores have improved with magic mouthwash. Denies rashes, edema. PMH, SH, and FH:  I reviewed the patients medication list and allergy list as noted on the electronic medical record. The PMH, SH and FH were also reviewed as noted on the EMR. Review of Systems:   Review of Systems   Pertinent review of systems noted in HPI, all other ROS negative. Objective:   Physical Exam   /86 (Site: Left Upper Arm, Position: Sitting, Cuff Size: Medium Adult)   Pulse 89   Temp 98.5 °F (36.9 °C) (Oral)   Resp 16   Ht 5' 7\" (1.702 m)   Wt 148 lb (67.1 kg)   SpO2 98%   BMI 23.18 kg/m²    General appearance: No apparent distress, well developed, chronically ill appearing, and cooperative.   HEENT: Pupils equal, round, and reactive to light. Conjunctivae/corneas clear. Oral mucosa moist. No mucositis noted. Neck: Supple, with full range of motion. Trachea midline. Respiratory:  Normal respiratory effort. Few faint expiratory wheezes. No rales or rhonchi. On room air, O2 sat 98%. Cardiovascular: Regular rate and rhythm with normal S1/S2   Abdomen: Soft, active bowel sounds. Musculoskeletal: No clubbing, cyanosis or edema bilaterally. Ambulates in office. Skin: Skin color, texture, turgor normal.  No visible rashes or lesions. Neurologic:  Neurovascularly intact without any focal sensory/motor deficits. Psychiatric: Alert and oriented      Imaging Studies and Labs:   CBC:   Lab Results   Component Value Date    WBC 0.7 (LL) 12/27/2022    HGB 11.6 (L) 12/27/2022    HCT 32.7 (L) 12/27/2022    MCV 88.1 12/27/2022    PLT 58 (L) 12/27/2022     BMP:   Lab Results   Component Value Date/Time     12/27/2022 08:05 AM     10/19/2022 12:00 PM    K 3.1 12/27/2022 08:05 AM    K 3.8 10/19/2022 12:00 PM    K 4.2 02/10/2022 09:35 AM     10/19/2022 12:00 PM    CO2 23 10/19/2022 12:00 PM    BUN 21 10/19/2022 12:00 PM    CREATININE 0.6 12/27/2022 08:05 AM    CREATININE 0.9 10/19/2022 12:00 PM    GLUCOSE 126 10/19/2022 12:00 PM    CALCIUM 10.6 10/19/2022 12:00 PM      LFT:   Lab Results   Component Value Date    ALT 17 12/27/2022    AST 15 12/27/2022    ALKPHOS 66 12/27/2022    BILITOT 0.8 12/27/2022      PET scan on 12/8/2022  FINDINGS:        Neck: No FDG avid cervical lymphadenopathy. Craniotomy changes are partially visualized. Chest: Cardiac size is normal. There is no pleural or pericardial effusion. There is a calcified granuloma in the right lung. No FDG avid pulmonary nodules are identified. There is no hypermetabolic mediastinal, hilar or axillary lymphadenopathy. There    are surgical changes of prior esophagectomy and gastric pull-through.  Degenerative changes are present in the thoracic spine without evidence of hypermetabolic osseous metastatic disease. Abdomen/pelvis: Physiologic activity is present in the liver, spleen, urinary collecting system and gastrointestinal tract. There are calcified granulomas in the spleen. Atherosclerotic calcifications are present in the abdominal aorta without evidence    of aneurysm. The prostate gland is enlarged and contains calcifications. There are phleboliths in the pelvis. There is no FDG avid mesenteric, retroperitoneal, pelvic or inguinal lymphadenopathy. Degenerative changes are present in the lumbar spine and    pelvis without evidence of hypermetabolic osseous metastatic disease. Impression       No FDG avid malignancy. Assessment and Plan:   Metastatic Esophageal Cancer   Initially diagnosed in March 2022. He was recommended neoadjuvant chemoradiation at [facility]. He completed weekly carboplatin and Taxol 5/11/2022 with week #6 on 6/16/22. He completed XRT on 6/20/22. He underwent robotic assisted laparoscopic thorascopic Kwesi Venu esophagectomy, additional gastric margin gastric lymph nodes and hernia sac per Dr. Leonela Griffin on 8/30/2022. No evidence of peritoneal metastatic disease. The patient developed brain metastasis in October 2022. He is status postcraniotomy at [facility] with debulking of large cerebellar met. He completed whole brain radiation. Patient recommended Herceptin and FOLFOX. PET scan for restaging on 12/8/2022 showed no FDG avid malignancy. 2. Systemic Therapy with Herceptin and FOLFOX  Echocardiogram completed on 12/8/2022 showed normal LV size and normal LV systolic, EF 17%. He began cycle #1 on 12/13/22. Overall he tolerated well. He is due for cycle #2 today. Treatment held on 12/27/22 due to thrombocytopenia and neutropenia. WBC count 700, .5, platelet count 30,608. Today on 1/4/2023: WBC count 4000, ANC 2560, Hgb 10.8, Hct 32.1, platelet count 569,093.    -Proceed with chemotherapy today.     -Return to clinic in 2 weeks for next cycle of chemotherapy    -Labs on RTC: CBC, BMP, LFT    3. Hypokalemia  Likely due to poor oral intake, loose stools. Improved. Potassium 3.9. continue Effer-K 25 mEq daily. RTC in 2 weeks. All patient questions answered. Pt voiced understanding. Reviewed assessment and plan with Dr. Jose Holden. Patient agreed with treatment plan. Follow up as directed. Patient instructed to call for questions or concerns.           Electronically signed by   Natacha Mahoney PA-C

## 2023-01-04 NOTE — ONCOLOGY
Patient assessed for the following post chemotherapy:    Dizziness   No  Lightheadedness  No      Acute nausea/vomiting No  Headache   No  Chest pain/pressure  No  Rash/itching   No  Shortness of breath  No     Patient tolerated chemotherapy treatment Trazimera/Oxaliplatin/Leucovorin/5-FU push without any complications. CADD pump 5FU added via mediport to infuse at 5.4ml/hr over 46 hours. Connections secured and tape to chest. Patient and family instructed on pump- it's usage,what to do if alarm goes off, and who to call if any questions. Verified pump running before discharge. Last vital signs:   /71   Pulse 89   Temp 98.7 °F (37.1 °C) (Oral)   Resp 16   Ht 5' 7\" (1.702 m)   Wt 148 lb 8 oz (67.4 kg)   SpO2 97%   BMI 23.26 kg/m²       Patient instructed if experience any of the above symptoms following today's infusion,he/she is to notify MD immediately or go to the emergency department. Discharge instructions given to patient. Patient to return Friday 1/6/23 to have CADD pump disconnected. Pt. verbalizes understanding. Ambulated off unit with spouse and with belongings.

## 2023-01-05 LAB
BASOPHILS # BLD: 0 %
BASOPHILS ABSOLUTE: 0 THOU/MM3 (ref 0–0.1)
EOSINOPHIL # BLD: 0 %
EOSINOPHILS ABSOLUTE: 0 THOU/MM3 (ref 0–0.4)
ERYTHROCYTE [DISTWIDTH] IN BLOOD BY AUTOMATED COUNT: 18 % (ref 11.5–14.5)
ERYTHROCYTE [DISTWIDTH] IN BLOOD BY AUTOMATED COUNT: 57.6 FL (ref 35–45)
HCT VFR BLD CALC: 32.1 % (ref 42–52)
HEMOGLOBIN: 10.8 GM/DL (ref 14–18)
LYMPHOCYTES # BLD: 13 %
LYMPHOCYTES ABSOLUTE: 0.5 THOU/MM3 (ref 1–4.8)
MCH RBC QN AUTO: 31.1 PG (ref 26–33)
MCHC RBC AUTO-ENTMCNC: 33.6 GM/DL (ref 32.2–35.5)
MCV RBC AUTO: 92.5 FL (ref 80–94)
METAMYELOCYTES: 6 %
MONOCYTES # BLD: 15 %
MONOCYTES ABSOLUTE: 0.6 THOU/MM3 (ref 0.4–1.3)
MYELOCYTES: 2 %
NUCLEATED RED BLOOD CELLS: 0 /100 WBC
PATHOLOGIST REVIEW: ABNORMAL
PLATELET # BLD: 130 THOU/MM3 (ref 130–400)
PLATELET ESTIMATE: ADEQUATE
PMV BLD AUTO: 9 FL (ref 9.4–12.4)
RBC # BLD: 3.47 MILL/MM3 (ref 4.7–6.1)
SEG NEUTROPHILS: 64 %
SEGMENTED NEUTROPHILS ABSOLUTE COUNT: 2.6 THOU/MM3 (ref 1.8–7.7)
TOXIC GRANULATION: PRESENT
WBC # BLD: 4 THOU/MM3 (ref 4.8–10.8)

## 2023-01-06 ENCOUNTER — HOSPITAL ENCOUNTER (OUTPATIENT)
Dept: INFUSION THERAPY | Age: 63
Discharge: HOME | End: 2023-01-06
Payer: MEDICAID

## 2023-01-06 VITALS
SYSTOLIC BLOOD PRESSURE: 125 MMHG | OXYGEN SATURATION: 98 % | TEMPERATURE: 98.5 F | DIASTOLIC BLOOD PRESSURE: 88 MMHG | HEART RATE: 100 BPM | RESPIRATION RATE: 16 BRPM

## 2023-01-06 DIAGNOSIS — C15.5 CANCER OF DISTAL THIRD OF ESOPHAGUS (HCC): Primary | ICD-10-CM

## 2023-01-06 PROCEDURE — 96523 IRRIG DRUG DELIVERY DEVICE: CPT

## 2023-01-06 PROCEDURE — 6360000002 HC RX W HCPCS: Performed by: INTERNAL MEDICINE

## 2023-01-06 PROCEDURE — 2580000003 HC RX 258: Performed by: INTERNAL MEDICINE

## 2023-01-06 RX ORDER — SODIUM CHLORIDE 9 MG/ML
5-40 INJECTION INTRAVENOUS PRN
Status: DISCONTINUED | OUTPATIENT
Start: 2023-01-06 | End: 2023-01-07 | Stop reason: HOSPADM

## 2023-01-06 RX ORDER — HEPARIN SODIUM (PORCINE) LOCK FLUSH IV SOLN 100 UNIT/ML 100 UNIT/ML
500 SOLUTION INTRAVENOUS PRN
Status: DISCONTINUED | OUTPATIENT
Start: 2023-01-06 | End: 2023-01-07 | Stop reason: HOSPADM

## 2023-01-06 RX ADMIN — SODIUM CHLORIDE, PRESERVATIVE FREE 10 ML: 5 INJECTION INTRAVENOUS at 14:05

## 2023-01-06 RX ADMIN — HEPARIN 500 UNITS: 100 SYRINGE at 14:05

## 2023-01-06 NOTE — PLAN OF CARE
Problem: Infection - Adult  Goal: Absence of infection at discharge  Outcome: Adequate for Discharge  Flowsheets (Taken 1/6/2023 1410)  Absence of infection at discharge:   Assess and monitor for signs and symptoms of infection   Monitor lab/diagnostic results   Monitor all insertion sites i.e., indwelling lines, tubes and drains  Note: Mediport site with no redness or warmth. Skin over port site intact with no signs of breakdown noted. Patient verbalizes signs/symptoms of port infection and when to notify the physician. Goal: Will show no infection signs and symptoms  Description: Will show no infection signs and symptoms  Outcome: Adequate for Discharge  Note: Discuss port maintenance,infection prevention, sign of infection and when to call MD.      Problem: Discharge Planning  Goal: Discharge to home or other facility with appropriate resources  Outcome: Adequate for Discharge  Flowsheets (Taken 1/6/2023 1410)  Discharge to home or other facility with appropriate resources:   Identify barriers to discharge with patient and caregiver   Arrange for needed discharge resources and transportation as appropriate   Identify discharge learning needs (meds, wound care, etc)  Note: Verbalize understanding of discharge instructions, follow up appointments, and when to call Physician. Problem: Safety - Adult  Goal: Free from fall injury  Outcome: Adequate for Discharge  Flowsheets (Taken 1/6/2023 1410)  Free From Fall Injury:   Instruct family/caregiver on patient safety   Based on caregiver fall risk screen, instruct family/caregiver to ask for assistance with transferring infant if caregiver noted to have fall risk factors  Note: Free from falls while in [facility]. Care plan reviewed with patient. Patient  verbalize understanding of the plan of care and contribute to goal setting.

## 2023-01-06 NOTE — PROGRESS NOTES
Patient tolerated chemotherapy treatment pump off without any complications. Last vital signs:   /88   Pulse 100   Temp 98.5 °F (36.9 °C) (Oral)   Resp 16   SpO2 98%     Patient instructed if experience any symptoms following today's infusion pump off, he is to notify MD immediately or go to the emergency department. Discharge instructions given to patient. Verbalizes understanding. Ambulated off unit per self, accompanied by family, with belongings.

## 2023-01-10 ENCOUNTER — APPOINTMENT (OUTPATIENT)
Dept: CT IMAGING | Age: 63
End: 2023-01-10
Payer: MEDICAID

## 2023-01-10 ENCOUNTER — HOSPITAL ENCOUNTER (EMERGENCY)
Age: 63
Discharge: HOME | End: 2023-01-10
Attending: EMERGENCY MEDICINE
Payer: MEDICAID

## 2023-01-10 ENCOUNTER — TELEPHONE (OUTPATIENT)
Dept: ONCOLOGY | Age: 63
End: 2023-01-10

## 2023-01-10 VITALS
OXYGEN SATURATION: 98 % | DIASTOLIC BLOOD PRESSURE: 98 MMHG | HEIGHT: 67 IN | SYSTOLIC BLOOD PRESSURE: 151 MMHG | RESPIRATION RATE: 16 BRPM | HEART RATE: 86 BPM | BODY MASS INDEX: 23.23 KG/M2 | TEMPERATURE: 98.3 F | WEIGHT: 148 LBS

## 2023-01-10 DIAGNOSIS — C15.5 CANCER OF DISTAL THIRD OF ESOPHAGUS (HCC): ICD-10-CM

## 2023-01-10 DIAGNOSIS — R11.2 NAUSEA AND VOMITING, UNSPECIFIED VOMITING TYPE: Primary | ICD-10-CM

## 2023-01-10 LAB
ALBUMIN SERPL-MCNC: 3.6 G/DL (ref 3.5–5.1)
ALP BLD-CCNC: 72 U/L (ref 38–126)
ALT SERPL-CCNC: 16 U/L (ref 11–66)
ANION GAP SERPL CALCULATED.3IONS-SCNC: 14 MEQ/L (ref 8–16)
AST SERPL-CCNC: 20 U/L (ref 5–40)
BASOPHILS # BLD: 1.2 %
BASOPHILS ABSOLUTE: 0 THOU/MM3 (ref 0–0.1)
BILIRUB SERPL-MCNC: 0.9 MG/DL (ref 0.3–1.2)
BUN BLDV-MCNC: 12 MG/DL (ref 7–22)
CALCIUM SERPL-MCNC: 8.9 MG/DL (ref 8.5–10.5)
CHLORIDE BLD-SCNC: 100 MEQ/L (ref 98–111)
CO2: 20 MEQ/L (ref 23–33)
CREAT SERPL-MCNC: 0.6 MG/DL (ref 0.4–1.2)
EKG ATRIAL RATE: 79 BPM
EKG P AXIS: 37 DEGREES
EKG P-R INTERVAL: 184 MS
EKG Q-T INTERVAL: 374 MS
EKG QRS DURATION: 96 MS
EKG QTC CALCULATION (BAZETT): 428 MS
EKG R AXIS: -9 DEGREES
EKG T AXIS: 18 DEGREES
EKG VENTRICULAR RATE: 79 BPM
EOSINOPHIL # BLD: 0 %
EOSINOPHILS ABSOLUTE: 0 THOU/MM3 (ref 0–0.4)
ERYTHROCYTE [DISTWIDTH] IN BLOOD BY AUTOMATED COUNT: 15.8 % (ref 11.5–14.5)
ERYTHROCYTE [DISTWIDTH] IN BLOOD BY AUTOMATED COUNT: 51.7 FL (ref 35–45)
GFR SERPL CREATININE-BSD FRML MDRD: > 60 ML/MIN/1.73M2
GLUCOSE BLD-MCNC: 73 MG/DL (ref 70–108)
HCT VFR BLD CALC: 31 % (ref 42–52)
HEMOGLOBIN: 10.6 GM/DL (ref 14–18)
IMMATURE GRANS (ABS): 0.01 THOU/MM3 (ref 0–0.07)
IMMATURE GRANULOCYTES: 0.4 %
INFLUENZA A: NOT DETECTED
INFLUENZA B: NOT DETECTED
LIPASE: 29 U/L (ref 5.6–51.3)
LYMPHOCYTES # BLD: 11.9 %
LYMPHOCYTES ABSOLUTE: 0.3 THOU/MM3 (ref 1–4.8)
MAGNESIUM: 1.9 MG/DL (ref 1.6–2.4)
MCH RBC QN AUTO: 31 PG (ref 26–33)
MCHC RBC AUTO-ENTMCNC: 34.2 GM/DL (ref 32.2–35.5)
MCV RBC AUTO: 90.6 FL (ref 80–94)
MONOCYTES # BLD: 2.9 %
MONOCYTES ABSOLUTE: 0.1 THOU/MM3 (ref 0.4–1.3)
NUCLEATED RED BLOOD CELLS: 0 /100 WBC
OSMOLALITY CALCULATION: 266.6 MOSMOL/KG (ref 275–300)
PLATELET # BLD: 113 THOU/MM3 (ref 130–400)
PLATELET ESTIMATE: ABNORMAL
PMV BLD AUTO: 9.2 FL (ref 9.4–12.4)
POIKILOCYTES: ABNORMAL
POTASSIUM REFLEX MAGNESIUM: 3.7 MEQ/L (ref 3.5–5.2)
RBC # BLD: 3.42 MILL/MM3 (ref 4.7–6.1)
SARS-COV-2 RNA, RT PCR: NOT DETECTED
SCAN OF BLOOD SMEAR: NORMAL
SEG NEUTROPHILS: 83.6 %
SEGMENTED NEUTROPHILS ABSOLUTE COUNT: 2 THOU/MM3 (ref 1.8–7.7)
SODIUM BLD-SCNC: 134 MEQ/L (ref 135–145)
TOTAL PROTEIN: 6.6 G/DL (ref 6.1–8)
TROPONIN T: < 0.01 NG/ML
TSH SERPL DL<=0.05 MIU/L-ACNC: 3.07 UIU/ML (ref 0.4–4.2)
WBC # BLD: 2.4 THOU/MM3 (ref 4.8–10.8)

## 2023-01-10 PROCEDURE — 80053 COMPREHEN METABOLIC PANEL: CPT

## 2023-01-10 PROCEDURE — 85025 COMPLETE CBC W/AUTO DIFF WBC: CPT

## 2023-01-10 PROCEDURE — 6360000002 HC RX W HCPCS: Performed by: EMERGENCY MEDICINE

## 2023-01-10 PROCEDURE — 36415 COLL VENOUS BLD VENIPUNCTURE: CPT

## 2023-01-10 PROCEDURE — 93005 ELECTROCARDIOGRAM TRACING: CPT | Performed by: EMERGENCY MEDICINE

## 2023-01-10 PROCEDURE — 84484 ASSAY OF TROPONIN QUANT: CPT

## 2023-01-10 PROCEDURE — 2580000003 HC RX 258: Performed by: EMERGENCY MEDICINE

## 2023-01-10 PROCEDURE — 84443 ASSAY THYROID STIM HORMONE: CPT

## 2023-01-10 PROCEDURE — 93010 ELECTROCARDIOGRAM REPORT: CPT | Performed by: NUCLEAR MEDICINE

## 2023-01-10 PROCEDURE — 6370000000 HC RX 637 (ALT 250 FOR IP): Performed by: EMERGENCY MEDICINE

## 2023-01-10 PROCEDURE — 70450 CT HEAD/BRAIN W/O DYE: CPT

## 2023-01-10 PROCEDURE — 87636 SARSCOV2 & INF A&B AMP PRB: CPT

## 2023-01-10 PROCEDURE — 83735 ASSAY OF MAGNESIUM: CPT

## 2023-01-10 PROCEDURE — 99284 EMERGENCY DEPT VISIT MOD MDM: CPT

## 2023-01-10 PROCEDURE — 83690 ASSAY OF LIPASE: CPT

## 2023-01-10 RX ORDER — 0.9 % SODIUM CHLORIDE 0.9 %
1000 INTRAVENOUS SOLUTION INTRAVENOUS ONCE
Status: COMPLETED | OUTPATIENT
Start: 2023-01-10 | End: 2023-01-10

## 2023-01-10 RX ORDER — ONDANSETRON 4 MG/1
4 TABLET, ORALLY DISINTEGRATING ORAL ONCE
Status: COMPLETED | OUTPATIENT
Start: 2023-01-10 | End: 2023-01-10

## 2023-01-10 RX ORDER — HEPARIN SODIUM (PORCINE) LOCK FLUSH IV SOLN 100 UNIT/ML 100 UNIT/ML
300 SOLUTION INTRAVENOUS ONCE
Status: COMPLETED | OUTPATIENT
Start: 2023-01-10 | End: 2023-01-10

## 2023-01-10 RX ORDER — PROMETHAZINE HYDROCHLORIDE 25 MG/1
25 SUPPOSITORY RECTAL EVERY 6 HOURS PRN
Qty: 45 SUPPOSITORY | Refills: 0 | Status: SHIPPED | OUTPATIENT
Start: 2023-01-10 | End: 2023-01-25

## 2023-01-10 RX ADMIN — ONDANSETRON 4 MG: 4 TABLET, ORALLY DISINTEGRATING ORAL at 15:19

## 2023-01-10 RX ADMIN — SODIUM CHLORIDE 1000 ML: 9 INJECTION, SOLUTION INTRAVENOUS at 15:19

## 2023-01-10 RX ADMIN — HEPARIN SODIUM (PORCINE) LOCK FLUSH IV SOLN 100 UNIT/ML 300 UNITS: 100 SOLUTION at 16:48

## 2023-01-10 ASSESSMENT — ENCOUNTER SYMPTOMS
ABDOMINAL DISTENTION: 0
BACK PAIN: 0
PHOTOPHOBIA: 0
COUGH: 0
RHINORRHEA: 0
EYE PAIN: 0
SINUS PRESSURE: 0
ABDOMINAL PAIN: 0
BLOOD IN STOOL: 0
SHORTNESS OF BREATH: 0
DIARRHEA: 0
WHEEZING: 0
TROUBLE SWALLOWING: 0
SORE THROAT: 0
CHOKING: 0
CONSTIPATION: 0
VOMITING: 1
VOICE CHANGE: 0
EYE DISCHARGE: 0
CHEST TIGHTNESS: 0
NAUSEA: 1
EYE REDNESS: 0
EYE ITCHING: 0

## 2023-01-10 ASSESSMENT — PAIN - FUNCTIONAL ASSESSMENT: PAIN_FUNCTIONAL_ASSESSMENT: NONE - DENIES PAIN

## 2023-01-10 NOTE — ED TRIAGE NOTES
Pt presents to the ER with c/o emesis. Pt has hx of cancer and is currently going through treatment. Pt states it is bad after his chemo treatments. Pt is taking anti-emetics, unsure which one. Pt denies pain.  Pt is alert and oriented, vss

## 2023-01-10 NOTE — ED NOTES
Patient resting in bed in a semi fowlers position. Patient denies needs at this time. Patient and family updated on POC.       Bartolo Delong RN  01/10/23 [id number]

## 2023-01-10 NOTE — ED PROVIDER NOTES
[facility]  eMERGENCY dEPARTMENT eNCOUnter          CHIEF COMPLAINT       Chief Complaint   Patient presents with    Emesis       Nurses Notes reviewed and I agree except as noted in the HPI. HISTORY OF PRESENT ILLNESS    Rick Silva is a 65 y.o. male who presents for nausea and vomiting. Apparently the patient has esophageal cancer. He also had what appears to be metastasis to the brain. He underwent surgery on this recently. Patient states that he does have nausea medication at home including promethazine and Zofran however he is not able to take those. Patient states he has some mild dizziness. Patient denies any fever chills sweats. No chest pain shortness of breath. Patient is undergoing radiation and chemotherapy. Last treatment was last week. She sees Dr. Ashley Gamble here for hematology oncology. Patient is otherwise resting comfortably in the cot no apparent distress no other physical complaints at this time. REVIEW OF SYSTEMS     Review of Systems   Constitutional:  Negative for activity change, appetite change, diaphoresis, fatigue and unexpected weight change. HENT:  Negative for congestion, ear discharge, ear pain, hearing loss, rhinorrhea, sinus pressure, sore throat, trouble swallowing and voice change. Eyes:  Negative for photophobia, pain, discharge, redness and itching. Respiratory:  Negative for cough, choking, chest tightness, shortness of breath and wheezing. Cardiovascular:  Negative for chest pain, palpitations and leg swelling. Gastrointestinal:  Positive for nausea and vomiting. Negative for abdominal distention, abdominal pain, blood in stool, constipation and diarrhea. Endocrine: Negative for polydipsia, polyphagia and polyuria. Genitourinary:  Negative for decreased urine volume, difficulty urinating, dysuria, enuresis, frequency, hematuria and urgency.    Musculoskeletal:  Negative for arthralgias, back pain, gait problem, myalgias, neck pain and neck stiffness. Skin:  Negative for pallor and rash. Allergic/Immunologic: Negative for immunocompromised state. Neurological:  Positive for light-headedness. Negative for dizziness, tremors, seizures, syncope, facial asymmetry, weakness, numbness and headaches. Hematological:  Negative for adenopathy. Does not bruise/bleed easily. Psychiatric/Behavioral:  Negative for agitation, hallucinations and suicidal ideas. The patient is not nervous/anxious. PAST MEDICAL HISTORY    has a past medical history of Atrial fibrillation ([address] Benign hypertension, Cancer of distal third of esophagus ([address] GERD (gastroesophageal reflux disease), HLD (hyperlipidemia), and Sleep apnea. SURGICAL HISTORY      has a past surgical history that includes Dental surgery; Abdomen surgery (08/16/2022); Abdomen surgery (08/30/2022); and Port Surgery (Left, 12/1/2022). CURRENT MEDICATIONS       Discharge Medication List as of 1/10/2023  4:36 PM        CONTINUE these medications which have NOT CHANGED    Details   potassium bicarbonate (EFFER-K) 25 MEQ disintegrating tablet Take 1 tablet by mouth daily, Disp-30 tablet, R-1Normal      megestrol (MEGACE) 40 MG tablet Take 1 tablet by mouth daily, Disp-30 tablet, R-0Normal      Magic Mouthwash (MIRACLE MOUTHWASH) Swish and spit 5 mLs 4 times daily as needed for Irritation 1:1:1:1 nystatin, maalox, viscous lidocaine, benadryl, Disp-200 mL, R-2Normal      ondansetron (ZOFRAN-ODT) 8 MG TBDP disintegrating tablet Place 1 tablet under the tongue every 8 hours as needed for Nausea or Vomiting, Disp-90 tablet, R-1Normal      prochlorperazine (COMPAZINE) 10 MG tablet Take 1 tablet by mouth every 6 hours as needed (nausea), Disp-120 tablet, R-1Normal      lidocaine-prilocaine (EMLA) 2.5-2.5 % cream Apply topically as needed. , Disp-1 each, R-3, Normal      dexamethasone (DECADRON) 4 MG tablet Take 2 mg by mouth 2 times daily (with meals) 1 mg daily-taperingHistorical Med escitalopram (LEXAPRO) 10 MG tablet Take 1 tablet by mouth daily, Disp-30 tablet, R-1Normal      Blood Pressure Monitoring (BP MONITOR-STETHOSCOPE) KIT Disp-1 kit, R-0, PrintDx: HTN      Misc. Devices (PULSE OXIMETER FOR FINGER) MISC Dx:  hypoxemia, Disp-1 each, R-0Print      ibuprofen (ADVIL;MOTRIN) 400 MG tablet Take 400 mg by mouth every 6 hours as needed for PainHistorical Med      pantoprazole (PROTONIX) 40 MG tablet Take 1 tablet by mouth in the morning and at bedtime, Disp-90 tablet, R-3Adjust Sig      famotidine (PEPCID) 20 MG tablet Take 20 mg by mouth 2 times dailyHistorical Med      NONFORMULARY Hema-Plex (Iron Multi-vitamin)Historical Med             ALLERGIES     is allergic to paclitaxel and aleve [naproxen]. FAMILY HISTORY     He indicated that his mother is . He indicated that his father is . He indicated that the status of his brother is unknown.   family history includes Cancer in his brother; Jenna Span in his mother; Heart Disease in his father. SOCIAL HISTORY      reports that he quit smoking about 9 years ago. His smoking use included cigarettes. He has a 30.00 pack-year smoking history. He has never used smokeless tobacco. He reports that he does not drink alcohol and does not use drugs. PHYSICAL EXAM     INITIAL VITALS:  height is 5' 7\" (1.702 m) and weight is 148 lb (67.1 kg). His oral temperature is 98.3 °F (36.8 °C). His blood pressure is 151/98 (abnormal) and his pulse is 86. His respiration is 16 and oxygen saturation is 98%. Physical Exam  Vitals and nursing note reviewed. Constitutional:       General: He is not in acute distress. Appearance: He is well-developed. He is not diaphoretic. HENT:      Head: Normocephalic and atraumatic. Comments: Well-healed surgical scar posterior right posterior scalp and neck     Right Ear: Hearing, tympanic membrane, ear canal and external ear normal. No hemotympanum.       Left Ear: Hearing, tympanic membrane, ear canal and external ear normal. No hemotympanum. Nose: Nose normal.   Eyes:      General: Lids are normal. No scleral icterus. Right eye: No discharge. Left eye: No discharge. Conjunctiva/sclera: Conjunctivae normal.      Right eye: No exudate. Left eye: No exudate. Pupils: Pupils are equal, round, and reactive to light. Neck:      Thyroid: No thyromegaly. Vascular: No JVD. Trachea: No tracheal deviation. Cardiovascular:      Rate and Rhythm: Normal rate and regular rhythm. Pulses: Normal pulses. Carotid pulses are 2+ on the right side and 2+ on the left side. Radial pulses are 2+ on the right side and 2+ on the left side. Femoral pulses are 2+ on the right side and 2+ on the left side. Popliteal pulses are 2+ on the right side and 2+ on the left side. Dorsalis pedis pulses are 2+ on the right side and 2+ on the left side. Posterior tibial pulses are 2+ on the right side and 2+ on the left side. Heart sounds: Normal heart sounds, S1 normal and S2 normal. No murmur heard. No friction rub. No gallop. Pulmonary:      Effort: Pulmonary effort is normal. No respiratory distress. Breath sounds: Normal breath sounds. No stridor. No decreased breath sounds, wheezing, rhonchi or rales. Chest:      Chest wall: No tenderness. Abdominal:      General: Bowel sounds are normal. There is no distension. Palpations: Abdomen is soft. There is no mass. Tenderness: There is no abdominal tenderness. There is no guarding or rebound. Musculoskeletal:         General: No tenderness. Normal range of motion. Cervical back: Full passive range of motion without pain, normal range of motion and neck supple. Normal range of motion. Right lower leg: No edema. Left lower leg: No edema. Lymphadenopathy:      Cervical: No cervical adenopathy. Skin:     General: Skin is warm and dry. Findings: No bruising, ecchymosis, lesion or rash. Neurological:      Mental Status: He is alert and oriented to person, place, and time. GCS: GCS eye subscore is 4. GCS verbal subscore is 5. GCS motor subscore is 6. Cranial Nerves: Cranial nerves 2-12 are intact. No cranial nerve deficit. Sensory: Sensation is intact. No sensory deficit. Motor: Motor function is intact. Coordination: Coordination is intact. Coordination normal.      Gait: Gait is intact. Deep Tendon Reflexes: Reflexes are normal and symmetric. Reflex Scores:       Tricep reflexes are 2+ on the right side and 2+ on the left side. Bicep reflexes are 2+ on the right side and 2+ on the left side. Brachioradialis reflexes are 2+ on the right side and 2+ on the left side. Patellar reflexes are 2+ on the right side and 2+ on the left side. Achilles reflexes are 2+ on the right side and 2+ on the left side. Psychiatric:         Speech: Speech normal.         Behavior: Behavior normal.         Thought Content: Thought content normal.         Judgment: Judgment normal.         DIFFERENTIAL DIAGNOSIS:   Chemotherapy sickness, infection, electrolyte abnormality, worsening brain cancer    DIAGNOSTIC RESULTS     EKG: All EKG's are interpreted by the Emergency Department Physician who either signs or Co-signs this chart in the absence of a cardiologist.  None    RADIOLOGY: non-plain film images(s) such as CT, Ultrasound and MRI are read by the radiologist.  [address]   Final Result       1. Postsurgical changes in the right cerebellum. 2. There are 3 areas of high attenuation consistent with hemorrhage. These are associated with areas of previously visualized metastases. The largest is in the right temporal lobe and measures 1.2 cm. There is no associated significant mass effect. 3. Vague areas of low attenuation bilaterally presumably due to small metastases.  No dominant lesions are identified. **This report has been created using voice recognition software. It may contain minor errors which are inherent in voice recognition technology. **      Final report electronically signed by Dr. Josiah Miner on 1/10/2023 3:16 PM            LABS:   Labs Reviewed   CBC WITH AUTO DIFFERENTIAL - Abnormal; Notable for the following components:       Result Value    WBC 2.4 (*)     RBC 3.42 (*)     Hemoglobin 10.6 (*)     Hematocrit 31.0 (*)     RDW-CV 15.8 (*)     RDW-SD 51.7 (*)     Platelets 509 (*)     MPV 9.2 (*)     Lymphocytes Absolute 0.3 (*)     Monocytes Absolute 0.1 (*)     All other components within normal limits   COMPREHENSIVE METABOLIC PANEL W/ REFLEX TO MG FOR LOW K - Abnormal; Notable for the following components:    Sodium 134 (*)     CO2 20 (*)     All other components within normal limits   OSMOLALITY - Abnormal; Notable for the following components:    Osmolality Calc 266.6 (*)     All other components within normal limits   COVID-19 & INFLUENZA COMBO   LIPASE   TROPONIN   MAGNESIUM   TSH   ANION GAP   GLOMERULAR FILTRATION RATE, ESTIMATED   SCAN OF BLOOD SMEAR       EMERGENCY DEPARTMENT COURSE:   Vitals:    Vitals:    01/10/23 1345 01/10/23 1519   BP: (!) 150/100 (!) 151/98   Pulse: 95 86   Resp: 18 16   Temp: 98.3 °F (36.8 °C)    TempSrc: Oral    SpO2: 99% 98%   Weight: 148 lb (67.1 kg)    Height: 5' 7\" (1.702 m)      Patient was assessed at bedside labs and imaging were ordered. Patient was given fluids and Zofran. Patient had no active nausea or vomiting here. Patient was neurologically intact. He did not have any disequilibrium here. I feel that this has something to do with these chemotherapy and radiation. In any event we got a CT scan of his head. All labs appeared within normal limits. The patient was afebrile. His neutrophil counts were within normal limits.   CT examination of the head showed postsurgical changes with changes associated with receiving chemo and radiation treatment. I did call and discussed this with the radiologist to make sure that what I was seeing was not new head bleeds. She said this was common in patients receiving chemo and radiation therapy and that this was to be expected. I then called and spoke with hematology discussed case with them they agreed with my assessment that the patient could probably go home and I will give them rectal Phenergan to aid in their nausea and vomiting at home. I went back and discussed this all with the patient and family at bedside. They were relieved to hear that there was no new problems going on they were agreeable with the plan. Patient is subsequently discharged home with instructions to follow-up with his hematology oncology doctor within the next 1 to 2 days and to return to the nearest emergency room immediately for any new or worsening complaints. Patient has what appears to be nausea and vomiting with chemotherapy, patient has been provided with Phenergan suppositories he is instructed to take that 20 minutes before taking oral antiemetics. After he takes the oral antiemetics he should wait an additional 20 minutes and then start eating slowly and drinking slowly. Patient is instructed to follow-up with the hematology oncology doctor and call for an appointment within the next 1 to 2 days. He is instructed return to the nearest emergency room immediately for any new or worsening complaints. CRITICAL CARE:   None    CONSULTS:  Dr Lisa Landis - Hematology  Dr. Vazquez-radiology      PROCEDURES:  None    FINAL IMPRESSION      1. Nausea and vomiting, unspecified vomiting type    2.  Cancer of distal third of esophagus [facility])          DISPOSITION/PLAN   Discharge    PATIENT REFERRED TO:  MD Denisse Dillard [address]  [address]     Call in 1 day      Herb Persaud, [facility] [place] [address] [facility], [address] [facility]  [address] [place] [id number] [phone]    Call in 1 day      DISCHARGE MEDICATIONS:  Discharge Medication List as of 1/10/2023  4:36 PM        START taking these medications    Details   promethazine (PROMETHEGAN) 25 MG suppository Place 1 suppository rectally every 6 hours as needed for Nausea, Disp-45 suppository, R-0Print             (Please note that portions of this note were completed with a voice recognition program.  Efforts were made to edit the dictations but occasionally words are mis-transcribed.)    DO Delmar HORTON DO  01/11/23 0024

## 2023-01-10 NOTE — DISCHARGE INSTRUCTIONS
Patient has what appears to be nausea and vomiting with chemotherapy, patient has been provided with Phenergan suppositories he is instructed to take that 20 minutes before taking oral antiemetics. After he takes the oral antiemetics he should wait an additional 20 minutes and then start eating slowly and drinking slowly. Patient is instructed to follow-up with the hematology oncology doctor and call for an appointment within the next 1 to 2 days. He is instructed return to the nearest emergency room immediately for any new or worsening complaints.

## 2023-01-10 NOTE — TELEPHONE ENCOUNTER
Received call from patient's wife that patient has been vomiting- antinausea medications aren't working, he cannot keep anything down, is dehydrated and feels terrible. She is asking for help. I asked if she felt like it was bad enough that the patient needs to go to the ED for fluids, anti nausea medication and see if there is anything else going on? She stated she didn't know if they should. Patient advised to go to the ED- she verbalized understanding.

## 2023-01-11 ENCOUNTER — TELEPHONE (OUTPATIENT)
Dept: FAMILY MEDICINE CLINIC | Age: 63
End: 2023-01-11

## 2023-01-11 DIAGNOSIS — C15.5 CANCER OF DISTAL THIRD OF ESOPHAGUS (HCC): Primary | ICD-10-CM

## 2023-01-11 RX ORDER — DIPHENHYDRAMINE HYDROCHLORIDE 50 MG/ML
50 INJECTION INTRAMUSCULAR; INTRAVENOUS
OUTPATIENT
Start: 2023-01-18

## 2023-01-11 RX ORDER — EPINEPHRINE 1 MG/ML
0.3 INJECTION, SOLUTION, CONCENTRATE INTRAVENOUS PRN
OUTPATIENT
Start: 2023-01-18

## 2023-01-11 RX ORDER — SODIUM CHLORIDE 9 MG/ML
5-250 INJECTION, SOLUTION INTRAVENOUS PRN
OUTPATIENT
Start: 2023-01-18

## 2023-01-11 RX ORDER — HEPARIN SODIUM (PORCINE) LOCK FLUSH IV SOLN 100 UNIT/ML 100 UNIT/ML
500 SOLUTION INTRAVENOUS PRN
OUTPATIENT
Start: 2023-01-20

## 2023-01-11 RX ORDER — SODIUM CHLORIDE 9 MG/ML
5-250 INJECTION, SOLUTION INTRAVENOUS PRN
OUTPATIENT
Start: 2023-01-20

## 2023-01-11 RX ORDER — ESCITALOPRAM OXALATE 10 MG/1
TABLET ORAL
Qty: 90 TABLET | Refills: 3 | Status: SHIPPED | OUTPATIENT
Start: 2023-01-11

## 2023-01-11 RX ORDER — SODIUM CHLORIDE 9 MG/ML
5-40 INJECTION INTRAVENOUS PRN
OUTPATIENT
Start: 2023-01-18

## 2023-01-11 RX ORDER — ACETAMINOPHEN 325 MG/1
650 TABLET ORAL
OUTPATIENT
Start: 2023-01-18

## 2023-01-11 RX ORDER — ONDANSETRON 2 MG/ML
8 INJECTION INTRAMUSCULAR; INTRAVENOUS
OUTPATIENT
Start: 2023-01-18

## 2023-01-11 RX ORDER — PALONOSETRON 0.05 MG/ML
0.25 INJECTION, SOLUTION INTRAVENOUS ONCE
OUTPATIENT
Start: 2023-01-18 | End: 2023-01-18

## 2023-01-11 RX ORDER — ALBUTEROL SULFATE 90 UG/1
4 AEROSOL, METERED RESPIRATORY (INHALATION) PRN
OUTPATIENT
Start: 2023-01-18

## 2023-01-11 RX ORDER — MEPERIDINE HYDROCHLORIDE 50 MG/ML
12.5 INJECTION INTRAMUSCULAR; INTRAVENOUS; SUBCUTANEOUS PRN
OUTPATIENT
Start: 2023-01-18

## 2023-01-11 RX ORDER — SODIUM CHLORIDE 9 MG/ML
INJECTION, SOLUTION INTRAVENOUS CONTINUOUS
OUTPATIENT
Start: 2023-01-18

## 2023-01-11 RX ORDER — HEPARIN SODIUM (PORCINE) LOCK FLUSH IV SOLN 100 UNIT/ML 100 UNIT/ML
500 SOLUTION INTRAVENOUS PRN
OUTPATIENT
Start: 2023-01-18

## 2023-01-11 RX ORDER — SODIUM CHLORIDE 9 MG/ML
5-40 INJECTION INTRAVENOUS PRN
OUTPATIENT
Start: 2023-01-20

## 2023-01-11 RX ORDER — FLUOROURACIL 50 MG/ML
400 INJECTION, SOLUTION INTRAVENOUS ONCE
OUTPATIENT
Start: 2023-01-18 | End: 2023-01-18

## 2023-01-11 RX ORDER — DEXTROSE MONOHYDRATE 50 MG/ML
5-250 INJECTION, SOLUTION INTRAVENOUS PRN
OUTPATIENT
Start: 2023-01-18

## 2023-01-11 RX ORDER — FAMOTIDINE 10 MG/ML
20 INJECTION, SOLUTION INTRAVENOUS
OUTPATIENT
Start: 2023-01-18

## 2023-01-16 NOTE — PROGRESS NOTES
[address] [place] [address]  Dept: [phone]  Loc: [phone]   Hematology/Oncology Consult (Clinic)        1/18/2023    Yash Yañez   1960     No ref. 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 Mass extends from 34 to 28 cm approximately 6 cm. Staging by EUS 3/23/2022 [facility] (T2 based on invasion and N2 based on 2 malignant appearing lymph nodes visualized and measured in the lower paraesophageal mediastinum level 8L adjacent to the mass. 2 malignant appearing lymph nodes visualized and measured in the subcarinal mediastinum level 7. PET/CT 3/30/2022 [facility]- hypermetabolic activity at mid esophagus consistent with primary malignancy with adjacent hypermetabolic left periesophageal lymph nodes. Brain Mets; extensive and bulky. 10/31/22. On Decadron 2 mg bid 11/22/22    (11/21/22) F1 and PDL-1 requested for me  by Dr Mary Lou Phan rad Onc at [facility].:  -Seen by Dr. Vijaya Cortez of thoracic surgery at [address] [place] [address] 3/30/2022. Recommends neoadjuvant chemoradiation to be followed by surgery  -Neoadjuvant chemoradiation with Dr. Raffy Briseno. Low-dose carboplatinum Taxol weekly x 6-7. Chemo  Start date: 5/11. Day 1 XRT 5/11 6/16/2022 week #6 due/last treatment. Last XRT 6/20/22.  -Robotic assisted laparoscopic and right thoracoscopic Kwesi Salas Carson esophagectomy ;   additional gastric margin, gastropathic lymph nodes and hernia sac per Dr. Vijaya Cortez 8/30/2022  No evidence of peritoneal metastatic disease. (Path report requested and pending)  - Craniotomy [facility] debulked largest cerebellar met  - WB XRT soon per Dr Suyapa Lin ( Dr Skye Hair)  - 1st Line Metastatic regimen: pending WB xrt completion and NGS results from [address] [place] [address] brain Bx.   Day 1 whole brain XRT- 11/22/22.  -Herceptin FOLFOX (started 12/13) post radiation to the brain completed   C3 due 1/18/23-hold due to [address] 8.6 potassium of 2.4 and ongoing nausea and vomiting. PARAMETERS:  -EGD/EUS  -PET/CT 12/8/22- No FDG avid  malignancy. COMORBIDITIES:  Include 30-pack-year history quit in 2013, alcohol none, GERD, hypertension, hyperlipidemia    SUBJECTIVE: Here today for his third course of Herceptin with modified FOLFOX. Poorly tolerated. Reports constant nausea and vomiting despite using Compazine and Zofran. Seen in ER last week and given IV fluids. Urine is dark and he is thirsty. No longer on Decadron. Denies any headaches. Prescribed potassium once daily but infrequently taking because of nausea and vomiting. Generally feels very weak and unable to eat adequately because of nausea and vomiting. HPI: Jacquelin Flores is a 62-year-old gentleman with a long history of GERD who presented to the ER on February 10 was admitted for 1 day because of chest heaviness. No cardiology because GI was consulted ultimately showed a mass in the distal esophagus. Outpatient EGD requested. Diagnosed with adenocarcinoma the distal esophagus and referred to Dr. Mike Tran of thoracic surgery at [facility].  EUS shows regional tone involvement. See scans below and ultrasound. No distant mets. Patient has no significant dysphagia and no weight loss. Patient referred back locally for chemoradiation neoadjuvant treatment before surgery. ROS:  Review of Systems 14 point negative except as above.     PMH:   Past Medical History:   Diagnosis Date    Atrial fibrillation ([facility]     Benign hypertension 11/3/2022    Cancer of distal third of esophagus ([address] 04/07/2022    GERD (gastroesophageal reflux disease)     HLD (hyperlipidemia) 8/15/2022    Sleep apnea         Social HX:   Social History     Socioeconomic History    Marital status:      Spouse name: Adam Nguyen    Number of children: 5    Years of education: 12    Highest education level: High school graduate   Occupational History    Occupation: VENDOR     Comment: [employer]   Tobacco Use    Smoking status: Former     Packs/day: 3.00     Years: 10.00     Pack years: 30.00     Types: Cigarettes     Quit date: 2013     Years since quittin.7    Smokeless tobacco: Never    Tobacco comments:     does not smoke, quit    Vaping Use    Vaping Use: Never used   Substance and Sexual Activity    Alcohol use: Never    Drug use: Never    Sexual activity: Yes     Partners: Female   Other Topics Concern    Not on file   Social History Narrative    Not on file     Social Determinants of Health     Financial Resource Strain: Low Risk     Difficulty of Paying Living Expenses: Not hard at all   Food Insecurity: Food Insecurity Present    Worried About [address] in the Last Year: Often true    Ran Out of Food in the Last Year: Sometimes true   Transportation Needs: Not on file   Physical Activity: Not on file   Stress: Not on file   Social Connections: Not on file   Intimate Partner Violence: Not on file   Housing Stability: Not on file        SpouseDoree Feeler  [phone]    Phone: [phone]     [address] [place] [address] Dr GANN [address] [facility]     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 [employer]    Immunizations:  Immunization History   Administered Date(s) Administered    Influenza Virus Vaccine 09/15/2015        Health Screenings:    C-Scope:  5 years ago  Prostate:   Lab Results   Component Value Date    PSA 1.41 2017            Health Maintenance Due   Topic Date Due    COVID-19 Vaccine (1) Never done    Pneumococcal 0-64 years Vaccine (1 - PCV) Never done    DTaP/Tdap/Td vaccine (1 - Tdap) Never done    Shingles vaccine (1 of 2) Never done    Low dose CT lung screening  Never done    Lipids  2022    Flu vaccine (1) 2022    Depression Screen  2023        Interests:   Cars. music family,    Fam HX:   Family History   Problem Relation Age of Onset    Colon Cancer Mother     Heart Disease Father     Cancer Brother         lung        Hospitalizations:   2/10/22    Allergies: Allergies   Allergen Reactions    Paclitaxel Other (See Comments)     Severe lower back pain- able to resume after medications given    Aleve [Naproxen] Hives        Adult Illness:  Patient Active Problem List   Diagnosis    Abdominal pain    Cancer of distal third of esophagus (HCC)    Acute postoperative pain    Chronic anemia    Esophageal adenocarcinoma (HCC)    GERD (gastroesophageal reflux disease)    HLD (hyperlipidemia)    Obesity (BMI 30.0-34. 9)    Postoperative atrial fibrillation (HCC)    Thrombocytopenia (HCC)    Brain lesion    Delayed gastric emptying    Serum creatinine raised    Severe protein-calorie malnutrition ([address]    Benign hypertension    Encounter for insertion of venous access port        Surgery:  Past Surgical History:   Procedure Laterality Date    ABDOMEN SURGERY  08/16/2022    GASTRIC DEVASCULARIZATON-[facility]    ABDOMEN SURGERY  08/30/2022    ESOPHAGOGASTRODUODENOSCOPY TRANSORAL DIAGNOSTIC ESOPHAGECTOMY W/ THORACOTOMY-[facility]    DENTAL SURGERY      25 teeth removed    PORT SURGERY Left 12/1/2022    LEFT SINGLE LUMEN MEDIPORT performed by Rachel Frey MD at [facility]        Medications:  Current Outpatient Medications   Medication Sig Dispense Refill    escitalopram (LEXAPRO) 10 MG tablet TAKE 1 TABLET BY MOUTH EVERY DAY 90 tablet 3    promethazine (PROMETHEGAN) 25 MG suppository Place 1 suppository rectally every 6 hours as needed for Nausea 45 suppository 0    Magic Mouthwash (MIRACLE MOUTHWASH) Swish and spit 5 mLs 4 times daily as needed for Irritation 1:1:1:1 nystatin, maalox, viscous lidocaine, benadryl 200 mL 2    ondansetron (ZOFRAN-ODT) 8 MG TBDP disintegrating tablet Place 1 tablet under the tongue every 8 hours as needed for Nausea or Vomiting 90 tablet 1    prochlorperazine (COMPAZINE) 10 MG tablet Take 1 tablet by mouth every 6 hours as needed (nausea) 120 tablet 1    lidocaine-prilocaine (EMLA) 2.5-2.5 % cream Apply topically as needed. 1 each 3    Misc. Devices (PULSE OXIMETER FOR FINGER) MISC Dx:  hypoxemia 1 each 0    pantoprazole (PROTONIX) 40 MG tablet Take 1 tablet by mouth in the morning and at bedtime 90 tablet 3    famotidine (PEPCID) 20 MG tablet Take 20 mg by mouth 2 times daily      NONFORMULARY Hema-Plex (Iron Multi-vitamin)      potassium bicarbonate (EFFER-K) 25 MEQ disintegrating tablet Take 1 tablet by mouth daily (Patient not taking: Reported on 2023) 30 tablet 1    Blood Pressure Monitoring (BP MONITOR-STETHOSCOPE) KIT Dx: HTN (Patient not taking: No sig reported) 1 kit 0    ibuprofen (ADVIL;MOTRIN) 400 MG tablet Take 400 mg by mouth every 6 hours as needed for Pain (Patient not taking: No sig reported)       No current facility-administered medications for this visit. Facility-Administered Medications Ordered in Other Visits   Medication Dose Route Frequency Provider Last Rate Last Admin    sodium chloride flush 0.9 % injection 5-40 mL  5-40 mL IntraVENous PRN Enedelia Hinojosa MD   20 mL at 23 0817    heparin flush 100 UNIT/ML injection 500 Units  500 Units IntraCATHeter PRN Enedelia Hinojosa MD       Over-the-counter iron supplement 3 times a day      EXAM:   height is 5' 7\" (1.702 m) and weight is 147 lb (66.7 kg). His oral temperature is 98.2 °F (36.8 °C). His blood pressure is 144/90 (abnormal) and his pulse is 96. His respiration is 16 and oxygen saturation is 99%. Estimated body surface area is 1.78 meters squared as calculated from the following:    Height as of this encounter: 5' 7\" (1.702 m). Weight as of this encounter: 147 lb (66.7 kg). ECO  General: Appears mildly chronically ill. Not acutely ill. Good spirits. HEENT: NC/AT,nonicteric, perrla,eom intact, mucosa is dry. , no thrush. Neck: normal thyroid, no masses. pulses nl, no bruits,   Nodes: No adenopathy  Lungs/chest: clear, no rales,rhonchi or wheezing, lung bases clear  CV: rrr, no rubs ,gallops or murmurs  Breasts: Not examined  Abd/Rectal: Soft, nontender ,multiple puncture sites from his recent esophagectomy all healing nicely. Back: normal curvature, No midline tenderness. flanks nontender  : Not Examined  Extremities: no cyanosis,clubbing or edema. Skin: unremarkable  Neuro: A and O x 4, CN exam nonfocal, Motor- no deficits, Sensory- no deficits, gait-nl, speech- fluent, no ataxia. Devices: Port site unremarkable.       DATA:    LAB:     CBC with Differential:      Lab Results   Component Value Date/Time    WBC 1.4 01/18/2023 08:16 AM    RBC 3.08 01/18/2023 08:16 AM    HGB 9.6 01/18/2023 08:16 AM    HCT 28.4 01/18/2023 08:16 AM     01/18/2023 08:16 AM    MCV 92 01/18/2023 08:16 AM    MCH 31.2 01/18/2023 08:16 AM    MCHC 33.8 01/18/2023 08:16 AM    RDW 19.2 01/18/2023 08:16 AM    NRBC 0 01/10/2023 02:28 PM    SEGSPCT 83.6 01/10/2023 02:28 PM    METASPCT 6 01/04/2023 10:02 AM    MONOPCT 2.9 01/10/2023 02:28 PM    MYELOPCT 2 01/04/2023 10:02 AM    MONOSABS 0.3 01/18/2023 08:16 AM    LYMPHSABS 0.5 01/18/2023 08:16 AM    EOSABS 0.1 01/18/2023 08:16 AM    BASOSABS 0.0 01/18/2023 08:16 AM    DIFFTYPE see below 12/27/2022 08:05 AM     Lab Results   Component Value Date/Time    SEGSABS 0.6 (L) 01/18/2023 08:16 AM       CMP:    Lab Results   Component Value Date/Time     01/18/2023 08:16 AM     01/10/2023 02:28 PM    K 2.4 01/18/2023 08:16 AM    K 3.7 01/10/2023 02:28 PM     01/10/2023 02:28 PM    CO2 20 01/10/2023 02:28 PM    BUN 12 01/10/2023 02:28 PM    CREATININE 0.8 01/18/2023 08:16 AM    CREATININE 0.6 01/10/2023 02:28 PM    LABGLOM >60 01/18/2023 08:16 AM    GLUCOSE 73 01/10/2023 02:28 PM    PROT 6.6 01/10/2023 02:28 PM    LABALBU 3.6 01/10/2023 02:28 PM    CALCIUM 8.9 01/10/2023 02:28 PM    BILITOT 0.9 01/10/2023 02:28 PM    ALKPHOS 72 01/10/2023 02:28 PM    AST 20 01/10/2023 02:28 PM    ALT 16 01/10/2023 02:28 PM BMP:    Lab Results   Component Value Date/Time     01/18/2023 08:16 AM     01/10/2023 02:28 PM    K 2.4 01/18/2023 08:16 AM    K 3.7 01/10/2023 02:28 PM     01/10/2023 02:28 PM    CO2 20 01/10/2023 02:28 PM    BUN 12 01/10/2023 02:28 PM    LABALBU 3.6 01/10/2023 02:28 PM    CREATININE 0.8 01/18/2023 08:16 AM    CREATININE 0.6 01/10/2023 02:28 PM    CALCIUM 8.9 01/10/2023 02:28 PM    LABGLOM >60 01/18/2023 08:16 AM    GLUCOSE 73 01/10/2023 02:28 PM       Magnesium:    Lab Results   Component Value Date/Time    MG 1.9 01/10/2023 02:28 PM     PT/INR:  No results found for: PROTIME, INR  TSH:    Lab Results   Component Value Date/Time    TSH 3.070 01/10/2023 02:28 PM     VITAMIN B12: No components found for: B12  FOLATE:    Lab Results   Component Value Date/Time    FOLATE 15.6 02/10/2022 12:56 PM     IRON:    Lab Results   Component Value Date/Time    IRON 58 04/27/2022 01:48 PM     Iron Saturation:  No components found for: PERCENTFE  TIBC:    Lab Results   Component Value Date/Time    TIBC 341 04/27/2022 01:48 PM     FERRITIN:    Lab Results   Component Value Date/Time    FERRITIN 28 04/27/2022 01:47 PM     PSA:   Lab Results   Component Value Date/Time    PSA 1.41 03/22/2017 07:45 AM            IMAGING:  PRE Herceptin ECHO   12/8/22   Summary   Normal left ventricle size and Low Normal LV systolic function. Ejection   fraction was estimated at 50 %. There were no regional left ventricular   wall motion abnormalities and wall thickness was within normal limits. Mildly reduced LV Longitudinal strain with Avg GLS -14.6%      Signature      ----------------------------------------------------------------   Electronically signed by Lynda Monsalve MD (Interpreting   physician) on 12/08/2022 at 07:09 PM   ----------------------------------------------------------------    Head CT wo 1/10/23  Impression       1. Postsurgical changes in the right cerebellum.    2. There are 3 areas of high attenuation consistent with hemorrhage. These are associated with areas of previously visualized metastases. The largest is in the right temporal lobe and measures 1.2 cm. There is no associated significant mass effect. 3. Vague areas of low attenuation bilaterally presumably due to small metastases. No dominant lesions are identified. PET  12/8/22  COMPARISON: PET/CT 3/29/2022 (outside study). FINDINGS:        Neck: No FDG avid cervical lymphadenopathy. Craniotomy changes are partially visualized. Chest: Cardiac size is normal. There is no pleural or pericardial effusion. There is a calcified granuloma in the right lung. No FDG avid pulmonary nodules are identified. There is no hypermetabolic mediastinal, hilar or axillary lymphadenopathy. There    are surgical changes of prior esophagectomy and gastric pull-through. Degenerative changes are present in the thoracic spine without evidence of hypermetabolic osseous metastatic disease. Abdomen/pelvis: Physiologic activity is present in the liver, spleen, urinary collecting system and gastrointestinal tract. There are calcified granulomas in the spleen. Atherosclerotic calcifications are present in the abdominal aorta without evidence    of aneurysm. The prostate gland is enlarged and contains calcifications. There are phleboliths in the pelvis. There is no FDG avid mesenteric, retroperitoneal, pelvic or inguinal lymphadenopathy. Degenerative changes are present in the lumbar spine and    pelvis without evidence of hypermetabolic osseous metastatic disease. Impression       No FDG avid malignancy. Final report electronically signed by Dr. Mariano Sen on 12/8/2022 2:40 PM     MRI Brain [facility]  11/22/22  Shows multiple heterogeneous supra and infratentorial masses very suspicious for metastatic disease. The larger lesions, such as in the right cerebellum are associated with significant vasogenic edema.   They suspected metastasis in the left frontoparietal junction may be slightly hemorrhagic. Partially obstructed fourth ventricle with early obstructive hydrocephalus. -PET/CT 3/30/2022  Intense hypermetabolic activity within the mid esophagus consistent with a primary malignancy. Adjacent hypermetabolic left periesophageal lymph node worrisome for metastatic adenopathy. PROCEDURES:  EGD 2/14/2022  Ulcerated mass distal esophagus    EUS 3/23/2022  Partially obstructing malignant esophageal tumor found in the lower third of the esophagus. Large hiatal hernia. Normal stomach and duodenum. Liver most consistent with fatty liver. 2 malignant appearing lymph nodes visualized and measured in the lower paraesophageal mediastinum level 8L adjacent to the mass. 2 malignant appearing lymph nodes visualized and measured in the subcarinal mediastinum level 7. PATHOLOGY:   EGD distal esophageal biopsy path report  Pathologic Diagnosis     Outside Slides  [id number] (02/15/22)  A. Esophagus, distal, biopsy:   Invasive adenocarcinoma, moderately differentiated. See comment   Alcian Blue/PAS performed at outside institution and submitted for review highlights Prescott's mucosa in the background      HER2/robina Gene Status: Amplified. ___________________________________________________________    8/30/2022 [facility] esophagectomy pathology-pending    Nov 2022- Craniotomy [facility]- Brain mets- REQUESTED    GENETICS:  HER2 amplified. On original esoph bx. MOLECULAR:  Requested by phone conversation on 11/21/22 that Dr Mcdonough Friend order Nayely Shafer and PDFL-1 on the recent Brain bx. NEED REPORT    ASSESSMENT/PLAN:    1: Diagnosis: 62-year-old gentleman with adenocarcinoma of the distal third of the esophagus. Clinical stage T2N2 by EUS and PET scan confirmed. No distant mets. No significant dysphagia. Presented with chest discomfort. Chronic history of GERD. Performance status is excellent.   Seen by Dr. Sigrid Gary thoracic surgery at [facility] and status post neoadjuvant chemoradiation followed by recent esophagectomy on 8/30/2022. Essentially a near Path CR. Now with extensive Brain mets. PET 12/8/22- No avid sites     2) Prognosis / Disease Status: High risk node positive disease T2N2 clinical stage, locally advanced. HER2 amplified which now has bearing with developing  metastatic disease. 3) Work-up:    Labs: CBC, CMP. Significant for ANC . 6 today and potassium of 2.4 today 1/18/2023   Imaging:  PET/CT 12/8/22- Negative. Get stat brain MRI because of persistent nausea and vomiting. Pretx Echocardiogram - see above   Procedures: 8/30/2022 esophagectomy. See above. Consults:   Other: Due to persistent nausea and vomiting despite Compazine and Zofran resume Decadron dose is 2 mg twice daily effective 1/18/2023    4) Symptom Management: IV hydration 1 L of normal saline over 2 to 3 hours with 40 mill equivalents of potassium. Also receive IV Decadron 8 mg IV Zofran. Increase oral potassium at home to twice daily. If unsuccessful in palliating his nausea and vomiting we will add Zyprexa 5 mg daily. 5) Supportive care provided. Level of care is appropriate. See above. 6) Treatment goal: Cure      Treatment plan:     Neoadjuvant chemoradiation: Low-dose carboplatin and Taxol weekly x 6-7 with radiation. Radiation will be completed 6/20 8/30/2022 esophagectomy at [facility] per Dr. De Ortiz . Path Report not yet available and has been requested. NCCN guidelines reviewed for patients with adenocarcinoma the esophagus who have received preoperative chemoradiation. -R0 resection i.e. no cancer resection margins is YPT0N0 options include category 1 chemotherapy  -If YP T+) positive recommendations category 1 is nivolumab in patients who received preoperative chemoradiation. Other options is chemotherapy if received preoperatively or even observation. I would recommend nivolumab if he fits in this category.   If so he will need a port.  We will review data on duration of nivolumab which is likely a year. Urgent WB XRT per Dr Ga Hernandez. Day 1  11/22/20    Restage w PET and await NGS etc and plan first line met systemic tx. Previously HER2 amplified thus genet up a Herceptin based regimen . See above. -Modified Folfox q 2 weeks + Herceptin 6 mk/kg LD on day 1 then, 4mg/kg iv q 2 weeks. Start- 12/13/22. 1/18/23 C 3 due. Hold because of nausea vomiting hypokalemia and neutropenia. Since No metastatic disease found on 12/8/22 PT. Plan to drop folfox after 2-3 months and \" maintain \" with herceptin. 7) Medications reviewed. Prescriptions today: None. Patient already has Compazine and Zofran at home            No orders of the defined types were placed in this encounter. OARRS:  Controlled Substance Monitoring:    Acute and Chronic Pain Monitoring:   No flowsheet data found. 8) Research Options:   Not applicable      9) Other:        Case discussed by me with Dr Claudia Valenzuela on 11/21/22. I d/w Dr Ga Hernandez as well. Ask Chun ATKINS to check on results of the foundation 1 requested by Dr. Lady Argueta in November. 10) Follow Up: Follow-up 1 week with me to reevaluate for treatment and symptom control. Sin Lovelace MD     Patient was hospitalized at [facility] from October 31 and discharged on November 6. Admitted by Dr. Arnett Schlatter thoracic surgery who did his esophagectomy in August.  Admitted with intractable nausea and vomiting. He was found to have multifocal CNS mets with obstructive hydrocephalus. On October 31 he underwent a right craniotomy and had at least 3 foci of tumor removed including a cerebellar lesion. Discharged on Decadron 4 mg twice daily with instructions for taper. Patient has multiple other lesions after his craniotomy the plan is for XRT  for these lesions.   Patient seen by Dr. Arsen Rossi radiation oncology  Pt wants tx close to me and will see Dr Analisa weaver me on 11/22/22. WB XRT asap. .    Seen by me 10/13 having undergone his esophagectomy August 30 at [facility] per Dr. Sigrid Gary. At that time he was doing quite well and had still been on a liquid diet but denied any nausea or vomiting at that time. Interestingly at the time of his esophagectomy in August 30 he did have a pathologic CR. He was HER2 amplified. This now of significance with his metastatic disease to brain. I did review the case with Dr. Elizabeth Hernández who felt that that he did not need adjuvant therapy at that point. Clearly now things have changed and he is a candidate for systemic therapy certainly if restaging shows disease elsewhere. I would get a PET/CT to look for disease outside the brain. And I would request foundation 1 next generation sequencing please be done on the brain tissue biopsy at [facility]. Dr Ceasar Cho said he would order 11/21/22 at [facility] on the recent brain tissue.

## 2023-01-17 RX ORDER — MEGESTROL ACETATE 40 MG/1
TABLET ORAL
Qty: 30 TABLET | Refills: 0 | Status: SHIPPED | OUTPATIENT
Start: 2023-01-17 | End: 2023-01-17 | Stop reason: CLARIF

## 2023-01-18 ENCOUNTER — HOSPITAL ENCOUNTER (OUTPATIENT)
Dept: INFUSION THERAPY | Age: 63
Discharge: HOME | End: 2023-01-18
Payer: MEDICAID

## 2023-01-18 ENCOUNTER — HOSPITAL ENCOUNTER (OUTPATIENT)
Dept: RADIATION ONCOLOGY | Age: 63
Discharge: HOME | End: 2023-01-18
Payer: MEDICAID

## 2023-01-18 ENCOUNTER — HOSPITAL ENCOUNTER (OUTPATIENT)
Dept: INFUSION THERAPY | Age: 63
End: 2023-01-18

## 2023-01-18 ENCOUNTER — OFFICE VISIT (OUTPATIENT)
Dept: ONCOLOGY | Age: 63
End: 2023-01-18
Payer: MEDICAID

## 2023-01-18 VITALS
HEART RATE: 96 BPM | TEMPERATURE: 98.2 F | WEIGHT: 147 LBS | OXYGEN SATURATION: 99 % | SYSTOLIC BLOOD PRESSURE: 144 MMHG | RESPIRATION RATE: 16 BRPM | DIASTOLIC BLOOD PRESSURE: 90 MMHG | BODY MASS INDEX: 23.02 KG/M2

## 2023-01-18 VITALS
HEIGHT: 67 IN | DIASTOLIC BLOOD PRESSURE: 90 MMHG | TEMPERATURE: 98.2 F | WEIGHT: 147 LBS | OXYGEN SATURATION: 99 % | RESPIRATION RATE: 16 BRPM | BODY MASS INDEX: 23.07 KG/M2 | HEART RATE: 96 BPM | SYSTOLIC BLOOD PRESSURE: 144 MMHG

## 2023-01-18 VITALS
HEIGHT: 67 IN | BODY MASS INDEX: 23.13 KG/M2 | RESPIRATION RATE: 16 BRPM | WEIGHT: 147.4 LBS | OXYGEN SATURATION: 100 % | DIASTOLIC BLOOD PRESSURE: 89 MMHG | SYSTOLIC BLOOD PRESSURE: 148 MMHG | HEART RATE: 71 BPM | TEMPERATURE: 98.5 F

## 2023-01-18 DIAGNOSIS — C79.9 METASTASIS FROM ESOPHAGEAL CANCER (HCC): Primary | ICD-10-CM

## 2023-01-18 DIAGNOSIS — C15.5 CANCER OF DISTAL THIRD OF ESOPHAGUS (HCC): Primary | ICD-10-CM

## 2023-01-18 DIAGNOSIS — C79.31 BRAIN METASTASES (HCC): Primary | ICD-10-CM

## 2023-01-18 DIAGNOSIS — C15.9 METASTASIS FROM ESOPHAGEAL CANCER (HCC): Primary | ICD-10-CM

## 2023-01-18 LAB
ABSOLUTE IMMATURE GRANULOCYTE: 0 THOU/MM3 (ref 0–0.07)
ALBUMIN SERPL-MCNC: 3.8 G/DL (ref 3.5–5.1)
ALP BLD-CCNC: 87 U/L (ref 38–126)
ALT SERPL-CCNC: 16 U/L (ref 11–66)
AST SERPL-CCNC: 26 U/L (ref 5–40)
BASINOPHIL, AUTOMATED: 1 % (ref 0–3)
BASOPHILS ABSOLUTE: 0 THOU/MM3 (ref 0–0.1)
BILIRUB SERPL-MCNC: 0.8 MG/DL (ref 0.3–1.2)
BILIRUBIN DIRECT: < 0.2 MG/DL (ref 0–0.3)
BUN, WHOLE BLOOD: 7 MG/DL (ref 8–26)
CHLORIDE, WHOLE BLOOD: 104 MEQ/L (ref 98–109)
CREATININE, WHOLE BLOOD: 0.8 MG/DL (ref 0.5–1.2)
EOSINOPHILS ABSOLUTE: 0.1 THOU/MM3 (ref 0–0.4)
EOSINOPHILS RELATIVE PERCENT: 5 % (ref 0–4)
GFR SERPL CREATININE-BSD FRML MDRD: > 60 ML/MIN/1.73M2
GLUCOSE, WHOLE BLOOD: 134 MG/DL (ref 70–108)
HCT VFR BLD CALC: 28.4 % (ref 42–52)
HEMOGLOBIN: 9.6 GM/DL (ref 14–18)
IMMATURE GRANULOCYTES: 0 %
IONIZED CALCIUM, WHOLE BLOOD: 1.25 MMOL/L (ref 1.12–1.32)
LYMPHOCYTES # BLD: 33 % (ref 15–47)
LYMPHOCYTES ABSOLUTE: 0.5 THOU/MM3 (ref 1–4.8)
MCH RBC QN AUTO: 31.2 PG (ref 26–33)
MCHC RBC AUTO-ENTMCNC: 33.8 GM/DL (ref 32.2–35.5)
MCV RBC AUTO: 92 FL (ref 80–94)
MONOCYTES ABSOLUTE: 0.3 THOU/MM3 (ref 0.4–1.3)
MONOCYTES: 22 % (ref 0–12)
PDW BLD-RTO: 19.2 % (ref 11.5–14.5)
PLATELET # BLD: 113 THOU/MM3 (ref 130–400)
PMV BLD AUTO: 10.4 FL (ref 9.4–12.4)
POTASSIUM, WHOLE BLOOD: 2.4 MEQ/L (ref 3.5–4.9)
RBC # BLD: 3.08 MILL/MM3 (ref 4.7–6.1)
SEG NEUTROPHILS: 39 % (ref 43–75)
SEGMENTED NEUTROPHILS ABSOLUTE COUNT: 0.6 THOU/MM3 (ref 1.8–7.7)
SODIUM, WHOLE BLOOD: 137 MEQ/L (ref 138–146)
TOTAL CO2, WHOLE BLOOD: 20 MEQ/L (ref 23–33)
TOTAL PROTEIN: 6.4 G/DL (ref 6.1–8)
WBC # BLD: 1.4 THOU/MM3 (ref 4.8–10.8)

## 2023-01-18 PROCEDURE — 80047 BASIC METABLC PNL IONIZED CA: CPT

## 2023-01-18 PROCEDURE — 3077F SYST BP >= 140 MM HG: CPT | Performed by: INTERNAL MEDICINE

## 2023-01-18 PROCEDURE — 3080F DIAST BP >= 90 MM HG: CPT | Performed by: INTERNAL MEDICINE

## 2023-01-18 PROCEDURE — 36591 DRAW BLOOD OFF VENOUS DEVICE: CPT

## 2023-01-18 PROCEDURE — 85025 COMPLETE CBC W/AUTO DIFF WBC: CPT

## 2023-01-18 PROCEDURE — 99212 OFFICE O/P EST SF 10 MIN: CPT | Performed by: RADIOLOGY

## 2023-01-18 PROCEDURE — 99215 OFFICE O/P EST HI 40 MIN: CPT | Performed by: INTERNAL MEDICINE

## 2023-01-18 PROCEDURE — 80076 HEPATIC FUNCTION PANEL: CPT

## 2023-01-18 PROCEDURE — 99211 OFF/OP EST MAY X REQ PHY/QHP: CPT

## 2023-01-18 PROCEDURE — 2580000003 HC RX 258: Performed by: INTERNAL MEDICINE

## 2023-01-18 PROCEDURE — 96365 THER/PROPH/DIAG IV INF INIT: CPT

## 2023-01-18 PROCEDURE — 6360000002 HC RX W HCPCS: Performed by: INTERNAL MEDICINE

## 2023-01-18 PROCEDURE — 96366 THER/PROPH/DIAG IV INF ADDON: CPT

## 2023-01-18 RX ORDER — SODIUM CHLORIDE 9 MG/ML
25 INJECTION, SOLUTION INTRAVENOUS PRN
Status: CANCELLED | OUTPATIENT
Start: 2023-01-18

## 2023-01-18 RX ORDER — SODIUM CHLORIDE AND POTASSIUM CHLORIDE 300; 900 MG/100ML; MG/100ML
INJECTION, SOLUTION INTRAVENOUS ONCE
Status: COMPLETED | OUTPATIENT
Start: 2023-01-18 | End: 2023-01-18

## 2023-01-18 RX ORDER — SODIUM CHLORIDE 0.9 % (FLUSH) 0.9 %
5-40 SYRINGE (ML) INJECTION PRN
Status: DISCONTINUED | OUTPATIENT
Start: 2023-01-18 | End: 2023-01-19 | Stop reason: HOSPADM

## 2023-01-18 RX ORDER — SODIUM CHLORIDE 0.9 % (FLUSH) 0.9 %
5-40 SYRINGE (ML) INJECTION PRN
Status: CANCELLED | OUTPATIENT
Start: 2023-01-18

## 2023-01-18 RX ORDER — HEPARIN SODIUM (PORCINE) LOCK FLUSH IV SOLN 100 UNIT/ML 100 UNIT/ML
500 SOLUTION INTRAVENOUS PRN
Status: DISCONTINUED | OUTPATIENT
Start: 2023-01-18 | End: 2023-01-19 | Stop reason: HOSPADM

## 2023-01-18 RX ORDER — WATER 1000 ML/1000ML
2.2 INJECTION, SOLUTION INTRAVENOUS ONCE
Status: CANCELLED | OUTPATIENT
Start: 2023-01-18 | End: 2023-01-18

## 2023-01-18 RX ORDER — HEPARIN SODIUM (PORCINE) LOCK FLUSH IV SOLN 100 UNIT/ML 100 UNIT/ML
500 SOLUTION INTRAVENOUS PRN
Status: CANCELLED | OUTPATIENT
Start: 2023-01-18

## 2023-01-18 RX ADMIN — SODIUM CHLORIDE, PRESERVATIVE FREE 10 ML: 5 INJECTION INTRAVENOUS at 12:32

## 2023-01-18 RX ADMIN — SODIUM CHLORIDE, PRESERVATIVE FREE 10 ML: 5 INJECTION INTRAVENOUS at 08:16

## 2023-01-18 RX ADMIN — HEPARIN 500 UNITS: 100 SYRINGE at 12:32

## 2023-01-18 RX ADMIN — SODIUM CHLORIDE, PRESERVATIVE FREE 20 ML: 5 INJECTION INTRAVENOUS at 08:17

## 2023-01-18 RX ADMIN — POTASSIUM CHLORIDE AND SODIUM CHLORIDE: 900; 300 INJECTION, SOLUTION INTRAVENOUS at 09:47

## 2023-01-18 ASSESSMENT — ENCOUNTER SYMPTOMS
SHORTNESS OF BREATH: 1
NAUSEA: 0
BLOOD IN STOOL: 0
COUGH: 0
ABDOMINAL PAIN: 0
SORE THROAT: 0
RECTAL PAIN: 0
TROUBLE SWALLOWING: 0
FACIAL SWELLING: 0

## 2023-01-18 NOTE — DISCHARGE INSTRUCTIONS
I please need the brain MRI report done at [facility] 11/22/2022  Course 3 FOLFOX with Herceptin on hold today because of ANC . 6 and hypokalemia at 2.4  1 L of normal saline with potassium 40 meq /liter via port over 2-3 hrs. Include premeds of Decadron 8 mg and Zofran IV 8 mg. Increase potassium at home to twice daily. Brain MRI with and without contrast stat  Resume Decadron 2 mg of twice daily. Continue Zofran and Compazine. Follow-up with me in 1 week  Remind patient of neutropenic precautions. Please ask Vanesa Mccullough to attempt to locate results of [address]. You received A port blood draw and IV fluids with potassium while in outpatient oncology clinic. Call if any uncontrolled nausea/vomiting  Call if any fevers/chills/ problems or concerns  Call if any bleeding  Call if any chest pain/pressure  Call if any severe shortness of breath  Drink at least (6) 8oz glasses of fluids daily     If develop any of above condition, please call your MD or go to Emergency Department.

## 2023-01-18 NOTE — PATIENT INSTRUCTIONS
I please need the brain MRI report done at [facility] 11/22/2022  Course 3 FOLFOX with Herceptin on hold today because of ANC . 6 and hypokalemia at 2.4  1 L of normal saline with potassium 40 meq /liter via port over 2-3 hrs. Include premeds of Decadron 8 mg and Zofran IV 8 mg. Increase potassium at home to twice daily. Brain MRI with and without contrast stat  Resume Decadron 2 mg of twice daily. Continue Zofran and Compazine. Follow-up with me in 1 week  Remind patient of neutropenic precautions. Please ask Avie Goodell to attempt to locate results of [address].

## 2023-01-18 NOTE — PLAN OF CARE
Problem: Infection - Adult  Goal: Absence of infection at discharge  Outcome: Progressing  Flowsheets (Taken 1/18/2023 1546)  Absence of infection at discharge:   Assess and monitor for signs and symptoms of infection   Monitor all insertion sites i.e., indwelling lines, tubes and drains  Note: Mediport site with no redness or warmth. Skin over port site intact with no signs of breakdown noted. Patient verbalizes signs/symptoms of port infection and when to notify the physician. Goal: Will show no infection signs and symptoms  Description: Will show no infection signs and symptoms  Outcome: Progressing  Intervention: EDUCATE PATIENT ABOUT SIGNS AND SYMPTOMS OF INFECTION  Note: Discuss port maintenance,infection prevention, sign of infection and when to call MD.      Problem: Discharge Planning  Goal: Discharge to home or other facility with appropriate resources  Outcome: Progressing  Flowsheets (Taken 1/18/2023 1546)  Discharge to home or other facility with appropriate resources: Identify barriers to discharge with patient and caregiver  Note: Verbalized understanding of discharge instructions, follow-up appointments, and when to call the physician. Problem: Safety - Adult  Goal: Free from fall injury  Outcome: Progressing  Flowsheets (Taken 1/18/2023 1546)  Free From Fall Injury: Instruct family/caregiver on patient safety  Note: No falls occurred with visit today. Care plan reviewed with patient and spouse. Patient and spouse verbalize understanding of the plan of care and contribute to goal setting.

## 2023-01-18 NOTE — PROGRESS NOTES
[address] [facility] [address], [address]        [place],  [facility]        Lesvia Finer: [phone]        F: [phone]       [email or web address]            FOLLOW UP NOTE    Date of Service: 2023  Patient ID: Cristo Hinson   : 1960  MRN: [id number]   Acct Number: [de-identified]       DATE OF SERVICE: 2023   LOCATION: [facility]  PROVIDER:  Carlin Lopez PA-C    FOLLOW UP PHYSICIANS: Dr. Stephanie Rudd ([facility]) Bj Richter PA-C    ASSESSMENT AND PLAN:  Cancer Staging  Cancer of distal third of esophagus [facility])  Staging form: Esophagus - Adenocarcinoma, AJCC 8th Edition  - Clinical stage from 3/23/2022: Stage IVB (cT2, cN2, cM1, G3) - Signed by Chuy Harp MD on 2022      - Cristo Hinson is a 65 y.o. male who presents today with his wife and daughter for regularly-scheduled first follow-up for their radiation treatment for their brain metastases from their esophageal adenocarcinoma. They completed radiation to the esophagus and lymph nodes on 22, and then whole brain radiation therapy on 22  - Over the past month, the patient states his scalp peeled but has since healed. He reports nausea and non-bloody emesis, with associated loss of appetite, lightheadedness, and fatigue, especially over the past week. He states he is taking in very little fluids and eats only a couple of snacks throughout the day. He denies headaches, vision changes, numbness/tingling, dysfunction/immobility of face or limbs, syncope, seizures. He reports some stable shortness of breath with exertion over the past several weeks but denies fever/chills, cough, chest pain, palpitations, changes/issues/blood with urination or defecation (apart from some darker urine the past few days likely secondary to his dehydration), sore throat, dysphagia, abdominal pain, bone pain, other pain.    - The patient is currently undergoing Herceptin and FOLFOX with medical oncology, though today's cycle was held due to low blood counts and low potassium. He was seen, evaluated, and managed by the medical oncologist Dr. Gaob Lares today who started him on IV fluids and potassium, as well as decadron and Zofran and instructed him to double oral potassium at home and resume decadron at home per his note. Dr. Gabo Lares ordered MRI brain w wo con to be completed stat; we will await this result. No obvious focal neuro deficits on examination today. Patient wants nothing more added to work-up or evaluation today. Offered discussion with dietician but patient declined today. Encouraged patient to focus on the social aspects of eating.  - Continue to follow with medical oncology and all other medical providers. Advised patient to call medical oncology or us or go to the ED should he develop any new or worsening symptoms  - Reviewed assessment and plan with Dr. Geneva Aldrich    The patient will return to clinic in 3 months or sooner if clinically indicated. They have our clinic number to call with any questions or concerns if needed. Thank you for allowing us to be a part of their care. HPI:  Oncology History Overview Note   Jaime Deleon is a 65 y.o. male      HISTORY:    3/30/2022 As per Thoracic Surgery (Dr. Srinivas Alfaro),    Luci Darden is a 62 y.o. male former smoker with history of HTN, HLD, and GERD who was referred to our service by Dr. Mary Wallis for recently diagnosed esophageal adenocarcinoma who presents today for review of PET/CT and EUS for esophageal cancer staging. Patient reports he is doing well overall. He denies fever, hemoptysis, chest pain, or dysphagia. He is currently on a full diet and is supplementing with one Ensure per day.       62 y.o. male former smoker with history of HTN, HLD, and GERD who was referred to our service by Dr. Mary Wallis for recently diagnosed esophageal adenocarcinoma who presents today for review of PET/CT and EUS for esophageal cancer staging. Patient was staged as stage JOSE (uT2N2) on recent EUS. Findings from recent PET/CT correlate with EUS findings. Plan to have patient complete neoadjuvant chemoradiation treatment. We will follow-up with patient in ~6 weeks with a telemedicine visit in order to discuss progress. \"    IMAGING:    EUS on 3/23/2022:  Impression:              - Partially obstructing, malignant esophageal tumor was found in the lower third of the esophagus.                          - Esophagogastric landmarks identified.                          - Large hiatal hernia. - Normal stomach. - Normal examined duodenum.                          - There was diffuse abnormal echotexture in the left lobe of the liver and in the right lobe of the liver. This was characterized by a hyperechoic appearance. This is consistent with a fatty liver. - There was no sign of significant pathology in the pancreatic head, genu of the pancreas and pancreatic body. - There was no sign of significant pathology in the common bile duct. - There was no sign of significant pathology in the gallbladder body. - A mass was found in the lower third of the esophagus. A tissue diagnosis was obtained prior to this exam. This is consistent with adenocarcinoma. This was staged T2 (based on invasion into) N2 Mx by endosonographic criteria. - Two malignant-appearing lymph nodes were visualized and measured in the lower paraesophageal mediastinum (level 8L) adjacent to the mass. - Two malignant-appearing lymph nodes were visualized and measured in the subcarinal mediastinum (level 7). - No specimens collected.        PET/CT on 3/30/2022:  IMPRESSION:     Intense hypermetabolic activity within the mid esophagus consistent with primary malignancy. Adjacent hypermetabolic left periesophageal lymph node worrisome for metastatic adenopathy. 07/20/2022 PET SCAN WHOLE BODY         7/20/2022 As per thoracic surgery,  Plan to proceed with a two staged operation with an EGD and robotic-assisted gastric devascularization with possible J-tube placement followed ~2 weeks later by a robotic-assisted laparoscopy and right thoracoscopy for minimally invasive Kwesi-Venu esophagectomy. 11/3/2022 MRI Brain with and with Con        12/8/22 PET SKULL BASE TO MID THIGH  Impression       No FDG avid malignancy. 1/10/23 CT HEAD WO CON  Impression       1. Postsurgical changes in the right cerebellum. 2. There are 3 areas of high attenuation consistent with hemorrhage. These are associated with areas of previously visualized metastases. The largest is in the right temporal lobe and measures 1.2 cm. There is no associated significant mass effect. 3. Vague areas of low attenuation bilaterally presumably due to small metastases. No dominant lesions are identified.           Cancer of distal third of esophagus ([address]   2/14/2022 Surgery         4/7/2022 Initial Diagnosis    Cancer of distal third of esophagus ([address]     5/11/2022 - 6/16/2022 Chemotherapy    OP CARBOplatin AUC 2 + PACLitaxel 45 mg/m2 Q7D  Plan Provider: Caryn Guillaume MD  Treatment goal: Neoadjuvant  Line of treatment: Neoadjuvant       5/11/2022 - 6/20/2022 Radiation    Treatment Course Number: 1    Treatment Site (s) Modality Dose (cGy) Fractions Elapsed Days   Esophageal Primary, regional lymph nodes IMRT 5040 28 40   Cumulative Dose: 5040 cGy    Radiation therapy delivered under the care of Dr. Yoel Landers MD MS ([facility])       8/31/2022 Surgery    Esophagogastrectomy and gastric lymph node excision           11/4/2022 Surgery         11/22/2022 - 12/7/2022 Radiation    Treatment Course Number: 2    Treatment Site (s) Modality Dose (cGy) Fractions Elapsed Days   Whole Brain Radiation Therapy Lateral 3000 10 15   Cumulative Dose: 3000 cGy    Radiation therapy delivered under the care of Dr. Migel Mckeon MD MS ([facility])       12/13/2022 -  Chemotherapy    OP OXALiplatin + leucovorin + fluorouracil IVP & CIVI (mFOLFOX6) + trastuzumab   Plan Provider: Kala Orta MD  Treatment goal: Control  Line of treatment: 1st Line           INTERVAL HISTORY:  Kenyon Lewis is a 65 y.o. male is presenting today for regularly scheduled follow up regarding the above mentioned oncologic history. Review of Systems   Constitutional:  Positive for appetite change and fatigue. Negative for activity change, chills, fever and unexpected weight change. HENT:  Negative for ear pain, facial swelling, hearing loss, sore throat, tinnitus and trouble swallowing. Eyes:  Negative for visual disturbance. Respiratory:  Positive for shortness of breath (with exertion, increased since the start of chemotherapy). Negative for cough. Cardiovascular:  Negative for chest pain, palpitations and leg swelling. Gastrointestinal:  Negative for abdominal pain, blood in stool, nausea and rectal pain. Genitourinary:  Negative for difficulty urinating, frequency, hematuria and urgency. Musculoskeletal:  Negative for arthralgias and myalgias. Neurological:  Positive for light-headedness. Negative for dizziness, tremors, seizures, syncope, facial asymmetry, speech difficulty, weakness, numbness and headaches. Psychiatric/Behavioral:  Negative for confusion. A complete review of systems was performed and found to be negative except as presented above.     CHAPERONE: declined    PAIN: 0/10    LABORATORY STUDIES:  Onc labs:   Lab Results   Component Value Date/Time    PSA 1.41 03/22/2017 07:45 AM       MEDICATIONS:   Current Outpatient Medications   Medication Sig Dispense Refill    escitalopram (LEXAPRO) 10 MG tablet TAKE 1 TABLET BY MOUTH EVERY DAY 90 tablet 3    promethazine (PROMETHEGAN) 25 MG suppository Place 1 suppository rectally every 6 hours as needed for Nausea 45 suppository 0    potassium bicarbonate (EFFER-K) 25 MEQ disintegrating tablet Take 1 tablet by mouth daily (Patient not taking: Reported on 2023) 30 tablet 1    Magic Mouthwash (MIRACLE MOUTHWASH) Swish and spit 5 mLs 4 times daily as needed for Irritation 1:1:1:1 nystatin, maalox, viscous lidocaine, benadryl 200 mL 2    ondansetron (ZOFRAN-ODT) 8 MG TBDP disintegrating tablet Place 1 tablet under the tongue every 8 hours as needed for Nausea or Vomiting 90 tablet 1    prochlorperazine (COMPAZINE) 10 MG tablet Take 1 tablet by mouth every 6 hours as needed (nausea) 120 tablet 1    lidocaine-prilocaine (EMLA) 2.5-2.5 % cream Apply topically as needed. 1 each 3    Blood Pressure Monitoring (BP MONITOR-STETHOSCOPE) KIT Dx: HTN (Patient not taking: No sig reported) 1 kit 0    Misc. Devices (PULSE OXIMETER FOR FINGER) MISC Dx:  hypoxemia 1 each 0    ibuprofen (ADVIL;MOTRIN) 400 MG tablet Take 400 mg by mouth every 6 hours as needed for Pain (Patient not taking: No sig reported)      pantoprazole (PROTONIX) 40 MG tablet Take 1 tablet by mouth in the morning and at bedtime 90 tablet 3    famotidine (PEPCID) 20 MG tablet Take 20 mg by mouth 2 times daily      NONFORMULARY Hema-Plex (Iron Multi-vitamin)       No current facility-administered medications for this encounter.      Facility-Administered Medications Ordered in Other Encounters   Medication Dose Route Frequency Provider Last Rate Last Admin    sodium chloride flush 0.9 % injection 5-40 mL  5-40 mL IntraVENous PRN Jeffy Ivey MD   10 mL at 23 1232    heparin flush 100 UNIT/ML injection 500 Units  500 Units IntraCATHeter PRN Jeffy Ivey MD   500 Units at 23 1232       PHYSICAL EXAMINATION:  VITAL SIGNS: BP (!) 144/90   Pulse 96   Temp 98.2 °F (36.8 °C) (Oral)   Resp 16   Wt 147 lb (66.7 kg)   SpO2 99%   BMI 23.02 kg/m²     ECO - Symptomatic, <50% in bed during the day (Ambulatory and capable of all self care but unable to carry out any work activities. Up and about more than 50% of waking hours)      Physical Exam  Constitutional:       General: He is not in acute distress. Comments: Tired-appearing   HENT:      Head: Normocephalic and atraumatic. Comments: Alopecia without erythema, hyperpigmentation, or desquamation     Mouth/Throat:      Mouth: Mucous membranes are dry. Comments: No lesions visualized in the oral cavity  Cardiovascular:      Rate and Rhythm: Normal rate and regular rhythm. Pulmonary:      Effort: Pulmonary effort is normal. No respiratory distress. Comments: No adventitious lung sounds heard. Abdominal:      General: Abdomen is flat. There is no distension. Palpations: Abdomen is soft. Tenderness: There is no abdominal tenderness. Comments: Bowel sounds present in all four quadrants   Musculoskeletal:      Right lower leg: No edema. Left lower leg: No edema. Neurological:      Mental Status: He is alert and oriented to person, place, and time. Comments: Visual fields full bilaterally. Pupils round and equal. EOMI. Facial motions full and symmetric. Hearing grossly intact bilaterally with finger rub. Rapid alternating movements normal. Tongue protrudes midline. Sensation of bilateral face, upper and lower extremities intact and equal. 5/5 manual muscle testing with bilateral , elbow flexion, knee extension, knee flexion. Electronically signed by  Jess Aguirre PA-C     ATTESTATION: 20 minutes were spent with the patient at today's visit reviewing pertinent information related to their oncologic diagnosis, including any recent labs, imaging, follow ups and plan of care going forward.     CC:Dr. Radha Kirkland ([facility]) Armando Kunz PA-C  ACC:[facility]

## 2023-01-18 NOTE — PROGRESS NOTES
[address] CANCER [address] [place] [address]  Dept: [phone]  Loc: [phone]   Hematology/Oncology Consult (Clinic)        1/18/2023    Daniele Yañez   1960     No ref. 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 Mass extends from 34 to 28 cm approximately 6 cm. Staging by EUS 3/23/2022 [facility] (T2 based on invasion and N2 based on 2 malignant appearing lymph nodes visualized and measured in the lower paraesophageal mediastinum level 8L adjacent to the mass. 2 malignant appearing lymph nodes visualized and measured in the subcarinal mediastinum level 7. PET/CT 3/30/2022 [facility]- hypermetabolic activity at mid esophagus consistent with primary malignancy with adjacent hypermetabolic left periesophageal lymph nodes. Brain Mets; extensive and bulky. 10/31/22. On Decadron 2 mg bid 11/22/22    (11/21/22) F1 and PDL-1 requested for me  by Dr Iraj Galindo rad Onc at [facility] 36.:  -Seen by Dr. Rosario Benavides of thoracic surgery at [address] [place] [address] 3/30/2022. Recommends neoadjuvant chemoradiation to be followed by surgery  -Neoadjuvant chemoradiation with Dr. Chapin Earl. Low-dose carboplatinum Taxol weekly x 6-7. Chemo  Start date: 5/11. Day 1 XRT 5/11 6/16/2022 week #6 due/last treatment. Last XRT 6/20/22.  -Robotic assisted laparoscopic and right thoracoscopic [facility] [place] esophagectomy ;   additional gastric margin, gastropathic lymph nodes and hernia sac per Dr. Rosario Benavides 8/30/2022  No evidence of peritoneal metastatic disease. (Path report requested and pending)  - Craniotomy [facility] debulked largest cerebellar met  - WB XRT soon per Dr Cassy Haque ( Dr Eileen Guerrero)  - 1st Line Metastatic regimen: pending WB xrt completion and NGS results from [address] [place] [address] brain Bx.   Day 1 whole brain XRT- 11/22/22.  -Herceptin FOLFOX (started 12/13) post radiation to the brain completed   C3 due 1/18/23-hold due to [address] . 6 potassium of 2.4 and ongoing nausea and vomiting. Plan  To simplify course 3 rescheduled for 1/25 by stopping the 5-FU bolus and leucovorin. PARAMETERS:  -EGD/EUS  -PET/CT 12/8/22- No FDG avid  malignancy. COMORBIDITIES:  Include 30-pack-year history quit in 2013, alcohol none, GERD, hypertension, hyperlipidemia    SUBJECTIVE: Here today for his third course of Herceptin with modified FOLFOX. Poorly tolerated. Reports constant nausea and vomiting despite using Compazine and Zofran. Seen in ER last week and given IV fluids. Urine is dark and he is thirsty. No longer on Decadron. Denies any headaches. Prescribed potassium once daily but infrequently taking because of nausea and vomiting. Generally feels very weak and unable to eat adequately because of nausea and vomiting. HPI: Gill Vance is a 62-year-old gentleman with a long history of GERD who presented to the ER on February 10 was admitted for 1 day because of chest heaviness. No cardiology because GI was consulted ultimately showed a mass in the distal esophagus. Outpatient EGD requested. Diagnosed with adenocarcinoma the distal esophagus and referred to Dr. Jeffrey Diggs of thoracic surgery at [facility].  EUS shows regional tone involvement. See scans below and ultrasound. No distant mets. Patient has no significant dysphagia and no weight loss. Patient referred back locally for chemoradiation neoadjuvant treatment before surgery. ROS:  Review of Systems 14 point negative except as above.     PMH:   Past Medical History:   Diagnosis Date    Atrial fibrillation ([address]     Benign hypertension 11/3/2022    Cancer of distal third of esophagus ([address] 04/07/2022    GERD (gastroesophageal reflux disease)     HLD (hyperlipidemia) 8/15/2022    Sleep apnea         Social HX:   Social History     Socioeconomic History    Marital status:      Spouse name: Winnie Carrasco Number of children: 5    Years of education: 12    Highest education level: High school graduate   Occupational History    Occupation: VENDOR     Comment: [address]   Tobacco Use    Smoking status: Former     Packs/day: 3.00     Years: 10.00     Pack years: 30.00     Types: Cigarettes     Quit date: 2013     Years since quittin.7    Smokeless tobacco: Never    Tobacco comments:     does not smoke, quit 2013   Vaping Use    Vaping Use: Never used   Substance and Sexual Activity    Alcohol use: Never    Drug use: Never    Sexual activity: Yes     Partners: Female   Other Topics Concern    Not on file   Social History Narrative    Not on file     Social Determinants of Health     Financial Resource Strain: Low Risk     Difficulty of Paying Living Expenses: Not hard at all   Food Insecurity: Food Insecurity Present    Worried About [address] in the Last Year: Often true    Ran Out of Food in the Last Year: Sometimes true   Transportation Needs: Not on file   Physical Activity: Not on file   Stress: Not on file   Social Connections: Not on file   Intimate Partner Violence: Not on file   Housing Stability: Not on file        [facility]  [phone]    Phone: [phone]     [address] [place] [address] Dr [facility] [address] [facility]     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 [employer]    Immunizations:  Immunization History   Administered Date(s) Administered    Influenza Virus Vaccine 09/15/2015        Health Screenings:    C-Scope:  5 years ago  Prostate:   Lab Results   Component Value Date    PSA 1.41 2017            Health Maintenance Due   Topic Date Due    COVID-19 Vaccine (1) Never done    Pneumococcal 0-64 years Vaccine (1 - PCV) Never done    DTaP/Tdap/Td vaccine (1 - Tdap) Never done    Shingles vaccine (1 of 2) Never done    Low dose CT lung screening  Never done    Lipids  2022    Flu vaccine (1) 2022    Depression Screen  2023        Interests:   Cars. music family,    Fam HX:   Family History   Problem Relation Age of Onset    Colon Cancer Mother     Heart Disease Father     Cancer Brother         lung        Hospitalizations:   2/10/22    Allergies: Allergies   Allergen Reactions    Paclitaxel Other (See Comments)     Severe lower back pain- able to resume after medications given    Aleve [Naproxen] Hives        Adult Illness:  Patient Active Problem List   Diagnosis    Abdominal pain    Cancer of distal third of esophagus (HCC)    Acute postoperative pain    Chronic anemia    Esophageal adenocarcinoma (HCC)    GERD (gastroesophageal reflux disease)    HLD (hyperlipidemia)    Obesity (BMI 30.0-34. 9)    Postoperative atrial fibrillation (HCC)    Thrombocytopenia (HCC)    Brain lesion    Delayed gastric emptying    Serum creatinine raised    Severe protein-calorie malnutrition ([facility]    Benign hypertension    Encounter for insertion of venous access port        Surgery:  Past Surgical History:   Procedure Laterality Date    ABDOMEN SURGERY  08/16/2022    GASTRIC DEVASCULARIZATON-[facility]    ABDOMEN SURGERY  08/30/2022    ESOPHAGOGASTRODUODENOSCOPY TRANSORAL DIAGNOSTIC ESOPHAGECTOMY W/ THORACOTOMY-[facility]    DENTAL SURGERY      25 teeth removed    PORT SURGERY Left 12/1/2022    LEFT SINGLE LUMEN MEDIPORT performed by Carlos Dumont MD at [place] [facility]        Medications:  Current Outpatient Medications   Medication Sig Dispense Refill    escitalopram (LEXAPRO) 10 MG tablet TAKE 1 TABLET BY MOUTH EVERY DAY 90 tablet 3    promethazine (PROMETHEGAN) 25 MG suppository Place 1 suppository rectally every 6 hours as needed for Nausea 45 suppository 0    potassium bicarbonate (EFFER-K) 25 MEQ disintegrating tablet Take 1 tablet by mouth daily (Patient not taking: Reported on 1/18/2023) 30 tablet 1    Magic Mouthwash (MIRACLE MOUTHWASH) Swish and spit 5 mLs 4 times daily as needed for Irritation 1:1:1:1 nystatin, maalox, viscous lidocaine, benadryl 200 mL 2    ondansetron (ZOFRAN-ODT) 8 MG TBDP disintegrating tablet Place 1 tablet under the tongue every 8 hours as needed for Nausea or Vomiting 90 tablet 1    prochlorperazine (COMPAZINE) 10 MG tablet Take 1 tablet by mouth every 6 hours as needed (nausea) 120 tablet 1    lidocaine-prilocaine (EMLA) 2.5-2.5 % cream Apply topically as needed. 1 each 3    Blood Pressure Monitoring (BP MONITOR-STETHOSCOPE) KIT Dx: HTN (Patient not taking: No sig reported) 1 kit 0    Misc. Devices (PULSE OXIMETER FOR FINGER) MISC Dx:  hypoxemia 1 each 0    ibuprofen (ADVIL;MOTRIN) 400 MG tablet Take 400 mg by mouth every 6 hours as needed for Pain (Patient not taking: No sig reported)      pantoprazole (PROTONIX) 40 MG tablet Take 1 tablet by mouth in the morning and at bedtime 90 tablet 3    famotidine (PEPCID) 20 MG tablet Take 20 mg by mouth 2 times daily      NONFORMULARY Hema-Plex (Iron Multi-vitamin)       No current facility-administered medications for this visit. Facility-Administered Medications Ordered in Other Visits   Medication Dose Route Frequency Provider Last Rate Last Admin    sodium chloride flush 0.9 % injection 5-40 mL  5-40 mL IntraVENous PRN Lavinia Aceves MD   10 mL at 23 1232    heparin flush 100 UNIT/ML injection 500 Units  500 Units IntraCATHeter PRN Lavinia Aceves MD   500 Units at 23 1232   Over-the-counter iron supplement 3 times a day      EXAM:   vitals were not taken for this visit. Estimated body surface area is 1.78 meters squared as calculated from the following:    Height as of an earlier encounter on 23: 5' 7\" (1.702 m). Weight as of an earlier encounter on 23: 147 lb (66.7 kg). ECO  General: Appears mildly chronically ill. Not acutely ill. Good spirits. HEENT: NC/AT,nonicteric, perrla,eom intact, mucosa is dry. , no thrush. Neck: normal thyroid, no masses. pulses nl, no bruits,   Nodes: No adenopathy  Lungs/chest: clear, no rales,rhonchi or wheezing, lung bases clear  CV: rrr, no rubs ,gallops or murmurs  Breasts: Not examined  Abd/Rectal: Soft, nontender ,multiple puncture sites from his recent esophagectomy all healing nicely. Back: normal curvature, No midline tenderness. flanks nontender  : Not Examined  Extremities: no cyanosis,clubbing or edema. Skin: unremarkable  Neuro: A and O x 4, CN exam nonfocal, Motor- no deficits, Sensory- no deficits, gait-nl, speech- fluent, no ataxia. Devices: Port site unremarkable.       DATA:    LAB:     CBC with Differential:      Lab Results   Component Value Date/Time    WBC 1.4 01/18/2023 08:16 AM    RBC 3.08 01/18/2023 08:16 AM    HGB 9.6 01/18/2023 08:16 AM    HCT 28.4 01/18/2023 08:16 AM     01/18/2023 08:16 AM    MCV 92 01/18/2023 08:16 AM    MCH 31.2 01/18/2023 08:16 AM    MCHC 33.8 01/18/2023 08:16 AM    RDW 19.2 01/18/2023 08:16 AM    NRBC 0 01/10/2023 02:28 PM    SEGSPCT 83.6 01/10/2023 02:28 PM    METASPCT 6 01/04/2023 10:02 AM    MONOPCT 2.9 01/10/2023 02:28 PM    MYELOPCT 2 01/04/2023 10:02 AM    MONOSABS 0.3 01/18/2023 08:16 AM    LYMPHSABS 0.5 01/18/2023 08:16 AM    EOSABS 0.1 01/18/2023 08:16 AM    BASOSABS 0.0 01/18/2023 08:16 AM    DIFFTYPE see below 12/27/2022 08:05 AM     Lab Results   Component Value Date/Time    SEGSABS 0.6 (L) 01/18/2023 08:16 AM       CMP:    Lab Results   Component Value Date/Time     01/18/2023 08:16 AM     01/10/2023 02:28 PM    K 2.4 01/18/2023 08:16 AM    K 3.7 01/10/2023 02:28 PM     01/10/2023 02:28 PM    CO2 20 01/10/2023 02:28 PM    BUN 12 01/10/2023 02:28 PM    CREATININE 0.8 01/18/2023 08:16 AM    CREATININE 0.6 01/10/2023 02:28 PM    LABGLOM >60 01/18/2023 08:16 AM    GLUCOSE 73 01/10/2023 02:28 PM    PROT 6.4 01/18/2023 08:16 AM    LABALBU 3.8 01/18/2023 08:16 AM    CALCIUM 8.9 01/10/2023 02:28 PM    BILITOT 0.8 01/18/2023 08:16 AM    ALKPHOS 87 01/18/2023 08:16 AM    AST 26 01/18/2023 08:16 AM    ALT 16 01/18/2023 08:16 AM       BMP:    Lab Results   Component Value Date/Time     01/18/2023 08:16 AM     01/10/2023 02:28 PM    K 2.4 01/18/2023 08:16 AM    K 3.7 01/10/2023 02:28 PM     01/10/2023 02:28 PM    CO2 20 01/10/2023 02:28 PM    BUN 12 01/10/2023 02:28 PM    LABALBU 3.8 01/18/2023 08:16 AM    CREATININE 0.8 01/18/2023 08:16 AM    CREATININE 0.6 01/10/2023 02:28 PM    CALCIUM 8.9 01/10/2023 02:28 PM    LABGLOM >60 01/18/2023 08:16 AM    GLUCOSE 73 01/10/2023 02:28 PM       Magnesium:    Lab Results   Component Value Date/Time    MG 1.9 01/10/2023 02:28 PM     PT/INR:  No results found for: PROTIME, INR  TSH:    Lab Results   Component Value Date/Time    TSH 3.070 01/10/2023 02:28 PM     VITAMIN B12: No components found for: B12  FOLATE:    Lab Results   Component Value Date/Time    FOLATE 15.6 02/10/2022 12:56 PM     IRON:    Lab Results   Component Value Date/Time    IRON 58 04/27/2022 01:48 PM     Iron Saturation:  No components found for: PERCENTFE  TIBC:    Lab Results   Component Value Date/Time    TIBC 341 04/27/2022 01:48 PM     FERRITIN:    Lab Results   Component Value Date/Time    FERRITIN 28 04/27/2022 01:47 PM     PSA:   Lab Results   Component Value Date/Time    PSA 1.41 03/22/2017 07:45 AM            IMAGING:  PRE Herceptin ECHO   12/8/22   Summary   Normal left ventricle size and Low Normal LV systolic function. Ejection   fraction was estimated at 50 %. There were no regional left ventricular   wall motion abnormalities and wall thickness was within normal limits. Mildly reduced LV Longitudinal strain with Avg GLS -14.6%      Signature      ----------------------------------------------------------------   Electronically signed by Chidi Douglass MD (Interpreting   physician) on 12/08/2022 at 07:09 PM   ----------------------------------------------------------------    Head CT wo 1/10/23  Impression       1. Postsurgical changes in the right cerebellum. 2. There are 3 areas of high attenuation consistent with hemorrhage.  These are associated with areas of previously visualized metastases. The largest is in the right temporal lobe and measures 1.2 cm. There is no associated significant mass effect. 3. Vague areas of low attenuation bilaterally presumably due to small metastases. No dominant lesions are identified. PET  12/8/22  COMPARISON: PET/CT 3/29/2022 (outside study). FINDINGS:        Neck: No FDG avid cervical lymphadenopathy. Craniotomy changes are partially visualized. Chest: Cardiac size is normal. There is no pleural or pericardial effusion. There is a calcified granuloma in the right lung. No FDG avid pulmonary nodules are identified. There is no hypermetabolic mediastinal, hilar or axillary lymphadenopathy. There    are surgical changes of prior esophagectomy and gastric pull-through. Degenerative changes are present in the thoracic spine without evidence of hypermetabolic osseous metastatic disease. Abdomen/pelvis: Physiologic activity is present in the liver, spleen, urinary collecting system and gastrointestinal tract. There are calcified granulomas in the spleen. Atherosclerotic calcifications are present in the abdominal aorta without evidence    of aneurysm. The prostate gland is enlarged and contains calcifications. There are phleboliths in the pelvis. There is no FDG avid mesenteric, retroperitoneal, pelvic or inguinal lymphadenopathy. Degenerative changes are present in the lumbar spine and    pelvis without evidence of hypermetabolic osseous metastatic disease. Impression       No FDG avid malignancy. Final report electronically signed by Dr. Kim Fulton on 12/8/2022 2:40 PM     MRI Brain [facility]  11/22/22    -PET/CT 3/30/2022  Intense hypermetabolic activity within the mid esophagus consistent with a primary malignancy. Adjacent hypermetabolic left periesophageal lymph node worrisome for metastatic adenopathy.     PROCEDURES:  EGD 2/14/2022  Ulcerated mass distal esophagus    EUS 3/23/2022  Partially obstructing malignant esophageal tumor found in the lower third of the esophagus. Large hiatal hernia. Normal stomach and duodenum. Liver most consistent with fatty liver. 2 malignant appearing lymph nodes visualized and measured in the lower paraesophageal mediastinum level 8L adjacent to the mass. 2 malignant appearing lymph nodes visualized and measured in the subcarinal mediastinum level 7. PATHOLOGY:   EGD distal esophageal biopsy path report  Pathologic Diagnosis     Outside Slides  [id number] (02/15/22)  A. Esophagus, distal, biopsy:   Invasive adenocarcinoma, moderately differentiated. See comment   Alcian Blue/PAS performed at outside institution and submitted for review highlights Prescott's mucosa in the background      HER2/robina Gene Status: Amplified. ___________________________________________________________    8/30/2022 [facility] esophagectomy pathology-pending    Nov 2022- Craniotomy [facility]- Brain mets- REQUESTED    GENETICS:  HER2 amplified. On original esoph bx. MOLECULAR:  Requested by phone conversation on 11/21/22 that Dr Kika Munguia order Martin Vanessa and PDFL-1 on the recent Brain bx. NEED REPORT    ASSESSMENT/PLAN:    1: Diagnosis: 59-year-old gentleman with adenocarcinoma of the distal third of the esophagus. Clinical stage T2N2 by EUS and PET scan confirmed. No distant mets. No significant dysphagia. Presented with chest discomfort. Chronic history of GERD. Performance status is excellent. Seen by Dr. Roverto Sage thoracic surgery at [facility] and status post neoadjuvant chemoradiation followed by recent esophagectomy on 8/30/2022. Essentially a near Path CR. Now with extensive Brain mets. PET 12/8/22- No avid sites     2) Prognosis / Disease Status: High risk node positive disease T2N2 clinical stage, locally advanced. HER2 amplified which now has bearing with developing  metastatic disease. 3) Work-up:    Labs: CBC, CMP. Significant for ANC . 6 today and potassium of 2.4 today 1/18/2023   Imaging:  PET/CT 12/8/22- Negative. Get stat brain MRI because of persistent nausea and vomiting. Pretx Echocardiogram - see above   Procedures: 8/30/2022 esophagectomy. See above. Consults:   Other: Due to persistent nausea and vomiting despite Compazine and Zofran resume Decadron dose is 2 mg twice daily effective 1/18/2023    4) Symptom Management: IV hydration 1 L of normal saline over 2 to 3 hours with 40 mill equivalents of potassium. Also receive IV Decadron 8 mg IV Zofran. Increase oral potassium at home to twice daily. If unsuccessful in palliating his nausea and vomiting we will add Zyprexa 5 mg daily. 5) Supportive care provided. Level of care is appropriate. See above. 6) Treatment goal: Cure      Treatment plan:     Neoadjuvant chemoradiation: Low-dose carboplatin and Taxol weekly x 6-7 with radiation. Radiation will be completed 6/20 8/30/2022 esophagectomy at [address] [place] [address] per Dr. Kenny Cash . Path Report not yet available and has been requested. NCCN guidelines reviewed for patients with adenocarcinoma the esophagus who have received preoperative chemoradiation. -R0 resection i.e. no cancer resection margins is YPT0N0 options include category 1 chemotherapy  -If YP T+) positive recommendations category 1 is nivolumab in patients who received preoperative chemoradiation. Other options is chemotherapy if received preoperatively or even observation. I would recommend nivolumab if he fits in this category. If so he will need a port. We will review data on duration of nivolumab which is likely a year. Urgent WB XRT per Dr Justin Faulkner. Day 1  11/22/20    Restage w PET and await NGS etc and plan first line met systemic tx. Previously HER2 amplified thus genet up a Herceptin based regimen . See above. -Modified Folfox q 2 weeks + Herceptin 6 mk/kg LD on day 1 then, 4mg/kg iv q 2 weeks. Start- 12/13/22. 1/18/23 C 3 due.   Hold because of nausea vomiting hypokalemia and neutropenia. Schedule course 3/4/2025 January and simplify regimen by discontinuing the bolus 5-FU and leucovorin. Get Brain MRI w refractory N/V. Since No metastatic disease found on 12/8/22 PT. Plan to drop folfox after 2-3 months and \" maintain \" with herceptin. 7) Medications reviewed. Prescriptions today: None. Patient already has Compazine and Zofran at home            No orders of the defined types were placed in this encounter. OARRS:  Controlled Substance Monitoring:    Acute and Chronic Pain Monitoring:   No flowsheet data found. 8) Research Options:   Not applicable      9) Other:        Case discussed by me with Dr Gala Giang on 11/21/22. I d/w Dr Clifford Narayan as well. Ask Sarkis ATKINS to check on results of the foundation 1 requested by Dr. Beata Conklin in November. 10) Follow Up: Follow-up 1 week with me to reevaluate for treatment and symptom control. Kaylen Faust MD     Patient was hospitalized at [facility] from October 31 and discharged on November 6. Admitted by Dr. Hernandez [facility] thoracic surgery who did his esophagectomy in August.  Admitted with intractable nausea and vomiting. He was found to have multifocal CNS mets with obstructive hydrocephalus. On October 31 he underwent a right craniotomy and had at least 3 foci of tumor removed including a cerebellar lesion. Discharged on Decadron 4 mg twice daily with instructions for taper. Patient has multiple other lesions after his craniotomy the plan is for XRT  for these lesions. Patient seen by Dr. Jesica Espinosa radiation oncology  Pt wants tx close to me and will see Dr Adrienne weaver me on 11/22/22. WB XRT asap. .    Seen by me 10/13 having undergone his esophagectomy August 30 at [facility] per Dr. Sigrid Gary. At that time he was doing quite well and had still been on a liquid diet but denied any nausea or vomiting at that time.   Interestingly at the time of his esophagectomy in August 30 he did have a pathologic CR. He was HER2 amplified. This now of significance with his metastatic disease to brain. I did review the case with Dr. Usman Diego who felt that that he did not need adjuvant therapy at that point. Clearly now things have changed and he is a candidate for systemic therapy certainly if restaging shows disease elsewhere. I would get a PET/CT to look for disease outside the brain. And I would request foundation 1 next generation sequencing please be done on the brain tissue biopsy at [facility]. Dr Maximus Weaver said he would order 11/21/22 at [facility] on the recent brain tissue.

## 2023-01-19 ENCOUNTER — HOSPITAL ENCOUNTER (OUTPATIENT)
Dept: MRI IMAGING | Age: 63
Discharge: HOME | End: 2023-01-19
Payer: MEDICAID

## 2023-01-19 DIAGNOSIS — C79.31 BRAIN METASTASES (HCC): ICD-10-CM

## 2023-01-19 PROCEDURE — A9579 GAD-BASE MR CONTRAST NOS,1ML: HCPCS | Performed by: INTERNAL MEDICINE

## 2023-01-19 PROCEDURE — 70553 MRI BRAIN STEM W/O & W/DYE: CPT

## 2023-01-19 PROCEDURE — 6360000002 HC RX W HCPCS: Performed by: RADIOLOGY

## 2023-01-19 PROCEDURE — 6360000004 HC RX CONTRAST MEDICATION: Performed by: INTERNAL MEDICINE

## 2023-01-19 RX ORDER — HEPARIN SODIUM (PORCINE) LOCK FLUSH IV SOLN 100 UNIT/ML 100 UNIT/ML
500 SOLUTION INTRAVENOUS ONCE
Status: COMPLETED | OUTPATIENT
Start: 2023-01-19 | End: 2023-01-19

## 2023-01-19 RX ADMIN — HEPARIN SODIUM (PORCINE) LOCK FLUSH IV SOLN 100 UNIT/ML 500 UNITS: 100 SOLUTION at 07:39

## 2023-01-19 RX ADMIN — GADOTERIDOL 15 ML: 279.3 INJECTION, SOLUTION INTRAVENOUS at 07:33

## 2023-01-19 NOTE — PROGRESS NOTES
1/18/23 placed order for foundation 1 and PD-L1 to be done on the November brain biopsy at [facility] that showed metastatic adenocarcinoma.

## 2023-01-24 NOTE — PROGRESS NOTES
[address] CANCER [address] [address]  Dept: [phone]  Loc: [phone]   Hematology/Oncology Consult (Clinic)        1/18/2023    Eliseo Yañez   1960     No ref. 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 Mass extends from 34 to 28 cm approximately 6 cm. Staging by EUS 3/23/2022 [facility] (T2 based on invasion and N2 based on 2 malignant appearing lymph nodes visualized and measured in the lower paraesophageal mediastinum level 8L adjacent to the mass. 2 malignant appearing lymph nodes visualized and measured in the subcarinal mediastinum level 7. PET/CT 3/30/2022 [facility]- hypermetabolic activity at mid esophagus consistent with primary malignancy with adjacent hypermetabolic left periesophageal lymph nodes. Brain Mets; extensive and bulky. 10/31/22. On Decadron 2 mg bid 11/22/22    (11/21/22) F1 and PDL-1 requested for me  by Dr Warren Page rad Onc at [facility].:  -Seen by Dr. Lee Marie of thoracic surgery at [facility] 3/30/2022. Recommends neoadjuvant chemoradiation to be followed by surgery  -Neoadjuvant chemoradiation with Dr. Shelli Monroy. Low-dose carboplatinum Taxol weekly x 6-7. Chemo  Start date: 5/11. Day 1 XRT 5/11 6/16/2022 week #6 due/last treatment. Last XRT 6/20/22.  -Robotic assisted laparoscopic and right thoracoscopic Kwesi Trena Resendiz esophagectomy ;   additional gastric margin, gastropathic lymph nodes and hernia sac per Dr. Lee Marie 8/30/2022  No evidence of peritoneal metastatic disease. (Path report requested and pending)  - Craniotomy [facility] debulked largest cerebellar met  - WB XRT soon per Dr Homero Talbot ( Dr Nitin Champion)  - 1st Line Metastatic regimen: pending WB xrt completion and NGS results from [facility] brain Bx.   Day 1 whole brain XRT- 11/22/22.  -Herceptin FOLFOX (started 12/13) post radiation to the brain completed   C3 due 1/18/23-hold due to [address] . 6, potassium of 2.4 and ongoing nausea and vomiting. Delayed and administered  1/25/23; modified by decreasing oxaliplatin 20% and dropping 5-FU bolus and leucovorin. Contemplating changing to single agent Herceptin maintenance soon    PARAMETERS:  -EGD/EUS  -PET/CT 12/8/22- No FDG avid  malignancy. - Brain MRI 1/19/23-improved    COMORBIDITIES:  Include 30-pack-year history quit in 2013, alcohol none, GERD, hypertension, hyperlipidemia    SUBJECTIVE: Here today for his third course of Herceptin with modified FOLFOX. Poorly tolerated. Reports less but low-grade nausea and vomiting using Compazine and Zofran. He is taking potassium once daily. Recently he got up too fast and dizzy and fell down briefly. No significant injuries. No longer on Decadron. Denies any headaches. Overall feels stronger over the last week with the break from chemotherapy last week. He would like to proceed with treatment today. HPI: Jorge Pérez is a 62-year-old gentleman with a long history of GERD who presented to the ER on February 10 was admitted for 1 day because of chest heaviness. No cardiology because GI was consulted ultimately showed a mass in the distal esophagus. Outpatient EGD requested. Diagnosed with adenocarcinoma the distal esophagus and referred to Dr. Erna Vega of thoracic surgery at [facility].  EUS shows regional tone involvement. See scans below and ultrasound. No distant mets. Patient has no significant dysphagia and no weight loss. Patient referred back locally for chemoradiation neoadjuvant treatment before surgery. ROS:  Review of Systems 14 point negative except as above.     PMH:   Past Medical History:   Diagnosis Date    Atrial fibrillation ([facility] [address]     Benign hypertension 11/3/2022    Cancer of distal third of esophagus ([facility] [address] 04/07/2022    GERD (gastroesophageal reflux disease)     HLD (hyperlipidemia) 8/15/2022 Sleep apnea         Social HX:   Social History     Socioeconomic History    Marital status:      Spouse name: Isha Moore    Number of children: 5    Years of education: 12    Highest education level: High school graduate   Occupational History    Occupation: VENDOR     Comment: [employer]   Tobacco Use    Smoking status: Former     Packs/day: 3.00     Years: 10.00     Pack years: 30.00     Types: Cigarettes     Quit date: 2013     Years since quittin.7    Smokeless tobacco: Never    Tobacco comments:     does not smoke, quit    Vaping Use    Vaping Use: Never used   Substance and Sexual Activity    Alcohol use: Never    Drug use: Never    Sexual activity: Yes     Partners: Female   Other Topics Concern    Not on file   Social History Narrative    Not on file     Social Determinants of Health     Financial Resource Strain: Low Risk     Difficulty of Paying Living Expenses: Not hard at all   Food Insecurity: Food Insecurity Present    Worried About [address] in the Last Year: Often true    Ran Out of Food in the Last Year: Sometimes true   Transportation Needs: Not on file   Physical Activity: Not on file   Stress: Not on file   Social Connections: Not on file   Intimate Partner Violence: Not on file   Housing Stability: Not on file        Zoë Frost  [phone]    Phone: [phone]     [address] [place] [address] Dr GANN [address]     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 [employer]    Immunizations:  Immunization History   Administered Date(s) Administered    Influenza Virus Vaccine 09/15/2015        Health Screenings:    C-Scope:  5 years ago  Prostate:   Lab Results   Component Value Date    PSA 1.41 2017            Health Maintenance Due   Topic Date Due    COVID-19 Vaccine (1) Never done    Pneumococcal 0-64 years Vaccine (1 - PCV) Never done    DTaP/Tdap/Td vaccine (1 - Tdap) Never done    Shingles vaccine (1 of 2) Never done    Low dose CT lung screening Never done    Lipids  03/22/2022    Flu vaccine (1) 08/01/2022    Depression Screen  02/17/2023        Interests:   Cars. music family,    Fam HX:   Family History   Problem Relation Age of Onset    Colon Cancer Mother     Heart Disease Father     Cancer Brother         lung        Hospitalizations:   2/10/22    Allergies: Allergies   Allergen Reactions    Paclitaxel Other (See Comments)     Severe lower back pain- able to resume after medications given    Aleve [Naproxen] Hives    Promethazine Rash        Adult Illness:  Patient Active Problem List   Diagnosis    Abdominal pain    Cancer of distal third of esophagus (HCC)    Acute postoperative pain    Chronic anemia    Esophageal adenocarcinoma (HCC)    GERD (gastroesophageal reflux disease)    HLD (hyperlipidemia)    Obesity (BMI 30.0-34. 9)    Postoperative atrial fibrillation (HCC)    Thrombocytopenia (HCC)    Brain lesion    Delayed gastric emptying    Serum creatinine raised    Severe protein-calorie malnutrition ([address]    Benign hypertension    Encounter for insertion of venous access port        Surgery:  Past Surgical History:   Procedure Laterality Date    ABDOMEN SURGERY  08/16/2022    GASTRIC DEVASCULARIZATON-[facility]    ABDOMEN SURGERY  08/30/2022    ESOPHAGOGASTRODUODENOSCOPY TRANSORAL DIAGNOSTIC ESOPHAGECTOMY W/ THORACOTOMY-[facility]    DENTAL SURGERY      25 teeth removed    PORT SURGERY Left 12/1/2022    LEFT SINGLE LUMEN MEDIPORT performed [facility]        Medications:  Current Outpatient Medications   Medication Sig Dispense Refill    escitalopram (LEXAPRO) 10 MG tablet TAKE 1 TABLET BY MOUTH EVERY DAY 90 tablet 3    potassium bicarbonate (EFFER-K) 25 MEQ disintegrating tablet Take 1 tablet by mouth daily 30 tablet 1    ondansetron (ZOFRAN-ODT) 8 MG TBDP disintegrating tablet Place 1 tablet under the tongue every 8 hours as needed for Nausea or Vomiting 90 tablet 1    prochlorperazine (COMPAZINE) 10 MG tablet Take 1 tablet by mouth every 6 hours as needed (nausea) 120 tablet 1    lidocaine-prilocaine (EMLA) 2.5-2.5 % cream Apply topically as needed. 1 each 3    Misc. Devices (PULSE OXIMETER FOR FINGER) MISC Dx:  hypoxemia 1 each 0    pantoprazole (PROTONIX) 40 MG tablet Take 1 tablet by mouth in the morning and at bedtime 90 tablet 3    famotidine (PEPCID) 20 MG tablet Take 20 mg by mouth 2 times daily      NONFORMULARY Hema-Plex (Iron Multi-vitamin)      Magic Mouthwash (MIRACLE MOUTHWASH) Swish and spit 5 mLs 4 times daily as needed for Irritation 1:1:1:1 nystatin, maalox, viscous lidocaine, benadryl (Patient not taking: Reported on 2023) 200 mL 2    Blood Pressure Monitoring (BP MONITOR-STETHOSCOPE) KIT Dx: HTN (Patient not taking: No sig reported) 1 kit 0    ibuprofen (ADVIL;MOTRIN) 400 MG tablet Take 400 mg by mouth every 6 hours as needed for Pain (Patient not taking: No sig reported)       No current facility-administered medications for this visit. Facility-Administered Medications Ordered in Other Visits   Medication Dose Route Frequency Provider Last Rate Last Admin    sodium chloride flush 0.9 % injection 5-40 mL  5-40 mL IntraVENous PRN Roman Newby MD   20 mL at 23 0746    heparin flush 100 UNIT/ML injection 500 Units  500 Units IntraCATHeter PRN Roman Newby MD       Over-the-counter iron supplement 3 times a day      EXAM:   height is 5' 7\" (1.702 m) and weight is 142 lb (64.4 kg). His oral temperature is 98.1 °F (36.7 °C). His blood pressure is 125/85 and his pulse is 71. His respiration is 16 and oxygen saturation is 99%. Estimated body surface area is 1.74 meters squared as calculated from the following:    Height as of this encounter: 5' 7\" (1.702 m). Weight as of this encounter: 142 lb (64.4 kg). ECO  General: Appears mildly chronically ill. Not acutely ill. Good spirits. Wife present. HEENT: NC/AT,nonicteric, perrla,eom intact, mucosa is moist, no thrush.   Neck: no masses  Nodes: No adenopathy  Lungs/chest: clear, no rales,rhonchi or wheezing, lung bases clear  CV: rrr, no rubs ,gallops or murmurs  Breasts: Not examined  Abd/Rectal: Soft, nontender no organomegaly. No distention. Back: normal curvature, No midline tenderness. : Not Examined  Extremities: no cyanosis,clubbing or edema. Skin: unremarkable  Neuro: A and O x 4, CN exam nonfocal, Motor- no deficits, Sensory- no deficits, gait-nl, speech- fluent, no ataxia. Devices: Port site unremarkable.       DATA:    LAB:     CBC with Differential:      Lab Results   Component Value Date/Time    WBC 1.4 01/18/2023 08:16 AM    RBC 3.08 01/18/2023 08:16 AM    HGB 9.6 01/18/2023 08:16 AM    HCT 28.4 01/18/2023 08:16 AM     01/18/2023 08:16 AM    MCV 92 01/18/2023 08:16 AM    MCH 31.2 01/18/2023 08:16 AM    MCHC 33.8 01/18/2023 08:16 AM    RDW 19.2 01/18/2023 08:16 AM    NRBC 0 01/10/2023 02:28 PM    SEGSPCT 83.6 01/10/2023 02:28 PM    METASPCT 6 01/04/2023 10:02 AM    MONOPCT 2.9 01/10/2023 02:28 PM    MYELOPCT 2 01/04/2023 10:02 AM    MONOSABS 0.3 01/18/2023 08:16 AM    LYMPHSABS 0.5 01/18/2023 08:16 AM    EOSABS 0.1 01/18/2023 08:16 AM    BASOSABS 0.0 01/18/2023 08:16 AM    DIFFTYPE see below 12/27/2022 08:05 AM     Lab Results   Component Value Date/Time    SEGSABS 0.6 (L) 01/18/2023 08:16 AM       CMP:    Lab Results   Component Value Date/Time     01/25/2023 07:45 AM     01/10/2023 02:28 PM    K 3.5 01/25/2023 07:45 AM    K 3.7 01/10/2023 02:28 PM     01/10/2023 02:28 PM    CO2 20 01/10/2023 02:28 PM    BUN 12 01/10/2023 02:28 PM    CREATININE 0.6 01/25/2023 07:45 AM    CREATININE 0.6 01/10/2023 02:28 PM    LABGLOM >60 01/25/2023 07:45 AM    GLUCOSE 73 01/10/2023 02:28 PM    PROT 6.4 01/18/2023 08:16 AM    LABALBU 3.8 01/18/2023 08:16 AM    CALCIUM 8.9 01/10/2023 02:28 PM    BILITOT 0.8 01/18/2023 08:16 AM    ALKPHOS 87 01/18/2023 08:16 AM    AST 26 01/18/2023 08:16 AM    ALT 16 01/18/2023 08:16 AM       BMP:    Lab Results   Component Value Date/Time     01/25/2023 07:45 AM     01/10/2023 02:28 PM    K 3.5 01/25/2023 07:45 AM    K 3.7 01/10/2023 02:28 PM     01/10/2023 02:28 PM    CO2 20 01/10/2023 02:28 PM    BUN 12 01/10/2023 02:28 PM    LABALBU 3.8 01/18/2023 08:16 AM    CREATININE 0.6 01/25/2023 07:45 AM    CREATININE 0.6 01/10/2023 02:28 PM    CALCIUM 8.9 01/10/2023 02:28 PM    LABGLOM >60 01/25/2023 07:45 AM    GLUCOSE 73 01/10/2023 02:28 PM       Magnesium:    Lab Results   Component Value Date/Time    MG 1.9 01/10/2023 02:28 PM     PT/INR:  No results found for: PROTIME, INR  TSH:    Lab Results   Component Value Date/Time    TSH 3.070 01/10/2023 02:28 PM     VITAMIN B12: No components found for: B12  FOLATE:    Lab Results   Component Value Date/Time    FOLATE 15.6 02/10/2022 12:56 PM     IRON:    Lab Results   Component Value Date/Time    IRON 58 04/27/2022 01:48 PM     Iron Saturation:  No components found for: PERCENTFE  TIBC:    Lab Results   Component Value Date/Time    TIBC 341 04/27/2022 01:48 PM     FERRITIN:    Lab Results   Component Value Date/Time    FERRITIN 28 04/27/2022 01:47 PM     PSA:   Lab Results   Component Value Date/Time    PSA 1.41 03/22/2017 07:45 AM      1/25/2023 white count 3.43 neutrophils 1.74 with 21% monocytes, hemoglobin 10.9 and platelets 773. Potassium today is 3.5 and creatinine is 0.6        IMAGING:    Brain MRI 1/19/23  Impression       1. There has been interval resection of the right cerebellar metastatic lesion. 2. There has been significant interval improvement since previous study dated 11/3/2022. Diminution in metastases in the right temporal lobe, right occipital lobe, left cerebellar hemisphere. Significant reduction in the surrounding edema. 3. There has been resolution of multiple metastases in the frontal and temporal lobes. 4. Probable ischemic changes in the white matter. 5. No acute infarct.    6. Mild inflammatory changes in ethmoid air cells bilaterally. Barnetta Kc PRE Herceptin ECHO   12/8/22   Summary   Normal left ventricle size and Low Normal LV systolic function. Ejection   fraction was estimated at 50 %. There were no regional left ventricular   wall motion abnormalities and wall thickness was within normal limits. Mildly reduced LV Longitudinal strain with Avg GLS -14.6%      Signature      ----------------------------------------------------------------   Electronically signed by Maryuri Mackenzie MD (Interpreting   physician) on 12/08/2022 at 07:09 PM   ----------------------------------------------------------------    Head CT wo 1/10/23  Impression       1. Postsurgical changes in the right cerebellum. 2. There are 3 areas of high attenuation consistent with hemorrhage. These are associated with areas of previously visualized metastases. The largest is in the right temporal lobe and measures 1.2 cm. There is no associated significant mass effect. 3. Vague areas of low attenuation bilaterally presumably due to small metastases. No dominant lesions are identified. PET  12/8/22  COMPARISON: PET/CT 3/29/2022 (outside study). FINDINGS:        Neck: No FDG avid cervical lymphadenopathy. Craniotomy changes are partially visualized. Chest: Cardiac size is normal. There is no pleural or pericardial effusion. There is a calcified granuloma in the right lung. No FDG avid pulmonary nodules are identified. There is no hypermetabolic mediastinal, hilar or axillary lymphadenopathy. There    are surgical changes of prior esophagectomy and gastric pull-through. Degenerative changes are present in the thoracic spine without evidence of hypermetabolic osseous metastatic disease. Abdomen/pelvis: Physiologic activity is present in the liver, spleen, urinary collecting system and gastrointestinal tract. There are calcified granulomas in the spleen.  Atherosclerotic calcifications are present in the abdominal aorta without evidence    of aneurysm. The prostate gland is enlarged and contains calcifications. There are phleboliths in the pelvis. There is no FDG avid mesenteric, retroperitoneal, pelvic or inguinal lymphadenopathy. Degenerative changes are present in the lumbar spine and    pelvis without evidence of hypermetabolic osseous metastatic disease. Impression       No FDG avid malignancy. Final report electronically signed by Dr. Hermes Gardner on 12/8/2022 2:40 PM     MRI Brain [facility]  11/22/22  Shows multiple heterogeneous supra and infratentorial masses very suspicious for metastatic disease. The larger lesions, such as in the right cerebellum are associated with significant vasogenic edema. They suspected metastasis in the left frontoparietal junction may be slightly hemorrhagic. Partially obstructed fourth ventricle with early obstructive hydrocephalus. -PET/CT 3/30/2022  Intense hypermetabolic activity within the mid esophagus consistent with a primary malignancy. Adjacent hypermetabolic left periesophageal lymph node worrisome for metastatic adenopathy. PROCEDURES:  EGD 2/14/2022  Ulcerated mass distal esophagus    EUS 3/23/2022  Partially obstructing malignant esophageal tumor found in the lower third of the esophagus. Large hiatal hernia. Normal stomach and duodenum. Liver most consistent with fatty liver. 2 malignant appearing lymph nodes visualized and measured in the lower paraesophageal mediastinum level 8L adjacent to the mass. 2 malignant appearing lymph nodes visualized and measured in the subcarinal mediastinum level 7. PATHOLOGY:   EGD distal esophageal biopsy path report  Pathologic Diagnosis     Outside Slides  [id number] (02/15/22)  A. Esophagus, distal, biopsy:   Invasive adenocarcinoma, moderately differentiated.  See comment   Alcian Blue/PAS performed at outside institution and submitted for review highlights Prescott's mucosa in the background HER2/robina Gene Status: Amplified. ___________________________________________________________    8/30/2022 [facility] esophagectomy pathology-pending    Nov 2022- Craniotomy [facility]- Brain mets- REQUESTED    GENETICS:  HER2 amplified. On original esoph bx. MOLECULAR:  Requested by phone conversation on 11/21/22 that Dr Aristeo Crespo order Melissa Girt and PDFL-1 on the recent Brain bx. NEED REPORT  1/18/23 placed order for foundation 1 and PD-L1 to be done on the November brain biopsy at [facility] that showed metastatic adenocarcinoma. ASSESSMENT/PLAN:    1: Diagnosis: 50-year-old gentleman with adenocarcinoma of the distal third of the esophagus. Clinical stage T2N2 by EUS and PET scan confirmed. No distant mets. No significant dysphagia. Presented with chest discomfort. Chronic history of GERD. Performance status is excellent. Seen by Dr. Gokul Ziegler thoracic surgery at [facility] and status post neoadjuvant chemoradiation followed by recent esophagectomy on 8/30/2022. Essentially a near Path CR. On 10/31/2022 found to have extensive Brain mets. PET 12/8/22- No avid sites     2) Prognosis / Disease Status: High risk node positive disease T2N2 clinical stage, locally advanced. HER2 amplified which now has bearing with developing  metastatic disease. 3) Work-up:    Labs: CBC, CMP. Improved, see above   imaging:  PET/CT 12/8/22- Negative. 1/19/2023 brain MRI because of persistent nausea and vomiting-improved mets. Pretx Echocardiogram - see above   Procedures: 8/30/2022 esophagectomy. See above. Consults:   Other: Due to persistent nausea and vomiting despite Compazine and Zofran resume Decadron dose is 2 mg twice daily effective 1/18/2023    4) Symptom Management: Potassium once daily. If unsuccessful in palliating his nausea and vomiting we will add Zyprexa 5 mg daily. 5) Supportive care provided. Level of care is appropriate. See above.       6) Treatment goal: Cure      Treatment plan:     Neoadjuvant chemoradiation: Low-dose carboplatin and Taxol weekly x 6-7 with radiation. Radiation will be completed 6/20 8/30/2022 esophagectomy at [facility] per Dr. Prince Paz . WB XRT per Dr Timothy Mera. Day 1  11/22/20    Restage w PET and await NGS etc and plan first line met systemic tx. Previously HER2 amplified thus genet up a Herceptin based regimen . See above. -Modified Folfox q 2 weeks + Herceptin 6 mk/kg LD on day 1 then, 4mg/kg iv q 2 weeks. Start- 12/13/22. 1/18/23 C 3 due. Hold because of nausea vomiting hypokalemia and neutropenia. Seen with course 31/25/23 modified without the 5-FU bolus and leucovorin and oxaliplatin decreased 20%. Since No metastatic disease found on 12/8/22 PT. Plan to drop folfox after 2-3 months and \" maintain \" with herceptin. Sooner if continues to tolerate poorly. 7) Medications reviewed. Prescriptions today: None. Patient already has Compazine and Zofran at home            No orders of the defined types were placed in this encounter. OARRS:  Controlled Substance Monitoring:    Acute and Chronic Pain Monitoring:   No flowsheet data found. 8) Research Options:   Not applicable      9) Other:        Case discussed by me with Dr Leesa Lombard on 11/21/22. I d/w Dr Timothy Mera as well. Ask Lamont Crowder B to check on results of the foundation 1 requested by Dr. Jose Ruiz in November. Not done. Therefore on 1/18 requested foundation 1 be done on the prior brain biopsy at [facility].    10) Follow Up: Follow-up 2 week with me to review upcoming CT chest abdomen pelvis with contrast and proceed with ongoing treatment possibly changing to maintenance Herceptin and    Zoila Mock MD     Patient was hospitalized at [facility] from October 31 and discharged on November 6. Admitted by Dr. Kaleb Chen thoracic surgery who did his esophagectomy in August.  Admitted with intractable nausea and vomiting.   He was found to have multifocal CNS mets with obstructive hydrocephalus. On October 31 he underwent a right craniotomy and had at least 3 foci of tumor removed including a cerebellar lesion. Discharged on Decadron 4 mg twice daily with instructions for taper. Patient has multiple other lesions after his craniotomy the plan is for XRT  for these lesions. Patient seen by Dr. Sandee Roche radiation oncology  Pt wants tx close to me and will see Dr Phong weaver me on 11/22/22. WB XRT asap. .    Seen by me 10/13 having undergone his esophagectomy August 30 at [facility] per Dr. Jessica Tyler. At that time he was doing quite well and had still been on a liquid diet but denied any nausea or vomiting at that time. Interestingly at the time of his esophagectomy in August 30 he did have a pathologic CR. He was HER2 amplified. This now of significance with his metastatic disease to brain. I did review the case with Dr. Indra Cortes who felt that that he did not need adjuvant therapy at that point. Clearly now things have changed and he is a candidate for systemic therapy certainly if restaging shows disease elsewhere. I would get a PET/CT to look for disease outside the brain. And I would request foundation 1 next generation sequencing please be done on the brain tissue biopsy at [facility]. Dr Sonia Hinkle said he would order 11/21/22 at [facility] on the recent brain tissue.

## 2023-01-25 ENCOUNTER — HOSPITAL ENCOUNTER (OUTPATIENT)
Dept: INFUSION THERAPY | Age: 63
Discharge: HOME | End: 2023-01-25
Payer: MEDICAID

## 2023-01-25 ENCOUNTER — OFFICE VISIT (OUTPATIENT)
Dept: ONCOLOGY | Age: 63
End: 2023-01-25
Payer: MEDICAID

## 2023-01-25 VITALS
HEART RATE: 71 BPM | DIASTOLIC BLOOD PRESSURE: 85 MMHG | HEIGHT: 67 IN | OXYGEN SATURATION: 99 % | TEMPERATURE: 98.1 F | RESPIRATION RATE: 16 BRPM | SYSTOLIC BLOOD PRESSURE: 125 MMHG | WEIGHT: 142 LBS | BODY MASS INDEX: 22.29 KG/M2

## 2023-01-25 VITALS
WEIGHT: 142 LBS | BODY MASS INDEX: 22.29 KG/M2 | TEMPERATURE: 98.5 F | HEART RATE: 69 BPM | HEIGHT: 67 IN | RESPIRATION RATE: 16 BRPM | DIASTOLIC BLOOD PRESSURE: 77 MMHG | OXYGEN SATURATION: 97 % | SYSTOLIC BLOOD PRESSURE: 125 MMHG

## 2023-01-25 DIAGNOSIS — C15.9 METASTASIS FROM ESOPHAGEAL CANCER (HCC): Primary | ICD-10-CM

## 2023-01-25 DIAGNOSIS — C15.5 CANCER OF DISTAL THIRD OF ESOPHAGUS (HCC): Primary | ICD-10-CM

## 2023-01-25 DIAGNOSIS — C79.31 BRAIN METASTASES (HCC): ICD-10-CM

## 2023-01-25 DIAGNOSIS — C79.9 METASTASIS FROM ESOPHAGEAL CANCER (HCC): Primary | ICD-10-CM

## 2023-01-25 LAB
ALBUMIN SERPL BCG-MCNC: 3.8 G/DL (ref 3.5–5.1)
ALP SERPL-CCNC: 80 U/L (ref 38–126)
ALT SERPL W/O P-5'-P-CCNC: 19 U/L (ref 11–66)
AST SERPL-CCNC: 22 U/L (ref 5–40)
BILIRUB CONJ SERPL-MCNC: < 0.2 MG/DL (ref 0–0.3)
BILIRUB SERPL-MCNC: 0.6 MG/DL (ref 0.3–1.2)
BUN BLDP-MCNC: 11 MG/DL (ref 8–26)
CHLORIDE BLD-SCNC: 102 MEQ/L (ref 98–109)
CREAT BLD-MCNC: 0.6 MG/DL (ref 0.5–1.2)
GFR SERPL CREATININE-BSD FRML MDRD: > 60 ML/MIN/1.73M2
GLUCOSE BLD-MCNC: 124 MG/DL (ref 70–108)
IONIZED CALCIUM, WHOLE BLOOD: 1.22 MMOL/L (ref 1.12–1.32)
MANUAL DIFF BLD: NORMAL
POTASSIUM BLD-SCNC: 3.5 MEQ/L (ref 3.5–4.9)
PROT SERPL-MCNC: 6.1 G/DL (ref 6.1–8)
SODIUM BLD-SCNC: 137 MEQ/L (ref 138–146)
TOTAL CO2, WHOLE BLOOD: 23 MEQ/L (ref 23–33)

## 2023-01-25 PROCEDURE — 6360000002 HC RX W HCPCS: Performed by: INTERNAL MEDICINE

## 2023-01-25 PROCEDURE — 96416 CHEMO PROLONG INFUSE W/PUMP: CPT

## 2023-01-25 PROCEDURE — 36591 DRAW BLOOD OFF VENOUS DEVICE: CPT

## 2023-01-25 PROCEDURE — 96413 CHEMO IV INFUSION 1 HR: CPT

## 2023-01-25 PROCEDURE — 99214 OFFICE O/P EST MOD 30 MIN: CPT | Performed by: INTERNAL MEDICINE

## 2023-01-25 PROCEDURE — 3079F DIAST BP 80-89 MM HG: CPT | Performed by: INTERNAL MEDICINE

## 2023-01-25 PROCEDURE — 96415 CHEMO IV INFUSION ADDL HR: CPT

## 2023-01-25 PROCEDURE — 3074F SYST BP LT 130 MM HG: CPT | Performed by: INTERNAL MEDICINE

## 2023-01-25 PROCEDURE — 2580000003 HC RX 258: Performed by: INTERNAL MEDICINE

## 2023-01-25 PROCEDURE — 85025 COMPLETE CBC W/AUTO DIFF WBC: CPT

## 2023-01-25 PROCEDURE — 99211 OFF/OP EST MAY X REQ PHY/QHP: CPT

## 2023-01-25 PROCEDURE — 96367 TX/PROPH/DG ADDL SEQ IV INF: CPT

## 2023-01-25 PROCEDURE — 96417 CHEMO IV INFUS EACH ADDL SEQ: CPT

## 2023-01-25 PROCEDURE — 96375 TX/PRO/DX INJ NEW DRUG ADDON: CPT

## 2023-01-25 PROCEDURE — 80047 BASIC METABLC PNL IONIZED CA: CPT

## 2023-01-25 PROCEDURE — 80076 HEPATIC FUNCTION PANEL: CPT

## 2023-01-25 RX ORDER — WATER 1000 ML/1000ML
2.2 INJECTION, SOLUTION INTRAVENOUS ONCE
OUTPATIENT
Start: 2023-01-25 | End: 2023-01-25

## 2023-01-25 RX ORDER — DEXTROSE MONOHYDRATE 50 MG/ML
5-250 INJECTION, SOLUTION INTRAVENOUS PRN
Status: DISCONTINUED | OUTPATIENT
Start: 2023-01-25 | End: 2023-01-26 | Stop reason: HOSPADM

## 2023-01-25 RX ORDER — HEPARIN SODIUM (PORCINE) LOCK FLUSH IV SOLN 100 UNIT/ML 100 UNIT/ML
500 SOLUTION INTRAVENOUS PRN
Status: DISCONTINUED | OUTPATIENT
Start: 2023-01-25 | End: 2023-01-26 | Stop reason: HOSPADM

## 2023-01-25 RX ORDER — PALONOSETRON 0.05 MG/ML
0.25 INJECTION, SOLUTION INTRAVENOUS ONCE
Status: COMPLETED | OUTPATIENT
Start: 2023-01-25 | End: 2023-01-25

## 2023-01-25 RX ORDER — SODIUM CHLORIDE 0.9 % (FLUSH) 0.9 %
5-40 SYRINGE (ML) INJECTION PRN
Status: DISCONTINUED | OUTPATIENT
Start: 2023-01-25 | End: 2023-01-26 | Stop reason: HOSPADM

## 2023-01-25 RX ORDER — HEPARIN SODIUM (PORCINE) LOCK FLUSH IV SOLN 100 UNIT/ML 100 UNIT/ML
500 SOLUTION INTRAVENOUS PRN
OUTPATIENT
Start: 2023-01-25

## 2023-01-25 RX ORDER — SODIUM CHLORIDE 9 MG/ML
25 INJECTION, SOLUTION INTRAVENOUS PRN
OUTPATIENT
Start: 2023-01-25

## 2023-01-25 RX ORDER — SODIUM CHLORIDE 0.9 % (FLUSH) 0.9 %
5-40 SYRINGE (ML) INJECTION PRN
OUTPATIENT
Start: 2023-01-25

## 2023-01-25 RX ADMIN — FLUOROURACIL 4400 MG: 50 INJECTION, SOLUTION INTRAVENOUS at 12:30

## 2023-01-25 RX ADMIN — SODIUM CHLORIDE, PRESERVATIVE FREE 10 ML: 5 INJECTION INTRAVENOUS at 07:45

## 2023-01-25 RX ADMIN — SODIUM CHLORIDE 273 MG: 9 INJECTION, SOLUTION INTRAVENOUS at 09:35

## 2023-01-25 RX ADMIN — SODIUM CHLORIDE, PRESERVATIVE FREE 20 ML: 5 INJECTION INTRAVENOUS at 07:46

## 2023-01-25 RX ADMIN — OXALIPLATIN 120 MG: 5 INJECTION, SOLUTION INTRAVENOUS at 10:14

## 2023-01-25 RX ADMIN — DEXTROSE MONOHYDRATE 25 ML/HR: 50 INJECTION, SOLUTION INTRAVENOUS at 08:58

## 2023-01-25 RX ADMIN — PALONOSETRON 0.25 MG: 0.05 INJECTION, SOLUTION INTRAVENOUS at 08:58

## 2023-01-25 RX ADMIN — DEXAMETHASONE SODIUM PHOSPHATE 12 MG: 4 INJECTION, SOLUTION INTRA-ARTICULAR; INTRALESIONAL; INTRAMUSCULAR; INTRAVENOUS; SOFT TISSUE at 08:40

## 2023-01-25 NOTE — PROGRESS NOTES
Patient assessed for the following post chemotherapy:    Dizziness   No  Lightheadedness  No      Acute nausea/vomiting No  Headache   No  Chest pain/pressure  No  Rash/itching   No  Shortness of breath  No    Patient kept for 20 minutes observation post infusion chemotherapy. Patient tolerated chemotherapy treatment Trazimera/Oxaliplatin/5FU without any complications. Last vital signs:   /77   Pulse 69   Temp 98.5 °F (36.9 °C) (Oral)   Resp 16   Ht 5' 7\" (1.702 m)   Wt 142 lb (64.4 kg)   SpO2 97%   BMI 22.24 kg/m²     Physician's instructions:  Course 3 today modified dropping the 5-FU bolus and leucovorin and decreasing the oxaliplatin to 120 mg. Follow-up with me in 2 weeks  Schedule CT chest abdomen pelvis with contrast in 1 week. Patient instructed if experience any of the above symptoms following today's infusion, he is to notify MD immediately or go to the emergency department. Discharge instructions given to patient. Verbalizes understanding. Ambulated off unit per self, accompanied by family, with belongings and CADD infusing at 5.4 ml/hr.

## 2023-01-25 NOTE — PATIENT INSTRUCTIONS
Course 3 today modified dropping the 5-FU bolus and leucovorin and decreasing the oxaliplatin to 120 mg. Follow-up with me in 2 weeks  Schedule CT chest abdomen pelvis with contrast in 1 week.

## 2023-01-25 NOTE — PLAN OF CARE
Problem: Infection - Adult  Goal: Absence of infection at discharge  Outcome: Adequate for Discharge  Flowsheets (Taken 1/25/2023 0844)  Absence of infection at discharge: Monitor all insertion sites i.e., indwelling lines, tubes and drains  Note: Mediport site with no redness or warmth. Skin over port site intact with no signs of breakdown noted. Patient verbalizes signs/symptoms of port infection and when to notify the physician.     Goal: Will show no infection signs and symptoms  Description: Will show no infection signs and symptoms  Outcome: Adequate for Discharge     Problem: Discharge Planning  Goal: Discharge to home or other facility with appropriate resources  Outcome: Adequate for Discharge  Flowsheets (Taken 1/25/2023 0844)  Discharge to home or other facility with appropriate resources: Identify barriers to discharge with patient and caregiver     Problem: Safety - Adult  Goal: Free from fall injury  Outcome: Adequate for Discharge  Flowsheets (Taken 1/25/2023 0844)  Free From Fall Injury: Instruct family/caregiver on patient safety     Problem: Chronic Conditions and Co-morbidities  Goal: Patient's chronic conditions and co-morbidity symptoms are monitored and maintained or improved  Outcome: Adequate for Discharge  Flowsheets (Taken 1/25/2023 0844)  Care Plan - Patient's Chronic Conditions and Co-Morbidity Symptoms are Monitored and Maintained or Improved: Collaborate with multidisciplinary team to address chronic and comorbid conditions and prevent exacerbation or deterioration  Note: Chemotherapy Teaching     What is Chemotherapy   Drug action [x]   Method of Administration [x]   Handouts given []     Side Effects  Nausea/vomiting [x]   Diarrhea [x]   Fatigue [x]   Signs / Symptoms of infection [x]   Neutropenia [x]   Thrombocytopenia [x]   Alopecia [x]   neuropathy [x]   [place] diet &  the importance of fluids [x]       Micellaneous  Importance of nutrition [x]   Importance of oral hygiene [x] When to call the MD [x]   Monitoring labs [x]   Use of supportive services []     Explanation of Drug Regimen / Frequency  Herceptin/Oxaliplatin/5FU     Comments  Verbalized understanding to drug,action,side effects and when to call MD         Care plan reviewed with patient and spouse. Patient and spouse verbalize understanding of the plan of care and contribute to goal setting.

## 2023-01-25 NOTE — DISCHARGE INSTRUCTIONS
Please contact your Oncologist if you have any questions regarding the chemotherapy Herception/Oxaliplatin/5FU that you received today. Patient instructed if experience any of the symptoms following today's chemotherapy treatment with CADD to notify MD immediately or go to emergency department. * dizziness/lightheadedness  *acute nausea/vomiting - not relieved with medication  *headache - not relieved from Tylenol/pain medication  *chest pain/pressure  *rash/itching  *shortness of breath        Drink fluids - 48oz fluids daily  Call if develop fever/ chills/ signs or symptoms of infection   Please call with any questions /alarms regarding your CADD. Physician's instructions:  Course 3 today modified dropping the 5-FU bolus and leucovorin and decreasing the oxaliplatin to 120 mg. Follow-up with me in 2 weeks  Schedule CT chest abdomen pelvis with contrast in 1 week.

## 2023-01-25 NOTE — ONCOLOGY
Chemotherapy Administration    Pre-assessment Data: Antineoplastic Agents  See toxicity flow sheet for assessment                                          [x]         Interventions:   Chemotherapy SQ injection given []   Taxol administered-VS per protocol []   Blood pressure meds held 12 hours prior to Rituxan/Ruxience []   Rituxan/Ruxience administered- VS and precautions per guidelines []   Emergency drugs available as appropriate [x]   Anaphylaxis assessment completed [x]   Pre-medications administered as ordered [x]   Blood return noted upon initiation of chemotherapy [x]   Blood return noted each 1-2ml of a vesicant medication if given IV push []   Navelbine, Vincristine and Velban given as a monitored wide open drip, blood return noted before during and after infusion. []   Blood return noted each 2-3ml of a non-vesicant medication if given IV push []   Patient aware of potential Immunotherapy toxicities []   Monitor for signs / symptoms of hypersensitivity reaction [x]   Chemotherapy orders (drug/dose/rate) verified by 2 Chemo certified RNs [x]   Monitor IV site and blood return throughout the infusion of the medication [x]   Document IV site checks on the IV assessment form [x]   Document chemotherapy teaching on the Patient Education tab [x]   Document patient verbalizes understanding of medications being administered [x]   If IV infiltration, see ONS Guidelines []   Other:     Trazimera/Oxaliplatin/5FU []     CADD pump with 5FU added via 250 ml bag of normal saline to infuse at 5.4 ml/hr over 46 hours. Connections secured and tape to chest. Patient and family instructed on:  -  Pump  -  It's usage  -  What to do if alarm goes off  -  Who to call if any questions. Verified pump running before discharge.

## 2023-01-26 LAB
ANISOCYTOSIS BLD QL SMEAR: PRESENT
AUTO DIFF PNL BLD: ABNORMAL
BASOPHILS ABSOLUTE: 0 THOU/MM3 (ref 0–0.1)
BASOPHILS NFR BLD AUTO: 0 %
DEPRECATED RDW RBC AUTO: 76.8 FL (ref 35–45)
EOSINOPHIL NFR BLD AUTO: 0 %
EOSINOPHILS ABSOLUTE: 0 THOU/MM3 (ref 0–0.4)
ERYTHROCYTE [DISTWIDTH] IN BLOOD BY AUTOMATED COUNT: 22 % (ref 11.5–14.5)
HCT VFR BLD AUTO: 33.8 % (ref 42–52)
HGB BLD-MCNC: 11 GM/DL (ref 14–18)
LYMPHOCYTES ABSOLUTE: 0.6 THOU/MM3 (ref 1–4.8)
LYMPHOCYTES NFR BLD AUTO: 17 %
MCH RBC QN AUTO: 32.4 PG (ref 26–33)
MCHC RBC AUTO-ENTMCNC: 32.5 GM/DL (ref 32.2–35.5)
MCV RBC AUTO: 99.7 FL (ref 80–94)
METAMYELOCYTES: 4 %
MONOCYTES ABSOLUTE: 0.9 THOU/MM3 (ref 0.4–1.3)
MONOCYTES NFR BLD AUTO: 25 %
MYELOCYTES: 4 %
NEUTROPHILS NFR BLD AUTO: 49 %
NRBC BLD AUTO-RTO: 2 /100 WBC
PATHOLOGIST REVIEW: ABNORMAL
PLATELET # BLD AUTO: 184 THOU/MM3 (ref 130–400)
PLATELET BLD QL SMEAR: ADEQUATE
PMV BLD AUTO: 9.9 FL (ref 9.4–12.4)
POIKILOCYTES: ABNORMAL
POLYCHROMASIA BLD QL SMEAR: ABNORMAL
PROMYELOCYTES: 1 %
RBC # BLD AUTO: 3.39 MILL/MM3 (ref 4.7–6.1)
SCHISTOCYTES BLD QL SMEAR: ABNORMAL
SEGMENTED NEUTROPHILS ABSOLUTE COUNT: 1.8 THOU/MM3 (ref 1.8–7.7)
TOXIC GRANULES BLD QL SMEAR: PRESENT
WBC # BLD AUTO: 3.6 THOU/MM3 (ref 4.8–10.8)

## 2023-01-27 ENCOUNTER — HOSPITAL ENCOUNTER (OUTPATIENT)
Dept: INFUSION THERAPY | Age: 63
Discharge: HOME | End: 2023-01-27
Payer: MEDICAID

## 2023-01-27 VITALS
RESPIRATION RATE: 18 BRPM | TEMPERATURE: 98.8 F | DIASTOLIC BLOOD PRESSURE: 66 MMHG | OXYGEN SATURATION: 98 % | HEART RATE: 69 BPM | SYSTOLIC BLOOD PRESSURE: 105 MMHG

## 2023-01-27 DIAGNOSIS — C15.5 CANCER OF DISTAL THIRD OF ESOPHAGUS (HCC): Primary | ICD-10-CM

## 2023-01-27 PROCEDURE — 6360000002 HC RX W HCPCS: Performed by: INTERNAL MEDICINE

## 2023-01-27 PROCEDURE — 2580000003 HC RX 258: Performed by: INTERNAL MEDICINE

## 2023-01-27 PROCEDURE — 96523 IRRIG DRUG DELIVERY DEVICE: CPT

## 2023-01-27 RX ORDER — SODIUM CHLORIDE 9 MG/ML
5-40 INJECTION INTRAVENOUS PRN
Status: DISCONTINUED | OUTPATIENT
Start: 2023-01-27 | End: 2023-01-28 | Stop reason: HOSPADM

## 2023-01-27 RX ORDER — HEPARIN SODIUM (PORCINE) LOCK FLUSH IV SOLN 100 UNIT/ML 100 UNIT/ML
500 SOLUTION INTRAVENOUS PRN
Status: DISCONTINUED | OUTPATIENT
Start: 2023-01-27 | End: 2023-01-28 | Stop reason: HOSPADM

## 2023-01-27 RX ADMIN — HEPARIN 500 UNITS: 100 SYRINGE at 11:17

## 2023-01-27 RX ADMIN — SODIUM CHLORIDE, PRESERVATIVE FREE 20 ML: 5 INJECTION INTRAVENOUS at 11:17

## 2023-01-27 NOTE — PLAN OF CARE
Problem: Infection - Adult  Goal: Absence of infection at discharge  Outcome: Progressing  Flowsheets (Taken 1/27/2023 1536)  Absence of infection at discharge:   Assess and monitor for signs and symptoms of infection   Monitor all insertion sites i.e., indwelling lines, tubes and drains  Note: Mediport site with no redness or warmth. Skin over port site intact with no signs of breakdown noted. Patient verbalizes signs/symptoms of port infection and when to notify the physician. Goal: Will show no infection signs and symptoms  Description: Will show no infection signs and symptoms  Outcome: Progressing     Problem: Discharge Planning  Goal: Discharge to home or other facility with appropriate resources  Outcome: Progressing  Flowsheets (Taken 1/27/2023 1536)  Discharge to home or other facility with appropriate resources: Identify barriers to discharge with patient and caregiver  Note: Discuss understanding of discharge instructions,follow-up appointments, and when to call the physician. Problem: Safety - Adult  Goal: Free from fall injury  Outcome: Progressing  Flowsheets (Taken 1/27/2023 1536)  Free From Fall Injury: Instruct family/caregiver on patient safety  Note: No falls occurred with visit today. Verbalized understanding of fall prevention to ask for assistance with ambulation. Call light within reach. Care plan reviewed with patient and spouse. Patient and spouse verbalize understanding of the plan of care and contribute to goal setting.

## 2023-01-27 NOTE — DISCHARGE INSTRUCTIONS
You received cadd pump removal while in outpatient oncology clinic. Call if any uncontrolled nausea/vomiting  Call if any fevers/chills/ problems or concerns  Call if any bleeding  Call if any chest pain/pressure  Call if any severe shortness of breath  Drink at least (6) 8oz glasses of fluids daily     If develop any of above condition, please call your MD or go to Emergency Department.

## 2023-02-01 DIAGNOSIS — C15.5 CANCER OF DISTAL THIRD OF ESOPHAGUS (HCC): Primary | ICD-10-CM

## 2023-02-01 RX ORDER — SODIUM CHLORIDE 9 MG/ML
INJECTION, SOLUTION INTRAVENOUS CONTINUOUS
OUTPATIENT
Start: 2023-02-08

## 2023-02-01 RX ORDER — SODIUM CHLORIDE 9 MG/ML
5-40 INJECTION INTRAVENOUS PRN
OUTPATIENT
Start: 2023-02-10

## 2023-02-01 RX ORDER — ACETAMINOPHEN 325 MG/1
650 TABLET ORAL
OUTPATIENT
Start: 2023-02-08

## 2023-02-01 RX ORDER — PALONOSETRON 0.05 MG/ML
0.25 INJECTION, SOLUTION INTRAVENOUS ONCE
OUTPATIENT
Start: 2023-02-08 | End: 2023-02-08

## 2023-02-01 RX ORDER — FAMOTIDINE 10 MG/ML
20 INJECTION, SOLUTION INTRAVENOUS
OUTPATIENT
Start: 2023-02-08

## 2023-02-01 RX ORDER — HEPARIN SODIUM (PORCINE) LOCK FLUSH IV SOLN 100 UNIT/ML 100 UNIT/ML
500 SOLUTION INTRAVENOUS PRN
OUTPATIENT
Start: 2023-02-10

## 2023-02-01 RX ORDER — EPINEPHRINE 1 MG/ML
0.3 INJECTION, SOLUTION, CONCENTRATE INTRAVENOUS PRN
OUTPATIENT
Start: 2023-02-08

## 2023-02-01 RX ORDER — SODIUM CHLORIDE 9 MG/ML
5-40 INJECTION INTRAVENOUS PRN
OUTPATIENT
Start: 2023-02-08

## 2023-02-01 RX ORDER — MEPERIDINE HYDROCHLORIDE 50 MG/ML
12.5 INJECTION INTRAMUSCULAR; INTRAVENOUS; SUBCUTANEOUS PRN
OUTPATIENT
Start: 2023-02-08

## 2023-02-01 RX ORDER — ALBUTEROL SULFATE 90 UG/1
4 AEROSOL, METERED RESPIRATORY (INHALATION) PRN
OUTPATIENT
Start: 2023-02-08

## 2023-02-01 RX ORDER — DIPHENHYDRAMINE HYDROCHLORIDE 50 MG/ML
50 INJECTION INTRAMUSCULAR; INTRAVENOUS
OUTPATIENT
Start: 2023-02-08

## 2023-02-01 RX ORDER — SODIUM CHLORIDE 9 MG/ML
5-250 INJECTION, SOLUTION INTRAVENOUS PRN
OUTPATIENT
Start: 2023-02-08

## 2023-02-01 RX ORDER — HEPARIN SODIUM (PORCINE) LOCK FLUSH IV SOLN 100 UNIT/ML 100 UNIT/ML
500 SOLUTION INTRAVENOUS PRN
OUTPATIENT
Start: 2023-02-08

## 2023-02-01 RX ORDER — SODIUM CHLORIDE 9 MG/ML
5-250 INJECTION, SOLUTION INTRAVENOUS PRN
OUTPATIENT
Start: 2023-02-10

## 2023-02-01 RX ORDER — ONDANSETRON 2 MG/ML
8 INJECTION INTRAMUSCULAR; INTRAVENOUS
OUTPATIENT
Start: 2023-02-08

## 2023-02-01 RX ORDER — DEXTROSE MONOHYDRATE 50 MG/ML
5-250 INJECTION, SOLUTION INTRAVENOUS PRN
OUTPATIENT
Start: 2023-02-08

## 2023-02-02 ENCOUNTER — CLINICAL DOCUMENTATION (OUTPATIENT)
Dept: ONCOLOGY | Age: 63
End: 2023-02-02

## 2023-02-02 ENCOUNTER — HOSPITAL ENCOUNTER (OUTPATIENT)
Dept: CT IMAGING | Age: 63
Discharge: HOME | End: 2023-02-02
Payer: MEDICAID

## 2023-02-02 DIAGNOSIS — C15.9 METASTASIS FROM ESOPHAGEAL CANCER (HCC): ICD-10-CM

## 2023-02-02 DIAGNOSIS — C79.9 METASTASIS FROM ESOPHAGEAL CANCER (HCC): ICD-10-CM

## 2023-02-02 PROCEDURE — 6360000002 HC RX W HCPCS: Performed by: RADIOLOGY

## 2023-02-02 PROCEDURE — 74177 CT ABD & PELVIS W/CONTRAST: CPT

## 2023-02-02 PROCEDURE — 71260 CT THORAX DX C+: CPT

## 2023-02-02 PROCEDURE — 6360000004 HC RX CONTRAST MEDICATION: Performed by: INTERNAL MEDICINE

## 2023-02-02 RX ORDER — HEPARIN SODIUM (PORCINE) LOCK FLUSH IV SOLN 100 UNIT/ML 100 UNIT/ML
500 SOLUTION INTRAVENOUS ONCE
Status: COMPLETED | OUTPATIENT
Start: 2023-02-02 | End: 2023-02-02

## 2023-02-02 RX ADMIN — HEPARIN 500 UNITS: 100 SYRINGE at 09:21

## 2023-02-02 RX ADMIN — IOPAMIDOL 85 ML: 755 INJECTION, SOLUTION INTRAVENOUS at 11:23

## 2023-02-02 NOTE — PROGRESS NOTES
[address] [address]  Dept: [phone]  Loc: [phone]   Hematology/Oncology Consult (Clinic)        1/18/2023    Will Santana Yañez   1960     No ref. 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 HER2 Amplified  Clinical stage T2 N2 M0. Mass extends from 34 to 28 cm approximately 6 cm. Staging by EUS 3/23/2022 [facility] (T2 based on invasion and N2 based on 2 malignant appearing lymph nodes visualized and measured in the lower paraesophageal mediastinum level 8L adjacent to the mass. 2 malignant appearing lymph nodes visualized and measured in the subcarinal mediastinum level 7. PET/CT 3/30/2022 [facility]- hypermetabolic activity at mid esophagus consistent with primary malignancy with adjacent hypermetabolic left periesophageal lymph nodes. Brain Mets; extensive and bulky. 10/31/22. On Decadron 2 mg bid 11/22/22    (11/21/22) F1 and PDL-1 requested for me  by Dr Tyler Zabala rad Onc at [place] [address].:  -Seen by Dr. Dion Wallace of thoracic surgery at [facility] 3/30/2022. Recommends neoadjuvant chemoradiation to be followed by surgery  -Neoadjuvant chemoradiation with Dr. Luis Jeffery. Low-dose carboplatinum Taxol weekly x 6-7. Chemo  Start date: 5/11. Day 1 XRT 5/11 6/16/2022 week #6 due/last treatment. Last XRT 6/20/22.  -Robotic assisted laparoscopic and right thoracoscopic [place] [facility] esophagectomy ;   additional gastric margin, gastropathic lymph nodes and hernia sac per Dr. Dion Wallace 8/30/2022  No evidence of peritoneal metastatic disease. (Path report requested and pending)  - Craniotomy [facility] debulked largest cerebellar met  - WB XRT soon per Dr Brigette Lima ( Dr Jae Rudd)  - 1st Line Metastatic regimen: pending WB xrt completion and NGS results from [facility] brain Bx.   Day 1 whole brain XRT- 11/22/22.  -Herceptin FOLFOX (started 12/13) post radiation to the brain completed   C3 due 1/18/23-hold due to [address] . 6, potassium of 2.4 and ongoing nausea and vomiting. Delayed and administered  1/25/23; modified by decreasing oxaliplatin 20% and dropping 5-FU bolus and leucovorin. Contemplating changing to single agent Herceptin maintenance soon  -Foundation 1 reveals tumor mutation burden 17 for which Keytruda or nivolumab would be indicated. PAD-L1 CPS 2 - 3% i.e. positive    PARAMETERS:  -EGD/EUS  -PET/CT 12/8/22- No FDG avid  malignancy. - Brain MRI 1/19/23-improved    COMORBIDITIES:  Include 30-pack-year history quit in 2013, alcohol none, GERD, hypertension, hyperlipidemia    SUBJECTIVE: Here today for his third course of Herceptin with modified FOLFOX. Poorly tolerated. Reports less but low-grade nausea and vomiting using Compazine and Zofran. He is taking potassium once daily. Recently he got up too fast and dizzy and fell down briefly. No significant injuries. No longer on Decadron. Denies any headaches. Overall feels stronger over the last week with the break from chemotherapy last week. He would like to proceed with treatment today. HPI: Marc Alberto is a 81-year-old gentleman with a long history of GERD who presented to the ER on February 10 was admitted for 1 day because of chest heaviness. No cardiology because GI was consulted ultimately showed a mass in the distal esophagus. Outpatient EGD requested. Diagnosed with adenocarcinoma the distal esophagus and referred to Dr. Vladimir Abdul of thoracic surgery at [address] [place] [address].  EUS shows regional tone involvement. See scans below and ultrasound. No distant mets. Patient has no significant dysphagia and no weight loss. Patient referred back locally for chemoradiation neoadjuvant treatment before surgery. ROS:  Review of Systems 14 point negative except as above.     PMH:   Past Medical History:   Diagnosis Date    Atrial fibrillation ([address]     Benign hypertension 11/3/2022 Cancer of distal third of esophagus ([facility] [address] 2022    GERD (gastroesophageal reflux disease)     HLD (hyperlipidemia) 8/15/2022    Sleep apnea         Social HX:   Social History     Socioeconomic History    Marital status:      Spouse name: Nixon Goncalves    Number of children: 5    Years of education: 12    Highest education level: High school graduate   Occupational History    Occupation: VENDOR     Comment: [address]   Tobacco Use    Smoking status: Former     Packs/day: 3.00     Years: 10.00     Pack years: 30.00     Types: Cigarettes     Quit date: 2013     Years since quittin.7    Smokeless tobacco: Never    Tobacco comments:     does not smoke, quit    Vaping Use    Vaping Use: Never used   Substance and Sexual Activity    Alcohol use: Never    Drug use: Never    Sexual activity: Yes     Partners: Female   Other Topics Concern    Not on file   Social History Narrative    Not on file     Social Determinants of Health     Financial Resource Strain: Low Risk     Difficulty of Paying Living Expenses: Not hard at all   Food Insecurity: Food Insecurity Present    Worried About [address] LM[address]-1 in the Last Year: Often true    Ran Out of Food in the Last Year: Sometimes true   Transportation Needs: Not on file   Physical Activity: Not on file   Stress: Not on file   Social Connections: Not on file   Intimate Partner Violence: Not on file   Housing Stability: Not on file        SpouseCarlean [facility]  [phone]    Phone: [phone]     [address] [place] [address] Dr GANN [address] [facility]     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    Immunizations:  Immunization History   Administered Date(s) Administered    Influenza Virus Vaccine 09/15/2015        Health Screenings:    C-Scope:  5 years ago  Prostate:   Lab Results   Component Value Date    PSA 1.41 2017            Health Maintenance Due   Topic Date Due    COVID-19 Vaccine (1) Never done    Pneumococcal 0-64 years Vaccine (1 - PCV) Never done    DTaP/Tdap/Td vaccine (1 - Tdap) Never done    Shingles vaccine (1 of 2) Never done    Low dose CT lung screening  Never done    Lipids  03/22/2022    Flu vaccine (1) 08/01/2022    Depression Screen  02/17/2023        Interests:   Cars. music family,    Fam HX:   Family History   Problem Relation Age of Onset    Colon Cancer Mother     Heart Disease Father     Cancer Brother         lung        Hospitalizations:   2/10/22    Allergies: Allergies   Allergen Reactions    Paclitaxel Other (See Comments)     Severe lower back pain- able to resume after medications given    Aleve [Naproxen] Hives    Promethazine Rash        Adult Illness:  Patient Active Problem List   Diagnosis    Abdominal pain    Cancer of distal third of esophagus (HCC)    Acute postoperative pain    Chronic anemia    Esophageal adenocarcinoma (HCC)    GERD (gastroesophageal reflux disease)    HLD (hyperlipidemia)    Obesity (BMI 30.0-34. 9)    Postoperative atrial fibrillation (HCC)    Thrombocytopenia (HCC)    Brain lesion    Delayed gastric emptying    Serum creatinine raised    Severe protein-calorie malnutrition ([address]    Benign hypertension    Encounter for insertion of venous access port        Surgery:  Past Surgical History:   Procedure Laterality Date    ABDOMEN SURGERY  08/16/2022    GASTRIC DEVASCULARIZATON-[facility]    ABDOMEN SURGERY  08/30/2022    ESOPHAGOGASTRODUODENOSCOPY TRANSORAL DIAGNOSTIC ESOPHAGECTOMY W/ THORACOTOMY-[facility]    DENTAL SURGERY      25 teeth removed    PORT SURGERY Left 12/1/2022    LEFT SINGLE LUMEN MEDIPORT performed by Susan Gomez MD at [facility]        Medications:  Current Outpatient Medications   Medication Sig Dispense Refill    escitalopram (LEXAPRO) 10 MG tablet TAKE 1 TABLET BY MOUTH EVERY DAY 90 tablet 3    potassium bicarbonate (EFFER-K) 25 MEQ disintegrating tablet Take 1 tablet by mouth daily 30 tablet 1    Magic Mouthwash (MIRACLE MOUTHWASH) Swish and spit 5 mLs 4 times daily as needed for Irritation 1:1:1:1 nystatin, maalox, viscous lidocaine, benadryl (Patient not taking: No sig reported) 200 mL 2    ondansetron (ZOFRAN-ODT) 8 MG TBDP disintegrating tablet Place 1 tablet under the tongue every 8 hours as needed for Nausea or Vomiting (Patient not taking: Reported on 2023) 90 tablet 1    prochlorperazine (COMPAZINE) 10 MG tablet Take 1 tablet by mouth every 6 hours as needed (nausea) 120 tablet 1    lidocaine-prilocaine (EMLA) 2.5-2.5 % cream Apply topically as needed. 1 each 3    Blood Pressure Monitoring (BP MONITOR-STETHOSCOPE) KIT Dx: HTN (Patient not taking: No sig reported) 1 kit 0    Misc. Devices (PULSE OXIMETER FOR FINGER) MISC Dx:  hypoxemia 1 each 0    ibuprofen (ADVIL;MOTRIN) 400 MG tablet Take 400 mg by mouth every 6 hours as needed for Pain (Patient not taking: No sig reported)      pantoprazole (PROTONIX) 40 MG tablet Take 1 tablet by mouth in the morning and at bedtime 90 tablet 3    famotidine (PEPCID) 20 MG tablet Take 20 mg by mouth 2 times daily      NONFORMULARY Hema-Plex (Iron Multi-vitamin)       No current facility-administered medications for this visit. Over-the-counter iron supplement 3 times a day      EXAM:   vitals were not taken for this visit. Estimated body surface area is 1.74 meters squared as calculated from the following:    Height as of 23: 5' 7\" (1.702 m). Weight as of 23: 142 lb (64.4 kg). ECO  General: Appears mildly chronically ill. Not acutely ill. Good spirits. Wife present. HEENT: NC/AT,nonicteric, perrla,eom intact, mucosa is moist, no thrush. Neck: no masses  Nodes: No adenopathy  Lungs/chest: clear, no rales,rhonchi or wheezing, lung bases clear  CV: rrr, no rubs ,gallops or murmurs  Breasts: Not examined  Abd/Rectal: Soft, nontender no organomegaly. No distention. Back: normal curvature, No midline tenderness. : Not Examined  Extremities: no cyanosis,clubbing or edema. Skin: unremarkable  Neuro:  A and O x 4, CN exam nonfocal, Motor- no deficits, Sensory- no deficits, gait-nl, speech- fluent, no ataxia. Devices: Port site unremarkable.       DATA:    LAB:     CBC with Differential:      Lab Results   Component Value Date/Time    WBC 3.6 01/25/2023 07:45 AM    RBC 3.39 01/25/2023 07:45 AM    HGB 11.0 01/25/2023 07:45 AM    HCT 33.8 01/25/2023 07:45 AM     01/25/2023 07:45 AM    MCV 99.7 01/25/2023 07:45 AM    MCH 32.4 01/25/2023 07:45 AM    MCHC 32.5 01/25/2023 07:45 AM    RDW 19.2 01/18/2023 08:16 AM    NRBC 2 01/25/2023 07:45 AM    SEGSPCT 49.0 01/25/2023 07:45 AM    METASPCT 4 01/25/2023 07:45 AM    PROMYELOPCT 1 01/25/2023 07:45 AM    MONOPCT 25.0 01/25/2023 07:45 AM    MYELOPCT 4 01/25/2023 07:45 AM    MONOSABS 0.9 01/25/2023 07:45 AM    LYMPHSABS 0.6 01/25/2023 07:45 AM    EOSABS 0.0 01/25/2023 07:45 AM    BASOSABS 0.0 01/25/2023 07:45 AM    DIFFTYPE see below 01/25/2023 07:45 AM     Lab Results   Component Value Date/Time    SEGSABS 1.8 01/25/2023 07:45 AM       CMP:    Lab Results   Component Value Date/Time     01/25/2023 07:45 AM     01/10/2023 02:28 PM    K 3.5 01/25/2023 07:45 AM    K 3.7 01/10/2023 02:28 PM     01/10/2023 02:28 PM    CO2 20 01/10/2023 02:28 PM    BUN 12 01/10/2023 02:28 PM    CREATININE 0.6 01/25/2023 07:45 AM    CREATININE 0.6 01/10/2023 02:28 PM    LABGLOM >60 01/25/2023 07:45 AM    GLUCOSE 73 01/10/2023 02:28 PM    PROT 6.1 01/25/2023 07:45 AM    LABALBU 3.8 01/25/2023 07:45 AM    CALCIUM 8.9 01/10/2023 02:28 PM    BILITOT 0.6 01/25/2023 07:45 AM    ALKPHOS 80 01/25/2023 07:45 AM    AST 22 01/25/2023 07:45 AM    ALT 19 01/25/2023 07:45 AM       BMP:    Lab Results   Component Value Date/Time     01/25/2023 07:45 AM     01/10/2023 02:28 PM    K 3.5 01/25/2023 07:45 AM    K 3.7 01/10/2023 02:28 PM     01/10/2023 02:28 PM    CO2 20 01/10/2023 02:28 PM    BUN 12 01/10/2023 02:28 PM    LABALBU 3.8 01/25/2023 07:45 AM    CREATININE 0.6 01/25/2023 07:45 AM    CREATININE 0.6 01/10/2023 02:28 PM    CALCIUM 8.9 01/10/2023 02:28 PM    LABGLOM >60 01/25/2023 07:45 AM    GLUCOSE 73 01/10/2023 02:28 PM       Magnesium:    Lab Results   Component Value Date/Time    MG 1.9 01/10/2023 02:28 PM     PT/INR:  No results found for: PROTIME, INR  TSH:    Lab Results   Component Value Date/Time    TSH 3.070 01/10/2023 02:28 PM     VITAMIN B12: No components found for: B12  FOLATE:    Lab Results   Component Value Date/Time    FOLATE 15.6 02/10/2022 12:56 PM     IRON:    Lab Results   Component Value Date/Time    IRON 58 04/27/2022 01:48 PM     Iron Saturation:  No components found for: PERCENTFE  TIBC:    Lab Results   Component Value Date/Time    TIBC 341 04/27/2022 01:48 PM     FERRITIN:    Lab Results   Component Value Date/Time    FERRITIN 28 04/27/2022 01:47 PM     PSA:   Lab Results   Component Value Date/Time    PSA 1.41 03/22/2017 07:45 AM      1/25/2023 white count 3.43 neutrophils 1.74 with 21% monocytes, hemoglobin 10.9 and platelets 348. Potassium today is 3.5 and creatinine is 0.6        IMAGING:    Brain MRI 1/19/23  Impression       1. There has been interval resection of the right cerebellar metastatic lesion. 2. There has been significant interval improvement since previous study dated 11/3/2022. Diminution in metastases in the right temporal lobe, right occipital lobe, left cerebellar hemisphere. Significant reduction in the surrounding edema. 3. There has been resolution of multiple metastases in the frontal and temporal lobes. 4. Probable ischemic changes in the white matter. 5. No acute infarct. 6. Mild inflammatory changes in ethmoid air cells bilaterally. Annelle Castles PRE Herceptin ECHO   12/8/22   Summary   Normal left ventricle size and Low Normal LV systolic function. Ejection   fraction was estimated at 50 %. There were no regional left ventricular   wall motion abnormalities and wall thickness was within normal limits.    Mildly reduced LV Longitudinal strain with Avg GLS -14.6%      Signature      ----------------------------------------------------------------   Electronically signed by Shin Nielson MD (Interpreting   physician) on 12/08/2022 at 07:09 PM   ----------------------------------------------------------------    Head CT wo 1/10/23  Impression       1. Postsurgical changes in the right cerebellum. 2. There are 3 areas of high attenuation consistent with hemorrhage. These are associated with areas of previously visualized metastases. The largest is in the right temporal lobe and measures 1.2 cm. There is no associated significant mass effect. 3. Vague areas of low attenuation bilaterally presumably due to small metastases. No dominant lesions are identified. PET  12/8/22  COMPARISON: PET/CT 3/29/2022 (outside study). FINDINGS:        Neck: No FDG avid cervical lymphadenopathy. Craniotomy changes are partially visualized. Chest: Cardiac size is normal. There is no pleural or pericardial effusion. There is a calcified granuloma in the right lung. No FDG avid pulmonary nodules are identified. There is no hypermetabolic mediastinal, hilar or axillary lymphadenopathy. There    are surgical changes of prior esophagectomy and gastric pull-through. Degenerative changes are present in the thoracic spine without evidence of hypermetabolic osseous metastatic disease. Abdomen/pelvis: Physiologic activity is present in the liver, spleen, urinary collecting system and gastrointestinal tract. There are calcified granulomas in the spleen. Atherosclerotic calcifications are present in the abdominal aorta without evidence    of aneurysm. The prostate gland is enlarged and contains calcifications. There are phleboliths in the pelvis. There is no FDG avid mesenteric, retroperitoneal, pelvic or inguinal lymphadenopathy.  Degenerative changes are present in the lumbar spine and    pelvis without evidence of hypermetabolic osseous metastatic disease. Impression       No FDG avid malignancy. Final report electronically signed by Dr. Wen Arauz on 12/8/2022 2:40 PM     MRI Brain [facility]  11/22/22  Shows multiple heterogeneous supra and infratentorial masses very suspicious for metastatic disease. The larger lesions, such as in the right cerebellum are associated with significant vasogenic edema. They suspected metastasis in the left frontoparietal junction may be slightly hemorrhagic. Partially obstructed fourth ventricle with early obstructive hydrocephalus. -PET/CT 3/30/2022  Intense hypermetabolic activity within the mid esophagus consistent with a primary malignancy. Adjacent hypermetabolic left periesophageal lymph node worrisome for metastatic adenopathy. PROCEDURES:  EGD 2/14/2022  Ulcerated mass distal esophagus    EUS 3/23/2022  Partially obstructing malignant esophageal tumor found in the lower third of the esophagus. Large hiatal hernia. Normal stomach and duodenum. Liver most consistent with fatty liver. 2 malignant appearing lymph nodes visualized and measured in the lower paraesophageal mediastinum level 8L adjacent to the mass. 2 malignant appearing lymph nodes visualized and measured in the subcarinal mediastinum level 7. PATHOLOGY:   EGD distal esophageal biopsy path report  Pathologic Diagnosis     Outside Slides  [id number] (02/15/22)  A. Esophagus, distal, biopsy:   Invasive adenocarcinoma, moderately differentiated. See comment   Alcian Blue/PAS performed at outside institution and submitted for review highlights Prescott's mucosa in the background      HER2/robina Gene Status: Amplified. ___________________________________________________________    8/30/2022 [facility] esophagectomy pathology-pending    Nov 2022- Craniotomy [facility]- Brain mets- REQUESTED    GENETICS:  HER2 amplified. On original esoph bx.     MOLECULAR:  Requested by phone conversation on 11/21/22 that Dr Debra Gardner order Foundation1 and PDFL-1 on the recent Brain bx. NEED REPORT  1/18/23 placed order for foundation 1 and PD-L1 to be done on the November brain biopsy at [facility] that showed metastatic adenocarcinoma. PD-L1 CPS 2 - 3%. Positive  Tumor mutation burden 17, MECHELLE, no specific targetable mutations. ASSESSMENT/PLAN:    1: Diagnosis: 73-year-old gentleman with adenocarcinoma of the distal third of the esophagus. Clinical stage T2N2 by EUS and PET scan confirmed. No distant mets. No significant dysphagia. Presented with chest discomfort. Chronic history of GERD. Performance status is excellent. Seen by Dr. Jesus Westfall thoracic surgery at [facility] and status post neoadjuvant chemoradiation followed by recent esophagectomy on 8/30/2022. Essentially a near Path CR. On 10/31/2022 found to have extensive Brain mets. PET 12/8/22- No avid sites     2) Prognosis / Disease Status: High risk node positive disease T2N2 clinical stage, locally advanced. HER2 amplified which now has bearing with developing  metastatic disease. 3) Work-up:    Labs: CBC, CMP. Improved, see above   imaging:  PET/CT 12/8/22- Negative. 1/19/2023 brain MRI because of persistent nausea and vomiting-improved mets. Pretx Echocardiogram - see above   Procedures: 8/30/2022 esophagectomy. See above. Consults:   Other: Due to persistent nausea and vomiting despite Compazine and Zofran resume Decadron dose is 2 mg twice daily effective 1/18/2023    4) Symptom Management: Potassium once daily. If unsuccessful in palliating his nausea and vomiting we will add Zyprexa 5 mg daily. 5) Supportive care provided. Level of care is appropriate. See above. 6) Treatment goal: Cure      Treatment plan:     Neoadjuvant chemoradiation: Low-dose carboplatin and Taxol weekly x 6-7 with radiation. Radiation will be completed 6/20 8/30/2022 esophagectomy at [facility] per Dr. Jesus Westfall . WB XRT per Dr Sophy Shetty.   Day 1  11/22/20    Restage w PET and await NGS etc and plan first line met systemic tx. Previously HER2 amplified thus genet up a Herceptin based regimen . See above. -Modified Folfox q 2 weeks + Herceptin 6 mk/kg LD on day 1 then, 4mg/kg iv q 2 weeks. Start- 12/13/22. 1/18/23 C 3 due. Hold because of nausea vomiting hypokalemia and neutropenia. Seen with course 31/25/23 modified without the 5-FU bolus and leucovorin and oxaliplatin decreased 20%. Since No metastatic disease found on 12/8/22 PT. Plan to drop folfox after 2-3 months and \" maintain \" with herceptin. Sooner if continues to tolerate poorly. 7) Medications reviewed. Prescriptions today: None. Patient already has Compazine and Zofran at home            No orders of the defined types were placed in this encounter. OARRS:  Controlled Substance Monitoring:    Acute and Chronic Pain Monitoring:   No flowsheet data found. 8) Research Options:   Not applicable      9) Other:        Case discussed by me with Dr Marleni Quiroga on 11/21/22. I d/w Dr Ivanna Ferraro as well. Ask Manfred ATKINS to check on results of the foundation 1 requested by Dr. Zehra Campuzano in November. Not done. Therefore on 1/18 requested foundation 1 be done on the prior brain biopsy at [facility].    10) Follow Up: Follow-up 2 week with me to review upcoming CT chest abdomen pelvis with contrast and proceed with ongoing treatment possibly changing to maintenance Herceptin and    Luís Garvin MD     Patient was hospitalized at [facility] from October 31 and discharged on November 6. Admitted by Dr. Carmen Rivera thoracic surgery who did his esophagectomy in August.  Admitted with intractable nausea and vomiting. He was found to have multifocal CNS mets with obstructive hydrocephalus. On October 31 he underwent a right craniotomy and had at least 3 foci of tumor removed including a cerebellar lesion. Discharged on Decadron 4 mg twice daily with instructions for taper.   Patient has multiple other lesions after his craniotomy the plan is for XRT  for these lesions. Patient seen by Dr. Erwin Cooks radiation oncology  Pt wants tx close to me and will see Dr Thu weaver me on 11/22/22. WB XRT asap. .    Seen by me 10/13 having undergone his esophagectomy August 30 at [facility] per Dr. Gabriela Colindres. At that time he was doing quite well and had still been on a liquid diet but denied any nausea or vomiting at that time. Interestingly at the time of his esophagectomy in August 30 he did have a pathologic CR. He was HER2 amplified. This now of significance with his metastatic disease to brain. I did review the case with Dr. Maria Ines Santos who felt that that he did not need adjuvant therapy at that point. Clearly now things have changed and he is a candidate for systemic therapy certainly if restaging shows disease elsewhere. I would get a PET/CT to look for disease outside the brain. And I would request foundation 1 next generation sequencing please be done on the brain tissue biopsy at [facility]. Dr Ba Potter said he would order 11/21/22 at [facility] on the recent brain tissue.

## 2023-02-02 NOTE — PROGRESS NOTES
[address] CANCER [address] [place] [address]  Dept: [phone]  Loc: [phone]   Hematology/Oncology Consult (Clinic)        1/18/2023    Aurelio Amaya Madora   1960     No ref. 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 Mass extends from 34 to 28 cm approximately 6 cm. Staging by EUS 3/23/2022 [facility] (T2 based on invasion and N2 based on 2 malignant appearing lymph nodes visualized and measured in the lower paraesophageal mediastinum level 8L adjacent to the mass. 2 malignant appearing lymph nodes visualized and measured in the subcarinal mediastinum level 7. PET/CT 3/30/2022 [facility]- hypermetabolic activity at mid esophagus consistent with primary malignancy with adjacent hypermetabolic left periesophageal lymph nodes. Brain Mets; extensive and bulky. 10/31/22. On Decadron 2 mg bid 11/22/22    (11/21/22) F1 and PDL-1 requested for me  by Dr Eugenio Self rad Onc at [place] [id number].:  -Seen by Dr. Haim Schulz of thoracic surgery at [facility] 3/30/2022. Recommends neoadjuvant chemoradiation to be followed by surgery  -Neoadjuvant chemoradiation with Dr. Ilya Landers. Low-dose carboplatinum Taxol weekly x 6-7. Chemo  Start date: 5/11. Day 1 XRT 5/11 6/16/2022 week #6 due/last treatment. Last XRT 6/20/22.  -Robotic assisted laparoscopic and right thoracoscopic [facility] esophagectomy ;   additional gastric margin, gastropathic lymph nodes and hernia sac per Dr. Haim Schulz 8/30/2022  No evidence of peritoneal metastatic disease. (Path report requested and pending)  - Craniotomy [facility] debulked largest cerebellar met  - WB XRT soon per Dr Paramjit Roberts ( Dr Teresita Salazar)  - 1st Line Metastatic regimen: pending WB xrt completion and NGS results from [facility] brain Bx.   Day 1 whole brain XRT- 11/22/22.  -Herceptin FOLFOX (started 12/13) post radiation to the brain completed   C3 due 1/18/23-hold due to [address] . 6, potassium of 2.4 and ongoing nausea and vomiting. Delayed and administered  1/25/23; modified by decreasing oxaliplatin 20% and dropping 5-FU bolus and leucovorin. Contemplating changing to single agent Herceptin maintenance soon  -Foundation 1 reveals tumor mutation burden 17 for which Keytruda or nivolumab would be indicated    PARAMETERS:  -EGD/EUS  -PET/CT 12/8/22- No FDG avid  malignancy. - Brain MRI 1/19/23-improved    COMORBIDITIES:  Include 30-pack-year history quit in 2013, alcohol none, GERD, hypertension, hyperlipidemia    SUBJECTIVE: Here today for his third course of Herceptin with modified FOLFOX. Poorly tolerated. Reports less but low-grade nausea and vomiting using Compazine and Zofran. He is taking potassium once daily. Recently he got up too fast and dizzy and fell down briefly. No significant injuries. No longer on Decadron. Denies any headaches. Overall feels stronger over the last week with the break from chemotherapy last week. He would like to proceed with treatment today. HPI: Radha Hernandez is a 30-year-old gentleman with a long history of GERD who presented to the ER on February 10 was admitted for 1 day because of chest heaviness. No cardiology because GI was consulted ultimately showed a mass in the distal esophagus. Outpatient EGD requested. Diagnosed with adenocarcinoma the distal esophagus and referred to Dr. Sharlee Kocher of thoracic surgery at [facility].  EUS shows regional tone involvement. See scans below and ultrasound. No distant mets. Patient has no significant dysphagia and no weight loss. Patient referred back locally for chemoradiation neoadjuvant treatment before surgery. ROS:  Review of Systems 14 point negative except as above.     PMH:   Past Medical History:   Diagnosis Date    Atrial fibrillation ([facility] [address]     Benign hypertension 11/3/2022    Cancer of distal third of esophagus ([address] 2022    GERD (gastroesophageal reflux disease)     HLD (hyperlipidemia) 8/15/2022    Sleep apnea         Social HX:   Social History     Socioeconomic History    Marital status:      Spouse name: Tk    Number of children: 5    Years of education: 12    Highest education level: High school graduate   Occupational History    Occupation: VENDOR     Comment: [address]   Tobacco Use    Smoking status: Former     Packs/day: 3.00     Years: 10.00     Pack years: 30.00     Types: Cigarettes     Quit date: 2013     Years since quittin.7    Smokeless tobacco: Never    Tobacco comments:     does not smoke, quit    Vaping Use    Vaping Use: Never used   Substance and Sexual Activity    Alcohol use: Never    Drug use: Never    Sexual activity: Yes     Partners: Female   Other Topics Concern    Not on file   Social History Narrative    Not on file     Social Determinants of Health     Financial Resource Strain: Low Risk     Difficulty of Paying Living Expenses: Not hard at all   Food Insecurity: Food Insecurity Present    Worried About [address] [employer] in the Last Year: Often true    Ran Out of Food in the Last Year: Sometimes true   Transportation Needs: Not on file   Physical Activity: Not on file   Stress: Not on file   Social Connections: Not on file   Intimate Partner Violence: Not on file   Housing Stability: Not on file        SpouseOresclif Rangel  [phone]    Phone: [phone]     [address] [place] [address] Dr GANN [address] [facility]     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    Immunizations:  Immunization History   Administered Date(s) Administered    Influenza Virus Vaccine 09/15/2015        Health Screenings:    C-Scope:  5 years ago  Prostate:   Lab Results   Component Value Date    PSA 1.41 2017            Health Maintenance Due   Topic Date Due    COVID-19 Vaccine (1) Never done    Pneumococcal 0-64 years Vaccine (1 - PCV) Never done DTaP/Tdap/Td vaccine (1 - Tdap) Never done    Shingles vaccine (1 of 2) Never done    Low dose CT lung screening  Never done    Lipids  03/22/2022    Flu vaccine (1) 08/01/2022    Depression Screen  02/17/2023        Interests:   Cars. music family,    Fam HX:   Family History   Problem Relation Age of Onset    Colon Cancer Mother     Heart Disease Father     Cancer Brother         lung        Hospitalizations:   2/10/22    Allergies: Allergies   Allergen Reactions    Paclitaxel Other (See Comments)     Severe lower back pain- able to resume after medications given    Aleve [Naproxen] Hives    Promethazine Rash        Adult Illness:  Patient Active Problem List   Diagnosis    Abdominal pain    Cancer of distal third of esophagus (HCC)    Acute postoperative pain    Chronic anemia    Esophageal adenocarcinoma (HCC)    GERD (gastroesophageal reflux disease)    HLD (hyperlipidemia)    Obesity (BMI 30.0-34. 9)    Postoperative atrial fibrillation (HCC)    Thrombocytopenia (HCC)    Brain lesion    Delayed gastric emptying    Serum creatinine raised    Severe protein-calorie malnutrition ([address]    Benign hypertension    Encounter for insertion of venous access port        Surgery:  Past Surgical History:   Procedure Laterality Date    ABDOMEN SURGERY  08/16/2022    GASTRIC DEVASCULARIZATON-[facility]    ABDOMEN SURGERY  08/30/2022    ESOPHAGOGASTRODUODENOSCOPY TRANSORAL DIAGNOSTIC ESOPHAGECTOMY W/ THORACOTOMY-[facility]    DENTAL SURGERY      25 teeth removed    PORT SURGERY Left 12/1/2022    LEFT SINGLE LUMEN MEDIPORT performed by Ni[place]ish[place] [facility] [place] [facility]        Medications:  Current Outpatient Medications   Medication Sig Dispense Refill    escitalopram (LEXAPRO) 10 MG tablet TAKE 1 TABLET BY MOUTH EVERY DAY 90 tablet 3    potassium bicarbonate (EFFER-K) 25 MEQ disintegrating tablet Take 1 tablet by mouth daily 30 tablet 1    Magic Mouthwash (MIRACLE MOUTHWASH) Swish and spit 5 mLs 4 times daily as needed for Irritation 1:1:1:1 nystatin, maalox, viscous lidocaine, benadryl (Patient not taking: No sig reported) 200 mL 2    ondansetron (ZOFRAN-ODT) 8 MG TBDP disintegrating tablet Place 1 tablet under the tongue every 8 hours as needed for Nausea or Vomiting (Patient not taking: Reported on 2023) 90 tablet 1    prochlorperazine (COMPAZINE) 10 MG tablet Take 1 tablet by mouth every 6 hours as needed (nausea) 120 tablet 1    lidocaine-prilocaine (EMLA) 2.5-2.5 % cream Apply topically as needed. 1 each 3    Blood Pressure Monitoring (BP MONITOR-STETHOSCOPE) KIT Dx: HTN (Patient not taking: No sig reported) 1 kit 0    Misc. Devices (PULSE OXIMETER FOR FINGER) MISC Dx:  hypoxemia 1 each 0    ibuprofen (ADVIL;MOTRIN) 400 MG tablet Take 400 mg by mouth every 6 hours as needed for Pain (Patient not taking: No sig reported)      pantoprazole (PROTONIX) 40 MG tablet Take 1 tablet by mouth in the morning and at bedtime 90 tablet 3    famotidine (PEPCID) 20 MG tablet Take 20 mg by mouth 2 times daily      NONFORMULARY Hema-Plex (Iron Multi-vitamin)       No current facility-administered medications for this visit. Over-the-counter iron supplement 3 times a day      EXAM:   vitals were not taken for this visit. Estimated body surface area is 1.74 meters squared as calculated from the following:    Height as of 23: 5' 7\" (1.702 m). Weight as of 23: 142 lb (64.4 kg). ECO  General: Appears mildly chronically ill. Not acutely ill. Good spirits. Wife present. HEENT: NC/AT,nonicteric, perrla,eom intact, mucosa is moist, no thrush. Neck: no masses  Nodes: No adenopathy  Lungs/chest: clear, no rales,rhonchi or wheezing, lung bases clear  CV: rrr, no rubs ,gallops or murmurs  Breasts: Not examined  Abd/Rectal: Soft, nontender no organomegaly. No distention. Back: normal curvature, No midline tenderness. : Not Examined  Extremities: no cyanosis,clubbing or edema.   Skin: unremarkable  Neuro: A and O x 4, CN exam nonfocal, Motor- no deficits, Sensory- no deficits, gait-nl, speech- fluent, no ataxia. Devices: Port site unremarkable.       DATA:    LAB:     CBC with Differential:      Lab Results   Component Value Date/Time    WBC 3.6 01/25/2023 07:45 AM    RBC 3.39 01/25/2023 07:45 AM    HGB 11.0 01/25/2023 07:45 AM    HCT 33.8 01/25/2023 07:45 AM     01/25/2023 07:45 AM    MCV 99.7 01/25/2023 07:45 AM    MCH 32.4 01/25/2023 07:45 AM    MCHC 32.5 01/25/2023 07:45 AM    RDW 19.2 01/18/2023 08:16 AM    NRBC 2 01/25/2023 07:45 AM    SEGSPCT 49.0 01/25/2023 07:45 AM    METASPCT 4 01/25/2023 07:45 AM    PROMYELOPCT 1 01/25/2023 07:45 AM    MONOPCT 25.0 01/25/2023 07:45 AM    MYELOPCT 4 01/25/2023 07:45 AM    MONOSABS 0.9 01/25/2023 07:45 AM    LYMPHSABS 0.6 01/25/2023 07:45 AM    EOSABS 0.0 01/25/2023 07:45 AM    BASOSABS 0.0 01/25/2023 07:45 AM    DIFFTYPE see below 01/25/2023 07:45 AM     Lab Results   Component Value Date/Time    SEGSABS 1.8 01/25/2023 07:45 AM       CMP:    Lab Results   Component Value Date/Time     01/25/2023 07:45 AM     01/10/2023 02:28 PM    K 3.5 01/25/2023 07:45 AM    K 3.7 01/10/2023 02:28 PM     01/10/2023 02:28 PM    CO2 20 01/10/2023 02:28 PM    BUN 12 01/10/2023 02:28 PM    CREATININE 0.6 01/25/2023 07:45 AM    CREATININE 0.6 01/10/2023 02:28 PM    LABGLOM >60 01/25/2023 07:45 AM    GLUCOSE 73 01/10/2023 02:28 PM    PROT 6.1 01/25/2023 07:45 AM    LABALBU 3.8 01/25/2023 07:45 AM    CALCIUM 8.9 01/10/2023 02:28 PM    BILITOT 0.6 01/25/2023 07:45 AM    ALKPHOS 80 01/25/2023 07:45 AM    AST 22 01/25/2023 07:45 AM    ALT 19 01/25/2023 07:45 AM       BMP:    Lab Results   Component Value Date/Time     01/25/2023 07:45 AM     01/10/2023 02:28 PM    K 3.5 01/25/2023 07:45 AM    K 3.7 01/10/2023 02:28 PM     01/10/2023 02:28 PM    CO2 20 01/10/2023 02:28 PM    BUN 12 01/10/2023 02:28 PM    LABALBU 3.8 01/25/2023 07:45 AM    CREATININE 0.6 01/25/2023 07:45 AM CREATININE 0.6 01/10/2023 02:28 PM    CALCIUM 8.9 01/10/2023 02:28 PM    LABGLOM >60 01/25/2023 07:45 AM    GLUCOSE 73 01/10/2023 02:28 PM       Magnesium:    Lab Results   Component Value Date/Time    MG 1.9 01/10/2023 02:28 PM     PT/INR:  No results found for: PROTIME, INR  TSH:    Lab Results   Component Value Date/Time    TSH 3.070 01/10/2023 02:28 PM     VITAMIN B12: No components found for: B12  FOLATE:    Lab Results   Component Value Date/Time    FOLATE 15.6 02/10/2022 12:56 PM     IRON:    Lab Results   Component Value Date/Time    IRON 58 04/27/2022 01:48 PM     Iron Saturation:  No components found for: PERCENTFE  TIBC:    Lab Results   Component Value Date/Time    TIBC 341 04/27/2022 01:48 PM     FERRITIN:    Lab Results   Component Value Date/Time    FERRITIN 28 04/27/2022 01:47 PM     PSA:   Lab Results   Component Value Date/Time    PSA 1.41 03/22/2017 07:45 AM      1/25/2023 white count 3.43 neutrophils 1.74 with 21% monocytes, hemoglobin 10.9 and platelets 397. Potassium today is 3.5 and creatinine is 0.6        IMAGING:    Brain MRI 1/19/23  Impression       1. There has been interval resection of the right cerebellar metastatic lesion. 2. There has been significant interval improvement since previous study dated 11/3/2022. Diminution in metastases in the right temporal lobe, right occipital lobe, left cerebellar hemisphere. Significant reduction in the surrounding edema. 3. There has been resolution of multiple metastases in the frontal and temporal lobes. 4. Probable ischemic changes in the white matter. 5. No acute infarct. 6. Mild inflammatory changes in ethmoid air cells bilaterally. Sofi Multani PRE Herceptin ECHO   12/8/22   Summary   Normal left ventricle size and Low Normal LV systolic function. Ejection   fraction was estimated at 50 %. There were no regional left ventricular   wall motion abnormalities and wall thickness was within normal limits.    Mildly reduced LV Longitudinal strain with Avg GLS -14.6%      Signature      ----------------------------------------------------------------   Electronically signed by Antwan Markham MD (Interpreting   physician) on 12/08/2022 at 07:09 PM   ----------------------------------------------------------------    Head CT wo 1/10/23  Impression       1. Postsurgical changes in the right cerebellum. 2. There are 3 areas of high attenuation consistent with hemorrhage. These are associated with areas of previously visualized metastases. The largest is in the right temporal lobe and measures 1.2 cm. There is no associated significant mass effect. 3. Vague areas of low attenuation bilaterally presumably due to small metastases. No dominant lesions are identified. PET  12/8/22  COMPARISON: PET/CT 3/29/2022 (outside study). FINDINGS:        Neck: No FDG avid cervical lymphadenopathy. Craniotomy changes are partially visualized. Chest: Cardiac size is normal. There is no pleural or pericardial effusion. There is a calcified granuloma in the right lung. No FDG avid pulmonary nodules are identified. There is no hypermetabolic mediastinal, hilar or axillary lymphadenopathy. There    are surgical changes of prior esophagectomy and gastric pull-through. Degenerative changes are present in the thoracic spine without evidence of hypermetabolic osseous metastatic disease. Abdomen/pelvis: Physiologic activity is present in the liver, spleen, urinary collecting system and gastrointestinal tract. There are calcified granulomas in the spleen. Atherosclerotic calcifications are present in the abdominal aorta without evidence    of aneurysm. The prostate gland is enlarged and contains calcifications. There are phleboliths in the pelvis. There is no FDG avid mesenteric, retroperitoneal, pelvic or inguinal lymphadenopathy.  Degenerative changes are present in the lumbar spine and    pelvis without evidence of hypermetabolic osseous metastatic disease.           Impression       No FDG avid malignancy.       Final report electronically signed by Dr. aRo Cummings on 12/8/2022 2:40 PM     MRI Brain [facility]  11/22/22  Shows multiple heterogeneous supra and infratentorial masses very suspicious for metastatic disease.  The larger lesions, such as in the right cerebellum are associated with significant vasogenic edema.  They suspected metastasis in the left frontoparietal junction may be slightly hemorrhagic.  Partially obstructed fourth ventricle with early obstructive hydrocephalus.    -PET/CT 3/30/2022  Intense hypermetabolic activity within the mid esophagus consistent with a primary malignancy.  Adjacent hypermetabolic left periesophageal lymph node worrisome for metastatic adenopathy.    PROCEDURES:  EGD 2/14/2022  Ulcerated mass distal esophagus    EUS 3/23/2022  Partially obstructing malignant esophageal tumor found in the lower third of the esophagus.  Large hiatal hernia.  Normal stomach and duodenum.  Liver most consistent with fatty liver.  2 malignant appearing lymph nodes visualized and measured in the lower paraesophageal mediastinum level 8L adjacent to the mass.  2 malignant appearing lymph nodes visualized and measured in the subcarinal mediastinum level 7.    PATHOLOGY:   EGD distal esophageal biopsy path report  Pathologic Diagnosis     Outside Slides  [id number] (02/15/22)  A. Esophagus, distal, biopsy:   Invasive adenocarcinoma, moderately differentiated. See comment   Alcian Blue/PAS performed at outside institution and submitted for review highlights Prescott's mucosa in the background      HER2/robina Gene Status: Amplified.  ___________________________________________________________    8/30/2022 [facility] esophagectomy pathology-pending    Nov 2022- Craniotomy [facility]- Brain mets- REQUESTED    GENETICS:  HER2 amplified. On original esoph bx.    MOLECULAR:  Requested by phone conversation on 11/21/22 that Dr Goddard order Foundation1 and PDFL-1 on the  recent Brain bx. NEED REPORT  1/18/23 placed order for foundation 1 and PD-L1 to be done on the November brain biopsy at [facility] that showed metastatic adenocarcinoma. ASSESSMENT/PLAN:    1: Diagnosis: 79-year-old gentleman with adenocarcinoma of the distal third of the esophagus. Clinical stage T2N2 by EUS and PET scan confirmed. No distant mets. No significant dysphagia. Presented with chest discomfort. Chronic history of GERD. Performance status is excellent. Seen by Dr. Jocy Fajardo thoracic surgery at [facility] and status post neoadjuvant chemoradiation followed by recent esophagectomy on 8/30/2022. Essentially a near Path CR. On 10/31/2022 found to have extensive Brain mets. PET 12/8/22- No avid sites     2) Prognosis / Disease Status: High risk node positive disease T2N2 clinical stage, locally advanced. HER2 amplified which now has bearing with developing  metastatic disease. 3) Work-up:    Labs: CBC, CMP. Improved, see above   imaging:  PET/CT 12/8/22- Negative. 1/19/2023 brain MRI because of persistent nausea and vomiting-improved mets. Pretx Echocardiogram - see above   Procedures: 8/30/2022 esophagectomy. See above. Consults:   Other: Due to persistent nausea and vomiting despite Compazine and Zofran resume Decadron dose is 2 mg twice daily effective 1/18/2023    4) Symptom Management: Potassium once daily. If unsuccessful in palliating his nausea and vomiting we will add Zyprexa 5 mg daily. 5) Supportive care provided. Level of care is appropriate. See above. 6) Treatment goal: Cure      Treatment plan:     Neoadjuvant chemoradiation: Low-dose carboplatin and Taxol weekly x 6-7 with radiation. Radiation will be completed 6/20 8/30/2022 esophagectomy at [facility] per Dr. Jocy Fajardo . WB XRT per Dr Ivanna Ferraro. Day 1  11/22/20    Restage w PET and await NGS etc and plan first line met systemic tx. Previously HER2 amplified thus genet up a Herceptin based regimen .  See above.    -Modified Folfox q 2 weeks + Herceptin 6 mk/kg LD on day 1 then, 4mg/kg iv q 2 weeks. Start- 12/13/22. 1/18/23 C 3 due. Hold because of nausea vomiting hypokalemia and neutropenia. Seen with course 31/25/23 modified without the 5-FU bolus and leucovorin and oxaliplatin decreased 20%. Since No metastatic disease found on 12/8/22 PT. Plan to drop folfox after 2-3 months and \" maintain \" with herceptin. Sooner if continues to tolerate poorly. 7) Medications reviewed. Prescriptions today: None. Patient already has Compazine and Zofran at home            No orders of the defined types were placed in this encounter. OARRS:  Controlled Substance Monitoring:    Acute and Chronic Pain Monitoring:   No flowsheet data found. 8) Research Options:   Not applicable      9) Other:        Case discussed by me with Dr Heri Sumner on 11/21/22. I d/w Dr No Ramirez as well. Ask Kaleb ATKINS to check on results of the foundation 1 requested by Dr. Eddie Hu in November. Not done. Therefore on 1/18 requested foundation 1 be done on the prior brain biopsy at [facility].    10) Follow Up: Follow-up 2 week with me to review upcoming CT chest abdomen pelvis with contrast and proceed with ongoing treatment possibly changing to maintenance Herceptin and    Dakota Valverde MD     Patient was hospitalized at [facility] from October 31 and discharged on November 6. Admitted by Dr. Kenzie Evans thoracic surgery who did his esophagectomy in August.  Admitted with intractable nausea and vomiting. He was found to have multifocal CNS mets with obstructive hydrocephalus. On October 31 he underwent a right craniotomy and had at least 3 foci of tumor removed including a cerebellar lesion. Discharged on Decadron 4 mg twice daily with instructions for taper. Patient has multiple other lesions after his craniotomy the plan is for XRT  for these lesions.   Patient seen by Dr. Femi Henry radiation oncology  Pt wants tx close to me and will see Dr Mannie weaver me on 11/22/22. WB XRT asap. .    Seen by me 10/13 having undergone his esophagectomy August 30 at [facility] per Dr. Shirley Gastelum. At that time he was doing quite well and had still been on a liquid diet but denied any nausea or vomiting at that time. Interestingly at the time of his esophagectomy in August 30 he did have a pathologic CR. He was HER2 amplified. This now of significance with his metastatic disease to brain. I did review the case with Dr. Charles Huber who felt that that he did not need adjuvant therapy at that point. Clearly now things have changed and he is a candidate for systemic therapy certainly if restaging shows disease elsewhere. I would get a PET/CT to look for disease outside the brain. And I would request foundation 1 next generation sequencing please be done on the brain tissue biopsy at [facility]. Dr Risa Lindquist said he would order 11/21/22 at [facility] on the recent brain tissue.

## 2023-02-02 NOTE — PROGRESS NOTES
Patient port de accessed in CT room flushed with 20 mL normal saline and then 5 mL of Heparin given, patient tolerated well and needle was removed and covered site with a 2 x 2 gauze and a transparent dressing. No other needs.

## 2023-02-07 NOTE — PROGRESS NOTES
[address] [place] [address]  Dept: [phone]  Loc: [phone]   Hematology/Oncology Consult (Clinic)        2/8/2023    Leonila Yañez   1960     No ref. 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 Mass extends from 34 to 28 cm approximately 6 cm. Staging by EUS 3/23/2022 [facility] (T2 based on invasion and N2 based on 2 malignant appearing lymph nodes visualized and measured in the lower paraesophageal mediastinum level 8L adjacent to the mass. 2 malignant appearing lymph nodes visualized and measured in the subcarinal mediastinum level 7. PET/CT 3/30/2022 [facility]- hypermetabolic activity at mid esophagus consistent with primary malignancy with adjacent hypermetabolic left periesophageal lymph nodes. Brain Mets; extensive and bulky. 10/31/22. On Decadron 2 mg bid 11/22/22    (11/21/22) F1 and PDL-1 requested for me  by Dr Jose Bee rad Onc at [facility]; Not done then. Completed on Brain path :Foundation 1 reveals tumor mutation burden 17 for which Keytruda or nivolumab would be indicated. PAD-L1 CPS 2 - 3% i.e. positive      TREATMENT:  -Seen by Dr. Rosalia Amaya of thoracic surgery at [facility] 3/30/2022. Recommends neoadjuvant chemoradiation to be followed by surgery  -Neoadjuvant chemoradiation with Dr. Ravindra Wilhelm. Low-dose carboplatinum Taxol weekly x 6-7. Chemo  Start date: 5/11. Day 1 XRT 5/11 6/16/2022 week #6 due/last treatment. Last XRT 6/20/22.  -Robotic assisted laparoscopic and right thoracoscopic [place] [facility] esophagectomy ;   additional gastric margin, gastropathic lymph nodes and hernia sac per Dr. Rosalia Amaya 8/30/2022  No evidence of peritoneal metastatic disease.   (Path report requested and pending)  - Craniotomy [facility] debulked largest cerebellar met  - WB XRT soon per Dr Marie Schreiber ( Dr Tellis Schilder)  - 1st Line Metastatic regimen: pending WB xrt completion and NGS results from [address] brain Bx. Day 1 whole brain XRT- 11/22/22.  -Herceptin FOLFOX (started 12/13) post radiation to the brain completed   C3 due 1/18/23-hold due to [address] . 6, potassium of 2.4 and ongoing nausea and vomiting. Delayed and administered  1/25/23; modified by decreasing oxaliplatin 20% and dropping 5-FU bolus and leucovorin. Effective 2/8/2023 change to single agent Herceptin 6 mg/kg every 3 weeks maintenance soon  -Foundation 1 reveals tumor mutation burden 17 for which Keytruda or nivolumab would be indicated. PAD-L1 CPS 2 - 3% i.e. positive    PARAMETERS:  -EGD/EUS  -PET/CT 12/8/22- No FDG avid  malignancy. - Brain MRI 1/19/23-improved  -CT chest abdomen pelvis with contrast 2/2/2023-CECILIO    COMORBIDITIES:  Include 30-pack-year history quit in 2013, alcohol none, GERD, hypertension, hyperlipidemia    SUBJECTIVE: Here today for his 4th course of Herceptin with modified FOLFOX. Poorly tolerated. Reports less but low-grade nausea and vomiting using Compazine and Zofran. He is taking potassium once daily. No more falling episodes since he was here 2 weeks ago. Reviewed that his recent CAT scan chest abdomen pelvis on February 2 shows no evidence of active disease or recurrence. Plan to change therapy to Herceptin alone starting today every 3 weeks. No significant injuries. No longer on Decadron. Denies any headaches. Overall feels stronger over the last week with the break from chemotherapy last week. He would like to proceed with treatment today. HPI: Chapis Roberts is a 61-year-old gentleman with a long history of GERD who presented to the ER on February 10 was admitted for 1 day because of chest heaviness. No cardiology because GI was consulted ultimately showed a mass in the distal esophagus. Outpatient EGD requested.   Diagnosed with adenocarcinoma the distal esophagus and referred to  Lori Avendaño of thoracic surgery at [facility].  EUS shows regional tone involvement. See scans below and ultrasound. No distant mets. Patient has no significant dysphagia and no weight loss. Patient referred back locally for chemoradiation neoadjuvant treatment before surgery. ROS:  Review of Systems 14 point negative except as above.     PMH:   Past Medical History:   Diagnosis Date    Atrial fibrillation ([facility] [address]     Benign hypertension 11/3/2022    Cancer of distal third of esophagus ([facility] [address] 2022    GERD (gastroesophageal reflux disease)     HLD (hyperlipidemia) 8/15/2022    Sleep apnea         Social HX:   Social History     Socioeconomic History    Marital status:      Spouse name: Dejon Lynch    Number of children: 5    Years of education: 12    Highest education level: High school graduate   Occupational History    Occupation: VENDOR     Comment: [address]   Tobacco Use    Smoking status: Former     Packs/day: 3.00     Years: 10.00     Pack years: 30.00     Types: Cigarettes     Quit date: 2013     Years since quittin.7    Smokeless tobacco: Never    Tobacco comments:     does not smoke, quit    Vaping Use    Vaping Use: Never used   Substance and Sexual Activity    Alcohol use: Never    Drug use: Never    Sexual activity: Yes     Partners: Female   Other Topics Concern    Not on file   Social History Narrative    Not on file     Social Determinants of Health     Financial Resource Strain: Low Risk     Difficulty of Paying Living Expenses: Not hard at all   Food Insecurity: Food Insecurity Present    Worried About [address] in the Last Year: Often true    [address] in the Last Year: Sometimes true   Transportation Needs: Not on file   Physical Activity: Not on file   Stress: Not on file   Social Connections: Not on file   Intimate Partner Violence: Not on file   Housing Stability: Not on file        SpouseAbner Jose Armando  [phone]    Phone: [phone]     [address]  [facility] [address]     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 [employer]    Immunizations:  Immunization History   Administered Date(s) Administered    Influenza Virus Vaccine 09/15/2015        Health Screenings:    C-Scope:  5 years ago  Prostate:   Lab Results   Component Value Date    PSA 1.41 03/22/2017            Health Maintenance Due   Topic Date Due    COVID-19 Vaccine (1) Never done    Pneumococcal 0-64 years Vaccine (1 - PCV) Never done    DTaP/Tdap/Td vaccine (1 - Tdap) Never done    Shingles vaccine (1 of 2) Never done    Low dose CT lung screening  Never done    Lipids  03/22/2022    Flu vaccine (1) 08/01/2022    Depression Screen  02/17/2023        Interests:   Cars. music family,    Fam HX:   Family History   Problem Relation Age of Onset    Colon Cancer Mother     Heart Disease Father     Cancer Brother         lung        Hospitalizations:   2/10/22    Allergies: Allergies   Allergen Reactions    Paclitaxel Other (See Comments)     Severe lower back pain- able to resume after medications given    Aleve [Naproxen] Hives    Promethazine Rash        Adult Illness:  Patient Active Problem List   Diagnosis    Abdominal pain    Cancer of distal third of esophagus (HCC)    Acute postoperative pain    Chronic anemia    Esophageal adenocarcinoma (HCC)    GERD (gastroesophageal reflux disease)    HLD (hyperlipidemia)    Obesity (BMI 30.0-34. 9)    Postoperative atrial fibrillation (HCC)    Thrombocytopenia (HCC)    Brain lesion    Delayed gastric emptying    Serum creatinine raised    Severe protein-calorie malnutrition ([address]    Benign hypertension    Encounter for insertion of venous access port        Surgery:  Past Surgical History:   Procedure Laterality Date    ABDOMEN SURGERY  08/16/2022    GASTRIC DEVASCULARIZATON-[facility]    ABDOMEN SURGERY  08/30/2022    ESOPHAGOGASTRODUODENOSCOPY TRANSORAL DIAGNOSTIC ESOPHAGECTOMY W/ THORACOTOMY-[facility]    DENTAL SURGERY      25 teeth removed    PORT SURGERY Left 2022    LEFT SINGLE LUMEN MEDIPORT performed by Nena Little MD at [facility]        Medications:  Current Outpatient Medications   Medication Sig Dispense Refill    escitalopram (LEXAPRO) 10 MG tablet TAKE 1 TABLET BY MOUTH EVERY DAY 90 tablet 3    potassium bicarbonate (EFFER-K) 25 MEQ disintegrating tablet Take 1 tablet by mouth daily 30 tablet 1    Magic Mouthwash (MIRACLE MOUTHWASH) Swish and spit 5 mLs 4 times daily as needed for Irritation 1:1:1:1 nystatin, maalox, viscous lidocaine, benadryl (Patient not taking: No sig reported) 200 mL 2    ondansetron (ZOFRAN-ODT) 8 MG TBDP disintegrating tablet Place 1 tablet under the tongue every 8 hours as needed for Nausea or Vomiting (Patient not taking: Reported on 2023) 90 tablet 1    prochlorperazine (COMPAZINE) 10 MG tablet Take 1 tablet by mouth every 6 hours as needed (nausea) 120 tablet 1    lidocaine-prilocaine (EMLA) 2.5-2.5 % cream Apply topically as needed. 1 each 3    Blood Pressure Monitoring (BP MONITOR-STETHOSCOPE) KIT Dx: HTN (Patient not taking: No sig reported) 1 kit 0    Misc. Devices (PULSE OXIMETER FOR FINGER) MISC Dx:  hypoxemia 1 each 0    ibuprofen (ADVIL;MOTRIN) 400 MG tablet Take 400 mg by mouth every 6 hours as needed for Pain (Patient not taking: No sig reported)      pantoprazole (PROTONIX) 40 MG tablet Take 1 tablet by mouth in the morning and at bedtime 90 tablet 3    famotidine (PEPCID) 20 MG tablet Take 20 mg by mouth 2 times daily      NONFORMULARY Hema-Plex (Iron Multi-vitamin)       No current facility-administered medications for this visit. Over-the-counter iron supplement 3 times a day      EXAM:   vitals were not taken for this visit. Estimated body surface area is 1.74 meters squared as calculated from the following:    Height as of 23: 5' 7\" (1.702 m). Weight as of 23: 142 lb (64.4 kg). ECO  General: Appears mildly chronically ill. Not acutely ill. Good spirits.   Wife present. HEENT: NC/AT,nonicteric, perrla,eom intact, mucosa is moist, no thrush. Neck: no masses  Nodes: No adenopathy  Lungs/chest: clear, no rales,rhonchi or wheezing, lung bases clear  CV: rrr, no rubs ,gallops or murmurs  Breasts: Not examined  Abd/Rectal: Soft, nontender no organomegaly. No distention. Back: normal curvature, No midline tenderness. : Not Examined  Extremities: no cyanosis,clubbing or edema. Skin: unremarkable  Neuro: A and O x 4, CN exam nonfocal, Motor- no deficits, Sensory- no deficits, gait-nl, speech- fluent, no ataxia. Devices: Port site unremarkable.       DATA:    LAB:     CBC with Differential:      Lab Results   Component Value Date/Time    WBC 3.6 01/25/2023 07:45 AM    RBC 3.39 01/25/2023 07:45 AM    HGB 11.0 01/25/2023 07:45 AM    HCT 33.8 01/25/2023 07:45 AM     01/25/2023 07:45 AM    MCV 99.7 01/25/2023 07:45 AM    MCH 32.4 01/25/2023 07:45 AM    MCHC 32.5 01/25/2023 07:45 AM    RDW 19.2 01/18/2023 08:16 AM    NRBC 2 01/25/2023 07:45 AM    SEGSPCT 49.0 01/25/2023 07:45 AM    METASPCT 4 01/25/2023 07:45 AM    PROMYELOPCT 1 01/25/2023 07:45 AM    MONOPCT 25.0 01/25/2023 07:45 AM    MYELOPCT 4 01/25/2023 07:45 AM    MONOSABS 0.9 01/25/2023 07:45 AM    LYMPHSABS 0.6 01/25/2023 07:45 AM    EOSABS 0.0 01/25/2023 07:45 AM    BASOSABS 0.0 01/25/2023 07:45 AM    DIFFTYPE see below 01/25/2023 07:45 AM     Lab Results   Component Value Date/Time    SEGSABS 1.8 01/25/2023 07:45 AM       CMP:    Lab Results   Component Value Date/Time     01/25/2023 07:45 AM     01/10/2023 02:28 PM    K 3.5 01/25/2023 07:45 AM    K 3.7 01/10/2023 02:28 PM     01/10/2023 02:28 PM    CO2 20 01/10/2023 02:28 PM    BUN 12 01/10/2023 02:28 PM    CREATININE 0.6 01/25/2023 07:45 AM    CREATININE 0.6 01/10/2023 02:28 PM    LABGLOM >60 01/25/2023 07:45 AM    GLUCOSE 73 01/10/2023 02:28 PM    PROT 6.1 01/25/2023 07:45 AM    LABALBU 3.8 01/25/2023 07:45 AM    CALCIUM 8.9 01/10/2023 02:28 PM    BILITOT 0.6 01/25/2023 07:45 AM    ALKPHOS 80 01/25/2023 07:45 AM    AST 22 01/25/2023 07:45 AM    ALT 19 01/25/2023 07:45 AM       BMP:    Lab Results   Component Value Date/Time     01/25/2023 07:45 AM     01/10/2023 02:28 PM    K 3.5 01/25/2023 07:45 AM    K 3.7 01/10/2023 02:28 PM     01/10/2023 02:28 PM    CO2 20 01/10/2023 02:28 PM    BUN 12 01/10/2023 02:28 PM    LABALBU 3.8 01/25/2023 07:45 AM    CREATININE 0.6 01/25/2023 07:45 AM    CREATININE 0.6 01/10/2023 02:28 PM    CALCIUM 8.9 01/10/2023 02:28 PM    LABGLOM >60 01/25/2023 07:45 AM    GLUCOSE 73 01/10/2023 02:28 PM       Magnesium:    Lab Results   Component Value Date/Time    MG 1.9 01/10/2023 02:28 PM     PT/INR:  No results found for: PROTIME, INR  TSH:    Lab Results   Component Value Date/Time    TSH 3.070 01/10/2023 02:28 PM     VITAMIN B12: No components found for: B12  FOLATE:    Lab Results   Component Value Date/Time    FOLATE 15.6 02/10/2022 12:56 PM     IRON:    Lab Results   Component Value Date/Time    IRON 58 04/27/2022 01:48 PM     Iron Saturation:  No components found for: PERCENTFE  TIBC:    Lab Results   Component Value Date/Time    TIBC 341 04/27/2022 01:48 PM     FERRITIN:    Lab Results   Component Value Date/Time    FERRITIN 28 04/27/2022 01:47 PM     PSA:   Lab Results   Component Value Date/Time    PSA 1.41 03/22/2017 07:45 AM      1/25/2023 white count 3.43 neutrophils 1.74 with 21% monocytes, hemoglobin 10.9 and platelets 034. Potassium today is 3.5 and creatinine is 0.6        IMAGING:  CT chest abdomen pelvis with contrast 2/2/2023  No metastatic disease. Includes no pulmonary masses or nodules. Bladder thickening may be artifactual related to underdistention and enlarged prostate. Brain MRI 1/19/23  Impression       1. There has been interval resection of the right cerebellar metastatic lesion. 2. There has been significant interval improvement since previous study dated 11/3/2022. Diminution in metastases in the right temporal lobe, right occipital lobe, left cerebellar hemisphere. Significant reduction in the surrounding edema. 3. There has been resolution of multiple metastases in the frontal and temporal lobes. 4. Probable ischemic changes in the white matter. 5. No acute infarct. 6. Mild inflammatory changes in ethmoid air cells bilaterally. Alejandro Lang PRE Herceptin ECHO   12/8/22   Summary   Normal left ventricle size and Low Normal LV systolic function. Ejection   fraction was estimated at 50 %. There were no regional left ventricular   wall motion abnormalities and wall thickness was within normal limits. Mildly reduced LV Longitudinal strain with Avg GLS -14.6%      Signature      ----------------------------------------------------------------   Electronically signed by Hallie Galdamez MD (Interpreting   physician) on 12/08/2022 at 07:09 PM   ----------------------------------------------------------------    Head CT wo 1/10/23  Impression       1. Postsurgical changes in the right cerebellum. 2. There are 3 areas of high attenuation consistent with hemorrhage. These are associated with areas of previously visualized metastases. The largest is in the right temporal lobe and measures 1.2 cm. There is no associated significant mass effect. 3. Vague areas of low attenuation bilaterally presumably due to small metastases. No dominant lesions are identified. PET  12/8/22  COMPARISON: PET/CT 3/29/2022 (outside study). FINDINGS:        Neck: No FDG avid cervical lymphadenopathy. Craniotomy changes are partially visualized. Chest: Cardiac size is normal. There is no pleural or pericardial effusion. There is a calcified granuloma in the right lung. No FDG avid pulmonary nodules are identified. There is no hypermetabolic mediastinal, hilar or axillary lymphadenopathy. There    are surgical changes of prior esophagectomy and gastric pull-through.  Degenerative changes are present in the thoracic spine without evidence of hypermetabolic osseous metastatic disease. Abdomen/pelvis: Physiologic activity is present in the liver, spleen, urinary collecting system and gastrointestinal tract. There are calcified granulomas in the spleen. Atherosclerotic calcifications are present in the abdominal aorta without evidence    of aneurysm. The prostate gland is enlarged and contains calcifications. There are phleboliths in the pelvis. There is no FDG avid mesenteric, retroperitoneal, pelvic or inguinal lymphadenopathy. Degenerative changes are present in the lumbar spine and    pelvis without evidence of hypermetabolic osseous metastatic disease. Impression       No FDG avid malignancy. Final report electronically signed by Dr. Sander Dubose on 12/8/2022 2:40 PM     MRI Brain [facility]  11/22/22  Shows multiple heterogeneous supra and infratentorial masses very suspicious for metastatic disease. The larger lesions, such as in the right cerebellum are associated with significant vasogenic edema. They suspected metastasis in the left frontoparietal junction may be slightly hemorrhagic. Partially obstructed fourth ventricle with early obstructive hydrocephalus. -PET/CT 3/30/2022  Intense hypermetabolic activity within the mid esophagus consistent with a primary malignancy. Adjacent hypermetabolic left periesophageal lymph node worrisome for metastatic adenopathy. PROCEDURES:  EGD 2/14/2022  Ulcerated mass distal esophagus    EUS 3/23/2022  Partially obstructing malignant esophageal tumor found in the lower third of the esophagus. Large hiatal hernia. Normal stomach and duodenum. Liver most consistent with fatty liver. 2 malignant appearing lymph nodes visualized and measured in the lower paraesophageal mediastinum level 8L adjacent to the mass. 2 malignant appearing lymph nodes visualized and measured in the subcarinal mediastinum level 7.     PATHOLOGY: EGD distal esophageal biopsy path report  Pathologic Diagnosis     Outside Slides  [id number] (02/15/22)  A. Esophagus, distal, biopsy:   Invasive adenocarcinoma, moderately differentiated. See comment   Alcian Blue/PAS performed at outside institution and submitted for review highlights Prescott's mucosa in the background      HER2/robina Gene Status: Amplified. ___________________________________________________________    8/30/2022 [facility] esophagectomy pathology-pending    Nov 2022- Craniotomy [facility]- Brain mets- REQUESTED    GENETICS:  HER2 amplified. On original esoph bx. MOLECULAR:  Requested by phone conversation on 11/21/22 that Dr Remedios Wheeler order Cris Grout and PDFL-1 on the recent Brain bx. NEED REPORT  1/18/23 placed order for foundation 1 and PD-L1 to be done on the November brain biopsy at [facility] that showed metastatic adenocarcinoma. PD-L1 CPS 2 - 3%. Positive  Tumor mutation burden 17, MECHELLE, no specific targetable mutations. ASSESSMENT/PLAN:    1: Diagnosis: 71-year-old gentleman with adenocarcinoma of the distal third of the esophagus. Clinical stage T2N2 by EUS and PET scan confirmed. No distant mets. No significant dysphagia. Presented with chest discomfort. Chronic history of GERD. Performance status is excellent. Seen by Dr. Agustin Carcamo thoracic surgery at [facility] and status post neoadjuvant chemoradiation followed by recent esophagectomy on 8/30/2022. Essentially a near Path CR. On 10/31/2022 found to have extensive Brain mets. PET 12/8/22- No avid sites     2) Prognosis / Disease Status: High risk node positive disease T2N2 clinical stage, locally advanced. HER2 amplified which now has bearing with developing  metastatic disease. 3) Work-up:    Labs: CBC, CMP. Improved, see above   imaging:  PET/CT 12/8/22- Negative. 1/19/2023 brain MRI because of persistent nausea and vomiting-improved mets. 2/2/2023 CT chest abdomen pelvis with contrast-no metastatic disease.   Pretx Echocardiogram - see above   Procedures: 8/30/2022 esophagectomy. See above. Consults:   Other: Due to persistent nausea and vomiting despite Compazine and Zofran resume Decadron dose is 2 mg twice daily effective 1/18/2023    4) Symptom Management: Potassium once daily. If unsuccessful in palliating his nausea and vomiting we will add Zyprexa 5 mg daily. 5) Supportive care provided. Level of care is appropriate. See above. 6) Treatment goal: Cure      Treatment plan:     Neoadjuvant chemoradiation: Low-dose carboplatin and Taxol weekly x 6-7 with radiation. Radiation will be completed 6/20 8/30/2022 esophagectomy at [facility] per Dr. Macho Murphy . WB XRT per Dr Ramirez Christine. Day 1  11/22/20    Restage w PET and await NGS etc and plan first line met systemic tx. Previously HER2 amplified thus genet up a Herceptin based regimen . See above. -Modified Folfox q 2 weeks + Herceptin 6 mk/kg LD on day 1 then, 4mg/kg iv q 2 weeks. Start- 12/13/22. 1/18/23 C 3 due. Hold because of nausea vomiting hypokalemia and neutropenia. Seen with course 31/25/23 modified without the 5-FU bolus and leucovorin and oxaliplatin decreased 20%. Since No metastatic disease found on 12/8/22 PT. Plan to drop folfox effective today 2/8/2023 and \" maintain \" with herceptin. Herceptin will be every 3 weeks at 6 mg/kg. 7) Medications reviewed. Prescriptions today: None. Patient already has Compazine and Zofran at home            No orders of the defined types were placed in this encounter. OARRS:  Controlled Substance Monitoring:    Acute and Chronic Pain Monitoring:   No flowsheet data found. 8) Research Options:   Not applicable      9) Other:            10) Follow Up: Follow-up 3 years with me and second maintenance Herceptin treatment. Observe nivolumab or Keytruda for systemic relapse.        Laurent Mccall MD

## 2023-02-08 ENCOUNTER — OFFICE VISIT (OUTPATIENT)
Dept: ONCOLOGY | Age: 63
End: 2023-02-08
Payer: MEDICAID

## 2023-02-08 ENCOUNTER — HOSPITAL ENCOUNTER (OUTPATIENT)
Dept: INFUSION THERAPY | Age: 63
Discharge: HOME | End: 2023-02-08
Payer: MEDICAID

## 2023-02-08 VITALS
RESPIRATION RATE: 18 BRPM | TEMPERATURE: 97.5 F | OXYGEN SATURATION: 98 % | HEIGHT: 67 IN | BODY MASS INDEX: 22.6 KG/M2 | DIASTOLIC BLOOD PRESSURE: 66 MMHG | HEART RATE: 60 BPM | SYSTOLIC BLOOD PRESSURE: 120 MMHG | WEIGHT: 144 LBS

## 2023-02-08 VITALS
HEART RATE: 83 BPM | SYSTOLIC BLOOD PRESSURE: 127 MMHG | BODY MASS INDEX: 22.6 KG/M2 | HEIGHT: 67 IN | DIASTOLIC BLOOD PRESSURE: 79 MMHG | RESPIRATION RATE: 18 BRPM | WEIGHT: 144 LBS | TEMPERATURE: 98.4 F | OXYGEN SATURATION: 99 %

## 2023-02-08 DIAGNOSIS — C15.9 METASTASIS FROM ESOPHAGEAL CANCER (HCC): Primary | ICD-10-CM

## 2023-02-08 DIAGNOSIS — C15.5 CANCER OF DISTAL THIRD OF ESOPHAGUS (HCC): Primary | ICD-10-CM

## 2023-02-08 DIAGNOSIS — C79.9 METASTASIS FROM ESOPHAGEAL CANCER (HCC): Primary | ICD-10-CM

## 2023-02-08 LAB
ABSOLUTE IMMATURE GRANULOCYTE: 0.02 THOU/MM3 (ref 0–0.07)
ALBUMIN SERPL BCG-MCNC: 3.8 G/DL (ref 3.5–5.1)
ALP SERPL-CCNC: 65 U/L (ref 38–126)
ALT SERPL W/O P-5'-P-CCNC: 16 U/L (ref 11–66)
AST SERPL-CCNC: 16 U/L (ref 5–40)
BASOPHILS ABSOLUTE: 0 THOU/MM3 (ref 0–0.1)
BASOPHILS NFR BLD AUTO: 0 % (ref 0–3)
BILIRUB CONJ SERPL-MCNC: < 0.2 MG/DL (ref 0–0.3)
BILIRUB SERPL-MCNC: 0.6 MG/DL (ref 0.3–1.2)
BUN BLDP-MCNC: 11 MG/DL (ref 8–26)
CHLORIDE BLD-SCNC: 105 MEQ/L (ref 98–109)
CREAT BLD-MCNC: 0.7 MG/DL (ref 0.5–1.2)
EOSINOPHIL NFR BLD AUTO: 0 % (ref 0–4)
EOSINOPHILS ABSOLUTE: 0 THOU/MM3 (ref 0–0.4)
ERYTHROCYTE [DISTWIDTH] IN BLOOD BY AUTOMATED COUNT: 20.5 % (ref 11.5–14.5)
GFR SERPL CREATININE-BSD FRML MDRD: > 60 ML/MIN/1.73M2
GLUCOSE BLD-MCNC: 154 MG/DL (ref 70–108)
HCT VFR BLD AUTO: 32.6 % (ref 42–52)
HGB BLD-MCNC: 11 GM/DL (ref 14–18)
IMMATURE GRANULOCYTES: 0 %
IONIZED CALCIUM, WHOLE BLOOD: 1.22 MMOL/L (ref 1.12–1.32)
LYMPHOCYTES ABSOLUTE: 0.6 THOU/MM3 (ref 1–4.8)
LYMPHOCYTES NFR BLD AUTO: 12 % (ref 15–47)
MCH RBC QN AUTO: 34 PG (ref 26–33)
MCHC RBC AUTO-ENTMCNC: 33.7 GM/DL (ref 32.2–35.5)
MCV RBC AUTO: 101 FL (ref 80–94)
MONOCYTES ABSOLUTE: 0.2 THOU/MM3 (ref 0.4–1.3)
MONOCYTES NFR BLD AUTO: 4 % (ref 0–12)
NEUTROPHILS NFR BLD AUTO: 83 % (ref 43–75)
PLATELET # BLD AUTO: 98 THOU/MM3 (ref 130–400)
PMV BLD AUTO: 10.2 FL (ref 9.4–12.4)
POTASSIUM BLD-SCNC: 3 MEQ/L (ref 3.5–4.9)
PROT SERPL-MCNC: 5.8 G/DL (ref 6.1–8)
RBC # BLD AUTO: 3.24 MILL/MM3 (ref 4.7–6.1)
SEGMENTED NEUTROPHILS ABSOLUTE COUNT: 4.2 THOU/MM3 (ref 1.8–7.7)
SODIUM BLD-SCNC: 136 MEQ/L (ref 138–146)
TOTAL CO2, WHOLE BLOOD: 17 MEQ/L (ref 23–33)
WBC # BLD AUTO: 5 THOU/MM3 (ref 4.8–10.8)

## 2023-02-08 PROCEDURE — 96413 CHEMO IV INFUSION 1 HR: CPT

## 2023-02-08 PROCEDURE — 6360000002 HC RX W HCPCS: Performed by: INTERNAL MEDICINE

## 2023-02-08 PROCEDURE — 3078F DIAST BP <80 MM HG: CPT | Performed by: INTERNAL MEDICINE

## 2023-02-08 PROCEDURE — 2580000003 HC RX 258: Performed by: INTERNAL MEDICINE

## 2023-02-08 PROCEDURE — 80076 HEPATIC FUNCTION PANEL: CPT

## 2023-02-08 PROCEDURE — 36591 DRAW BLOOD OFF VENOUS DEVICE: CPT

## 2023-02-08 PROCEDURE — 3074F SYST BP LT 130 MM HG: CPT | Performed by: INTERNAL MEDICINE

## 2023-02-08 PROCEDURE — 99214 OFFICE O/P EST MOD 30 MIN: CPT | Performed by: INTERNAL MEDICINE

## 2023-02-08 PROCEDURE — 85025 COMPLETE CBC W/AUTO DIFF WBC: CPT

## 2023-02-08 PROCEDURE — 80047 BASIC METABLC PNL IONIZED CA: CPT

## 2023-02-08 PROCEDURE — 99211 OFF/OP EST MAY X REQ PHY/QHP: CPT

## 2023-02-08 RX ORDER — HEPARIN SODIUM (PORCINE) LOCK FLUSH IV SOLN 100 UNIT/ML 100 UNIT/ML
500 SOLUTION INTRAVENOUS PRN
Status: DISCONTINUED | OUTPATIENT
Start: 2023-02-08 | End: 2023-02-09 | Stop reason: HOSPADM

## 2023-02-08 RX ORDER — SODIUM CHLORIDE 9 MG/ML
INJECTION, SOLUTION INTRAVENOUS PRN
Status: DISCONTINUED | OUTPATIENT
Start: 2023-02-08 | End: 2023-02-09 | Stop reason: HOSPADM

## 2023-02-08 RX ORDER — PANTOPRAZOLE SODIUM 40 MG/1
40 TABLET, DELAYED RELEASE ORAL 2 TIMES DAILY
Qty: 90 TABLET | Refills: 3 | Status: SHIPPED | OUTPATIENT
Start: 2023-02-08

## 2023-02-08 RX ORDER — HEPARIN SODIUM (PORCINE) LOCK FLUSH IV SOLN 100 UNIT/ML 100 UNIT/ML
500 SOLUTION INTRAVENOUS PRN
OUTPATIENT
Start: 2023-02-08

## 2023-02-08 RX ORDER — SODIUM CHLORIDE 0.9 % (FLUSH) 0.9 %
5-40 SYRINGE (ML) INJECTION PRN
OUTPATIENT
Start: 2023-02-08

## 2023-02-08 RX ORDER — WATER 1000 ML/1000ML
2.2 INJECTION, SOLUTION INTRAVENOUS ONCE
OUTPATIENT
Start: 2023-02-08 | End: 2023-02-08

## 2023-02-08 RX ORDER — MEGESTROL ACETATE 40 MG/1
1 TABLET ORAL DAILY
COMMUNITY
Start: 2023-01-17

## 2023-02-08 RX ORDER — SODIUM CHLORIDE 0.9 % (FLUSH) 0.9 %
5-40 SYRINGE (ML) INJECTION PRN
Status: DISCONTINUED | OUTPATIENT
Start: 2023-02-08 | End: 2023-02-09 | Stop reason: HOSPADM

## 2023-02-08 RX ORDER — SODIUM CHLORIDE 9 MG/ML
25 INJECTION, SOLUTION INTRAVENOUS PRN
OUTPATIENT
Start: 2023-02-08

## 2023-02-08 RX ORDER — DEXAMETHASONE 2 MG/1
1 TABLET ORAL
COMMUNITY
Start: 2022-12-08

## 2023-02-08 RX ADMIN — SODIUM CHLORIDE, PRESERVATIVE FREE 20 ML: 5 INJECTION INTRAVENOUS at 09:57

## 2023-02-08 RX ADMIN — SODIUM CHLORIDE, PRESERVATIVE FREE 10 ML: 5 INJECTION INTRAVENOUS at 09:56

## 2023-02-08 RX ADMIN — SODIUM CHLORIDE, PRESERVATIVE FREE 10 ML: 5 INJECTION INTRAVENOUS at 12:22

## 2023-02-08 RX ADMIN — SODIUM CHLORIDE 420 MG: 9 INJECTION, SOLUTION INTRAVENOUS at 11:24

## 2023-02-08 RX ADMIN — Medication 500 UNITS: at 12:22

## 2023-02-08 RX ADMIN — SODIUM CHLORIDE: 9 INJECTION, SOLUTION INTRAVENOUS at 11:09

## 2023-02-08 NOTE — PLAN OF CARE
Problem: Chronic Conditions and Co-morbidities  Goal: Patient's chronic conditions and co-morbidity symptoms are monitored and maintained or improved  Outcome: Adequate for Discharge  Flowsheets (Taken 2/8/2023 1112)  Care Plan - Patient's Chronic Conditions and Co-Morbidity Symptoms are Monitored and Maintained or Improved: Monitor and assess patient's chronic conditions and comorbid symptoms for stability, deterioration, or improvement  Note: Chemotherapy Teaching     What is Chemotherapy   Drug action [x]   Method of Administration [x]   Handouts given []     Side Effects  Nausea/vomiting [x]   Diarrhea [x]   Fatigue [x]   Signs / Symptoms of infection [x]   Neutropenia [x]   Thrombocytopenia [x]   Alopecia [x]   neuropathy [x]   Mather diet &  the importance of fluids [x]       Micellaneous  Importance of nutrition [x]   Importance of oral hygiene [x]   When to call the MD [x]   Monitoring labs [x]   Use of supportive services []     Explanation of Drug Regimen / Frequency  Day 1 cycle 1-Trazimera     Comments  Verbalized understanding to drug,action,side effects and when to call MD         Problem: Infection - Adult  Goal: Absence of infection at discharge  Outcome: Adequate for Discharge  Flowsheets (Taken 2/8/2023 1112)  Absence of infection at discharge: Assess and monitor for signs and symptoms of infection  Note: Mediport site with no redness or warmth. Skin over port site intact with no signs of breakdown noted. Patient verbalizes signs/symptoms of port infection and when to notify the physician.    Goal: Will show no infection signs and symptoms  Description: Will show no infection signs and symptoms  Outcome: Adequate for Discharge  Intervention: EDUCATE PATIENT ABOUT SIGNS AND SYMPTOMS OF INFECTION  Note: Discuss port maintenance,infection prevention, sign of infection and when to call MD.      Problem: Discharge Planning  Goal: Discharge to home or other facility with appropriate resources  Outcome: Adequate for Discharge  Flowsheets (Taken 2/8/2023 1112)  Discharge to home or other facility with appropriate resources: Identify barriers to discharge with patient and caregiver  Note: Verbalize understanding of discharge instructions, follow up appointments, and when to call Physician. Problem: Safety - Adult  Goal: Free from fall injury  Outcome: Adequate for Discharge  Flowsheets (Taken 2/8/2023 1112)  Free From Fall Injury: Instruct family/caregiver on patient safety  Note: Free from falls while in [facility]. Oncology. Care plan reviewed with patient and family. Patient and family verbalize understanding of the plan of care and contribute to goal setting.

## 2023-02-08 NOTE — PROGRESS NOTES
Patient tolerated Trazimera without any complications. Patient kept for 20 minuets observation post trazimera. Denies dizziness, lightheadedness, acute nausea or vomiting, headache, heart palpitations, rash/itching or increased SOB. Last vital signs  /66   Pulse 60   Temp 97.5 °F (36.4 °C) (Oral)   Resp 18   Ht 5' 7\" (1.702 m)   Wt 144 lb (65.3 kg)   SpO2 98%   BMI 22.55 kg/m²     Patient instructed if they experience any of the above symptoms following today's visit, he/she is to notify the Physician or go to the Emergency Dept. Discharge instructions given to patient, Verbalizes understanding. Ambulated off unit per self in stable condition with all belongings.

## 2023-02-08 NOTE — PATIENT INSTRUCTIONS
Modify treatment plan to Herceptin only at 6 mg/kg every 3 weeks starting today  Follow-up 3 weeks with me and ongoing Herceptin.

## 2023-02-08 NOTE — DISCHARGE INSTRUCTIONS
Please contact your Oncologist if you have any questions regarding the [facility] that you received today. You are instructed to call the office or go to the Emergency Dept. If you experience any of the following symptoms:    Dizziness/lightheadedness   Acute nausea or vomiting-not relieved by medications  Headaches-not relieved by medications  New chest pain or pressure  New rash /itching  New shortness of breath  Fever,chills or signs or symptoms of infection    Make sure you are drinking 48 to 64 ounces of water daily-if you are unable to drink fluids let us know right away.

## 2023-02-08 NOTE — ONCOLOGY
Chemotherapy Administration    Pre-assessment Data: Antineoplastic Agents  See toxicity flow sheet for assessment                                          [x]         Interventions:   Chemotherapy SQ injection given []   Taxol administered-VS per protocol []   Blood pressure meds held 12 hours prior to Rituxan/Ruxience []   Rituxan/Ruxience administered- VS and precautions per guidelines []   Emergency drugs available as appropriate [x]   Anaphylaxis assessment completed [x]   Pre-medications administered as ordered [x]   Blood return noted upon initiation of chemotherapy [x]   Blood return noted each 1-2ml of a vesicant medication if given IV push []   Navelbine, Vincristine and Velban given as a monitored wide open drip, blood return noted before during and after infusion.  []   Blood return noted each 2-3ml of a non-vesicant medication if given IV push []   Patient aware of potential Immunotherapy toxicities []   Monitor for signs / symptoms of hypersensitivity reaction [x]   Chemotherapy orders (drug/dose/rate) verified by 2 Chemo certified RNs [x]   Monitor IV site and blood return throughout the infusion of the medication [x]   Document IV site checks on the IV assessment form [x]   Document chemotherapy teaching on the Patient Education tab [x]   Document patient verbalizes understanding of medications being administered- Herceptin [x]   If IV infiltration, see ONS Guidelines []   Other:      []

## 2023-02-14 ENCOUNTER — OFFICE VISIT (OUTPATIENT)
Dept: FAMILY MEDICINE CLINIC | Age: 63
End: 2023-02-14
Payer: MEDICAID

## 2023-02-14 VITALS
BODY MASS INDEX: 21.91 KG/M2 | RESPIRATION RATE: 16 BRPM | DIASTOLIC BLOOD PRESSURE: 76 MMHG | HEART RATE: 76 BPM | WEIGHT: 139.9 LBS | SYSTOLIC BLOOD PRESSURE: 110 MMHG

## 2023-02-14 DIAGNOSIS — D64.9 ANEMIA, UNSPECIFIED TYPE: ICD-10-CM

## 2023-02-14 DIAGNOSIS — R63.4 UNINTENDED WEIGHT LOSS: ICD-10-CM

## 2023-02-14 DIAGNOSIS — C15.9 METASTASIS FROM ESOPHAGEAL CANCER (HCC): ICD-10-CM

## 2023-02-14 DIAGNOSIS — C79.9 METASTASIS FROM ESOPHAGEAL CANCER (HCC): ICD-10-CM

## 2023-02-14 DIAGNOSIS — R11.2 NAUSEA AND VOMITING, UNSPECIFIED VOMITING TYPE: ICD-10-CM

## 2023-02-14 DIAGNOSIS — C15.9 ESOPHAGEAL ADENOCARCINOMA (HCC): Primary | ICD-10-CM

## 2023-02-14 PROCEDURE — 3078F DIAST BP <80 MM HG: CPT | Performed by: FAMILY MEDICINE

## 2023-02-14 PROCEDURE — 99214 OFFICE O/P EST MOD 30 MIN: CPT | Performed by: FAMILY MEDICINE

## 2023-02-14 PROCEDURE — 3074F SYST BP LT 130 MM HG: CPT | Performed by: FAMILY MEDICINE

## 2023-02-14 SDOH — ECONOMIC STABILITY: HOUSING INSECURITY
IN THE LAST 12 MONTHS, WAS THERE A TIME WHEN YOU DID NOT HAVE A STEADY PLACE TO SLEEP OR SLEPT IN A SHELTER (INCLUDING NOW)?: NO

## 2023-02-14 SDOH — ECONOMIC STABILITY: FOOD INSECURITY: WITHIN THE PAST 12 MONTHS, YOU WORRIED THAT YOUR FOOD WOULD RUN OUT BEFORE YOU GOT MONEY TO BUY MORE.: NEVER TRUE

## 2023-02-14 SDOH — ECONOMIC STABILITY: FOOD INSECURITY: WITHIN THE PAST 12 MONTHS, THE FOOD YOU BOUGHT JUST DIDN'T LAST AND YOU DIDN'T HAVE MONEY TO GET MORE.: NEVER TRUE

## 2023-02-14 SDOH — ECONOMIC STABILITY: INCOME INSECURITY: HOW HARD IS IT FOR YOU TO PAY FOR THE VERY BASICS LIKE FOOD, HOUSING, MEDICAL CARE, AND HEATING?: NOT HARD AT ALL

## 2023-02-14 ASSESSMENT — PATIENT HEALTH QUESTIONNAIRE - PHQ9
1. LITTLE INTEREST OR PLEASURE IN DOING THINGS: 0
SUM OF ALL RESPONSES TO PHQ9 QUESTIONS 1 & 2: 0
SUM OF ALL RESPONSES TO PHQ QUESTIONS 1-9: 0
2. FEELING DOWN, DEPRESSED OR HOPELESS: 0

## 2023-02-14 ASSESSMENT — ENCOUNTER SYMPTOMS
RESPIRATORY NEGATIVE: 1
NAUSEA: 1

## 2023-02-14 NOTE — PROGRESS NOTES
2023    Rhonda Yañez (:  1960) is a 65 y.o. male, here for a preventive medicine evaluation. Chief Complaint   Patient presents with    Annual Exam    Health Maintenance     Needs a lipid panel     Annual eval.      Overall he is doing ok. Continues to follow closely with Heme/Onc. BP Readings from Last 3 Encounters:   23 110/76   23 127/79   23 120/66     Weight has stabilized her pt, feels that he has regained some fat. Wt Readings from Last 3 Encounters:   23 139 lb 14.4 oz (63.5 kg)   23 144 lb (65.3 kg)   23 144 lb (65.3 kg)     No acute concerns for me today. Patient Active Problem List   Diagnosis    Abdominal pain    Cancer of distal third of esophagus (HCC)    Acute postoperative pain    Chronic anemia    Esophageal adenocarcinoma (HCC)    GERD (gastroesophageal reflux disease)    HLD (hyperlipidemia)    Obesity (BMI 30.0-34. 9)    Postoperative atrial fibrillation (HCC)    Thrombocytopenia (HCC)    Brain lesion    Delayed gastric emptying    Serum creatinine raised    Severe protein-calorie malnutrition ([facility] [address]    Benign hypertension    Encounter for insertion of venous access port       Review of Systems   Constitutional:  Positive for appetite change, fatigue and unexpected weight change. HENT: Negative. Respiratory: Negative. Cardiovascular: Negative. Gastrointestinal:  Positive for nausea. Musculoskeletal: Negative. Neurological:  Positive for weakness. All other systems reviewed and are negative. Prior to Visit Medications    Medication Sig Taking?  Authorizing Provider   pantoprazole (PROTONIX) 40 MG tablet Take 1 tablet by mouth in the morning and at bedtime Yes Bri Dennis MD   dexamethasone (DECADRON) 2 MG tablet Take 1 tablet by mouth Yes Historical Provider, MD   megestrol (MEGACE) 40 MG tablet Take 1 tablet by mouth daily Yes Historical Provider, MD   escitalopram (LEXAPRO) 10 MG tablet TAKE 1 TABLET BY MOUTH EVERY DAY Yes Ankita Finnegan DO   potassium bicarbonate (EFFER-K) 25 MEQ disintegrating tablet Take 1 tablet by mouth daily Yes Anya Bettencourt PA-C   Magic Mouthwash (MIRACLE MOUTHWASH) Swish and spit 5 mLs 4 times daily as needed for Irritation 1:1:1:1 nystatin, maalox, viscous lidocaine, benadryl Yes Anya Bettencourt PA-C   ondansetron (ZOFRAN-ODT) 8 MG TBDP disintegrating tablet Place 1 tablet under the tongue every 8 hours as needed for Nausea or Vomiting Yes BHARTI Wells CNP   prochlorperazine (COMPAZINE) 10 MG tablet Take 1 tablet by mouth every 6 hours as needed (nausea) Yes BHARTI Wells CNP   lidocaine-prilocaine (EMLA) 2.5-2.5 % cream Apply topically as needed. Yes BHARTI Wells CNP   Blood Pressure Monitoring (BP MONITOR-STETHOSCOPE) KIT Dx: HTN Yes Ankita Finnegan DO   Misc.  Devices (PULSE OXIMETER FOR FINGER) MISC Dx:  hypoxemia Yes Ankita Finnegan DO   famotidine (PEPCID) 20 MG tablet Take 20 mg by mouth 2 times daily Yes Historical Provider, MD   NONFORMULARY Hema-Plex (Iron Multi-vitamin) Yes Historical Provider, MD   ibuprofen (ADVIL;MOTRIN) 400 MG tablet Take 400 mg by mouth every 6 hours as needed for Pain  Patient not taking: No sig reported  Historical Provider, MD        Allergies   Allergen Reactions    Paclitaxel Other (See Comments)     Severe lower back pain- able to resume after medications given    Aleve [Naproxen] Hives    Promethazine Rash       Past Medical History:   Diagnosis Date    Atrial fibrillation ([address]     Benign hypertension 11/3/2022    Cancer of distal third of esophagus ([facility] [address] 04/07/2022    GERD (gastroesophageal reflux disease)     HLD (hyperlipidemia) 8/15/2022    Sleep apnea        Past Surgical History:   Procedure Laterality Date    ABDOMEN SURGERY  08/16/2022    GASTRIC DEVASCULARIZATON-[facility]    ABDOMEN SURGERY  08/30/2022    ESOPHAGOGASTRODUODENOSCOPY TRANSORAL DIAGNOSTIC ESOPHAGECTOMY W/ THORACOTOMY-[facility]    DENTAL SURGERY      25 teeth removed    PORT SURGERY Left 2022    LEFT SINGLE LUMEN MEDIPORT performed by Michel Mackey MD at [facility] History     Socioeconomic History    Marital status:      Spouse name: Lashell Chaudhari    Number of children: 5    Years of education: 12    Highest education level: High school graduate   Occupational History    Occupation: VENDOR     Comment: [address]   Tobacco Use    Smoking status: Former     Packs/day: 3.00     Years: 10.00     Pack years: 30.00     Types: Cigarettes     Quit date: 2013     Years since quittin.7    Smokeless tobacco: Never    Tobacco comments:     does not smoke, quit    Vaping Use    Vaping Use: Never used   Substance and Sexual Activity    Alcohol use: Never    Drug use: Never    Sexual activity: Yes     Partners: Female   Other Topics Concern    Not on file   Social History Narrative    Not on file     Social Determinants of Health     Financial Resource Strain: Low Risk     Difficulty of Paying Living Expenses: Not hard at all   Food Insecurity: No Food Insecurity    Worried About Running Out of Food in the Last Year: Never true    [address] in the Last Year: Never true   Transportation Needs: Unknown    Lack of Transportation (Medical): Not on file    Lack of Transportation (Non-Medical):  No   Physical Activity: Not on file   Stress: Not on file   Social Connections: Not on file   Intimate Partner Violence: Not on file   Housing Stability: Unknown    Unable to Pay for Housing in the Last Year: Not on file    Number of Places Lived in the Last Year: Not on file    Unstable Housing in the Last Year: No        Family History   Problem Relation Age of Onset    Colon Cancer Mother     Heart Disease Father     Cancer Brother         lung       ADVANCE DIRECTIVE: N, <no information>    Vitals:    23 1100   BP: 110/76   Site: Left Upper Arm   Position: Sitting   Cuff Size: Medium Adult Pulse: 76   Resp: 16   Weight: 139 lb 14.4 oz (63.5 kg)     Estimated body mass index is 21.91 kg/m² as calculated from the following:    Height as of 2/8/23: 5' 7\" (1.702 m). Weight as of this encounter: 139 lb 14.4 oz (63.5 kg). Physical Exam  Vitals and nursing note reviewed. Constitutional:       General: He is not in acute distress. Appearance: Normal appearance. He is well-developed. HENT:      Head: Normocephalic and atraumatic. Right Ear: Tympanic membrane normal.      Left Ear: Tympanic membrane normal.   Eyes:      Conjunctiva/sclera: Conjunctivae normal.   Cardiovascular:      Rate and Rhythm: Normal rate and regular rhythm. Heart sounds: Normal heart sounds. No murmur heard. Pulmonary:      Effort: Pulmonary effort is normal.      Breath sounds: Normal breath sounds. No wheezing, rhonchi or rales. Abdominal:      General: There is no distension. Musculoskeletal:      Cervical back: Neck supple. Skin:     General: Skin is warm and dry. Findings: No rash (on exposed surfaces). Neurological:      General: No focal deficit present. Mental Status: He is alert. Psychiatric:         Attention and Perception: Attention normal.         Mood and Affect: Mood normal.         Speech: Speech normal.         Behavior: Behavior normal. Behavior is cooperative. Thought Content: Thought content normal.         Judgment: Judgment normal.       No flowsheet data found.     Lab Results   Component Value Date/Time    CHOL 229 03/22/2017 07:45 AM    TRIG 325 03/22/2017 07:45 AM    HDL 34 03/22/2017 07:45 AM    LDLCALC 130 03/22/2017 07:45 AM    GLUCOSE 73 01/10/2023 02:28 PM    LABA1C 5.4 03/22/2017 07:45 AM       The ASCVD Risk score (Alex DK, et al., 2019) failed to calculate for the following reasons:    Cannot find a previous HDL lab    Cannot find a previous total cholesterol lab    Immunization History   Administered Date(s) Administered    Influenza Virus Vaccine 09/15/2015       Health Maintenance   Topic Date Due    COVID-19 Vaccine (1) Never done    Pneumococcal 0-64 years Vaccine (1 - PCV) Never done    DTaP/Tdap/Td vaccine (1 - Tdap) Never done    Shingles vaccine (1 of 2) Never done    Low dose CT lung screening  Never done    Lipids  03/22/2022    Flu vaccine (1) 08/01/2022    Depression Screen  02/17/2023    Colorectal Cancer Screen  05/05/2027    Hepatitis C screen  Completed    HIV screen  Completed    Hepatitis A vaccine  Aged Out    Hib vaccine  Aged Out    Meningococcal (ACWY) vaccine  Aged Out       Assessment & Plan   Esophageal adenocarcinoma ([facility] [address]  Metastasis from esophageal cancer ([facility] [address]  Nausea and vomiting, unspecified vomiting type  Anemia, unspecified type  Unintended weight loss    -  Chronic medical problems stable  -  Continue current medications  -  Follow up with specialists as scheduled    Return for as needed.          --Epifanio Sanchez, DO

## 2023-02-22 DIAGNOSIS — C15.5 CANCER OF DISTAL THIRD OF ESOPHAGUS (HCC): Primary | ICD-10-CM

## 2023-02-22 RX ORDER — DIPHENHYDRAMINE HYDROCHLORIDE 50 MG/ML
50 INJECTION INTRAMUSCULAR; INTRAVENOUS
OUTPATIENT
Start: 2023-03-01

## 2023-02-22 RX ORDER — MEPERIDINE HYDROCHLORIDE 50 MG/ML
12.5 INJECTION INTRAMUSCULAR; INTRAVENOUS; SUBCUTANEOUS PRN
OUTPATIENT
Start: 2023-03-01

## 2023-02-22 RX ORDER — ALBUTEROL SULFATE 90 UG/1
4 AEROSOL, METERED RESPIRATORY (INHALATION) PRN
OUTPATIENT
Start: 2023-03-01

## 2023-02-22 RX ORDER — SODIUM CHLORIDE 9 MG/ML
5-40 INJECTION INTRAVENOUS PRN
OUTPATIENT
Start: 2023-03-01

## 2023-02-22 RX ORDER — HEPARIN SODIUM (PORCINE) LOCK FLUSH IV SOLN 100 UNIT/ML 100 UNIT/ML
500 SOLUTION INTRAVENOUS PRN
OUTPATIENT
Start: 2023-03-01

## 2023-02-22 RX ORDER — SODIUM CHLORIDE 9 MG/ML
5-250 INJECTION, SOLUTION INTRAVENOUS PRN
OUTPATIENT
Start: 2023-03-01

## 2023-02-22 RX ORDER — FAMOTIDINE 10 MG/ML
20 INJECTION, SOLUTION INTRAVENOUS
OUTPATIENT
Start: 2023-03-01

## 2023-02-22 RX ORDER — ONDANSETRON 2 MG/ML
8 INJECTION INTRAMUSCULAR; INTRAVENOUS
OUTPATIENT
Start: 2023-03-01

## 2023-02-22 RX ORDER — EPINEPHRINE 1 MG/ML
0.3 INJECTION, SOLUTION, CONCENTRATE INTRAVENOUS PRN
OUTPATIENT
Start: 2023-03-01

## 2023-02-22 RX ORDER — SODIUM CHLORIDE 9 MG/ML
INJECTION, SOLUTION INTRAVENOUS CONTINUOUS
OUTPATIENT
Start: 2023-03-01

## 2023-02-22 RX ORDER — SODIUM CHLORIDE 9 MG/ML
INJECTION, SOLUTION INTRAVENOUS PRN
Start: 2023-03-01

## 2023-02-22 RX ORDER — ACETAMINOPHEN 325 MG/1
650 TABLET ORAL
OUTPATIENT
Start: 2023-03-01

## 2023-02-28 NOTE — PROGRESS NOTES
[address] CANCER [address] [place] [address]  Dept: [phone]  Loc: [phone]   Hematology/Oncology Consult (Clinic)        3/1/2023    Demetris Yañez   1960     No ref. 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 Mass extends from 34 to 28 cm approximately 6 cm. Staging by EUS 3/23/2022 [facility] (T2 based on invasion and N2 based on 2 malignant appearing lymph nodes visualized and measured in the lower paraesophageal mediastinum level 8L adjacent to the mass. 2 malignant appearing lymph nodes visualized and measured in the subcarinal mediastinum level 7. PET/CT 3/30/2022 [facility]- hypermetabolic activity at mid esophagus consistent with primary malignancy with adjacent hypermetabolic left periesophageal lymph nodes. Brain Mets; extensive and bulky. 10/31/22. On Decadron 2 mg bid 11/22/22. Current Decadron dose 2 mg now changed to every other day effective 3/1/2023    (11/21/22) F1 and PDL-1 requested for me  by Dr Sami Charles rad Onc at [facility]; Not done then. Completed on Brain path :Foundation 1 reveals tumor mutation burden 17 for which Keytruda or nivolumab would be indicated. PAD-L1 CPS 2 - 3% i.e. positive      TREATMENT:  -Seen by Dr. Francheska Hope of thoracic surgery at [facility] 3/30/2022. Recommends neoadjuvant chemoradiation to be followed by surgery  -Neoadjuvant chemoradiation with Dr. Kwesi Davidson. Low-dose carboplatinum Taxol weekly x 6-7. Chemo  Start date: 5/11. Day 1 XRT 5/11 6/16/2022 week #6 due/last treatment. Last XRT 6/20/22.  -Robotic assisted laparoscopic and right thoracoscopic [place] [facility] Delphine esophagectomy ;   additional gastric margin, gastropathic lymph nodes and hernia sac per Dr. Francheska Hope 8/30/2022  No evidence of peritoneal metastatic disease.   (Path report requested and pending)  - Craniotomy [facility] debulked largest cerebellar met  - WB XRT soon per Dr Huyen Mendieta ( Dr Figueroa Cutler). Currently follow-up with Dr. Fracisco Casarez radiation oncology  - 1st Line Metastatic regimen: pending WB xrt completion and NGS results from [address] brain Bx. Day 1 whole brain XRT- 11/22/22.  -Herceptin FOLFOX (started 12/13) post radiation to the brain completed   C3 due 1/18/23-hold due to [address] . 6, potassium of 2.4 and ongoing nausea and vomiting. Delayed and administered  1/25/23; modified by decreasing oxaliplatin 20% and dropping 5-FU bolus and leucovorin. Effective 2/8/2023 changed to : single agent Herceptin 6 mg/kg every 3 weeks maintenance . Due for C5  3/1/23  -Foundation 1 reveals tumor mutation burden 17 for which Keytruda or nivolumab would be indicated. PAD-L1 CPS 2 - 3% i.e. positive    PARAMETERS:  -EGD/EUS  -PET/CT 12/8/22- No FDG avid  malignancy. - Brain MRI 1/19/23-improved  -CT chest abdomen pelvis with contrast 2/2/2023-CECILIO    COMORBIDITIES:  Include 30-pack-year history quit in 2013, alcohol none, GERD, hypertension, hyperlipidemia    SUBJECTIVE: Here today for his 5th course of Herceptin maintenance. Reviewed that his recent CAT scan chest abdomen pelvis on February 2 shows no evidence of active disease or recurrence. Denies headaches but over the last several weeks notices that his gait is little more slow and deliberate and he is a little bit unsteady. Denies headaches or vision changes or nausea. Overall pleased that he is tolerating the Herceptin maintenance therapy much better. Says he is little bit unsteady and he fell out of bed recently also a little bit fidgety. Current Decadron dose is 2 mg once daily. Prescription requested for Decadron and Megace 40 mg tablet taking 1 daily. Taking potassium dissolvable tablet daily. Does not like the taste.     HPI: Serenity Vazquez is a 71-year-old gentleman with a long history of GERD who presented to the ER on February 10 was admitted for 1 day because of chest heaviness. No cardiology because GI was consulted ultimately showed a mass in the distal esophagus. Outpatient EGD requested. Diagnosed with adenocarcinoma the distal esophagus and referred to Dr. Keily Palencia of thoracic surgery at [facility].  EUS shows regional tone involvement. See scans below and ultrasound. No distant mets. Patient has no significant dysphagia and no weight loss. Patient referred back locally for chemoradiation neoadjuvant treatment before surgery. ROS:  Review of Systems 14 point negative except as above.     PMH:   Past Medical History:   Diagnosis Date    Atrial fibrillation ([facility] [address]     Benign hypertension 11/3/2022    Cancer of distal third of esophagus ([facility] [address] 2022    GERD (gastroesophageal reflux disease)     HLD (hyperlipidemia) 8/15/2022    Sleep apnea         Social HX:   Social History     Socioeconomic History    Marital status:      Spouse name: Armond Gonzales    Number of children: 5    Years of education: 12    Highest education level: High school graduate   Occupational History    Occupation: VENDOR     Comment: [address]   Tobacco Use    Smoking status: Former     Packs/day: 3.00     Years: 10.00     Pack years: 30.00     Types: Cigarettes     Quit date: 2013     Years since quittin.8    Smokeless tobacco: Never    Tobacco comments:     does not smoke, quit    Vaping Use    Vaping Use: Never used   Substance and Sexual Activity    Alcohol use: Never    Drug use: Never    Sexual activity: Yes     Partners: Female   Other Topics Concern    Not on file   Social History Narrative    Not on file     Social Determinants of Health     Financial Resource Strain: Low Risk     Difficulty of Paying Living Expenses: Not hard at all   Food Insecurity: No Food Insecurity    Worried About Running Out of Food in the Last Year: Never true    [address] in the Last Year: Never true   Transportation Needs: Unknown    Lack of Transportation (Medical): Not on file    Lack of Transportation (Non-Medical): No   Physical Activity: Not on file   Stress: Not on file   Social Connections: Not on file   Intimate Partner Violence: Not on file   Housing Stability: Unknown    Unable to Pay for Housing in the Last Year: Not on file    Number of Places Lived in the Last Year: Not on file    Unstable Housing in the Last Year: No        Spouse: Ron Quinn  [phone]    Phone: [phone]     [address] [place] [address] Dr GANN [address]     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 [employer]    Immunizations:  Immunization History   Administered Date(s) Administered    Influenza Virus Vaccine 09/15/2015        Health Screenings:    C-Scope:  5 years ago  Prostate:   Lab Results   Component Value Date    PSA 1.41 03/22/2017            Health Maintenance Due   Topic Date Due    COVID-19 Vaccine (1) Never done    Pneumococcal 0-64 years Vaccine (1 - PCV) Never done    DTaP/Tdap/Td vaccine (1 - Tdap) Never done    Shingles vaccine (1 of 2) Never done    Low dose CT lung screening  Never done    Lipids  03/22/2022    Flu vaccine (1) 08/01/2022        Interests:   Cars. [email or web address] family,[employer]    Fam HX:   Family History   Problem Relation Age of Onset    Colon Cancer Mother     Heart Disease Father     Cancer Brother         lung        Hospitalizations:   2/10/22    Allergies: Allergies   Allergen Reactions    Paclitaxel Other (See Comments)     Severe lower back pain- able to resume after medications given    Aleve [Naproxen] Hives    Promethazine Rash        Adult Illness:  Patient Active Problem List   Diagnosis    Abdominal pain    Cancer of distal third of esophagus (HCC)    Acute postoperative pain    Chronic anemia    Esophageal adenocarcinoma (HCC)    GERD (gastroesophageal reflux disease)    HLD (hyperlipidemia)    Obesity (BMI 30.0-34. 9)    Postoperative atrial fibrillation (HCC)    Thrombocytopenia (HCC)    Brain lesion    Delayed gastric emptying    Serum creatinine raised    Severe protein-calorie malnutrition ([facility] [address]    Benign hypertension    Encounter for insertion of venous access port        Surgery:  Past Surgical History:   Procedure Laterality Date    ABDOMEN SURGERY  08/16/2022    GASTRIC DEVASCULARIZATON-[facility]    ABDOMEN SURGERY  08/30/2022    ESOPHAGOGASTRODUODENOSCOPY TRANSORAL DIAGNOSTIC ESOPHAGECTOMY W/ THORACOTOMY-[facility]    DENTAL SURGERY      25 teeth removed    PORT SURGERY Left 12/1/2022    LEFT SINGLE LUMEN MEDIPORT performed by Macarena Guerrero MD at [facility]        Medications:  Current Outpatient Medications   Medication Sig Dispense Refill    dexamethasone (DECADRON) 2 MG tablet Take 1 tablet by mouth every other day 30 tablet 1    megestrol (MEGACE) 40 MG tablet Take 1 tablet by mouth daily 30 tablet 2    pantoprazole (PROTONIX) 40 MG tablet Take 1 tablet by mouth in the morning and at bedtime 90 tablet 3    escitalopram (LEXAPRO) 10 MG tablet TAKE 1 TABLET BY MOUTH EVERY DAY 90 tablet 3    potassium bicarbonate (EFFER-K) 25 MEQ disintegrating tablet Take 1 tablet by mouth daily 30 tablet 1    ondansetron (ZOFRAN-ODT) 8 MG TBDP disintegrating tablet Place 1 tablet under the tongue every 8 hours as needed for Nausea or Vomiting 90 tablet 1    prochlorperazine (COMPAZINE) 10 MG tablet Take 1 tablet by mouth every 6 hours as needed (nausea) 120 tablet 1    lidocaine-prilocaine (EMLA) 2.5-2.5 % cream Apply topically as needed. 1 each 3    Blood Pressure Monitoring (BP MONITOR-STETHOSCOPE) KIT Dx: HTN 1 kit 0    Misc.  Devices (PULSE OXIMETER FOR FINGER) MISC Dx:  hypoxemia 1 each 0    ibuprofen (ADVIL;MOTRIN) 400 MG tablet Take 400 mg by mouth every 6 hours as needed for Pain      famotidine (PEPCID) 20 MG tablet Take 20 mg by mouth 2 times daily      NONFORMULARY Hema-Plex (Iron Multi-vitamin)      Magic Mouthwash (MIRACLE MOUTHWASH) Swish and spit 5 mLs 4 times daily as needed for Irritation 1:1:1:1 nystatin, maalox, viscous lidocaine, benadryl (Patient not taking: Reported on 3/1/2023) 200 mL 2     No current facility-administered medications for this visit. Facility-Administered Medications Ordered in Other Visits   Medication Dose Route Frequency Provider Last Rate Last Admin    sodium chloride flush 0.9 % injection 5-40 mL  5-40 mL IntraVENous PRN Jocelyn Mackenzie MD   20 mL at 23 0821    heparin flush 100 UNIT/ML injection 500 Units  500 Units IntraCATHeter PRN Jocelyn Mackenzie MD       Over-the-counter iron supplement 3 times a day      EXAM:   height is 5' 7\" (1.702 m) and weight is 143 lb (64.9 kg). His oral temperature is 98.1 °F (36.7 °C). His blood pressure is 123/91 (abnormal) and his pulse is 75. His respiration is 18 and oxygen saturation is 99%. Estimated body surface area is 1.75 meters squared as calculated from the following:    Height as of this encounter: 5' 7\" (1.702 m). Weight as of this encounter: 143 lb (64.9 kg). ECO  General: Appears mildly chronically ill. Not acutely ill. Good spirits. Wife present. HEENT: NC/AT,nonicteric, perrla,eom intact, mucosa is moist, no thrush. Neck: no masses  Nodes: No adenopathy  Lungs/chest: clear, no rales,rhonchi or wheezing, lung bases clear  CV: rrr, no rubs ,gallops or murmurs  Breasts: Not examined  Abd/Rectal: Soft, nontender no organomegaly. No distention. Back: normal curvature, No midline tenderness. : Not Examined  Extremities: no cyanosis,clubbing or edema. Skin: unremarkable  Neuro: A and O x 4, CN exam nonfocal, Motor- no deficits, Sensory- no deficits, gait-slow slightly wide-based and deliberate. , speech- fluent, no ataxia. Devices: Port site unremarkable.       DATA:    LAB:     CBC with Differential:      Lab Results   Component Value Date/Time    WBC 5.1 2023 08:20 AM    RBC 3.36 2023 08:20 AM    HGB 11.8 2023 08:20 AM    HCT 34.8 2023 08:20 AM     2023 08:20 AM     2023 08:20 AM    MCH 35.1 03/01/2023 08:20 AM    MCHC 33.9 03/01/2023 08:20 AM    RDW 14.0 03/01/2023 08:20 AM    NRBC 2 01/25/2023 07:45 AM    SEGSPCT 49.0 01/25/2023 07:45 AM    METASPCT 4 01/25/2023 07:45 AM    PROMYELOPCT 1 01/25/2023 07:45 AM    MONOPCT 25.0 01/25/2023 07:45 AM    MYELOPCT 4 01/25/2023 07:45 AM    MONOSABS 0.4 03/01/2023 08:20 AM    LYMPHSABS 0.7 03/01/2023 08:20 AM    EOSABS 0.0 03/01/2023 08:20 AM    BASOSABS 0.0 03/01/2023 08:20 AM    DIFFTYPE see below 01/25/2023 07:45 AM     Lab Results   Component Value Date/Time    SEGSABS 3.9 03/01/2023 08:20 AM       CMP:    Lab Results   Component Value Date/Time     03/01/2023 08:20 AM     01/10/2023 02:28 PM    K 4.0 03/01/2023 08:20 AM    K 3.7 01/10/2023 02:28 PM     01/10/2023 02:28 PM    CO2 20 01/10/2023 02:28 PM    BUN 12 01/10/2023 02:28 PM    CREATININE 0.7 03/01/2023 08:20 AM    CREATININE 0.6 01/10/2023 02:28 PM    LABGLOM >60 03/01/2023 08:20 AM    GLUCOSE 73 01/10/2023 02:28 PM    PROT 5.8 02/08/2023 09:56 AM    LABALBU 3.8 02/08/2023 09:56 AM    CALCIUM 8.9 01/10/2023 02:28 PM    BILITOT 0.6 02/08/2023 09:56 AM    ALKPHOS 65 02/08/2023 09:56 AM    AST 16 02/08/2023 09:56 AM    ALT 16 02/08/2023 09:56 AM       BMP:    Lab Results   Component Value Date/Time     03/01/2023 08:20 AM     01/10/2023 02:28 PM    K 4.0 03/01/2023 08:20 AM    K 3.7 01/10/2023 02:28 PM     01/10/2023 02:28 PM    CO2 20 01/10/2023 02:28 PM    BUN 12 01/10/2023 02:28 PM    LABALBU 3.8 02/08/2023 09:56 AM    CREATININE 0.7 03/01/2023 08:20 AM    CREATININE 0.6 01/10/2023 02:28 PM    CALCIUM 8.9 01/10/2023 02:28 PM    LABGLOM >60 03/01/2023 08:20 AM    GLUCOSE 73 01/10/2023 02:28 PM       Magnesium:    Lab Results   Component Value Date/Time    MG 1.9 01/10/2023 02:28 PM     PT/INR:  No results found for: PROTIME, INR  TSH:    Lab Results   Component Value Date/Time    TSH 3.070 01/10/2023 02:28 PM     VITAMIN B12: No components found for: B12  FOLATE:   Lab Results   Component Value Date/Time    FOLATE 15.6 02/10/2022 12:56 PM     IRON:    Lab Results   Component Value Date/Time    IRON 58 04/27/2022 01:48 PM     Iron Saturation:  No components found for: PERCENTFE  TIBC:    Lab Results   Component Value Date/Time    TIBC 341 04/27/2022 01:48 PM     FERRITIN:    Lab Results   Component Value Date/Time    FERRITIN 28 04/27/2022 01:47 PM     PSA:   Lab Results   Component Value Date/Time    PSA 1.41 03/22/2017 07:45 AM      1/25/2023 white count 3.43 neutrophils 1.74 with 21% monocytes, hemoglobin 10.9 and platelets 545. Potassium today is 3.5 and creatinine is 0.6        IMAGING:  CT chest abdomen pelvis with contrast 2/2/2023  No metastatic disease. Includes no pulmonary masses or nodules. Bladder thickening may be artifactual related to underdistention and enlarged prostate. Brain MRI 1/19/23  Impression       1. There has been interval resection of the right cerebellar metastatic lesion. 2. There has been significant interval improvement since previous study dated 11/3/2022. Diminution in metastases in the right temporal lobe, right occipital lobe, left cerebellar hemisphere. Significant reduction in the surrounding edema. 3. There has been resolution of multiple metastases in the frontal and temporal lobes. 4. Probable ischemic changes in the white matter. 5. No acute infarct. 6. Mild inflammatory changes in ethmoid air cells bilaterally. Venita Walsh PRE Herceptin ECHO   12/8/22   Summary   Normal left ventricle size and Low Normal LV systolic function. Ejection   fraction was estimated at 50 %. There were no regional left ventricular   wall motion abnormalities and wall thickness was within normal limits.    Mildly reduced LV Longitudinal strain with Avg GLS -14.6%      Signature      ----------------------------------------------------------------   Electronically signed by Herb Mcgraw MD (Interpreting   physician) on 12/08/2022 at 07:09 PM ----------------------------------------------------------------    Head CT wo 1/10/23  Impression       1. Postsurgical changes in the right cerebellum. 2. There are 3 areas of high attenuation consistent with hemorrhage. These are associated with areas of previously visualized metastases. The largest is in the right temporal lobe and measures 1.2 cm. There is no associated significant mass effect. 3. Vague areas of low attenuation bilaterally presumably due to small metastases. No dominant lesions are identified. PET  12/8/22  COMPARISON: PET/CT 3/29/2022 (outside study). FINDINGS:        Neck: No FDG avid cervical lymphadenopathy. Craniotomy changes are partially visualized. Chest: Cardiac size is normal. There is no pleural or pericardial effusion. There is a calcified granuloma in the right lung. No FDG avid pulmonary nodules are identified. There is no hypermetabolic mediastinal, hilar or axillary lymphadenopathy. There    are surgical changes of prior esophagectomy and gastric pull-through. Degenerative changes are present in the thoracic spine without evidence of hypermetabolic osseous metastatic disease. Abdomen/pelvis: Physiologic activity is present in the liver, spleen, urinary collecting system and gastrointestinal tract. There are calcified granulomas in the spleen. Atherosclerotic calcifications are present in the abdominal aorta without evidence    of aneurysm. The prostate gland is enlarged and contains calcifications. There are phleboliths in the pelvis. There is no FDG avid mesenteric, retroperitoneal, pelvic or inguinal lymphadenopathy. Degenerative changes are present in the lumbar spine and    pelvis without evidence of hypermetabolic osseous metastatic disease. Impression       No FDG avid malignancy.        Final report electronically signed by Dr. Russell Stahl on 12/8/2022 2:40 PM     MRI Brain [facility]  11/22/22  Shows multiple heterogeneous supra and infratentorial masses very suspicious for metastatic disease. The larger lesions, such as in the right cerebellum are associated with significant vasogenic edema. They suspected metastasis in the left frontoparietal junction may be slightly hemorrhagic. Partially obstructed fourth ventricle with early obstructive hydrocephalus. -PET/CT 3/30/2022  Intense hypermetabolic activity within the mid esophagus consistent with a primary malignancy. Adjacent hypermetabolic left periesophageal lymph node worrisome for metastatic adenopathy. PROCEDURES:  EGD 2/14/2022  Ulcerated mass distal esophagus    EUS 3/23/2022  Partially obstructing malignant esophageal tumor found in the lower third of the esophagus. Large hiatal hernia. Normal stomach and duodenum. Liver most consistent with fatty liver. 2 malignant appearing lymph nodes visualized and measured in the lower paraesophageal mediastinum level 8L adjacent to the mass. 2 malignant appearing lymph nodes visualized and measured in the subcarinal mediastinum level 7. PATHOLOGY:   EGD distal esophageal biopsy path report  Pathologic Diagnosis     Outside Slides  [id number] (02/15/22)  A. Esophagus, distal, biopsy:   Invasive adenocarcinoma, moderately differentiated. See comment   Alcian Blue/PAS performed at outside institution and submitted for review highlights Prescott's mucosa in the background      HER2/robina Gene Status: Amplified. ___________________________________________________________    8/30/2022 [facility] esophagectomy pathology-pending    Nov 2022- Craniotomy [facility]- Brain mets- REQUESTED    GENETICS:  HER2 amplified. On original esoph bx. MOLECULAR:  Requested by phone conversation on 11/21/22 that Dr Sandy Gee order Maria G Rios and PDFL-1 on the recent Brain bx. NEED REPORT  1/18/23 placed order for foundation 1 and PD-L1 to be done on the November brain biopsy at [facility] that showed metastatic adenocarcinoma. PD-L1 CPS 2 - 3%.   Positive  Tumor mutation burden 17, MECHELLE, no specific targetable mutations. ASSESSMENT/PLAN:    1: Diagnosis: 26-year-old gentleman with adenocarcinoma of the distal third of the esophagus. Clinical stage T2N2 by EUS and PET scan confirmed. No distant mets. No significant dysphagia. Presented with chest discomfort. Chronic history of GERD. Performance status is excellent. Seen by Dr. Miki Dumont thoracic surgery at [facility] and status post neoadjuvant chemoradiation followed by recent esophagectomy on 8/30/2022. Essentially a near Path CR. On 10/31/2022 found to have extensive Brain mets. PET 12/8/22- No avid sites     2) Prognosis / Disease Status: High risk node positive disease T2N2 clinical stage, locally advanced. HER2 amplified which now has bearing with developing  metastatic disease. 3) Work-up:    Labs: CBC, CMP.     imaging:  PET/CT 12/8/22- Negative. 1/19/2023 brain MRI because of persistent nausea and vomiting-improved mets. 2/2/2023 CT chest abdomen pelvis with contrast-no metastatic disease. Pretx Echocardiogram - see above. Order brain MRI 3/1/2023 because of slow slightly unsteady gait. Plan next CT in April. Procedures: 8/30/2022 esophagectomy. See above. Consults:   Other: Due to persistent nausea and vomiting despite Compazine and Zofran resume Decadron dose is 2 mg twice daily effective 1/18/2023    4) Symptom Management: Potassium once daily. Changed to 1 tablet Monday Wednesday Friday. Decadron changed to 2 mg every other day effective 3/1/2023    5) Supportive care provided. Level of care is appropriate. See above. 6) Treatment goal: Cure      Treatment plan:     Neoadjuvant chemoradiation: Low-dose carboplatin and Taxol weekly x 6-7 with radiation. Radiation will be completed 6/20 8/30/2022 esophagectomy at [facility] per Dr. Miki Dumont . WB XRT per Dr Mamta Nuñez. Day 1  11/22/20    Restage w PET and await NGS etc and plan first line met systemic tx.  Previously HER2 amplified thus genet up a Herceptin based regimen . See above.    -Modified Folfox q 2 weeks + Herceptin 6 mk/kg LD on day 1 then, 4mg/kg iv q 2 weeks.   Start- 12/13/22. 1/18/23 C 3 due.  Hold because of nausea vomiting hypokalemia and neutropenia.  Seen with course 31/25/23 modified without the 5-FU bolus and leucovorin and oxaliplatin decreased 20%.  Since No metastatic disease found on 12/8/22 PT. Plan to drop folfox effective today 2/8/2023 and \" maintain \" with herceptin.  Herceptin will be every 3 weeks at 6 mg/kg.   C5 due 3/1/23        7) Medications reviewed.   Prescriptions today: None.  Patient already has Compazine and Zofran at home              Orders Placed This Encounter   Medications    dexamethasone (DECADRON) 2 MG tablet     Sig: Take 1 tablet by mouth every other day     Dispense:  30 tablet     Refill:  1    megestrol (MEGACE) 40 MG tablet     Sig: Take 1 tablet by mouth daily     Dispense:  30 tablet     Refill:  2          [place]:  Controlled Substance Monitoring:    Acute and Chronic Pain Monitoring:   No flowsheet data found.       8) Research Options:   Not applicable      9) Other:            10) Follow Up:  Follow-up 3 wks  with me and ongoing maintenance Herceptin treatment.    Reserve nivolumab or Keytruda for systemic relapse.  Order MRI to be done within the next 24 hours of the brain because of change in his gait and unsteadiness.       KARAN BELCHER MD

## 2023-03-01 ENCOUNTER — HOSPITAL ENCOUNTER (OUTPATIENT)
Dept: INFUSION THERAPY | Age: 63
Discharge: HOME | End: 2023-03-01
Payer: MEDICAID

## 2023-03-01 ENCOUNTER — OFFICE VISIT (OUTPATIENT)
Dept: ONCOLOGY | Age: 63
End: 2023-03-01
Payer: MEDICAID

## 2023-03-01 ENCOUNTER — HOSPITAL ENCOUNTER (OUTPATIENT)
Dept: MRI IMAGING | Age: 63
Discharge: HOME | End: 2023-03-01
Payer: MEDICAID

## 2023-03-01 VITALS
DIASTOLIC BLOOD PRESSURE: 86 MMHG | WEIGHT: 143.7 LBS | SYSTOLIC BLOOD PRESSURE: 141 MMHG | HEART RATE: 72 BPM | HEIGHT: 67 IN | BODY MASS INDEX: 22.55 KG/M2 | TEMPERATURE: 98.1 F | RESPIRATION RATE: 18 BRPM | OXYGEN SATURATION: 100 %

## 2023-03-01 VITALS
OXYGEN SATURATION: 99 % | HEIGHT: 67 IN | RESPIRATION RATE: 18 BRPM | WEIGHT: 143 LBS | HEART RATE: 75 BPM | TEMPERATURE: 98.1 F | SYSTOLIC BLOOD PRESSURE: 123 MMHG | BODY MASS INDEX: 22.44 KG/M2 | DIASTOLIC BLOOD PRESSURE: 91 MMHG

## 2023-03-01 DIAGNOSIS — C79.9 METASTASIS FROM ESOPHAGEAL CANCER (HCC): Primary | ICD-10-CM

## 2023-03-01 DIAGNOSIS — C79.9 METASTASIS FROM ESOPHAGEAL CANCER (HCC): ICD-10-CM

## 2023-03-01 DIAGNOSIS — C15.9 METASTASIS FROM ESOPHAGEAL CANCER (HCC): Primary | ICD-10-CM

## 2023-03-01 DIAGNOSIS — C15.5 CANCER OF DISTAL THIRD OF ESOPHAGUS (HCC): Primary | ICD-10-CM

## 2023-03-01 DIAGNOSIS — C15.9 METASTASIS FROM ESOPHAGEAL CANCER (HCC): ICD-10-CM

## 2023-03-01 LAB
ABSOLUTE IMMATURE GRANULOCYTE: 0.07 THOU/MM3 (ref 0–0.07)
ALBUMIN SERPL BCG-MCNC: 3.5 G/DL (ref 3.5–5.1)
ALP SERPL-CCNC: 64 U/L (ref 38–126)
ALT SERPL W/O P-5'-P-CCNC: 24 U/L (ref 11–66)
AST SERPL-CCNC: 20 U/L (ref 5–40)
BASOPHILS ABSOLUTE: 0 THOU/MM3 (ref 0–0.1)
BASOPHILS NFR BLD AUTO: 0 % (ref 0–3)
BILIRUB CONJ SERPL-MCNC: < 0.2 MG/DL (ref 0–0.3)
BILIRUB SERPL-MCNC: 0.4 MG/DL (ref 0.3–1.2)
BUN BLDP-MCNC: 14 MG/DL (ref 8–26)
CHLORIDE BLD-SCNC: 107 MEQ/L (ref 98–109)
CREAT BLD-MCNC: 0.7 MG/DL (ref 0.5–1.2)
EOSINOPHIL NFR BLD AUTO: 1 % (ref 0–4)
EOSINOPHILS ABSOLUTE: 0 THOU/MM3 (ref 0–0.4)
ERYTHROCYTE [DISTWIDTH] IN BLOOD BY AUTOMATED COUNT: 14 % (ref 11.5–14.5)
GFR SERPL CREATININE-BSD FRML MDRD: > 60 ML/MIN/1.73M2
GLUCOSE BLD-MCNC: 89 MG/DL (ref 70–108)
HCT VFR BLD AUTO: 34.8 % (ref 42–52)
HGB BLD-MCNC: 11.8 GM/DL (ref 14–18)
IMMATURE GRANULOCYTES: 1 %
IONIZED CALCIUM, WHOLE BLOOD: 1.24 MMOL/L (ref 1.12–1.32)
LYMPHOCYTES ABSOLUTE: 0.7 THOU/MM3 (ref 1–4.8)
LYMPHOCYTES NFR BLD AUTO: 13 % (ref 15–47)
MCH RBC QN AUTO: 35.1 PG (ref 26–33)
MCHC RBC AUTO-ENTMCNC: 33.9 GM/DL (ref 32.2–35.5)
MCV RBC AUTO: 104 FL (ref 80–94)
MONOCYTES ABSOLUTE: 0.4 THOU/MM3 (ref 0.4–1.3)
MONOCYTES NFR BLD AUTO: 8 % (ref 0–12)
NEUTROPHILS NFR BLD AUTO: 76 % (ref 43–75)
PLATELET # BLD AUTO: 107 THOU/MM3 (ref 130–400)
PMV BLD AUTO: 9.6 FL (ref 9.4–12.4)
POTASSIUM BLD-SCNC: 4 MEQ/L (ref 3.5–4.9)
PROT SERPL-MCNC: 6.1 G/DL (ref 6.1–8)
RBC # BLD AUTO: 3.36 MILL/MM3 (ref 4.7–6.1)
SEGMENTED NEUTROPHILS ABSOLUTE COUNT: 3.9 THOU/MM3 (ref 1.8–7.7)
SODIUM BLD-SCNC: 140 MEQ/L (ref 138–146)
TOTAL CO2, WHOLE BLOOD: 24 MEQ/L (ref 23–33)
WBC # BLD AUTO: 5.1 THOU/MM3 (ref 4.8–10.8)

## 2023-03-01 PROCEDURE — 99214 OFFICE O/P EST MOD 30 MIN: CPT | Performed by: INTERNAL MEDICINE

## 2023-03-01 PROCEDURE — 80047 BASIC METABLC PNL IONIZED CA: CPT

## 2023-03-01 PROCEDURE — 3080F DIAST BP >= 90 MM HG: CPT | Performed by: INTERNAL MEDICINE

## 2023-03-01 PROCEDURE — 2580000003 HC RX 258: Performed by: INTERNAL MEDICINE

## 2023-03-01 PROCEDURE — 80076 HEPATIC FUNCTION PANEL: CPT

## 2023-03-01 PROCEDURE — 6360000004 HC RX CONTRAST MEDICATION: Performed by: INTERNAL MEDICINE

## 2023-03-01 PROCEDURE — 85025 COMPLETE CBC W/AUTO DIFF WBC: CPT

## 2023-03-01 PROCEDURE — 99211 OFF/OP EST MAY X REQ PHY/QHP: CPT

## 2023-03-01 PROCEDURE — 6360000002 HC RX W HCPCS: Performed by: INTERNAL MEDICINE

## 2023-03-01 PROCEDURE — 36591 DRAW BLOOD OFF VENOUS DEVICE: CPT

## 2023-03-01 PROCEDURE — 3074F SYST BP LT 130 MM HG: CPT | Performed by: INTERNAL MEDICINE

## 2023-03-01 PROCEDURE — 96413 CHEMO IV INFUSION 1 HR: CPT

## 2023-03-01 PROCEDURE — A9579 GAD-BASE MR CONTRAST NOS,1ML: HCPCS | Performed by: INTERNAL MEDICINE

## 2023-03-01 PROCEDURE — 70553 MRI BRAIN STEM W/O & W/DYE: CPT

## 2023-03-01 RX ORDER — HEPARIN SODIUM (PORCINE) LOCK FLUSH IV SOLN 100 UNIT/ML 100 UNIT/ML
500 SOLUTION INTRAVENOUS PRN
OUTPATIENT
Start: 2023-03-01

## 2023-03-01 RX ORDER — SODIUM CHLORIDE 9 MG/ML
INJECTION, SOLUTION INTRAVENOUS PRN
Status: DISCONTINUED | OUTPATIENT
Start: 2023-03-01 | End: 2023-03-02 | Stop reason: HOSPADM

## 2023-03-01 RX ORDER — WATER 1000 ML/1000ML
2.2 INJECTION, SOLUTION INTRAVENOUS ONCE
OUTPATIENT
Start: 2023-03-01 | End: 2023-03-01

## 2023-03-01 RX ORDER — SODIUM CHLORIDE 0.9 % (FLUSH) 0.9 %
5-40 SYRINGE (ML) INJECTION PRN
OUTPATIENT
Start: 2023-03-01

## 2023-03-01 RX ORDER — DEXAMETHASONE 2 MG/1
2 TABLET ORAL EVERY OTHER DAY
Qty: 30 TABLET | Refills: 1 | Status: SHIPPED | OUTPATIENT
Start: 2023-03-01

## 2023-03-01 RX ORDER — MEGESTROL ACETATE 40 MG/1
40 TABLET ORAL DAILY
Qty: 30 TABLET | Refills: 2 | Status: SHIPPED | OUTPATIENT
Start: 2023-03-01

## 2023-03-01 RX ORDER — SODIUM CHLORIDE 0.9 % (FLUSH) 0.9 %
5-40 SYRINGE (ML) INJECTION PRN
Status: DISCONTINUED | OUTPATIENT
Start: 2023-03-01 | End: 2023-03-02 | Stop reason: HOSPADM

## 2023-03-01 RX ORDER — HEPARIN SODIUM (PORCINE) LOCK FLUSH IV SOLN 100 UNIT/ML 100 UNIT/ML
500 SOLUTION INTRAVENOUS PRN
Status: DISCONTINUED | OUTPATIENT
Start: 2023-03-01 | End: 2023-03-02 | Stop reason: HOSPADM

## 2023-03-01 RX ORDER — SODIUM CHLORIDE 9 MG/ML
25 INJECTION, SOLUTION INTRAVENOUS PRN
OUTPATIENT
Start: 2023-03-01

## 2023-03-01 RX ADMIN — SODIUM CHLORIDE, PRESERVATIVE FREE 20 ML: 5 INJECTION INTRAVENOUS at 13:29

## 2023-03-01 RX ADMIN — SODIUM CHLORIDE, PRESERVATIVE FREE 20 ML: 5 INJECTION INTRAVENOUS at 08:21

## 2023-03-01 RX ADMIN — SODIUM CHLORIDE, PRESERVATIVE FREE 10 ML: 5 INJECTION INTRAVENOUS at 08:20

## 2023-03-01 RX ADMIN — GADOTERIDOL 10 ML: 279.3 INJECTION, SOLUTION INTRAVENOUS at 10:46

## 2023-03-01 RX ADMIN — SODIUM CHLORIDE 420 MG: 9 INJECTION, SOLUTION INTRAVENOUS at 12:35

## 2023-03-01 RX ADMIN — Medication 500 UNITS: at 13:29

## 2023-03-01 RX ADMIN — SODIUM CHLORIDE 250 ML: 9 INJECTION, SOLUTION INTRAVENOUS at 12:32

## 2023-03-01 NOTE — PLAN OF CARE
Problem: Chronic Conditions and Co-morbidities  Goal: Patient's chronic conditions and co-morbidity symptoms are monitored and maintained or improved  Outcome: Adequate for Discharge  Flowsheets (Taken 3/1/2023 1006)  Care Plan - Patient's Chronic Conditions and Co-Morbidity Symptoms are Monitored and Maintained or Improved:   Monitor and assess patient's chronic conditions and comorbid symptoms for stability, deterioration, or improvement   Collaborate with multidisciplinary team to address chronic and comorbid conditions and prevent exacerbation or deterioration  Note: Patient verbalizes understanding to verbal information given on Trazimera,action and possible side effects. Aware to call MD if develop complications. Chemotherapy Teaching     What is Chemotherapy   Drug action [x]   Method of Administration [x]   Handouts given []     Side Effects  Nausea/vomiting [x]   Diarrhea [x]   Fatigue [x]   Signs / Symptoms of infection [x]   Neutropenia [x]   Thrombocytopenia [x]   Alopecia [x]   neuropathy [x]   [place] diet &  the importance of fluids [x]       Micellaneous  Importance of nutrition [x]   Importance of oral hygiene [x]   When to call the MD [x]   Monitoring labs [x]   Use of supportive services []     Explanation of Drug Regimen / Frequency  Trazimera     Comments  Verbalized understanding to drug,action,side effects and when to call MD         Problem: Infection - Adult  Goal: Absence of infection at discharge  Outcome: Adequate for Discharge  Flowsheets (Taken 3/1/2023 1006)  Absence of infection at discharge:   Assess and monitor for signs and symptoms of infection   Monitor lab/diagnostic results   Monitor all insertion sites i.e., indwelling lines, tubes and drains  Note: Mediport site with no redness or warmth. Skin over port site intact with no signs of breakdown noted. Patient verbalizes signs/symptoms of port infection and when to notify the physician.    Goal: Will show no infection signs and symptoms  Description: Will show no infection signs and symptoms  Outcome: Adequate for Discharge  Note: Discuss port maintenance,infection prevention, sign of infection and when to call MD.      Problem: Discharge Planning  Goal: Discharge to home or other facility with appropriate resources  Outcome: Adequate for Discharge  Flowsheets (Taken 3/1/2023 1006)  Discharge to home or other facility with appropriate resources:   Identify barriers to discharge with patient and caregiver   Arrange for needed discharge resources and transportation as appropriate   Identify discharge learning needs (meds, wound care, etc)  Note: Verbalize understanding of discharge instructions, follow up appointments, and when to call Physician. Problem: Safety - Adult  Goal: Free from fall injury  Outcome: Adequate for Discharge  Flowsheets (Taken 3/1/2023 1006)  Free From Fall Injury:   Instruct family/caregiver on patient safety   Based on caregiver fall risk screen, instruct family/caregiver to ask for assistance with transferring infant if caregiver noted to have fall risk factors  Note: Free from falls while in [facility].P. Oncology. Care plan reviewed with patient and spouse. Patient and spouse verbalize understanding of the plan of care and contribute to goal setting.

## 2023-03-01 NOTE — DISCHARGE SUMMARY
Patient assessed for the following post chemotherapy:    Dizziness   No  Lightheadedness  No      Acute nausea/vomiting No  Headache   No  Chest pain/pressure  No  Rash/itching   No  Shortness of breath  No    Patient kept for 20 minutes observation post infusion chemotherapy. Patient tolerated chemotherapy treatment Trazimera without any complications. Last vital signs:   BP (!) 141/86   Pulse 72   Temp 98.1 °F (36.7 °C) (Oral)   Resp 18   Ht 5' 7\" (1.702 m)   Wt 143 lb 11.2 oz (65.2 kg)   SpO2 100%   BMI 22.51 kg/m²     Patient instructed if experience any of the above symptoms following today's infusion,he/she is to notify MD immediately or go to the emergency department. Discharge instructions given to patient. Verbalizes understanding. Ambulated off unit per self with spouse with belongings.

## 2023-03-01 NOTE — DISCHARGE INSTRUCTIONS
Please contact your Oncologist if you have any questions regarding the chemotherapy Trazimera that you received today. Patient instructed if experience any of the symptoms following today's chemotherapy / to notify MD immediately or go to emergency department.     * dizziness/lightheadedness  *acute nausea/vomiting - not relieved with medication  *headache - not relieved from Tylenol/pain medication  *chest pain/pressure  *rash/itching  *shortness of breath        Drink fluids - 48oz fluids daily  Call if develop fever/ chills/ signs or symptoms of infection

## 2023-03-01 NOTE — PATIENT INSTRUCTIONS
Change potassium dissolving tablet to 1 tablet Monday Wednesday Friday  Brain MRI with and without contrast.  Please do within the next 24 hours because of imbalance  Proceed with ongoing Herceptin every 3 weeks today  Follow-up 3/22 with me for next Herceptin and lab

## 2023-03-15 DIAGNOSIS — C15.5 CANCER OF DISTAL THIRD OF ESOPHAGUS (HCC): Primary | ICD-10-CM

## 2023-03-15 RX ORDER — ACETAMINOPHEN 325 MG/1
650 TABLET ORAL
OUTPATIENT
Start: 2023-03-22

## 2023-03-15 RX ORDER — SODIUM CHLORIDE 9 MG/ML
5-40 INJECTION INTRAVENOUS PRN
OUTPATIENT
Start: 2023-03-22

## 2023-03-15 RX ORDER — MEPERIDINE HYDROCHLORIDE 50 MG/ML
12.5 INJECTION INTRAMUSCULAR; INTRAVENOUS; SUBCUTANEOUS PRN
OUTPATIENT
Start: 2023-03-22

## 2023-03-15 RX ORDER — FAMOTIDINE 10 MG/ML
20 INJECTION, SOLUTION INTRAVENOUS
OUTPATIENT
Start: 2023-03-22

## 2023-03-15 RX ORDER — ALBUTEROL SULFATE 90 UG/1
4 AEROSOL, METERED RESPIRATORY (INHALATION) PRN
OUTPATIENT
Start: 2023-03-22

## 2023-03-15 RX ORDER — ONDANSETRON 2 MG/ML
8 INJECTION INTRAMUSCULAR; INTRAVENOUS
OUTPATIENT
Start: 2023-03-22

## 2023-03-15 RX ORDER — POTASSIUM BICARBONATE 978 MG/1
TABLET, EFFERVESCENT ORAL
Qty: 30 TABLET | Refills: 1 | Status: SHIPPED | OUTPATIENT
Start: 2023-03-15

## 2023-03-15 RX ORDER — HEPARIN SODIUM (PORCINE) LOCK FLUSH IV SOLN 100 UNIT/ML 100 UNIT/ML
500 SOLUTION INTRAVENOUS PRN
OUTPATIENT
Start: 2023-03-22

## 2023-03-15 RX ORDER — EPINEPHRINE 1 MG/ML
0.3 INJECTION, SOLUTION, CONCENTRATE INTRAVENOUS PRN
OUTPATIENT
Start: 2023-03-22

## 2023-03-15 RX ORDER — SODIUM CHLORIDE 9 MG/ML
5-250 INJECTION, SOLUTION INTRAVENOUS PRN
OUTPATIENT
Start: 2023-03-22

## 2023-03-15 RX ORDER — DIPHENHYDRAMINE HYDROCHLORIDE 50 MG/ML
50 INJECTION INTRAMUSCULAR; INTRAVENOUS
OUTPATIENT
Start: 2023-03-22

## 2023-03-15 RX ORDER — SODIUM CHLORIDE 9 MG/ML
INJECTION, SOLUTION INTRAVENOUS CONTINUOUS
OUTPATIENT
Start: 2023-03-22

## 2023-03-15 RX ORDER — SODIUM CHLORIDE 9 MG/ML
INJECTION, SOLUTION INTRAVENOUS PRN
Start: 2023-03-22

## 2023-03-21 NOTE — PROGRESS NOTES
[address] CANCER [address] [place] [address]  Dept: [phone]  Loc: [phone]   Hematology/Oncology Consult (Clinic)        3/22/2023    Purnima Yañez   1960     No ref. 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 Mass extends from 34 to 28 cm approximately 6 cm. Staging by EUS 3/23/2022 [facility] (T2 based on invasion and N2 based on 2 malignant appearing lymph nodes visualized and measured in the lower paraesophageal mediastinum level 8L adjacent to the mass. 2 malignant appearing lymph nodes visualized and measured in the subcarinal mediastinum level 7. PET/CT 3/30/2022 [facility]- hypermetabolic activity at mid esophagus consistent with primary malignancy with adjacent hypermetabolic left periesophageal lymph nodes. Brain Mets; extensive and bulky. 10/31/22. On Decadron 2 mg bid 11/22/22. Current Decadron dose 2 mg now changed to every other day effective 3/1/2023    (11/21/22) F1 and PDL-1 requested for me  by Dr Robert Alvarez rad Onc at [facility]; Not done then. Completed on Brain path :Foundation 1 reveals tumor mutation burden 17 for which Keytruda or nivolumab would be indicated. PAD-L1 CPS 2 - 3% i.e. positive      TREATMENT:  -Seen by Dr. Keily Palencia of thoracic surgery at [facility] 3/30/2022. Recommends neoadjuvant chemoradiation to be followed by surgery  -Neoadjuvant chemoradiation with Dr. Mary Jane Mancia. Low-dose carboplatinum Taxol weekly x 6-7. Chemo  Start date: 5/11. Day 1 XRT 5/11 6/16/2022 week #6 due/last treatment. Last XRT 6/20/22.  -Robotic assisted laparoscopic and right thoracoscopic [place] Graft esophagectomy ;   additional gastric margin, gastropathic lymph nodes and hernia sac per Dr. Keily Palencia 8/30/2022  No evidence of peritoneal metastatic disease.   (Path report requested

## 2023-03-22 ENCOUNTER — HOSPITAL ENCOUNTER (OUTPATIENT)
Dept: INFUSION THERAPY | Age: 63
Discharge: HOME | End: 2023-03-22
Payer: MEDICAID

## 2023-03-22 ENCOUNTER — OFFICE VISIT (OUTPATIENT)
Dept: ONCOLOGY | Age: 63
End: 2023-03-22
Payer: MEDICAID

## 2023-03-22 VITALS
OXYGEN SATURATION: 98 % | BODY MASS INDEX: 22.76 KG/M2 | RESPIRATION RATE: 18 BRPM | HEIGHT: 67 IN | SYSTOLIC BLOOD PRESSURE: 116 MMHG | TEMPERATURE: 98.2 F | DIASTOLIC BLOOD PRESSURE: 74 MMHG | WEIGHT: 145 LBS | HEART RATE: 81 BPM

## 2023-03-22 VITALS
TEMPERATURE: 98.8 F | RESPIRATION RATE: 16 BRPM | OXYGEN SATURATION: 98 % | HEART RATE: 73 BPM | HEIGHT: 67 IN | BODY MASS INDEX: 22.84 KG/M2 | SYSTOLIC BLOOD PRESSURE: 105 MMHG | DIASTOLIC BLOOD PRESSURE: 61 MMHG | WEIGHT: 145.5 LBS

## 2023-03-22 DIAGNOSIS — C15.5 CANCER OF DISTAL THIRD OF ESOPHAGUS (HCC): Primary | ICD-10-CM

## 2023-03-22 DIAGNOSIS — C15.9 METASTASIS FROM ESOPHAGEAL CANCER (HCC): Primary | ICD-10-CM

## 2023-03-22 DIAGNOSIS — C79.9 METASTASIS FROM ESOPHAGEAL CANCER (HCC): Primary | ICD-10-CM

## 2023-03-22 LAB
ABSOLUTE IMMATURE GRANULOCYTE: 0.12 THOU/MM3 (ref 0–0.07)
ALBUMIN SERPL BCG-MCNC: 3.5 G/DL (ref 3.5–5.1)
ALP SERPL-CCNC: 74 U/L (ref 38–126)
ALT SERPL W/O P-5'-P-CCNC: 15 U/L (ref 11–66)
AST SERPL-CCNC: 16 U/L (ref 5–40)
BASOPHILS ABSOLUTE: 0 THOU/MM3 (ref 0–0.1)
BASOPHILS NFR BLD AUTO: 1 % (ref 0–3)
BILIRUB CONJ SERPL-MCNC: < 0.2 MG/DL (ref 0–0.3)
BILIRUB SERPL-MCNC: 0.4 MG/DL (ref 0.3–1.2)
BUN BLDP-MCNC: 11 MG/DL (ref 8–26)
CHLORIDE BLD-SCNC: 106 MEQ/L (ref 98–109)
CREAT BLD-MCNC: 0.7 MG/DL (ref 0.5–1.2)
EOSINOPHIL NFR BLD AUTO: 5 % (ref 0–4)
EOSINOPHILS ABSOLUTE: 0.2 THOU/MM3 (ref 0–0.4)
ERYTHROCYTE [DISTWIDTH] IN BLOOD BY AUTOMATED COUNT: 12.7 % (ref 11.5–14.5)
GFR SERPL CREATININE-BSD FRML MDRD: > 60 ML/MIN/1.73M2
GLUCOSE BLD-MCNC: 107 MG/DL (ref 70–108)
HCT VFR BLD AUTO: 32.7 % (ref 42–52)
HGB BLD-MCNC: 11.1 GM/DL (ref 14–18)
IMMATURE GRANULOCYTES: 3 %
IONIZED CALCIUM, WHOLE BLOOD: 1.24 MMOL/L (ref 1.12–1.32)
LYMPHOCYTES ABSOLUTE: 0.3 THOU/MM3 (ref 1–4.8)
LYMPHOCYTES NFR BLD AUTO: 7 % (ref 15–47)
MCH RBC QN AUTO: 34.6 PG (ref 26–33)
MCHC RBC AUTO-ENTMCNC: 33.9 GM/DL (ref 32.2–35.5)
MCV RBC AUTO: 102 FL (ref 80–94)
MONOCYTES ABSOLUTE: 0.4 THOU/MM3 (ref 0.4–1.3)
MONOCYTES NFR BLD AUTO: 8 % (ref 0–12)
NEUTROPHILS NFR BLD AUTO: 77 % (ref 43–75)
PLATELET # BLD AUTO: 114 THOU/MM3 (ref 130–400)
PMV BLD AUTO: 8.5 FL (ref 9.4–12.4)
POTASSIUM BLD-SCNC: 3.8 MEQ/L (ref 3.5–4.9)
PROT SERPL-MCNC: 5.8 G/DL (ref 6.1–8)
RBC # BLD AUTO: 3.21 MILL/MM3 (ref 4.7–6.1)
SEGMENTED NEUTROPHILS ABSOLUTE COUNT: 3.7 THOU/MM3 (ref 1.8–7.7)
SODIUM BLD-SCNC: 139 MEQ/L (ref 138–146)
TOTAL CO2, WHOLE BLOOD: 22 MEQ/L (ref 23–33)
WBC # BLD AUTO: 4.7 THOU/MM3 (ref 4.8–10.8)

## 2023-03-22 PROCEDURE — 80047 BASIC METABLC PNL IONIZED CA: CPT

## 2023-03-22 PROCEDURE — 99211 OFF/OP EST MAY X REQ PHY/QHP: CPT

## 2023-03-22 PROCEDURE — 2580000003 HC RX 258: Performed by: INTERNAL MEDICINE

## 2023-03-22 PROCEDURE — 3074F SYST BP LT 130 MM HG: CPT | Performed by: INTERNAL MEDICINE

## 2023-03-22 PROCEDURE — 99214 OFFICE O/P EST MOD 30 MIN: CPT | Performed by: INTERNAL MEDICINE

## 2023-03-22 PROCEDURE — 6360000002 HC RX W HCPCS: Performed by: INTERNAL MEDICINE

## 2023-03-22 PROCEDURE — 80076 HEPATIC FUNCTION PANEL: CPT

## 2023-03-22 PROCEDURE — 36591 DRAW BLOOD OFF VENOUS DEVICE: CPT

## 2023-03-22 PROCEDURE — 3078F DIAST BP <80 MM HG: CPT | Performed by: INTERNAL MEDICINE

## 2023-03-22 PROCEDURE — 96413 CHEMO IV INFUSION 1 HR: CPT

## 2023-03-22 PROCEDURE — 85025 COMPLETE CBC W/AUTO DIFF WBC: CPT

## 2023-03-22 RX ORDER — HEPARIN SODIUM (PORCINE) LOCK FLUSH IV SOLN 100 UNIT/ML 100 UNIT/ML
500 SOLUTION INTRAVENOUS PRN
OUTPATIENT
Start: 2023-03-22

## 2023-03-22 RX ORDER — HEPARIN SODIUM (PORCINE) LOCK FLUSH IV SOLN 100 UNIT/ML 100 UNIT/ML
500 SOLUTION INTRAVENOUS PRN
Status: DISCONTINUED | OUTPATIENT
Start: 2023-03-22 | End: 2023-03-23 | Stop reason: HOSPADM

## 2023-03-22 RX ORDER — WATER 1000 ML/1000ML
2.2 INJECTION, SOLUTION INTRAVENOUS ONCE
OUTPATIENT
Start: 2023-03-22 | End: 2023-03-22

## 2023-03-22 RX ORDER — SODIUM CHLORIDE 9 MG/ML
25 INJECTION, SOLUTION INTRAVENOUS PRN
OUTPATIENT
Start: 2023-03-22

## 2023-03-22 RX ORDER — SODIUM CHLORIDE 0.9 % (FLUSH) 0.9 %
5-40 SYRINGE (ML) INJECTION PRN
OUTPATIENT
Start: 2023-03-22

## 2023-03-22 RX ORDER — SODIUM CHLORIDE 0.9 % (FLUSH) 0.9 %
5-40 SYRINGE (ML) INJECTION PRN
Status: DISCONTINUED | OUTPATIENT
Start: 2023-03-22 | End: 2023-03-23 | Stop reason: HOSPADM

## 2023-03-22 RX ORDER — SODIUM CHLORIDE 9 MG/ML
INJECTION, SOLUTION INTRAVENOUS PRN
Status: DISCONTINUED | OUTPATIENT
Start: 2023-03-22 | End: 2023-03-23 | Stop reason: HOSPADM

## 2023-03-22 RX ADMIN — Medication 500 UNITS: at 11:08

## 2023-03-22 RX ADMIN — SODIUM CHLORIDE, PRESERVATIVE FREE 10 ML: 5 INJECTION INTRAVENOUS at 08:20

## 2023-03-22 RX ADMIN — SODIUM CHLORIDE, PRESERVATIVE FREE 10 ML: 5 INJECTION INTRAVENOUS at 11:08

## 2023-03-22 RX ADMIN — SODIUM CHLORIDE 420 MG: 9 INJECTION, SOLUTION INTRAVENOUS at 10:26

## 2023-03-22 RX ADMIN — SODIUM CHLORIDE: 9 INJECTION, SOLUTION INTRAVENOUS at 09:54

## 2023-03-22 RX ADMIN — SODIUM CHLORIDE, PRESERVATIVE FREE 20 ML: 5 INJECTION INTRAVENOUS at 08:21

## 2023-03-22 NOTE — ONCOLOGY
Chemotherapy Administration    Pre-assessment Data: Antineoplastic Agents  See toxicity flow sheet for assessment                                          [x]         Interventions:   Chemotherapy SQ injection given []   Taxol administered-VS per protocol []   Blood pressure meds held 12 hours prior to Rituxan/Ruxience []   Rituxan/Ruxience administered- VS and precautions per guidelines []   Emergency drugs available as appropriate [x]   Anaphylaxis assessment completed [x]   Pre-medications administered as ordered [x]   Blood return noted upon initiation of chemotherapy [x]   Blood return noted each 1-2ml of a vesicant medication if given IV push []   Navelbine, Vincristine and Velban given as a monitored wide open drip, blood return noted before during and after infusion.  []   Blood return noted each 2-3ml of a non-vesicant medication if given IV push []   Patient aware of potential Immunotherapy toxicities []   Monitor for signs / symptoms of hypersensitivity reaction [x]   Chemotherapy orders (drug/dose/rate) verified by 2 Chemo certified RNs [x]   Monitor IV site and blood return throughout the infusion of the medication [x]   Document IV site checks on the IV assessment form [x]   Document chemotherapy teaching on the Patient Education tab [x]   Document patient verbalizes understanding of medications being administered [x]   If IV infiltration, see ONS Guidelines []   Other:     Leroy Coon []

## 2023-03-22 NOTE — PATIENT INSTRUCTIONS
Proceed with every 3-week Herceptin today  Next treatment 3 weeks and does not need to see provider. Follow-up with me in 6 weeks for ongoing treatment and labs.

## 2023-03-22 NOTE — PROGRESS NOTES
Patient assessed for the following post chemotherapy:    Dizziness   No  Lightheadedness  No      Acute nausea/vomiting No  Headache   No  Chest pain/pressure  No  Rash/itching   No  Shortness of breath  No    Patient kept for 20 minutes observation post infusion chemotherapy. Patient tolerated chemotherapy treatment Trazimera without any complications. Last vital signs:   /61   Pulse 73   Temp 98.8 °F (37.1 °C) (Oral)   Resp 16   Ht 5' 7\" (1.702 m)   Wt 145 lb 8 oz (66 kg)   SpO2 98%   BMI 22.79 kg/m²     Physician's instructions:  Proceed with every 3-week Herceptin today  Next treatment 3 weeks and does not need to see provider. Follow-up with me in 6 weeks for ongoing treatment and labs. Patient instructed if experience any of the above symptoms following today's infusion, he is to notify MD immediately or go to the emergency department. Discharge instructions given to patient. Verbalizes understanding. Ambulated off unit per self, with belongings.

## 2023-03-22 NOTE — DISCHARGE INSTRUCTIONS
Please contact your Oncologist if you have any questions regarding the chemotherapy Trazimera that you received today. Patient instructed if experience any of the symptoms following today's chemotherapy treatment to notify MD immediately or go to emergency department. * dizziness/lightheadedness  *acute nausea/vomiting - not relieved with medication  *headache - not relieved from Tylenol/pain medication  *chest pain/pressure  *rash/itching  *shortness of breath        Drink fluids - 48oz fluids daily  Call if develop fever/ chills/ signs or symptoms of infection     Physician's instructions:  Proceed with every 3-week Herceptin today  Next treatment 3 weeks and does not need to see provider. Follow-up with me in 6 weeks for ongoing treatment and labs.

## 2023-03-22 NOTE — PLAN OF CARE
Problem: Chronic Conditions and Co-morbidities  Goal: Patient's chronic conditions and co-morbidity symptoms are monitored and maintained or improved  Outcome: Adequate for Discharge  Flowsheets (Taken 3/22/2023 3835)  Care Plan - Patient's Chronic Conditions and Co-Morbidity Symptoms are Monitored and Maintained or Improved: Collaborate with multidisciplinary team to address chronic and comorbid conditions and prevent exacerbation or deterioration  Note: Chemotherapy Teaching     What is Chemotherapy   Drug action [x]   Method of Administration [x]   Handouts given []     Side Effects  Nausea/vomiting [x]   Diarrhea [x]   Fatigue [x]   Signs / Symptoms of infection [x]   Neutropenia [x]   Thrombocytopenia [x]   Alopecia [x]   neuropathy [x]   [place] diet &  the importance of fluids [x]       Micellaneous  Importance of nutrition [x]   Importance of oral hygiene [x]   When to call the MD [x]   Monitoring labs [x]   Use of supportive services []     Explanation of Drug Regimen / Frequency  Herceptin     Comments  Verbalized understanding to drug,action,side effects and when to call MD         Problem: Infection - Adult  Goal: Absence of infection at discharge  Outcome: Adequate for Discharge  Flowsheets (Taken 3/22/2023 1516)  Absence of infection at discharge: Monitor all insertion sites i.e., indwelling lines, tubes and drains  Note: Mediport site with no redness or warmth. Skin over port site intact with no signs of breakdown noted. Patient verbalizes signs/symptoms of port infection and when to notify the physician.     Goal: Will show no infection signs and symptoms  Description: Will show no infection signs and symptoms  Outcome: Adequate for Discharge     Problem: Discharge Planning  Goal: Discharge to home or other facility with appropriate resources  Outcome: Adequate for Discharge  Flowsheets (Taken 3/22/2023 1234)  Discharge to home or other facility with appropriate resources: Identify barriers to discharge with patient and caregiver     Problem: Safety - Adult  Goal: Free from fall injury  Outcome: Adequate for Discharge  Flowsheets (Taken 3/22/2023 3004)  Free From Fall Injury: Instruct family/caregiver on patient safety     Care plan reviewed with patient and spouse. Patient and spouse verbalize understanding of the plan of care and contribute to goal setting.

## 2023-04-06 DIAGNOSIS — C15.5 CANCER OF DISTAL THIRD OF ESOPHAGUS (HCC): Primary | ICD-10-CM

## 2023-04-06 RX ORDER — ACETAMINOPHEN 325 MG/1
650 TABLET ORAL
OUTPATIENT
Start: 2023-04-12

## 2023-04-06 RX ORDER — HEPARIN SODIUM (PORCINE) LOCK FLUSH IV SOLN 100 UNIT/ML 100 UNIT/ML
500 SOLUTION INTRAVENOUS PRN
OUTPATIENT
Start: 2023-04-12

## 2023-04-06 RX ORDER — ONDANSETRON 2 MG/ML
8 INJECTION INTRAMUSCULAR; INTRAVENOUS
OUTPATIENT
Start: 2023-04-12

## 2023-04-06 RX ORDER — SODIUM CHLORIDE 9 MG/ML
INJECTION, SOLUTION INTRAVENOUS PRN
Start: 2023-04-12

## 2023-04-06 RX ORDER — SODIUM CHLORIDE 9 MG/ML
5-250 INJECTION, SOLUTION INTRAVENOUS PRN
OUTPATIENT
Start: 2023-04-12

## 2023-04-06 RX ORDER — ALBUTEROL SULFATE 90 UG/1
4 AEROSOL, METERED RESPIRATORY (INHALATION) PRN
OUTPATIENT
Start: 2023-04-12

## 2023-04-06 RX ORDER — SODIUM CHLORIDE 0.9 % (FLUSH) 0.9 %
5-40 SYRINGE (ML) INJECTION PRN
OUTPATIENT
Start: 2023-04-12

## 2023-04-06 RX ORDER — DIPHENHYDRAMINE HYDROCHLORIDE 50 MG/ML
50 INJECTION INTRAMUSCULAR; INTRAVENOUS
OUTPATIENT
Start: 2023-04-12

## 2023-04-06 RX ORDER — EPINEPHRINE 1 MG/ML
0.3 INJECTION, SOLUTION, CONCENTRATE INTRAVENOUS PRN
OUTPATIENT
Start: 2023-04-12

## 2023-04-06 RX ORDER — MEPERIDINE HYDROCHLORIDE 50 MG/ML
12.5 INJECTION INTRAMUSCULAR; INTRAVENOUS; SUBCUTANEOUS PRN
OUTPATIENT
Start: 2023-04-12

## 2023-04-06 RX ORDER — FAMOTIDINE 10 MG/ML
20 INJECTION, SOLUTION INTRAVENOUS
OUTPATIENT
Start: 2023-04-12

## 2023-04-06 RX ORDER — SODIUM CHLORIDE 9 MG/ML
INJECTION, SOLUTION INTRAVENOUS CONTINUOUS
OUTPATIENT
Start: 2023-04-12

## 2023-04-12 ENCOUNTER — HOSPITAL ENCOUNTER (OUTPATIENT)
Dept: INFUSION THERAPY | Age: 63
Discharge: HOME | End: 2023-04-12
Payer: MEDICAID

## 2023-04-12 VITALS
TEMPERATURE: 99.4 F | BODY MASS INDEX: 22.44 KG/M2 | DIASTOLIC BLOOD PRESSURE: 76 MMHG | HEART RATE: 76 BPM | SYSTOLIC BLOOD PRESSURE: 122 MMHG | RESPIRATION RATE: 16 BRPM | OXYGEN SATURATION: 100 % | HEIGHT: 67 IN | WEIGHT: 143 LBS

## 2023-04-12 DIAGNOSIS — C15.5 CANCER OF DISTAL THIRD OF ESOPHAGUS (HCC): Primary | ICD-10-CM

## 2023-04-12 LAB
ABSOLUTE IMMATURE GRANULOCYTE: 0.07 THOU/MM3 (ref 0–0.07)
ALBUMIN SERPL BCG-MCNC: 3.6 G/DL (ref 3.5–5.1)
ALP SERPL-CCNC: 64 U/L (ref 38–126)
ALT SERPL W/O P-5'-P-CCNC: 12 U/L (ref 11–66)
AST SERPL-CCNC: 15 U/L (ref 5–40)
BASOPHILS ABSOLUTE: 0 THOU/MM3 (ref 0–0.1)
BASOPHILS NFR BLD AUTO: 0 % (ref 0–3)
BILIRUB CONJ SERPL-MCNC: < 0.2 MG/DL (ref 0–0.3)
BILIRUB SERPL-MCNC: 0.5 MG/DL (ref 0.3–1.2)
BUN BLDP-MCNC: 8 MG/DL (ref 8–26)
CHLORIDE BLD-SCNC: 108 MEQ/L (ref 98–109)
CREAT BLD-MCNC: 0.7 MG/DL (ref 0.5–1.2)
EOSINOPHIL NFR BLD AUTO: 3 % (ref 0–4)
EOSINOPHILS ABSOLUTE: 0.2 THOU/MM3 (ref 0–0.4)
ERYTHROCYTE [DISTWIDTH] IN BLOOD BY AUTOMATED COUNT: 12.2 % (ref 11.5–14.5)
GFR SERPL CREATININE-BSD FRML MDRD: > 60 ML/MIN/1.73M2
GLUCOSE BLD-MCNC: 82 MG/DL (ref 70–108)
HCT VFR BLD AUTO: 31.8 % (ref 42–52)
HGB BLD-MCNC: 10.8 GM/DL (ref 14–18)
IMMATURE GRANULOCYTES: 2 %
IONIZED CALCIUM, WHOLE BLOOD: 1.28 MMOL/L (ref 1.12–1.32)
LYMPHOCYTES ABSOLUTE: 0.6 THOU/MM3 (ref 1–4.8)
LYMPHOCYTES NFR BLD AUTO: 12 % (ref 15–47)
MCH RBC QN AUTO: 34.1 PG (ref 26–33)
MCHC RBC AUTO-ENTMCNC: 34 GM/DL (ref 32.2–35.5)
MCV RBC AUTO: 100 FL (ref 80–94)
MONOCYTES ABSOLUTE: 0.4 THOU/MM3 (ref 0.4–1.3)
MONOCYTES NFR BLD AUTO: 8 % (ref 0–12)
NEUTROPHILS NFR BLD AUTO: 75 % (ref 43–75)
PLATELET # BLD AUTO: 119 THOU/MM3 (ref 130–400)
PMV BLD AUTO: 8 FL (ref 9.4–12.4)
POTASSIUM BLD-SCNC: 3.3 MEQ/L (ref 3.5–4.9)
PROT SERPL-MCNC: 5.8 G/DL (ref 6.1–8)
RBC # BLD AUTO: 3.17 MILL/MM3 (ref 4.7–6.1)
SEGMENTED NEUTROPHILS ABSOLUTE COUNT: 3.6 THOU/MM3 (ref 1.8–7.7)
SODIUM BLD-SCNC: 142 MEQ/L (ref 138–146)
TOTAL CO2, WHOLE BLOOD: 24 MEQ/L (ref 23–33)
WBC # BLD AUTO: 4.8 THOU/MM3 (ref 4.8–10.8)

## 2023-04-12 PROCEDURE — 96366 THER/PROPH/DIAG IV INF ADDON: CPT

## 2023-04-12 PROCEDURE — 36591 DRAW BLOOD OFF VENOUS DEVICE: CPT

## 2023-04-12 PROCEDURE — 2580000003 HC RX 258: Performed by: INTERNAL MEDICINE

## 2023-04-12 PROCEDURE — 99211 OFF/OP EST MAY X REQ PHY/QHP: CPT

## 2023-04-12 PROCEDURE — 96365 THER/PROPH/DIAG IV INF INIT: CPT

## 2023-04-12 PROCEDURE — 80047 BASIC METABLC PNL IONIZED CA: CPT

## 2023-04-12 PROCEDURE — 85025 COMPLETE CBC W/AUTO DIFF WBC: CPT

## 2023-04-12 PROCEDURE — 80076 HEPATIC FUNCTION PANEL: CPT

## 2023-04-12 PROCEDURE — 6360000002 HC RX W HCPCS: Performed by: INTERNAL MEDICINE

## 2023-04-12 RX ORDER — WATER 1000 ML/1000ML
2.2 INJECTION, SOLUTION INTRAVENOUS ONCE
Status: CANCELLED | OUTPATIENT
Start: 2023-04-12 | End: 2023-04-12

## 2023-04-12 RX ORDER — HEPARIN SODIUM (PORCINE) LOCK FLUSH IV SOLN 100 UNIT/ML 100 UNIT/ML
500 SOLUTION INTRAVENOUS PRN
Status: CANCELLED | OUTPATIENT
Start: 2023-04-12

## 2023-04-12 RX ORDER — SODIUM CHLORIDE 0.9 % (FLUSH) 0.9 %
5-40 SYRINGE (ML) INJECTION PRN
Status: DISCONTINUED | OUTPATIENT
Start: 2023-04-12 | End: 2023-04-13 | Stop reason: HOSPADM

## 2023-04-12 RX ORDER — SODIUM CHLORIDE 9 MG/ML
25 INJECTION, SOLUTION INTRAVENOUS PRN
Status: CANCELLED | OUTPATIENT
Start: 2023-04-12

## 2023-04-12 RX ORDER — SODIUM CHLORIDE 0.9 % (FLUSH) 0.9 %
5-40 SYRINGE (ML) INJECTION PRN
Status: CANCELLED | OUTPATIENT
Start: 2023-04-12

## 2023-04-12 RX ORDER — HEPARIN SODIUM (PORCINE) LOCK FLUSH IV SOLN 100 UNIT/ML 100 UNIT/ML
500 SOLUTION INTRAVENOUS PRN
Status: DISCONTINUED | OUTPATIENT
Start: 2023-04-12 | End: 2023-04-13 | Stop reason: HOSPADM

## 2023-04-12 RX ADMIN — SODIUM CHLORIDE, PRESERVATIVE FREE 10 ML: 5 INJECTION INTRAVENOUS at 13:50

## 2023-04-12 RX ADMIN — HEPARIN 500 UNITS: 100 SYRINGE at 13:50

## 2023-04-12 RX ADMIN — SODIUM CHLORIDE, PRESERVATIVE FREE 20 ML: 5 INJECTION INTRAVENOUS at 10:41

## 2023-04-12 RX ADMIN — SODIUM CHLORIDE, PRESERVATIVE FREE 10 ML: 5 INJECTION INTRAVENOUS at 10:40

## 2023-04-12 RX ADMIN — POTASSIUM CHLORIDE: 2 INJECTION, SOLUTION, CONCENTRATE INTRAVENOUS at 11:36

## 2023-04-12 NOTE — DISCHARGE INSTRUCTIONS
Please contact your Oncologist if you have any questions regarding the IV hydration with potassium replacement that you received today. Patient instructed if experience any of the symptoms following today's IV hydration with electrolyte replacement to notify MD immediately or go to emergency department. * dizziness/lightheadedness  *acute nausea/vomiting - not relieved with medication  *headache - not relieved from Tylenol/pain medication  *chest pain/pressure  *rash/itching  *shortness of breath        Drink fluids - 48oz fluids daily  Call if develop fever/ chills/ signs or symptoms of infection       Physician's instructions:  Hold treatment today. Give 1000 ml of NS with 20 mEq potassium   Will order MRI of Head to evaluate swelling/pain   Schedule patient to see me next week.

## 2023-04-12 NOTE — PLAN OF CARE
Problem: Chronic Conditions and Co-morbidities  Goal: Patient's chronic conditions and co-morbidity symptoms are monitored and maintained or improved  Outcome: Adequate for Discharge  Flowsheets (Taken 4/12/2023 1532)  Care Plan - Patient's Chronic Conditions and Co-Morbidity Symptoms are Monitored and Maintained or Improved: Collaborate with multidisciplinary team to address chronic and comorbid conditions and prevent exacerbation or deterioration  Note: IV fluids with potassium reviewed, patient verbalizes understanding of medication being administered and potential side effects. Problem: Infection - Adult  Goal: Absence of infection at discharge  Outcome: Adequate for Discharge  Flowsheets (Taken 4/12/2023 1532)  Absence of infection at discharge: Monitor all insertion sites i.e., indwelling lines, tubes and drains  Note: Mediport site with no redness or warmth. Skin over port site intact with no signs of breakdown noted. Patient verbalizes signs/symptoms of port infection and when to notify the physician. Goal: Will show no infection signs and symptoms  Description: Will show no infection signs and symptoms  Outcome: Adequate for Discharge     Problem: Discharge Planning  Goal: Discharge to home or other facility with appropriate resources  Outcome: Adequate for Discharge  Flowsheets (Taken 4/12/2023 1532)  Discharge to home or other facility with appropriate resources: Identify barriers to discharge with patient and caregiver     Problem: Safety - Adult  Goal: Free from fall injury  Outcome: Adequate for Discharge  Flowsheets (Taken 4/12/2023 1532)  Free From Fall Injury: Instruct family/caregiver on patient safety     Care plan reviewed with patient and family. Patient and family verbalize understanding of the plan of care and contribute to goal setting.

## 2023-04-12 NOTE — PROGRESS NOTES
Patient assessed for the following post IV fluids with potassium:    Dizziness   No  Lightheadedness  No      Acute nausea/vomiting No  Headache   No  Chest pain/pressure  No  Rash/itching   No  Shortness of breath  No    Patient tolerated IV hydration with potassium replacement   without any complications. Last vital signs:   /76   Pulse 76   Temp 99.4 °F (37.4 °C) (Oral)   Resp 16   Ht 5' 7\" (1.702 m)   Wt 143 lb (64.9 kg)   SpO2 100%   BMI 22.40 kg/m²     Physician's instructions:  Patient here today for Herceptin. Family notes some nausea vomiting and increased right-sided headaches. Brain MRI early March was unchanged  Exam shows some soft tissue swelling around the surgical site of the right occipital area of the skull versus soft tissues. Therefore hold Herceptin. 1 L normal saline with 20 mg of K per liter. Potassium is 3.3. MRI stat. Follow-up with me in 1 week. Patient instructed if experience any of the above symptoms following today's infusion, he is to notify MD immediately or go to the emergency department. Discharge instructions given to patient. Verbalizes understanding. Wheelchair assisted off unit by family, with belongings.

## 2023-04-19 ENCOUNTER — HOSPITAL ENCOUNTER (OUTPATIENT)
Dept: INFUSION THERAPY | Age: 63
Discharge: HOME | End: 2023-04-19
Payer: MEDICAID

## 2023-04-19 ENCOUNTER — HOSPITAL ENCOUNTER (OUTPATIENT)
Dept: CT IMAGING | Age: 63
Discharge: HOME | End: 2023-04-19
Payer: MEDICAID

## 2023-04-19 ENCOUNTER — OFFICE VISIT (OUTPATIENT)
Dept: ONCOLOGY | Age: 63
End: 2023-04-19
Payer: MEDICAID

## 2023-04-19 VITALS
BODY MASS INDEX: 22.44 KG/M2 | RESPIRATION RATE: 16 BRPM | HEIGHT: 67 IN | OXYGEN SATURATION: 97 % | DIASTOLIC BLOOD PRESSURE: 62 MMHG | WEIGHT: 143 LBS | TEMPERATURE: 97.5 F | SYSTOLIC BLOOD PRESSURE: 94 MMHG | HEART RATE: 68 BPM

## 2023-04-19 VITALS
HEART RATE: 64 BPM | OXYGEN SATURATION: 98 % | RESPIRATION RATE: 16 BRPM | TEMPERATURE: 97.4 F | SYSTOLIC BLOOD PRESSURE: 112 MMHG | DIASTOLIC BLOOD PRESSURE: 72 MMHG

## 2023-04-19 DIAGNOSIS — Z51.11 ENCOUNTER FOR CHEMOTHERAPY MANAGEMENT: Primary | ICD-10-CM

## 2023-04-19 DIAGNOSIS — D69.6 THROMBOCYTOPENIA (HCC): ICD-10-CM

## 2023-04-19 DIAGNOSIS — C15.5 CANCER OF DISTAL THIRD OF ESOPHAGUS (HCC): ICD-10-CM

## 2023-04-19 DIAGNOSIS — Z51.11 ENCOUNTER FOR CHEMOTHERAPY MANAGEMENT: ICD-10-CM

## 2023-04-19 DIAGNOSIS — D64.9 ANEMIA, UNSPECIFIED TYPE: ICD-10-CM

## 2023-04-19 DIAGNOSIS — E87.6 HYPOKALEMIA: ICD-10-CM

## 2023-04-19 DIAGNOSIS — C15.9 METASTASIS FROM ESOPHAGEAL CANCER (HCC): ICD-10-CM

## 2023-04-19 DIAGNOSIS — C79.9 METASTASIS FROM ESOPHAGEAL CANCER (HCC): ICD-10-CM
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
## 2023-04-19 LAB
ABSOLUTE IMMATURE GRANULOCYTE: 0.09 THOU/MM3 (ref 0–0.07)
ALBUMIN SERPL BCG-MCNC: 3.6 G/DL (ref 3.5–5.1)
ALP SERPL-CCNC: 61 U/L (ref 38–126)
ALT SERPL W/O P-5'-P-CCNC: 14 U/L (ref 11–66)
AST SERPL-CCNC: 14 U/L (ref 5–40)
BASOPHILS ABSOLUTE: 0 THOU/MM3 (ref 0–0.1)
BASOPHILS NFR BLD AUTO: 0 % (ref 0–3)
BILIRUB CONJ SERPL-MCNC: < 0.2 MG/DL (ref 0–0.3)
BILIRUB SERPL-MCNC: 0.4 MG/DL (ref 0.3–1.2)
BUN BLDP-MCNC: 12 MG/DL (ref 8–26)
CHLORIDE BLD-SCNC: 107 MEQ/L (ref 98–109)
CREAT BLD-MCNC: 0.8 MG/DL (ref 0.5–1.2)
EOSINOPHIL NFR BLD AUTO: 1 % (ref 0–4)
EOSINOPHILS ABSOLUTE: 0 THOU/MM3 (ref 0–0.4)
ERYTHROCYTE [DISTWIDTH] IN BLOOD BY AUTOMATED COUNT: 12.2 % (ref 11.5–14.5)
GFR SERPL CREATININE-BSD FRML MDRD: > 60 ML/MIN/1.73M2
GLUCOSE BLD-MCNC: 109 MG/DL (ref 70–108)
HCT VFR BLD AUTO: 32.5 % (ref 42–52)
HGB BLD-MCNC: 10.8 GM/DL (ref 14–18)
IMMATURE GRANULOCYTES: 2 %
IONIZED CALCIUM, WHOLE BLOOD: 1.24 MMOL/L (ref 1.12–1.32)
LYMPHOCYTES ABSOLUTE: 0.8 THOU/MM3 (ref 1–4.8)
LYMPHOCYTES NFR BLD AUTO: 14 % (ref 15–47)
MCH RBC QN AUTO: 34 PG (ref 26–33)
MCHC RBC AUTO-ENTMCNC: 33.2 GM/DL (ref 32.2–35.5)
MCV RBC AUTO: 102 FL (ref 80–94)
MONOCYTES ABSOLUTE: 0.4 THOU/MM3 (ref 0.4–1.3)
MONOCYTES NFR BLD AUTO: 8 % (ref 0–12)
NEUTROPHILS NFR BLD AUTO: 76 % (ref 43–75)
PLATELET # BLD AUTO: 111 THOU/MM3 (ref 130–400)
PMV BLD AUTO: 8.6 FL (ref 9.4–12.4)
POTASSIUM BLD-SCNC: 3.1 MEQ/L (ref 3.5–4.9)
PROT SERPL-MCNC: 5.6 G/DL (ref 6.1–8)
RBC # BLD AUTO: 3.18 MILL/MM3 (ref 4.7–6.1)
SEGMENTED NEUTROPHILS ABSOLUTE COUNT: 4.1 THOU/MM3 (ref 1.8–7.7)
SODIUM BLD-SCNC: 140 MEQ/L (ref 138–146)
TOTAL CO2, WHOLE BLOOD: 22 MEQ/L (ref 23–33)
WBC # BLD AUTO: 5.4 THOU/MM3 (ref 4.8–10.8)

## 2023-04-19 PROCEDURE — 2580000003 HC RX 258: Performed by: INTERNAL MEDICINE

## 2023-04-19 PROCEDURE — 85025 COMPLETE CBC W/AUTO DIFF WBC: CPT

## 2023-04-19 PROCEDURE — 80047 BASIC METABLC PNL IONIZED CA: CPT

## 2023-04-19 PROCEDURE — 2580000003 HC RX 258: Performed by: NURSE PRACTITIONER

## 2023-04-19 PROCEDURE — 6360000002 HC RX W HCPCS: Performed by: INTERNAL MEDICINE

## 2023-04-19 PROCEDURE — 96365 THER/PROPH/DIAG IV INF INIT: CPT

## 2023-04-19 PROCEDURE — 80076 HEPATIC FUNCTION PANEL: CPT

## 2023-04-19 PROCEDURE — 96366 THER/PROPH/DIAG IV INF ADDON: CPT

## 2023-04-19 PROCEDURE — 99215 OFFICE O/P EST HI 40 MIN: CPT | Performed by: NURSE PRACTITIONER

## 2023-04-19 PROCEDURE — 99211 OFF/OP EST MAY X REQ PHY/QHP: CPT

## 2023-04-19 PROCEDURE — 3074F SYST BP LT 130 MM HG: CPT | Performed by: NURSE PRACTITIONER

## 2023-04-19 PROCEDURE — 36591 DRAW BLOOD OFF VENOUS DEVICE: CPT

## 2023-04-19 PROCEDURE — 70450 CT HEAD/BRAIN W/O DYE: CPT

## 2023-04-19 PROCEDURE — 6360000002 HC RX W HCPCS: Performed by: NURSE PRACTITIONER

## 2023-04-19 PROCEDURE — 3078F DIAST BP <80 MM HG: CPT | Performed by: NURSE PRACTITIONER

## 2023-04-19 RX ORDER — SODIUM CHLORIDE 0.9 % (FLUSH) 0.9 %
5-40 SYRINGE (ML) INJECTION PRN
Status: CANCELLED | OUTPATIENT
Start: 2023-04-19

## 2023-04-19 RX ORDER — HEPARIN SODIUM (PORCINE) LOCK FLUSH IV SOLN 100 UNIT/ML 100 UNIT/ML
500 SOLUTION INTRAVENOUS PRN
Status: CANCELLED | OUTPATIENT
Start: 2023-04-19

## 2023-04-19 RX ORDER — SODIUM CHLORIDE 9 MG/ML
25 INJECTION, SOLUTION INTRAVENOUS PRN
Status: CANCELLED | OUTPATIENT
Start: 2023-04-19

## 2023-04-19 RX ORDER — HEPARIN SODIUM (PORCINE) LOCK FLUSH IV SOLN 100 UNIT/ML 100 UNIT/ML
500 SOLUTION INTRAVENOUS PRN
Status: DISCONTINUED | OUTPATIENT
Start: 2023-04-19 | End: 2023-04-20 | Stop reason: HOSPADM

## 2023-04-19 RX ORDER — SODIUM CHLORIDE 0.9 % (FLUSH) 0.9 %
5-40 SYRINGE (ML) INJECTION PRN
Status: DISCONTINUED | OUTPATIENT
Start: 2023-04-19 | End: 2023-04-20 | Stop reason: HOSPADM

## 2023-04-19 RX ORDER — WATER 1000 ML/1000ML
2.2 INJECTION, SOLUTION INTRAVENOUS ONCE
Status: CANCELLED | OUTPATIENT
Start: 2023-04-19 | End: 2023-04-19

## 2023-04-19 RX ADMIN — SODIUM CHLORIDE, PRESERVATIVE FREE 10 ML: 5 INJECTION INTRAVENOUS at 11:53

## 2023-04-19 RX ADMIN — POTASSIUM CHLORIDE: 2 INJECTION, SOLUTION, CONCENTRATE INTRAVENOUS at 12:39

## 2023-04-19 RX ADMIN — HEPARIN 500 UNITS: 100 SYRINGE at 15:02

## 2023-04-19 RX ADMIN — SODIUM CHLORIDE, PRESERVATIVE FREE 10 ML: 5 INJECTION INTRAVENOUS at 15:02

## 2023-04-19 RX ADMIN — SODIUM CHLORIDE, PRESERVATIVE FREE 10 ML: 5 INJECTION INTRAVENOUS at 12:37

## 2023-04-19 RX ADMIN — SODIUM CHLORIDE, PRESERVATIVE FREE 10 ML: 5 INJECTION INTRAVENOUS at 11:52

## 2023-04-19 NOTE — PATIENT INSTRUCTIONS
IVF with potassium today over 2 hours   GO to ED for STAT CT head without contrast  PT/OT referral   Return next week for possible treatment   Return to clinic next week to see Max Kid with labs:  CBC, CMP
None

## 2023-04-19 NOTE — PLAN OF CARE
Problem: Chronic Conditions and Co-morbidities  Goal: Patient's chronic conditions and co-morbidity symptoms are monitored and maintained or improved  Outcome: Adequate for Discharge  Flowsheets (Taken 4/19/2023 1232)  Care Plan - Patient's Chronic Conditions and Co-Morbidity Symptoms are Monitored and Maintained or Improved: Monitor and assess patient's chronic conditions and comorbid symptoms for stability, deterioration, or improvement  Note: Patient received IV fluids with potassium with no complications. Verbalized understanding of discharge instructions. Assisted off unit by wheelchair. Problem: Discharge Planning  Goal: Discharge to home or other facility with appropriate resources  Outcome: Adequate for Discharge  Flowsheets (Taken 4/19/2023 1232)  Discharge to home or other facility with appropriate resources: Identify barriers to discharge with patient and caregiver  Note: Discuss understanding of discharge instructions,follow-up appointments, and when to call the physician. Verbalized understanding of discharge instructions, follow-up appointments, and when to call the physician. Problem: Safety - Adult  Goal: Free from fall injury  Outcome: Adequate for Discharge  Flowsheets (Taken 4/19/2023 1232)  Free From Fall Injury: Instruct family/caregiver on patient safety  Note: Verbalized understanding of fall prevention to ask for assistance with ambulation. Call light within reach. No falls occurred with visit today. Care plan reviewed with patient and spouse/daughter. Patient and spouse/daughter verbalize understanding of the plan of care and contribute to goal setting.

## 2023-04-19 NOTE — DISCHARGE INSTRUCTIONS
Please contact your Oncologist if you have any questions regarding the chemotherapy *** that you received today. Patient instructed if experience any of the symptoms following today's chemotherapy / to notify MD immediately or go to emergency department.     * dizziness/lightheadedness  *acute nausea/vomiting - not relieved with medication  *headache - not relieved from Tylenol/pain medication  *chest pain/pressure  *rash/itching  *shortness of breath        Drink fluids - 48oz fluids daily  Call if develop fever/ chills/ signs or symptoms of infection

## 2023-04-19 NOTE — PROGRESS NOTES
Patient tolerated IV fluids with potassium replacement with no complications. Reviewed discharge instructions and appointment for CT of head following this appointment-- verbalized understanding. Assisted off unit per wheelchair with belongings. To hospital for Ct scan- accompanied by spouse.

## 2023-04-19 NOTE — PROGRESS NOTES
2022    GASTRIC DEVASCULARIZATON-[facility]    ABDOMEN SURGERY  2022    ESOPHAGOGASTRODUODENOSCOPY TRANSORAL DIAGNOSTIC ESOPHAGECTOMY W/ THORACOTOMY-[facility]    DENTAL SURGERY      25 teeth removed    PORT SURGERY Left 2022    LEFT SINGLE LUMEN MEDIPORT performed by Trudee Duverney, MD at [address] [place] [address] History   Problem Relation Age of Onset    Colon Cancer Mother     Heart Disease Father     Cancer Brother         lung      Social History     Tobacco Use    Smoking status: Former     Packs/day: 3.00     Years: 10.00     Pack years: 30.00     Types: Cigarettes     Quit date: 2013     Years since quittin.9    Smokeless tobacco: Never    Tobacco comments:     does not smoke, quit    Substance Use Topics    Alcohol use: Never      Current Outpatient Medications   Medication Sig Dispense Refill    KLOR-CON/EF 25 MEQ disintegrating tablet DISSOLVE AND TAKE 1 TABLET BY MOUTH EVERY DAY 30 tablet 1    dexamethasone (DECADRON) 2 MG tablet Take 1 tablet by mouth every other day 30 tablet 1    megestrol (MEGACE) 40 MG tablet Take 1 tablet by mouth daily 30 tablet 2    pantoprazole (PROTONIX) 40 MG tablet Take 1 tablet by mouth in the morning and at bedtime 90 tablet 3    escitalopram (LEXAPRO) 10 MG tablet TAKE 1 TABLET BY MOUTH EVERY DAY 90 tablet 3    prochlorperazine (COMPAZINE) 10 MG tablet Take 1 tablet by mouth every 6 hours as needed (nausea) 120 tablet 1    lidocaine-prilocaine (EMLA) 2.5-2.5 % cream Apply topically as needed.  1 each 3    ibuprofen (ADVIL;MOTRIN) 400 MG tablet Take 1 tablet by mouth every 6 hours as needed for Pain      famotidine (PEPCID) 20 MG tablet Take 1 tablet by mouth 2 times daily      NONFORMULARY Hema-Plex (Iron Multi-vitamin)      Magic Mouthwash (MIRACLE MOUTHWASH) Swish and spit 5 mLs 4 times daily as needed for Irritation 1:1:1:1 nystatin, maalox, viscous lidocaine, benadryl (Patient not taking: No sig reported) 200 mL 2    ondansetron (ZOFRAN-ODT) 8 MG TBDP

## 2023-04-20 ENCOUNTER — TELEPHONE (OUTPATIENT)
Dept: ONCOLOGY | Age: 63
End: 2023-04-20

## 2023-04-20 DIAGNOSIS — C15.9 METASTASIS FROM ESOPHAGEAL CANCER (HCC): Primary | ICD-10-CM

## 2023-04-20 DIAGNOSIS — C79.9 METASTASIS FROM ESOPHAGEAL CANCER (HCC): Primary | ICD-10-CM

## 2023-04-21 ENCOUNTER — TELEPHONE (OUTPATIENT)
Dept: FAMILY MEDICINE CLINIC | Age: 63
End: 2023-04-21

## 2023-04-21 ENCOUNTER — TELEPHONE (OUTPATIENT)
Dept: ONCOLOGY | Age: 63
End: 2023-04-21

## 2023-04-21 ENCOUNTER — CLINICAL DOCUMENTATION (OUTPATIENT)
Dept: NUTRITION | Age: 63
End: 2023-04-21

## 2023-04-21 NOTE — TELEPHONE ENCOUNTER
Wife calling in very urgently requesting nutrition assistance for the patient. She said she called in about it yesterday but did not hear back. I informed her that a nutrition referral was placed by Dr. Wanda Garcia and that I would reach out to [facility] today. She said that her  has not been eating or drinking anything and is afraid Juan Caraballo is going to pass away\" this weekend if he doesn't get any form of nutrition. Email sent to Bridget Flores RD.

## 2023-04-21 NOTE — TELEPHONE ENCOUNTER
I am not able to order IV nutrition, this needs to come from Oncology. If no help from Oncology, recommend taking him into the ED.

## 2023-04-21 NOTE — TELEPHONE ENCOUNTER
The patients wife called in very tearful stating that she needs Dr. Jonny Mcginnis to order [facility] nutrition\" for the patient. She states that he is not able to take in enough nutrition to sustain life. She states that this morning he ate 1/2 a bowl of cereal and that will be all he takes in today. States that they have tried Ensure and other supplements and he just cant take it in. She denies any N/V. States that he is down to 130 lbs and \"he is dying\". Asked her if she had contacted his oncologist and she stated that she did call Dr. Robert Tavarez and was told that he was out today and they did not offer for any other provider to address her concerns. Please advise.

## 2023-04-26 ENCOUNTER — HOSPITAL ENCOUNTER (OUTPATIENT)
Dept: INFUSION THERAPY | Age: 63
Discharge: HOME | End: 2023-04-26
Payer: MEDICAID

## 2023-04-26 ENCOUNTER — HOSPITAL ENCOUNTER (INPATIENT)
Age: 63
LOS: 12 days | Discharge: HOME HEALTH | DRG: 987 | End: 2023-05-08
Attending: EMERGENCY MEDICINE | Admitting: PHYSICIAN ASSISTANT
Payer: MEDICAID

## 2023-04-26 ENCOUNTER — OFFICE VISIT (OUTPATIENT)
Dept: ONCOLOGY | Age: 63
End: 2023-04-26
Payer: MEDICAID

## 2023-04-26 VITALS
DIASTOLIC BLOOD PRESSURE: 69 MMHG | HEART RATE: 73 BPM | SYSTOLIC BLOOD PRESSURE: 102 MMHG | OXYGEN SATURATION: 99 % | TEMPERATURE: 98.4 F | HEIGHT: 67 IN | WEIGHT: 138 LBS | BODY MASS INDEX: 21.66 KG/M2 | RESPIRATION RATE: 16 BRPM

## 2023-04-26 VITALS
TEMPERATURE: 98.4 F | HEIGHT: 67 IN | DIASTOLIC BLOOD PRESSURE: 69 MMHG | BODY MASS INDEX: 21.66 KG/M2 | WEIGHT: 138 LBS | OXYGEN SATURATION: 99 % | SYSTOLIC BLOOD PRESSURE: 102 MMHG | RESPIRATION RATE: 16 BRPM | HEART RATE: 73 BPM

## 2023-04-26 DIAGNOSIS — D69.6 THROMBOCYTOPENIA (HCC): ICD-10-CM

## 2023-04-26 DIAGNOSIS — R62.7 FAILURE TO THRIVE IN ADULT: ICD-10-CM

## 2023-04-26 DIAGNOSIS — D64.9 ANEMIA, UNSPECIFIED TYPE: ICD-10-CM

## 2023-04-26 DIAGNOSIS — C79.9 METASTASIS FROM ESOPHAGEAL CANCER (HCC): Primary | ICD-10-CM

## 2023-04-26 DIAGNOSIS — C15.5 CANCER OF DISTAL THIRD OF ESOPHAGUS (HCC): ICD-10-CM

## 2023-04-26 DIAGNOSIS — C15.9 METASTASIS FROM ESOPHAGEAL CANCER (HCC): Primary | ICD-10-CM

## 2023-04-26 DIAGNOSIS — D72.819 LEUKOPENIA, UNSPECIFIED TYPE: ICD-10-CM

## 2023-04-26 DIAGNOSIS — Z51.11 ENCOUNTER FOR CHEMOTHERAPY MANAGEMENT: ICD-10-CM

## 2023-04-26 DIAGNOSIS — E87.6 HYPOKALEMIA: ICD-10-CM

## 2023-04-26 DIAGNOSIS — E46 MALNUTRITION, UNSPECIFIED TYPE (HCC): Primary | ICD-10-CM

## 2023-04-26 LAB
ABSOLUTE IMMATURE GRANULOCYTE: 0.02 THOU/MM3 (ref 0–0.07)
ALBUMIN SERPL BCG-MCNC: 3.5 G/DL (ref 3.5–5.1)
ALBUMIN SERPL BCG-MCNC: 3.6 G/DL (ref 3.5–5.1)
ALP SERPL-CCNC: 73 U/L (ref 38–126)
ALP SERPL-CCNC: 75 U/L (ref 38–126)
ALT SERPL W/O P-5'-P-CCNC: 116 U/L (ref 11–66)
ALT SERPL W/O P-5'-P-CCNC: 118 U/L (ref 11–66)
ANION GAP SERPL CALC-SCNC: 12 MEQ/L (ref 8–16)
AST SERPL-CCNC: 38 U/L (ref 5–40)
AST SERPL-CCNC: 38 U/L (ref 5–40)
BASOPHILS ABSOLUTE: 0 THOU/MM3 (ref 0–0.1)
BASOPHILS ABSOLUTE: 0 THOU/MM3 (ref 0–0.1)
BASOPHILS NFR BLD AUTO: 0.7 %
BASOPHILS NFR BLD AUTO: 1 % (ref 0–3)
BILIRUB CONJ SERPL-MCNC: < 0.2 MG/DL (ref 0–0.3)
BILIRUB SERPL-MCNC: 0.6 MG/DL (ref 0.3–1.2)
BILIRUB SERPL-MCNC: 0.6 MG/DL (ref 0.3–1.2)
BUN BLDP-MCNC: 6 MG/DL (ref 8–26)
BUN SERPL-MCNC: 8 MG/DL (ref 7–22)
CALCIUM SERPL-MCNC: 8.5 MG/DL (ref 8.5–10.5)
CHLORIDE BLD-SCNC: 107 MEQ/L (ref 98–109)
CHLORIDE SERPL-SCNC: 107 MEQ/L (ref 98–111)
CO2 SERPL-SCNC: 21 MEQ/L (ref 23–33)
CREAT BLD-MCNC: 0.8 MG/DL (ref 0.5–1.2)
CREAT SERPL-MCNC: 0.9 MG/DL (ref 0.4–1.2)
DEPRECATED RDW RBC AUTO: 45.3 FL (ref 35–45)
EOSINOPHIL NFR BLD AUTO: 2.5 %
EOSINOPHIL NFR BLD AUTO: 3 % (ref 0–4)
EOSINOPHILS ABSOLUTE: 0.1 THOU/MM3 (ref 0–0.4)
EOSINOPHILS ABSOLUTE: 0.1 THOU/MM3 (ref 0–0.4)
ERYTHROCYTE [DISTWIDTH] IN BLOOD BY AUTOMATED COUNT: 12.1 % (ref 11.5–14.5)
ERYTHROCYTE [DISTWIDTH] IN BLOOD BY AUTOMATED COUNT: 12.3 % (ref 11.5–14.5)
GFR SERPL CREATININE-BSD FRML MDRD: > 60 ML/MIN/1.73M2
GFR SERPL CREATININE-BSD FRML MDRD: > 60 ML/MIN/1.73M2
GLUCOSE BLD-MCNC: 138 MG/DL (ref 70–108)
GLUCOSE SERPL-MCNC: 100 MG/DL (ref 70–108)
HCT VFR BLD AUTO: 33.7 % (ref 42–52)
HCT VFR BLD AUTO: 33.9 % (ref 42–52)
HGB BLD-MCNC: 11.5 GM/DL (ref 14–18)
HGB BLD-MCNC: 11.7 GM/DL (ref 14–18)
IMM GRANULOCYTES # BLD AUTO: 0.06 THOU/MM3 (ref 0–0.07)
IMM GRANULOCYTES NFR BLD AUTO: 1.4 %
IMMATURE GRANULOCYTES: 1 %
IONIZED CALCIUM, WHOLE BLOOD: 1.22 MMOL/L (ref 1.12–1.32)
LIPASE SERPL-CCNC: 44.3 U/L (ref 5.6–51.3)
LYMPHOCYTES ABSOLUTE: 0.5 THOU/MM3 (ref 1–4.8)
LYMPHOCYTES ABSOLUTE: 0.5 THOU/MM3 (ref 1–4.8)
LYMPHOCYTES NFR BLD AUTO: 11.5 %
LYMPHOCYTES NFR BLD AUTO: 13 % (ref 15–47)
MAGNESIUM SERPL-MCNC: 2.1 MG/DL (ref 1.6–2.4)
MCH RBC QN AUTO: 34 PG (ref 26–33)
MCH RBC QN AUTO: 34.8 PG (ref 26–33)
MCHC RBC AUTO-ENTMCNC: 34.1 GM/DL (ref 32.2–35.5)
MCHC RBC AUTO-ENTMCNC: 34.5 GM/DL (ref 32.2–35.5)
MCV RBC AUTO: 100 FL (ref 80–94)
MCV RBC AUTO: 100.9 FL (ref 80–94)
MONOCYTES ABSOLUTE: 0.3 THOU/MM3 (ref 0.4–1.3)
MONOCYTES ABSOLUTE: 0.4 THOU/MM3 (ref 0.4–1.3)
MONOCYTES NFR BLD AUTO: 8 % (ref 0–12)
MONOCYTES NFR BLD AUTO: 9.2 %
NEUTROPHILS NFR BLD AUTO: 74.7 %
NEUTROPHILS NFR BLD AUTO: 76 % (ref 43–75)
NRBC BLD AUTO-RTO: 0 /100 WBC
OSMOLALITY SERPL CALC.SUM OF ELEC: 277.8 MOSMOL/KG (ref 275–300)
PLATELET # BLD AUTO: 105 THOU/MM3 (ref 130–400)
PLATELET # BLD AUTO: 113 THOU/MM3 (ref 130–400)
PMV BLD AUTO: 8.2 FL (ref 9.4–12.4)
PMV BLD AUTO: 8.6 FL (ref 9.4–12.4)
POTASSIUM BLD-SCNC: 3.3 MEQ/L (ref 3.5–4.9)
POTASSIUM SERPL-SCNC: 3.3 MEQ/L (ref 3.5–5.2)
PROT SERPL-MCNC: 5.9 G/DL (ref 6.1–8)
PROT SERPL-MCNC: 6.1 G/DL (ref 6.1–8)
RBC # BLD AUTO: 3.36 MILL/MM3 (ref 4.7–6.1)
RBC # BLD AUTO: 3.38 MILL/MM3 (ref 4.7–6.1)
SEGMENTED NEUTROPHILS ABSOLUTE COUNT: 3 THOU/MM3 (ref 1.8–7.7)
SEGMENTED NEUTROPHILS ABSOLUTE COUNT: 3.3 THOU/MM3 (ref 1.8–7.7)
SODIUM BLD-SCNC: 141 MEQ/L (ref 138–146)
SODIUM SERPL-SCNC: 140 MEQ/L (ref 135–145)
TOTAL CO2, WHOLE BLOOD: 22 MEQ/L (ref 23–33)
TSH SERPL DL<=0.005 MIU/L-ACNC: 1.04 UIU/ML (ref 0.4–4.2)
WBC # BLD AUTO: 4 THOU/MM3 (ref 4.8–10.8)
WBC # BLD AUTO: 4.4 THOU/MM3 (ref 4.8–10.8)

## 2023-04-26 PROCEDURE — 36415 COLL VENOUS BLD VENIPUNCTURE: CPT

## 2023-04-26 PROCEDURE — 1200000000 HC SEMI PRIVATE

## 2023-04-26 PROCEDURE — 3074F SYST BP LT 130 MM HG: CPT | Performed by: NURSE PRACTITIONER

## 2023-04-26 PROCEDURE — 99214 OFFICE O/P EST MOD 30 MIN: CPT | Performed by: NURSE PRACTITIONER

## 2023-04-26 PROCEDURE — 80047 BASIC METABLC PNL IONIZED CA: CPT

## 2023-04-26 PROCEDURE — 99285 EMERGENCY DEPT VISIT HI MDM: CPT

## 2023-04-26 PROCEDURE — 83690 ASSAY OF LIPASE: CPT

## 2023-04-26 PROCEDURE — 3078F DIAST BP <80 MM HG: CPT | Performed by: NURSE PRACTITIONER

## 2023-04-26 PROCEDURE — 6370000000 HC RX 637 (ALT 250 FOR IP): Performed by: NURSE PRACTITIONER

## 2023-04-26 PROCEDURE — 2580000003 HC RX 258: Performed by: EMERGENCY MEDICINE

## 2023-04-26 PROCEDURE — 85025 COMPLETE CBC W/AUTO DIFF WBC: CPT

## 2023-04-26 PROCEDURE — 36591 DRAW BLOOD OFF VENOUS DEVICE: CPT

## 2023-04-26 PROCEDURE — 80053 COMPREHEN METABOLIC PANEL: CPT

## 2023-04-26 PROCEDURE — 2580000003 HC RX 258: Performed by: NURSE PRACTITIONER

## 2023-04-26 PROCEDURE — 1200000003 HC TELEMETRY R&B

## 2023-04-26 PROCEDURE — 99211 OFF/OP EST MAY X REQ PHY/QHP: CPT

## 2023-04-26 PROCEDURE — 84443 ASSAY THYROID STIM HORMONE: CPT

## 2023-04-26 PROCEDURE — 83735 ASSAY OF MAGNESIUM: CPT

## 2023-04-26 PROCEDURE — 6360000002 HC RX W HCPCS: Performed by: NURSE PRACTITIONER

## 2023-04-26 PROCEDURE — 2580000003 HC RX 258: Performed by: INTERNAL MEDICINE

## 2023-04-26 PROCEDURE — 99222 1ST HOSP IP/OBS MODERATE 55: CPT | Performed by: NURSE PRACTITIONER

## 2023-04-26 RX ORDER — PANTOPRAZOLE SODIUM 40 MG/1
40 TABLET, DELAYED RELEASE ORAL 2 TIMES DAILY
Status: DISCONTINUED | OUTPATIENT
Start: 2023-04-26 | End: 2023-05-04 | Stop reason: ALTCHOICE

## 2023-04-26 RX ORDER — HEPARIN SODIUM (PORCINE) LOCK FLUSH IV SOLN 100 UNIT/ML 100 UNIT/ML
500 SOLUTION INTRAVENOUS PRN
Status: DISCONTINUED | OUTPATIENT
Start: 2023-04-26 | End: 2023-04-27 | Stop reason: HOSPADM

## 2023-04-26 RX ORDER — ACETAMINOPHEN 325 MG/1
650 TABLET ORAL EVERY 6 HOURS PRN
Status: DISCONTINUED | OUTPATIENT
Start: 2023-04-26 | End: 2023-05-04

## 2023-04-26 RX ORDER — SODIUM CHLORIDE, SODIUM LACTATE, POTASSIUM CHLORIDE, CALCIUM CHLORIDE 600; 310; 30; 20 MG/100ML; MG/100ML; MG/100ML; MG/100ML
INJECTION, SOLUTION INTRAVENOUS CONTINUOUS
Status: DISCONTINUED | OUTPATIENT
Start: 2023-04-26 | End: 2023-05-03

## 2023-04-26 RX ORDER — POTASSIUM CHLORIDE 20 MEQ/1
40 TABLET, EXTENDED RELEASE ORAL PRN
Status: DISCONTINUED | OUTPATIENT
Start: 2023-04-26 | End: 2023-05-08 | Stop reason: HOSPADM

## 2023-04-26 RX ORDER — WATER 1000 ML/1000ML
2.2 INJECTION, SOLUTION INTRAVENOUS ONCE
OUTPATIENT
Start: 2023-04-26 | End: 2023-04-26

## 2023-04-26 RX ORDER — HEPARIN SODIUM (PORCINE) LOCK FLUSH IV SOLN 100 UNIT/ML 100 UNIT/ML
500 SOLUTION INTRAVENOUS PRN
OUTPATIENT
Start: 2023-04-26

## 2023-04-26 RX ORDER — ESCITALOPRAM OXALATE 10 MG/1
10 TABLET ORAL DAILY
Status: DISCONTINUED | OUTPATIENT
Start: 2023-04-26 | End: 2023-05-08 | Stop reason: HOSPADM

## 2023-04-26 RX ORDER — SODIUM CHLORIDE 0.9 % (FLUSH) 0.9 %
5-40 SYRINGE (ML) INJECTION EVERY 12 HOURS SCHEDULED
Status: DISCONTINUED | OUTPATIENT
Start: 2023-04-26 | End: 2023-05-08 | Stop reason: HOSPADM

## 2023-04-26 RX ORDER — POTASSIUM CHLORIDE 7.45 MG/ML
10 INJECTION INTRAVENOUS
Status: COMPLETED | OUTPATIENT
Start: 2023-04-26 | End: 2023-04-26

## 2023-04-26 RX ORDER — SODIUM CHLORIDE 9 MG/ML
INJECTION, SOLUTION INTRAVENOUS PRN
Status: DISCONTINUED | OUTPATIENT
Start: 2023-04-26 | End: 2023-05-08 | Stop reason: HOSPADM

## 2023-04-26 RX ORDER — MAGNESIUM SULFATE IN WATER 40 MG/ML
2000 INJECTION, SOLUTION INTRAVENOUS PRN
Status: DISCONTINUED | OUTPATIENT
Start: 2023-04-26 | End: 2023-05-08 | Stop reason: HOSPADM

## 2023-04-26 RX ORDER — POLYETHYLENE GLYCOL 3350 17 G/17G
17 POWDER, FOR SOLUTION ORAL DAILY PRN
Status: DISCONTINUED | OUTPATIENT
Start: 2023-04-26 | End: 2023-05-08 | Stop reason: HOSPADM

## 2023-04-26 RX ORDER — MEGESTROL ACETATE 40 MG/1
40 TABLET ORAL DAILY
Status: DISCONTINUED | OUTPATIENT
Start: 2023-04-26 | End: 2023-05-08 | Stop reason: HOSPADM

## 2023-04-26 RX ORDER — DEXAMETHASONE 4 MG/1
2 TABLET ORAL EVERY OTHER DAY
Status: DISCONTINUED | OUTPATIENT
Start: 2023-04-27 | End: 2023-05-08 | Stop reason: HOSPADM

## 2023-04-26 RX ORDER — ONDANSETRON 2 MG/ML
4 INJECTION INTRAMUSCULAR; INTRAVENOUS EVERY 6 HOURS PRN
Status: DISCONTINUED | OUTPATIENT
Start: 2023-04-26 | End: 2023-05-08 | Stop reason: HOSPADM

## 2023-04-26 RX ORDER — ACETAMINOPHEN 650 MG/1
650 SUPPOSITORY RECTAL EVERY 6 HOURS PRN
Status: DISCONTINUED | OUTPATIENT
Start: 2023-04-26 | End: 2023-05-04

## 2023-04-26 RX ORDER — 0.9 % SODIUM CHLORIDE 0.9 %
1000 INTRAVENOUS SOLUTION INTRAVENOUS ONCE
Status: COMPLETED | OUTPATIENT
Start: 2023-04-26 | End: 2023-04-26

## 2023-04-26 RX ORDER — SODIUM CHLORIDE 0.9 % (FLUSH) 0.9 %
5-40 SYRINGE (ML) INJECTION PRN
Status: DISCONTINUED | OUTPATIENT
Start: 2023-04-26 | End: 2023-04-27 | Stop reason: HOSPADM

## 2023-04-26 RX ORDER — FAMOTIDINE 20 MG/1
20 TABLET, FILM COATED ORAL 2 TIMES DAILY
Status: DISCONTINUED | OUTPATIENT
Start: 2023-04-26 | End: 2023-05-08 | Stop reason: HOSPADM

## 2023-04-26 RX ORDER — ONDANSETRON 4 MG/1
4 TABLET, ORALLY DISINTEGRATING ORAL EVERY 8 HOURS PRN
Status: DISCONTINUED | OUTPATIENT
Start: 2023-04-26 | End: 2023-05-08 | Stop reason: HOSPADM

## 2023-04-26 RX ORDER — POTASSIUM CHLORIDE 7.45 MG/ML
10 INJECTION INTRAVENOUS PRN
Status: DISCONTINUED | OUTPATIENT
Start: 2023-04-26 | End: 2023-05-08 | Stop reason: HOSPADM

## 2023-04-26 RX ORDER — SODIUM CHLORIDE 0.9 % (FLUSH) 0.9 %
5-40 SYRINGE (ML) INJECTION PRN
Status: DISCONTINUED | OUTPATIENT
Start: 2023-04-26 | End: 2023-05-08 | Stop reason: HOSPADM

## 2023-04-26 RX ORDER — SODIUM CHLORIDE 0.9 % (FLUSH) 0.9 %
5-40 SYRINGE (ML) INJECTION PRN
OUTPATIENT
Start: 2023-04-26

## 2023-04-26 RX ORDER — SODIUM CHLORIDE 9 MG/ML
25 INJECTION, SOLUTION INTRAVENOUS PRN
OUTPATIENT
Start: 2023-04-26

## 2023-04-26 RX ADMIN — SODIUM CHLORIDE, PRESERVATIVE FREE 20 ML: 5 INJECTION INTRAVENOUS at 11:38

## 2023-04-26 RX ADMIN — SODIUM CHLORIDE, PRESERVATIVE FREE 10 ML: 5 INJECTION INTRAVENOUS at 11:37

## 2023-04-26 RX ADMIN — ESCITALOPRAM 10 MG: 10 TABLET, FILM COATED ORAL at 21:32

## 2023-04-26 RX ADMIN — PANTOPRAZOLE SODIUM 40 MG: 40 TABLET, DELAYED RELEASE ORAL at 21:32

## 2023-04-26 RX ADMIN — POTASSIUM CHLORIDE 10 MEQ: 7.46 INJECTION, SOLUTION INTRAVENOUS at 21:21

## 2023-04-26 RX ADMIN — SODIUM CHLORIDE, POTASSIUM CHLORIDE, SODIUM LACTATE AND CALCIUM CHLORIDE: 600; 310; 30; 20 INJECTION, SOLUTION INTRAVENOUS at 18:32

## 2023-04-26 RX ADMIN — SODIUM CHLORIDE 1000 ML: 9 INJECTION, SOLUTION INTRAVENOUS at 13:42

## 2023-04-26 RX ADMIN — POTASSIUM CHLORIDE 10 MEQ: 7.46 INJECTION, SOLUTION INTRAVENOUS at 21:15

## 2023-04-26 RX ADMIN — POTASSIUM CHLORIDE 10 MEQ: 7.46 INJECTION, SOLUTION INTRAVENOUS at 22:28

## 2023-04-26 RX ADMIN — POTASSIUM CHLORIDE 10 MEQ: 7.46 INJECTION, SOLUTION INTRAVENOUS at 18:37

## 2023-04-26 RX ADMIN — POTASSIUM CHLORIDE 10 MEQ: 7.46 INJECTION, SOLUTION INTRAVENOUS at 19:49

## 2023-04-26 RX ADMIN — FAMOTIDINE 20 MG: 20 TABLET, FILM COATED ORAL at 21:31

## 2023-04-26 ASSESSMENT — PAIN - FUNCTIONAL ASSESSMENT
PAIN_FUNCTIONAL_ASSESSMENT: NONE - DENIES PAIN

## 2023-04-26 ASSESSMENT — LIFESTYLE VARIABLES
HOW OFTEN DO YOU HAVE A DRINK CONTAINING ALCOHOL: NEVER
HOW MANY STANDARD DRINKS CONTAINING ALCOHOL DO YOU HAVE ON A TYPICAL DAY: PATIENT DOES NOT DRINK

## 2023-04-26 ASSESSMENT — PAIN SCALES - GENERAL: PAINLEVEL_OUTOF10: 0

## 2023-04-26 NOTE — DISCHARGE INSTRUCTIONS
You received a blood draw per port while in outpatient oncology clinic. Call if any uncontrolled nausea/vomiting  Call if any fevers/chills/ problems or concerns  Call if any bleeding  Call if any chest pain/pressure  Call if any severe shortness of breath  Drink at least (6) 8oz glasses of fluids daily     If develop any of above condition, please call your MD or go to Emergency Department. Per Recommendations from Archana Jacobs CNP:     Go to ED for possible tube placement for nutritional support.    Reschedule treatment for next week   Return to clinic next week to see [facility] & [facility] with labs:  CBC, CMP

## 2023-04-26 NOTE — PROGRESS NOTES
Date/Time     04/26/2023 11:38 AM     01/10/2023 02:28 PM    K 3.3 04/26/2023 11:38 AM    K 3.7 01/10/2023 02:28 PM     01/10/2023 02:28 PM    CO2 20 01/10/2023 02:28 PM    BUN 12 01/10/2023 02:28 PM    CREATININE 0.8 04/26/2023 11:38 AM    CREATININE 0.6 01/10/2023 02:28 PM    GLUCOSE 73 01/10/2023 02:28 PM    CALCIUM 8.9 01/10/2023 02:28 PM      LFT:   Lab Results   Component Value Date    ALT 14 04/19/2023    AST 14 04/19/2023    ALKPHOS 61 04/19/2023    BILITOT 0.4 04/19/2023         Assessment and Plan:     1. Metastasis from esophageal cancer (HCC)  Clinical stage T2 N2 M0. Mass extends from 34 to 28 cm approximately 6 cm. Staging by EUS 3/23/2022 [facility] (T2 based on invasion and N2 based on 2 malignant appearing lymph nodes visualized and measured in the lower paraesophageal mediastinum level 8L adjacent to the mass. 2 malignant appearing lymph nodes visualized and measured in the subcarinal mediastinum level 7. PET/CT 3/30/2022 [facility]- hypermetabolic activity at mid esophagus consistent with primary malignancy with adjacent hypermetabolic left periesophageal lymph nodes. Brain Mets; extensive and bulky. 10/31/22, On Decadron 2 mg bid 11/22/22, taper started. He completed WBRT per Dr. Brigette Lima. Multiple falls at home, PT/OT referral.     2. Encounter for chemotherapy management  Current treatment recommendations include herceptin. HOLD treatment today d/t failure to thrive, low nutritional status, weakness, fatigue. Pt recommended enteral nutrition by nutritionist on 4/21/2023. No appnts have been made, pt did not go to hospital to get this initiated. 5# weight loss x 1 week. Recommended pt go to ED to be admitted for likely enteral tube placement. Pt agreeable. Pt mediport is accessed. 3. Hypokalemia  K 3.3. Chronic issue with chronic N/V/D. Pt on oral K but having difficulty with oral intake. Will need IV supplementation.   Have given in the past in our clinic but sending

## 2023-04-26 NOTE — ONCOLOGY
Patient tolerated blood draw per port without any complications. Per Rajwinder List, CNP, chemo treatment to be held today and family advised to take patient to ED to expedite evaluation for possible PEG tube placement. Mediport to be left accessed at discharge from clinic per Rajwinder List for use in ED. Discharge instructions given to patient and family-verbalizes understanding. Patient escorted off unit by wife and daughter with belongings.

## 2023-04-26 NOTE — PLAN OF CARE
Problem: Chronic Conditions and Co-morbidities  Goal: Patient's chronic conditions and co-morbidity symptoms are monitored and maintained or improved  Outcome: Progressing  Flowsheets (Taken 4/26/2023 1155)  Care Plan - Patient's Chronic Conditions and Co-Morbidity Symptoms are Monitored and Maintained or Improved: Monitor and assess patient's chronic conditions and comorbid symptoms for stability, deterioration, or improvement  Note: Discussed lab results with patient. Problem: Discharge Planning  Goal: Discharge to home or other facility with appropriate resources  Outcome: Progressing  Flowsheets (Taken 4/26/2023 1155)  Discharge to home or other facility with appropriate resources: Identify barriers to discharge with patient and caregiver  Note: Verbalize understanding of discharge instructions, follow up appointments, and when to call Physician. Problem: Safety - Adult  Goal: Free from fall injury  Outcome: Progressing  Flowsheets (Taken 4/26/2023 1155)  Free From Fall Injury: Instruct family/caregiver on patient safety  Note: No falls occurred with visit today. Care plan reviewed with patient. Patient verbalizes understanding of the plan of care and contributes to goal setting.

## 2023-04-26 NOTE — PATIENT INSTRUCTIONS
Go to ED for possible tube placement for nutritional support.    Reschedule treatment for next week   Return to clinic next week to see [facility] with labs:  CBC, CMP

## 2023-04-27 PROBLEM — E43 SEVERE MALNUTRITION (HCC): Status: ACTIVE | Noted: 2023-04-27

## 2023-04-27 LAB
ALBUMIN SERPL BCG-MCNC: 3 G/DL (ref 3.5–5.1)
ALP SERPL-CCNC: 61 U/L (ref 38–126)
ALT SERPL W/O P-5'-P-CCNC: 99 U/L (ref 11–66)
ANION GAP SERPL CALC-SCNC: 10 MEQ/L (ref 8–16)
AST SERPL-CCNC: 36 U/L (ref 5–40)
BILIRUB CONJ SERPL-MCNC: < 0.2 MG/DL (ref 0–0.3)
BILIRUB SERPL-MCNC: 0.5 MG/DL (ref 0.3–1.2)
BUN SERPL-MCNC: 5 MG/DL (ref 7–22)
CALCIUM SERPL-MCNC: 8.2 MG/DL (ref 8.5–10.5)
CHLORIDE SERPL-SCNC: 112 MEQ/L (ref 98–111)
CO2 SERPL-SCNC: 20 MEQ/L (ref 23–33)
CREAT SERPL-MCNC: 0.6 MG/DL (ref 0.4–1.2)
GFR SERPL CREATININE-BSD FRML MDRD: > 60 ML/MIN/1.73M2
GLUCOSE SERPL-MCNC: 83 MG/DL (ref 70–108)
POTASSIUM SERPL-SCNC: 3.6 MEQ/L (ref 3.5–5.2)
PROT SERPL-MCNC: 4.7 G/DL (ref 6.1–8)
SODIUM SERPL-SCNC: 142 MEQ/L (ref 135–145)

## 2023-04-27 PROCEDURE — 80076 HEPATIC FUNCTION PANEL: CPT

## 2023-04-27 PROCEDURE — 2580000003 HC RX 258: Performed by: NURSE PRACTITIONER

## 2023-04-27 PROCEDURE — 80048 BASIC METABOLIC PNL TOTAL CA: CPT

## 2023-04-27 PROCEDURE — 6360000002 HC RX W HCPCS: Performed by: NURSE PRACTITIONER

## 2023-04-27 PROCEDURE — 1200000000 HC SEMI PRIVATE

## 2023-04-27 PROCEDURE — 6370000000 HC RX 637 (ALT 250 FOR IP): Performed by: NURSE PRACTITIONER

## 2023-04-27 PROCEDURE — 1200000003 HC TELEMETRY R&B

## 2023-04-27 PROCEDURE — 99232 SBSQ HOSP IP/OBS MODERATE 35: CPT | Performed by: PHYSICIAN ASSISTANT

## 2023-04-27 PROCEDURE — 36591 DRAW BLOOD OFF VENOUS DEVICE: CPT

## 2023-04-27 RX ADMIN — SODIUM CHLORIDE, POTASSIUM CHLORIDE, SODIUM LACTATE AND CALCIUM CHLORIDE: 600; 310; 30; 20 INJECTION, SOLUTION INTRAVENOUS at 22:36

## 2023-04-27 RX ADMIN — ESCITALOPRAM 10 MG: 10 TABLET, FILM COATED ORAL at 21:56

## 2023-04-27 RX ADMIN — POTASSIUM BICARBONATE 20 MEQ: 782 TABLET, EFFERVESCENT ORAL at 08:53

## 2023-04-27 RX ADMIN — FAMOTIDINE 20 MG: 20 TABLET, FILM COATED ORAL at 21:56

## 2023-04-27 RX ADMIN — PANTOPRAZOLE SODIUM 40 MG: 40 TABLET, DELAYED RELEASE ORAL at 21:56

## 2023-04-27 RX ADMIN — PANTOPRAZOLE SODIUM 40 MG: 40 TABLET, DELAYED RELEASE ORAL at 08:53

## 2023-04-27 RX ADMIN — SODIUM CHLORIDE, PRESERVATIVE FREE 10 ML: 5 INJECTION INTRAVENOUS at 21:56

## 2023-04-27 RX ADMIN — DEXAMETHASONE 2 MG: 4 TABLET ORAL at 08:53

## 2023-04-28 ENCOUNTER — APPOINTMENT (OUTPATIENT)
Dept: INTERVENTIONAL RADIOLOGY/VASCULAR | Age: 63
DRG: 987 | End: 2023-04-28
Payer: MEDICAID

## 2023-04-28 PROCEDURE — 1200000000 HC SEMI PRIVATE

## 2023-04-28 PROCEDURE — 1200000003 HC TELEMETRY R&B

## 2023-04-28 PROCEDURE — 99232 SBSQ HOSP IP/OBS MODERATE 35: CPT | Performed by: PHYSICIAN ASSISTANT

## 2023-04-28 PROCEDURE — 6370000000 HC RX 637 (ALT 250 FOR IP): Performed by: NURSE PRACTITIONER

## 2023-04-28 PROCEDURE — 76000 FLUOROSCOPY <1 HR PHYS/QHP: CPT

## 2023-04-28 PROCEDURE — 2709999900 IR INTERVENTIONAL RADIOLOGY PROCEDURE REQUEST

## 2023-04-28 PROCEDURE — 2580000003 HC RX 258: Performed by: NURSE PRACTITIONER

## 2023-04-28 RX ADMIN — FAMOTIDINE 20 MG: 20 TABLET, FILM COATED ORAL at 08:57

## 2023-04-28 RX ADMIN — MEGESTROL ACETATE 40 MG: 40 TABLET ORAL at 08:56

## 2023-04-28 RX ADMIN — FAMOTIDINE 20 MG: 20 TABLET, FILM COATED ORAL at 20:39

## 2023-04-28 RX ADMIN — ESCITALOPRAM 10 MG: 10 TABLET, FILM COATED ORAL at 20:39

## 2023-04-28 RX ADMIN — SODIUM CHLORIDE, POTASSIUM CHLORIDE, SODIUM LACTATE AND CALCIUM CHLORIDE: 600; 310; 30; 20 INJECTION, SOLUTION INTRAVENOUS at 12:17

## 2023-04-28 RX ADMIN — SODIUM CHLORIDE, PRESERVATIVE FREE 10 ML: 5 INJECTION INTRAVENOUS at 20:39

## 2023-04-28 RX ADMIN — SODIUM CHLORIDE, PRESERVATIVE FREE 10 ML: 5 INJECTION INTRAVENOUS at 08:57

## 2023-04-28 RX ADMIN — PANTOPRAZOLE SODIUM 40 MG: 40 TABLET, DELAYED RELEASE ORAL at 08:57

## 2023-04-28 RX ADMIN — POTASSIUM BICARBONATE 20 MEQ: 782 TABLET, EFFERVESCENT ORAL at 08:57

## 2023-04-28 RX ADMIN — PANTOPRAZOLE SODIUM 40 MG: 40 TABLET, DELAYED RELEASE ORAL at 20:39

## 2023-04-28 RX ADMIN — ACETAMINOPHEN 650 MG: 325 TABLET ORAL at 11:26

## 2023-04-28 ASSESSMENT — PAIN DESCRIPTION - DESCRIPTORS: DESCRIPTORS: ACHING

## 2023-04-28 ASSESSMENT — PAIN SCALES - GENERAL: PAINLEVEL_OUTOF10: 3

## 2023-04-28 ASSESSMENT — PAIN DESCRIPTION - LOCATION: LOCATION: EAR;HEAD;NECK

## 2023-04-29 PROCEDURE — 2580000003 HC RX 258: Performed by: NURSE PRACTITIONER

## 2023-04-29 PROCEDURE — 99232 SBSQ HOSP IP/OBS MODERATE 35: CPT | Performed by: PHYSICIAN ASSISTANT

## 2023-04-29 PROCEDURE — 6370000000 HC RX 637 (ALT 250 FOR IP): Performed by: NURSE PRACTITIONER

## 2023-04-29 PROCEDURE — 1200000000 HC SEMI PRIVATE

## 2023-04-29 PROCEDURE — 6360000002 HC RX W HCPCS: Performed by: NURSE PRACTITIONER

## 2023-04-29 RX ADMIN — FAMOTIDINE 20 MG: 20 TABLET, FILM COATED ORAL at 20:46

## 2023-04-29 RX ADMIN — DEXAMETHASONE 2 MG: 4 TABLET ORAL at 09:26

## 2023-04-29 RX ADMIN — FAMOTIDINE 20 MG: 20 TABLET, FILM COATED ORAL at 09:26

## 2023-04-29 RX ADMIN — MEGESTROL ACETATE 40 MG: 40 TABLET ORAL at 09:26

## 2023-04-29 RX ADMIN — ESCITALOPRAM 10 MG: 10 TABLET, FILM COATED ORAL at 20:46

## 2023-04-29 RX ADMIN — PANTOPRAZOLE SODIUM 40 MG: 40 TABLET, DELAYED RELEASE ORAL at 09:26

## 2023-04-29 RX ADMIN — ACETAMINOPHEN 650 MG: 325 TABLET ORAL at 09:32

## 2023-04-29 RX ADMIN — ONDANSETRON 4 MG: 2 INJECTION INTRAMUSCULAR; INTRAVENOUS at 09:40

## 2023-04-29 RX ADMIN — PANTOPRAZOLE SODIUM 40 MG: 40 TABLET, DELAYED RELEASE ORAL at 20:46

## 2023-04-29 RX ADMIN — SODIUM CHLORIDE, POTASSIUM CHLORIDE, SODIUM LACTATE AND CALCIUM CHLORIDE: 600; 310; 30; 20 INJECTION, SOLUTION INTRAVENOUS at 15:20

## 2023-04-29 RX ADMIN — POTASSIUM BICARBONATE 20 MEQ: 782 TABLET, EFFERVESCENT ORAL at 09:26

## 2023-04-29 ASSESSMENT — PAIN DESCRIPTION - LOCATION: LOCATION: ABDOMEN

## 2023-04-29 ASSESSMENT — PAIN DESCRIPTION - ORIENTATION: ORIENTATION: RIGHT

## 2023-04-29 ASSESSMENT — PAIN DESCRIPTION - DESCRIPTORS: DESCRIPTORS: ACHING

## 2023-04-29 ASSESSMENT — PAIN SCALES - GENERAL
PAINLEVEL_OUTOF10: 0
PAINLEVEL_OUTOF10: 2
PAINLEVEL_OUTOF10: 0

## 2023-04-29 ASSESSMENT — PAIN - FUNCTIONAL ASSESSMENT: PAIN_FUNCTIONAL_ASSESSMENT: ACTIVITIES ARE NOT PREVENTED

## 2023-04-30 LAB
ANION GAP SERPL CALC-SCNC: 12 MEQ/L (ref 8–16)
BUN SERPL-MCNC: 3 MG/DL (ref 7–22)
CALCIUM SERPL-MCNC: 8.8 MG/DL (ref 8.5–10.5)
CHLORIDE SERPL-SCNC: 108 MEQ/L (ref 98–111)
CO2 SERPL-SCNC: 22 MEQ/L (ref 23–33)
CREAT SERPL-MCNC: 0.5 MG/DL (ref 0.4–1.2)
DEPRECATED RDW RBC AUTO: 42.5 FL (ref 35–45)
ERYTHROCYTE [DISTWIDTH] IN BLOOD BY AUTOMATED COUNT: 11.7 % (ref 11.5–14.5)
GFR SERPL CREATININE-BSD FRML MDRD: > 60 ML/MIN/1.73M2
GLUCOSE SERPL-MCNC: 80 MG/DL (ref 70–108)
HCT VFR BLD AUTO: 29.8 % (ref 42–52)
HGB BLD-MCNC: 10.3 GM/DL (ref 14–18)
MCH RBC QN AUTO: 34.3 PG (ref 26–33)
MCHC RBC AUTO-ENTMCNC: 34.6 GM/DL (ref 32.2–35.5)
MCV RBC AUTO: 99.3 FL (ref 80–94)
PLATELET # BLD AUTO: 116 THOU/MM3 (ref 130–400)
PMV BLD AUTO: 8.4 FL (ref 9.4–12.4)
POTASSIUM SERPL-SCNC: 3.6 MEQ/L (ref 3.5–5.2)
RBC # BLD AUTO: 3 MILL/MM3 (ref 4.7–6.1)
SODIUM SERPL-SCNC: 142 MEQ/L (ref 135–145)
WBC # BLD AUTO: 3.1 THOU/MM3 (ref 4.8–10.8)

## 2023-04-30 PROCEDURE — 80048 BASIC METABOLIC PNL TOTAL CA: CPT

## 2023-04-30 PROCEDURE — 6370000000 HC RX 637 (ALT 250 FOR IP): Performed by: NURSE PRACTITIONER

## 2023-04-30 PROCEDURE — 1200000000 HC SEMI PRIVATE

## 2023-04-30 PROCEDURE — 99232 SBSQ HOSP IP/OBS MODERATE 35: CPT | Performed by: PHYSICIAN ASSISTANT

## 2023-04-30 PROCEDURE — 85027 COMPLETE CBC AUTOMATED: CPT

## 2023-04-30 PROCEDURE — 2580000003 HC RX 258: Performed by: NURSE PRACTITIONER

## 2023-04-30 RX ADMIN — PANTOPRAZOLE SODIUM 40 MG: 40 TABLET, DELAYED RELEASE ORAL at 20:21

## 2023-04-30 RX ADMIN — POTASSIUM BICARBONATE 20 MEQ: 782 TABLET, EFFERVESCENT ORAL at 09:01

## 2023-04-30 RX ADMIN — ESCITALOPRAM 10 MG: 10 TABLET, FILM COATED ORAL at 20:22

## 2023-04-30 RX ADMIN — MEGESTROL ACETATE 40 MG: 40 TABLET ORAL at 09:01

## 2023-04-30 RX ADMIN — FAMOTIDINE 20 MG: 20 TABLET, FILM COATED ORAL at 20:21

## 2023-04-30 RX ADMIN — SODIUM CHLORIDE, POTASSIUM CHLORIDE, SODIUM LACTATE AND CALCIUM CHLORIDE: 600; 310; 30; 20 INJECTION, SOLUTION INTRAVENOUS at 16:32

## 2023-04-30 RX ADMIN — FAMOTIDINE 20 MG: 20 TABLET, FILM COATED ORAL at 09:01

## 2023-04-30 RX ADMIN — PANTOPRAZOLE SODIUM 40 MG: 40 TABLET, DELAYED RELEASE ORAL at 09:01

## 2023-04-30 RX ADMIN — ACETAMINOPHEN 650 MG: 325 TABLET ORAL at 12:50

## 2023-04-30 ASSESSMENT — PAIN SCALES - GENERAL
PAINLEVEL_OUTOF10: 2
PAINLEVEL_OUTOF10: 0

## 2023-04-30 ASSESSMENT — PAIN DESCRIPTION - ORIENTATION: ORIENTATION: LEFT

## 2023-04-30 ASSESSMENT — PAIN DESCRIPTION - DESCRIPTORS: DESCRIPTORS: ACHING

## 2023-04-30 ASSESSMENT — PAIN DESCRIPTION - LOCATION: LOCATION: HEAD

## 2023-04-30 ASSESSMENT — PAIN - FUNCTIONAL ASSESSMENT: PAIN_FUNCTIONAL_ASSESSMENT: ACTIVITIES ARE NOT PREVENTED

## 2023-05-01 ENCOUNTER — ANESTHESIA EVENT (OUTPATIENT)
Dept: OPERATING ROOM | Age: 63
End: 2023-05-01
Payer: MEDICAID

## 2023-05-01 ENCOUNTER — APPOINTMENT (OUTPATIENT)
Dept: RADIATION ONCOLOGY | Age: 63
End: 2023-05-01
Payer: MEDICAID

## 2023-05-01 ENCOUNTER — ANESTHESIA (OUTPATIENT)
Dept: OPERATING ROOM | Age: 63
End: 2023-05-01
Payer: MEDICAID

## 2023-05-01 ENCOUNTER — HOSPITAL ENCOUNTER (OUTPATIENT)
Dept: RADIATION ONCOLOGY | Age: 63
End: 2023-05-01
Payer: MEDICAID

## 2023-05-01 LAB — GLUCOSE BLD STRIP.AUTO-MCNC: 160 MG/DL (ref 70–108)

## 2023-05-01 PROCEDURE — 6370000000 HC RX 637 (ALT 250 FOR IP): Performed by: PHYSICIAN ASSISTANT

## 2023-05-01 PROCEDURE — 1200000000 HC SEMI PRIVATE

## 2023-05-01 PROCEDURE — 6360000002 HC RX W HCPCS: Performed by: SURGERY

## 2023-05-01 PROCEDURE — 3700000000 HC ANESTHESIA ATTENDED CARE: Performed by: SURGERY

## 2023-05-01 PROCEDURE — 2500000003 HC RX 250 WO HCPCS: Performed by: SURGERY

## 2023-05-01 PROCEDURE — 0WQF0ZZ REPAIR ABDOMINAL WALL, OPEN APPROACH: ICD-10-PCS | Performed by: SURGERY

## 2023-05-01 PROCEDURE — 2580000003 HC RX 258: Performed by: NURSE ANESTHETIST, CERTIFIED REGISTERED

## 2023-05-01 PROCEDURE — 6360000002 HC RX W HCPCS: Performed by: NURSE ANESTHETIST, CERTIFIED REGISTERED

## 2023-05-01 PROCEDURE — 7100000000 HC PACU RECOVERY - FIRST 15 MIN: Performed by: SURGERY

## 2023-05-01 PROCEDURE — 82948 REAGENT STRIP/BLOOD GLUCOSE: CPT

## 2023-05-01 PROCEDURE — 2500000003 HC RX 250 WO HCPCS: Performed by: NURSE ANESTHETIST, CERTIFIED REGISTERED

## 2023-05-01 PROCEDURE — 6360000002 HC RX W HCPCS: Performed by: STUDENT IN AN ORGANIZED HEALTH CARE EDUCATION/TRAINING PROGRAM

## 2023-05-01 PROCEDURE — 6370000000 HC RX 637 (ALT 250 FOR IP): Performed by: SURGERY

## 2023-05-01 PROCEDURE — 6360000002 HC RX W HCPCS: Performed by: PHYSICIAN ASSISTANT

## 2023-05-01 PROCEDURE — 2709999900 HC NON-CHARGEABLE SUPPLY: Performed by: SURGERY

## 2023-05-01 PROCEDURE — 3700000001 HC ADD 15 MINUTES (ANESTHESIA): Performed by: SURGERY

## 2023-05-01 PROCEDURE — 7100000001 HC PACU RECOVERY - ADDTL 15 MIN: Performed by: SURGERY

## 2023-05-01 PROCEDURE — 0DHA0UZ INSERTION OF FEEDING DEVICE INTO JEJUNUM, OPEN APPROACH: ICD-10-PCS | Performed by: SURGERY

## 2023-05-01 PROCEDURE — 2580000003 HC RX 258: Performed by: SURGERY

## 2023-05-01 PROCEDURE — 3600000002 HC SURGERY LEVEL 2 BASE: Performed by: SURGERY

## 2023-05-01 PROCEDURE — 3600000012 HC SURGERY LEVEL 2 ADDTL 15MIN: Performed by: SURGERY

## 2023-05-01 PROCEDURE — 99232 SBSQ HOSP IP/OBS MODERATE 35: CPT | Performed by: PHYSICIAN ASSISTANT

## 2023-05-01 RX ORDER — ONDANSETRON 2 MG/ML
4 INJECTION INTRAMUSCULAR; INTRAVENOUS
Status: DISCONTINUED | OUTPATIENT
Start: 2023-05-01 | End: 2023-05-01 | Stop reason: HOSPADM

## 2023-05-01 RX ORDER — SODIUM CHLORIDE, SODIUM LACTATE, POTASSIUM CHLORIDE, CALCIUM CHLORIDE 600; 310; 30; 20 MG/100ML; MG/100ML; MG/100ML; MG/100ML
INJECTION, SOLUTION INTRAVENOUS CONTINUOUS PRN
Status: DISCONTINUED | OUTPATIENT
Start: 2023-05-01 | End: 2023-05-01 | Stop reason: SDUPTHER

## 2023-05-01 RX ORDER — SODIUM CHLORIDE 9 MG/ML
INJECTION, SOLUTION INTRAVENOUS PRN
Status: DISCONTINUED | OUTPATIENT
Start: 2023-05-01 | End: 2023-05-01 | Stop reason: HOSPADM

## 2023-05-01 RX ORDER — HYDROCODONE BITARTRATE AND ACETAMINOPHEN 5; 325 MG/1; MG/1
1 TABLET ORAL EVERY 4 HOURS PRN
Status: DISCONTINUED | OUTPATIENT
Start: 2023-05-01 | End: 2023-05-08 | Stop reason: HOSPADM

## 2023-05-01 RX ORDER — HYDROCODONE BITARTRATE AND ACETAMINOPHEN 5; 325 MG/1; MG/1
2 TABLET ORAL EVERY 4 HOURS PRN
Status: DISCONTINUED | OUTPATIENT
Start: 2023-05-01 | End: 2023-05-08 | Stop reason: HOSPADM

## 2023-05-01 RX ORDER — HYDROMORPHONE HCL 110MG/55ML
PATIENT CONTROLLED ANALGESIA SYRINGE INTRAVENOUS PRN
Status: DISCONTINUED | OUTPATIENT
Start: 2023-05-01 | End: 2023-05-01 | Stop reason: SDUPTHER

## 2023-05-01 RX ORDER — ONDANSETRON 2 MG/ML
INJECTION INTRAMUSCULAR; INTRAVENOUS PRN
Status: DISCONTINUED | OUTPATIENT
Start: 2023-05-01 | End: 2023-05-01 | Stop reason: SDUPTHER

## 2023-05-01 RX ORDER — LIDOCAINE HYDROCHLORIDE 20 MG/ML
INJECTION, SOLUTION INTRAVENOUS PRN
Status: DISCONTINUED | OUTPATIENT
Start: 2023-05-01 | End: 2023-05-01 | Stop reason: SDUPTHER

## 2023-05-01 RX ORDER — BUPIVACAINE HYDROCHLORIDE 5 MG/ML
INJECTION, SOLUTION PERINEURAL PRN
Status: DISCONTINUED | OUTPATIENT
Start: 2023-05-01 | End: 2023-05-01 | Stop reason: ALTCHOICE

## 2023-05-01 RX ORDER — FENTANYL CITRATE 50 UG/ML
INJECTION, SOLUTION INTRAMUSCULAR; INTRAVENOUS PRN
Status: DISCONTINUED | OUTPATIENT
Start: 2023-05-01 | End: 2023-05-01 | Stop reason: SDUPTHER

## 2023-05-01 RX ORDER — MORPHINE SULFATE 2 MG/ML
2 INJECTION, SOLUTION INTRAMUSCULAR; INTRAVENOUS
Status: DISCONTINUED | OUTPATIENT
Start: 2023-05-01 | End: 2023-05-08 | Stop reason: HOSPADM

## 2023-05-01 RX ORDER — DIPHENHYDRAMINE HYDROCHLORIDE 50 MG/ML
12.5 INJECTION INTRAMUSCULAR; INTRAVENOUS
Status: DISCONTINUED | OUTPATIENT
Start: 2023-05-01 | End: 2023-05-01 | Stop reason: HOSPADM

## 2023-05-01 RX ORDER — ESMOLOL HYDROCHLORIDE 10 MG/ML
INJECTION INTRAVENOUS PRN
Status: DISCONTINUED | OUTPATIENT
Start: 2023-05-01 | End: 2023-05-01 | Stop reason: SDUPTHER

## 2023-05-01 RX ORDER — SODIUM CHLORIDE 0.9 % (FLUSH) 0.9 %
5-40 SYRINGE (ML) INJECTION EVERY 12 HOURS SCHEDULED
Status: DISCONTINUED | OUTPATIENT
Start: 2023-05-01 | End: 2023-05-01 | Stop reason: HOSPADM

## 2023-05-01 RX ORDER — PROPOFOL 10 MG/ML
INJECTION, EMULSION INTRAVENOUS PRN
Status: DISCONTINUED | OUTPATIENT
Start: 2023-05-01 | End: 2023-05-01 | Stop reason: SDUPTHER

## 2023-05-01 RX ORDER — ROCURONIUM BROMIDE 10 MG/ML
INJECTION, SOLUTION INTRAVENOUS PRN
Status: DISCONTINUED | OUTPATIENT
Start: 2023-05-01 | End: 2023-05-01 | Stop reason: SDUPTHER

## 2023-05-01 RX ORDER — FENTANYL CITRATE 50 UG/ML
50 INJECTION, SOLUTION INTRAMUSCULAR; INTRAVENOUS EVERY 5 MIN PRN
Status: DISCONTINUED | OUTPATIENT
Start: 2023-05-01 | End: 2023-05-01 | Stop reason: HOSPADM

## 2023-05-01 RX ORDER — SODIUM CHLORIDE 0.9 % (FLUSH) 0.9 %
5-40 SYRINGE (ML) INJECTION PRN
Status: DISCONTINUED | OUTPATIENT
Start: 2023-05-01 | End: 2023-05-01 | Stop reason: HOSPADM

## 2023-05-01 RX ORDER — MORPHINE SULFATE 4 MG/ML
4 INJECTION, SOLUTION INTRAMUSCULAR; INTRAVENOUS
Status: DISCONTINUED | OUTPATIENT
Start: 2023-05-01 | End: 2023-05-08 | Stop reason: HOSPADM

## 2023-05-01 RX ORDER — MIDAZOLAM HYDROCHLORIDE 1 MG/ML
INJECTION INTRAMUSCULAR; INTRAVENOUS PRN
Status: DISCONTINUED | OUTPATIENT
Start: 2023-05-01 | End: 2023-05-01 | Stop reason: SDUPTHER

## 2023-05-01 RX ADMIN — FENTANYL CITRATE 50 MCG: 50 INJECTION, SOLUTION INTRAMUSCULAR; INTRAVENOUS at 11:32

## 2023-05-01 RX ADMIN — LIDOCAINE HYDROCHLORIDE 100 MG: 20 INJECTION INTRAVENOUS at 09:55

## 2023-05-01 RX ADMIN — FAMOTIDINE 20 MG: 20 TABLET, FILM COATED ORAL at 21:31

## 2023-05-01 RX ADMIN — ONDANSETRON 4 MG: 2 INJECTION INTRAMUSCULAR; INTRAVENOUS at 09:55

## 2023-05-01 RX ADMIN — HYDROMORPHONE HYDROCHLORIDE 1 MG: 2 INJECTION INTRAMUSCULAR; INTRAVENOUS; SUBCUTANEOUS at 10:39

## 2023-05-01 RX ADMIN — MIDAZOLAM 2 MG: 1 INJECTION INTRAMUSCULAR; INTRAVENOUS at 09:52

## 2023-05-01 RX ADMIN — ROCURONIUM BROMIDE 50 MG: 10 INJECTION INTRAVENOUS at 09:55

## 2023-05-01 RX ADMIN — ESMOLOL HYDROCHLORIDE 10 MG: 10 INJECTION, SOLUTION INTRAVENOUS at 10:59

## 2023-05-01 RX ADMIN — HYDROMORPHONE HYDROCHLORIDE 0.25 MG: 1 INJECTION, SOLUTION INTRAMUSCULAR; INTRAVENOUS; SUBCUTANEOUS at 11:42

## 2023-05-01 RX ADMIN — CEFAZOLIN 2000 MG: 10 INJECTION, POWDER, FOR SOLUTION INTRAVENOUS at 10:02

## 2023-05-01 RX ADMIN — ESMOLOL HYDROCHLORIDE 10 MG: 10 INJECTION, SOLUTION INTRAVENOUS at 10:28

## 2023-05-01 RX ADMIN — SODIUM CHLORIDE, POTASSIUM CHLORIDE, SODIUM LACTATE AND CALCIUM CHLORIDE: 600; 310; 30; 20 INJECTION, SOLUTION INTRAVENOUS at 09:50

## 2023-05-01 RX ADMIN — HYDROMORPHONE HYDROCHLORIDE 0.5 MG: 2 INJECTION INTRAMUSCULAR; INTRAVENOUS; SUBCUTANEOUS at 10:53

## 2023-05-01 RX ADMIN — PANTOPRAZOLE SODIUM 40 MG: 40 TABLET, DELAYED RELEASE ORAL at 21:32

## 2023-05-01 RX ADMIN — HYDROMORPHONE HYDROCHLORIDE 0.25 MG: 1 INJECTION, SOLUTION INTRAMUSCULAR; INTRAVENOUS; SUBCUTANEOUS at 11:37

## 2023-05-01 RX ADMIN — SUGAMMADEX 200 MG: 100 INJECTION, SOLUTION INTRAVENOUS at 11:12

## 2023-05-01 RX ADMIN — ROCURONIUM BROMIDE 20 MG: 10 INJECTION INTRAVENOUS at 10:29

## 2023-05-01 RX ADMIN — ONDANSETRON 4 MG: 2 INJECTION INTRAMUSCULAR; INTRAVENOUS at 16:23

## 2023-05-01 RX ADMIN — MORPHINE SULFATE 4 MG: 4 INJECTION, SOLUTION INTRAMUSCULAR; INTRAVENOUS at 22:08

## 2023-05-01 RX ADMIN — HYDROCODONE BITARTRATE AND ACETAMINOPHEN 2 TABLET: 5; 325 TABLET ORAL at 17:30

## 2023-05-01 RX ADMIN — PHENYLEPHRINE HYDROCHLORIDE 100 MCG: 10 INJECTION INTRAVENOUS at 10:06

## 2023-05-01 RX ADMIN — FENTANYL CITRATE 50 MCG: 50 INJECTION, SOLUTION INTRAMUSCULAR; INTRAVENOUS at 10:26

## 2023-05-01 RX ADMIN — HYDROCODONE BITARTRATE AND ACETAMINOPHEN 2 TABLET: 5; 325 TABLET ORAL at 21:31

## 2023-05-01 RX ADMIN — PROPOFOL 130 MG: 10 INJECTION, EMULSION INTRAVENOUS at 09:55

## 2023-05-01 RX ADMIN — FENTANYL CITRATE 50 MCG: 50 INJECTION, SOLUTION INTRAMUSCULAR; INTRAVENOUS at 09:55

## 2023-05-01 RX ADMIN — ESCITALOPRAM 10 MG: 10 TABLET, FILM COATED ORAL at 21:32

## 2023-05-01 ASSESSMENT — PAIN SCALES - WONG BAKER
WONGBAKER_NUMERICALRESPONSE: 2

## 2023-05-01 ASSESSMENT — PAIN DESCRIPTION - ORIENTATION
ORIENTATION: MID
ORIENTATION: LEFT
ORIENTATION: MID

## 2023-05-01 ASSESSMENT — PAIN DESCRIPTION - LOCATION
LOCATION: ABDOMEN

## 2023-05-01 ASSESSMENT — PAIN SCALES - GENERAL
PAINLEVEL_OUTOF10: 10
PAINLEVEL_OUTOF10: 10
PAINLEVEL_OUTOF10: 6
PAINLEVEL_OUTOF10: 2
PAINLEVEL_OUTOF10: 3
PAINLEVEL_OUTOF10: 4
PAINLEVEL_OUTOF10: 7
PAINLEVEL_OUTOF10: 2
PAINLEVEL_OUTOF10: 7

## 2023-05-01 ASSESSMENT — PAIN DESCRIPTION - DESCRIPTORS
DESCRIPTORS: OTHER (COMMENT)
DESCRIPTORS: ACHING
DESCRIPTORS: ACHING

## 2023-05-01 NOTE — ANESTHESIA POSTPROCEDURE EVALUATION
Department of Anesthesiology  Postprocedure Note    Patient: José Manuel Saavedra  MRN: [id number]  YOB: 1960  Date of evaluation: 5/1/2023      Procedure Summary     Date: 05/01/23 Room / Location: [facility]  Valeriano Godinez    Anesthesia Start: 7515 Anesthesia Stop: 0187    Procedure: Open JEJUNOSTOMY TUBE INSERTION; repair of umbilical hernia Diagnosis:       Malignant neoplasm of esophagus, unspecified location ([address]      (Malignant neoplasm of esophagus, unspecified location ([address] [C15.9])    Surgeons: Daphne Joiner MD Responsible Provider: Evangelina Campos MD    Anesthesia Type: general ASA Status: 4          Anesthesia Type: No value filed.     Patsy Phase I: Patsy Score: 9    Patsy Phase II:        Anesthesia Post Evaluation    Patient location during evaluation: bedside  Patient participation: complete - patient participated  Level of consciousness: awake  Airway patency: patent  Nausea & Vomiting: no vomiting and no nausea  Complications: no  Cardiovascular status: hemodynamically stable  Respiratory status: acceptable  Hydration status: stable

## 2023-05-01 NOTE — ANESTHESIA PRE PROCEDURE
Department of Anesthesiology  Preprocedure Note       Name:  Jazmin Martinez   Age:  65 y.o.  :  1960                                          MRN:  [id number]         Date:  2023      Surgeon: Maikel Dsouza):  Della Fitzgerald MD    Procedure: Procedure(s):  Open JEJUNOSTOMY TUBE INSERTION    Medications prior to admission:   Prior to Admission medications    Medication Sig Start Date End Date Taking? Authorizing Provider   KLOR-CON/EF 25 MEQ disintegrating tablet DISSOLVE AND TAKE 1 TABLET BY MOUTH EVERY DAY 3/15/23   Calvin Bassett MD   dexamethasone (DECADRON) 2 MG tablet Take 1 tablet by mouth every other day 3/1/23   Calvin Bassett MD   megestrol (MEGACE) 40 MG tablet Take 1 tablet by mouth daily 3/1/23   Calvin Bassett MD   pantoprazole (PROTONIX) 40 MG tablet Take 1 tablet by mouth in the morning and at bedtime 23   Calvin Bassett MD   escitalopram (LEXAPRO) 10 MG tablet TAKE 1 TABLET BY MOUTH EVERY DAY 23   Ankit Vitale DO   Magic Mouthwash (MIRACLE MOUTHWASH) Swish and spit 5 mLs 4 times daily as needed for Irritation 1:1:1:1 nystatin, maalox, viscous lidocaine, benadryl 22   Anya Bettencourt PA-C   prochlorperazine (COMPAZINE) 10 MG tablet Take 1 tablet by mouth every 6 hours as needed (nausea) 22   BHARTI Rodriguez CNP   lidocaine-prilocaine (EMLA) 2.5-2.5 % cream Apply topically as needed. 22   BHARTI Rodriguez CNP   Blood Pressure Monitoring (BP MONITOR-STETHOSCOPE) KIT Dx: HTN  Patient not taking: Reported on 2023   Ankit Vitale DO   Misc.  Devices (PULSE OXIMETER FOR FINGER) MISC Dx:  hypoxemia 22   Ankit Vitale DO   ibuprofen (ADVIL;MOTRIN) 400 MG tablet Take 1 tablet by mouth every 6 hours as needed for Pain    Historical Provider, MD   famotidine (PEPCID) 20 MG tablet Take 1 tablet by mouth 2 times daily Takes only at night    Historical Provider, MD   NONFORMULARY Hema-Plex (Iron Multi-vitamin)

## 2023-05-02 ENCOUNTER — TELEPHONE (OUTPATIENT)
Dept: ONCOLOGY | Age: 63
End: 2023-05-02

## 2023-05-02 PROCEDURE — 6370000000 HC RX 637 (ALT 250 FOR IP): Performed by: PHYSICIAN ASSISTANT

## 2023-05-02 PROCEDURE — 6370000000 HC RX 637 (ALT 250 FOR IP): Performed by: SURGERY

## 2023-05-02 PROCEDURE — 6360000002 HC RX W HCPCS: Performed by: PHYSICIAN ASSISTANT

## 2023-05-02 PROCEDURE — 2580000003 HC RX 258: Performed by: SURGERY

## 2023-05-02 PROCEDURE — 99232 SBSQ HOSP IP/OBS MODERATE 35: CPT | Performed by: PHYSICIAN ASSISTANT

## 2023-05-02 PROCEDURE — 6360000002 HC RX W HCPCS: Performed by: SURGERY

## 2023-05-02 PROCEDURE — 1200000000 HC SEMI PRIVATE

## 2023-05-02 RX ADMIN — FAMOTIDINE 20 MG: 20 TABLET, FILM COATED ORAL at 08:28

## 2023-05-02 RX ADMIN — SODIUM CHLORIDE, POTASSIUM CHLORIDE, SODIUM LACTATE AND CALCIUM CHLORIDE: 600; 310; 30; 20 INJECTION, SOLUTION INTRAVENOUS at 11:37

## 2023-05-02 RX ADMIN — ESCITALOPRAM 10 MG: 10 TABLET, FILM COATED ORAL at 21:09

## 2023-05-02 RX ADMIN — ONDANSETRON 4 MG: 2 INJECTION INTRAMUSCULAR; INTRAVENOUS at 16:22

## 2023-05-02 RX ADMIN — PANTOPRAZOLE SODIUM 40 MG: 40 TABLET, DELAYED RELEASE ORAL at 08:25

## 2023-05-02 RX ADMIN — POTASSIUM BICARBONATE 20 MEQ: 782 TABLET, EFFERVESCENT ORAL at 08:25

## 2023-05-02 RX ADMIN — HYDROCODONE BITARTRATE AND ACETAMINOPHEN 1 TABLET: 5; 325 TABLET ORAL at 03:54

## 2023-05-02 RX ADMIN — FAMOTIDINE 20 MG: 20 TABLET, FILM COATED ORAL at 21:10

## 2023-05-02 RX ADMIN — ONDANSETRON 4 MG: 2 INJECTION INTRAMUSCULAR; INTRAVENOUS at 08:24

## 2023-05-02 RX ADMIN — MEGESTROL ACETATE 40 MG: 40 TABLET ORAL at 08:29

## 2023-05-02 RX ADMIN — HYDROCODONE BITARTRATE AND ACETAMINOPHEN 2 TABLET: 5; 325 TABLET ORAL at 08:25

## 2023-05-02 RX ADMIN — ONDANSETRON 4 MG: 2 INJECTION INTRAMUSCULAR; INTRAVENOUS at 22:23

## 2023-05-02 RX ADMIN — PANTOPRAZOLE SODIUM 40 MG: 40 TABLET, DELAYED RELEASE ORAL at 21:10

## 2023-05-02 RX ADMIN — MORPHINE SULFATE 2 MG: 2 INJECTION, SOLUTION INTRAMUSCULAR; INTRAVENOUS at 16:17

## 2023-05-02 RX ADMIN — HYDROCODONE BITARTRATE AND ACETAMINOPHEN 2 TABLET: 5; 325 TABLET ORAL at 20:57

## 2023-05-02 ASSESSMENT — PAIN SCALES - GENERAL
PAINLEVEL_OUTOF10: 5
PAINLEVEL_OUTOF10: 6
PAINLEVEL_OUTOF10: 7
PAINLEVEL_OUTOF10: 8
PAINLEVEL_OUTOF10: 5

## 2023-05-02 ASSESSMENT — PAIN - FUNCTIONAL ASSESSMENT
PAIN_FUNCTIONAL_ASSESSMENT: ACTIVITIES ARE NOT PREVENTED

## 2023-05-02 ASSESSMENT — PAIN DESCRIPTION - LOCATION
LOCATION: ABDOMEN

## 2023-05-02 ASSESSMENT — PAIN SCALES - WONG BAKER
WONGBAKER_NUMERICALRESPONSE: 2
WONGBAKER_NUMERICALRESPONSE: 6
WONGBAKER_NUMERICALRESPONSE: 2

## 2023-05-02 ASSESSMENT — PAIN DESCRIPTION - ORIENTATION
ORIENTATION: LEFT
ORIENTATION: LEFT
ORIENTATION: MID

## 2023-05-02 ASSESSMENT — PAIN DESCRIPTION - DESCRIPTORS
DESCRIPTORS: ACHING
DESCRIPTORS: ACHING
DESCRIPTORS: SHARP

## 2023-05-03 ENCOUNTER — APPOINTMENT (OUTPATIENT)
Dept: GENERAL RADIOLOGY | Age: 63
DRG: 987 | End: 2023-05-03
Payer: MEDICAID

## 2023-05-03 LAB
ANION GAP SERPL CALC-SCNC: 9 MEQ/L (ref 8–16)
BUN SERPL-MCNC: 5 MG/DL (ref 7–22)
CALCIUM SERPL-MCNC: 8.6 MG/DL (ref 8.5–10.5)
CHLORIDE SERPL-SCNC: 106 MEQ/L (ref 98–111)
CO2 SERPL-SCNC: 23 MEQ/L (ref 23–33)
CREAT SERPL-MCNC: 0.4 MG/DL (ref 0.4–1.2)
DEPRECATED RDW RBC AUTO: 45 FL (ref 35–45)
ERYTHROCYTE [DISTWIDTH] IN BLOOD BY AUTOMATED COUNT: 12.1 % (ref 11.5–14.5)
GFR SERPL CREATININE-BSD FRML MDRD: > 60 ML/MIN/1.73M2
GLUCOSE SERPL-MCNC: 94 MG/DL (ref 70–108)
HCT VFR BLD AUTO: 28.3 % (ref 42–52)
HGB BLD-MCNC: 9.4 GM/DL (ref 14–18)
MAGNESIUM SERPL-MCNC: 1.7 MG/DL (ref 1.6–2.4)
MCH RBC QN AUTO: 33.6 PG (ref 26–33)
MCHC RBC AUTO-ENTMCNC: 33.2 GM/DL (ref 32.2–35.5)
MCV RBC AUTO: 101.1 FL (ref 80–94)
PLATELET # BLD AUTO: 117 THOU/MM3 (ref 130–400)
PMV BLD AUTO: 8.9 FL (ref 9.4–12.4)
POTASSIUM SERPL-SCNC: 3.4 MEQ/L (ref 3.5–5.2)
RBC # BLD AUTO: 2.8 MILL/MM3 (ref 4.7–6.1)
SODIUM SERPL-SCNC: 138 MEQ/L (ref 135–145)
WBC # BLD AUTO: 4.4 THOU/MM3 (ref 4.8–10.8)

## 2023-05-03 PROCEDURE — 2580000003 HC RX 258: Performed by: SURGERY

## 2023-05-03 PROCEDURE — 85027 COMPLETE CBC AUTOMATED: CPT

## 2023-05-03 PROCEDURE — 6370000000 HC RX 637 (ALT 250 FOR IP): Performed by: SURGERY

## 2023-05-03 PROCEDURE — 80048 BASIC METABOLIC PNL TOTAL CA: CPT

## 2023-05-03 PROCEDURE — 74018 RADEX ABDOMEN 1 VIEW: CPT

## 2023-05-03 PROCEDURE — 6360000002 HC RX W HCPCS: Performed by: SURGERY

## 2023-05-03 PROCEDURE — 83735 ASSAY OF MAGNESIUM: CPT

## 2023-05-03 PROCEDURE — 99232 SBSQ HOSP IP/OBS MODERATE 35: CPT | Performed by: PHYSICIAN ASSISTANT

## 2023-05-03 PROCEDURE — 6370000000 HC RX 637 (ALT 250 FOR IP): Performed by: PHYSICIAN ASSISTANT

## 2023-05-03 PROCEDURE — 1200000000 HC SEMI PRIVATE

## 2023-05-03 PROCEDURE — 6360000002 HC RX W HCPCS: Performed by: PHYSICIAN ASSISTANT

## 2023-05-03 RX ADMIN — ONDANSETRON 4 MG: 2 INJECTION INTRAMUSCULAR; INTRAVENOUS at 11:46

## 2023-05-03 RX ADMIN — ONDANSETRON 4 MG: 2 INJECTION INTRAMUSCULAR; INTRAVENOUS at 17:54

## 2023-05-03 RX ADMIN — TRIMETHOBENZAMIDE HYDROCHLORIDE 200 MG: 100 INJECTION INTRAMUSCULAR at 23:46

## 2023-05-03 RX ADMIN — HYDROCODONE BITARTRATE AND ACETAMINOPHEN 2 TABLET: 5; 325 TABLET ORAL at 07:54

## 2023-05-03 RX ADMIN — PANTOPRAZOLE SODIUM 40 MG: 40 TABLET, DELAYED RELEASE ORAL at 07:54

## 2023-05-03 RX ADMIN — MEGESTROL ACETATE 40 MG: 40 TABLET ORAL at 07:57

## 2023-05-03 RX ADMIN — HYDROCODONE BITARTRATE AND ACETAMINOPHEN 2 TABLET: 5; 325 TABLET ORAL at 01:08

## 2023-05-03 RX ADMIN — POTASSIUM BICARBONATE 20 MEQ: 782 TABLET, EFFERVESCENT ORAL at 07:54

## 2023-05-03 RX ADMIN — ONDANSETRON 4 MG: 2 INJECTION INTRAMUSCULAR; INTRAVENOUS at 04:21

## 2023-05-03 RX ADMIN — FAMOTIDINE 20 MG: 20 TABLET, FILM COATED ORAL at 21:37

## 2023-05-03 RX ADMIN — SODIUM CHLORIDE, POTASSIUM CHLORIDE, SODIUM LACTATE AND CALCIUM CHLORIDE: 600; 310; 30; 20 INJECTION, SOLUTION INTRAVENOUS at 01:07

## 2023-05-03 RX ADMIN — DEXAMETHASONE 2 MG: 4 TABLET ORAL at 07:57

## 2023-05-03 RX ADMIN — FAMOTIDINE 20 MG: 20 TABLET, FILM COATED ORAL at 07:54

## 2023-05-03 RX ADMIN — TRIMETHOBENZAMIDE HYDROCHLORIDE 200 MG: 100 INJECTION INTRAMUSCULAR at 15:53

## 2023-05-03 RX ADMIN — ESCITALOPRAM 10 MG: 10 TABLET, FILM COATED ORAL at 21:38

## 2023-05-03 ASSESSMENT — PAIN DESCRIPTION - ONSET
ONSET: ON-GOING
ONSET: ON-GOING

## 2023-05-03 ASSESSMENT — PAIN SCALES - WONG BAKER
WONGBAKER_NUMERICALRESPONSE: 2

## 2023-05-03 ASSESSMENT — PAIN SCALES - GENERAL
PAINLEVEL_OUTOF10: 2
PAINLEVEL_OUTOF10: 8
PAINLEVEL_OUTOF10: 2
PAINLEVEL_OUTOF10: 2
PAINLEVEL_OUTOF10: 7

## 2023-05-03 ASSESSMENT — PAIN - FUNCTIONAL ASSESSMENT
PAIN_FUNCTIONAL_ASSESSMENT: ACTIVITIES ARE NOT PREVENTED
PAIN_FUNCTIONAL_ASSESSMENT: ACTIVITIES ARE NOT PREVENTED

## 2023-05-03 ASSESSMENT — PAIN DESCRIPTION - DESCRIPTORS
DESCRIPTORS: SHARP
DESCRIPTORS: ACHING

## 2023-05-03 ASSESSMENT — PAIN DESCRIPTION - LOCATION
LOCATION: ABDOMEN
LOCATION: ABDOMEN
LOCATION: NECK

## 2023-05-03 ASSESSMENT — PAIN DESCRIPTION - ORIENTATION
ORIENTATION: MID
ORIENTATION: LEFT

## 2023-05-03 ASSESSMENT — PAIN DESCRIPTION - PAIN TYPE
TYPE: SURGICAL PAIN
TYPE: CHRONIC PAIN

## 2023-05-03 ASSESSMENT — PAIN DESCRIPTION - FREQUENCY
FREQUENCY: INTERMITTENT
FREQUENCY: CONTINUOUS

## 2023-05-04 LAB
ANION GAP SERPL CALC-SCNC: 12 MEQ/L (ref 8–16)
BUN SERPL-MCNC: 6 MG/DL (ref 7–22)
CALCIUM SERPL-MCNC: 8.7 MG/DL (ref 8.5–10.5)
CHLORIDE SERPL-SCNC: 96 MEQ/L (ref 98–111)
CO2 SERPL-SCNC: 22 MEQ/L (ref 23–33)
CREAT SERPL-MCNC: 0.4 MG/DL (ref 0.4–1.2)
GFR SERPL CREATININE-BSD FRML MDRD: > 60 ML/MIN/1.73M2
GLUCOSE SERPL-MCNC: 98 MG/DL (ref 70–108)
MAGNESIUM SERPL-MCNC: 1.8 MG/DL (ref 1.6–2.4)
POTASSIUM SERPL-SCNC: 3.2 MEQ/L (ref 3.5–5.2)
SODIUM SERPL-SCNC: 130 MEQ/L (ref 135–145)

## 2023-05-04 PROCEDURE — 6370000000 HC RX 637 (ALT 250 FOR IP): Performed by: SURGERY

## 2023-05-04 PROCEDURE — 1200000000 HC SEMI PRIVATE

## 2023-05-04 PROCEDURE — 6370000000 HC RX 637 (ALT 250 FOR IP): Performed by: PHYSICIAN ASSISTANT

## 2023-05-04 PROCEDURE — 83735 ASSAY OF MAGNESIUM: CPT

## 2023-05-04 PROCEDURE — 80048 BASIC METABOLIC PNL TOTAL CA: CPT

## 2023-05-04 PROCEDURE — 6360000002 HC RX W HCPCS: Performed by: SURGERY

## 2023-05-04 PROCEDURE — 2580000003 HC RX 258: Performed by: SURGERY

## 2023-05-04 PROCEDURE — 99024 POSTOP FOLLOW-UP VISIT: CPT | Performed by: SURGERY

## 2023-05-04 PROCEDURE — 6360000002 HC RX W HCPCS: Performed by: PHYSICIAN ASSISTANT

## 2023-05-04 PROCEDURE — 99232 SBSQ HOSP IP/OBS MODERATE 35: CPT | Performed by: PHYSICIAN ASSISTANT

## 2023-05-04 RX ORDER — HYDROXYZINE HYDROCHLORIDE 50 MG/ML
50 INJECTION, SOLUTION INTRAMUSCULAR EVERY 6 HOURS PRN
Status: DISCONTINUED | OUTPATIENT
Start: 2023-05-04 | End: 2023-05-08 | Stop reason: HOSPADM

## 2023-05-04 RX ORDER — PROCHLORPERAZINE EDISYLATE 5 MG/ML
10 INJECTION INTRAMUSCULAR; INTRAVENOUS EVERY 6 HOURS PRN
Status: DISCONTINUED | OUTPATIENT
Start: 2023-05-04 | End: 2023-05-08 | Stop reason: HOSPADM

## 2023-05-04 RX ORDER — LANSOPRAZOLE
30 KIT 2 TIMES DAILY
Status: COMPLETED | OUTPATIENT
Start: 2023-05-04 | End: 2023-05-07

## 2023-05-04 RX ORDER — DIPHENHYDRAMINE HYDROCHLORIDE 50 MG/ML
25 INJECTION INTRAMUSCULAR; INTRAVENOUS ONCE
Status: DISCONTINUED | OUTPATIENT
Start: 2023-05-04 | End: 2023-05-08 | Stop reason: HOSPADM

## 2023-05-04 RX ORDER — HYDROXYZINE PAMOATE 50 MG/1
50 CAPSULE ORAL 4 TIMES DAILY PRN
Status: DISCONTINUED | OUTPATIENT
Start: 2023-05-04 | End: 2023-05-08 | Stop reason: HOSPADM

## 2023-05-04 RX ORDER — LABETALOL HYDROCHLORIDE 5 MG/ML
10 INJECTION, SOLUTION INTRAVENOUS EVERY 6 HOURS PRN
Status: DISCONTINUED | OUTPATIENT
Start: 2023-05-04 | End: 2023-05-08 | Stop reason: HOSPADM

## 2023-05-04 RX ORDER — KETOROLAC TROMETHAMINE 30 MG/ML
30 INJECTION, SOLUTION INTRAMUSCULAR; INTRAVENOUS EVERY 6 HOURS
Status: DISCONTINUED | OUTPATIENT
Start: 2023-05-04 | End: 2023-05-08 | Stop reason: HOSPADM

## 2023-05-04 RX ORDER — AMLODIPINE BESYLATE 5 MG/1
5 TABLET ORAL DAILY
Status: DISCONTINUED | OUTPATIENT
Start: 2023-05-04 | End: 2023-05-05

## 2023-05-04 RX ORDER — HYDRALAZINE HYDROCHLORIDE 20 MG/ML
10 INJECTION INTRAMUSCULAR; INTRAVENOUS EVERY 6 HOURS PRN
Status: DISCONTINUED | OUTPATIENT
Start: 2023-05-04 | End: 2023-05-08 | Stop reason: HOSPADM

## 2023-05-04 RX ORDER — ACETAMINOPHEN 325 MG/1
650 TABLET ORAL EVERY 6 HOURS SCHEDULED
Status: DISCONTINUED | OUTPATIENT
Start: 2023-05-04 | End: 2023-05-08 | Stop reason: HOSPADM

## 2023-05-04 RX ORDER — PALONOSETRON 0.05 MG/ML
0.25 INJECTION, SOLUTION INTRAVENOUS ONCE
Status: DISCONTINUED | OUTPATIENT
Start: 2023-05-04 | End: 2023-05-04

## 2023-05-04 RX ADMIN — KETOROLAC TROMETHAMINE 30 MG: 30 INJECTION, SOLUTION INTRAMUSCULAR; INTRAVENOUS at 11:04

## 2023-05-04 RX ADMIN — TRIMETHOBENZAMIDE HYDROCHLORIDE 200 MG: 100 INJECTION INTRAMUSCULAR at 08:45

## 2023-05-04 RX ADMIN — FAMOTIDINE 20 MG: 20 TABLET, FILM COATED ORAL at 22:33

## 2023-05-04 RX ADMIN — ACETAMINOPHEN 650 MG: 325 TABLET ORAL at 17:40

## 2023-05-04 RX ADMIN — Medication 30 MG: at 14:17

## 2023-05-04 RX ADMIN — ONDANSETRON 4 MG: 2 INJECTION INTRAMUSCULAR; INTRAVENOUS at 14:13

## 2023-05-04 RX ADMIN — POTASSIUM BICARBONATE 40 MEQ: 782 TABLET, EFFERVESCENT ORAL at 17:40

## 2023-05-04 RX ADMIN — HYDROXYZINE PAMOATE 50 MG: 50 CAPSULE ORAL at 14:13

## 2023-05-04 RX ADMIN — SODIUM CHLORIDE, PRESERVATIVE FREE 10 ML: 5 INJECTION INTRAVENOUS at 22:38

## 2023-05-04 RX ADMIN — FAMOTIDINE 20 MG: 20 TABLET, FILM COATED ORAL at 08:51

## 2023-05-04 RX ADMIN — HYDROCODONE BITARTRATE AND ACETAMINOPHEN 1 TABLET: 5; 325 TABLET ORAL at 08:51

## 2023-05-04 RX ADMIN — ONDANSETRON 4 MG: 2 INJECTION INTRAMUSCULAR; INTRAVENOUS at 03:46

## 2023-05-04 RX ADMIN — Medication 30 MG: at 22:36

## 2023-05-04 RX ADMIN — POTASSIUM BICARBONATE 20 MEQ: 782 TABLET, EFFERVESCENT ORAL at 08:50

## 2023-05-04 RX ADMIN — MEGESTROL ACETATE 40 MG: 40 TABLET ORAL at 08:52

## 2023-05-04 RX ADMIN — KETOROLAC TROMETHAMINE 30 MG: 30 INJECTION, SOLUTION INTRAMUSCULAR; INTRAVENOUS at 17:40

## 2023-05-04 RX ADMIN — ACETAMINOPHEN 650 MG: 325 TABLET ORAL at 11:04

## 2023-05-04 RX ADMIN — HYDROCODONE BITARTRATE AND ACETAMINOPHEN 2 TABLET: 5; 325 TABLET ORAL at 22:33

## 2023-05-04 RX ADMIN — AMLODIPINE BESYLATE 5 MG: 5 TABLET ORAL at 16:10

## 2023-05-04 RX ADMIN — ESCITALOPRAM 10 MG: 10 TABLET, FILM COATED ORAL at 22:36

## 2023-05-04 ASSESSMENT — PAIN - FUNCTIONAL ASSESSMENT
PAIN_FUNCTIONAL_ASSESSMENT: PREVENTS OR INTERFERES SOME ACTIVE ACTIVITIES AND ADLS
PAIN_FUNCTIONAL_ASSESSMENT: PREVENTS OR INTERFERES SOME ACTIVE ACTIVITIES AND ADLS

## 2023-05-04 ASSESSMENT — PAIN DESCRIPTION - LOCATION
LOCATION: ABDOMEN
LOCATION: ABDOMEN
LOCATION: NECK

## 2023-05-04 ASSESSMENT — PAIN SCALES - GENERAL
PAINLEVEL_OUTOF10: 5
PAINLEVEL_OUTOF10: 4
PAINLEVEL_OUTOF10: 2
PAINLEVEL_OUTOF10: 6
PAINLEVEL_OUTOF10: 6
PAINLEVEL_OUTOF10: 2

## 2023-05-04 ASSESSMENT — PAIN DESCRIPTION - DESCRIPTORS: DESCRIPTORS: ACHING;THROBBING;SORE

## 2023-05-04 ASSESSMENT — PAIN DESCRIPTION - ONSET: ONSET: GRADUAL

## 2023-05-04 ASSESSMENT — PAIN SCALES - WONG BAKER
WONGBAKER_NUMERICALRESPONSE: 2
WONGBAKER_NUMERICALRESPONSE: 0

## 2023-05-04 ASSESSMENT — PAIN DESCRIPTION - PAIN TYPE: TYPE: ACUTE PAIN

## 2023-05-04 ASSESSMENT — PAIN DESCRIPTION - ORIENTATION
ORIENTATION: MID
ORIENTATION: MID;ANTERIOR

## 2023-05-04 ASSESSMENT — PAIN DESCRIPTION - FREQUENCY: FREQUENCY: INTERMITTENT

## 2023-05-05 PROBLEM — E46 MALNUTRITION (HCC): Status: ACTIVE | Noted: 2022-10-31

## 2023-05-05 LAB — POTASSIUM SERPL-SCNC: 3.6 MEQ/L (ref 3.5–5.2)

## 2023-05-05 PROCEDURE — 6370000000 HC RX 637 (ALT 250 FOR IP): Performed by: PHYSICIAN ASSISTANT

## 2023-05-05 PROCEDURE — 6360000002 HC RX W HCPCS: Performed by: SURGERY

## 2023-05-05 PROCEDURE — 6370000000 HC RX 637 (ALT 250 FOR IP): Performed by: SURGERY

## 2023-05-05 PROCEDURE — 6360000002 HC RX W HCPCS: Performed by: PHYSICIAN ASSISTANT

## 2023-05-05 PROCEDURE — 2580000003 HC RX 258: Performed by: SURGERY

## 2023-05-05 PROCEDURE — 1200000000 HC SEMI PRIVATE

## 2023-05-05 PROCEDURE — 84132 ASSAY OF SERUM POTASSIUM: CPT

## 2023-05-05 PROCEDURE — 97530 THERAPEUTIC ACTIVITIES: CPT

## 2023-05-05 PROCEDURE — 97166 OT EVAL MOD COMPLEX 45 MIN: CPT

## 2023-05-05 RX ORDER — AMLODIPINE BESYLATE 10 MG/1
10 TABLET ORAL DAILY
Status: DISCONTINUED | OUTPATIENT
Start: 2023-05-06 | End: 2023-05-08 | Stop reason: HOSPADM

## 2023-05-05 RX ADMIN — HYDROXYZINE PAMOATE 50 MG: 50 CAPSULE ORAL at 08:50

## 2023-05-05 RX ADMIN — ACETAMINOPHEN 650 MG: 325 TABLET ORAL at 06:06

## 2023-05-05 RX ADMIN — FAMOTIDINE 20 MG: 20 TABLET, FILM COATED ORAL at 08:51

## 2023-05-05 RX ADMIN — ONDANSETRON 4 MG: 2 INJECTION INTRAMUSCULAR; INTRAVENOUS at 12:20

## 2023-05-05 RX ADMIN — HYDROCODONE BITARTRATE AND ACETAMINOPHEN 1 TABLET: 5; 325 TABLET ORAL at 08:51

## 2023-05-05 RX ADMIN — ESCITALOPRAM 10 MG: 10 TABLET, FILM COATED ORAL at 21:27

## 2023-05-05 RX ADMIN — HYDROCODONE BITARTRATE AND ACETAMINOPHEN 1 TABLET: 5; 325 TABLET ORAL at 15:46

## 2023-05-05 RX ADMIN — FAMOTIDINE 20 MG: 20 TABLET, FILM COATED ORAL at 21:27

## 2023-05-05 RX ADMIN — Medication 30 MG: at 21:27

## 2023-05-05 RX ADMIN — AMLODIPINE BESYLATE 5 MG: 5 TABLET ORAL at 09:00

## 2023-05-05 RX ADMIN — ACETAMINOPHEN 650 MG: 325 TABLET ORAL at 17:59

## 2023-05-05 RX ADMIN — KETOROLAC TROMETHAMINE 30 MG: 30 INJECTION, SOLUTION INTRAMUSCULAR; INTRAVENOUS at 06:06

## 2023-05-05 RX ADMIN — ACETAMINOPHEN 650 MG: 325 TABLET ORAL at 23:17

## 2023-05-05 RX ADMIN — KETOROLAC TROMETHAMINE 30 MG: 30 INJECTION, SOLUTION INTRAMUSCULAR; INTRAVENOUS at 12:16

## 2023-05-05 RX ADMIN — KETOROLAC TROMETHAMINE 30 MG: 30 INJECTION, SOLUTION INTRAMUSCULAR; INTRAVENOUS at 00:29

## 2023-05-05 RX ADMIN — SODIUM CHLORIDE, PRESERVATIVE FREE 10 ML: 5 INJECTION INTRAVENOUS at 21:27

## 2023-05-05 RX ADMIN — KETOROLAC TROMETHAMINE 30 MG: 30 INJECTION, SOLUTION INTRAMUSCULAR; INTRAVENOUS at 17:59

## 2023-05-05 RX ADMIN — MEGESTROL ACETATE 40 MG: 40 TABLET ORAL at 08:51

## 2023-05-05 RX ADMIN — ACETAMINOPHEN 650 MG: 325 TABLET ORAL at 12:16

## 2023-05-05 RX ADMIN — Medication 30 MG: at 08:52

## 2023-05-05 RX ADMIN — KETOROLAC TROMETHAMINE 30 MG: 30 INJECTION, SOLUTION INTRAMUSCULAR; INTRAVENOUS at 23:17

## 2023-05-05 RX ADMIN — DEXAMETHASONE 2 MG: 4 TABLET ORAL at 08:50

## 2023-05-05 RX ADMIN — ACETAMINOPHEN 650 MG: 325 TABLET ORAL at 00:29

## 2023-05-05 RX ADMIN — POTASSIUM BICARBONATE 20 MEQ: 782 TABLET, EFFERVESCENT ORAL at 08:51

## 2023-05-05 RX ADMIN — HYDROXYZINE PAMOATE 50 MG: 50 CAPSULE ORAL at 15:46

## 2023-05-05 RX ADMIN — ONDANSETRON 4 MG: 2 INJECTION INTRAMUSCULAR; INTRAVENOUS at 06:20

## 2023-05-05 ASSESSMENT — PAIN DESCRIPTION - ORIENTATION
ORIENTATION: MID;UPPER
ORIENTATION: MID;LOWER
ORIENTATION: MID

## 2023-05-05 ASSESSMENT — PAIN DESCRIPTION - LOCATION
LOCATION: ABDOMEN;BACK
LOCATION: GENERALIZED

## 2023-05-05 ASSESSMENT — PAIN SCALES - WONG BAKER
WONGBAKER_NUMERICALRESPONSE: 0
WONGBAKER_NUMERICALRESPONSE: 0

## 2023-05-05 ASSESSMENT — PAIN SCALES - GENERAL
PAINLEVEL_OUTOF10: 5
PAINLEVEL_OUTOF10: 3
PAINLEVEL_OUTOF10: 3
PAINLEVEL_OUTOF10: 5
PAINLEVEL_OUTOF10: 5
PAINLEVEL_OUTOF10: 3
PAINLEVEL_OUTOF10: 3
PAINLEVEL_OUTOF10: 0

## 2023-05-05 ASSESSMENT — PAIN DESCRIPTION - DESCRIPTORS
DESCRIPTORS: DISCOMFORT
DESCRIPTORS: ACHING;STABBING
DESCRIPTORS: ACHING;STABBING;SPASM;SHARP
DESCRIPTORS: ACHING
DESCRIPTORS: ACHING;SORE;SHARP

## 2023-05-05 ASSESSMENT — PAIN - FUNCTIONAL ASSESSMENT: PAIN_FUNCTIONAL_ASSESSMENT: PREVENTS OR INTERFERES SOME ACTIVE ACTIVITIES AND ADLS

## 2023-05-05 ASSESSMENT — PAIN DESCRIPTION - PAIN TYPE: TYPE: CHRONIC PAIN

## 2023-05-05 ASSESSMENT — PAIN DESCRIPTION - ONSET: ONSET: GRADUAL

## 2023-05-05 ASSESSMENT — PAIN DESCRIPTION - FREQUENCY: FREQUENCY: INTERMITTENT

## 2023-05-06 LAB
ALBUMIN SERPL BCG-MCNC: 3.7 G/DL (ref 3.5–5.1)
ALP SERPL-CCNC: 64 U/L (ref 38–126)
ALT SERPL W/O P-5'-P-CCNC: 22 U/L (ref 11–66)
ANION GAP SERPL CALC-SCNC: 10 MEQ/L (ref 8–16)
AST SERPL-CCNC: 13 U/L (ref 5–40)
BILIRUB SERPL-MCNC: 0.5 MG/DL (ref 0.3–1.2)
BUN SERPL-MCNC: 11 MG/DL (ref 7–22)
CALCIUM SERPL-MCNC: 9.2 MG/DL (ref 8.5–10.5)
CHLORIDE SERPL-SCNC: 100 MEQ/L (ref 98–111)
CO2 SERPL-SCNC: 25 MEQ/L (ref 23–33)
CREAT SERPL-MCNC: 0.6 MG/DL (ref 0.4–1.2)
DEPRECATED RDW RBC AUTO: 43.8 FL (ref 35–45)
ERYTHROCYTE [DISTWIDTH] IN BLOOD BY AUTOMATED COUNT: 12 % (ref 11.5–14.5)
GFR SERPL CREATININE-BSD FRML MDRD: > 60 ML/MIN/1.73M2
GLUCOSE SERPL-MCNC: 112 MG/DL (ref 70–108)
HCT VFR BLD AUTO: 33.1 % (ref 42–52)
HGB BLD-MCNC: 11 GM/DL (ref 14–18)
MCH RBC QN AUTO: 33.2 PG (ref 26–33)
MCHC RBC AUTO-ENTMCNC: 33.2 GM/DL (ref 32.2–35.5)
MCV RBC AUTO: 100 FL (ref 80–94)
PLATELET # BLD AUTO: 145 THOU/MM3 (ref 130–400)
PMV BLD AUTO: 8.5 FL (ref 9.4–12.4)
POTASSIUM SERPL-SCNC: 3.7 MEQ/L (ref 3.5–5.2)
PROT SERPL-MCNC: 6 G/DL (ref 6.1–8)
RBC # BLD AUTO: 3.31 MILL/MM3 (ref 4.7–6.1)
SODIUM SERPL-SCNC: 135 MEQ/L (ref 135–145)
WBC # BLD AUTO: 5.2 THOU/MM3 (ref 4.8–10.8)

## 2023-05-06 PROCEDURE — 6370000000 HC RX 637 (ALT 250 FOR IP): Performed by: SURGERY

## 2023-05-06 PROCEDURE — 80053 COMPREHEN METABOLIC PANEL: CPT

## 2023-05-06 PROCEDURE — 6370000000 HC RX 637 (ALT 250 FOR IP): Performed by: PHYSICIAN ASSISTANT

## 2023-05-06 PROCEDURE — 1200000000 HC SEMI PRIVATE

## 2023-05-06 PROCEDURE — 6360000002 HC RX W HCPCS: Performed by: SURGERY

## 2023-05-06 PROCEDURE — 6360000002 HC RX W HCPCS: Performed by: PHYSICIAN ASSISTANT

## 2023-05-06 PROCEDURE — 6370000000 HC RX 637 (ALT 250 FOR IP): Performed by: HOSPITALIST

## 2023-05-06 PROCEDURE — 85027 COMPLETE CBC AUTOMATED: CPT

## 2023-05-06 PROCEDURE — 99024 POSTOP FOLLOW-UP VISIT: CPT | Performed by: SURGERY

## 2023-05-06 PROCEDURE — 2580000003 HC RX 258: Performed by: SURGERY

## 2023-05-06 RX ORDER — LANSOPRAZOLE 30 MG/1
30 TABLET, ORALLY DISINTEGRATING, DELAYED RELEASE ORAL 2 TIMES DAILY
Status: DISCONTINUED | OUTPATIENT
Start: 2023-05-07 | End: 2023-05-08 | Stop reason: HOSPADM

## 2023-05-06 RX ADMIN — Medication 30 MG: at 20:09

## 2023-05-06 RX ADMIN — ONDANSETRON 4 MG: 2 INJECTION INTRAMUSCULAR; INTRAVENOUS at 17:31

## 2023-05-06 RX ADMIN — AMLODIPINE BESYLATE 10 MG: 10 TABLET ORAL at 09:23

## 2023-05-06 RX ADMIN — FAMOTIDINE 20 MG: 20 TABLET, FILM COATED ORAL at 09:23

## 2023-05-06 RX ADMIN — Medication 30 MG: at 09:22

## 2023-05-06 RX ADMIN — ACETAMINOPHEN 650 MG: 325 TABLET ORAL at 05:07

## 2023-05-06 RX ADMIN — KETOROLAC TROMETHAMINE 30 MG: 30 INJECTION, SOLUTION INTRAMUSCULAR; INTRAVENOUS at 12:19

## 2023-05-06 RX ADMIN — POTASSIUM BICARBONATE 20 MEQ: 782 TABLET, EFFERVESCENT ORAL at 08:35

## 2023-05-06 RX ADMIN — ACETAMINOPHEN 650 MG: 325 TABLET ORAL at 12:21

## 2023-05-06 RX ADMIN — KETOROLAC TROMETHAMINE 30 MG: 30 INJECTION, SOLUTION INTRAMUSCULAR; INTRAVENOUS at 05:07

## 2023-05-06 RX ADMIN — SODIUM CHLORIDE, PRESERVATIVE FREE 10 ML: 5 INJECTION INTRAVENOUS at 20:10

## 2023-05-06 RX ADMIN — HYDROXYZINE PAMOATE 50 MG: 50 CAPSULE ORAL at 20:13

## 2023-05-06 RX ADMIN — ONDANSETRON 4 MG: 2 INJECTION INTRAMUSCULAR; INTRAVENOUS at 08:22

## 2023-05-06 RX ADMIN — ACETAMINOPHEN 650 MG: 325 TABLET ORAL at 17:31

## 2023-05-06 RX ADMIN — KETOROLAC TROMETHAMINE 30 MG: 30 INJECTION, SOLUTION INTRAMUSCULAR; INTRAVENOUS at 23:36

## 2023-05-06 RX ADMIN — KETOROLAC TROMETHAMINE 30 MG: 30 INJECTION, SOLUTION INTRAMUSCULAR; INTRAVENOUS at 17:31

## 2023-05-06 RX ADMIN — FAMOTIDINE 20 MG: 20 TABLET, FILM COATED ORAL at 20:08

## 2023-05-06 RX ADMIN — ESCITALOPRAM 10 MG: 10 TABLET, FILM COATED ORAL at 20:08

## 2023-05-06 RX ADMIN — ACETAMINOPHEN 650 MG: 325 TABLET ORAL at 23:36

## 2023-05-06 RX ADMIN — MEGESTROL ACETATE 40 MG: 40 TABLET ORAL at 09:23

## 2023-05-06 ASSESSMENT — PAIN DESCRIPTION - ORIENTATION: ORIENTATION: MID

## 2023-05-06 ASSESSMENT — PAIN DESCRIPTION - DESCRIPTORS
DESCRIPTORS: DISCOMFORT
DESCRIPTORS: DISCOMFORT
DESCRIPTORS: ACHING

## 2023-05-06 ASSESSMENT — PAIN SCALES - GENERAL
PAINLEVEL_OUTOF10: 3
PAINLEVEL_OUTOF10: 0
PAINLEVEL_OUTOF10: 0
PAINLEVEL_OUTOF10: 3
PAINLEVEL_OUTOF10: 5

## 2023-05-06 ASSESSMENT — PAIN DESCRIPTION - LOCATION
LOCATION: ABDOMEN
LOCATION: GENERALIZED
LOCATION: ABDOMEN

## 2023-05-07 ENCOUNTER — APPOINTMENT (OUTPATIENT)
Dept: GENERAL RADIOLOGY | Age: 63
DRG: 987 | End: 2023-05-07
Payer: MEDICAID

## 2023-05-07 LAB
ALBUMIN SERPL BCG-MCNC: 3.7 G/DL (ref 3.5–5.1)
ALP SERPL-CCNC: 58 U/L (ref 38–126)
ALT SERPL W/O P-5'-P-CCNC: 18 U/L (ref 11–66)
ANION GAP SERPL CALC-SCNC: 11 MEQ/L (ref 8–16)
AST SERPL-CCNC: 13 U/L (ref 5–40)
BILIRUB SERPL-MCNC: 0.5 MG/DL (ref 0.3–1.2)
BUN SERPL-MCNC: 13 MG/DL (ref 7–22)
CALCIUM SERPL-MCNC: 9.1 MG/DL (ref 8.5–10.5)
CHLORIDE SERPL-SCNC: 99 MEQ/L (ref 98–111)
CO2 SERPL-SCNC: 24 MEQ/L (ref 23–33)
CREAT SERPL-MCNC: 0.4 MG/DL (ref 0.4–1.2)
DEPRECATED RDW RBC AUTO: 43.6 FL (ref 35–45)
ERYTHROCYTE [DISTWIDTH] IN BLOOD BY AUTOMATED COUNT: 12 % (ref 11.5–14.5)
GFR SERPL CREATININE-BSD FRML MDRD: > 60 ML/MIN/1.73M2
GLUCOSE SERPL-MCNC: 83 MG/DL (ref 70–108)
HCT VFR BLD AUTO: 33.1 % (ref 42–52)
HGB BLD-MCNC: 11.1 GM/DL (ref 14–18)
MCH RBC QN AUTO: 33.3 PG (ref 26–33)
MCHC RBC AUTO-ENTMCNC: 33.5 GM/DL (ref 32.2–35.5)
MCV RBC AUTO: 99.4 FL (ref 80–94)
PLATELET # BLD AUTO: 139 THOU/MM3 (ref 130–400)
PMV BLD AUTO: 8.1 FL (ref 9.4–12.4)
POTASSIUM SERPL-SCNC: 3.7 MEQ/L (ref 3.5–5.2)
PROT SERPL-MCNC: 5.8 G/DL (ref 6.1–8)
RBC # BLD AUTO: 3.33 MILL/MM3 (ref 4.7–6.1)
SODIUM SERPL-SCNC: 134 MEQ/L (ref 135–145)
WBC # BLD AUTO: 4.8 THOU/MM3 (ref 4.8–10.8)

## 2023-05-07 PROCEDURE — 6360000002 HC RX W HCPCS: Performed by: PHYSICIAN ASSISTANT

## 2023-05-07 PROCEDURE — 2580000003 HC RX 258: Performed by: SURGERY

## 2023-05-07 PROCEDURE — 6370000000 HC RX 637 (ALT 250 FOR IP): Performed by: PHYSICIAN ASSISTANT

## 2023-05-07 PROCEDURE — 74019 RADEX ABDOMEN 2 VIEWS: CPT

## 2023-05-07 PROCEDURE — 80053 COMPREHEN METABOLIC PANEL: CPT

## 2023-05-07 PROCEDURE — 6360000002 HC RX W HCPCS: Performed by: SURGERY

## 2023-05-07 PROCEDURE — 6370000000 HC RX 637 (ALT 250 FOR IP): Performed by: HOSPITALIST

## 2023-05-07 PROCEDURE — 6370000000 HC RX 637 (ALT 250 FOR IP): Performed by: SURGERY

## 2023-05-07 PROCEDURE — 85027 COMPLETE CBC AUTOMATED: CPT

## 2023-05-07 PROCEDURE — 1200000000 HC SEMI PRIVATE

## 2023-05-07 RX ADMIN — KETOROLAC TROMETHAMINE 30 MG: 30 INJECTION, SOLUTION INTRAMUSCULAR; INTRAVENOUS at 16:49

## 2023-05-07 RX ADMIN — ESCITALOPRAM 10 MG: 10 TABLET, FILM COATED ORAL at 21:02

## 2023-05-07 RX ADMIN — PROCHLORPERAZINE EDISYLATE 10 MG: 5 INJECTION INTRAMUSCULAR; INTRAVENOUS at 17:48

## 2023-05-07 RX ADMIN — Medication 30 MG: at 08:10

## 2023-05-07 RX ADMIN — SODIUM CHLORIDE, PRESERVATIVE FREE 10 ML: 5 INJECTION INTRAVENOUS at 21:02

## 2023-05-07 RX ADMIN — FAMOTIDINE 20 MG: 20 TABLET, FILM COATED ORAL at 21:02

## 2023-05-07 RX ADMIN — ACETAMINOPHEN 650 MG: 325 TABLET ORAL at 12:15

## 2023-05-07 RX ADMIN — DEXAMETHASONE 2 MG: 4 TABLET ORAL at 08:09

## 2023-05-07 RX ADMIN — KETOROLAC TROMETHAMINE 30 MG: 30 INJECTION, SOLUTION INTRAMUSCULAR; INTRAVENOUS at 05:12

## 2023-05-07 RX ADMIN — AMLODIPINE BESYLATE 10 MG: 10 TABLET ORAL at 08:09

## 2023-05-07 RX ADMIN — FAMOTIDINE 20 MG: 20 TABLET, FILM COATED ORAL at 08:10

## 2023-05-07 RX ADMIN — MEGESTROL ACETATE 40 MG: 40 TABLET ORAL at 08:09

## 2023-05-07 RX ADMIN — POTASSIUM BICARBONATE 20 MEQ: 782 TABLET, EFFERVESCENT ORAL at 08:09

## 2023-05-07 RX ADMIN — LANSOPRAZOLE 30 MG: 30 TABLET, ORALLY DISINTEGRATING, DELAYED RELEASE ORAL at 21:02

## 2023-05-07 RX ADMIN — SODIUM CHLORIDE, PRESERVATIVE FREE 10 ML: 5 INJECTION INTRAVENOUS at 08:10

## 2023-05-07 RX ADMIN — ACETAMINOPHEN 650 MG: 325 TABLET ORAL at 05:12

## 2023-05-07 RX ADMIN — ACETAMINOPHEN 650 MG: 325 TABLET ORAL at 16:49

## 2023-05-07 ASSESSMENT — PAIN DESCRIPTION - DESCRIPTORS: DESCRIPTORS: DISCOMFORT

## 2023-05-07 ASSESSMENT — PAIN SCALES - GENERAL: PAINLEVEL_OUTOF10: 3

## 2023-05-07 ASSESSMENT — PAIN DESCRIPTION - LOCATION: LOCATION: ABDOMEN

## 2023-05-08 VITALS
DIASTOLIC BLOOD PRESSURE: 74 MMHG | SYSTOLIC BLOOD PRESSURE: 122 MMHG | RESPIRATION RATE: 16 BRPM | OXYGEN SATURATION: 96 % | HEIGHT: 67 IN | BODY MASS INDEX: 19.7 KG/M2 | TEMPERATURE: 98 F | WEIGHT: 125.5 LBS | HEART RATE: 108 BPM

## 2023-05-08 PROCEDURE — 6370000000 HC RX 637 (ALT 250 FOR IP): Performed by: HOSPITALIST

## 2023-05-08 PROCEDURE — 6360000002 HC RX W HCPCS: Performed by: SURGERY

## 2023-05-08 PROCEDURE — 97535 SELF CARE MNGMENT TRAINING: CPT

## 2023-05-08 PROCEDURE — 6360000002 HC RX W HCPCS: Performed by: PHYSICIAN ASSISTANT

## 2023-05-08 PROCEDURE — 6360000002 HC RX W HCPCS: Performed by: NURSE PRACTITIONER

## 2023-05-08 PROCEDURE — 6370000000 HC RX 637 (ALT 250 FOR IP): Performed by: SURGERY

## 2023-05-08 PROCEDURE — 97530 THERAPEUTIC ACTIVITIES: CPT

## 2023-05-08 PROCEDURE — 99239 HOSP IP/OBS DSCHRG MGMT >30: CPT | Performed by: NURSE PRACTITIONER

## 2023-05-08 PROCEDURE — 6370000000 HC RX 637 (ALT 250 FOR IP): Performed by: PHYSICIAN ASSISTANT

## 2023-05-08 RX ORDER — HYDROCODONE BITARTRATE AND ACETAMINOPHEN 5; 325 MG/1; MG/1
1 TABLET ORAL EVERY 4 HOURS PRN
Qty: 30 TABLET | Refills: 0 | Status: SHIPPED | OUTPATIENT
Start: 2023-05-08 | End: 2023-05-16

## 2023-05-08 RX ORDER — AMLODIPINE BESYLATE 10 MG/1
10 TABLET ORAL DAILY
Qty: 30 TABLET | Refills: 3 | Status: SHIPPED | OUTPATIENT
Start: 2023-05-09

## 2023-05-08 RX ORDER — LANSOPRAZOLE 30 MG/1
30 TABLET, ORALLY DISINTEGRATING, DELAYED RELEASE ORAL 2 TIMES DAILY
Qty: 60 TABLET | Refills: 1 | Status: SHIPPED | OUTPATIENT
Start: 2023-05-08

## 2023-05-08 RX ORDER — HEPARIN SODIUM (PORCINE) LOCK FLUSH IV SOLN 100 UNIT/ML 100 UNIT/ML
500 SOLUTION INTRAVENOUS PRN
Status: DISCONTINUED | OUTPATIENT
Start: 2023-05-08 | End: 2023-05-08 | Stop reason: HOSPADM

## 2023-05-08 RX ADMIN — ONDANSETRON 4 MG: 2 INJECTION INTRAMUSCULAR; INTRAVENOUS at 12:55

## 2023-05-08 RX ADMIN — ACETAMINOPHEN 650 MG: 325 TABLET ORAL at 12:04

## 2023-05-08 RX ADMIN — KETOROLAC TROMETHAMINE 30 MG: 30 INJECTION, SOLUTION INTRAMUSCULAR; INTRAVENOUS at 12:04

## 2023-05-08 RX ADMIN — KETOROLAC TROMETHAMINE 30 MG: 30 INJECTION, SOLUTION INTRAMUSCULAR; INTRAVENOUS at 05:27

## 2023-05-08 RX ADMIN — POTASSIUM BICARBONATE 20 MEQ: 782 TABLET, EFFERVESCENT ORAL at 08:30

## 2023-05-08 RX ADMIN — ACETAMINOPHEN 650 MG: 325 TABLET ORAL at 05:27

## 2023-05-08 RX ADMIN — LANSOPRAZOLE 30 MG: 30 TABLET, ORALLY DISINTEGRATING, DELAYED RELEASE ORAL at 08:34

## 2023-05-08 RX ADMIN — MEGESTROL ACETATE 40 MG: 40 TABLET ORAL at 08:32

## 2023-05-08 RX ADMIN — FAMOTIDINE 20 MG: 20 TABLET, FILM COATED ORAL at 08:31

## 2023-05-08 RX ADMIN — AMLODIPINE BESYLATE 10 MG: 10 TABLET ORAL at 08:31

## 2023-05-08 RX ADMIN — KETOROLAC TROMETHAMINE 30 MG: 30 INJECTION, SOLUTION INTRAMUSCULAR; INTRAVENOUS at 00:21

## 2023-05-08 RX ADMIN — ACETAMINOPHEN 650 MG: 325 TABLET ORAL at 00:21

## 2023-05-08 RX ADMIN — HEPARIN 500 UNITS: 100 SYRINGE at 12:53

## 2023-05-08 ASSESSMENT — PAIN SCALES - GENERAL
PAINLEVEL_OUTOF10: 3
PAINLEVEL_OUTOF10: 3
PAINLEVEL_OUTOF10: 0
PAINLEVEL_OUTOF10: 3

## 2023-05-08 ASSESSMENT — PAIN DESCRIPTION - DESCRIPTORS
DESCRIPTORS: DISCOMFORT
DESCRIPTORS: DISCOMFORT
DESCRIPTORS: ACHING

## 2023-05-08 ASSESSMENT — PAIN DESCRIPTION - ORIENTATION: ORIENTATION: MID

## 2023-05-08 ASSESSMENT — PAIN DESCRIPTION - LOCATION
LOCATION: ABDOMEN

## 2023-05-08 NOTE — PROGRESS NOTES
Comprehensive Nutrition Assessment    Type and Reason for Visit:  Reassess    Nutrition Recommendations/Plan:   Continue current TF - Vital 1.5 at 60 ml/hr (goal rate). Free water flush per MD (150 ml every 4 hours - 900 ml per day). Instructed pt. And wife on home TF regimen. Recommend continued f/u with OP RD at [facility]. Malnutrition Assessment:  Malnutrition Status:  Severe malnutrition (04/27/23 1242)    Context:  Chronic Illness     Findings of the 6 clinical characteristics of malnutrition:  Energy Intake:  75% or less estimated energy requirements for 1 month or longer  Weight Loss:   (~32% weight loss over 10 months)     Body Fat Loss:  Mild body fat loss Orbital   Muscle Mass Loss:  Mild muscle mass loss Temples (temporalis), Clavicles (pectoralis & deltoids)  Fluid Accumulation:  No significant fluid accumulation     Strength:  Not Performed    Nutrition Assessment:     At risk for nutritional compromise r/t severe malnutrition, esophageal cancer s/p esophagectomy (2022) with brain mets s/p craniotomy, undergoing cancer treatment, and underlying medical condition (PMH: esophageal cancer, HTN, GERD, HLD). Nutrition Related Findings:      Wound Type: None     Pt. Report/Treatments/Miscellaneous: pt. Seen with wife; reports tolerating current TF at goal well; denies any nausea or belly pain; very minimal po intake; feeling better overall today; plan discharge w/ [facility] - spoke with HIS - they can deliver Vital 1.5 to pt. At discharge today; pt. Follows w/ Kannan Canales RD, [facility] at cancer center  GI Status: BM 5/7  Pertinent Labs: 5/7: Glucose 83, BUN 13, Cr 0.4, Sodium 134  Pertinent Meds: Magic Mouthwash, Compazine, Tigan, Zofran, Glycolax, Decadron      Current Nutrition Intake & Therapies:    Average Meal Intake: 1-25%  Average Supplements Intake: None Ordered  ADULT TUBE FEEDING; Jejunostomy; Peptide Based; Continuous; 10; Yes; 10; Q 8 hours; 60; 150; Q 4 hours  ADULT DIET;  Full
Date: 2023  Patient Name: Nancy Tsai        MRN: [id number]   Account: [de-identified]   : 1960  (65 y.o.)  Gender: male   Referring Practitioner: VERONICA Goldstein  Diagnosis: failure to thrive in adult  Additional Pertinent Hx: per chart review; 65 y.o. male with PMHx that includes: Esophageal cancer status post esophagectomy with metastasis to the brain status post craniotomy currently undergoing chemotherapy, pancytopenia, GERD, hypertension, hyperlipidemia, MARTÍN who was referred to [facility] for evaluation of PEG tube placement. Patient seen and examined, alert, oriented x3 but does have moments of confusion and slow thought process. Accompanied by daughter initially at bedside, on repeat visit met wife. Daughter provided most of information for HPI stating that patient has been having a poor appetite for the last few months. States family has been trying multiple different types of foods of which patient will \"\"crave\" but then take 1 or 2 bites and be done. Patient states that he just does not feel like food has any taste. Reports occasional, intermittent abdominal cramping and sporadic nausea, but no significant episodes of emesis. Denies early satiety. Patient reports weight loss of 5 pounds in the past week. Patient wants to \"continue to fight\", is currently receiving chemotherapy but treatments have been sporadic due to weakness and electrolyte imbalance. Discussed PEG tube placement with daughter and then with wife at bedside as well as with patient. Patient and wife will continue to discuss with other family members but at this time they are leaning towards proceeding with PEG tube placement. \"    Nancy Tsai requires a Standard Bedside Commode to complete bathing, toileting, dressing and grooming tasks. Patient requires a Bedside Commode due to Upper extremity weakness, Lower extremity weakness, Limited ambulation abilities, and Inability to walk to bathroom at home.   Without the
I have reviewed discharge instructions with the patient and spouse. The patient and spouse verbalized understanding.
Restriction  Other position/activity restrictions: brain mets, J-tube     SUBJECTIVE: Pt seated on [facility] with wife present upon arrival, agreeable to OT session. PAIN: No c/o. Vitals: Vitals not assessed per clinical judgement, see nursing flowsheet    COGNITION: Decreased Problem Solving and Decreased Safety Awareness    ADL:   Grooming: with set-up. Using wet wipe washing hands  Toileting: Maximum Assistance. Pt's wife assisting with hygiene after BM  Toilet Transfer: [address] BSC . BALANCE:  Sitting Balance:  Supervision. Bedside chair  Standing Balance: Contact Guard Assistance, X 1.   X1 minute in prep for mobility. BED MOBILITY:  Not Tested    TRANSFERS:  Sit to Stand:  Contact Guard Assistance, X 1.   Stand to Sit: [address], X 1. Comment: To/from bedside chair- education for proper hand placement with good carryover. FUNCTIONAL MOBILITY:  Assistive Device: Rolling Walker   Assist Level:  Contact Guard Assistance and Minimal Assistance. Distance:   Completed functional mobility x1 lap within pt room at slow pace, no significant LOB noted with mild unsteadiness throughout. Pt requires min cues for walker safety and line awareness- extended seated rest break after trial of mobility, mod fatigue noted. ADDITIONAL ACTIVITIES:  Patient and wife participated in functional transfer and DME education regarding bathroom safety with safe toilet and shower transfers. Patient and wife demo'ed good understanding of education, appreciation and asked appropriate questions. Expressed interest for BSC in prep for discharge home.     ASSESSMENT:     Activity Tolerance:  Patient tolerance of  treatment: Fair treatment tolerance, Limited by fatigue, Reduced activity pace, and Need for increased rest breaks      Discharge Recommendations: 24 hour assistance or supervision, Home with Home Health OT, and Patient would benefit from continued OT at discharge  Equipment

## 2023-05-08 NOTE — DISCHARGE SUMMARY
initially at bedside, on repeat visit met wife. Daughter provided most of information for HPI stating that patient has been having a poor appetite for the last few months. States family has been trying multiple different types of foods of which patient will \"\"crave\" but then take 1 or 2 bites and be done. Patient states that he just does not feel like food has any taste. Reports occasional, intermittent abdominal cramping and sporadic nausea, but no significant episodes of emesis. Denies early satiety. Patient reports weight loss of 5 pounds in the past week. Patient wants to \"continue to fight\", is currently receiving chemotherapy but treatments have been sporadic due to weakness and electrolyte imbalance. Discussed PEG tube placement with daughter and then with wife at bedside as well as with patient. Patient and wife will continue to discuss with other family members but at this time they are leaning towards proceeding with PEG tube placement. \"     5/8--> I resumed care of this patient today, placement of an open J-tube and primary repair of umbilical hernia on May 1, 2023; discharge plan is home with [facility] home health and home infusion for enteral feedings at discharge; hemodynamically stable, patient is sitting up in the chair, denies any complaints, does not have any nausea at this time, states his bowels are moving, no family at bedside, his tube feeds are currently: Vital 1.5 at 60 mL an hour with 150 mL an hour free water flush every 4 hours; patient is tolerating this without issues, since the patient is tolerating tube feeds without issues plan is home with home medical/home infusion, at this time patient is stable, he needs to continue outpatient follow-up.        Exam:     Vitals:  Vitals:    05/07/23 2002 05/08/23 0308 05/08/23 0528 05/08/23 0753   BP: 135/84 127/88  122/74   Pulse: 97 91  (!) 108   Resp: 18 18  16   Temp: 98.6 °F (37 °C) 98.5 °F (36.9 °C)  98 °F (36.7 °C)   TempSrc: Oral Oral

## 2023-05-09 ENCOUNTER — TELEPHONE (OUTPATIENT)
Dept: FAMILY MEDICINE CLINIC | Age: 63
End: 2023-05-09

## 2023-05-09 ENCOUNTER — TELEPHONE (OUTPATIENT)
Dept: ONCOLOGY | Age: 63
End: 2023-05-09

## 2023-05-09 NOTE — CARE COORDINATION
Late entry, 5/08/2023 patient discharged to home with [address] to [facility] and advised her of patient's discharge to home. [facility] HI notified of patient's discharge to home, spoke to [employer]. [facility] DME notified of discharge order in place with orders for bedside commode, spoke to Cassidy Pandey. 5/9/23, 7:33 AM EDT    Patient goals/plan/ treatment preferences discussed by  and . Patient goals/plan/ treatment preferences reviewed with patient/ family. Patient/ family verbalize understanding of discharge plan and are in agreement with goal/plan/treatment preferences. Understanding was demonstrated using the teach back method. AVS provided by RN at time of discharge, which includes all necessary medical information pertaining to the patients current course of illness, treatment, post-discharge goals of care, and treatment preferences. Services At/After Discharge: DME and Home Health and enteral feedings.

## 2023-05-09 NOTE — TELEPHONE ENCOUNTER
Care Transitions Initial Follow Up Call    Outreach made within 2 business days of discharge: Yes    Patient: Annie Fraser Patient : 1960   MRN: [id number]  Reason for Admission: There are no discharge diagnoses documented for the most recent discharge. Discharge Date: 23       Spoke with: Randi Castellano (on HIPAA)    Discharge department/facility: [facility]     TCM Interactive Patient Contact:  Was patient able to fill all prescriptions: Yes  Was patient instructed to bring all medications to the follow-up visit: Yes  Is patient taking all medications as directed in the discharge summary?  Yes  Does patient understand their discharge instructions: Yes  Does patient have questions or concerns that need addressed prior to 7-14 day follow up office visit: no    Scheduled appointment with PCP within 7-14 days    Follow Up  Future Appointments   Date Time Provider Pedro Sorensen   5/10/2023  9:30 AM STR OUT PT ONC INJ RM [address] HOD   5/10/2023 10:15 AM STR OUT PT ONC CMA STRZ OP ONC [facility] HOD   5/10/2023 10:20 AM Edgard Aguirre MD [facility] [place] [facility] [address]   2023  9:45 AM Keara Watkins DO [facility]       Na Baron LPN

## 2023-05-10 ENCOUNTER — CLINICAL DOCUMENTATION (OUTPATIENT)
Dept: NUTRITION | Age: 63
End: 2023-05-10

## 2023-05-11 ENCOUNTER — TELEMEDICINE (OUTPATIENT)
Dept: FAMILY MEDICINE CLINIC | Age: 63
End: 2023-05-11

## 2023-05-11 DIAGNOSIS — R62.7 FTT (FAILURE TO THRIVE) IN ADULT: Primary | ICD-10-CM

## 2023-05-11 DIAGNOSIS — R63.0 DECREASED APPETITE: ICD-10-CM

## 2023-05-11 DIAGNOSIS — E46 MALNUTRITION, UNSPECIFIED TYPE (HCC): ICD-10-CM

## 2023-05-11 DIAGNOSIS — C15.9 METASTASIS FROM ESOPHAGEAL CANCER (HCC): ICD-10-CM

## 2023-05-11 DIAGNOSIS — D64.9 ANEMIA, UNSPECIFIED TYPE: ICD-10-CM

## 2023-05-11 DIAGNOSIS — R26.81 UNSTABLE GAIT: ICD-10-CM

## 2023-05-11 DIAGNOSIS — C79.9 METASTASIS FROM ESOPHAGEAL CANCER (HCC): ICD-10-CM

## 2023-05-11 DIAGNOSIS — Z09 HOSPITAL DISCHARGE FOLLOW-UP: ICD-10-CM

## 2023-05-11 DIAGNOSIS — R53.1 GENERAL WEAKNESS: ICD-10-CM

## 2023-05-11 DIAGNOSIS — C15.9 MALIGNANT NEOPLASM OF ESOPHAGUS, UNSPECIFIED LOCATION (HCC): ICD-10-CM

## 2023-05-12 ENCOUNTER — TELEPHONE (OUTPATIENT)
Dept: FAMILY MEDICINE CLINIC | Age: 63
End: 2023-05-12

## 2023-05-12 DIAGNOSIS — C79.9 METASTASIS FROM ESOPHAGEAL CANCER (HCC): ICD-10-CM

## 2023-05-12 DIAGNOSIS — R53.1 GENERAL WEAKNESS: ICD-10-CM

## 2023-05-12 DIAGNOSIS — C15.9 ESOPHAGEAL ADENOCARCINOMA (HCC): ICD-10-CM

## 2023-05-12 DIAGNOSIS — R26.81 UNSTABLE GAIT: Primary | ICD-10-CM

## 2023-05-12 DIAGNOSIS — C15.9 METASTASIS FROM ESOPHAGEAL CANCER (HCC): ICD-10-CM

## 2023-05-17 ENCOUNTER — TELEPHONE (OUTPATIENT)
Dept: FAMILY MEDICINE CLINIC | Age: 63
End: 2023-05-17

## 2023-05-17 NOTE — TELEPHONE ENCOUNTER
----- Message from [employer] sent at 5/17/2023 10:28 AM EDT -----  Subject: Hospital Follow Up    QUESTIONS  What hospital was the Patient Discharged from? [facility]  Date of Discharge? 2023-05-03  Discharge Location? Home  Reason for hospitalization as patient stated? J Tube   What question does the patient have, if applicable? Pt was seen Virtually   for follow up but needs seen in person for hospital as well   ---------------------------------------------------------------------------  --------------  CALL BACK INFO  What is the best way for the office to contact you? OK to leave message on   voicemail  Preferred Call Back Phone Number? [phone]  ---------------------------------------------------------------------------  --------------  SCRIPT ANSWERS  Relationship to Patient? Spouse/Partner  Representative Name? Primitivo Scott - Wife   Additional information verified (besides Name and Date of Birth)?  Phone   Number

## 2023-05-18 ENCOUNTER — OFFICE VISIT (OUTPATIENT)
Dept: FAMILY MEDICINE CLINIC | Age: 63
End: 2023-05-18
Payer: MEDICAID

## 2023-05-18 VITALS
DIASTOLIC BLOOD PRESSURE: 86 MMHG | BODY MASS INDEX: 22.62 KG/M2 | WEIGHT: 144.4 LBS | RESPIRATION RATE: 18 BRPM | HEART RATE: 108 BPM | SYSTOLIC BLOOD PRESSURE: 124 MMHG

## 2023-05-18 DIAGNOSIS — C79.9 METASTASIS FROM ESOPHAGEAL CANCER (HCC): ICD-10-CM

## 2023-05-18 DIAGNOSIS — C15.9 METASTASIS FROM ESOPHAGEAL CANCER (HCC): ICD-10-CM

## 2023-05-18 DIAGNOSIS — R26.81 UNSTABLE GAIT: ICD-10-CM

## 2023-05-18 DIAGNOSIS — D64.9 ANEMIA, UNSPECIFIED TYPE: ICD-10-CM

## 2023-05-18 DIAGNOSIS — C15.9 ESOPHAGEAL ADENOCARCINOMA (HCC): Primary | ICD-10-CM

## 2023-05-18 DIAGNOSIS — L24.A9 WOUND DRAINAGE: ICD-10-CM

## 2023-05-18 DIAGNOSIS — R62.7 FTT (FAILURE TO THRIVE) IN ADULT: ICD-10-CM

## 2023-05-18 DIAGNOSIS — R53.1 GENERAL WEAKNESS: ICD-10-CM

## 2023-05-18 PROCEDURE — 3078F DIAST BP <80 MM HG: CPT | Performed by: FAMILY MEDICINE

## 2023-05-18 PROCEDURE — 99214 OFFICE O/P EST MOD 30 MIN: CPT | Performed by: FAMILY MEDICINE

## 2023-05-18 PROCEDURE — 3074F SYST BP LT 130 MM HG: CPT | Performed by: FAMILY MEDICINE

## 2023-05-18 NOTE — PROGRESS NOTES
Bam Aguilar (:  1960) is a 65 y.o. male,Established patient, here for evaluation of the following chief complaint(s):  Drainage from Incision          Subjective   SUBJECTIVE/OBJECTIVE:  HPI  Chief Complaint   Patient presents with    Drainage from Incision     Pt presents today for F2F for HHS. SN active. Requesting PT/OT. SN had concerns regarding wound drainage, requests cultures. No fevers. Denies redness or increased pain. Follow up surgeon in a few weeks. BP Readings from Last 3 Encounters:   23 124/86   23 122/74   23 102/69     Weight is up significantly with feeds, feeling much better. Wt Readings from Last 3 Encounters:   23 144 lb 6.4 oz (65.5 kg)   23 125 lb 8 oz (56.9 kg)   23 138 lb (62.6 kg)       Patient Active Problem List   Diagnosis    Abdominal pain    Cancer of distal third of esophagus (HCC)    Acute postoperative pain    Chronic anemia    Esophageal adenocarcinoma (HCC)    GERD (gastroesophageal reflux disease)    HLD (hyperlipidemia)    Obesity (BMI 30.0-34. 9)    Postoperative atrial fibrillation (HCC)    Thrombocytopenia (HCC)    Brain lesion    Delayed gastric emptying    Serum creatinine raised    Malnutrition ([facility] [address]    Benign hypertension    Encounter for insertion of venous access port    Failure to thrive in adult    Severe malnutrition (HCC)       Current Outpatient Medications   Medication Sig Dispense Refill    prochlorperazine (COMPAZINE) 10 MG tablet Take 1 tablet by mouth every 6 hours as needed (nausea) 120 tablet 1    lidocaine-prilocaine (EMLA) 2.5-2.5 % cream Apply topically as needed. 1 each 3    Blood Pressure Monitoring (BP MONITOR-STETHOSCOPE) KIT Dx: HTN 1 kit 0    Misc.  Devices (PULSE OXIMETER FOR FINGER) MISC Dx:  hypoxemia 1 each 0    ibuprofen (ADVIL;MOTRIN) 400 MG tablet Take 1 tablet by mouth every 6 hours as needed for Pain      famotidine (PEPCID) 20 MG tablet Take 1 tablet by mouth 2 times daily

## 2023-05-19 ENCOUNTER — TELEPHONE (OUTPATIENT)
Dept: FAMILY MEDICINE CLINIC | Age: 63
End: 2023-05-19

## 2023-05-19 ASSESSMENT — ENCOUNTER SYMPTOMS
RESPIRATORY NEGATIVE: 1
GASTROINTESTINAL NEGATIVE: 1

## 2023-05-21 LAB
BACTERIA SPEC AEROBE CULT: NORMAL
BACTERIA SPEC ANAEROBE CULT: NORMAL
GRAM STN SPEC: NORMAL

## 2023-05-22 LAB
BACTERIA SPEC AEROBE CULT: NORMAL
BACTERIA SPEC ANAEROBE CULT: NORMAL
GRAM STN SPEC: NORMAL

## 2023-05-23 RX ORDER — CEFDINIR 300 MG/1
300 CAPSULE ORAL 2 TIMES DAILY
Qty: 14 CAPSULE | Refills: 0 | Status: SHIPPED | OUTPATIENT
Start: 2023-05-23 | End: 2023-05-30

## 2023-05-24 ENCOUNTER — HOSPITAL ENCOUNTER (OUTPATIENT)
Dept: RADIATION ONCOLOGY | Age: 63
Discharge: HOME | End: 2023-05-24
Payer: MEDICAID

## 2023-05-24 VITALS
BODY MASS INDEX: 22.43 KG/M2 | DIASTOLIC BLOOD PRESSURE: 76 MMHG | RESPIRATION RATE: 16 BRPM | OXYGEN SATURATION: 98 % | WEIGHT: 143.2 LBS | HEART RATE: 114 BPM | SYSTOLIC BLOOD PRESSURE: 118 MMHG | TEMPERATURE: 98.3 F

## 2023-05-24 PROCEDURE — 99212 OFFICE O/P EST SF 10 MIN: CPT | Performed by: RADIOLOGY

## 2023-05-24 ASSESSMENT — ENCOUNTER SYMPTOMS
BACK PAIN: 0
SORE THROAT: 0
TROUBLE SWALLOWING: 0
ABDOMINAL PAIN: 0
BLOOD IN STOOL: 0
FACIAL SWELLING: 0
SHORTNESS OF BREATH: 0
COUGH: 0
RECTAL PAIN: 0
VOMITING: 1
NAUSEA: 0

## 2023-05-24 NOTE — PROGRESS NOTES
[address] [facility] [address], [address]        [facility], [address] [facility]        Jurgen Listen: [phone]        F: [phone]       [email or web address]            FOLLOW UP NOTE    Date of Service: 2023  Patient ID: Lacey Morales   : 1960  MRN: [id number]   Acct Number: [de-identified]       DATE OF SERVICE: 2023   LOCATION: [facility]  PROVIDER: Osbaldo Quintero MD    FOLLOW UP PHYSICIANS: Dr. Maximus Dobson ([facility]) Lito Pascual PA-C    ASSESSMENT AND PLAN:   Cancer Staging   Cancer of distal third of esophagus [facility])  Staging form: Esophagus - Adenocarcinoma, AJCC 8th Edition  - Clinical stage from 3/23/2022: Stage IVB (cT2, cN2, cM1, G3) - Signed by Jodee Ewing MD on 2022    Patient is making slow improvement but continues to be confused with right sided weakness. He continues OT and PT. It is no six months since completing his brain RT and almost one year since completing esophageal RT. We will order a PET/CT. The patient will return to clinic in 6 months or sooner if clinically indicated. He will continue follow up with his other providers per their preferred schedules. They have our clinic number to call with any questions or concerns if needed. Thank you for allowing us to be a part of their care. HPI:  Oncology History Overview Note   Lacey Morales is a 65 y.o. male      HISTORY:    3/30/2022 As per Thoracic Surgery (Dr. Gala Mehta),    Den Nance is a 62 y.o. male former smoker with history of HTN, HLD, and GERD who was referred to our service by Dr. Palma Alfred for recently diagnosed esophageal adenocarcinoma who presents today for review of PET/CT and EUS for esophageal cancer staging. Patient reports he is doing well overall. He denies fever, hemoptysis, chest pain, or dysphagia. He is currently on a full diet and is supplementing with one Ensure per day.       62 y.o. male former smoker with

## 2023-05-25 ENCOUNTER — OFFICE VISIT (OUTPATIENT)
Dept: ONCOLOGY | Age: 63
End: 2023-05-25
Payer: MEDICAID

## 2023-05-25 ENCOUNTER — HOSPITAL ENCOUNTER (OUTPATIENT)
Dept: INFUSION THERAPY | Age: 63
Discharge: HOME | End: 2023-05-25
Payer: MEDICAID

## 2023-05-25 VITALS
TEMPERATURE: 98 F | DIASTOLIC BLOOD PRESSURE: 81 MMHG | HEART RATE: 108 BPM | WEIGHT: 145.8 LBS | RESPIRATION RATE: 18 BRPM | SYSTOLIC BLOOD PRESSURE: 121 MMHG | HEIGHT: 67 IN | OXYGEN SATURATION: 99 % | BODY MASS INDEX: 22.88 KG/M2

## 2023-05-25 VITALS
TEMPERATURE: 98 F | DIASTOLIC BLOOD PRESSURE: 81 MMHG | RESPIRATION RATE: 18 BRPM | HEART RATE: 108 BPM | OXYGEN SATURATION: 99 % | HEIGHT: 67 IN | WEIGHT: 145.8 LBS | SYSTOLIC BLOOD PRESSURE: 121 MMHG | BODY MASS INDEX: 22.88 KG/M2

## 2023-05-25 DIAGNOSIS — T81.49XA SURGICAL WOUND INFECTION: ICD-10-CM

## 2023-05-25 DIAGNOSIS — Z51.11 ENCOUNTER FOR CHEMOTHERAPY MANAGEMENT: ICD-10-CM

## 2023-05-25 DIAGNOSIS — C15.9 METASTASIS FROM ESOPHAGEAL CANCER (HCC): Primary | ICD-10-CM

## 2023-05-25 DIAGNOSIS — R62.7 FAILURE TO THRIVE IN ADULT: ICD-10-CM

## 2023-05-25 DIAGNOSIS — C15.5 CANCER OF DISTAL THIRD OF ESOPHAGUS (HCC): ICD-10-CM

## 2023-05-25 DIAGNOSIS — C79.9 METASTASIS FROM ESOPHAGEAL CANCER (HCC): Primary | ICD-10-CM

## 2023-05-25 DIAGNOSIS — C15.5 CANCER OF DISTAL THIRD OF ESOPHAGUS (HCC): Primary | ICD-10-CM

## 2023-05-25 LAB
ABSOLUTE IMMATURE GRANULOCYTE: 0.06 THOU/MM3 (ref 0–0.07)
ALBUMIN SERPL BCG-MCNC: 3.9 G/DL (ref 3.5–5.1)
ALP SERPL-CCNC: 102 U/L (ref 38–126)
ALT SERPL W/O P-5'-P-CCNC: 33 U/L (ref 11–66)
AST SERPL-CCNC: 24 U/L (ref 5–40)
BASOPHILS ABSOLUTE: 0 THOU/MM3 (ref 0–0.1)
BASOPHILS NFR BLD AUTO: 0 % (ref 0–3)
BILIRUB CONJ SERPL-MCNC: < 0.2 MG/DL (ref 0–0.3)
BILIRUB SERPL-MCNC: 0.3 MG/DL (ref 0.3–1.2)
BUN BLDP-MCNC: 17 MG/DL (ref 8–26)
CHLORIDE BLD-SCNC: 105 MEQ/L (ref 98–109)
CREAT BLD-MCNC: < 0.3 MG/DL (ref 0.5–1.2)
EOSINOPHIL NFR BLD AUTO: 1 % (ref 0–4)
EOSINOPHILS ABSOLUTE: 0.1 THOU/MM3 (ref 0–0.4)
ERYTHROCYTE [DISTWIDTH] IN BLOOD BY AUTOMATED COUNT: 14.4 % (ref 11.5–14.5)
GFR SERPL CREATININE-BSD FRML MDRD: > 60 ML/MIN/1.73M2
GLUCOSE BLD-MCNC: 119 MG/DL (ref 70–108)
HCT VFR BLD AUTO: 32.2 % (ref 42–52)
HGB BLD-MCNC: 10.4 GM/DL (ref 14–18)
IMMATURE GRANULOCYTES: 1 %
IONIZED CALCIUM, WHOLE BLOOD: 1.25 MMOL/L (ref 1.12–1.32)
LYMPHOCYTES ABSOLUTE: 0.5 THOU/MM3 (ref 1–4.8)
LYMPHOCYTES NFR BLD AUTO: 6 % (ref 15–47)
MCH RBC QN AUTO: 33.8 PG (ref 26–33)
MCHC RBC AUTO-ENTMCNC: 32.3 GM/DL (ref 32.2–35.5)
MCV RBC AUTO: 105 FL (ref 80–94)
MONOCYTES ABSOLUTE: 0.9 THOU/MM3 (ref 0.4–1.3)
MONOCYTES NFR BLD AUTO: 12 % (ref 0–12)
NEUTROPHILS NFR BLD AUTO: 80 % (ref 43–75)
PLATELET # BLD AUTO: 237 THOU/MM3 (ref 130–400)
PMV BLD AUTO: 8.4 FL (ref 9.4–12.4)
POTASSIUM BLD-SCNC: 4.1 MEQ/L (ref 3.5–4.9)
PROT SERPL-MCNC: 6.6 G/DL (ref 6.1–8)
RBC # BLD AUTO: 3.08 MILL/MM3 (ref 4.7–6.1)
SEGMENTED NEUTROPHILS ABSOLUTE COUNT: 6.1 THOU/MM3 (ref 1.8–7.7)
SODIUM BLD-SCNC: 138 MEQ/L (ref 138–146)
TOTAL CO2, WHOLE BLOOD: 25 MEQ/L (ref 23–33)
WBC # BLD AUTO: 7.5 THOU/MM3 (ref 4.8–10.8)

## 2023-05-25 PROCEDURE — 3079F DIAST BP 80-89 MM HG: CPT | Performed by: NURSE PRACTITIONER

## 2023-05-25 PROCEDURE — 99215 OFFICE O/P EST HI 40 MIN: CPT | Performed by: NURSE PRACTITIONER

## 2023-05-25 PROCEDURE — 85025 COMPLETE CBC W/AUTO DIFF WBC: CPT

## 2023-05-25 PROCEDURE — 36591 DRAW BLOOD OFF VENOUS DEVICE: CPT

## 2023-05-25 PROCEDURE — 2580000003 HC RX 258: Performed by: INTERNAL MEDICINE

## 2023-05-25 PROCEDURE — 3074F SYST BP LT 130 MM HG: CPT | Performed by: NURSE PRACTITIONER

## 2023-05-25 PROCEDURE — 99211 OFF/OP EST MAY X REQ PHY/QHP: CPT

## 2023-05-25 PROCEDURE — 6360000002 HC RX W HCPCS: Performed by: INTERNAL MEDICINE

## 2023-05-25 PROCEDURE — 80076 HEPATIC FUNCTION PANEL: CPT

## 2023-05-25 PROCEDURE — 80047 BASIC METABLC PNL IONIZED CA: CPT

## 2023-05-25 RX ORDER — WATER 1000 ML/1000ML
2.2 INJECTION, SOLUTION INTRAVENOUS ONCE
OUTPATIENT
Start: 2023-05-25 | End: 2023-05-25

## 2023-05-25 RX ORDER — SODIUM CHLORIDE 0.9 % (FLUSH) 0.9 %
5-40 SYRINGE (ML) INJECTION PRN
Status: DISCONTINUED | OUTPATIENT
Start: 2023-05-25 | End: 2023-05-26 | Stop reason: HOSPADM

## 2023-05-25 RX ORDER — WATER 1000 ML/1000ML
2.2 INJECTION, SOLUTION INTRAVENOUS ONCE
Status: CANCELLED | OUTPATIENT
Start: 2023-05-25 | End: 2023-05-25

## 2023-05-25 RX ORDER — SODIUM CHLORIDE 9 MG/ML
25 INJECTION, SOLUTION INTRAVENOUS PRN
OUTPATIENT
Start: 2023-05-25

## 2023-05-25 RX ORDER — HEPARIN SODIUM (PORCINE) LOCK FLUSH IV SOLN 100 UNIT/ML 100 UNIT/ML
500 SOLUTION INTRAVENOUS PRN
OUTPATIENT
Start: 2023-05-25

## 2023-05-25 RX ORDER — SODIUM CHLORIDE 9 MG/ML
25 INJECTION, SOLUTION INTRAVENOUS PRN
Status: CANCELLED | OUTPATIENT
Start: 2023-05-25

## 2023-05-25 RX ORDER — SODIUM CHLORIDE 0.9 % (FLUSH) 0.9 %
5-40 SYRINGE (ML) INJECTION PRN
OUTPATIENT
Start: 2023-05-25

## 2023-05-25 RX ORDER — HEPARIN SODIUM (PORCINE) LOCK FLUSH IV SOLN 100 UNIT/ML 100 UNIT/ML
500 SOLUTION INTRAVENOUS PRN
Status: DISCONTINUED | OUTPATIENT
Start: 2023-05-25 | End: 2023-05-26 | Stop reason: HOSPADM

## 2023-05-25 RX ADMIN — SODIUM CHLORIDE, PRESERVATIVE FREE 20 ML: 5 INJECTION INTRAVENOUS at 14:05

## 2023-05-25 RX ADMIN — SODIUM CHLORIDE, PRESERVATIVE FREE 20 ML: 5 INJECTION INTRAVENOUS at 13:10

## 2023-05-25 RX ADMIN — Medication 500 UNITS: at 14:06

## 2023-05-25 NOTE — PLAN OF CARE
Problem: Chronic Conditions and Co-morbidities  Goal: Patient's chronic conditions and co-morbidity symptoms are monitored and maintained or improved  Outcome: Adequate for Discharge  Flowsheets (Taken 5/25/2023 1613)  Care Plan - Patient's Chronic Conditions and Co-Morbidity Symptoms are Monitored and Maintained or Improved: Collaborate with multidisciplinary team to address chronic and comorbid conditions and prevent exacerbation or deterioration     Problem: Discharge Planning  Goal: Discharge to home or other facility with appropriate resources  Outcome: Adequate for Discharge  Flowsheets (Taken 5/25/2023 1613)  Discharge to home or other facility with appropriate resources: Identify barriers to discharge with patient and caregiver     Problem: Safety - Adult  Goal: Free from fall injury  Outcome: Adequate for Discharge  Flowsheets (Taken 5/25/2023 1613)  Free From Fall Injury: Instruct family/caregiver on patient safety     Problem: Infection - Adult  Goal: Absence of infection at discharge  Outcome: Adequate for Discharge  Flowsheets (Taken 5/25/2023 1613)  Absence of infection at discharge: Monitor all insertion sites i.e., indwelling lines, tubes and drains  Note: Mediport site with no redness or warmth. Skin over port site intact with no signs of breakdown noted. Patient verbalizes signs/symptoms of port infection and when to notify the physician. Goal: Will show no infection signs and symptoms  Description: Will show no infection signs and symptoms  Outcome: Adequate for Discharge     Care plan reviewed with patient and spouse. Patient and spouse verbalize understanding of the plan of care and contribute to goal setting.

## 2023-05-25 NOTE — PROGRESS NOTES
chemotherapy    3. Failure to thrive in adult  J-tube in place with continuous tube feeding    4. Surgical wound infection  - [facility]    Return in about 4 weeks (around 6/22/2023). 48 minutes on chart prep, review of labs and face-to-face with the patient. Greater than 50% of time was spent on counseling and coordinating care. All patient questions answered. Pt voiced understanding. Patient agreed with treatment plan. Follow up as directed. Patient instructed to call for questions or concerns.        Electronically signed by   BHARTI Gomez - CNP

## 2023-05-25 NOTE — PATIENT INSTRUCTIONS
Wound clinic consult  Return to clinic for follow up with MD in 4 weeks  Notify the office of any new or worsening symptoms

## 2023-05-30 ENCOUNTER — TELEPHONE (OUTPATIENT)
Dept: FAMILY MEDICINE CLINIC | Age: 63
End: 2023-05-30

## 2023-05-30 NOTE — TELEPHONE ENCOUNTER
Qiana with [facility] called office stating they admitted pt recently. Upon review of pt med list, what he is taking does not match the list they have. The only thing pt is taking is Compazine 10mg prn, Magic Mouthwash for thrush, Ibuprofen prn and Vital 1.5 tube feed. Kenan Zaidi wants to confirm that is all pt is to be on. Call her back at [phone]. Please advise. Per the [place] med list they have on file, pt is NOT taking Amlodipine, Dexamethasone, Lexapro, Pantoprazole, Pepcid, Potassium or Megace.

## 2023-05-31 ENCOUNTER — HOSPITAL ENCOUNTER (OUTPATIENT)
Dept: WOUND CARE | Age: 63
Discharge: HOME | End: 2023-05-31
Payer: MEDICAID

## 2023-05-31 VITALS
RESPIRATION RATE: 16 BRPM | OXYGEN SATURATION: 99 % | HEART RATE: 103 BPM | TEMPERATURE: 98.2 F | SYSTOLIC BLOOD PRESSURE: 131 MMHG | DIASTOLIC BLOOD PRESSURE: 92 MMHG

## 2023-05-31 PROCEDURE — 99202 OFFICE O/P NEW SF 15 MIN: CPT | Performed by: NURSE PRACTITIONER

## 2023-05-31 PROCEDURE — 99213 OFFICE O/P EST LOW 20 MIN: CPT

## 2023-05-31 NOTE — PROGRESS NOTES
[facility]  Consult and Procedure Note      Nohemi Young  MEDICAL RECORD NUMBER:  [id number]  AGE: 65 y.o. GENDER: male  : 1960  EPISODE DATE:  2023    SUBJECTIVE:     Chief Complaint   Patient presents with    Wound Check         HISTORY OF PRESENT ILLNESS      Nohemi Young is a 65 y.o. male who presents today for evaluation of nonhealing surgical wound s/p Jejunostomy tube insertion and repair of umbilical hernia 50 per Dr. Dillan Wynn. History obtained from patient and wife, present for today's visit. Patient reports that he developed drainage from incision line, around umbilicus. He was evaluated by PCP for this problem who completed culture revealing coagulase negative staph aureus for which he was placed on Cefdinir. Patient was advised to follow up with surgeon. This has not yet been completed. Patient's wife states she has been utilizing her nurse friends for care, has been painting with betadine per their instructions. She states that drainage has resolved. Patient denies any pain to incision line. No further needs or concerns identified.      PAST MEDICAL HISTORY             Diagnosis Date    Atrial fibrillation ([address]     Benign hypertension 11/3/2022    Cancer of distal third of esophagus ([address] 2022    GERD (gastroesophageal reflux disease)     HLD (hyperlipidemia) 8/15/2022    Sleep apnea        PAST SURGICAL HISTORY     Past Surgical History:   Procedure Laterality Date    ABDOMEN SURGERY  2022    GASTRIC DEVASCULARIZATON-[facility]    ABDOMEN SURGERY  2022    ESOPHAGOGASTRODUODENOSCOPY TRANSORAL DIAGNOSTIC ESOPHAGECTOMY W/ THORACOTOMY-[facility]    DENTAL SURGERY      25 teeth removed    PORT SURGERY Left 2022    LEFT SINGLE LUMEN MEDIPORT performed by Nicanor Sutton MD at  [address] 2023    Open JEJUNOSTOMY TUBE INSERTION; repair of umbilical hernia performed by Akshat Pham MD at [address]

## 2023-05-31 NOTE — PLAN OF CARE
Problem: Wound:  Goal: Will show signs of wound healing; wound closure and no evidence of infection  Description: Will show signs of wound healing; wound closure and no evidence of infection  Outcome: Progressing   Patient seen in clinic today for abdomen wound. No s/s of infection. See AVS. Follow up as needed. Care plan reviewed with patient and wife. Patient and wife verbalize understanding of the plan of care and contribute to goal setting.

## 2023-05-31 NOTE — DISCHARGE INSTRUCTIONS
Visit Discharge/Physician Orders:  - suture and steri strips removed today  - avoid scented soaps      Home Care: [facility]    Wound Location: Abdomen - healed        Follow up visit:   as needed    Supplies:    Duration of dressings: n/a    We have sent your supply order to the following company:  n/a  If you don't receive the items you were expecting or don't know what the items are that you received, call the company where the order was sent. If you are unable to obtain wound supplies, continue to use the supplies you have available until you are able to reach us. It is most important to keep the wound covered at all times. It is YOUR responsibility to make sure that supplies are re-ordered before you run out. Re-order telephone numbers are included in each package. Keep next scheduled appointment. Please give 24 hour notice if unable to keep appointment. [phone]    If you experience any of the following, please call the Wound Care Service during business hours: Monday through Friday 8:00 am - 4:30 pm  ([phone]). *Increase in pain   *Temperature over 101   *Increase in drainage from your wound or a foul odor   *Uncontrolled swelling   *Need for compression bandage changes due to slippage, breakthrough drainage    If you need medical attention outside of business hours, please contact your Primary Care Doctor or go to the nearest emergency room.

## 2023-06-02 ENCOUNTER — APPOINTMENT (OUTPATIENT)
Dept: GENERAL RADIOLOGY | Age: 63
End: 2023-06-02
Payer: MEDICAID

## 2023-06-02 ENCOUNTER — HOSPITAL ENCOUNTER (EMERGENCY)
Age: 63
Discharge: HOME | End: 2023-06-02
Payer: MEDICAID

## 2023-06-02 VITALS
DIASTOLIC BLOOD PRESSURE: 84 MMHG | BODY MASS INDEX: 22.76 KG/M2 | WEIGHT: 145 LBS | SYSTOLIC BLOOD PRESSURE: 127 MMHG | RESPIRATION RATE: 16 BRPM | TEMPERATURE: 98.8 F | HEIGHT: 67 IN | OXYGEN SATURATION: 98 % | HEART RATE: 106 BPM

## 2023-06-02 DIAGNOSIS — K94.13 JEJUNOSTOMY TUBE LEAK (HCC): Primary | ICD-10-CM

## 2023-06-02 PROCEDURE — 99283 EMERGENCY DEPT VISIT LOW MDM: CPT

## 2023-06-02 PROCEDURE — 6360000004 HC RX CONTRAST MEDICATION: Performed by: NURSE PRACTITIONER

## 2023-06-02 PROCEDURE — 49465 FLUORO EXAM OF G/COLON TUBE: CPT

## 2023-06-02 RX ADMIN — DIATRIZOATE MEGLUMINE AND DIATRIZOATE SODIUM 30 ML: 600; 100 SOLUTION ORAL; RECTAL at 20:34

## 2023-06-02 ASSESSMENT — PAIN - FUNCTIONAL ASSESSMENT: PAIN_FUNCTIONAL_ASSESSMENT: NONE - DENIES PAIN

## 2023-06-02 NOTE — ED PROVIDER NOTES
[facility] [address] [address] EMERGENCY DEPT      EMERGENCY MEDICINE     Pt Name: Allyn Jimenez  MRN: [id number]  [place] 1960  Date of evaluation: 6/2/2023  Provider: BHARTI Atkinson CNP    CHIEF COMPLAINT       Chief Complaint   Patient presents with    Feeding Tube Problem     HISTORY OF PRESENT ILLNESS   Allyn Jimenez is a pleasant 65 y.o. male who presents to the emergency department from from home, by private vehicle for evaluation of tube feeding leaking. Reports wife was giving him his tube feed when he started to feel his shirt getting wet. Wife then reports that she called EMS to bring patient to the ER. Patient denies any chest pain, shortness of breath or fever. PASTMEDICAL HISTORY     Past Medical History:   Diagnosis Date    Atrial fibrillation ([address]     Benign hypertension 11/3/2022    Cancer of distal third of esophagus ([address] 04/07/2022    GERD (gastroesophageal reflux disease)     HLD (hyperlipidemia) 8/15/2022    Sleep apnea        Patient Active Problem List   Diagnosis Code    Abdominal pain R10.9    Cancer of distal third of esophagus ([address] C15.5    Acute postoperative pain G89.18    Chronic anemia D64.9    Esophageal adenocarcinoma (HCC) C15.9    GERD (gastroesophageal reflux disease) K21.9    HLD (hyperlipidemia) E78.5    Obesity (BMI 30.0-34. 9) E66.9    Postoperative atrial fibrillation (HCC) I97.89, I48.91    Thrombocytopenia (HCC) D69.6    Brain lesion G93.9    Delayed gastric emptying K30    Serum creatinine raised R79.89    Malnutrition ([address] E46    Benign hypertension I10    Encounter for insertion of venous access port Z45.2    Failure to thrive in adult R62.7    Severe malnutrition ([address] E43     SURGICAL HISTORY       Past Surgical History:   Procedure Laterality Date    ABDOMEN SURGERY  08/16/2022    GASTRIC DEVASCULARIZATON-[facility]    ABDOMEN SURGERY  08/30/2022    ESOPHAGOGASTRODUODENOSCOPY TRANSORAL DIAGNOSTIC ESOPHAGECTOMY W/ THORACOTOMY-[facility]    DENTAL SURGERY      25 teeth

## 2023-06-02 NOTE — ED TRIAGE NOTES
Pt presents to ED via EMS with chief complaint of feeding tube problem. Pt states he went to flush his tube tonight and it leaked around the insertion site.

## 2023-06-03 NOTE — DISCHARGE INSTRUCTIONS
Rest, Stay well-hydrated. Continue home medications as previously prescribed. Follow-up with Dr. Sima Cabral as scheduled. If any abdominal pain, fever, nausea, vomiting or any other concerns return to the ER immediately.

## 2023-06-05 ENCOUNTER — PATIENT MESSAGE (OUTPATIENT)
Dept: FAMILY MEDICINE CLINIC | Age: 63
End: 2023-06-05

## 2023-06-19 ENCOUNTER — HOSPITAL ENCOUNTER (OUTPATIENT)
Dept: PET IMAGING | Age: 63
Discharge: HOME | End: 2023-06-19
Attending: RADIOLOGY
Payer: MEDICAID

## 2023-06-19 DIAGNOSIS — C15.5 CANCER OF DISTAL THIRD OF ESOPHAGUS (HCC): ICD-10-CM

## 2023-06-19 PROCEDURE — A9552 F18 FDG: HCPCS | Performed by: RADIOLOGY

## 2023-06-19 PROCEDURE — 78815 PET IMAGE W/CT SKULL-THIGH: CPT

## 2023-06-19 PROCEDURE — 3430000000 HC RX DIAGNOSTIC RADIOPHARMACEUTICAL: Performed by: RADIOLOGY

## 2023-06-19 RX ORDER — HEPARIN SODIUM (PORCINE) LOCK FLUSH IV SOLN 100 UNIT/ML 100 UNIT/ML
500 SOLUTION INTRAVENOUS ONCE
Status: COMPLETED | OUTPATIENT
Start: 2023-06-19 | End: 2023-06-19

## 2023-06-19 RX ORDER — FLUDEOXYGLUCOSE F 18 200 MCI/ML
9.5 INJECTION, SOLUTION INTRAVENOUS
Status: COMPLETED | OUTPATIENT
Start: 2023-06-19 | End: 2023-06-19

## 2023-06-19 RX ADMIN — FLUDEOXYGLUCOSE F 18 9.5 MILLICURIE: 200 INJECTION, SOLUTION INTRAVENOUS at 13:47

## 2023-06-19 RX ADMIN — HEPARIN SODIUM (PORCINE) LOCK FLUSH IV SOLN 100 UNIT/ML 500 UNITS: 100 SOLUTION at 13:50

## 2023-06-20 ENCOUNTER — TELEPHONE (OUTPATIENT)
Dept: FAMILY MEDICINE CLINIC | Age: 63
End: 2023-06-20

## 2023-06-20 NOTE — TELEPHONE ENCOUNTER
Schedule in-office at 9:15 tomorrow. Contact pt and encourage adequate hydration in the meantime. If looks acutely ill, recommend ED for evaluation today.

## 2023-06-21 ENCOUNTER — OFFICE VISIT (OUTPATIENT)
Dept: FAMILY MEDICINE CLINIC | Age: 63
End: 2023-06-21
Payer: MEDICAID

## 2023-06-21 VITALS
SYSTOLIC BLOOD PRESSURE: 112 MMHG | HEART RATE: 92 BPM | WEIGHT: 157.4 LBS | BODY MASS INDEX: 24.65 KG/M2 | OXYGEN SATURATION: 98 % | DIASTOLIC BLOOD PRESSURE: 70 MMHG | RESPIRATION RATE: 20 BRPM

## 2023-06-21 DIAGNOSIS — I95.9 HYPOTENSION, UNSPECIFIED HYPOTENSION TYPE: ICD-10-CM

## 2023-06-21 DIAGNOSIS — C15.9 METASTASIS FROM ESOPHAGEAL CANCER (HCC): ICD-10-CM

## 2023-06-21 DIAGNOSIS — R62.7 FTT (FAILURE TO THRIVE) IN ADULT: ICD-10-CM

## 2023-06-21 DIAGNOSIS — C15.9 ESOPHAGEAL ADENOCARCINOMA (HCC): ICD-10-CM

## 2023-06-21 DIAGNOSIS — R00.0 TACHYCARDIA: Primary | ICD-10-CM

## 2023-06-21 DIAGNOSIS — R53.1 GENERAL WEAKNESS: ICD-10-CM

## 2023-06-21 DIAGNOSIS — C79.9 METASTASIS FROM ESOPHAGEAL CANCER (HCC): ICD-10-CM

## 2023-06-21 PROCEDURE — 3074F SYST BP LT 130 MM HG: CPT | Performed by: FAMILY MEDICINE

## 2023-06-21 PROCEDURE — 3078F DIAST BP <80 MM HG: CPT | Performed by: FAMILY MEDICINE

## 2023-06-21 PROCEDURE — 99214 OFFICE O/P EST MOD 30 MIN: CPT | Performed by: FAMILY MEDICINE

## 2023-06-21 ASSESSMENT — ENCOUNTER SYMPTOMS
SHORTNESS OF BREATH: 0
CHEST TIGHTNESS: 0
RESPIRATORY NEGATIVE: 1
GASTROINTESTINAL NEGATIVE: 1

## 2023-06-21 NOTE — PROGRESS NOTES
Fab Osborne (:  1960) is a 65 y.o. male,Established patient, here for evaluation of the following chief complaint(s):  Hypotension (Unsure of home readings) and Tachycardia          Subjective   SUBJECTIVE/OBJECTIVE:  HPI  Chief Complaint   Patient presents with    Hypotension     Unsure of home readings    Tachycardia     See TE below:    Patient wife called and stated that patient heart rate has been elevated and they were told to monitor it. She stated they have been and its still in the 110s. However the last week patient has been clammy, heart rate  and blood pressure 98/70. She stated that she didn't know if she should schedule an appointment or what they need to do. Pt feels better today, more energy. Vitals fine. Vitals:    23 0911   BP: 112/70   Pulse: 92   Resp: 20   SpO2: 98%     Admittedly does not take in much liquid orally. Patient Active Problem List   Diagnosis    Abdominal pain    Cancer of distal third of esophagus (HCC)    Acute postoperative pain    Chronic anemia    Esophageal adenocarcinoma (HCC)    GERD (gastroesophageal reflux disease)    HLD (hyperlipidemia)    Obesity (BMI 30.0-34. 9)    Postoperative atrial fibrillation (HCC)    Thrombocytopenia (HCC)    Brain lesion    Delayed gastric emptying    Serum creatinine raised    Malnutrition ([facility]    Benign hypertension    Encounter for insertion of venous access port    Failure to thrive in adult    Severe malnutrition (HCC)       Current Outpatient Medications   Medication Sig Dispense Refill    prochlorperazine (COMPAZINE) 10 MG tablet Take 1 tablet by mouth every 6 hours as needed (nausea) 120 tablet 1    lidocaine-prilocaine (EMLA) 2.5-2.5 % cream Apply topically as needed. 1 each 3    Blood Pressure Monitoring (BP MONITOR-STETHOSCOPE) KIT Dx: HTN 1 kit 0    Misc.  Devices (PULSE OXIMETER FOR FINGER) MISC Dx:  hypoxemia 1 each 0    ibuprofen (ADVIL;MOTRIN) 400 MG tablet Take 1 tablet by mouth every

## 2023-06-22 ENCOUNTER — HOSPITAL ENCOUNTER (OUTPATIENT)
Dept: INFUSION THERAPY | Age: 63
Discharge: HOME | End: 2023-06-22
Payer: MEDICAID

## 2023-06-22 ENCOUNTER — CLINICAL DOCUMENTATION (OUTPATIENT)
Dept: CASE MANAGEMENT | Age: 63
End: 2023-06-22

## 2023-06-22 ENCOUNTER — OFFICE VISIT (OUTPATIENT)
Dept: ONCOLOGY | Age: 63
End: 2023-06-22
Payer: MEDICAID

## 2023-06-22 VITALS
OXYGEN SATURATION: 98 % | HEIGHT: 67 IN | WEIGHT: 158 LBS | BODY MASS INDEX: 24.8 KG/M2 | RESPIRATION RATE: 18 BRPM | TEMPERATURE: 98.1 F | DIASTOLIC BLOOD PRESSURE: 79 MMHG | HEART RATE: 88 BPM | SYSTOLIC BLOOD PRESSURE: 125 MMHG

## 2023-06-22 VITALS
RESPIRATION RATE: 18 BRPM | WEIGHT: 158 LBS | HEIGHT: 67 IN | DIASTOLIC BLOOD PRESSURE: 79 MMHG | HEART RATE: 88 BPM | TEMPERATURE: 98.1 F | SYSTOLIC BLOOD PRESSURE: 125 MMHG | BODY MASS INDEX: 24.8 KG/M2 | OXYGEN SATURATION: 98 %

## 2023-06-22 DIAGNOSIS — C79.9 METASTASIS FROM ESOPHAGEAL CANCER (HCC): Primary | ICD-10-CM

## 2023-06-22 DIAGNOSIS — C15.9 METASTASIS FROM ESOPHAGEAL CANCER (HCC): Primary | ICD-10-CM

## 2023-06-22 DIAGNOSIS — C15.5 CANCER OF DISTAL THIRD OF ESOPHAGUS (HCC): ICD-10-CM

## 2023-06-22 LAB
ABSOLUTE IMMATURE GRANULOCYTE: 0.02 THOU/MM3 (ref 0–0.07)
ALBUMIN SERPL BCG-MCNC: 4 G/DL (ref 3.5–5.1)
ALP SERPL-CCNC: 92 U/L (ref 38–126)
ALT SERPL W/O P-5'-P-CCNC: 20 U/L (ref 11–66)
AST SERPL-CCNC: 23 U/L (ref 5–40)
BASOPHILS ABSOLUTE: 0 THOU/MM3 (ref 0–0.1)
BASOPHILS NFR BLD AUTO: 1 % (ref 0–3)
BILIRUB CONJ SERPL-MCNC: < 0.2 MG/DL (ref 0–0.3)
BILIRUB SERPL-MCNC: 0.2 MG/DL (ref 0.3–1.2)
BUN BLDP-MCNC: 15 MG/DL (ref 8–26)
CHLORIDE BLD-SCNC: 106 MEQ/L (ref 98–109)
CREAT BLD-MCNC: 0.6 MG/DL (ref 0.5–1.2)
EOSINOPHIL NFR BLD AUTO: 3 % (ref 0–4)
EOSINOPHILS ABSOLUTE: 0.1 THOU/MM3 (ref 0–0.4)
ERYTHROCYTE [DISTWIDTH] IN BLOOD BY AUTOMATED COUNT: 13.2 % (ref 11.5–14.5)
GFR SERPL CREATININE-BSD FRML MDRD: > 60 ML/MIN/1.73M2
GLUCOSE BLD-MCNC: 84 MG/DL (ref 70–108)
HCT VFR BLD AUTO: 34.9 % (ref 42–52)
HGB BLD-MCNC: 11.4 GM/DL (ref 14–18)
IMMATURE GRANULOCYTES: 1 %
IONIZED CALCIUM, WHOLE BLOOD: 1.24 MMOL/L (ref 1.12–1.32)
LYMPHOCYTES ABSOLUTE: 0.5 THOU/MM3 (ref 1–4.8)
LYMPHOCYTES NFR BLD AUTO: 13 % (ref 15–47)
MCH RBC QN AUTO: 32.9 PG (ref 26–33)
MCHC RBC AUTO-ENTMCNC: 32.7 GM/DL (ref 32.2–35.5)
MCV RBC AUTO: 101 FL (ref 80–94)
MONOCYTES ABSOLUTE: 0.5 THOU/MM3 (ref 0.4–1.3)
MONOCYTES NFR BLD AUTO: 13 % (ref 0–12)
NEUTROPHILS NFR BLD AUTO: 70 % (ref 43–75)
PLATELET # BLD AUTO: 166 THOU/MM3 (ref 130–400)
PMV BLD AUTO: 8.4 FL (ref 9.4–12.4)
POTASSIUM BLD-SCNC: 4.2 MEQ/L (ref 3.5–4.9)
PROT SERPL-MCNC: 6.7 G/DL (ref 6.1–8)
RBC # BLD AUTO: 3.46 MILL/MM3 (ref 4.7–6.1)
SEGMENTED NEUTROPHILS ABSOLUTE COUNT: 2.8 THOU/MM3 (ref 1.8–7.7)
SODIUM BLD-SCNC: 141 MEQ/L (ref 138–146)
TOTAL CO2, WHOLE BLOOD: 27 MEQ/L (ref 23–33)
WBC # BLD AUTO: 4 THOU/MM3 (ref 4.8–10.8)

## 2023-06-22 PROCEDURE — 2580000003 HC RX 258: Performed by: INTERNAL MEDICINE

## 2023-06-22 PROCEDURE — 36591 DRAW BLOOD OFF VENOUS DEVICE: CPT

## 2023-06-22 PROCEDURE — 3078F DIAST BP <80 MM HG: CPT | Performed by: INTERNAL MEDICINE

## 2023-06-22 PROCEDURE — 3074F SYST BP LT 130 MM HG: CPT | Performed by: INTERNAL MEDICINE

## 2023-06-22 PROCEDURE — 99211 OFF/OP EST MAY X REQ PHY/QHP: CPT

## 2023-06-22 PROCEDURE — 6360000002 HC RX W HCPCS: Performed by: INTERNAL MEDICINE

## 2023-06-22 PROCEDURE — 85025 COMPLETE CBC W/AUTO DIFF WBC: CPT

## 2023-06-22 PROCEDURE — 80076 HEPATIC FUNCTION PANEL: CPT

## 2023-06-22 PROCEDURE — 80047 BASIC METABLC PNL IONIZED CA: CPT

## 2023-06-22 PROCEDURE — 99214 OFFICE O/P EST MOD 30 MIN: CPT | Performed by: INTERNAL MEDICINE

## 2023-06-22 RX ORDER — SODIUM CHLORIDE 0.9 % (FLUSH) 0.9 %
5-40 SYRINGE (ML) INJECTION PRN
OUTPATIENT
Start: 2023-06-22

## 2023-06-22 RX ORDER — WATER 1000 ML/1000ML
2.2 INJECTION, SOLUTION INTRAVENOUS ONCE
OUTPATIENT
Start: 2023-06-22 | End: 2023-06-22

## 2023-06-22 RX ORDER — HEPARIN SODIUM 100 [USP'U]/ML
500 INJECTION, SOLUTION INTRAVENOUS PRN
OUTPATIENT
Start: 2023-06-22

## 2023-06-22 RX ORDER — HEPARIN SODIUM 100 [USP'U]/ML
500 INJECTION, SOLUTION INTRAVENOUS PRN
Status: DISCONTINUED | OUTPATIENT
Start: 2023-06-22 | End: 2023-06-23 | Stop reason: HOSPADM

## 2023-06-22 RX ORDER — SODIUM CHLORIDE 9 MG/ML
25 INJECTION, SOLUTION INTRAVENOUS PRN
OUTPATIENT
Start: 2023-06-22

## 2023-06-22 RX ORDER — SODIUM CHLORIDE 0.9 % (FLUSH) 0.9 %
5-40 SYRINGE (ML) INJECTION PRN
Status: DISCONTINUED | OUTPATIENT
Start: 2023-06-22 | End: 2023-06-23 | Stop reason: HOSPADM

## 2023-06-22 RX ADMIN — HEPARIN 500 UNITS: 100 SYRINGE at 10:28

## 2023-06-22 RX ADMIN — SODIUM CHLORIDE, PRESERVATIVE FREE 20 ML: 5 INJECTION INTRAVENOUS at 09:26

## 2023-06-22 RX ADMIN — SODIUM CHLORIDE, PRESERVATIVE FREE 10 ML: 5 INJECTION INTRAVENOUS at 09:25

## 2023-06-22 NOTE — PROGRESS NOTES
Patient tolerated  lab draw from [address] [facility] without any complications. Discharge instructions given to patient-verbalizes understanding. Ambulated off unit per self with belongings.

## 2023-06-22 NOTE — PLAN OF CARE
Problem: Infection - Adult  Goal: Absence of infection at discharge  Outcome: Adequate for Discharge  Flowsheets (Taken 6/22/2023 1132)  Absence of infection at discharge:   Assess and monitor for signs and symptoms of infection   Monitor all insertion sites i.e., indwelling lines, tubes and drains  Note: Mediport site with no redness or warmth. Skin over port site intact with no signs of breakdown noted. Patient verbalizes signs/symptoms of port infection and when to notify the physician. Discuss port maintenance,infection prevention, sign of infection and when to call MD.      Problem: Discharge Planning  Goal: Discharge to home or other facility with appropriate resources  Outcome: Adequate for Discharge  Flowsheets (Taken 6/22/2023 1132)  Discharge to home or other facility with appropriate resources: Identify barriers to discharge with patient and caregiver  Note: Discuss understanding of discharge instructions,follow-up appointments, and when to call the physician. Problem: Safety - Adult  Goal: Free from fall injury  Outcome: Adequate for Discharge  Flowsheets (Taken 6/22/2023 1132)  Free From Fall Injury: Instruct family/caregiver on patient safety  Note: No falls occurred with visit today. Verbalized understanding of fall prevention to ask for assistance with ambulation. Call light within reach. Care plan reviewed with patient and spouse. Patient and spouse verbalize understanding of the plan of care and contribute to goal setting.

## 2023-06-22 NOTE — PROGRESS NOTES
Patient tolerated  lab draw from [address] At [facility] without any complications. Discharge instructions given to patient-verbalizes understanding. Ambulated off unit per self with belongings. no

## 2023-06-22 NOTE — PROGRESS NOTES
Name: Annie Fraser  : 1960  MRN: [id number]    Oncology Navigation Follow-Up Note    Contact Type:  Medical Oncology    Subjective: doing pretty good; Next radiation appt is in November    Objective: using cane for mobility. No issues with feeding tube. PET 23  IMPRESSION:  There is no FDG avid malignancy. ONC POC:  -FU ECHO ordered  -Keep Rad Onc appt  -MRI 23  -Herceptin hold; PET results reviewed  -FU with ONC 3rd week July to review MRI. Assistance Needed: denies today    Receptive to Advanced Care Planning / Palliative Care:  deferred    Referrals: N/A    Education: POC reiterated    Notes: Fredy available to assist as needed.     Electronically signed by Christophe Webber RN on 2023 at 1:07 PM

## 2023-06-22 NOTE — DISCHARGE INSTRUCTIONS
Next brain MRI please do the week of July 12. Herceptin on hold given his excellent PET scan. Schedule follow-up echocardiogram.  Follow-up with me the third week of July. You received lab from [address] [facility] [address] [facility] while in outpatient oncology clinic. Call if any uncontrolled nausea/vomiting  Call if any fevers/chills/ problems or concerns  Call if any bleeding  Call if any chest pain/pressure  Call if any severe shortness of breath  Drink at least (6) 8oz glasses of fluids daily     If develop any of above condition, please call your MD or go to Emergency Department.

## 2023-06-22 NOTE — PROGRESS NOTES
daily effective 1/18/2023    4) Symptom Management: Patient is no longer taking Decadron. 5) Supportive care provided. Level of care is appropriate. See above. 6) Treatment goal: Cure      Treatment plan:     Neoadjuvant chemoradiation: Low-dose carboplatin and Taxol weekly x 6-7 with radiation. Radiation will be completed 6/20 8/30/2022 esophagectomy at [facility] per Dr. Richard Perez . WB XRT per Dr Chasidy Bergman. Day 1  11/22/20    Restage w PET and await NGS etc and plan first line met systemic tx. Previously HER2 amplified thus genet up a Herceptin based regimen . See above. -Modified Folfox q 2 weeks + Herceptin 6 mk/kg LD on day 1 then, 4mg/kg iv q 2 weeks. Start- 12/13/22. 1/18/23 C 3 due. Hold because of nausea vomiting hypokalemia and neutropenia. Seen with course 31/25/23 modified without the 5-FU bolus and leucovorin and oxaliplatin decreased 20%. Since No metastatic disease found on 12/8/22 PT. Plan to drop folfox effective today 2/8/2023 and \" maintain \" with herceptin. Herceptin will be every 3 weeks at 6 mg/kg. C6 done 3/22/23 this was the last therapy. .      At this time given his complications with therapy and hospitalizations etc. I think would be reasonable to hold off systemic therapy and monitor closely  High risk for relapse of course in the brain. Monitor closely should have his next MRI in July  Options for treatment systemic relapse with most likely include going back to Herceptin based therapy since he does not feel that. Another option would be immunotherapy          7) Medications reviewed. Prescriptions today: None. Patient already has Compazine and Zofran at home              No orders of the defined types were placed in this encounter. OARRS:  Controlled Substance Monitoring:    Acute and Chronic Pain Monitoring:   No flowsheet data found. 8) Research Options:   Not applicable      9) Other:            10) Follow Up:   Follow-up with me the

## 2023-06-22 NOTE — PATIENT INSTRUCTIONS
Next brain MRI please do the week of July 12. Herceptin on hold given his excellent PET scan. Schedule follow-up echocardiogram.  Follow-up with me the third week of July.

## 2023-07-05 ENCOUNTER — CLINICAL DOCUMENTATION (OUTPATIENT)
Dept: NUTRITION | Age: 63
End: 2023-07-05

## 2023-07-05 NOTE — PROGRESS NOTES
Fluid needs: ~2000 (~1ml/calorie)     Nutrition Interventions:     7/5/23:  -Decrease EN goal rate to 55ml/hr x 24 hours/day  -Continue additional water needs of 900ml/day  -Monitor weight and tolerance  -Contact RD with needs  5/10/23:  *Continue current EN regimen (at goal)  *Continue additional water via J-tube (goal: 900ml)  *Sips of fluids for pleasure and to maintain swallow function     Nutrition Evaluation:          Evaluation: Progressing toward goal       Goals: Patient will tolerate 100% or greater of EN intake and maintain/gain weight throughout treatment.          Monitoring: Oral intake, weight, EN intake, EN tolerance, bowel habits, taste perception, hydration     Electronically signed by Tray Shepherd RD, AGAPITO on 7/5/2023 at 10:53 AM     Contact number: [phone]

## 2023-07-11 DIAGNOSIS — C79.9 METASTASIS FROM ESOPHAGEAL CANCER (HCC): Primary | ICD-10-CM

## 2023-07-11 DIAGNOSIS — C15.9 METASTASIS FROM ESOPHAGEAL CANCER (HCC): Primary | ICD-10-CM

## 2023-07-12 ENCOUNTER — HOSPITAL ENCOUNTER (OUTPATIENT)
Dept: MRI IMAGING | Age: 63
Discharge: HOME | End: 2023-07-12
Attending: INTERNAL MEDICINE
Payer: MEDICAID

## 2023-07-12 ENCOUNTER — HOSPITAL ENCOUNTER (OUTPATIENT)
Dept: NON INVASIVE DIAGNOSTICS | Age: 63
Discharge: HOME | End: 2023-07-12
Attending: INTERNAL MEDICINE
Payer: MEDICAID

## 2023-07-12 DIAGNOSIS — C15.9 METASTASIS FROM ESOPHAGEAL CANCER (HCC): ICD-10-CM

## 2023-07-12 DIAGNOSIS — C79.9 METASTASIS FROM ESOPHAGEAL CANCER (HCC): ICD-10-CM

## 2023-07-12 LAB
LV EF: 60 %
LVEF MODALITY: NORMAL

## 2023-07-12 PROCEDURE — 93306 TTE W/DOPPLER COMPLETE: CPT

## 2023-07-12 PROCEDURE — 6360000002 HC RX W HCPCS: Performed by: RADIOLOGY

## 2023-07-12 RX ORDER — HEPARIN SODIUM (PORCINE) LOCK FLUSH IV SOLN 100 UNIT/ML 100 UNIT/ML
500 SOLUTION INTRAVENOUS ONCE
Status: COMPLETED | OUTPATIENT
Start: 2023-07-12 | End: 2023-07-12

## 2023-07-12 RX ADMIN — HEPARIN 500 UNITS: 100 SYRINGE at 14:50

## 2023-07-27 ENCOUNTER — HOSPITAL ENCOUNTER (OUTPATIENT)
Dept: MRI IMAGING | Age: 63
Discharge: HOME | End: 2023-07-27
Attending: INTERNAL MEDICINE
Payer: MEDICAID

## 2023-07-27 PROCEDURE — 6360000002 HC RX W HCPCS: Performed by: RADIOLOGY

## 2023-07-27 PROCEDURE — 70553 MRI BRAIN STEM W/O & W/DYE: CPT

## 2023-07-27 PROCEDURE — 6360000004 HC RX CONTRAST MEDICATION: Performed by: INTERNAL MEDICINE

## 2023-07-27 PROCEDURE — A9579 GAD-BASE MR CONTRAST NOS,1ML: HCPCS | Performed by: INTERNAL MEDICINE

## 2023-07-27 RX ORDER — HEPARIN SODIUM (PORCINE) LOCK FLUSH IV SOLN 100 UNIT/ML 100 UNIT/ML
500 SOLUTION INTRAVENOUS ONCE
Status: COMPLETED | OUTPATIENT
Start: 2023-07-27 | End: 2023-07-27

## 2023-07-27 RX ADMIN — HEPARIN 500 UNITS: 100 SYRINGE at 07:53

## 2023-07-27 RX ADMIN — GADOTERIDOL 15 ML: 279.3 INJECTION, SOLUTION INTRAVENOUS at 08:09

## 2023-07-31 ENCOUNTER — PATIENT MESSAGE (OUTPATIENT)
Dept: FAMILY MEDICINE CLINIC | Age: 63
End: 2023-07-31

## 2023-07-31 DIAGNOSIS — R53.1 GENERAL WEAKNESS: Primary | ICD-10-CM

## 2023-07-31 NOTE — TELEPHONE ENCOUNTER
From: Rochelle Yañez  To: Dr. Alonso Gene: 7/31/2023 12:47 PM EDT  Subject: orders    can markos get a prescription for a quad cane?   please fax to passport for approval [phone]

## 2023-08-09 ENCOUNTER — TELEMEDICINE (OUTPATIENT)
Dept: FAMILY MEDICINE CLINIC | Age: 63
End: 2023-08-09
Payer: MEDICAID

## 2023-08-09 DIAGNOSIS — M25.512 BILATERAL SHOULDER PAIN, UNSPECIFIED CHRONICITY: Primary | ICD-10-CM

## 2023-08-09 DIAGNOSIS — M25.511 BILATERAL SHOULDER PAIN, UNSPECIFIED CHRONICITY: Primary | ICD-10-CM

## 2023-08-09 PROCEDURE — 99213 OFFICE O/P EST LOW 20 MIN: CPT | Performed by: FAMILY MEDICINE

## 2023-08-09 RX ORDER — METHYLPREDNISOLONE 4 MG/1
TABLET ORAL
Qty: 1 KIT | Refills: 0 | Status: SHIPPED | OUTPATIENT
Start: 2023-08-09 | End: 2023-08-15

## 2023-08-09 ASSESSMENT — ENCOUNTER SYMPTOMS
GASTROINTESTINAL NEGATIVE: 1
RESPIRATORY NEGATIVE: 1

## 2023-08-21 NOTE — PROGRESS NOTES
[place] [address]  Dept: [phone]  Loc: [phone]   Hematology/Oncology Consult (Clinic)        8/22/2023    Migel Yañez   1960     No ref. 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 Mass extends from 34 to 28 cm approximately 6 cm. Staging by EUS 3/23/2022 [facility] (T2 based on invasion and N2 based on 2 malignant appearing lymph nodes visualized and measured in the lower paraesophageal mediastinum level 8L adjacent to the mass. 2 malignant appearing lymph nodes visualized and measured in the subcarinal mediastinum level 7. PET/CT 3/30/2022 [facility]- hypermetabolic activity at mid esophagus consistent with primary malignancy with adjacent hypermetabolic left periesophageal lymph nodes. Brain Mets; extensive and bulky. 10/31/22. Off Decadron .    (11/21/22) F1 and PDL-1 requested for me  by Dr Sigifredo Junior rad Onc at [facility]; Not done then. Completed on Brain path :Foundation 1 reveals tumor mutation burden 17 for which Keytruda or nivolumab would be indicated. PAD-L1 CPS 2 - 3% i.e. positive      TREATMENT:  -Seen by Dr. Arielle Bishop of thoracic surgery at [facility] 3/30/2022. Recommends neoadjuvant chemoradiation to be followed by surgery  -Neoadjuvant chemoradiation with Dr. Natividad Espinoza. Low-dose carboplatinum Taxol weekly x 6-7. Chemo  Start date: 5/11. Day 1 XRT 5/11 6/16/2022 week #6 due/last treatment. Last XRT 6/20/22.  -Robotic assisted laparoscopic and right thoracoscopic Clutier Dala Spotted esophagectomy ;   additional gastric margin, gastropathic lymph nodes and hernia sac per Dr. Arielle Bishop 8/30/2022  No evidence of peritoneal metastatic disease.   (Path report requested and pending)  - Craniotomy [facility] debulked largest cerebellar met  - WB XRT soon per

## 2023-08-22 ENCOUNTER — OFFICE VISIT (OUTPATIENT)
Dept: ONCOLOGY | Age: 63
End: 2023-08-22
Payer: MEDICAID

## 2023-08-22 ENCOUNTER — HOSPITAL ENCOUNTER (OUTPATIENT)
Dept: INFUSION THERAPY | Age: 63
Discharge: HOME | End: 2023-08-22
Payer: MEDICAID

## 2023-08-22 VITALS
OXYGEN SATURATION: 99 % | SYSTOLIC BLOOD PRESSURE: 172 MMHG | HEIGHT: 67 IN | DIASTOLIC BLOOD PRESSURE: 79 MMHG | HEART RATE: 72 BPM | BODY MASS INDEX: 24.15 KG/M2 | RESPIRATION RATE: 16 BRPM | TEMPERATURE: 97.9 F | WEIGHT: 153.87 LBS

## 2023-08-22 VITALS
DIASTOLIC BLOOD PRESSURE: 79 MMHG | RESPIRATION RATE: 16 BRPM | SYSTOLIC BLOOD PRESSURE: 172 MMHG | HEIGHT: 67 IN | OXYGEN SATURATION: 99 % | WEIGHT: 153.8 LBS | BODY MASS INDEX: 24.14 KG/M2 | TEMPERATURE: 97.9 F | HEART RATE: 72 BPM

## 2023-08-22 DIAGNOSIS — C15.9 METASTASIS FROM ESOPHAGEAL CANCER (HCC): Primary | ICD-10-CM

## 2023-08-22 DIAGNOSIS — C79.9 METASTASIS FROM ESOPHAGEAL CANCER (HCC): Primary | ICD-10-CM

## 2023-08-22 DIAGNOSIS — C15.5 CANCER OF DISTAL THIRD OF ESOPHAGUS (HCC): ICD-10-CM

## 2023-08-22 DIAGNOSIS — C15.5 CANCER OF DISTAL THIRD OF ESOPHAGUS (HCC): Primary | ICD-10-CM

## 2023-08-22 LAB
ABSOLUTE IMMATURE GRANULOCYTE: 0 THOU/MM3 (ref 0–0.07)
ALBUMIN SERPL BCG-MCNC: 4.1 G/DL (ref 3.5–5.1)
ALP SERPL-CCNC: 114 U/L (ref 38–126)
ALT SERPL W/O P-5'-P-CCNC: 21 U/L (ref 11–66)
AST SERPL-CCNC: 19 U/L (ref 5–40)
BASOPHILS ABSOLUTE: 0 THOU/MM3 (ref 0–0.1)
BASOPHILS NFR BLD AUTO: 0 % (ref 0–3)
BILIRUB CONJ SERPL-MCNC: < 0.2 MG/DL (ref 0–0.3)
BILIRUB SERPL-MCNC: 0.4 MG/DL (ref 0.3–1.2)
BUN BLDP-MCNC: 18 MG/DL (ref 8–26)
CHLORIDE BLD-SCNC: 105 MEQ/L (ref 98–109)
CREAT BLD-MCNC: 0.7 MG/DL (ref 0.5–1.2)
EOSINOPHIL NFR BLD AUTO: 1 % (ref 0–4)
EOSINOPHILS ABSOLUTE: 0.1 THOU/MM3 (ref 0–0.4)
ERYTHROCYTE [DISTWIDTH] IN BLOOD BY AUTOMATED COUNT: 12.3 % (ref 11.5–14.5)
GFR SERPL CREATININE-BSD FRML MDRD: > 60 ML/MIN/1.73M2
GLUCOSE BLD-MCNC: 87 MG/DL (ref 70–108)
HCT VFR BLD AUTO: 39.6 % (ref 42–52)
HGB BLD-MCNC: 13 GM/DL (ref 14–18)
IMMATURE GRANULOCYTES: 0 %
IONIZED CALCIUM, WHOLE BLOOD: 1.2 MMOL/L (ref 1.12–1.32)
LYMPHOCYTES ABSOLUTE: 0.7 THOU/MM3 (ref 1–4.8)
LYMPHOCYTES NFR BLD AUTO: 9 % (ref 15–47)
MCH RBC QN AUTO: 30.2 PG (ref 26–33)
MCHC RBC AUTO-ENTMCNC: 32.8 GM/DL (ref 32.2–35.5)
MCV RBC AUTO: 92 FL (ref 80–94)
MONOCYTES ABSOLUTE: 0.6 THOU/MM3 (ref 0.4–1.3)
MONOCYTES NFR BLD AUTO: 8 % (ref 0–12)
NEUTROPHILS NFR BLD AUTO: 82 % (ref 43–75)
PLATELET # BLD AUTO: 123 THOU/MM3 (ref 130–400)
PMV BLD AUTO: 8.7 FL (ref 9.4–12.4)
POTASSIUM BLD-SCNC: 4.2 MEQ/L (ref 3.5–4.9)
PROT SERPL-MCNC: 7.1 G/DL (ref 6.1–8)
RBC # BLD AUTO: 4.31 MILL/MM3 (ref 4.7–6.1)
SEGMENTED NEUTROPHILS ABSOLUTE COUNT: 6.2 THOU/MM3 (ref 1.8–7.7)
SODIUM BLD-SCNC: 139 MEQ/L (ref 138–146)
TOTAL CO2, WHOLE BLOOD: 26 MEQ/L (ref 23–33)
WBC # BLD AUTO: 7.6 THOU/MM3 (ref 4.8–10.8)

## 2023-08-22 PROCEDURE — 99214 OFFICE O/P EST MOD 30 MIN: CPT | Performed by: INTERNAL MEDICINE

## 2023-08-22 PROCEDURE — 99211 OFF/OP EST MAY X REQ PHY/QHP: CPT

## 2023-08-22 PROCEDURE — 85025 COMPLETE CBC W/AUTO DIFF WBC: CPT

## 2023-08-22 PROCEDURE — 3078F DIAST BP <80 MM HG: CPT | Performed by: INTERNAL MEDICINE

## 2023-08-22 PROCEDURE — 36591 DRAW BLOOD OFF VENOUS DEVICE: CPT

## 2023-08-22 PROCEDURE — 6360000002 HC RX W HCPCS: Performed by: INTERNAL MEDICINE

## 2023-08-22 PROCEDURE — 3077F SYST BP >= 140 MM HG: CPT | Performed by: INTERNAL MEDICINE

## 2023-08-22 PROCEDURE — 80047 BASIC METABLC PNL IONIZED CA: CPT

## 2023-08-22 PROCEDURE — 2580000003 HC RX 258: Performed by: INTERNAL MEDICINE

## 2023-08-22 PROCEDURE — 80076 HEPATIC FUNCTION PANEL: CPT

## 2023-08-22 RX ORDER — HEPARIN 100 UNIT/ML
500 SYRINGE INTRAVENOUS PRN
Status: DISCONTINUED | OUTPATIENT
Start: 2023-08-22 | End: 2023-08-23 | Stop reason: HOSPADM

## 2023-08-22 RX ORDER — WATER 1000 ML/1000ML
2.2 INJECTION, SOLUTION INTRAVENOUS ONCE
OUTPATIENT
Start: 2023-08-22 | End: 2023-08-22

## 2023-08-22 RX ORDER — SODIUM CHLORIDE 0.9 % (FLUSH) 0.9 %
5-40 SYRINGE (ML) INJECTION PRN
OUTPATIENT
Start: 2023-08-22

## 2023-08-22 RX ORDER — LIDOCAINE AND PRILOCAINE 25; 25 MG/G; MG/G
CREAM TOPICAL
Qty: 1 EACH | Refills: 3 | Status: SHIPPED | OUTPATIENT
Start: 2023-08-22

## 2023-08-22 RX ORDER — SODIUM CHLORIDE 0.9 % (FLUSH) 0.9 %
5-40 SYRINGE (ML) INJECTION PRN
Status: DISCONTINUED | OUTPATIENT
Start: 2023-08-22 | End: 2023-08-23 | Stop reason: HOSPADM

## 2023-08-22 RX ORDER — HEPARIN 100 UNIT/ML
500 SYRINGE INTRAVENOUS PRN
OUTPATIENT
Start: 2023-08-22

## 2023-08-22 RX ORDER — SODIUM CHLORIDE 9 MG/ML
25 INJECTION, SOLUTION INTRAVENOUS PRN
OUTPATIENT
Start: 2023-08-22

## 2023-08-22 RX ADMIN — SODIUM CHLORIDE, PRESERVATIVE FREE 10 ML: 5 INJECTION INTRAVENOUS at 15:03

## 2023-08-22 RX ADMIN — SODIUM CHLORIDE, PRESERVATIVE FREE 20 ML: 5 INJECTION INTRAVENOUS at 14:31

## 2023-08-22 RX ADMIN — SODIUM CHLORIDE, PRESERVATIVE FREE 10 ML: 5 INJECTION INTRAVENOUS at 14:30

## 2023-08-22 RX ADMIN — HEPARIN 500 UNITS: 100 SYRINGE at 15:03

## 2023-08-22 NOTE — PLAN OF CARE
Problem: Infection - Adult  Goal: Absence of infection at discharge  Outcome: Adequate for Discharge  Flowsheets (Taken 8/22/2023 1729)  Absence of infection at discharge: Monitor all insertion sites i.e., indwelling lines, tubes and drains  Note: Mediport site with no redness or warmth. Skin over port site intact with no signs of breakdown noted. Patient verbalizes signs/symptoms of port infection and when to notify the physician. Goal: Will show no infection signs and symptoms  Description: Will show no infection signs and symptoms  Outcome: Adequate for Discharge     Problem: Discharge Planning  Goal: Discharge to home or other facility with appropriate resources  Outcome: Adequate for Discharge  Flowsheets (Taken 8/22/2023 1729)  Discharge to home or other facility with appropriate resources: Identify barriers to discharge with patient and caregiver     Problem: Safety - Adult  Goal: Free from fall injury  Outcome: Adequate for Discharge  Flowsheets (Taken 8/22/2023 1729)  Free From Fall Injury: Instruct family/caregiver on patient safety     Care plan reviewed with patient and spouse. Patient and spouse verbalize understanding of the plan of care and contribute to goal setting.

## 2023-08-22 NOTE — DISCHARGE INSTRUCTIONS
Please schedule labs and CT chest abdomen pelvis with contrast on October 10. Follow-up with me 1 week later to review results. E prescription for Emla cream.      You received a blood port while in outpatient oncology clinic. Call if any uncontrolled nausea/vomiting  Call if any fevers/chills/ problems or concerns  Call if any bleeding  Call if any chest pain/pressure  Call if any severe shortness of breath  Drink at least (6) 8oz glasses of fluids daily     If develop any of above condition, please call your MD or go to Emergency Department.

## 2023-08-22 NOTE — DISCHARGE SUMMARY
Patient tolerated blood draw per port without any complications. Discharge instructions given to patient-verbalizes understanding. Ambulated off unit per cane with spouse and with belongings.

## 2023-08-22 NOTE — PATIENT INSTRUCTIONS
Please schedule labs and CT chest abdomen pelvis with contrast on October 10. Follow-up with me 1 week later to review results.   E prescription for Emla cream.

## 2023-08-23 ENCOUNTER — CLINICAL DOCUMENTATION (OUTPATIENT)
Dept: NUTRITION | Age: 63
End: 2023-08-23

## 2023-08-23 NOTE — PROGRESS NOTES
Nutrition Assessment     Reason for Call:   8/23/23 - follow-up, change in EN rate  7/5/23 - Received call from patient wife Susan Baeza), EN rate change  5/10/23 - esophageal cancer s/p esophagectomy (2022), brain mets s/p craniotomy, current with treatment, J-tube placed 5/1/23. Nutrition Recommendations:   -Continue Vital 1.5, increase rate back to 60ml/hrx 24 hours = 2160 kcals, 97 grams protein, 2469 grams CHO, 1100 ml water in formula. -Recommend an additional 900ml water via flushes per day  -PO ad corrina for pleasure     Malnutrition Assessment: (4/27/23 - per inpatient RD)  Malnutrition Status: severe malnutrition  Context: chronic illness  Findings of the 6 clinical characteristics of malnutrition (minimum of 2 out of 6 clinical characteristics is required to make the dx of moderate or severe Protein Calorie Malnutrition based on AND/ASPEN Guidelines):              1. Energy Intake: 75% of less estimated energy requirements for 1 month or longer              2. Weight Loss: 32% over 10 months              3. Fat Loss: mild body fat loss orbital              4. Muscle Loss: mild muscle mass loss temples (temporalis), clavicles (pectoralis & deltoids)              5. Fluid Accumulation: no significant fluid accumulation              6.  Strength: not measured     Nutrition Diagnosis:   Problem: severe malnutrition in the context of chronic illness  Etiology: catabolic illness, inadequate oral intake  Signs and Symptoms: unintentional significant weight loss, taste changes, mild fat and muscle loss, brain mets affecting cognition per wife     Nutrition Assessment:   History: esophageal cancer with brain mets, HTN, GERD, HLD  Subjective:   8/23/23 - Received call from Jan (patient's wife). Jan reports that patient lost weight and is now 149.5# which is a 8.5# loss over the last 2 months (5% of body weight). Jan says that she did increase his EN rate back to 60ml/hr.  Jan mentions that patient still cannot

## 2023-08-28 DIAGNOSIS — C15.5 CANCER OF DISTAL THIRD OF ESOPHAGUS (HCC): ICD-10-CM

## 2023-08-29 ENCOUNTER — HOSPITAL ENCOUNTER (OUTPATIENT)
Age: 63
Setting detail: OBSERVATION
Discharge: HOME | End: 2023-08-31
Attending: EMERGENCY MEDICINE | Admitting: STUDENT IN AN ORGANIZED HEALTH CARE EDUCATION/TRAINING PROGRAM
Payer: MEDICAID

## 2023-08-29 DIAGNOSIS — T85.528A DISLODGED JEJUNOSTOMY TUBE: Primary | ICD-10-CM

## 2023-08-29 PROCEDURE — 99285 EMERGENCY DEPT VISIT HI MDM: CPT

## 2023-08-29 RX ORDER — PROCHLORPERAZINE MALEATE 10 MG
TABLET ORAL
Qty: 40 TABLET | Refills: 0 | Status: SHIPPED | OUTPATIENT
Start: 2023-08-29

## 2023-08-29 ASSESSMENT — PAIN DESCRIPTION - DESCRIPTORS: DESCRIPTORS: BURNING

## 2023-08-29 ASSESSMENT — PAIN SCALES - GENERAL: PAINLEVEL_OUTOF10: 7

## 2023-08-29 ASSESSMENT — PAIN DESCRIPTION - LOCATION: LOCATION: ABDOMEN

## 2023-08-29 ASSESSMENT — PAIN - FUNCTIONAL ASSESSMENT: PAIN_FUNCTIONAL_ASSESSMENT: 0-10

## 2023-08-30 ENCOUNTER — APPOINTMENT (OUTPATIENT)
Dept: CT IMAGING | Age: 63
End: 2023-08-30
Payer: MEDICAID

## 2023-08-30 ENCOUNTER — APPOINTMENT (OUTPATIENT)
Dept: INTERVENTIONAL RADIOLOGY/VASCULAR | Age: 63
End: 2023-08-30
Payer: MEDICAID

## 2023-08-30 ENCOUNTER — APPOINTMENT (OUTPATIENT)
Dept: GENERAL RADIOLOGY | Age: 63
End: 2023-08-30
Payer: MEDICAID

## 2023-08-30 DIAGNOSIS — C15.5 CANCER OF DISTAL THIRD OF ESOPHAGUS (HCC): ICD-10-CM

## 2023-08-30 PROBLEM — K94.23 GASTROSTOMY TUBE DYSFUNCTION (HCC): Status: ACTIVE | Noted: 2023-08-30

## 2023-08-30 PROBLEM — K94.13 MALFUNCTIONING JEJUNOSTOMY TUBE (HCC): Status: ACTIVE | Noted: 2023-08-30

## 2023-08-30 PROBLEM — G47.33 OSA (OBSTRUCTIVE SLEEP APNEA): Status: ACTIVE | Noted: 2023-08-30

## 2023-08-30 PROBLEM — T85.528A DISLODGED JEJUNOSTOMY TUBE: Status: ACTIVE | Noted: 2023-08-30

## 2023-08-30 LAB
ALBUMIN SERPL BCG-MCNC: 3.8 G/DL (ref 3.5–5.1)
ALP SERPL-CCNC: 106 U/L (ref 38–126)
ALT SERPL W/O P-5'-P-CCNC: 14 U/L (ref 11–66)
ANION GAP SERPL CALC-SCNC: 10 MEQ/L (ref 8–16)
ANION GAP SERPL CALC-SCNC: 11 MEQ/L (ref 8–16)
AST SERPL-CCNC: 14 U/L (ref 5–40)
BASOPHILS ABSOLUTE: 0 THOU/MM3 (ref 0–0.1)
BASOPHILS NFR BLD AUTO: 0.3 %
BILIRUB SERPL-MCNC: 0.2 MG/DL (ref 0.3–1.2)
BUN SERPL-MCNC: 12 MG/DL (ref 7–22)
BUN SERPL-MCNC: 18 MG/DL (ref 7–22)
CALCIUM SERPL-MCNC: 9.5 MG/DL (ref 8.5–10.5)
CALCIUM SERPL-MCNC: 9.6 MG/DL (ref 8.5–10.5)
CHLORIDE SERPL-SCNC: 103 MEQ/L (ref 98–111)
CHLORIDE SERPL-SCNC: 105 MEQ/L (ref 98–111)
CO2 SERPL-SCNC: 25 MEQ/L (ref 23–33)
CO2 SERPL-SCNC: 28 MEQ/L (ref 23–33)
CREAT SERPL-MCNC: 0.6 MG/DL (ref 0.4–1.2)
CREAT SERPL-MCNC: 0.7 MG/DL (ref 0.4–1.2)
DEPRECATED RDW RBC AUTO: 42.8 FL (ref 35–45)
EKG ATRIAL RATE: 82 BPM
EKG P AXIS: 22 DEGREES
EKG P-R INTERVAL: 198 MS
EKG Q-T INTERVAL: 350 MS
EKG QRS DURATION: 74 MS
EKG QTC CALCULATION (BAZETT): 408 MS
EKG R AXIS: -10 DEGREES
EKG T AXIS: 19 DEGREES
EKG VENTRICULAR RATE: 82 BPM
EOSINOPHIL NFR BLD AUTO: 1.3 %
EOSINOPHILS ABSOLUTE: 0.1 THOU/MM3 (ref 0–0.4)
ERYTHROCYTE [DISTWIDTH] IN BLOOD BY AUTOMATED COUNT: 12.4 % (ref 11.5–14.5)
GFR SERPL CREATININE-BSD FRML MDRD: > 60 ML/MIN/1.73M2
GFR SERPL CREATININE-BSD FRML MDRD: > 60 ML/MIN/1.73M2
GLUCOSE BLD STRIP.AUTO-MCNC: 102 MG/DL (ref 70–108)
GLUCOSE BLD STRIP.AUTO-MCNC: 108 MG/DL (ref 70–108)
GLUCOSE BLD STRIP.AUTO-MCNC: 109 MG/DL (ref 70–108)
GLUCOSE BLD STRIP.AUTO-MCNC: 114 MG/DL (ref 70–108)
GLUCOSE SERPL-MCNC: 102 MG/DL (ref 70–108)
GLUCOSE SERPL-MCNC: 97 MG/DL (ref 70–108)
HCT VFR BLD AUTO: 37.5 % (ref 42–52)
HGB BLD-MCNC: 12.2 GM/DL (ref 14–18)
IMM GRANULOCYTES # BLD AUTO: 0.03 THOU/MM3 (ref 0–0.07)
IMM GRANULOCYTES NFR BLD AUTO: 0.5 %
LYMPHOCYTES ABSOLUTE: 0.6 THOU/MM3 (ref 1–4.8)
LYMPHOCYTES NFR BLD AUTO: 10 %
MAGNESIUM SERPL-MCNC: 2 MG/DL (ref 1.6–2.4)
MCH RBC QN AUTO: 30.7 PG (ref 26–33)
MCHC RBC AUTO-ENTMCNC: 32.5 GM/DL (ref 32.2–35.5)
MCV RBC AUTO: 94.5 FL (ref 80–94)
MONOCYTES ABSOLUTE: 0.6 THOU/MM3 (ref 0.4–1.3)
MONOCYTES NFR BLD AUTO: 9 %
NEUTROPHILS NFR BLD AUTO: 78.9 %
NRBC BLD AUTO-RTO: 0 /100 WBC
OSMOLALITY SERPL CALC.SUM OF ELEC: 283.1 MOSMOL/KG (ref 275–300)
PHOSPHATE SERPL-MCNC: 3.6 MG/DL (ref 2.4–4.7)
PLATELET # BLD AUTO: 141 THOU/MM3 (ref 130–400)
PMV BLD AUTO: 9.1 FL (ref 9.4–12.4)
POTASSIUM SERPL-SCNC: 4.3 MEQ/L (ref 3.5–5.2)
POTASSIUM SERPL-SCNC: 4.4 MEQ/L (ref 3.5–5.2)
PROT SERPL-MCNC: 6.6 G/DL (ref 6.1–8)
RBC # BLD AUTO: 3.97 MILL/MM3 (ref 4.7–6.1)
SEGMENTED NEUTROPHILS ABSOLUTE COUNT: 5 THOU/MM3 (ref 1.8–7.7)
SODIUM SERPL-SCNC: 141 MEQ/L (ref 135–145)
SODIUM SERPL-SCNC: 141 MEQ/L (ref 135–145)
WBC # BLD AUTO: 6.3 THOU/MM3 (ref 4.8–10.8)

## 2023-08-30 PROCEDURE — 6360000004 HC RX CONTRAST MEDICATION: Performed by: EMERGENCY MEDICINE

## 2023-08-30 PROCEDURE — 84100 ASSAY OF PHOSPHORUS: CPT

## 2023-08-30 PROCEDURE — G0378 HOSPITAL OBSERVATION PER HR: HCPCS

## 2023-08-30 PROCEDURE — 96361 HYDRATE IV INFUSION ADD-ON: CPT

## 2023-08-30 PROCEDURE — 94760 N-INVAS EAR/PLS OXIMETRY 1: CPT

## 2023-08-30 PROCEDURE — 83735 ASSAY OF MAGNESIUM: CPT

## 2023-08-30 PROCEDURE — 49465 FLUORO EXAM OF G/COLON TUBE: CPT

## 2023-08-30 PROCEDURE — 82948 REAGENT STRIP/BLOOD GLUCOSE: CPT

## 2023-08-30 PROCEDURE — 96374 THER/PROPH/DIAG INJ IV PUSH: CPT

## 2023-08-30 PROCEDURE — 74177 CT ABD & PELVIS W/CONTRAST: CPT

## 2023-08-30 PROCEDURE — C9113 INJ PANTOPRAZOLE SODIUM, VIA: HCPCS | Performed by: STUDENT IN AN ORGANIZED HEALTH CARE EDUCATION/TRAINING PROGRAM

## 2023-08-30 PROCEDURE — 94669 MECHANICAL CHEST WALL OSCILL: CPT

## 2023-08-30 PROCEDURE — 85025 COMPLETE CBC W/AUTO DIFF WBC: CPT

## 2023-08-30 PROCEDURE — 93010 ELECTROCARDIOGRAM REPORT: CPT | Performed by: NUCLEAR MEDICINE

## 2023-08-30 PROCEDURE — 2580000003 HC RX 258: Performed by: STUDENT IN AN ORGANIZED HEALTH CARE EDUCATION/TRAINING PROGRAM

## 2023-08-30 PROCEDURE — 96376 TX/PRO/DX INJ SAME DRUG ADON: CPT

## 2023-08-30 PROCEDURE — 96375 TX/PRO/DX INJ NEW DRUG ADDON: CPT

## 2023-08-30 PROCEDURE — 49450 REPLACE G/C TUBE PERC: CPT

## 2023-08-30 PROCEDURE — 96372 THER/PROPH/DIAG INJ SC/IM: CPT

## 2023-08-30 PROCEDURE — 6360000002 HC RX W HCPCS: Performed by: STUDENT IN AN ORGANIZED HEALTH CARE EDUCATION/TRAINING PROGRAM

## 2023-08-30 PROCEDURE — C1769 GUIDE WIRE: HCPCS

## 2023-08-30 PROCEDURE — 99222 1ST HOSP IP/OBS MODERATE 55: CPT | Performed by: STUDENT IN AN ORGANIZED HEALTH CARE EDUCATION/TRAINING PROGRAM

## 2023-08-30 PROCEDURE — 80053 COMPREHEN METABOLIC PANEL: CPT

## 2023-08-30 PROCEDURE — 93005 ELECTROCARDIOGRAM TRACING: CPT | Performed by: STUDENT IN AN ORGANIZED HEALTH CARE EDUCATION/TRAINING PROGRAM

## 2023-08-30 PROCEDURE — 36415 COLL VENOUS BLD VENIPUNCTURE: CPT

## 2023-08-30 PROCEDURE — 99222 1ST HOSP IP/OBS MODERATE 55: CPT | Performed by: NURSE PRACTITIONER

## 2023-08-30 RX ORDER — SODIUM CHLORIDE 0.9 % (FLUSH) 0.9 %
5-40 SYRINGE (ML) INJECTION PRN
Status: DISCONTINUED | OUTPATIENT
Start: 2023-08-30 | End: 2023-08-31 | Stop reason: HOSPADM

## 2023-08-30 RX ORDER — DEXTROSE MONOHYDRATE, SODIUM CHLORIDE, AND POTASSIUM CHLORIDE 50; 1.49; 4.5 G/1000ML; G/1000ML; G/1000ML
INJECTION, SOLUTION INTRAVENOUS ONCE
Status: DISCONTINUED | OUTPATIENT
Start: 2023-08-30 | End: 2023-08-30

## 2023-08-30 RX ORDER — FENTANYL CITRATE 50 UG/ML
25 INJECTION, SOLUTION INTRAMUSCULAR; INTRAVENOUS EVERY 4 HOURS PRN
Status: DISCONTINUED | OUTPATIENT
Start: 2023-08-30 | End: 2023-08-31 | Stop reason: HOSPADM

## 2023-08-30 RX ORDER — DEXTROSE MONOHYDRATE 100 MG/ML
INJECTION, SOLUTION INTRAVENOUS CONTINUOUS PRN
Status: DISCONTINUED | OUTPATIENT
Start: 2023-08-30 | End: 2023-08-31 | Stop reason: HOSPADM

## 2023-08-30 RX ORDER — ACETAMINOPHEN 650 MG/1
650 SUPPOSITORY RECTAL EVERY 6 HOURS PRN
Status: DISCONTINUED | OUTPATIENT
Start: 2023-08-30 | End: 2023-08-31 | Stop reason: HOSPADM

## 2023-08-30 RX ORDER — PANTOPRAZOLE SODIUM 40 MG/10ML
40 INJECTION, POWDER, LYOPHILIZED, FOR SOLUTION INTRAVENOUS 2 TIMES DAILY
Status: DISCONTINUED | OUTPATIENT
Start: 2023-08-30 | End: 2023-08-31 | Stop reason: HOSPADM

## 2023-08-30 RX ORDER — PROCHLORPERAZINE MALEATE 10 MG
10 TABLET ORAL EVERY 6 HOURS PRN
Status: DISCONTINUED | OUTPATIENT
Start: 2023-08-30 | End: 2023-08-30

## 2023-08-30 RX ORDER — ENOXAPARIN SODIUM 100 MG/ML
40 INJECTION SUBCUTANEOUS DAILY
Status: DISCONTINUED | OUTPATIENT
Start: 2023-08-30 | End: 2023-08-31 | Stop reason: HOSPADM

## 2023-08-30 RX ORDER — PANTOPRAZOLE SODIUM 40 MG/1
40 TABLET, DELAYED RELEASE ORAL 2 TIMES DAILY
Status: DISCONTINUED | OUTPATIENT
Start: 2023-08-30 | End: 2023-08-31 | Stop reason: HOSPADM

## 2023-08-30 RX ORDER — POLYETHYLENE GLYCOL 3350 17 G/17G
17 POWDER, FOR SOLUTION ORAL DAILY PRN
Status: DISCONTINUED | OUTPATIENT
Start: 2023-08-30 | End: 2023-08-31 | Stop reason: HOSPADM

## 2023-08-30 RX ORDER — ACETAMINOPHEN 325 MG/1
650 TABLET ORAL EVERY 6 HOURS PRN
Status: DISCONTINUED | OUTPATIENT
Start: 2023-08-30 | End: 2023-08-31 | Stop reason: HOSPADM

## 2023-08-30 RX ORDER — SODIUM CHLORIDE 9 MG/ML
INJECTION, SOLUTION INTRAVENOUS PRN
Status: DISCONTINUED | OUTPATIENT
Start: 2023-08-30 | End: 2023-08-31 | Stop reason: HOSPADM

## 2023-08-30 RX ORDER — SODIUM CHLORIDE 0.9 % (FLUSH) 0.9 %
5-40 SYRINGE (ML) INJECTION EVERY 12 HOURS SCHEDULED
Status: DISCONTINUED | OUTPATIENT
Start: 2023-08-30 | End: 2023-08-31 | Stop reason: HOSPADM

## 2023-08-30 RX ORDER — DEXTROSE AND SODIUM CHLORIDE 5; .45 G/100ML; G/100ML
INJECTION, SOLUTION INTRAVENOUS CONTINUOUS
Status: DISCONTINUED | OUTPATIENT
Start: 2023-08-30 | End: 2023-08-30

## 2023-08-30 RX ORDER — ONDANSETRON 4 MG/1
4 TABLET, ORALLY DISINTEGRATING ORAL EVERY 8 HOURS PRN
Status: DISCONTINUED | OUTPATIENT
Start: 2023-08-30 | End: 2023-08-31 | Stop reason: HOSPADM

## 2023-08-30 RX ORDER — ONDANSETRON 2 MG/ML
4 INJECTION INTRAMUSCULAR; INTRAVENOUS EVERY 6 HOURS PRN
Status: DISCONTINUED | OUTPATIENT
Start: 2023-08-30 | End: 2023-08-31 | Stop reason: HOSPADM

## 2023-08-30 RX ORDER — PANTOPRAZOLE SODIUM 40 MG/1
40 TABLET, DELAYED RELEASE ORAL 2 TIMES DAILY
COMMUNITY
Start: 2023-08-28

## 2023-08-30 RX ORDER — IBUPROFEN 400 MG/1
400 TABLET ORAL EVERY 6 HOURS PRN
Status: DISCONTINUED | OUTPATIENT
Start: 2023-08-30 | End: 2023-08-30

## 2023-08-30 RX ORDER — PROCHLORPERAZINE EDISYLATE 5 MG/ML
10 INJECTION INTRAMUSCULAR; INTRAVENOUS EVERY 6 HOURS PRN
Status: DISCONTINUED | OUTPATIENT
Start: 2023-08-30 | End: 2023-08-31 | Stop reason: HOSPADM

## 2023-08-30 RX ORDER — DEXTROSE AND SODIUM CHLORIDE 5; .9 G/100ML; G/100ML
INJECTION, SOLUTION INTRAVENOUS CONTINUOUS
Status: DISCONTINUED | OUTPATIENT
Start: 2023-08-30 | End: 2023-08-31 | Stop reason: HOSPADM

## 2023-08-30 RX ADMIN — DEXTROSE AND SODIUM CHLORIDE: 5; 450 INJECTION, SOLUTION INTRAVENOUS at 03:47

## 2023-08-30 RX ADMIN — IOPAMIDOL 80 ML: 755 INJECTION, SOLUTION INTRAVENOUS at 04:03

## 2023-08-30 RX ADMIN — ENOXAPARIN SODIUM 40 MG: 100 INJECTION SUBCUTANEOUS at 08:30

## 2023-08-30 RX ADMIN — PANTOPRAZOLE SODIUM 40 MG: 40 INJECTION, POWDER, FOR SOLUTION INTRAVENOUS at 08:30

## 2023-08-30 RX ADMIN — ONDANSETRON 4 MG: 2 INJECTION INTRAMUSCULAR; INTRAVENOUS at 04:25

## 2023-08-30 RX ADMIN — DEXTROSE AND SODIUM CHLORIDE: 5; 900 INJECTION, SOLUTION INTRAVENOUS at 12:34

## 2023-08-30 RX ADMIN — SODIUM CHLORIDE, PRESERVATIVE FREE 10 ML: 5 INJECTION INTRAVENOUS at 08:32

## 2023-08-30 RX ADMIN — DEXTROSE AND SODIUM CHLORIDE: 5; 900 INJECTION, SOLUTION INTRAVENOUS at 22:57

## 2023-08-30 RX ADMIN — DIATRIZOATE MEGLUMINE AND DIATRIZOATE SODIUM 10 ML: 600; 100 SOLUTION ORAL; RECTAL at 02:06

## 2023-08-30 RX ADMIN — PANTOPRAZOLE SODIUM 40 MG: 40 INJECTION, POWDER, FOR SOLUTION INTRAVENOUS at 23:04

## 2023-08-30 ASSESSMENT — PAIN SCALES - GENERAL: PAINLEVEL_OUTOF10: 3

## 2023-08-30 NOTE — PLAN OF CARE
J tube completely got dislodged and patient went down for CT scan. Otherwise, CT scan negative except thickening of the left rectus abdominis muscle at the level of the G-tube tract suggestive of muscle edema versus hematoma. Considering the findings, will consult surgery in a.m. as J tube was placed by Dr. Adan Coyle. Also will consult ID to evaluate the J-tube site due to foul-smelling discharge and erythema around the site.

## 2023-08-30 NOTE — CARE COORDINATION
Case Management Assessment  Initial Evaluation    Date/Time of Evaluation: 8/30/2023 10:28 AM  Assessment Completed by: Will Joe RN    If patient is discharged prior to next notation, then this note serves as note for discharge by case management. Patient Name: Angelia Amos                   YOB: 1960  Diagnosis: Gastrostomy tube dysfunction ([address]) [K94.23]  Dislodged jejunostomy tube [I37.911P]                   Date / Time: 8/29/2023 11:51 PM  Location[facility], [place]     Patient Admission Status: Observation   Readmission Risk Low 0-14, Mod 15-19), High > 20: Readmission Risk Score: 18    Current PCP: Keya Nieves, DO  PCP verified by CM? Yes    Chart Reviewed: Yes      History Provided by: Patient  Patient Orientation: Alert and Oriented    Patient Cognition: Alert    Hospitalization in the last 30 days (Readmission):  No    If yes, Readmission Assessment in CM Navigator will be completed. Advance Directives:      Code Status: Full Code   Patient's Primary Decision Maker is: Named in [facility]    Primary Decision Maker: [employer] - Spouse - [phone]    Secondary Decision Maker: Blas Gutierrez - Child - [phone]    Supplemental (Other) Decision Maker: Franco Sullivan - Child - [phone]    Discharge Planning:    Patient lives with: Spouse/Significant Other Type of Home: House  Primary Care Giver: Self  Patient Support Systems include: Spouse/Significant Other, Children, Friends/Neighbors   Current Financial resources: Medicaid  Current community resources: Other (Comment) (Passport)  Current services prior to admission: [address] [place] [address],[address], Emergency Call  System, Other (Comment) ([employer])            Current DME: Raya Lazier, Shower Chair, Wheelchair, [employer] (rollator)            Type of Home Care services:  None    ADLS  Prior functional level: Independent in ADLs/IADLs  Current functional level:  Independent in ADLs/IADLs    Family can provide of Care is related to the following treatment goals of Gastrostomy tube dysfunction ([address]) [K94.23]  Dislodged jejunostomy tube [R34.670F]    Patient Goals/Plan/Treatment Preferences: Met with Momo. Verified PCP and insurance. Aniyah Treviño is able to afford his medications. He is mostly independent with ADLs; wife assists. Wife takes care of enteral feeding. Current with [facility] for enteral feedings (Vital 1.5; 6 cans daily; continuous rate 55ml/hr). Updated [facility] HIS of admission. Has passport services, wife updated the CM. [employer] delivers meals to the home. Has cane, rollator, wheelchair, hospital bed and emergency alert button. Plans home with spouse. Declines needs/services. Spoke with patients wife Ginny Lee via telephone and updated on plan. Verified home equipment and services. Denied needs. Transportation/Food Security/Housekeeping Addressed: No issues identified.      Rico Shah RN  Case Management Department

## 2023-08-30 NOTE — H&P
Hospitalist - History & Physical      Patient: Dean Casey    Unit/Bed:24/024A  YOB: 1960  MRN: [id number]   Acct: [de-identified]   PCP: Tank Fitzpatrick DO    Date of Service: Pt seen/examined on 08/30/23  and Admitted to Observation with expected LOS less than two midnights due to medical therapy. Chief Complaint:  J tube dislodgment and abdominal pain     Assessment and Plan:-  Abdominal pain secondary to J-tube dislodgment with foul-smelling discharge c/f infection: X-ray on admission showed that J-tube does not appear to opacify bowel. CT abdomen/pelvis pending. Based on findings, will decide whether to put on antibiotics and whether to consult IR versus surgery for the dislodgment. Of note, WBC within normal limit at 6.3. Jejunostomy tube insertion by Dr. Ирина Cortes for failure to thrive/ Severe malnutrition on 5/1/23 due to esophageal cancer with metastasis of brain s/p crainotomy (October 2022) and esophagectomy on August 2022 and new adjuvant chemoradiation: Nutrition consult for malnutrition and tube feed management once able to have tube feeds. On D5 1/2 NS for now. Oncologist: Dr. Gladys Hutchins. PET/CT on 6/20/2023 with no FDG avid malignancy. MRI brain on 7/27/2023 with interval improvement since prior study on 4/12/2023 but diminishing in the enhancing lesions in the right and left cerebellar hemispheres and in the right posterior temporal lobe. Per last oncologist note on 1/49/7903: Given his complications with therapy and hospitalization, plan was to hold off on systemic therapy and monitor closely. High risk for relapse in the brain. Next schedule scans in October. GERD: Switch PPI to IV given n.p.o. status. Primary repair of umbilical hernia on 4/6/86. MARTÍN: Follows with [facility]. Does not wear CPAP. History of hypertension: Noted. Currently not on any medications. Will monitor.       History Of Present Illness:    Mr. Dean Casey is a 59 y.o. male bowel sounds. Musculoskeletal:  No clubbing, cyanosis or edema bilaterally. Skin: Skin color, texture, turgor normal.  No rashes or lesions. Neurologic:  Neurovascularly intact without any focal sensory/motor deficits. Cranial nerves: II-XII intact, grossly non-focal.  Psychiatric: Alert and oriented (to person, place, year but not president), thought content appropriate, normal insight            Labs:   No results for input(s): WBC, HGB, HCT, PLT in the last 72 hours. No results for input(s): NA, K, CL, CO2, BUN, CREATININE, CALCIUM, PHOS in the last 72 hours. Invalid input(s): MAGNES  No results for input(s): AST, ALT, BILIDIR, BILITOT, ALKPHOS in the last 72 hours. No results for input(s): INR in the last 72 hours. No results for input(s): Manisha Lyndon in the last 72 hours. Urinalysis:    No results found for: Yessenia Me, BACTERIA, RBCUA, BLOODU, 1 Jonathan Pro, [place]    Radiology:   XR INJ CONTRAST GASTRIC TUBE PERC    (Results Pending)   CT ABDOMEN PELVIS W IV CONTRAST Additional Contrast? None    (Results Pending)     No results found. EKG: pending.      Electronically signed by Iliana Hernandez MD on 8/30/2023 at 2:35 AM

## 2023-08-30 NOTE — CONSULTS
[address]   General Surgery Consult Note  Ludmila Ignacio, APRN-CNP for Dr. Camryn Ratliff MD FACS    Pt Name: Hema Cardozo  MRN: [id number]  YOB: 1960  Date of evaluation: 8/30/2023  Primary Care Physician: Sidney Paulino DO  Patient evaluated at the request of  Dr. Kevin Garcia  Reason for evaluation: dislodged J-tube   IMPRESSIONS:   Dislodged J-tube   Jejunostomy tube insertion by Dr. Charles Noe for failure to thrive/ Severe malnutrition on 5/1/23 due to esophageal cancer with metastasis of brain s/p crainotomy (October 2022) and esophagectomy on August 2022 and new adjuvant chemoradiation  Malnutrition   does not have any pertinent problems on file. PLANS:   NPO until plan finalized   IR consult for re-placement of J-tube. Discussed with them this morning and Dr. Dk Murray will attempt. If fails then will plan on surgical intervention for placement. ID consulted for possible abdominal wall cellulitis. No signs of infection. No antibiotics needed just wound care. Medical management   Lovenox for DVT prophylaxis  CT imaging reviewed  Wife present on rounds this morning. Plan discussed with her. Patient is agreeable to this. Will await radiology findings. If unable to place in IR then will find surgery time. SUBJECTIVE:   Chief complaint: J-tube fell out    History of present illness:  Becki Emmanuel is a 60-year old male patient who presents for dislodged J-tube last night. Presents with wife. History of J-tube placement and umbilical hernia repair in May of this year with Dr. Charles Noe secondary to his esophageal cancer with metastasis of brain s/p crainotomy (October 2022) and esophagectomy on August 2022 and new adjuvant chemoradiation. He has continuous tube feeds. Wife states last night around 11 pm she noticed leaking around his J-tube. When he stood up the tube fell out. She brought him into the ED where the ER doctor attempted to replace but med resistance.  CT imaging proceed with insertion. No family at bedside during evaluation. Patient in agreement. Await IR placement.     Electronically signed by Ajith Suazo MD on 8/30/23 at 4:00 PM EDT

## 2023-08-30 NOTE — CARE COORDINATION
08/30/23  3:41 PM    Patient's wife Feli Jimenez left this CM a VM at [phone] requesting callback as she was very upset. This CM returned call at [phone]; Feli Jimenez was in the patient's room so this CM went to bedside to discuss patient and family concerns. Feli Jimenez and Willie Mancera wanted updated on plan. If J-tube was not going to be replaced today, Willie Mancera and Feli Jimenez wanted discharge orders so patient could be more comfortable at home until J-tube could be replaced. Feli Jimenez received a call with update on plan for patient to go to IR for J-tube replacement this AM from General surgery. At this time patient had still not been down to IR, and Feli Jimenez had heard IR vs. Surgery and wanted to know what was going on.     1524: This RN contacted Darlene Galeano NP, related to patient and family concerns. Darlene Galeano stated that Dr. Aaron Brito was going to try to do J-tube via IR today. This CM called IR and was told that patient is scheduled for approximately 1700 after an urgent case is complete, IR asked this CM to tube down previous J-tube in biohazard bag to ensure size. This CM updated Willie Mancera and Feli Jimenez on IR planned time and request. Willie Mancera and Feli Jimenez very thankful on update in plan. Permission given to tube J-tube to IR. J-tube labeled and sent to tube station 110.      Electronically signed by Susie Lane RN on 8/30/2023 at 3:48 PM

## 2023-08-30 NOTE — PROGRESS NOTES
Comprehensive Nutrition Assessment    Type and Reason for Visit:  Initial, Consult (Nutrition support recommendations and management)    Nutrition Recommendations/Plan:   Once PEJ replaced and medically appropriate to initiate enteral nutrition recommend the following: Initiate vital 1.2 at 10 ml/hr, advance by 10 ml every 6 hours as tolerated until goal rate of 70 ml/hr is reached. (Mimics home TF regimen)  Additional free water per provider recommendations pr recommend 150 ml every 6 hours to provide an additional 600 ml/day. Monitoring all nutrition aspects and will provide further recommendations as necessary and appropriate. Malnutrition Assessment:  Malnutrition Status: At risk for malnutrition (Comment) (08/30/23 1208)    Context:  Acute Illness     Findings of the 6 clinical characteristics of malnutrition:  Energy Intake:  Mild decrease in energy intake (Comment) (NPO/no EN d/t J tube dislodgement)  Weight Loss:  No significant weight loss     Body Fat Loss:   (previously 32% loss within 10 months, weight stable/gaining since 5/18/23)     Muscle Mass Loss:  No significant muscle mass loss    Fluid Accumulation:  No significant fluid accumulation     Strength:  Not Performed    Nutrition Assessment:     Pt. nutritionally compromised AEB no source of nutrition secondary to tube dislodgement. At risk for further nutrition compromise r/t admit secondary to dysfunctioning J tube, esophageal cancer and underlying medical condition (PMH: afib, HTN, GERD, HLD, esophageal cancer with brain mets, J tube 5/1/23). Nutrition Related Findings:    Pt. Report/Treatments/Miscellaneous: pt seen at bedside today. Planning replacement of feeding tube with surgery~ unsure when at this time. Discussed with wife on phone who reports has been tolerating home regimen and confirms vital 1.5 at 60 ml/hr x 24 hours. Just followed up with Radha Garcia 8/23/23.    GI Status: wife reports normal BM PTA, pt reports

## 2023-08-30 NOTE — ED PROVIDER NOTES
[facility]  EMERGENCY DEPARTMENT ENCOUNTER        PATIENT NAME: Beti Payen  MRN: [id number]  : 1960  PENA: 2023  PROVIDER: Vazquez Soto MD    [address]       Chief Complaint   Patient presents with    Other     J tube problem       Patient is seen and evaluated in a timely fashion. Nurses Notes are reviewed and I agree except as noted in the HPI. HISTORY OF PRESENT ILLNESS   Beti Payne is a 59 y.o. male who presents to Emergency Department with Other (J tube problem)    Patient presents for evaluation of possible dislodgment of J-tube. PMH remarkable for distal esophageal CA with brain mets and significant dysphagia, failure to thrive with severe malnutrition and currently J-tube feeding dependence (G-tube on 2023 at [facility] with Dr. Adan Coyle). Tonight during coughing, he noticed G-tube coming out for about 10-15 cm and he tried to push it back. He noticed burning sensation to J-tube site. No bleeding. No nausea or vomiting. This HPI was provided by patient. PAST MEDICAL HISTORY    has a past medical history of Atrial fibrillation ([address]), Benign hypertension, Cancer of distal third of esophagus ([address]), GERD (gastroesophageal reflux disease), HLD (hyperlipidemia), and Sleep apnea. SURGICAL HISTORY      has a past surgical history that includes Dental surgery; Abdomen surgery (2022); Abdomen surgery (2022); Port Surgery (Left, 2022); and Stomach surgery (N/A, 2023). CURRENT MEDICATIONS       Previous Medications    BLOOD PRESSURE MONITORING (BP MONITOR-STETHOSCOPE) KIT    Dx: HTN    IBUPROFEN (ADVIL;MOTRIN) 400 MG TABLET    Take 1 tablet by mouth every 6 hours as needed for Pain    LIDOCAINE-PRILOCAINE (EMLA) 2.5-2.5 % CREAM    Apply topically as needed. MISC. DEVICES (PULSE OXIMETER FOR FINGER) MISC    Dx:  hypoxemia    MISC. DEVICES (WALL GRAB BAR) MISC    As directed.     PANTOPRAZOLE (PROTONIX) 40 MG TABLET    Take 1 tablet

## 2023-08-30 NOTE — ED TRIAGE NOTES
Patient presents to ED from home with complaints of J tube issues. Patient states that he thought it was coming out so he pushed it back in, \"feels like its coming out or either in too far\". Patient reports a burning sensation to outside skin around J tube. J tube placed in May.

## 2023-08-30 NOTE — PLAN OF CARE
Pt dmitted after midnight for J-tube dislodgement. ID and Gen Surg consulted. To IR for Jtube replacement    Restart TF - pt on Vital 1.5 continuous 60 mL/hr at home. Restart for now at 30 mL/hr.  Dietitian to evaluate in the am.     Electronically signed by Jess Doshi PA-C on 8/30/2023 at 7:21 PM

## 2023-08-30 NOTE — ED NOTES
Patient resting in bed. Respirations easy and unlabored. No distress noted. Call light within reach.        Tabitha Wright RN  08/30/23 [id number]

## 2023-08-30 NOTE — ED NOTES
Patient transported to  [facility] by cart in stable condition. IV port is patent.         Kostas Rausch, EMT-P  08/30/23 [id number]

## 2023-08-30 NOTE — PROGRESS NOTES
Pt in IR for Jtube replacement. Jtube replaced with 18Fr gastrotube. Balloon inflated with 10ml of water/gastrografin. Wife educated on new tube and operation instructions. Pt returned to 8B via bed.

## 2023-08-31 ENCOUNTER — TELEPHONE (OUTPATIENT)
Dept: FAMILY MEDICINE CLINIC | Age: 63
End: 2023-08-31

## 2023-08-31 VITALS
RESPIRATION RATE: 18 BRPM | WEIGHT: 160 LBS | SYSTOLIC BLOOD PRESSURE: 131 MMHG | OXYGEN SATURATION: 97 % | TEMPERATURE: 97.8 F | DIASTOLIC BLOOD PRESSURE: 85 MMHG | HEIGHT: 67 IN | HEART RATE: 81 BPM | BODY MASS INDEX: 25.11 KG/M2

## 2023-08-31 LAB
ALBUMIN SERPL BCG-MCNC: 3.9 G/DL (ref 3.5–5.1)
ALP SERPL-CCNC: 109 U/L (ref 38–126)
ALT SERPL W/O P-5'-P-CCNC: 14 U/L (ref 11–66)
ANION GAP SERPL CALC-SCNC: 15 MEQ/L (ref 8–16)
AST SERPL-CCNC: 16 U/L (ref 5–40)
BASOPHILS ABSOLUTE: 0 THOU/MM3 (ref 0–0.1)
BASOPHILS NFR BLD AUTO: 0.7 %
BILIRUB CONJ SERPL-MCNC: < 0.2 MG/DL (ref 0–0.3)
BILIRUB SERPL-MCNC: 0.3 MG/DL (ref 0.3–1.2)
BUN SERPL-MCNC: 8 MG/DL (ref 7–22)
CALCIUM SERPL-MCNC: 9.7 MG/DL (ref 8.5–10.5)
CHLORIDE SERPL-SCNC: 107 MEQ/L (ref 98–111)
CO2 SERPL-SCNC: 22 MEQ/L (ref 23–33)
CREAT SERPL-MCNC: 0.7 MG/DL (ref 0.4–1.2)
DEPRECATED RDW RBC AUTO: 41.6 FL (ref 35–45)
EOSINOPHIL NFR BLD AUTO: 4 %
EOSINOPHILS ABSOLUTE: 0.1 THOU/MM3 (ref 0–0.4)
ERYTHROCYTE [DISTWIDTH] IN BLOOD BY AUTOMATED COUNT: 12.2 % (ref 11.5–14.5)
GFR SERPL CREATININE-BSD FRML MDRD: > 60 ML/MIN/1.73M2
GLUCOSE BLD STRIP.AUTO-MCNC: 121 MG/DL (ref 70–108)
GLUCOSE BLD STRIP.AUTO-MCNC: 99 MG/DL (ref 70–108)
GLUCOSE SERPL-MCNC: 124 MG/DL (ref 70–108)
HCT VFR BLD AUTO: 39.1 % (ref 42–52)
HGB BLD-MCNC: 12.6 GM/DL (ref 14–18)
IMM GRANULOCYTES # BLD AUTO: 0 THOU/MM3 (ref 0–0.07)
IMM GRANULOCYTES NFR BLD AUTO: 0 %
LYMPHOCYTES ABSOLUTE: 0.5 THOU/MM3 (ref 1–4.8)
LYMPHOCYTES NFR BLD AUTO: 17.1 %
MAGNESIUM SERPL-MCNC: 2 MG/DL (ref 1.6–2.4)
MCH RBC QN AUTO: 30.1 PG (ref 26–33)
MCHC RBC AUTO-ENTMCNC: 32.2 GM/DL (ref 32.2–35.5)
MCV RBC AUTO: 93.3 FL (ref 80–94)
MONOCYTES ABSOLUTE: 0.3 THOU/MM3 (ref 0.4–1.3)
MONOCYTES NFR BLD AUTO: 9 %
NEUTROPHILS NFR BLD AUTO: 69.2 %
NRBC BLD AUTO-RTO: 0 /100 WBC
PHOSPHATE SERPL-MCNC: 3.5 MG/DL (ref 2.4–4.7)
PLATELET # BLD AUTO: 146 THOU/MM3 (ref 130–400)
PMV BLD AUTO: 9.1 FL (ref 9.4–12.4)
POTASSIUM SERPL-SCNC: 4.1 MEQ/L (ref 3.5–5.2)
PROT SERPL-MCNC: 6.6 G/DL (ref 6.1–8)
RBC # BLD AUTO: 4.19 MILL/MM3 (ref 4.7–6.1)
SEGMENTED NEUTROPHILS ABSOLUTE COUNT: 2.1 THOU/MM3 (ref 1.8–7.7)
SODIUM SERPL-SCNC: 144 MEQ/L (ref 135–145)
TRIGL SERPL-MCNC: 212 MG/DL (ref 0–199)
WBC # BLD AUTO: 3 THOU/MM3 (ref 4.8–10.8)

## 2023-08-31 PROCEDURE — C9113 INJ PANTOPRAZOLE SODIUM, VIA: HCPCS | Performed by: STUDENT IN AN ORGANIZED HEALTH CARE EDUCATION/TRAINING PROGRAM

## 2023-08-31 PROCEDURE — 85025 COMPLETE CBC W/AUTO DIFF WBC: CPT

## 2023-08-31 PROCEDURE — 99231 SBSQ HOSP IP/OBS SF/LOW 25: CPT | Performed by: SURGERY

## 2023-08-31 PROCEDURE — 84478 ASSAY OF TRIGLYCERIDES: CPT

## 2023-08-31 PROCEDURE — 96372 THER/PROPH/DIAG INJ SC/IM: CPT

## 2023-08-31 PROCEDURE — 83735 ASSAY OF MAGNESIUM: CPT

## 2023-08-31 PROCEDURE — 6360000002 HC RX W HCPCS: Performed by: STUDENT IN AN ORGANIZED HEALTH CARE EDUCATION/TRAINING PROGRAM

## 2023-08-31 PROCEDURE — 96376 TX/PRO/DX INJ SAME DRUG ADON: CPT

## 2023-08-31 PROCEDURE — 36415 COLL VENOUS BLD VENIPUNCTURE: CPT

## 2023-08-31 PROCEDURE — 99239 HOSP IP/OBS DSCHRG MGMT >30: CPT | Performed by: PHYSICIAN ASSISTANT

## 2023-08-31 PROCEDURE — 82248 BILIRUBIN DIRECT: CPT

## 2023-08-31 PROCEDURE — 84100 ASSAY OF PHOSPHORUS: CPT

## 2023-08-31 PROCEDURE — 82948 REAGENT STRIP/BLOOD GLUCOSE: CPT

## 2023-08-31 PROCEDURE — 6360000002 HC RX W HCPCS: Performed by: PHYSICIAN ASSISTANT

## 2023-08-31 PROCEDURE — G0378 HOSPITAL OBSERVATION PER HR: HCPCS

## 2023-08-31 PROCEDURE — 80053 COMPREHEN METABOLIC PANEL: CPT

## 2023-08-31 RX ORDER — HEPARIN 100 UNIT/ML
500 SYRINGE INTRAVENOUS PRN
Status: DISCONTINUED | OUTPATIENT
Start: 2023-08-31 | End: 2023-08-31 | Stop reason: HOSPADM

## 2023-08-31 RX ORDER — PROCHLORPERAZINE MALEATE 10 MG
TABLET ORAL
Qty: 40 TABLET | Refills: 0 | OUTPATIENT
Start: 2023-08-31

## 2023-08-31 RX ADMIN — HEPARIN 500 UNITS: 100 SYRINGE at 11:54

## 2023-08-31 RX ADMIN — ENOXAPARIN SODIUM 40 MG: 100 INJECTION SUBCUTANEOUS at 07:45

## 2023-08-31 RX ADMIN — PANTOPRAZOLE SODIUM 40 MG: 40 INJECTION, POWDER, FOR SOLUTION INTRAVENOUS at 07:45

## 2023-08-31 NOTE — PROGRESS NOTES
Tube feed rate increased from 30ml per hour to 70ml per hour per order. Water flush also adjusted per order to 150ml Q6.

## 2023-08-31 NOTE — PROGRESS NOTES
Chest port de-accessed by IV team, G-tube disconnected from tube feed, flushed with 20ml tap water and capped, discharge instructions reviewed with patient and wife and signed, patient with all possessions, patient taken by wheelchair to discharge lobby for transport home by wife.

## 2023-08-31 NOTE — DISCHARGE SUMMARY
Oral 76 18 99 % --     Weight:   Weight - Scale: 160 lb (72.6 kg)   24 hour intake/output:     Intake/Output Summary (Last 24 hours) at 8/31/2023 1130  Last data filed at 8/31/2023 1119  Gross per 24 hour   Intake 264 ml   Output --   Net 264 ml       General appearance: Chronically ill-appearing, no apparent distress, appears stated age and cooperative. HEENT: Normal cephalic, atraumatic without obvious deformity. Pupils equal, round, and reactive to light. Extra ocular muscles intact. Conjunctivae/corneas clear. Neck: Supple, with full range of motion. No jugular venous distention. Trachea midline. Respiratory:  Normal respiratory effort. Clear to auscultation, bilaterally without Rales/Wheezes/Rhonchi. Cardiovascular: Regular rate and rhythm with normal S1/S2 without murmurs, rubs or gallops. Abdomen: J-tube in place with dressing, minimally tender with palpation to area, non-distended with normal bowel sounds. Musculoskeletal:  No clubbing, cyanosis or edema bilaterally. Skin: Skin color, texture, turgor normal.  No rashes or lesions. Neurologic:  Neurovascularly intact without any focal sensory/motor deficits. Cranial nerves: II-XII intact, grossly non-focal.  Psychiatric: Alert and oriented, thought content appropriate, normal insight        Discharge Medications:-      Medication List        CONTINUE taking these medications      BP Monitor-Stethoscope Kit  Dx: HTN     ibuprofen 400 MG tablet  Commonly known as: ADVIL;MOTRIN     lidocaine-prilocaine 2.5-2.5 % cream  Commonly known as: EMLA  Apply topically as needed. pantoprazole 40 MG tablet  Commonly known as: PROTONIX     prochlorperazine 10 MG tablet  Commonly known as: COMPAZINE  TAKE 1 TABLET BY MOUTH EVERY 6 HOURS AS NEEDED FOR NAUSEA     * Pulse Oximeter For Finger Misc  Dx:  hypoxemia     * Wall Grab Bar Misc  As directed. * This list has 2 medication(s) that are the same as other medications prescribed for you.  Read the achievable. XR INJ CONTRAST GASTRIC TUBE PERC    Result Date: 8/30/2023  1 view abdomen Comparison: 06/02/2023 08:18 PM EDT (07:18 PM CDT) : CR,SR: XR INJ CONTRAST GASTRIC TUBE PERC Findings: Contrast administered via J-tube does not appear to opacify bowel. Contrast administered via J-tube does not appear to opacify bowel. Position of the tube is unknown on the basis of this exam. This document has been electronically signed by: Amaya Abdul MD on 08/30/2023 02:37 AM    IR FLUOROSCOPY LESS THAN 1 HOUR    Result Date: 8/30/2023  JEJUNOSTOMYTUBE EXCHANGE: PERFORMED BY: India Arias M.D. CLINICAL INFORMATION: 59-year-old male with a history of esophageal cancer. INDWELLING CATHETER: A Haines catheter is occupying the space from the previous tube as it fell out last night. NEW CATHETER: 25 Swedish G tube  with ENFit connector. FLUOROSCOPY TIME: 54 seconds FLUOROSCOPIC IMAGES: 2 ESTIMATED BLOOD LOSS: Minimal  PROCEDURE:  Signed informed consent was obtained prior to performing this procedure. The patient was not sedated during this procedure. Exposed portions of the recently placed Haines catheterand surrounding skin were prepped and draped in a sterile fashion. Gastroview contrast was injected revealing the tube to be positioned within the jejunum. The catheter was then removed over a guidewire and a new tube, as listed above, was advanced over the Glidewire with the tip passing into the jejunum. This was performed with fluoroscopic guidance. Additional contrast material was injected, confirming appropriate position of the jejunostomytube within the jejunum. A Fluoroscopic image was obtained for documentation. The balloon tip of the catheter was then inflated with 10 mL dilute contrast/sterile water. Status post successful jejunostomytube replacement. **This report has been created using voice recognition software. It may contain minor errors which are inherent in voice recognition technology. **

## 2023-08-31 NOTE — PROGRESS NOTES
Perfect serve to [facility] asking for diet order for patient. Wife is visiting and states that patient eats and drinks at home \"when he feels like it. \" Patient is currently NPO.

## 2023-08-31 NOTE — PROGRESS NOTES
[facility], APRN-CNP for Dr. Latanya Nicole Surgery Daily Progress Note    Pt Name: Deb Brady  Medical Record Number: [id number]  Date of Birth 1960   Today's Date: 8/31/2023    Hospital day # 1    ASSESSMENT   Dislodged J-tube   Jejunostomy tube insertion by Dr. Nona Jones for failure to thrive/ Severe malnutrition on 5/1/23 due to esophageal cancer with metastasis of brain s/p crainotomy (October 2022) and esophagectomy on August 2022 and new adjuvant chemoradiation  At risk of malnutrition   Marijuana use    has a past medical history of Atrial fibrillation ([address]), Benign hypertension, Cancer of distal third of esophagus ([address]), GERD (gastroesophageal reflux disease), HLD (hyperlipidemia), and Sleep apnea. PLAN   J-tube replaced by IR last night. Tolerating tube feeds at 30 ml/hr. ID following. No concern for any signs of infection. Dietician following. On tube feeds at 30 ml/hr. Goal rate 70 ml/hr. Adv as tolerated. Okay for home diet from our standpoint  Medical management  Lovenox for DVT prophylaxis   Nothing from a surgical standpoint. Signing off. Patient will need an appointment with Dr. Nona Jones in 2 weeks for follow up. He may need 25 F gastrotube may need exchanged for red rubber tube if issues. Okay for discharge home today. SUBJECTIVE   Chief complaint: No new complaints     Patient was stable overnight. VS stable. No fevers. Chart reviewed. Updated by nursing staff. Denies chest discomfort or dyspnea. No N/V; (+) belching, flatus. No BM. Tolerating tube feeds at 30 ml/hr. No abdominal pain. Tube in place without bleeding or drainage.    CURRENT MEDICATIONS   Scheduled Meds:   [Held by provider] pantoprazole  40 mg Oral BID    sodium chloride flush  5-40 mL IntraVENous 2 times per day    enoxaparin  40 mg SubCUTAneous Daily    pantoprazole  40 mg IntraVENous BID     Continuous Infusions:   sodium chloride      dextrose      dextrose 5 % and

## 2023-08-31 NOTE — PROGRESS NOTES
Perfect serve to AURORA Brooks informing her that a dietician has seen the patient and placed a note.

## 2023-08-31 NOTE — PROGRESS NOTES
Per Michelle Obando in case management, home infusion company will have equipment at patient's home today. Ibeth Browning informed of this.

## 2023-08-31 NOTE — CARE COORDINATION
8/31/23, 11:59 AM EDT    Patient goals/plan/ treatment preferences discussed by  and . Patient goals/plan/ treatment preferences reviewed with patient/ family. Patient/ family verbalize understanding of discharge plan and are in agreement with goal/plan/treatment preferences. Understanding was demonstrated using the teach back method. AVS provided by RN at time of discharge, which includes all necessary medical information pertaining to the patients current course of illness, treatment, post-discharge goals of care, and treatment preferences. Services At/After Discharge: [facility] for Tube Feed     Return home with wife, [employer] services, [facility] for tube feeds and current DME. Declines needs/services.

## 2023-08-31 NOTE — PLAN OF CARE
Problem: Discharge Planning  Goal: Discharge to home or other facility with appropriate resources  Outcome: Progressing  Flowsheets (Taken 8/30/2023 2000)  Discharge to home or other facility with appropriate resources:   Identify barriers to discharge with patient and caregiver   Arrange for needed discharge resources and transportation as appropriate   Identify discharge learning needs (meds, wound care, etc)   Refer to discharge planning if patient needs post-hospital services based on physician order or complex needs related to functional status, cognitive ability or social support system     Problem: Pain  Goal: Verbalizes/displays adequate comfort level or baseline comfort level  Outcome: Progressing  Flowsheets (Taken 8/30/2023 2000)  Verbalizes/displays adequate comfort level or baseline comfort level:   Encourage patient to monitor pain and request assistance   Assess pain using appropriate pain scale   Administer analgesics based on type and severity of pain and evaluate response   Implement non-pharmacological measures as appropriate and evaluate response     Problem: Safety - Adult  Goal: Free from fall injury  Outcome: Progressing  Flowsheets (Taken 8/31/2023 0331)  Free From Fall Injury: Instruct family/caregiver on patient safety     Problem: Nutrition Deficit:  Goal: Optimize nutritional status  Outcome: Progressing     Problem: ABCDS Injury Assessment  Goal: Absence of physical injury  Outcome: Progressing

## 2023-08-31 NOTE — PROGRESS NOTES
Comprehensive Nutrition Assessment    Type and Reason for Visit:  Reassess (tube feeding orders and management)    Nutrition Recommendations/Plan:   While remains inpatient at [facility] recommend the following: Vital 1.2 at 70 ml/hr (goal rate), with free water flushes of 150 ml every 6 hours. (This mimics home regimen. Home EN regimen: Vital 1.5 at 60 ml/hr x 24 hours. Monitoring all nutrition aspects and will provide further recommendations as necessary and appropriate. Malnutrition Assessment:  Malnutrition Status: At risk for malnutrition (Comment) (08/30/23 1208)    Context:  Acute Illness     Findings of the 6 clinical characteristics of malnutrition:  Energy Intake:  Mild decrease in energy intake (Comment) (NPO/no EN d/t J tube dislodgement)  Weight Loss:  No significant weight loss     Body Fat Loss:   (previously 32% loss within 10 months, weight stable/gaining since 5/18/23)     Muscle Mass Loss:  No significant muscle mass loss    Fluid Accumulation:  No significant fluid accumulation     Strength:  Not Performed    Nutrition Assessment:     Pt. Improving from a nutritional standpoint aeb tube replaced and EN infusing. At risk for further nutrition compromise r/t admit secondary to dysfunctioning J tube, esophageal cancer and underlying medical condition (PMH: afib, HTN, GERD, HLD, esophageal cancer with brain mets, J tube 5/1/23). Nutrition Related Findings:    Pt. Report/Treatments/Miscellaneous: pt and wife seen this morning. Wife very familiar with home tube feeding recommendations and needs. Denies further questions at this time. Follows with michael with oncology for home tube feed management. Discussed with case management regarding home tube feeding needs with home infusion.    GI Status: last BM 8/29  Pertinent Labs: Na 144, K 4.1, BUN 8, creatinine 0.7, magnesium 2, glucose 124, phos 3.5  Pertinent Meds: protonix, D5, gastrografin     Wound Type: None       Current Nutrition Intake &

## 2023-09-01 NOTE — TELEPHONE ENCOUNTER
----- Message from Ankit Jacobo sent at 8/31/2023 11:39 AM EDT -----  Subject: Hospital Follow Up    QUESTIONS  What hospital was the Patient Discharged from? [facility]   Date of Discharge? 2023-08-31  Discharge Location? Home  Reason for hospitalization as patient stated? G Tube dislodged   What question does the patient have, if applicable? 7 days   ---------------------------------------------------------------------------  --------------  CALL BACK INFO  What is the best way for the office to contact you? Do not leave any   message, patient will call back for answer  Preferred Call Back Phone Number? [phone]  ---------------------------------------------------------------------------  --------------  SCRIPT ANSWERS  Relationship to Patient? Covered Entity  Covered Entity Type? Hospital?  Representative Name?  [facility]
Care Transitions Initial Follow Up Call    Outreach made within 2 business days of discharge: Yes    Patient: Danielle Dubose Patient : 1960   MRN: [id number]  Reason for Admission: There are no discharge diagnoses documented for the most recent discharge. Discharge Date: 23       Spoke with: Patient wife    Discharge department/facility:  [facility] Interactive Patient Contact:  Was patient able to fill all prescriptions: Yes  Was patient instructed to bring all medications to the follow-up visit: Yes  Is patient taking all medications as directed in the discharge summary? Yes  Does patient understand their discharge instructions: Yes  Does patient have questions or concerns that need addressed prior to 7-14 day follow up office visit: no    Schedule appointment with PCP within 7-14 days. Declined appointment at this time.      Follow Up  Future Appointments   Date Time Provider [address]   10/10/2023 10:30 AM STR OUT PT ONC INJ RM 10 STR[facility] OP ONC [facility] HOD   10/10/2023 12:40 PM STR CT IMAGING RM1  [facility] OUT EXP STR [facility]   10/10/2023  1:00 PM STR CT IMAGING RM1  [facility] OUT EXP [facility]   10/17/2023  9:40 AM Cecilia Nicholson MD N Oncology [facility]   2023 11:30 AM SCHEDULE, [address] [place] [address] MARIELA Pierce ([facility])
Care Transitions Initial Follow Up Call    Outreach made within 2 business days of discharge: Yes    Patient: Micheal Erwin Patient : 1960   MRN: [id number]  Reason for Admission: There are no discharge diagnoses documented for the most recent discharge.   Discharge Date: 23       Spoke with: KIRTI    Discharge department/facility: [facility]    Schedule appointment with PCP within 7-14 days    Follow Up  Future Appointments   Date Time Provider [address] [place] [address] Ct   10/10/2023 10:30 AM STR OUT PT ONC INJ RM 10 STRZ  [facility] HOD   10/10/2023 12:40 PM STR CT IMAGING RM1  [facility] OUT EXP STR [facility]iolog   10/10/2023  1:00 PM STR CT IMAGING RM1  [facility] OUT EXP STR Radiolog   10/17/2023  9:40 AM Gene Garcia MD N Oncology [facility]   2023 11:30 AM SCHEDULE, [address] [place] [address] MARIELA Pierce ([facility] [address] [facility])
None

## 2023-09-27 DIAGNOSIS — C15.5 CANCER OF DISTAL THIRD OF ESOPHAGUS (HCC): ICD-10-CM

## 2023-09-27 RX ORDER — PROCHLORPERAZINE MALEATE 10 MG
TABLET ORAL
Qty: 40 TABLET | Refills: 0 | Status: SHIPPED | OUTPATIENT
Start: 2023-09-27

## 2023-10-03 ENCOUNTER — HOSPITAL ENCOUNTER (EMERGENCY)
Age: 63
Discharge: HOME | End: 2023-10-03
Attending: EMERGENCY MEDICINE
Payer: MEDICAID

## 2023-10-03 ENCOUNTER — APPOINTMENT (OUTPATIENT)
Dept: GENERAL RADIOLOGY | Age: 63
End: 2023-10-03
Payer: MEDICAID

## 2023-10-03 VITALS
BODY MASS INDEX: 25.06 KG/M2 | SYSTOLIC BLOOD PRESSURE: 118 MMHG | OXYGEN SATURATION: 97 % | RESPIRATION RATE: 16 BRPM | WEIGHT: 160 LBS | TEMPERATURE: 98 F | HEART RATE: 74 BPM | DIASTOLIC BLOOD PRESSURE: 77 MMHG

## 2023-10-03 DIAGNOSIS — T85.528A DISLODGED JEJUNOSTOMY TUBE: Primary | ICD-10-CM

## 2023-10-03 PROCEDURE — 99283 EMERGENCY DEPT VISIT LOW MDM: CPT

## 2023-10-03 PROCEDURE — 6360000004 HC RX CONTRAST MEDICATION: Performed by: EMERGENCY MEDICINE

## 2023-10-03 PROCEDURE — 49465 FLUORO EXAM OF G/COLON TUBE: CPT

## 2023-10-03 RX ORDER — LIDOCAINE HYDROCHLORIDE 20 MG/ML
JELLY TOPICAL PRN
Status: DISCONTINUED | OUTPATIENT
Start: 2023-10-03 | End: 2023-10-03 | Stop reason: HOSPADM

## 2023-10-03 RX ADMIN — DIATRIZOATE MEGLUMINE AND DIATRIZOATE SODIUM 30 ML: 600; 100 SOLUTION ORAL; RECTAL at 16:12

## 2023-10-03 NOTE — ED PROVIDER NOTES
7/7/2021         RE: Shaneka Alvarez  [id number] [facility] [id number]        Dear Colleague,    Thank you for referring your patient, Shaneka Alvarez, to the [facility]. Please see a copy of my visit note below.      ONCOLOGY FOLLOW UP NOTE: 7/07/21        I took the history from reviewing the previous notes that she was following with Dr. Amaya.  I have copied and updated from prior notes.    ONCOLOGY History:    1.  January 2006:  Diagnosed with Stage IIB, T2 N1 M0 invasive lobular carcinoma of the right breast.  Final pathology showed a 4.5 x 3.8 x 2.5 cm, 01/14 lymph nodes positive.  Estrogen, progesterone receptor positive, HER-2 negative.     2.  Genetics.  BRCA1 and 2 mutations not detected. Variant of Uncertain Significance in MSH2 gene.       THERAPY TO DATE:   1. 2006:  ECOG 2104 protocol of dose-dense Adriamycin, Cytoxan and Avastin x4 cycles followed by Taxol and Avastin x4 cycles.   2.  03/2007:  Completed 1 year Avastin.   3.  11/2006-01/2007:  Radiotherapy to the right breast of 5040 cGy.   4.  03/[id number]:  Aromasin.  Stopped after moving to the [facility].   5.  01/2016:  Right modified radical mastectomy with latissimus dorsi flap reconstruction and a left prophylactic mastectomy with latissimus dorsi flap reconstruction.     She recently had MRI of the brain as a follow-up for multiple sclerosis and there were multiple calvarial lesions noted which was suspicious for metastatic disease.  There was no evidence of new multiple sclerosis lesions.  She had a PET scan on 8/30/2019 which showed widespread bone metastatic lesions (calvarium, spine, sacrum, pelvis, ribs most prominent at C7, T3, L1 and L4), hypermetabolic 3 cm lesion in LLL, and mediastinal/hilar LN. CEA wnl at 1.9 and C27-29 elevated to 415 (28 on 8/23/16). A CT guided biopsy of the right iliac bone was obtained on 9/4/19, pathology consistent with metastatic adenocarcinoma (breast),  [facility] EMERGENCY DEPT      EMERGENCY MEDICINE     Pt Name: Sandro Dong  MRN: [id number]  [address] [facility] 1960  Date of evaluation: 10/3/2023  Provider: Latrice Huynh MD    CHIEF COMPLAINT       Chief Complaint   Patient presents with    J Tube Complications     HISTORY OF PRESENT ILLNESS   Sandro Dong is a pleasant 59 y.o. male who presents to the emergency department from from home, by private vehicle for evaluation of dysfunctional J-tube. Patient presents emergency department complaining of at least 2 hours of feeling up pop sensation on mesogastrium after which J-tube started moving in and out more than normally; patient states being worried about J-tube balloon got dislodged because that had happened before; patient was recently admitted due to J-tube misplacement. Patient denies any trauma, denies have been pulling it out, patient was getting enteral feeding while this happened; going complaining of mild abdominal pain on J-tube port of entry. PASTMEDICAL HISTORY     Past Medical History:   Diagnosis Date    Atrial fibrillation ([address])     Benign hypertension 11/3/2022    Cancer of distal third of esophagus ([address]) 04/07/2022    GERD (gastroesophageal reflux disease)     HLD (hyperlipidemia) 8/15/2022    Sleep apnea        Patient Active Problem List   Diagnosis Code    Abdominal pain R10.9    Cancer of distal third of esophagus ([address]) C15.5    Acute postoperative pain G89.18    Chronic anemia D64.9    Primary cancer of esophagus with metastasis to other site ([address]) C15.9    GERD (gastroesophageal reflux disease) K21.9    HLD (hyperlipidemia) E78.5    Obesity (BMI 30.0-34. 9) E66.9    Postoperative atrial fibrillation (HCC) I97.89, I48.91    Thrombocytopenia (HCC) D69.6    Brain lesion G93.9    Delayed gastric emptying K30    Serum creatinine raised R79.89    Malnutrition ([address]) E46    Benign hypertension I10    Encounter for insertion of venous access port Z45.2    Failure to thrive in adult ER/ND negative, HER-2 negative PDL 1 < 1%. A second biopsy was take of the LLL nodule via EBUS on 9/5/19 did not show any malignancy.    C/T/L spine MRI 8/3/19 to 9/2/19 with numerous enhancing lesions of the C, T and L spine, largest around T9 and L1. No evidence of cord compression. Has a L1 compression fracture and impending L4.     Received radiation T12-L5 3000 cGy in 10 fractions from 9/6/2019 till 9/18/2019.  Neurosurgery recommended no surgical intervention but to wear Hooven brace when out of bed and HOB >30 degrees    She started palliative Xeloda 2000/1500 on 10/1/2019 but after just a few doses she noticed nausea, red eyes blurred vision, loss of appetite.  She stopped taking it after 5 doses and was seen by nurse practitioner on 10/7/2019 when she was improving.  At that point she was restarted on Xeloda at a lower dose of 1000 mg twice a day.  As she was not able to tolerate even the lower dose with extreme nausea and vomiting and feeling extremely fatigued and blurry vision, we decided on stopping Xeloda.    We decided on repeating scans and MRI brain on 10/25/2019 showed no intracranial metastatic disease.   Multiple enhancing calvarial lesions may be increased in number and are suggestive of metastatic disease. Thinner imaging on the current study may identify the smaller lesions and may be responsible for  identified more lesions.   There is a single focus of T2 hyperintense signal within the anterior right temporal lobe may represent interval demyelination. There is no evidence for active demyelination.    PET/CT on 11/1/2019 showed favorable response to therapy and Overall FDG uptake within scattered osseous metastases is decreased since 8/30/2019, particularly within the pelvis. Increased sclerotic appearance of L1 and L4 pathologic compression deformities, likely sequela of recently completed palliative radiation therapy.    No significant FDG uptake in previously demonstrated hypermetabolic  mediastinal lymph nodes. Biopsy negative on 9/5/2019.  There is also decreased FDG uptake in the left lower lobe (biopsy negative for  malignancy), now with dense consolidation containing air bronchograms suggestive of infection versus aspiration.  There is diffuse FDG uptake within the esophagus, consistent with esophagitis.    Because of intolerance to Xeloda we decided on switching to weekly paclitaxel on 11/5/2019.    C#2 Taxol 12/4/19- started with 70mg/m2 dose reduction    C#3 Taxol 12/30/2019     PET/CT on 1/24/2020 showed progression of the disease in some of the bone lesions including increase in size and lytic lesion within the vertebral body of C4 measuring 1 cm as opposed to 0.6 cm previously and a new central soft tissue nodule with SUV max 4.1 when previously it was non-hypermetabolic.  There is also some progression noted in the right proximal femur and right iliac bone.  There is decreased metabolic uptake in the right lamina of T9 with SUV max 4.7 when previously it was 8.0.  Other lesions are stable.    CA 27-29 also had increased significantly and it was 257 on 1/28/2020.    We decided on switching to eribulin on 1/28/2020.    C#2 2/18/2020  C#2 D#8 2/25/2020    C#3 D#1 3/18/2020 -delayed by 1 week as per patient's preference because she wanted to visit her family.    She had a repeat PET/CT on 3/27/2020 which showed stable size of the bone metastatic lesions although the FDG avidity was less, consistent with treatment response.    She had MRI of the thoracic spine on 4/3/2020 and it was compared to the one which was done in August 2019 and it showed increased size of several metastatic lesions most notably at T9 but also at T5-T7.  Other lesions at T10, T11 and T12 appeared improved.    CA 27-29 was also down to 222 on 3/18/2020.    Cycle #4 eribulin ( dose reduced to 1.2 mg/m2 )-4/7/2020-   Cycle #5 Eribulin ( 1.2 mg/m2 )- 4/28/2020   Cycle #6 Eribulin ( 1.2 mg/m2 )- 5/19/2020     Cycle #7  Eribulin ( 1.2 mg/m2 )- 6/9/2020    Cycle #8 Eribulin ( 1.2 mg/m2 )- 6/30/2020     She had a repeat PET scan on 7/17/2020 which showed incidental finding of new acute bilateral pulmonary embolism in the distal left main pulmonary artery extending in the left upper and lower lobes and in the segmental and branch arteries in the right lower lobe.    It also showed mildly increased FDG avidity in the lytic lesion in the T9 lamina and right pedicle with SUV max 5.3, previously 3.9.  That is also slightly increased FDG uptake to the left anterior fifth rib at the costochondral junction SUV max 3.9, previously 2.5.  There is slightly decreased FDG uptake in the right femoral neck lesion SUV max 2.2, previously 2.9.  FDG uptake in other bone lesions is fairly stable.  No new metastasis are seen.    CA 27-29 was 308 on 6/30/2020.  It was 286 on 5/19/2020.    Overall this is consistent with only slight progression versus stable disease.    She was started on Lovenox.    Cycle #9 eribulin 7/21/2020. CA 27-29 was 238    C#10 eribulin 8/18/2020. ( delayed by one week for ANC 0.9 )    C#11 Eribulin 9/8/2020    C#12 Eribulin 9/29/2020     C#13 Eribulin 10/20/2020     PET scan on 11/6/2020 shows progression of the disease with increased FDG uptake in the lytic lesions in the T9/T10 and right posterolateral elements as well as increased FDG uptake in the previously known hypermetabolic right iliac bone lesion suggesting recurrence of previously treated metastasis.  There is new subtle lesion in the right manubrium.  There is also a new fracture in the anterolateral left fourth rib with associated uptake and this could be a pathologic fracture.  Healing fracture in the left anterior fifth rib lesion.  There is resolution of the left pulmonary emboli.  Decreased FDG uptake of the esophagus consistent with improving inflammation.    CA 27-29 on 10/20/2020 was also elevated at 489.    C#1 Trodelvy on 11/11/2020.  Cycle #2 Trodelvy  12/2/2020  Cycle number 2-day #8 Trodelvy 12/8/2020.    12/15/2020-12/16/2020.  She was admitted to the hospital with nausea vomiting and dehydration.  She had tachycardia and initial lactic acid of 5.  It decreased to 1.4 after 2 L of normal saline.  She also had hypokalemia and ANC was 1.3.  She was treated with IV fluids.  Procalcitonin was negative.  CTA of the chest was negative for pulmonary embolism or pneumonia.  No bacterial infection was documented.  Her medication which she was initially unable to tolerate at home, were restarted and she was discharged home once started to feel better.      We delayed the start of cycle number 3 x 1 week and decrease the dose of chemotherapy by 25%.  She also had neutropenia with ANC of 1.0.      She started cycle number 3-day #1 on 12/29/2020.  CA 27-29 was 392.    Cycle number 3-day #8 1/5/2021.    C#4 Trodelvy 1/20/2021- 75% dose    2/5/2021.  PET/CT overall shows a good response to treatment with improvement of previously hypermetabolic lesions in the spine and pelvis and stability of other bone meta stasis.  There is a single lytic lesion in the right iliac bone which demonstrates slight Yimi increased FDG uptake and has SUV max 4.7 while previously it was SUV max 3.4.  There was diffuse wall thickening of the esophagus with uptake in the esophagus in the fundus of the stomach and this could be seen with inflammation.    Trodelvy cycle #5 - 2/10/2021.  75% of the dose.  CA 27-29 was 337.    Trodelvy cycle #6 - 3/3/2021.  75% of the dose.  Trodelvy cycle #6, D#8 - 3/10/2021.  75% of the dose.    Trodelvy C#8, D#8 on 4/21/2021  CA 27-29 stable at 371 on 4/14/2021    Trodelvy, cycle #9- 5/5/2021        On 2/25/2020 when she had biopsy of the right acetabulum and it was consistent with metastatic lobular carcinoma.  Again it was Triple Negative.     On 3/18/2020 when she also met with Dr. Arelis Arshad who also advised testing for androgen receptor studies on the biopsy  specimen.  Tumor was diffusely androgen receptor positive.      5/26/2021-cycle #10 Trodelvy started    CA 27-29 was 545.    6/11/2021.  PET/CT shows mildly increased uptake in several bone lesions for example in the left anterior iliac crest, mid right iliac crest and left ischium.  Uptake in other bone lesions are similar to previously.    Because of minimal progression of the disease and she is tolerating chemotherapy very well, we decided on continuing the same chemotherapy.    6/16/2021-Trodelvy cycle #11    7/7/2021 -Trodelvy cycle #12    Interval history.  This is a video visit.  She tolerated last chemotherapy well.  She did not have nausea. BMs were fine as long as she takes senna.  No rash and no worsening neuropathy. No new swellings. No dyspnea. No infections.  Pain is under fair control and she takes MS Contin and morphine sulfate only at night.        ECOG 0-1    ROS:  Rest of the comprehensive review of the system was unremarkable.        I reviewed other history in epic as below.       PAST MEDICAL HISTORY:     1.  Breast cancer  2.  Multiple sclerosis.   3.  Depression.   4.  Migraines.   5.  Hypertension.   6.  Cholecystectomy and umbilical hernia repair.     SOCIAL HISTORY: She smoked for 5 years but quit many years ago.  Drinks alcohol socially.  She lives with her .  She is a teacher in middle school.       FAMILY HISTORY: She mentions that her mother had breast cancer at 49.  Both grandmothers had breast cancer but she is not sure of the age.  A couple of cousins have cancers.  Patient has 3 children who are healthy.    Current Outpatient Medications   Medication     acetaminophen (TYLENOL) 500 MG tablet     apixaban ANTICOAGULANT (ELIQUIS) 5 MG tablet     busPIRone (BUSPAR) 15 MG tablet     calcium citrate-vitamin D (CITRACAL) 315-250 MG-UNIT TABS     Cholecalciferol (VITAMIN D3 PO)     HEMP OIL OR EXTRACT OR OTHER CBD CANNABINOID, NOT MEDICAL CANNABIS,     loratadine (CLARITIN) 10 MG  "tablet     LORazepam (ATIVAN) 1 MG tablet     magnesium 250 MG tablet     morphine (MS CONTIN) 15 MG CR tablet     morphine (MSIR) 15 MG IR tablet     OLANZapine (ZYPREXA) 5 MG tablet     promethazine (PHENERGAN) 25 MG tablet     propranolol ER (INDERAL LA) 80 MG 24 hr capsule     senna-docusate (SENOKOT-S/PERICOLACE) 8.6-50 MG tablet     traZODone (DESYREL) 50 MG tablet     venlafaxine (EFFEXOR-XR) 75 MG 24 hr capsule     No current facility-administered medications for this visit.         Allergies   Allergen Reactions     Bactrim [Sulfamethoxazole W/Trimethoprim]      Internal bleeding     Copaxone [Glatiramer] Hives          PHYSICAL EXAMINATION:     /81 (BP Location: Left arm, Patient Position: Chair, Cuff Size: Adult Regular)   Pulse 71   Ht 1.575 m (5' 2\")   Wt 65.1 kg (143 lb 9.6 oz)   SpO2 100%   BMI 26.26 kg/m    Wt Readings from Last 4 Encounters:   07/07/21 65.1 kg (143 lb 9.6 oz)   06/28/21 66.7 kg (147 lb)   06/23/21 66.8 kg (147 lb 4.8 oz)   06/16/21 64.1 kg (141 lb 4.8 oz)         Constitutional.  Looks well and in no apparent distress.   Eyes.  Without eye redness or apparent jaundice.   Respiratory.  Non labored breathing. Speaking in full sentences.    Skin.  No concerning skin rashes on the skin visualized.   Neurological.  Is alert and oriented.  Psychiatric.  Mood and affect seem appropriate.      The rest of a comprehensive physical examination is deferred due to Public Health Emergency video visit restrictions.      Labs and Imaging.    Reviewed.    7/7/2021  CBC-unremarkable.      CMP shows normal kidney functions.  Phosphorus 5.2.  Albumin 3.3.  Normal LFTs.    CA 27-29 was 465 on 6/16/2021.  It was 545 on 5/26/2021 6/11/2021.  PET/CT shows mildly increased uptake in several bone lesions for example in the left anterior iliac crest, mid right iliac crest and left ischium.  Uptake in other bone lesions are similar to previously.  The right eye iliac crest lesion which on PET CT " in February 2021 showed mildly increased uptake as compared to previously, now seems stable.      Biopsy from the right acetabulum shows metastatic lobular carcinoma.  It is triple negative.  Androgen receptor was diffusely positive (90%, moderate intensity) by immunohistochemistry. )  PD-L1 negative.      Foundation 1 testing did not reveal any actionable mutations.          ASSESSMENT AND PLAN:     Now she has metastatic breast cancer negative breast cancer (androgen receptor positive ) with extensive involvement of the bones.  There is also a left lower lobe hypermetabolic lesion and mediastinal lymph nodes which are hypermetabolic.  The biopsy from the right iliac bone is consistent with metastatic lobular breast cancer but it is hormone receptor and HER-2 negative and PDL 1 is also negative.    Foundation 1 testing did not reveal any actionable mutations.    She was intolerant to Xeloda and progressed on Taxol and eribulin.      We then switched to recently FDA approved sacituzumab govitecan-hziy (Trodelvy) for TNBC, based on the results of the phase II IMMU-132-01 clinical trial.   She started this on 11/11/2020.      We delayed the start of cycle number 3 x 1 week and decrease the dose of chemotherapy by 25% and she also has neutropenia with ANC of 1.  She started cycle number 3-day #1 on 12/29/2020.      After 4 cycles of chemotherapy, overall the PET scan shows positive response to treatment apart from mildly increased FDG avidity in one of the right iliac bone lesions.  CA 27-29 was 454 slightly elevated from before.    We decided on continuing the same chemotherapy.      She obtained a second opinion from Dr. Castillo from [facility] who recommended a repeat biopsy to be done to make sure that she really has triple negative breast cancer.      She also met with Dr. Arelis Arshad on 3/18/2021.      She has completed 10 cycles of Trodelvy.  Last CA 27-29 was elevated from previously.      PET/CT on  6/11/2021 shows mildly increased uptake in some of the bone lesions while a stabilityin other bone lesions.      We discussed the situation in detail.      Because of minimal progression of the disease plus the fact that she has been tolerating treatments well, we decided on continuing the same chemotherapy as in the big picture it seems to be meeting its goal of slowing down the growth of the cancer while keeping the treatments tolerable and maintaining good quality of life.     She has received 11 cycles of Trodelvy up until now and is tolerating it well.  Today we will proceed with cycle #12.  Last CA 27-29 was also down a little bit.    Upon progression I can consider Doxil every 4 weeks.     As the tumor is diffusely positive for androgen receptor, we will consider giving her androgen receptor blockers like Casodex or enzalutamide in the future.      Bone disease.  She has metastatic disease to the bone.  Previously she got palliative radiation to T12-L5 3000 cGy in 10 fractions from 9/6/2019 till 9/18/2019.  She was evaluated by orthopedics Dr. Bipin Elliott on 11/1/2019 and conservative management was recommended.    She was on Zometa every 3 months. She last received on 9/29/2020.  Because of progression of the disease in the bones, we switched to Xgeva which she received on 11/11/2020.  Continue Xgeva.  She last received on 6/16/2021.  Continue calcium and vitamin D.        Pain management.  Pain is under fair control with morphine sulfate immediate release and MS Contin.  Continue to follow with palliative care.      Bilateral pulmonary emboli.  She remains on  Eliquis for incidentally found PE on 7/17/2020.     Elevated Phosphorus. She is not on supplemental phosphorus. We will cont to monitor      Neuropathy.  Mild stable neuropathy in her hands.  This is in addition to the chronic balance issues and heat and cold sensitivity from underlying multiple sclerosis which is also stable for years.   Continue to observe.     Sensitivity to the scalp.  She does not report any skin rashes or swellings.  Overall this is mild.  Last PET scan did not show any worsening of osseous meta stasis to that area.  We will continue to observe for now.    We did not address the following today.    Insomnia.  Continue trazodone.      Wall thickening of esophagus and FDG uptake of fundus of the stomach.  It could be in the setting of inflammation from recent nausea and vomiting.  Symptoms have resolved.  Continue to monitor clinically.      Subcutaneous nodule in the scalp.  I am not certain whether it is a cyst or a metastatic deposit.  PET scan does not cause this to light up.  Continue to observe.      Vision changes.    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  It was thought that this could be due to Taxol as patient reported to the ophthalmologist that she was on Taxol in September 2019 when her symptoms started.  But she was not on Taxol in September 2019 when she started noticing the visual changes so Taxol is not responsible for this.  The first dose of Taxol was on 11/5/2019.   She had left cataract extraction on 2/8/2021 and she has noticed significant improvement in her vision.  She plans to do cataract extraction of the right eye in couple of weeks.          Increased FDG ability in the colon.  She had FDG uptake in the cecum and ascending colon but no corresponding lesion was seen on the CT scan.  She is completely asymptomatic so at this time we will continue to observe.    Discussion regarding healthcare directives.  On previous visit she told me that she will bring the health care directive for our records but she forgot so I told her to bring it on next visit.      Breast implant removal.  She tells me that she was planning to do breast implant removal because there was a recall on this.  Her surgery was scheduled for 9/24/2019.  Because of this new development of  metastatic breast cancer and her recent radiation, surgery has been canceled.  We discussed that at this time treatment for metastatic breast cancer would take precedence over the other surgery as the other surgery would require her to be off chemotherapy for several weeks and in that case the chances of cancer progression would be high and I would not recommend that.    Multiple sclerosis.  She will continue to follow with her neurologist Dr. Quiles.  Currently multiple sclerosis is under control and currently she is not taking any medications.      Return to clinic in 3 weeks       All of her questions were answered to her satisfaction.  She is agreeable and comfortable with the plan.    Dewayne Zepeda MD    Video start time. 10:06 AM  Video stop time.10:16 AM          Again, thank you for allowing me to participate in the care of your patient.        Sincerely,        Dewayne Zepeda MD

## 2023-10-03 NOTE — ED NOTES
Pt to er. Pt states he felt a pop and noticed his j-tube coming out a little. States just wants to make sure it is in the right place.       Ana Leal RN  10/03/23 [id number]

## 2023-10-03 NOTE — ED PROVIDER NOTES
Transfer of Care Note:   Physician Signing out: Dr. Vero Farris  Receiving Physician: Agnieszka Erickson MD  Sign out time: 36      Brief history:  71-year-old male, dislodged jejunostomy tube    Items pending that need to be checked:  Contrast study      Expected disposition of patient:  Pending results, discharged. Additional Assessment and results:   I have personally performed a face to face diagnostic evaluation on this patient. The patient's initial evaluation and plan have been discussed with the prior physician who initially evaluated the patient. Nursing Notes, Past Medical Hx, Past Surgical Hx, Social Hx, Allergies, vital signs and Family Hx were all reviewed. Vitals:    10/03/23 1540   BP: 118/77   Pulse: 74   Resp: 16   Temp:    SpO2: 97%         Labs Reviewed - No data to display      Medications   lidocaine (XYLOCAINE) 2 % uro-jet (has no administration in time range)   diatrizoate meglumine-sodium (GASTROGRAFIN) 66-10 % solution 30 mL (30 mLs Per J Tube Given 10/3/23 1612)         XR INJ CONTRAST GASTRIC TUBE PERC   Final Result       1. The jejunostomy tube appears be in satisfactory position along the right side of the abdomen. .               **This report has been created using voice recognition software. It may contain minor errors which are inherent in voice recognition technology. **      Final report electronically signed by DR Selwyn Lucas on 10/3/2023 4:27 PM            ED Course as of 10/03/23 1630   Tue Oct 03, 2023   1630 XR INJ CONTRAST GASTRIC TUBE PERC  IMPRESSION:     1. The jejunostomy tube appears be in satisfactory position along the right side of the abdomen. . [TM]      ED Course User Index  [TM] Agnieszka Erickson MD         Further MDM and disposition:   Assessment:   Dislodged J-tube. Plan:   Successfully replaced. .    Final diagnoses:   Dislodged jejunostomy tube     New Prescriptions    No medications on file         Condition: condition: stable  Dispo: Discharge to

## 2023-10-10 ENCOUNTER — HOSPITAL ENCOUNTER (OUTPATIENT)
Dept: INFUSION THERAPY | Age: 63
Discharge: HOME | End: 2023-10-10
Payer: MEDICAID

## 2023-10-10 ENCOUNTER — HOSPITAL ENCOUNTER (OUTPATIENT)
Dept: CT IMAGING | Age: 63
Discharge: HOME | End: 2023-10-10
Attending: INTERNAL MEDICINE
Payer: MEDICAID

## 2023-10-10 VITALS
OXYGEN SATURATION: 97 % | RESPIRATION RATE: 16 BRPM | HEART RATE: 69 BPM | DIASTOLIC BLOOD PRESSURE: 67 MMHG | WEIGHT: 157.5 LBS | HEIGHT: 67 IN | TEMPERATURE: 98 F | SYSTOLIC BLOOD PRESSURE: 100 MMHG | BODY MASS INDEX: 24.72 KG/M2

## 2023-10-10 DIAGNOSIS — C15.5 CANCER OF DISTAL THIRD OF ESOPHAGUS (HCC): ICD-10-CM

## 2023-10-10 DIAGNOSIS — C15.5 CANCER OF DISTAL THIRD OF ESOPHAGUS (HCC): Primary | ICD-10-CM

## 2023-10-10 LAB
ABSOLUTE IMMATURE GRANULOCYTE: 0 THOU/MM3 (ref 0–0.07)
ALBUMIN SERPL BCG-MCNC: 4.1 G/DL (ref 3.5–5.1)
ALP SERPL-CCNC: 113 U/L (ref 38–126)
ALT SERPL W/O P-5'-P-CCNC: 21 U/L (ref 11–66)
AST SERPL-CCNC: 23 U/L (ref 5–40)
BASOPHILS ABSOLUTE: 0 THOU/MM3 (ref 0–0.1)
BASOPHILS NFR BLD AUTO: 0 % (ref 0–3)
BILIRUB CONJ SERPL-MCNC: < 0.2 MG/DL (ref 0–0.3)
BILIRUB SERPL-MCNC: 0.4 MG/DL (ref 0.3–1.2)
BUN BLDP-MCNC: 11 MG/DL (ref 8–26)
CHLORIDE BLD-SCNC: 107 MEQ/L (ref 98–109)
CREAT BLD-MCNC: 0.5 MG/DL (ref 0.5–1.2)
EOSINOPHIL NFR BLD AUTO: 2 % (ref 0–4)
EOSINOPHILS ABSOLUTE: 0.1 THOU/MM3 (ref 0–0.4)
ERYTHROCYTE [DISTWIDTH] IN BLOOD BY AUTOMATED COUNT: 13.4 % (ref 11.5–14.5)
GFR SERPL CREATININE-BSD FRML MDRD: > 60 ML/MIN/1.73M2
GLUCOSE BLD-MCNC: 87 MG/DL (ref 70–108)
HCT VFR BLD AUTO: 39.2 % (ref 42–52)
HGB BLD-MCNC: 13.2 GM/DL (ref 14–18)
IMMATURE GRANULOCYTES: 0 %
IONIZED CALCIUM, WHOLE BLOOD: 1.22 MMOL/L (ref 1.12–1.32)
LYMPHOCYTES ABSOLUTE: 0.7 THOU/MM3 (ref 1–4.8)
LYMPHOCYTES NFR BLD AUTO: 14 % (ref 15–47)
MCH RBC QN AUTO: 30.4 PG (ref 26–33)
MCHC RBC AUTO-ENTMCNC: 33.7 GM/DL (ref 32.2–35.5)
MCV RBC AUTO: 90 FL (ref 80–94)
MONOCYTES ABSOLUTE: 0.4 THOU/MM3 (ref 0.4–1.3)
MONOCYTES NFR BLD AUTO: 9 % (ref 0–12)
NEUTROPHILS NFR BLD AUTO: 75 % (ref 43–75)
PLATELET # BLD AUTO: 118 THOU/MM3 (ref 130–400)
PMV BLD AUTO: 8.9 FL (ref 9.4–12.4)
POTASSIUM BLD-SCNC: 4 MEQ/L (ref 3.5–4.9)
PROT SERPL-MCNC: 6.8 G/DL (ref 6.1–8)
RBC # BLD AUTO: 4.34 MILL/MM3 (ref 4.7–6.1)
SEGMENTED NEUTROPHILS ABSOLUTE COUNT: 3.5 THOU/MM3 (ref 1.8–7.7)
SODIUM BLD-SCNC: 139 MEQ/L (ref 138–146)
TOTAL CO2, WHOLE BLOOD: 27 MEQ/L (ref 23–33)
WBC # BLD AUTO: 4.7 THOU/MM3 (ref 4.8–10.8)

## 2023-10-10 PROCEDURE — 71260 CT THORAX DX C+: CPT

## 2023-10-10 PROCEDURE — 6360000002 HC RX W HCPCS: Performed by: RADIOLOGY

## 2023-10-10 PROCEDURE — 80047 BASIC METABLC PNL IONIZED CA: CPT

## 2023-10-10 PROCEDURE — 74177 CT ABD & PELVIS W/CONTRAST: CPT

## 2023-10-10 PROCEDURE — 36415 COLL VENOUS BLD VENIPUNCTURE: CPT

## 2023-10-10 PROCEDURE — 6360000004 HC RX CONTRAST MEDICATION: Performed by: INTERNAL MEDICINE

## 2023-10-10 PROCEDURE — 2580000003 HC RX 258: Performed by: INTERNAL MEDICINE

## 2023-10-10 PROCEDURE — 85025 COMPLETE CBC W/AUTO DIFF WBC: CPT

## 2023-10-10 PROCEDURE — 80076 HEPATIC FUNCTION PANEL: CPT

## 2023-10-10 PROCEDURE — 36591 DRAW BLOOD OFF VENOUS DEVICE: CPT

## 2023-10-10 RX ORDER — HEPARIN 100 UNIT/ML
500 SYRINGE INTRAVENOUS PRN
OUTPATIENT
Start: 2023-10-10

## 2023-10-10 RX ORDER — SODIUM CHLORIDE 9 MG/ML
25 INJECTION, SOLUTION INTRAVENOUS PRN
OUTPATIENT
Start: 2023-10-10

## 2023-10-10 RX ORDER — HEPARIN 100 UNIT/ML
500 SYRINGE INTRAVENOUS PRN
Status: DISCONTINUED | OUTPATIENT
Start: 2023-10-10 | End: 2023-10-11 | Stop reason: HOSPADM

## 2023-10-10 RX ORDER — SODIUM CHLORIDE 0.9 % (FLUSH) 0.9 %
5-40 SYRINGE (ML) INJECTION PRN
Status: DISCONTINUED | OUTPATIENT
Start: 2023-10-10 | End: 2023-10-11 | Stop reason: HOSPADM

## 2023-10-10 RX ORDER — HEPARIN SODIUM (PORCINE) LOCK FLUSH IV SOLN 100 UNIT/ML 100 UNIT/ML
500 SOLUTION INTRAVENOUS ONCE
Status: COMPLETED | OUTPATIENT
Start: 2023-10-10 | End: 2023-10-10

## 2023-10-10 RX ORDER — SODIUM CHLORIDE 0.9 % (FLUSH) 0.9 %
5-40 SYRINGE (ML) INJECTION PRN
OUTPATIENT
Start: 2023-10-10

## 2023-10-10 RX ORDER — WATER 1000 ML/1000ML
2.2 INJECTION, SOLUTION INTRAVENOUS ONCE
OUTPATIENT
Start: 2023-10-10 | End: 2023-10-10

## 2023-10-10 RX ADMIN — SODIUM CHLORIDE, PRESERVATIVE FREE 10 ML: 5 INJECTION INTRAVENOUS at 10:47

## 2023-10-10 RX ADMIN — IOPAMIDOL 85 ML: 755 INJECTION, SOLUTION INTRAVENOUS at 11:46

## 2023-10-10 RX ADMIN — SODIUM CHLORIDE, PRESERVATIVE FREE 20 ML: 5 INJECTION INTRAVENOUS at 10:48

## 2023-10-10 RX ADMIN — HEPARIN 500 UNITS: 100 SYRINGE at 11:50

## 2023-10-10 NOTE — PLAN OF CARE
Problem: Infection - Adult  Goal: Absence of infection at discharge  Outcome: Progressing  Flowsheets (Taken 10/10/2023 1111)  Absence of infection at discharge: Assess and monitor for signs and symptoms of infection  Note: Mediport site with no redness or warmth. Skin over port site intact with no signs of breakdown noted. Patient verbalizes signs/symptoms of port infection and when to notify the physician. Problem: Discharge Planning  Goal: Discharge to home or other facility with appropriate resources  Outcome: Progressing  Flowsheets (Taken 10/10/2023 1111)  Discharge to home or other facility with appropriate resources: Identify barriers to discharge with patient and caregiver  Note: Verbalize understanding of discharge instructions, follow up appointments, and when to call Physician. Problem: Safety - Adult  Goal: Free from fall injury  Outcome: Progressing  Flowsheets (Taken 10/10/2023 1111)  Free From Fall Injury: Instruct family/caregiver on patient safety  Note: No falls occurred with visit today. Care plan reviewed with patient. Patient verbalizes understanding of the plan of care and contributes to goal setting.

## 2023-10-10 NOTE — PROGRESS NOTES
Patient tolerated  lab work from [facility] without any complications. Discharge instructions given to patient-verbalizes understanding. Ambulated off unit per self with belongings.

## 2023-10-16 NOTE — PROGRESS NOTES
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 music family,    Fam HX:   Family History   Problem Relation Age of Onset    Colon Cancer Mother     Heart Disease Father     Cancer Brother         lung        Hospitalizations:   2/10/22    Allergies: Allergies   Allergen Reactions    Paclitaxel Other (See Comments)     Severe lower back pain- able to resume after medications given    Aleve [Naproxen] Hives    Promethazine Rash        Adult Illness:  Patient Active Problem List   Diagnosis    Abdominal pain    Cancer of distal third of esophagus (HCC)    Acute postoperative pain    Chronic anemia    Primary cancer of esophagus with metastasis to other site [facility])    GERD (gastroesophageal reflux disease)    HLD (hyperlipidemia)    Obesity (BMI 30.0-34. 9)    Postoperative atrial fibrillation (HCC)    Thrombocytopenia (HCC)    Brain lesion    Delayed gastric emptying    Serum creatinine raised    Malnutrition ([address])    Benign hypertension    Encounter for insertion of venous access port    Failure to thrive in adult    Severe malnutrition ([address])    Gastrostomy tube dysfunction ([address])    Malfunctioning jejunostomy tube (HCC)    MARTÍN (obstructive sleep apnea)

## 2023-10-17 ENCOUNTER — HOSPITAL ENCOUNTER (OUTPATIENT)
Dept: INFUSION THERAPY | Age: 63
Discharge: HOME | End: 2023-10-17
Payer: MEDICAID

## 2023-10-17 ENCOUNTER — OFFICE VISIT (OUTPATIENT)
Dept: ONCOLOGY | Age: 63
End: 2023-10-17
Payer: MEDICAID

## 2023-10-17 VITALS
RESPIRATION RATE: 16 BRPM | HEART RATE: 96 BPM | TEMPERATURE: 97.7 F | OXYGEN SATURATION: 97 % | DIASTOLIC BLOOD PRESSURE: 64 MMHG | SYSTOLIC BLOOD PRESSURE: 93 MMHG

## 2023-10-17 VITALS
WEIGHT: 154 LBS | OXYGEN SATURATION: 97 % | HEART RATE: 96 BPM | BODY MASS INDEX: 24.12 KG/M2 | DIASTOLIC BLOOD PRESSURE: 64 MMHG | TEMPERATURE: 97.7 F | SYSTOLIC BLOOD PRESSURE: 93 MMHG | RESPIRATION RATE: 16 BRPM

## 2023-10-17 DIAGNOSIS — C79.9 METASTASIS FROM ESOPHAGEAL CANCER (HCC): Primary | ICD-10-CM

## 2023-10-17 DIAGNOSIS — C15.9 METASTASIS FROM ESOPHAGEAL CANCER (HCC): Primary | ICD-10-CM

## 2023-10-17 PROCEDURE — 99211 OFF/OP EST MAY X REQ PHY/QHP: CPT

## 2023-10-17 PROCEDURE — 99214 OFFICE O/P EST MOD 30 MIN: CPT | Performed by: INTERNAL MEDICINE

## 2023-10-17 PROCEDURE — 3078F DIAST BP <80 MM HG: CPT | Performed by: INTERNAL MEDICINE

## 2023-10-17 PROCEDURE — 3074F SYST BP LT 130 MM HG: CPT | Performed by: INTERNAL MEDICINE

## 2023-10-17 NOTE — PATIENT INSTRUCTIONS
Brain MRI with and without contrast please do the week of November 7  Follow-up with me 1 week later November 14.

## 2023-10-24 RX ORDER — PANTOPRAZOLE SODIUM 40 MG/1
40 TABLET, DELAYED RELEASE ORAL 2 TIMES DAILY
Qty: 180 TABLET | Refills: 1 | Status: SHIPPED | OUTPATIENT
Start: 2023-10-24

## 2023-10-27 ENCOUNTER — CLINICAL DOCUMENTATION (OUTPATIENT)
Facility: HOSPITAL | Age: 63
End: 2023-10-27

## 2023-10-27 ENCOUNTER — CLINICAL DOCUMENTATION (OUTPATIENT)
Dept: NUTRITION | Age: 63
End: 2023-10-27

## 2023-10-27 NOTE — PROGRESS NOTES
Received fax for signoff on TF change. Discussed with Omaira Butts who provides sheet on enteral formulary substitutions. Prior dietician note recommends Vital AF 1.2 TF, which is now out of stock. Per Dietician and sheet recommendations provided, Peptamen AF is substitute for Vital AF 1.2. Faxed signed form back to [facility].       Electronically signed by BHARTI Hall CNP on 10/27/2023 at 10:12 AM

## 2023-10-27 NOTE — PROGRESS NOTES
Patient Assistance                   Additional notes: Reviewed chart and no assistance is needed. Patient has Medicaid.

## 2023-10-27 NOTE — PROGRESS NOTES
Nutrition Assessment     Reason for Call:   10/27/23 - follow-up and need for formula substitution (Peptamen 1.5) due to shortage of current formula (Vital 1.5)  8/23/23 - follow-up, change in EN rate  7/5/23 - Received call from patient wife Feli Jimenez), EN rate change  5/10/23 - esophageal cancer s/p esophagectomy (2022), brain mets s/p craniotomy, current with treatment, J-tube placed 5/1/23. Nutrition Recommendations:   -Current EN formula of Vital 1.2 is currently not available - Substitute Peptamen 1.5 at 60ml/hr x 24 hours per day = 1440ml formula volume, 2160 kcals, 271 grams CHO, 98 grams protein, 1075ml water (Vital 1.5 at 60ml/hrx 24 hours = 2160 kcals, 97 grams protein, 2469 grams CHO, 1100 ml water in formula.   -Recommend an additional 900ml water via flushes per day  -PO ad corrina for pleasure     Malnutrition Assessment: (4/27/23 - per inpatient RD)  Malnutrition Status: severe malnutrition  Context: chronic illness  Findings of the 6 clinical characteristics of malnutrition (minimum of 2 out of 6 clinical characteristics is required to make the dx of moderate or severe Protein Calorie Malnutrition based on AND/ASPEN Guidelines):              1. Energy Intake: 75% of less estimated energy requirements for 1 month or longer              2. Weight Loss: 32% over 10 months              3. Fat Loss: mild body fat loss orbital              4. Muscle Loss: mild muscle mass loss temples (temporalis), clavicles (pectoralis & deltoids)              5. Fluid Accumulation: no significant fluid accumulation              6.  Strength: not measured     Nutrition Diagnosis:   Problem: severe malnutrition in the context of chronic illness  Etiology: catabolic illness, inadequate oral intake  Signs and Symptoms: unintentional significant weight loss, taste changes, mild fat and muscle loss, brain mets affecting cognition per wife     Nutrition Assessment:   History: esophageal cancer with brain mets, HTN, GERD,

## 2023-11-07 ENCOUNTER — HOSPITAL ENCOUNTER (OUTPATIENT)
Dept: MRI IMAGING | Age: 63
Discharge: HOME | End: 2023-11-07
Attending: INTERNAL MEDICINE
Payer: MEDICAID

## 2023-11-07 DIAGNOSIS — C15.9 METASTASIS FROM ESOPHAGEAL CANCER (HCC): ICD-10-CM

## 2023-11-07 DIAGNOSIS — C79.9 METASTASIS FROM ESOPHAGEAL CANCER (HCC): ICD-10-CM

## 2023-11-07 PROCEDURE — 6360000002 HC RX W HCPCS: Performed by: RADIOLOGY

## 2023-11-07 RX ORDER — HEPARIN SODIUM (PORCINE) LOCK FLUSH IV SOLN 100 UNIT/ML 100 UNIT/ML
500 SOLUTION INTRAVENOUS ONCE
Status: COMPLETED | OUTPATIENT
Start: 2023-11-07 | End: 2023-11-07

## 2023-11-07 RX ADMIN — HEPARIN 500 UNITS: 100 SYRINGE at 10:55

## 2023-11-10 ENCOUNTER — HOSPITAL ENCOUNTER (OUTPATIENT)
Dept: MRI IMAGING | Age: 63
Discharge: HOME | End: 2023-11-10
Attending: INTERNAL MEDICINE
Payer: MEDICAID

## 2023-11-10 PROCEDURE — 6360000004 HC RX CONTRAST MEDICATION: Performed by: INTERNAL MEDICINE

## 2023-11-10 PROCEDURE — 70553 MRI BRAIN STEM W/O & W/DYE: CPT

## 2023-11-10 PROCEDURE — A9579 GAD-BASE MR CONTRAST NOS,1ML: HCPCS | Performed by: INTERNAL MEDICINE

## 2023-11-10 RX ADMIN — GADOTERIDOL 15 ML: 279.3 INJECTION, SOLUTION INTRAVENOUS at 07:03

## 2023-11-13 ENCOUNTER — PATIENT MESSAGE (OUTPATIENT)
Dept: FAMILY MEDICINE CLINIC | Age: 63
End: 2023-11-13

## 2023-11-13 RX ORDER — SILDENAFIL 100 MG/1
100 TABLET, FILM COATED ORAL PRN
Qty: 30 TABLET | Refills: 1 | Status: SHIPPED | OUTPATIENT
Start: 2023-11-13

## 2023-11-17 ENCOUNTER — HOSPITAL ENCOUNTER (OUTPATIENT)
Dept: RADIATION ONCOLOGY | Age: 63
Discharge: HOME | End: 2023-11-17
Payer: MEDICAID

## 2023-11-17 VITALS
HEART RATE: 71 BPM | OXYGEN SATURATION: 96 % | TEMPERATURE: 98 F | WEIGHT: 150 LBS | DIASTOLIC BLOOD PRESSURE: 74 MMHG | BODY MASS INDEX: 24.21 KG/M2 | SYSTOLIC BLOOD PRESSURE: 107 MMHG | RESPIRATION RATE: 16 BRPM

## 2023-11-17 DIAGNOSIS — C15.5 CANCER OF DISTAL THIRD OF ESOPHAGUS (HCC): Primary | ICD-10-CM

## 2023-11-17 PROCEDURE — 99213 OFFICE O/P EST LOW 20 MIN: CPT

## 2023-11-17 PROCEDURE — 99212 OFFICE O/P EST SF 10 MIN: CPT

## 2023-11-17 ASSESSMENT — ENCOUNTER SYMPTOMS
RECTAL PAIN: 0
BLOOD IN STOOL: 0
TROUBLE SWALLOWING: 0
SORE THROAT: 0
ABDOMINAL PAIN: 0
COUGH: 0
NAUSEA: 1
FACIAL SWELLING: 0
SHORTNESS OF BREATH: 0
VOMITING: 1

## 2023-11-17 NOTE — PROGRESS NOTES
[facility]           [id number] [place] [address], [address] [place] [address] Deny Castillo: [phone]        F: [phone]       [email or web address]            FOLLOW UP NOTE    Date of Service: 2023  Patient ID: Patricia Chisholm   : 1960  MRN: [id number]   Acct Number: [de-identified]       DATE OF SERVICE: 2023   LOCATION: [facility]  PROVIDER: Amara Alcaraz PA-C    FOLLOW UP PHYSICIANS:  Dr. Howard Tabor ([facility]) Angelita JoseC    ASSESSMENT AND PLAN:   Cancer Staging   Cancer of distal third of esophagus [facility])  Staging form: Esophagus - Adenocarcinoma, AJCC 8th Edition  - Clinical stage from 3/23/2022: Stage IVB (cT2, cN2, cM1, G3) - Signed by Bethany Rae MD on 2022    - Patricia Chisholm is a 59 y.o. 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 He completed whole brain radiation therapy  - The patient appears to be doing well overall. He does report some subtle confusion with time but denies other new notable neurologic symptoms  - Recent MRI brain completed on 11/10/23 resulted as: improvement in brain metastases, apart from 2 small lesions which appear new. Reviewed with Dr. Rafael Arroyo who advised continued surveillance for now  - Continue to follow with medical oncology and all other medical providers  - No concerning findings on neurologic today  - We will order MRI brain (indy protocol) to be completed within 3 months  - I advised the patient to call with any new/worsening symptoms that may arise before their return visit; the patient voiced understanding and agreement    Assessment and plan formulated in conjunction with / under the guidance of Dr. Felicia Diallo MD, radiation oncologist.    The patient will return to clinic in 3 months, after MRI brain, or sooner if clinically indicated.  They have our clinic number to call with any questions

## 2023-11-27 DIAGNOSIS — C15.9 METASTASIS FROM ESOPHAGEAL CANCER (HCC): Primary | ICD-10-CM

## 2023-11-27 DIAGNOSIS — C79.9 METASTASIS FROM ESOPHAGEAL CANCER (HCC): Primary | ICD-10-CM

## 2023-12-04 NOTE — PROGRESS NOTES
Amplified. ___________________________________________________________    8/30/2022 [facility] esophagectomy pathology-pending    Nov 2022- Craniotomy [facility]- Brain mets- REQUESTED    GENETICS:  HER2 amplified. On original esoph bx. MOLECULAR:  Requested by phone conversation on 11/21/22 that Dr Edmar Espino order Shana Byers and PDFL-1 on the recent Brain bx. NEED REPORT  1/18/23 placed order for foundation 1 and PD-L1 to be done on the November brain biopsy at [facility] that showed metastatic adenocarcinoma. PD-L1 CPS 2 - 3%. Positive  Tumor mutation burden 17, MECHELLE, no specific targetable mutations. ASSESSMENT/PLAN:    1: Diagnosis: 67-year-old gentleman with adenocarcinoma of the distal third of the esophagus. Clinical stage T2N2 by EUS and PET scan confirmed. No distant mets. No significant dysphagia. Presented with chest discomfort. Chronic history of GERD. Performance status is excellent. Seen by Dr. Acacia Akers thoracic surgery at [facility] and status post neoadjuvant chemoradiation followed by recent esophagectomy on 8/30/2022. Essentially a near Path CR. On 10/31/2022 found to have extensive Brain mets. PET 12/8/22- No avid sites     2) Prognosis / Disease Status: High risk node positive disease T2N2 clinical stage, locally advanced. HER2 amplified which now has bearing with developing  metastatic disease. Brain only. 3) Work-up:    Labs: CBC, CMP. Next labs to be drawn 1/9/2024 on the day of his next CT   imaging:  PET/CT 6/20/2023-no FDG avid malignancy  3-month interval for brain MRI therefore scheduled for the week of July 27/23-continued interval improvement. July echocardiogram.-Unremarkable, see above. CT chest abdomen pelvis with contrast 10/10/2023-no evidence of recurrence or progression. Next CT chest abdomen pelvis with contrast to be done 1/9/2024  Brain MRI-11/10/2023 mixed response with some lesions smaller and 2 new lesions measuring only a couple millimeters.   Radiation oncology aware and plan

## 2023-12-05 ENCOUNTER — OFFICE VISIT (OUTPATIENT)
Dept: ONCOLOGY | Age: 63
End: 2023-12-05
Payer: MEDICAID

## 2023-12-05 ENCOUNTER — HOSPITAL ENCOUNTER (OUTPATIENT)
Dept: INFUSION THERAPY | Age: 63
Discharge: HOME | End: 2023-12-05
Payer: MEDICAID

## 2023-12-05 VITALS
HEIGHT: 67 IN | WEIGHT: 150.4 LBS | RESPIRATION RATE: 16 BRPM | TEMPERATURE: 98.6 F | HEART RATE: 83 BPM | BODY MASS INDEX: 23.61 KG/M2 | SYSTOLIC BLOOD PRESSURE: 99 MMHG | DIASTOLIC BLOOD PRESSURE: 70 MMHG | OXYGEN SATURATION: 96 %

## 2023-12-05 VITALS
HEIGHT: 67 IN | SYSTOLIC BLOOD PRESSURE: 99 MMHG | TEMPERATURE: 98.6 F | DIASTOLIC BLOOD PRESSURE: 70 MMHG | BODY MASS INDEX: 23.61 KG/M2 | OXYGEN SATURATION: 96 % | HEART RATE: 83 BPM | WEIGHT: 150.4 LBS | RESPIRATION RATE: 16 BRPM

## 2023-12-05 DIAGNOSIS — C15.5 CANCER OF DISTAL THIRD OF ESOPHAGUS (HCC): Primary | ICD-10-CM

## 2023-12-05 PROCEDURE — 99213 OFFICE O/P EST LOW 20 MIN: CPT

## 2023-12-05 PROCEDURE — 2580000003 HC RX 258: Performed by: INTERNAL MEDICINE

## 2023-12-05 PROCEDURE — 3074F SYST BP LT 130 MM HG: CPT | Performed by: INTERNAL MEDICINE

## 2023-12-05 PROCEDURE — 99214 OFFICE O/P EST MOD 30 MIN: CPT | Performed by: INTERNAL MEDICINE

## 2023-12-05 PROCEDURE — 6360000002 HC RX W HCPCS: Performed by: INTERNAL MEDICINE

## 2023-12-05 PROCEDURE — 3078F DIAST BP <80 MM HG: CPT | Performed by: INTERNAL MEDICINE

## 2023-12-05 RX ORDER — SODIUM CHLORIDE 9 MG/ML
25 INJECTION, SOLUTION INTRAVENOUS PRN
OUTPATIENT
Start: 2023-12-05

## 2023-12-05 RX ORDER — WATER 10 ML/10ML
2.2 INJECTION INTRAMUSCULAR; INTRAVENOUS; SUBCUTANEOUS ONCE
OUTPATIENT
Start: 2023-12-05 | End: 2023-12-05

## 2023-12-05 RX ORDER — HEPARIN 100 UNIT/ML
500 SYRINGE INTRAVENOUS PRN
Status: DISCONTINUED | OUTPATIENT
Start: 2023-12-05 | End: 2023-12-06 | Stop reason: HOSPADM

## 2023-12-05 RX ORDER — SODIUM CHLORIDE 0.9 % (FLUSH) 0.9 %
5-40 SYRINGE (ML) INJECTION PRN
OUTPATIENT
Start: 2023-12-05

## 2023-12-05 RX ORDER — SODIUM CHLORIDE 0.9 % (FLUSH) 0.9 %
5-40 SYRINGE (ML) INJECTION PRN
Status: DISCONTINUED | OUTPATIENT
Start: 2023-12-05 | End: 2023-12-06 | Stop reason: HOSPADM

## 2023-12-05 RX ORDER — HEPARIN 100 UNIT/ML
500 SYRINGE INTRAVENOUS PRN
OUTPATIENT
Start: 2023-12-05

## 2023-12-05 RX ORDER — DEXAMETHASONE 2 MG/1
TABLET ORAL
Qty: 15 TABLET | Refills: 1 | Status: SHIPPED | OUTPATIENT
Start: 2023-12-05

## 2023-12-05 RX ADMIN — HEPARIN 500 UNITS: 100 SYRINGE at 09:17

## 2023-12-05 RX ADMIN — SODIUM CHLORIDE, PRESERVATIVE FREE 20 ML: 5 INJECTION INTRAVENOUS at 09:16

## 2023-12-05 RX ADMIN — SODIUM CHLORIDE, PRESERVATIVE FREE 10 ML: 5 INJECTION INTRAVENOUS at 09:17

## 2023-12-05 NOTE — PATIENT INSTRUCTIONS
Port flush today. No labs needed today.   1/9/2024 please schedule CBC, CMP and CT chest chest abdomen pelvis with contrast.  Next brain MRI to be done in February to be ordered by radiation oncology  Follow-up with me 1/16/2024  Start Decadron 1 mg p.o. twice daily

## 2023-12-05 NOTE — PLAN OF CARE
Problem: Infection - Adult  Goal: Absence of infection at discharge  Flowsheets (Taken 12/5/2023 1256)  Absence of infection at discharge:   Assess and monitor for signs and symptoms of infection   Monitor lab/diagnostic results   Monitor all insertion sites i.e., indwelling lines, tubes and drains  Note: Mediport site with no redness or warmth. Skin over port site intact with no signs of breakdown noted. Patient verbalizes signs/symptoms of port infection and when to notify the physician. Goal: Will show no infection signs and symptoms  Description: Will show no infection signs and symptoms  Note: Discuss port maintenance,infection prevention, sign of infection and when to call MD.     Problem: Discharge Planning  Goal: Discharge to home or other facility with appropriate resources  Flowsheets (Taken 12/5/2023 1256)  Discharge to home or other facility with appropriate resources:   Identify barriers to discharge with patient and caregiver   Arrange for needed discharge resources and transportation as appropriate   Identify discharge learning needs (meds, wound care, etc)  Note: Verbalize understanding of discharge instructions, follow up appointments, and when to call Physician. Problem: Safety - Adult  Goal: Free from fall injury  Outcome: Adequate for Discharge  Flowsheets (Taken 12/5/2023 1256)  Free From Fall Injury:   Yuliana Lloyd family/caregiver on patient safety   Based on caregiver fall risk screen, instruct family/caregiver to ask for assistance with transferring infant if caregiver noted to have fall risk factors  Note: Free from falls while in O.P. Oncology. Care plan reviewed with patient and family member. Patient and family member verbalize understanding of the plan of care and contribute to goal setting.

## 2023-12-05 NOTE — DISCHARGE INSTRUCTIONS
Patient tolerated port flush without any complications. Patient verbalized understanding of discharge instructions. Ambulated off unit per self with belongings. Port flush today. No labs needed today.   1/9/2024 please schedule CBC, CMP and CT chest chest abdomen pelvis with contrast.  Next brain MRI to be done in February to be ordered by radiation oncology  Follow-up with me 1/16/2024  Start Decadron 1 mg p.o. twice daily

## 2024-01-03 ENCOUNTER — CLINICAL DOCUMENTATION (OUTPATIENT)
Dept: NUTRITION | Age: 64
End: 2024-01-03

## 2024-01-03 NOTE — PROGRESS NOTES
Nutrition Assessment     Reason for Call:   1/3/24 - EN follow-up  (esophageal cancer s/p esophagectomy (2022), brain mets s/p craniotomy, current with treatment, J-tube placed 5/1/23).     Nutrition Recommendations:   -Peptamen 1.5 at 60ml/hr x 24 hours per day = 1440ml formula volume, 2160 kcals, 271 grams CHO, 98 grams protein, 1075ml water (Vital 1.5 at 60ml/hrx 24 hours = 2160 kcals, 97 grams protein, 2469 grams CHO, 1100 ml water in formula.  -Recommend an additional 900ml water via flushes per day  -PO ad corrina for pleasure     Malnutrition Assessment: (4/27/23 - per inpatient RD)  Malnutrition Status: severe malnutrition  Context: chronic illness  Findings of the 6 clinical characteristics of malnutrition (minimum of 2 out of 6 clinical characteristics is required to make the dx of moderate or severe Protein Calorie Malnutrition based on AND/ASPEN Guidelines):              1. Energy Intake: 75% of less estimated energy requirements for 1 month or longer              2. Weight Loss: 32% over 10 months              3. Fat Loss: mild body fat loss orbital              4. Muscle Loss: mild muscle mass loss temples (temporalis), clavicles (pectoralis & deltoids)              5. Fluid Accumulation: no significant fluid accumulation              6.  Strength: not measured     Nutrition Diagnosis:   Problem: severe malnutrition in the context of chronic illness  Etiology: catabolic illness, inadequate oral intake  Signs and Symptoms: unintentional significant weight loss, taste changes, mild fat and muscle loss, brain mets affecting cognition per wife     Nutrition Assessment:   History: esophageal cancer with brain mets, HTN, GERD, HLD  Subjective:   1/3/24 - Spoke to patient's wife (Jocelyne) today via phone. Jocelyne admits that patient had gained some weight so she decrease the run time of his EN. Jocelyne says that patient lost weight and is now down to 145#. In response to patient's weight loss Jocelyne has increased

## 2024-01-04 ENCOUNTER — TELEMEDICINE (OUTPATIENT)
Dept: FAMILY MEDICINE CLINIC | Age: 64
End: 2024-01-04
Payer: MEDICAID

## 2024-01-04 ENCOUNTER — TELEPHONE (OUTPATIENT)
Dept: FAMILY MEDICINE CLINIC | Age: 64
End: 2024-01-04

## 2024-01-04 DIAGNOSIS — Z20.822 CLOSE EXPOSURE TO COVID-19 VIRUS: Primary | ICD-10-CM

## 2024-01-04 PROCEDURE — 99213 OFFICE O/P EST LOW 20 MIN: CPT | Performed by: FAMILY MEDICINE

## 2024-01-04 ASSESSMENT — ENCOUNTER SYMPTOMS
GASTROINTESTINAL NEGATIVE: 1
COUGH: 1

## 2024-01-04 NOTE — TELEPHONE ENCOUNTER
The patients wife was in today and tested positive for COVID.  She states the patient is now having S/S of cough and congestion and didn't know if he should be put on something.  They uses [address]

## 2024-01-04 NOTE — PROGRESS NOTES
Niranjan Yañez (:  1960) is a Established patient, here for evaluation of the following:    Subjective   HPI:   Chief Complaint   Patient presents with    Concern For COVID-19     See message below:    The patients wife was in today and tested positive for COVID.  She states the patient is now having S/S of cough and congestion and didn't know if he should be put on something.     Pt denies feeling sick.  Occasional cough but this is not new.    No fevers.    Patient Active Problem List   Diagnosis    Abdominal pain    Cancer of distal third of esophagus (HCC)    Acute postoperative pain    Chronic anemia    Primary cancer of esophagus with metastasis to other site (HCC)    GERD (gastroesophageal reflux disease)    HLD (hyperlipidemia)    Obesity (BMI 30.0-34.9)    Postoperative atrial fibrillation (HCC)    Thrombocytopenia (HCC)    Brain lesion    Delayed gastric emptying    Serum creatinine raised    Malnutrition (HCC)    Benign hypertension    Encounter for insertion of venous access port    Failure to thrive in adult    Severe malnutrition (HCC)    Gastrostomy tube dysfunction (HCC)    Malfunctioning jejunostomy tube (HCC)    MARTÍN (obstructive sleep apnea)    Dislodged jejunostomy tube     Past Surgical History:   Procedure Laterality Date    ABDOMEN SURGERY  2022    GASTRIC DEVASCULARIZATON-[facility]    ABDOMEN SURGERY  2022    ESOPHAGOGASTRODUODENOSCOPY TRANSORAL DIAGNOSTIC ESOPHAGECTOMY W/ THORACOTOMY-[facility]    DENTAL SURGERY      25 teeth removed    PORT SURGERY Left 2022    LEFT SINGLE LUMEN MEDIPORT performed by Konstantin Melara MD at [facility] OR    STOMACH SURGERY N/A 2023    Open JEJUNOSTOMY TUBE INSERTION; repair of umbilical hernia performed by Elian Zaragoza MD at [facility] OR     Social History     Tobacco Use    Smoking status: Former     Current packs/day: 0.00     Average packs/day: 3.0 packs/day for 10.0 years (30.0 ttl pk-yrs)     Types: Cigarettes     Start date: 2003

## 2024-01-08 ENCOUNTER — TELEPHONE (OUTPATIENT)
Dept: FAMILY MEDICINE CLINIC | Age: 64
End: 2024-01-08

## 2024-01-08 NOTE — TELEPHONE ENCOUNTER
Called patient wife back and she stated that they were to call if anything changed. She stated that patient has COVID and he has had the cough but it seems to be getting worse. She stated that it sounded like death, \"death rattle\" she stated that the cough does seem to be braking up stuff, he is coughing up mucus now.     She stated that patient didn't test but he has been exposed to her and she tested positive on Thursday 1/4/24, and he has all of the symptoms.

## 2024-01-08 NOTE — TELEPHONE ENCOUNTER
----- Message from Konstantin Carpio DO sent at 1/8/2024  7:43 AM EST -----  Pt's wife left a message on [employer] this morning regarding Momo, requesting call back.

## 2024-01-17 RX ORDER — DEXAMETHASONE 2 MG/1
TABLET ORAL
Qty: 15 TABLET | Refills: 1 | Status: SHIPPED | OUTPATIENT
Start: 2024-01-17

## 2024-01-27 ENCOUNTER — HOSPITAL ENCOUNTER (OUTPATIENT)
Dept: CT IMAGING | Age: 64
Discharge: HOME | End: 2024-01-27
Attending: INTERNAL MEDICINE
Payer: MEDICAID

## 2024-01-27 DIAGNOSIS — C15.5 CANCER OF DISTAL THIRD OF ESOPHAGUS (HCC): ICD-10-CM

## 2024-01-27 LAB — POC CREATININE WHOLE BLOOD: 0.6 MG/DL (ref 0.5–1.2)

## 2024-01-27 PROCEDURE — 74177 CT ABD & PELVIS W/CONTRAST: CPT

## 2024-01-27 PROCEDURE — 6360000004 HC RX CONTRAST MEDICATION: Performed by: INTERNAL MEDICINE

## 2024-01-27 PROCEDURE — 71260 CT THORAX DX C+: CPT

## 2024-01-27 PROCEDURE — 82565 ASSAY OF CREATININE: CPT

## 2024-01-27 RX ADMIN — IOPAMIDOL 85 ML: 755 INJECTION, SOLUTION INTRAVENOUS at 08:42

## 2024-01-29 NOTE — PROGRESS NOTES
day 1 then, 4mg/kg iv q 2 weeks.   Start- 12/13/22. 1/18/23 C 3 due.  Hold because of nausea vomiting hypokalemia and neutropenia.  Seen with course 31/25/23 modified without the 5-FU bolus and leucovorin and oxaliplatin decreased 20%.  Since No metastatic disease found on 12/8/22 PT. Plan to drop folfox effective today 2/8/2023 and \" maintain \" with herceptin.  Herceptin will be every 3 weeks at 6 mg/kg.   C6 done 3/22/23 this was the last therapy..      At this time given his complications with therapy and hospitalizations etc. I think would be reasonable to hold off systemic therapy and monitor closely  High risk for relapse of course in the brain.  Monitor closely should have his next MRI in July  Options for treatment systemic relapse with most likely include going back to Herceptin based therapy since he does not feel that.  Another option would be immunotherapy          7) Medications reviewed.   Prescriptions today: None.  Patient already has Compazine and Zofran at home              No orders of the defined types were placed in this encounter.         [place]ARRS:  Controlled Substance Monitoring:    Acute and Chronic Pain Monitoring:        No data to display                   8) Research Options:   Not applicable      9) Other: 11/17/23 Cristy Mcintyre PA-C Rad Onc      - Recent MRI brain completed on 11/10/23 resulted as: improvement in brain metastases, apart from 2 small lesions which appear new. Reviewed with Dr. Hubbard who advised continued surveillance for now  - Continue to follow with medical oncology and all other medical providers  - No concerning findings on neurologic today  - We will order MRI brain (indy protocol) to be completed within 3 months  - I advised the patient to call with any new/worsening symptoms that may arise before their return visit; the patient voiced understanding and agreement     10) Follow Up:  Port flush today.  Cxbc,cmp 1/30/24 .  Next brain MRI to be done in February to

## 2024-01-30 ENCOUNTER — HOSPITAL ENCOUNTER (OUTPATIENT)
Dept: INFUSION THERAPY | Age: 64
Discharge: HOME | End: 2024-01-30
Payer: MEDICAID

## 2024-01-30 ENCOUNTER — OFFICE VISIT (OUTPATIENT)
Dept: ONCOLOGY | Age: 64
End: 2024-01-30
Payer: MEDICAID

## 2024-01-30 VITALS
OXYGEN SATURATION: 99 % | WEIGHT: 163 LBS | HEIGHT: 67 IN | SYSTOLIC BLOOD PRESSURE: 120 MMHG | DIASTOLIC BLOOD PRESSURE: 79 MMHG | TEMPERATURE: 98.1 F | RESPIRATION RATE: 16 BRPM | HEART RATE: 66 BPM | BODY MASS INDEX: 25.58 KG/M2

## 2024-01-30 VITALS
WEIGHT: 163 LBS | RESPIRATION RATE: 16 BRPM | DIASTOLIC BLOOD PRESSURE: 79 MMHG | TEMPERATURE: 98.1 F | HEART RATE: 66 BPM | BODY MASS INDEX: 25.53 KG/M2 | SYSTOLIC BLOOD PRESSURE: 120 MMHG | OXYGEN SATURATION: 99 %

## 2024-01-30 DIAGNOSIS — C15.5 CANCER OF DISTAL THIRD OF ESOPHAGUS (HCC): Primary | ICD-10-CM

## 2024-01-30 LAB
ABSOLUTE IMMATURE GRANULOCYTE: 0.24 THOU/MM3 (ref 0–0.07)
ALBUMIN SERPL BCG-MCNC: 3.9 G/DL (ref 3.5–5.1)
ALP SERPL-CCNC: 77 U/L (ref 38–126)
ALT SERPL W/O P-5'-P-CCNC: 34 U/L (ref 11–66)
AST SERPL-CCNC: 21 U/L (ref 5–40)
BASOPHILS ABSOLUTE: 0 THOU/MM3 (ref 0–0.1)
BASOPHILS NFR BLD AUTO: 0 % (ref 0–3)
BILIRUB CONJ SERPL-MCNC: < 0.2 MG/DL (ref 0–0.3)
BILIRUB SERPL-MCNC: 0.3 MG/DL (ref 0.3–1.2)
BUN BLDP-MCNC: 14 MG/DL (ref 8–26)
CHLORIDE BLD-SCNC: 109 MEQ/L (ref 98–109)
CREAT BLD-MCNC: 0.5 MG/DL (ref 0.5–1.2)
EOSINOPHIL NFR BLD AUTO: 0 % (ref 0–4)
EOSINOPHILS ABSOLUTE: 0 THOU/MM3 (ref 0–0.4)
ERYTHROCYTE [DISTWIDTH] IN BLOOD BY AUTOMATED COUNT: 14.4 % (ref 11.5–14.5)
GFR SERPL CREATININE-BSD FRML MDRD: > 60 ML/MIN/1.73M2
GGT SERPL-CCNC: 37 U/L (ref 8–69)
GLUCOSE BLD-MCNC: 75 MG/DL (ref 70–108)
HCT VFR BLD AUTO: 40.3 % (ref 42–52)
HGB BLD-MCNC: 13.6 GM/DL (ref 14–18)
IMMATURE GRANULOCYTES: 3 %
IONIZED CALCIUM, WHOLE BLOOD: 1.2 MMOL/L (ref 1.12–1.32)
LYMPHOCYTES ABSOLUTE: 0.9 THOU/MM3 (ref 1–4.8)
LYMPHOCYTES NFR BLD AUTO: 12 % (ref 15–47)
MCH RBC QN AUTO: 32.4 PG (ref 26–33)
MCHC RBC AUTO-ENTMCNC: 33.7 GM/DL (ref 32.2–35.5)
MCV RBC AUTO: 96 FL (ref 80–94)
MONOCYTES ABSOLUTE: 0.5 THOU/MM3 (ref 0.4–1.3)
MONOCYTES NFR BLD AUTO: 7 % (ref 0–12)
NEUTROPHILS NFR BLD AUTO: 77 % (ref 43–75)
PLATELET # BLD AUTO: 110 THOU/MM3 (ref 130–400)
PMV BLD AUTO: 8.1 FL (ref 9.4–12.4)
POTASSIUM BLD-SCNC: 3.8 MEQ/L (ref 3.5–4.9)
PROT SERPL-MCNC: 6.4 G/DL (ref 6.1–8)
RBC # BLD AUTO: 4.2 MILL/MM3 (ref 4.7–6.1)
SEGMENTED NEUTROPHILS ABSOLUTE COUNT: 5.4 THOU/MM3 (ref 1.8–7.7)
SODIUM BLD-SCNC: 142 MEQ/L (ref 138–146)
TOTAL CO2, WHOLE BLOOD: 25 MEQ/L (ref 23–33)
WBC # BLD AUTO: 7 THOU/MM3 (ref 4.8–10.8)

## 2024-01-30 PROCEDURE — 99214 OFFICE O/P EST MOD 30 MIN: CPT | Performed by: INTERNAL MEDICINE

## 2024-01-30 PROCEDURE — 80047 BASIC METABLC PNL IONIZED CA: CPT

## 2024-01-30 PROCEDURE — 99211 OFF/OP EST MAY X REQ PHY/QHP: CPT

## 2024-01-30 PROCEDURE — 80076 HEPATIC FUNCTION PANEL: CPT

## 2024-01-30 PROCEDURE — 3074F SYST BP LT 130 MM HG: CPT | Performed by: INTERNAL MEDICINE

## 2024-01-30 PROCEDURE — 82977 ASSAY OF GGT: CPT

## 2024-01-30 PROCEDURE — 85025 COMPLETE CBC W/AUTO DIFF WBC: CPT

## 2024-01-30 PROCEDURE — 36591 DRAW BLOOD OFF VENOUS DEVICE: CPT

## 2024-01-30 PROCEDURE — 6360000002 HC RX W HCPCS: Performed by: INTERNAL MEDICINE

## 2024-01-30 PROCEDURE — 3078F DIAST BP <80 MM HG: CPT | Performed by: INTERNAL MEDICINE

## 2024-01-30 PROCEDURE — 2580000003 HC RX 258: Performed by: INTERNAL MEDICINE

## 2024-01-30 RX ORDER — WATER 10 ML/10ML
2.2 INJECTION INTRAMUSCULAR; INTRAVENOUS; SUBCUTANEOUS ONCE
OUTPATIENT
Start: 2024-01-30 | End: 2024-01-30

## 2024-01-30 RX ORDER — HEPARIN 100 UNIT/ML
500 SYRINGE INTRAVENOUS PRN
OUTPATIENT
Start: 2024-01-30

## 2024-01-30 RX ORDER — SODIUM CHLORIDE 9 MG/ML
25 INJECTION, SOLUTION INTRAVENOUS PRN
OUTPATIENT
Start: 2024-01-30

## 2024-01-30 RX ORDER — HEPARIN 100 UNIT/ML
500 SYRINGE INTRAVENOUS PRN
Status: DISCONTINUED | OUTPATIENT
Start: 2024-01-30 | End: 2024-01-31 | Stop reason: HOSPADM

## 2024-01-30 RX ORDER — SODIUM CHLORIDE 0.9 % (FLUSH) 0.9 %
5-40 SYRINGE (ML) INJECTION PRN
Status: DISCONTINUED | OUTPATIENT
Start: 2024-01-30 | End: 2024-01-31 | Stop reason: HOSPADM

## 2024-01-30 RX ORDER — SODIUM CHLORIDE 0.9 % (FLUSH) 0.9 %
5-40 SYRINGE (ML) INJECTION PRN
OUTPATIENT
Start: 2024-01-30

## 2024-01-30 RX ADMIN — SODIUM CHLORIDE, PRESERVATIVE FREE 20 ML: 5 INJECTION INTRAVENOUS at 11:13

## 2024-01-30 RX ADMIN — HEPARIN 500 UNITS: 100 SYRINGE at 12:11

## 2024-01-30 RX ADMIN — SODIUM CHLORIDE, PRESERVATIVE FREE 10 ML: 5 INJECTION INTRAVENOUS at 12:11

## 2024-01-30 NOTE — PATIENT INSTRUCTIONS
Continue port flush every 2 months.  Labs drawn today.  Keep follow-up with Cristy Mcintyre radiation oncology February 23 at which time I presume she will order the next brain MRI  Next follow-up with me the week of May 7.  I have ordered labs and CT chest abdomen pelvis to be done 1 week before the week of April 29

## 2024-01-30 NOTE — PROGRESS NOTES
[facility]. Discharge instructions given to patient-verbalizes understanding. Ambulated off unit per self with belongings.

## 2024-01-30 NOTE — PLAN OF CARE
Problem: Discharge Planning  Goal: Discharge to home or other facility with appropriate resources  Outcome: Adequate for Discharge  Flowsheets (Taken 1/30/2024 1400)  Discharge to home or other facility with appropriate resources:   Identify barriers to discharge with patient and caregiver   Identify discharge learning needs (meds, wound care, etc)  Note: Verbalize understanding of discharge instructions, follow up appointments, and when to call Physician.Discuss understanding of discharge instructions, follow up appointments and when to call Physician.     Problem: Safety - Adult  Goal: Free from fall injury  Outcome: Adequate for Discharge  Flowsheets (Taken 1/30/2024 1400)  Free From Fall Injury:   Instruct family/caregiver on patient safety   Based on caregiver fall risk screen, instruct family/caregiver to ask for assistance with transferring infant if caregiver noted to have fall risk factors  Note: Free from falls while in O.P. Oncology.Discussed the need to use the call light for assistance when getting up to ambulate.     Problem: Chronic Conditions and Co-morbidities  Goal: Patient's chronic conditions and co-morbidity symptoms are monitored and maintained or improved  Outcome: Adequate for Discharge  Flowsheets (Taken 1/30/2024 1400)  Care Plan - Patient's Chronic Conditions and Co-Morbidity Symptoms are Monitored and Maintained or Improved:   Collaborate with multidisciplinary team to address chronic and comorbid conditions and prevent exacerbation or deterioration   Monitor and assess patient's chronic conditions and comorbid symptoms for stability, deterioration, or improvement  Note: Patient verbalizes understanding to verbal information given on lab draw from mediport,action and possible side effects. Aware to call MD if develop complications.      Care plan reviewed with patient.  Patient verbalize understanding of the plan of care and contribute to goal setting.

## 2024-02-09 ENCOUNTER — CLINICAL DOCUMENTATION (OUTPATIENT)
Dept: NUTRITION | Age: 64
End: 2024-02-09

## 2024-02-09 NOTE — PROGRESS NOTES
Nutrition Assessment     Reason for Call:   2/9/2024 - EN follow-up  1/3/2024 - EN follow-up  (esophageal cancer s/p esophagectomy (2022), brain mets s/p craniotomy, current with treatment, J-tube placed 5/1/23).     Nutrition Recommendations:   -EN adjusted prn according to oral intake/weight  -Peptamen 1.5 at 60ml/hr x 24 hours per day = 1440ml formula volume, 2160 kcals, 271 grams CHO, 98 grams protein, 1075ml water (Vital 1.5 at 60ml/hrx 24 hours = 2160 kcals, 97 grams protein, 2469 grams CHO, 1100 ml water in formula.  -Recommend an additional 900ml water via flushes per day  -PO ad corrina for pleasure     Malnutrition Assessment: (4/27/23 - per inpatient RD)  Malnutrition Status: severe malnutrition  Context: chronic illness  Findings of the 6 clinical characteristics of malnutrition (minimum of 2 out of 6 clinical characteristics is required to make the dx of moderate or severe Protein Calorie Malnutrition based on AND/ASPEN Guidelines):              1. Energy Intake: 75% of less estimated energy requirements for 1 month or longer              2. Weight Loss: 32% over 10 months              3. Fat Loss: mild body fat loss orbital              4. Muscle Loss: mild muscle mass loss temples (temporalis), clavicles (pectoralis & deltoids)              5. Fluid Accumulation: no significant fluid accumulation              6.  Strength: not measured     Nutrition Diagnosis:   Problem: severe malnutrition in the context of chronic illness  Etiology: catabolic illness, inadequate oral intake  Signs and Symptoms: unintentional significant weight loss, taste changes, mild fat and muscle loss, brain mets affecting cognition per wife     Nutrition Assessment:   History: esophageal cancer with brain mets, HTN, GERD, HLD  Subjective:   2/9/2024 - Received return call from Jocelyne. Jocelyne reports that patient is doing well. Patient ate a whole [place] hamburger the other day and tolerated it well. Jocelyne says that she did cut back

## 2024-02-19 ENCOUNTER — HOSPITAL ENCOUNTER (OUTPATIENT)
Dept: MRI IMAGING | Age: 64
Discharge: HOME | End: 2024-02-19
Payer: MEDICAID

## 2024-02-19 DIAGNOSIS — C15.5 CANCER OF DISTAL THIRD OF ESOPHAGUS (HCC): ICD-10-CM

## 2024-02-19 PROCEDURE — 70553 MRI BRAIN STEM W/O & W/DYE: CPT

## 2024-02-19 PROCEDURE — A9579 GAD-BASE MR CONTRAST NOS,1ML: HCPCS

## 2024-02-19 PROCEDURE — 6360000004 HC RX CONTRAST MEDICATION

## 2024-02-19 RX ORDER — HEPARIN SODIUM (PORCINE) LOCK FLUSH IV SOLN 100 UNIT/ML 100 UNIT/ML
500 SOLUTION INTRAVENOUS ONCE
Status: COMPLETED | OUTPATIENT
Start: 2024-02-19 | End: 2024-02-19

## 2024-02-19 RX ADMIN — GADOTERIDOL 15 ML: 279.3 INJECTION, SOLUTION INTRAVENOUS at 14:16

## 2024-02-19 RX ADMIN — HEPARIN SODIUM (PORCINE) LOCK FLUSH IV SOLN 100 UNIT/ML 500 UNITS: 100 SOLUTION at 14:23

## 2024-02-23 ENCOUNTER — HOSPITAL ENCOUNTER (OUTPATIENT)
Dept: RADIATION ONCOLOGY | Age: 64
Discharge: HOME | End: 2024-02-23
Payer: MEDICAID

## 2024-02-23 VITALS
TEMPERATURE: 98.3 F | BODY MASS INDEX: 25.84 KG/M2 | DIASTOLIC BLOOD PRESSURE: 67 MMHG | WEIGHT: 165 LBS | HEART RATE: 75 BPM | RESPIRATION RATE: 18 BRPM | SYSTOLIC BLOOD PRESSURE: 120 MMHG | OXYGEN SATURATION: 97 %

## 2024-02-23 DIAGNOSIS — C15.9 PRIMARY CANCER OF ESOPHAGUS WITH METASTASIS TO OTHER SITE (HCC): Primary | ICD-10-CM

## 2024-02-23 PROCEDURE — 99212 OFFICE O/P EST SF 10 MIN: CPT

## 2024-02-23 ASSESSMENT — ENCOUNTER SYMPTOMS
NAUSEA: 0
VOMITING: 0
FACIAL SWELLING: 0
SHORTNESS OF BREATH: 0
TROUBLE SWALLOWING: 0
DIARRHEA: 0
BACK PAIN: 0
COUGH: 0
RECTAL PAIN: 0
ABDOMINAL PAIN: 0
CONSTIPATION: 0
SORE THROAT: 0
BLOOD IN STOOL: 0

## 2024-02-23 NOTE — PROGRESS NOTES
Thank you for allowing us to be a part of their care.    HPI:  Oncology History Overview Note   Niranjan Yañez is a 62 y.o. male      HISTORY:    3/30/2022 As per Thoracic Surgery (Dr. Ridge GONZALEZ),    \"Niranjan Yañez is a 61 y.o. male former smoker with history of HTN, HLD, and GERD who was referred to our service by Dr. Urena for recently diagnosed esophageal adenocarcinoma who presents today for review of PET/CT and EUS for esophageal cancer staging. Patient reports he is doing well overall. He denies fever, hemoptysis, chest pain, or dysphagia.     He is currently on a full diet and is supplementing with one Ensure per day.      61 y.o. male former smoker with history of HTN, HLD, and GERD who was referred to our service by Dr. Urena for recently diagnosed esophageal adenocarcinoma who presents today for review of PET/CT and EUS for esophageal cancer staging. Patient was staged as stage JOSE (uT2N2) on recent EUS. Findings from recent PET/CT correlate with EUS findings. Plan to have patient complete neoadjuvant chemoradiation treatment. We will follow-up with patient in ~6 weeks with a telemedicine visit in order to discuss progress.\"      7/20/2022 As per thoracic surgery,  Plan to proceed with a two staged operation with an EGD and robotic-assisted gastric devascularization with possible J-tube placement followed ~2 weeks later by a robotic-assisted laparoscopy and right thoracoscopy for minimally invasive Olga-Venu esophagectomy.       Cancer of distal third of esophagus (HCC)   2/14/2022 Surgery         3/23/2022 Procedure    EUS    Impression:              - Partially obstructing, malignant esophageal tumor was found in the lower third of the esophagus.                          - Esophagogastric landmarks identified.                          - Large hiatal hernia.                          - Normal stomach.                          - Normal examined duodenum.                          - There was

## 2024-02-26 DIAGNOSIS — C15.9 PRIMARY CANCER OF ESOPHAGUS WITH METASTASIS TO OTHER SITE (HCC): Primary | ICD-10-CM

## 2024-02-26 RX ORDER — LORAZEPAM 0.5 MG/1
TABLET ORAL
Qty: 3 TABLET | Refills: 0 | Status: SHIPPED | OUTPATIENT
Start: 2024-02-26 | End: 2024-02-27

## 2024-03-19 ENCOUNTER — OFFICE VISIT (OUTPATIENT)
Dept: FAMILY MEDICINE CLINIC | Age: 64
End: 2024-03-19
Payer: MEDICAID

## 2024-03-19 VITALS
WEIGHT: 174.9 LBS | SYSTOLIC BLOOD PRESSURE: 110 MMHG | DIASTOLIC BLOOD PRESSURE: 70 MMHG | HEART RATE: 84 BPM | BODY MASS INDEX: 27.39 KG/M2 | RESPIRATION RATE: 16 BRPM

## 2024-03-19 DIAGNOSIS — R51.9 OCCIPITAL HEADACHE: ICD-10-CM

## 2024-03-19 DIAGNOSIS — D64.9 ANEMIA, UNSPECIFIED TYPE: ICD-10-CM

## 2024-03-19 DIAGNOSIS — R53.1 GENERAL WEAKNESS: ICD-10-CM

## 2024-03-19 DIAGNOSIS — R11.0 NAUSEA: Primary | ICD-10-CM

## 2024-03-19 DIAGNOSIS — C79.9 METASTASIS FROM ESOPHAGEAL CANCER (HCC): ICD-10-CM

## 2024-03-19 DIAGNOSIS — F12.10 TETRAHYDROCANNABINOL (THC) USE DISORDER, MILD, ABUSE: ICD-10-CM

## 2024-03-19 DIAGNOSIS — C15.9 METASTASIS FROM ESOPHAGEAL CANCER (HCC): ICD-10-CM

## 2024-03-19 PROCEDURE — 3074F SYST BP LT 130 MM HG: CPT | Performed by: FAMILY MEDICINE

## 2024-03-19 PROCEDURE — G2211 COMPLEX E/M VISIT ADD ON: HCPCS | Performed by: FAMILY MEDICINE

## 2024-03-19 PROCEDURE — 99214 OFFICE O/P EST MOD 30 MIN: CPT | Performed by: FAMILY MEDICINE

## 2024-03-19 PROCEDURE — 3078F DIAST BP <80 MM HG: CPT | Performed by: FAMILY MEDICINE

## 2024-03-19 RX ORDER — ONDANSETRON 4 MG/1
4 TABLET, ORALLY DISINTEGRATING ORAL 3 TIMES DAILY PRN
Qty: 30 TABLET | Refills: 0 | Status: SHIPPED | OUTPATIENT
Start: 2024-03-19

## 2024-03-19 RX ORDER — GABAPENTIN 100 MG/1
100 CAPSULE ORAL 3 TIMES DAILY
Qty: 30 CAPSULE | Refills: 0 | Status: SHIPPED | OUTPATIENT
Start: 2024-03-19 | End: 2024-03-29

## 2024-03-19 SDOH — ECONOMIC STABILITY: FOOD INSECURITY: WITHIN THE PAST 12 MONTHS, THE FOOD YOU BOUGHT JUST DIDN'T LAST AND YOU DIDN'T HAVE MONEY TO GET MORE.: NEVER TRUE

## 2024-03-19 SDOH — ECONOMIC STABILITY: FOOD INSECURITY: WITHIN THE PAST 12 MONTHS, YOU WORRIED THAT YOUR FOOD WOULD RUN OUT BEFORE YOU GOT MONEY TO BUY MORE.: NEVER TRUE

## 2024-03-19 SDOH — ECONOMIC STABILITY: INCOME INSECURITY: HOW HARD IS IT FOR YOU TO PAY FOR THE VERY BASICS LIKE FOOD, HOUSING, MEDICAL CARE, AND HEATING?: NOT HARD AT ALL

## 2024-03-19 ASSESSMENT — PATIENT HEALTH QUESTIONNAIRE - PHQ9
SUM OF ALL RESPONSES TO PHQ QUESTIONS 1-9: 0
SUM OF ALL RESPONSES TO PHQ QUESTIONS 1-9: 0
SUM OF ALL RESPONSES TO PHQ9 QUESTIONS 1 & 2: 0
SUM OF ALL RESPONSES TO PHQ QUESTIONS 1-9: 0
SUM OF ALL RESPONSES TO PHQ QUESTIONS 1-9: 0
2. FEELING DOWN, DEPRESSED OR HOPELESS: NOT AT ALL
1. LITTLE INTEREST OR PLEASURE IN DOING THINGS: NOT AT ALL

## 2024-03-19 ASSESSMENT — ENCOUNTER SYMPTOMS
ABDOMINAL PAIN: 0
RESPIRATORY NEGATIVE: 1
NAUSEA: 1
VOMITING: 0
DIARRHEA: 0

## 2024-03-19 NOTE — PROGRESS NOTES
Niranjan Yañez (:  1960) is a 63 y.o. male,Established patient, here for evaluation of the following chief complaint(s):  Fatigue, Nausea, and Headache          Subjective   SUBJECTIVE/OBJECTIVE:  HPI  Chief Complaint   Patient presents with    Fatigue    Nausea    Headache     Pt presents today with c/o nausea, fatigue, HA that is chronic in nature.  HA in the back of the head near the surgery.      No fevers.      Taking ibuprofen for the HA with relief at times.  Recent MRI in Feb stable.  Question nerve related?    BP Readings from Last 3 Encounters:   24 110/70   24 120/67   24 120/79     Weight is up.    Wt Readings from Last 3 Encounters:   24 79.3 kg (174 lb 14.4 oz)   24 74.8 kg (165 lb)   24 73.9 kg (163 lb)     Smokes THC on regular basis.      GERD controlled on the Protonix.  No relief with the nausea.  Has compazine at home from [facility].    Patient Active Problem List   Diagnosis    Abdominal pain    Cancer of distal third of esophagus (HCC)    Acute postoperative pain    Chronic anemia    Primary cancer of esophagus with metastasis to other site (HCC)    GERD (gastroesophageal reflux disease)    HLD (hyperlipidemia)    Obesity (BMI 30.0-34.9)    Postoperative atrial fibrillation (HCC)    Thrombocytopenia (HCC)    Brain lesion    Delayed gastric emptying    Serum creatinine raised    Malnutrition (HCC)    Benign hypertension    Encounter for insertion of venous access port    Failure to thrive in adult    Severe malnutrition (HCC)    Gastrostomy tube dysfunction (HCC)    Malfunctioning jejunostomy tube (HCC)    MARTÍN (obstructive sleep apnea)    Dislodged jejunostomy tube       Current Outpatient Medications   Medication Sig Dispense Refill    ondansetron (ZOFRAN-ODT) 4 MG disintegrating tablet Take 1 tablet by mouth 3 times daily as needed for Nausea or Vomiting 30 tablet 0    gabapentin (NEURONTIN) 100 MG capsule Take 1 capsule by mouth 3 times daily for 10

## 2024-03-22 RX ORDER — DEXAMETHASONE 2 MG/1
TABLET ORAL
Qty: 15 TABLET | Refills: 1 | Status: SHIPPED | OUTPATIENT
Start: 2024-03-22

## 2024-03-30 DIAGNOSIS — R51.9 OCCIPITAL HEADACHE: ICD-10-CM

## 2024-04-01 RX ORDER — GABAPENTIN 100 MG/1
CAPSULE ORAL
Qty: 30 CAPSULE | Refills: 0 | OUTPATIENT
Start: 2024-04-01

## 2024-04-01 RX ORDER — GABAPENTIN 100 MG/1
100 CAPSULE ORAL 2 TIMES DAILY
Qty: 60 CAPSULE | Refills: 2 | Status: SHIPPED | OUTPATIENT
Start: 2024-04-01 | End: 2024-05-31

## 2024-04-01 NOTE — TELEPHONE ENCOUNTER
Spoke with patient and wife, Jocelyne.  Patient is taking medication BID and is working well for him. Would like to continue at current dose.  Will check with pharmacy after 1pm.  Please advise

## 2024-04-02 ENCOUNTER — HOSPITAL ENCOUNTER (OUTPATIENT)
Dept: INFUSION THERAPY | Age: 64
Discharge: HOME | End: 2024-04-02
Payer: MEDICAID

## 2024-04-02 VITALS
HEIGHT: 67 IN | DIASTOLIC BLOOD PRESSURE: 81 MMHG | BODY MASS INDEX: 27.37 KG/M2 | TEMPERATURE: 97.4 F | WEIGHT: 174.4 LBS | SYSTOLIC BLOOD PRESSURE: 119 MMHG | RESPIRATION RATE: 16 BRPM | OXYGEN SATURATION: 97 % | HEART RATE: 67 BPM

## 2024-04-02 DIAGNOSIS — C15.5 CANCER OF DISTAL THIRD OF ESOPHAGUS (HCC): Primary | ICD-10-CM

## 2024-04-02 PROCEDURE — 6360000002 HC RX W HCPCS: Performed by: INTERNAL MEDICINE

## 2024-04-02 PROCEDURE — 96523 IRRIG DRUG DELIVERY DEVICE: CPT

## 2024-04-02 PROCEDURE — 2580000003 HC RX 258: Performed by: INTERNAL MEDICINE

## 2024-04-02 RX ORDER — HEPARIN 100 UNIT/ML
500 SYRINGE INTRAVENOUS PRN
Status: DISCONTINUED | OUTPATIENT
Start: 2024-04-02 | End: 2024-04-03 | Stop reason: HOSPADM

## 2024-04-02 RX ORDER — SODIUM CHLORIDE 9 MG/ML
25 INJECTION, SOLUTION INTRAVENOUS PRN
OUTPATIENT
Start: 2024-04-02

## 2024-04-02 RX ORDER — WATER 10 ML/10ML
2.2 INJECTION INTRAMUSCULAR; INTRAVENOUS; SUBCUTANEOUS ONCE
OUTPATIENT
Start: 2024-04-02 | End: 2024-04-02

## 2024-04-02 RX ORDER — SODIUM CHLORIDE 0.9 % (FLUSH) 0.9 %
5-40 SYRINGE (ML) INJECTION PRN
Status: DISCONTINUED | OUTPATIENT
Start: 2024-04-02 | End: 2024-04-03 | Stop reason: HOSPADM

## 2024-04-02 RX ORDER — HEPARIN 100 UNIT/ML
500 SYRINGE INTRAVENOUS PRN
OUTPATIENT
Start: 2024-04-02

## 2024-04-02 RX ORDER — SODIUM CHLORIDE 0.9 % (FLUSH) 0.9 %
5-40 SYRINGE (ML) INJECTION PRN
OUTPATIENT
Start: 2024-04-02

## 2024-04-02 RX ADMIN — SODIUM CHLORIDE, PRESERVATIVE FREE 20 ML: 5 INJECTION INTRAVENOUS at 13:08

## 2024-04-02 RX ADMIN — Medication 500 UNITS: at 13:08

## 2024-04-02 NOTE — PLAN OF CARE
Problem: Chronic Conditions and Co-morbidities  Goal: Patient's chronic conditions and co-morbidity symptoms are monitored and maintained or improved  Outcome: Adequate for Discharge  Flowsheets (Taken 4/2/2024 1337)  Care Plan - Patient's Chronic Conditions and Co-Morbidity Symptoms are Monitored and Maintained or Improved:   Monitor and assess patient's chronic conditions and comorbid symptoms for stability, deterioration, or improvement   Collaborate with multidisciplinary team to address chronic and comorbid conditions and prevent exacerbation or deterioration  Note: Patient verbalizes understanding to verbal information given on port flush,action and possible side effects. Aware to call MD if develop complications.        Problem: Discharge Planning  Goal: Discharge to home or other facility with appropriate resources  Outcome: Adequate for Discharge  Flowsheets (Taken 4/2/2024 1337)  Discharge to home or other facility with appropriate resources: Identify barriers to discharge with patient and caregiver     Problem: Safety - Adult  Goal: Free from fall injury  Outcome: Adequate for Discharge  Flowsheets (Taken 4/2/2024 1337)  Free From Fall Injury: Instruct family/caregiver on patient safety     Problem: Infection - Adult  Goal: Absence of infection at discharge  Outcome: Adequate for Discharge  Flowsheets (Taken 4/2/2024 1337)  Absence of infection at discharge: Monitor all insertion sites i.e., indwelling lines, tubes and drains  Note: Mediport site with no redness or warmth. Skin over port site intact with no signs of breakdown noted. Patient verbalizes signs/symptoms of port infection and when to notify the physician.    Goal: Will show no infection signs and symptoms  Description: Will show no infection signs and symptoms  Outcome: Adequate for Discharge  Note: Mediport site with no redness or warmth. Skin over port site intact with no signs of breakdown noted. Patient verbalizes signs/symptoms of port

## 2024-04-02 NOTE — PROGRESS NOTES
Patient tolerated port flush without any complications.    Last vital signs:   /81   Pulse 67   Temp 97.4 °F (36.3 °C) (Oral)   Resp 16   Ht 1.702 m (5' 7.01\")   Wt 79.1 kg (174 lb 6.4 oz)   SpO2 97%   BMI 27.31 kg/m²     Patient instructed if experience any symptoms following today's port flush, he is to notify MD immediately or go to the emergency department.    Discharge instructions given to patient. Verbalizes understanding. Ambulated off unit per self, accompanied by spouse, with belongings.

## 2024-04-03 DIAGNOSIS — C15.5 CANCER OF DISTAL THIRD OF ESOPHAGUS (HCC): ICD-10-CM

## 2024-04-03 RX ORDER — PROCHLORPERAZINE MALEATE 10 MG
TABLET ORAL
Qty: 60 TABLET | Refills: 2 | Status: SHIPPED | OUTPATIENT
Start: 2024-04-03

## 2024-04-17 RX ORDER — PANTOPRAZOLE SODIUM 40 MG/1
40 TABLET, DELAYED RELEASE ORAL 2 TIMES DAILY
Qty: 180 TABLET | Refills: 1 | Status: SHIPPED | OUTPATIENT
Start: 2024-04-17

## 2024-04-29 ENCOUNTER — HOSPITAL ENCOUNTER (OUTPATIENT)
Dept: CT IMAGING | Age: 64
Discharge: HOME | End: 2024-04-29
Attending: INTERNAL MEDICINE
Payer: MEDICAID

## 2024-04-29 DIAGNOSIS — C15.5 CANCER OF DISTAL THIRD OF ESOPHAGUS (HCC): ICD-10-CM

## 2024-04-29 LAB — POC CREATININE WHOLE BLOOD: 0.8 MG/DL (ref 0.5–1.2)

## 2024-04-29 PROCEDURE — 74177 CT ABD & PELVIS W/CONTRAST: CPT

## 2024-04-29 PROCEDURE — 6360000004 HC RX CONTRAST MEDICATION: Performed by: INTERNAL MEDICINE

## 2024-04-29 PROCEDURE — 71260 CT THORAX DX C+: CPT

## 2024-04-29 PROCEDURE — 82565 ASSAY OF CREATININE: CPT

## 2024-04-29 RX ORDER — HEPARIN SODIUM (PORCINE) LOCK FLUSH IV SOLN 100 UNIT/ML 100 UNIT/ML
500 SOLUTION INTRAVENOUS ONCE
Status: DISCONTINUED | OUTPATIENT
Start: 2024-04-29 | End: 2024-04-30 | Stop reason: HOSPADM

## 2024-04-29 RX ADMIN — IOPAMIDOL 80 ML: 755 INJECTION, SOLUTION INTRAVENOUS at 09:13

## 2024-05-06 ENCOUNTER — OFFICE VISIT (OUTPATIENT)
Dept: FAMILY MEDICINE CLINIC | Age: 64
End: 2024-05-06
Payer: MEDICAID

## 2024-05-06 VITALS
RESPIRATION RATE: 16 BRPM | HEART RATE: 80 BPM | HEIGHT: 67 IN | TEMPERATURE: 98.7 F | BODY MASS INDEX: 28.03 KG/M2 | DIASTOLIC BLOOD PRESSURE: 78 MMHG | SYSTOLIC BLOOD PRESSURE: 130 MMHG | OXYGEN SATURATION: 97 % | WEIGHT: 178.6 LBS

## 2024-05-06 DIAGNOSIS — R39.15 URINARY URGENCY: ICD-10-CM

## 2024-05-06 DIAGNOSIS — N52.9 ERECTILE DYSFUNCTION, UNSPECIFIED ERECTILE DYSFUNCTION TYPE: Primary | ICD-10-CM

## 2024-05-06 PROCEDURE — 3075F SYST BP GE 130 - 139MM HG: CPT | Performed by: FAMILY MEDICINE

## 2024-05-06 PROCEDURE — 99213 OFFICE O/P EST LOW 20 MIN: CPT | Performed by: FAMILY MEDICINE

## 2024-05-06 PROCEDURE — 3078F DIAST BP <80 MM HG: CPT | Performed by: FAMILY MEDICINE

## 2024-05-06 PROCEDURE — G2211 COMPLEX E/M VISIT ADD ON: HCPCS | Performed by: FAMILY MEDICINE

## 2024-05-06 RX ORDER — TADALAFIL 5 MG/1
5 TABLET ORAL DAILY PRN
Qty: 30 TABLET | Refills: 0 | Status: SHIPPED | OUTPATIENT
Start: 2024-05-06

## 2024-05-06 ASSESSMENT — ENCOUNTER SYMPTOMS
GASTROINTESTINAL NEGATIVE: 1
RESPIRATORY NEGATIVE: 1

## 2024-05-06 NOTE — PROGRESS NOTES
MG tablet TAKE 1 TABLET BY MOUTH EVERY 6 HOURS AS NEEDED FOR NAUSEA 60 tablet 2    gabapentin (NEURONTIN) 100 MG capsule Take 1 capsule by mouth in the morning and at bedtime for 90 days. 60 capsule 2    dexAMETHasone (DECADRON) 2 MG tablet TAKE 1/2 TABLET BY MOUTH TWICE DAILY (Patient taking differently: TAKE 1/2 TABLET BY MOUTH TWICE DAILY) 15 tablet 1    ondansetron (ZOFRAN-ODT) 4 MG disintegrating tablet Take 1 tablet by mouth 3 times daily as needed for Nausea or Vomiting 30 tablet 0    lidocaine-prilocaine (EMLA) 2.5-2.5 % cream Apply topically as needed. 1 each 3    Misc. Devices (WALL GRAB BAR) MISC As directed. 2 each 0    Blood Pressure Monitoring (BP MONITOR-STETHOSCOPE) KIT Dx: HTN 1 kit 0    Misc. Devices (PULSE OXIMETER FOR FINGER) MISC Dx:  hypoxemia 1 each 0    ibuprofen (ADVIL;MOTRIN) 400 MG tablet Take 1 tablet by mouth every 6 hours as needed for Pain       No current facility-administered medications for this visit.       Past Surgical History:   Procedure Laterality Date    ABDOMEN SURGERY  08/16/2022    GASTRIC DEVASCULARIZATON-[facility]    ABDOMEN SURGERY  08/30/2022    ESOPHAGOGASTRODUODENOSCOPY TRANSORAL DIAGNOSTIC ESOPHAGECTOMY W/ THORACOTOMY-[facility]    DENTAL SURGERY      25 teeth removed    PORT SURGERY Left 12/1/2022    LEFT SINGLE LUMEN MEDIPORT performed by Konstantin Melara MD at [facility] OR    STOMACH SURGERY N/A 5/1/2023    Open JEJUNOSTOMY TUBE INSERTION; repair of umbilical hernia performed by Elian Zaragoza MD at [facility] OR       Review of Systems   Constitutional: Negative.    HENT: Negative.     Respiratory: Negative.     Cardiovascular: Negative.    Gastrointestinal: Negative.    Genitourinary:  Positive for frequency and urgency.        ED   Musculoskeletal: Negative.    All other systems reviewed and are negative.         Objective   Physical Exam  Vitals and nursing note reviewed.   Constitutional:       General: He is not in acute distress.     Appearance: Normal appearance. He is

## 2024-05-06 NOTE — PROGRESS NOTES
obstructive hydrocephalus.    -PET/CT 3/30/2022  Intense hypermetabolic activity within the mid esophagus consistent with a primary malignancy.  Adjacent hypermetabolic left periesophageal lymph node worrisome for metastatic adenopathy.    PROCEDURES:  EGD 2/14/2022  Ulcerated mass distal esophagus    EUS 3/23/2022  Partially obstructing malignant esophageal tumor found in the lower third of the esophagus.  Large hiatal hernia.  Normal stomach and duodenum.  Liver most consistent with fatty liver.  2 malignant appearing lymph nodes visualized and measured in the lower paraesophageal mediastinum level 8L adjacent to the mass.  2 malignant appearing lymph nodes visualized and measured in the subcarinal mediastinum level 7.    Echocardiogram 7/12/2023 normal systolic function ejection fraction estimated at 60%.  No regional left ventricular wall motion abnormalities.  Grade 1 diastolic dysfunction.    PATHOLOGY:   EGD distal esophageal biopsy path report  Pathologic Diagnosis     Outside Slides  [id number] (02/15/22)  A. Esophagus, distal, biopsy:   Invasive adenocarcinoma, moderately differentiated. See comment   Alcian Blue/PAS performed at outside institution and submitted for review highlights Prescott's mucosa in the background      HER2/robina Gene Status: Amplified.  ___________________________________________________________    8/30/2022 [facility] esophagectomy pathology-pending    Nov 2022- Craniotomy [facility]- Brain mets- REQUESTED    GENETICS:  HER2 amplified. On original esoph bx.    MOLECULAR:  Requested by phone conversation on 11/21/22 that Dr Goddard order Foundation1 and PDFL-1 on the recent Brain bx.  NEED REPORT  1/18/23 placed order for foundation 1 and PD-L1 to be done on the November brain biopsy at [facility] that showed metastatic adenocarcinoma.  PD-L1 CPS 2 - 3%.  Positive  Tumor mutation burden 17, MECHELLE, no specific targetable mutations.      ASSESSMENT/PLAN:    1: Diagnosis: 63-year-old gentleman with

## 2024-05-07 ENCOUNTER — OFFICE VISIT (OUTPATIENT)
Dept: ONCOLOGY | Age: 64
End: 2024-05-07
Payer: MEDICAID

## 2024-05-07 ENCOUNTER — HOSPITAL ENCOUNTER (OUTPATIENT)
Dept: INFUSION THERAPY | Age: 64
Discharge: HOME | End: 2024-05-07
Payer: MEDICAID

## 2024-05-07 VITALS
BODY MASS INDEX: 27.34 KG/M2 | TEMPERATURE: 98.4 F | HEIGHT: 67 IN | RESPIRATION RATE: 16 BRPM | DIASTOLIC BLOOD PRESSURE: 72 MMHG | HEART RATE: 74 BPM | OXYGEN SATURATION: 95 % | WEIGHT: 174.2 LBS | SYSTOLIC BLOOD PRESSURE: 113 MMHG

## 2024-05-07 VITALS
HEART RATE: 74 BPM | DIASTOLIC BLOOD PRESSURE: 72 MMHG | BODY MASS INDEX: 27.34 KG/M2 | WEIGHT: 174.2 LBS | OXYGEN SATURATION: 95 % | HEIGHT: 67 IN | RESPIRATION RATE: 16 BRPM | SYSTOLIC BLOOD PRESSURE: 113 MMHG | TEMPERATURE: 98.4 F

## 2024-05-07 DIAGNOSIS — C79.9 METASTASIS FROM ESOPHAGEAL CANCER (HCC): Primary | ICD-10-CM

## 2024-05-07 DIAGNOSIS — C15.9 METASTASIS FROM ESOPHAGEAL CANCER (HCC): Primary | ICD-10-CM

## 2024-05-07 DIAGNOSIS — C15.5 CANCER OF DISTAL THIRD OF ESOPHAGUS (HCC): Primary | ICD-10-CM

## 2024-05-07 LAB
ALBUMIN SERPL BCG-MCNC: 4.2 G/DL (ref 3.5–5.1)
ALP SERPL-CCNC: 62 U/L (ref 38–126)
ALT SERPL W/O P-5'-P-CCNC: 46 U/L (ref 11–66)
AST SERPL-CCNC: 30 U/L (ref 5–40)
BASOPHILS ABSOLUTE: 0 THOU/MM3 (ref 0–0.1)
BASOPHILS NFR BLD AUTO: 0 % (ref 0–3)
BILIRUB CONJ SERPL-MCNC: < 0.2 MG/DL (ref 0–0.3)
BILIRUB SERPL-MCNC: 0.2 MG/DL (ref 0.3–1.2)
BUN BLDP-MCNC: 15 MG/DL (ref 8–26)
CHLORIDE BLD-SCNC: 106 MEQ/L (ref 98–109)
CREAT BLD-MCNC: 0.5 MG/DL (ref 0.5–1.2)
EOSINOPHIL NFR BLD AUTO: 0 % (ref 0–4)
EOSINOPHILS ABSOLUTE: 0 THOU/MM3 (ref 0–0.4)
ERYTHROCYTE [DISTWIDTH] IN BLOOD BY AUTOMATED COUNT: 12.8 % (ref 11.5–14.5)
GFR SERPL CREATININE-BSD FRML MDRD: > 90 ML/MIN/1.73M2
GLUCOSE BLD-MCNC: 87 MG/DL (ref 70–108)
HCT VFR BLD AUTO: 42.1 % (ref 42–52)
HGB BLD-MCNC: 14.5 GM/DL (ref 14–18)
IMMATURE GRANULOCYTES %: 3 %
IMMATURE GRANULOCYTES ABSOLUTE: 0.19 THOU/MM3 (ref 0–0.07)
IONIZED CALCIUM, WHOLE BLOOD: 1.2 MMOL/L (ref 1.12–1.32)
LYMPHOCYTES ABSOLUTE: 0.7 THOU/MM3 (ref 1–4.8)
LYMPHOCYTES NFR BLD AUTO: 9 % (ref 15–47)
MCH RBC QN AUTO: 33.6 PG (ref 26–33)
MCHC RBC AUTO-ENTMCNC: 34.4 GM/DL (ref 32.2–35.5)
MCV RBC AUTO: 98 FL (ref 80–94)
MONOCYTES ABSOLUTE: 0.6 THOU/MM3 (ref 0.4–1.3)
MONOCYTES NFR BLD AUTO: 7 % (ref 0–12)
NEUTROPHILS ABSOLUTE: 6.2 THOU/MM3 (ref 1.8–7.7)
NEUTROPHILS NFR BLD AUTO: 81 % (ref 43–75)
PLATELET # BLD AUTO: 117 THOU/MM3 (ref 130–400)
PMV BLD AUTO: 8.4 FL (ref 9.4–12.4)
POTASSIUM BLD-SCNC: 3.9 MEQ/L (ref 3.5–4.9)
PROT SERPL-MCNC: 7.2 G/DL (ref 6.1–8)
RBC # BLD AUTO: 4.31 MILL/MM3 (ref 4.7–6.1)
SODIUM BLD-SCNC: 141 MEQ/L (ref 138–146)
TOTAL CO2, WHOLE BLOOD: 26 MEQ/L (ref 23–33)
WBC # BLD AUTO: 7.7 THOU/MM3 (ref 4.8–10.8)

## 2024-05-07 PROCEDURE — 3074F SYST BP LT 130 MM HG: CPT | Performed by: INTERNAL MEDICINE

## 2024-05-07 PROCEDURE — 6360000002 HC RX W HCPCS: Performed by: INTERNAL MEDICINE

## 2024-05-07 PROCEDURE — 80076 HEPATIC FUNCTION PANEL: CPT

## 2024-05-07 PROCEDURE — 80047 BASIC METABLC PNL IONIZED CA: CPT

## 2024-05-07 PROCEDURE — 99214 OFFICE O/P EST MOD 30 MIN: CPT | Performed by: INTERNAL MEDICINE

## 2024-05-07 PROCEDURE — 99211 OFF/OP EST MAY X REQ PHY/QHP: CPT

## 2024-05-07 PROCEDURE — 36591 DRAW BLOOD OFF VENOUS DEVICE: CPT

## 2024-05-07 PROCEDURE — 85025 COMPLETE CBC W/AUTO DIFF WBC: CPT

## 2024-05-07 PROCEDURE — 2580000003 HC RX 258: Performed by: INTERNAL MEDICINE

## 2024-05-07 PROCEDURE — 3078F DIAST BP <80 MM HG: CPT | Performed by: INTERNAL MEDICINE

## 2024-05-07 RX ORDER — SODIUM CHLORIDE 0.9 % (FLUSH) 0.9 %
5-40 SYRINGE (ML) INJECTION PRN
Status: DISCONTINUED | OUTPATIENT
Start: 2024-05-07 | End: 2024-05-08 | Stop reason: HOSPADM

## 2024-05-07 RX ORDER — WATER 10 ML/10ML
2.2 INJECTION INTRAMUSCULAR; INTRAVENOUS; SUBCUTANEOUS ONCE
OUTPATIENT
Start: 2024-05-07 | End: 2024-05-07

## 2024-05-07 RX ORDER — HEPARIN 100 UNIT/ML
500 SYRINGE INTRAVENOUS PRN
OUTPATIENT
Start: 2024-05-07

## 2024-05-07 RX ORDER — SODIUM CHLORIDE 0.9 % (FLUSH) 0.9 %
5-40 SYRINGE (ML) INJECTION PRN
OUTPATIENT
Start: 2024-05-07

## 2024-05-07 RX ORDER — SODIUM CHLORIDE 9 MG/ML
25 INJECTION, SOLUTION INTRAVENOUS PRN
OUTPATIENT
Start: 2024-05-07

## 2024-05-07 RX ORDER — HEPARIN 100 UNIT/ML
500 SYRINGE INTRAVENOUS PRN
Status: DISCONTINUED | OUTPATIENT
Start: 2024-05-07 | End: 2024-05-08 | Stop reason: HOSPADM

## 2024-05-07 RX ADMIN — Medication 500 UNITS: at 13:37

## 2024-05-07 RX ADMIN — SODIUM CHLORIDE, PRESERVATIVE FREE 20 ML: 5 INJECTION INTRAVENOUS at 13:11

## 2024-05-07 RX ADMIN — SODIUM CHLORIDE, PRESERVATIVE FREE 10 ML: 5 INJECTION INTRAVENOUS at 13:10

## 2024-05-07 NOTE — PLAN OF CARE
Problem: Chronic Conditions and Co-morbidities  Goal: Patient's chronic conditions and co-morbidity symptoms are monitored and maintained or improved  Outcome: Adequate for Discharge  Flowsheets (Taken 5/7/2024 1339)  Care Plan - Patient's Chronic Conditions and Co-Morbidity Symptoms are Monitored and Maintained or Improved:   Monitor and assess patient's chronic conditions and comorbid symptoms for stability, deterioration, or improvement   Collaborate with multidisciplinary team to address chronic and comorbid conditions and prevent exacerbation or deterioration  Note: Patient verbalizes understanding to verbal information given on lab draw/port flush,action and possible side effects. Aware to call MD if develop complications.       Problem: Infection - Adult  Goal: Absence of infection at discharge  Outcome: Adequate for Discharge  Flowsheets (Taken 5/7/2024 1339)  Absence of infection at discharge:   Assess and monitor for signs and symptoms of infection   Monitor all insertion sites i.e., indwelling lines, tubes and drains  Note: Mediport site with no redness or warmth. Skin over port site intact with no signs of breakdown noted. Patient verbalizes signs/symptoms of port infection and when to notify the physician.    Goal: Will show no infection signs and symptoms  Description: Will show no infection signs and symptoms  Outcome: Adequate for Discharge     Problem: Discharge Planning  Goal: Discharge to home or other facility with appropriate resources  Outcome: Adequate for Discharge  Flowsheets (Taken 5/7/2024 1339)  Discharge to home or other facility with appropriate resources: Identify barriers to discharge with patient and caregiver     Problem: Safety - Adult  Goal: Free from fall injury  Outcome: Adequate for Discharge  Flowsheets (Taken 5/7/2024 1339)  Free From Fall Injury: Instruct family/caregiver on patient safety     Care plan reviewed with patient.  Patient verbalize understanding of the plan of

## 2024-05-07 NOTE — DISCHARGE INSTRUCTIONS
Physician's instructions:  Instructions  Port flush today.  Every 2 months  Next brain MRI  5/20/24  ordered by radiation oncology  Follow-up with me 8/13 and get CT CAP w 1 week before 8/6/24 /ordered.  On Decadron 1 mg p.o. daily.  Decreased to 1 mg every other day.

## 2024-05-07 NOTE — PATIENT INSTRUCTIONS
Port flush today.  Every 2 months  Next brain MRI  5/20/24  ordered by radiation oncology  Follow-up with me 8/13 and get CT CAP w 1 week before 8/6/24 /ordered.  On Decadron 1 mg p.o. daily.  Decreased to 1 mg every other day.

## 2024-05-07 NOTE — PROGRESS NOTES
Patient tolerated line/lab without any complications.    Last vital signs:   /72   Pulse 74   Temp 98.4 °F (36.9 °C) (Oral)   Resp 16   Ht 1.702 m (5' 7.01\")   Wt 79 kg (174 lb 3.2 oz)   SpO2 95%   BMI 27.28 kg/m²     Physician's instructions:  Signed         Port flush today.  Every 2 months  Next brain MRI  5/20/24  ordered by radiation oncology  Follow-up with me 8/13 and get CT CAP w 1 week before 8/6/24 /ordered.  On Decadron 1 mg p.o. daily.  Decreased to 1 mg every other day.           Patient instructed if experience any symptoms following today's line/lab, he is to notify MD immediately or go to the emergency department.    Discharge instructions given to patient. Verbalizes understanding. Ambulated off unit per self, accompanied by spouse, with belongings.

## 2024-05-20 ENCOUNTER — HOSPITAL ENCOUNTER (OUTPATIENT)
Dept: MRI IMAGING | Age: 64
Discharge: HOME | End: 2024-05-20
Payer: MEDICAID

## 2024-05-20 DIAGNOSIS — C15.9 PRIMARY CANCER OF ESOPHAGUS WITH METASTASIS TO OTHER SITE (HCC): ICD-10-CM

## 2024-05-20 PROCEDURE — 70553 MRI BRAIN STEM W/O & W/DYE: CPT

## 2024-05-20 PROCEDURE — 6360000002 HC RX W HCPCS: Performed by: RADIOLOGY

## 2024-05-20 PROCEDURE — 6360000004 HC RX CONTRAST MEDICATION

## 2024-05-20 PROCEDURE — A9579 GAD-BASE MR CONTRAST NOS,1ML: HCPCS

## 2024-05-20 RX ORDER — HEPARIN SODIUM (PORCINE) LOCK FLUSH IV SOLN 100 UNIT/ML 100 UNIT/ML
500 SOLUTION INTRAVENOUS ONCE
Status: COMPLETED | OUTPATIENT
Start: 2024-05-20 | End: 2024-05-20

## 2024-05-20 RX ADMIN — HEPARIN 500 UNITS: 100 SYRINGE at 14:21

## 2024-05-20 RX ADMIN — GADOTERIDOL 15 ML: 279.3 INJECTION, SOLUTION INTRAVENOUS at 13:59

## 2024-05-29 RX ORDER — DEXAMETHASONE 2 MG/1
1 TABLET ORAL EVERY OTHER DAY
Qty: 15 TABLET | Refills: 1 | Status: CANCELLED | OUTPATIENT
Start: 2024-05-29

## 2024-05-29 RX ORDER — DEXAMETHASONE 2 MG/1
1 TABLET ORAL EVERY OTHER DAY
Qty: 15 TABLET | Refills: 1 | Status: SHIPPED | OUTPATIENT
Start: 2024-05-29

## 2024-05-29 RX ORDER — DEXAMETHASONE 2 MG/1
TABLET ORAL
Qty: 15 TABLET | Refills: 1 | Status: SHIPPED | OUTPATIENT
Start: 2024-05-29 | End: 2024-05-29

## 2024-05-29 NOTE — TELEPHONE ENCOUNTER
Pharmacy sending in medication request but looks like he is suppose to be taking it different from your last note.

## 2024-05-31 ENCOUNTER — CLINICAL DOCUMENTATION (OUTPATIENT)
Dept: NUTRITION | Age: 64
End: 2024-05-31

## 2024-05-31 ENCOUNTER — HOSPITAL ENCOUNTER (OUTPATIENT)
Dept: RADIATION ONCOLOGY | Age: 64
Discharge: HOME | End: 2024-05-31
Payer: MEDICAID

## 2024-05-31 VITALS
WEIGHT: 181.4 LBS | DIASTOLIC BLOOD PRESSURE: 81 MMHG | OXYGEN SATURATION: 96 % | SYSTOLIC BLOOD PRESSURE: 120 MMHG | HEART RATE: 109 BPM | TEMPERATURE: 98.2 F | BODY MASS INDEX: 28.4 KG/M2 | RESPIRATION RATE: 18 BRPM

## 2024-05-31 DIAGNOSIS — R51.9 OCCIPITAL HEADACHE: ICD-10-CM

## 2024-05-31 DIAGNOSIS — C15.9 PRIMARY CANCER OF ESOPHAGUS WITH METASTASIS TO OTHER SITE (HCC): Primary | ICD-10-CM

## 2024-05-31 PROCEDURE — 99212 OFFICE O/P EST SF 10 MIN: CPT

## 2024-05-31 RX ORDER — GABAPENTIN 100 MG/1
100 CAPSULE ORAL 2 TIMES DAILY
Qty: 180 CAPSULE | Refills: 3 | Status: SHIPPED | OUTPATIENT
Start: 2024-05-31 | End: 2025-05-31

## 2024-05-31 ASSESSMENT — ENCOUNTER SYMPTOMS
NAUSEA: 0
RECTAL PAIN: 0
VOMITING: 0
SHORTNESS OF BREATH: 0
TROUBLE SWALLOWING: 0
SORE THROAT: 0
BLOOD IN STOOL: 0
COUGH: 0
ABDOMINAL PAIN: 0
BACK PAIN: 0
FACIAL SWELLING: 0

## 2024-05-31 NOTE — PROGRESS NOTES
Nutrition Assessment     Reason for Call:   5/31/2024 - 3 month follow-up regarding EN and PO needs  *esophageal cancer s/p esophagectomy (2022), brain mets s/p craniotomy, current with treatment, J-tube placed 5/1/23     Nutrition Recommendations:   -EN for 100% of nutrition - Peptamen 1.5 at 60ml/hr x 24 hours per day = 1440ml formula volume, 2160 kcals, 271 grams CHO, 98 grams protein, 1075ml water (Vital 1.5 at 60ml/hrx 24 hours = 2160 kcals, 97 grams protein, 2469 grams CHO, 1100 ml water in formula.  -Recommend an additional 900ml fluid per day  -PO ad corrina for pleasure and to maintain swallow function     Malnutrition Assessment: (5/31/2024)  Malnutrition Status: no malnutrition  Context: chronic illness  Findings of the 6 clinical characteristics of malnutrition (minimum of 2 out of 6 clinical characteristics is required to make the dx of moderate or severe Protein Calorie Malnutrition based on AND/ASPEN Guidelines):              1. Energy Intake: 100% of needs met with EN              2. Weight Loss: no loss              3. Fat Loss: not able to assess              4. Muscle Loss: not able to assess              5. Fluid Accumulation: not able to assess              6.  Strength: not measured     Nutrition Diagnosis:   Problem: altered GI function  Etiology: esophageal cancer  Signs and Symptoms: need for 100% of nutrition via J-tube     Nutrition Assessment:   History: esophageal cancer with brain mets, HTN, GERD, HLD  Subjective:   5/31/2024 - I spoke patient's wife Jocelyne today. Jocelyne reports that patient is doing well. Patient continues on Pepatmen 1.5 at 60ml/hr x 24 hours/day. Patient does get water flushes of 120ml TID along with a bottle of gatorade and mushroom tea daily. Patient is tolerating EN well. Jocelyne says that patient will nibble on veggies for pleasure but his oral intake is hit or miss. Jocelyne also reports that patient has gained some weight. Jocelyne is very happy with how well patient is

## 2024-05-31 NOTE — PROGRESS NOTES
enhancement.  2. Stable areas of abnormal signal at treated metastases.  3. Moderate severity chronic small vessel ischemic changes.     4/29/2024 Imaging    CT CAP    IMPRESSION:  1. Stable CT scan of the abdomen and pelvis, no interval change since previous study dated 1/27/2024.     5/20/2024 Imaging    MRI brain w wo con    FINDINGS:  There are postoperative changes in the right cerebellar hemisphere.  The diffusion-weighted images are normal. The brain volume is reduced.There are  no intra-or extra-axial collections.  There is no hydrocephalus, midline shift  or mass effect.  On the FLAIR and T2-weighted sequences, there our areas of abnormal signal  intensity in the right occipital lobe, right and left cerebellar hemispheres,  consistent with treated metastases. There is increased signal intensity in the  white matter possibly representing changes of radiation therapy.  On the gradient echo T2-weighted images, there our areas of susceptibility  artifact in the right and left cerebellar hemispheres, right and left occipital  lobes, in the right temporal lobe and left frontal lobe.  There is no abnormal enhancement in the brain. The major intracranial vascular  flow voids are present.  The midline craniocervical junction structures are  normal.  The brainstem and pituitary gland are normal.     IMPRESSION:  1. Stable MRI scan of the brain with and without intravenous contrast, no interval change since previous study dated 2/19/2024.  2. Postoperative changes in the right cerebellar hemisphere.  3. Treated metastasis in the right and left cerebellar hemispheres and right occipital lobe.  4. No abnormal enhancement noted to suggest recurrent metastatic disease.  5. Increased signal intensity in the white matter possibly representing changes of radiation therapy.         INTERVAL HISTORY:  Niranjan Yañez is a 63 y.o. male is presenting today for regularly scheduled follow up regarding the above mentioned oncologic

## 2024-06-04 DIAGNOSIS — N52.9 ERECTILE DYSFUNCTION, UNSPECIFIED ERECTILE DYSFUNCTION TYPE: ICD-10-CM

## 2024-06-04 RX ORDER — TADALAFIL 5 MG/1
5 TABLET ORAL DAILY PRN
Qty: 30 TABLET | Refills: 0 | Status: SHIPPED | OUTPATIENT
Start: 2024-06-04

## 2024-07-03 DIAGNOSIS — N52.9 ERECTILE DYSFUNCTION, UNSPECIFIED ERECTILE DYSFUNCTION TYPE: ICD-10-CM

## 2024-07-03 RX ORDER — TADALAFIL 5 MG/1
5 TABLET ORAL DAILY PRN
Qty: 90 TABLET | Refills: 3 | Status: SHIPPED | OUTPATIENT
Start: 2024-07-03

## 2024-08-05 ENCOUNTER — HOSPITAL ENCOUNTER (OUTPATIENT)
Dept: CT IMAGING | Age: 64
Discharge: HOME | End: 2024-08-05
Attending: INTERNAL MEDICINE
Payer: MEDICARE

## 2024-08-05 DIAGNOSIS — C15.9 METASTASIS FROM ESOPHAGEAL CANCER (HCC): ICD-10-CM

## 2024-08-05 DIAGNOSIS — C79.9 METASTASIS FROM ESOPHAGEAL CANCER (HCC): ICD-10-CM

## 2024-08-05 LAB — POC CREATININE WHOLE BLOOD: 0.8 MG/DL (ref 0.5–1.2)

## 2024-08-05 PROCEDURE — 6360000004 HC RX CONTRAST MEDICATION: Performed by: INTERNAL MEDICINE

## 2024-08-05 PROCEDURE — 74177 CT ABD & PELVIS W/CONTRAST: CPT

## 2024-08-05 PROCEDURE — 71260 CT THORAX DX C+: CPT

## 2024-08-05 PROCEDURE — 6360000002 HC RX W HCPCS: Performed by: RADIOLOGY

## 2024-08-05 PROCEDURE — 82565 ASSAY OF CREATININE: CPT

## 2024-08-05 RX ORDER — HEPARIN SODIUM (PORCINE) LOCK FLUSH IV SOLN 100 UNIT/ML 100 UNIT/ML
500 SOLUTION INTRAVENOUS ONCE
Status: COMPLETED | OUTPATIENT
Start: 2024-08-05 | End: 2024-08-05

## 2024-08-05 RX ADMIN — IOPAMIDOL 80 ML: 755 INJECTION, SOLUTION INTRAVENOUS at 10:37

## 2024-08-05 RX ADMIN — HEPARIN 500 UNITS: 100 SYRINGE at 11:00

## 2024-08-06 DIAGNOSIS — C15.5 CANCER OF DISTAL THIRD OF ESOPHAGUS (HCC): ICD-10-CM

## 2024-08-06 RX ORDER — PROCHLORPERAZINE MALEATE 10 MG
TABLET ORAL
Qty: 60 TABLET | Refills: 2 | Status: SHIPPED | OUTPATIENT
Start: 2024-08-06

## 2024-08-13 ENCOUNTER — OFFICE VISIT (OUTPATIENT)
Dept: ONCOLOGY | Age: 64
End: 2024-08-13
Payer: MEDICARE

## 2024-08-13 ENCOUNTER — HOSPITAL ENCOUNTER (OUTPATIENT)
Dept: INFUSION THERAPY | Age: 64
Discharge: HOME | End: 2024-08-13
Payer: MEDICARE

## 2024-08-13 VITALS
HEART RATE: 80 BPM | TEMPERATURE: 98.2 F | DIASTOLIC BLOOD PRESSURE: 79 MMHG | OXYGEN SATURATION: 95 % | RESPIRATION RATE: 16 BRPM | BODY MASS INDEX: 27.15 KG/M2 | WEIGHT: 173 LBS | SYSTOLIC BLOOD PRESSURE: 119 MMHG | HEIGHT: 67 IN

## 2024-08-13 VITALS
DIASTOLIC BLOOD PRESSURE: 79 MMHG | OXYGEN SATURATION: 95 % | SYSTOLIC BLOOD PRESSURE: 119 MMHG | HEIGHT: 67 IN | RESPIRATION RATE: 16 BRPM | TEMPERATURE: 98.2 F | BODY MASS INDEX: 27.15 KG/M2 | WEIGHT: 173 LBS | HEART RATE: 80 BPM

## 2024-08-13 DIAGNOSIS — Z12.5 PROSTATE CANCER SCREENING: ICD-10-CM

## 2024-08-13 DIAGNOSIS — C15.5 CANCER OF DISTAL THIRD OF ESOPHAGUS (HCC): Primary | ICD-10-CM

## 2024-08-13 LAB
ALBUMIN SERPL BCG-MCNC: 4.4 G/DL (ref 3.5–5.1)
ALP SERPL-CCNC: 73 U/L (ref 38–126)
ALT SERPL W/O P-5'-P-CCNC: 35 U/L (ref 11–66)
AST SERPL-CCNC: 28 U/L (ref 5–40)
BASOPHILS ABSOLUTE: 0 THOU/MM3 (ref 0–0.1)
BASOPHILS NFR BLD AUTO: 0 % (ref 0–3)
BILIRUB CONJ SERPL-MCNC: < 0.1 MG/DL (ref 0.1–13.8)
BILIRUB SERPL-MCNC: 0.3 MG/DL (ref 0.3–1.2)
BUN BLDP-MCNC: 14 MG/DL (ref 8–26)
CHLORIDE BLD-SCNC: 103 MEQ/L (ref 98–109)
CREAT BLD-MCNC: 0.6 MG/DL (ref 0.5–1.2)
EOSINOPHIL NFR BLD AUTO: 0 % (ref 0–4)
EOSINOPHILS ABSOLUTE: 0 THOU/MM3 (ref 0–0.4)
ERYTHROCYTE [DISTWIDTH] IN BLOOD BY AUTOMATED COUNT: 11.9 % (ref 11.5–14.5)
GFR SERPL CREATININE-BSD FRML MDRD: > 90 ML/MIN/1.73M2
GLUCOSE BLD-MCNC: 95 MG/DL (ref 70–108)
HCT VFR BLD AUTO: 40.2 % (ref 42–52)
HGB BLD-MCNC: 13.7 GM/DL (ref 14–18)
IMMATURE GRANULOCYTES %: 1 %
IMMATURE GRANULOCYTES ABSOLUTE: 0.06 THOU/MM3 (ref 0–0.07)
IONIZED CALCIUM, WHOLE BLOOD: 1.19 MMOL/L (ref 1.12–1.32)
LYMPHOCYTES ABSOLUTE: 0.8 THOU/MM3 (ref 1–4.8)
LYMPHOCYTES NFR BLD AUTO: 14 % (ref 15–47)
MCH RBC QN AUTO: 32.2 PG (ref 26–33)
MCHC RBC AUTO-ENTMCNC: 34.1 GM/DL (ref 32.2–35.5)
MCV RBC AUTO: 94 FL (ref 80–94)
MONOCYTES ABSOLUTE: 0.4 THOU/MM3 (ref 0.4–1.3)
MONOCYTES NFR BLD AUTO: 7 % (ref 0–12)
NEUTROPHILS ABSOLUTE: 4.7 THOU/MM3 (ref 1.8–7.7)
NEUTROPHILS NFR BLD AUTO: 78 % (ref 43–75)
PLATELET # BLD AUTO: 180 THOU/MM3 (ref 130–400)
PMV BLD AUTO: 8.5 FL (ref 9.4–12.4)
POTASSIUM BLD-SCNC: 4.1 MEQ/L (ref 3.5–4.9)
PROT SERPL-MCNC: 7.2 G/DL (ref 6.1–8)
RBC # BLD AUTO: 4.26 MILL/MM3 (ref 4.7–6.1)
SODIUM BLD-SCNC: 140 MEQ/L (ref 138–146)
TOTAL CO2, WHOLE BLOOD: 27 MEQ/L (ref 23–33)
WBC # BLD AUTO: 6 THOU/MM3 (ref 4.8–10.8)

## 2024-08-13 PROCEDURE — G0103 PSA SCREENING: HCPCS

## 2024-08-13 PROCEDURE — 3017F COLORECTAL CA SCREEN DOC REV: CPT | Performed by: INTERNAL MEDICINE

## 2024-08-13 PROCEDURE — 1036F TOBACCO NON-USER: CPT | Performed by: INTERNAL MEDICINE

## 2024-08-13 PROCEDURE — 80076 HEPATIC FUNCTION PANEL: CPT

## 2024-08-13 PROCEDURE — G8419 CALC BMI OUT NRM PARAM NOF/U: HCPCS | Performed by: INTERNAL MEDICINE

## 2024-08-13 PROCEDURE — 99211 OFF/OP EST MAY X REQ PHY/QHP: CPT

## 2024-08-13 PROCEDURE — 99214 OFFICE O/P EST MOD 30 MIN: CPT | Performed by: INTERNAL MEDICINE

## 2024-08-13 PROCEDURE — 36591 DRAW BLOOD OFF VENOUS DEVICE: CPT

## 2024-08-13 PROCEDURE — G8427 DOCREV CUR MEDS BY ELIG CLIN: HCPCS | Performed by: INTERNAL MEDICINE

## 2024-08-13 PROCEDURE — 3074F SYST BP LT 130 MM HG: CPT | Performed by: INTERNAL MEDICINE

## 2024-08-13 PROCEDURE — 2580000003 HC RX 258: Performed by: INTERNAL MEDICINE

## 2024-08-13 PROCEDURE — 6360000002 HC RX W HCPCS: Performed by: INTERNAL MEDICINE

## 2024-08-13 PROCEDURE — 85025 COMPLETE CBC W/AUTO DIFF WBC: CPT

## 2024-08-13 PROCEDURE — 80047 BASIC METABLC PNL IONIZED CA: CPT

## 2024-08-13 PROCEDURE — 3078F DIAST BP <80 MM HG: CPT | Performed by: INTERNAL MEDICINE

## 2024-08-13 RX ORDER — DEXAMETHASONE 2 MG/1
1 TABLET ORAL EVERY OTHER DAY
Qty: 15 TABLET | Refills: 1 | Status: SHIPPED | OUTPATIENT
Start: 2024-08-13 | End: 2024-08-13

## 2024-08-13 RX ORDER — DEXAMETHASONE 2 MG/1
TABLET ORAL
Qty: 4 TABLET | Refills: 0 | Status: SHIPPED | OUTPATIENT
Start: 2024-08-13

## 2024-08-13 RX ORDER — HEPARIN 100 UNIT/ML
500 SYRINGE INTRAVENOUS PRN
Status: DISCONTINUED | OUTPATIENT
Start: 2024-08-13 | End: 2024-08-14 | Stop reason: HOSPADM

## 2024-08-13 RX ORDER — SODIUM CHLORIDE 0.9 % (FLUSH) 0.9 %
5-40 SYRINGE (ML) INJECTION PRN
Status: DISCONTINUED | OUTPATIENT
Start: 2024-08-13 | End: 2024-08-14 | Stop reason: HOSPADM

## 2024-08-13 RX ADMIN — SODIUM CHLORIDE, PRESERVATIVE FREE 30 ML: 5 INJECTION INTRAVENOUS at 12:15

## 2024-08-13 RX ADMIN — HEPARIN 500 UNITS: 100 SYRINGE at 12:15

## 2024-08-13 NOTE — DISCHARGE INSTRUCTIONS
Physician's instructions:  Instructions  -CBC, CMP and PSA today; port draw today  -Urology referral because of difficulty with urination and possible BPH  -Taper  Decadron 1 mg Tuesdays and Thursdays for 2 weeks then discontinue.  -E-prescription for Decadron tablets.  -Radiation oncology has ordered the next brain MRI to be done next month  -Add to schedule for port draw with labs today.  Port flush every 2 months  -Next CT chest abdomen pelvis in 3 months ordered to be done 11/4/2024.  -Please schedule follow-up with me 1 week later.

## 2024-08-13 NOTE — PROGRESS NOTES
Patient tolerated line/lab without any complications.    Last vital signs:   /79   Pulse 80   Temp 98.2 °F (36.8 °C) (Oral)   Resp 16   Ht 1.702 m (5' 7.01\")   Wt 78.5 kg (173 lb)   SpO2 95%   BMI 27.09 kg/m²     Physician's instructions:  Instructions  -CBC, CMP and PSA today; port draw today  -Urology referral because of difficulty with urination and possible BPH  -Taper  Decadron 1 mg Tuesdays and Thursdays for 2 weeks then discontinue.  -E-prescription for Decadron tablets.  -Radiation oncology has ordered the next brain MRI to be done next month  -Add to schedule for port draw with labs today.  Port flush every 2 months  -Next CT chest abdomen pelvis in 3 months ordered to be done 11/4/2024.  -Please schedule follow-up with me 1 week later.        Patient instructed if experience any symptoms following today's line/lab, he is to notify MD immediately or go to the emergency department.    Discharge instructions given to patient. Verbalizes understanding. Ambulated off unit per self, accompanied by family, with belongings.

## 2024-08-13 NOTE — PATIENT INSTRUCTIONS
-CBC, CMP and PSA today; port draw today  -Urology referral because of difficulty with urination and possible BPH  -Taper  Decadron 1 mg Tuesdays and Thursdays for 2 weeks then discontinue.  -E-prescription for Decadron tablets.  -Radiation oncology has ordered the next brain MRI to be done next month  -Add to schedule for port draw with labs today.  Port flush every 2 months  -Next CT chest abdomen pelvis in 3 months ordered to be done 11/4/2024.  -Please schedule follow-up with me 1 week later.

## 2024-08-13 NOTE — PROGRESS NOTES
Food Insecurity: No Food Insecurity (3/19/2024)    Hunger Vital Sign     Worried About Running Out of Food in the Last Year: Never true     Ran Out of Food in the Last Year: Never true   Transportation Needs: Unknown (3/19/2024)    PRAPARE - Transportation     Lack of Transportation (Medical): Not on file     Lack of Transportation (Non-Medical): No   Physical Activity: Not on file   Stress: Not on file   Social Connections: Not on file   Intimate Partner Violence: Not on file   Housing Stability: Unknown (3/19/2024)    Housing Stability Vital Sign     Unable to Pay for Housing in the Last Year: Not on file     Number of Places Lived in the Last Year: Not on file     Unstable Housing in the Last Year: No        Spouse: Jocelyne  [phone]    Phone: [phone] [address] [facility] [address]     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 [employer]    Immunizations:  Immunization History   Administered Date(s) Administered    Influenza Virus Vaccine 09/15/2015        Health Screenings:    C-Scope:  5 years ago  Prostate:   Lab Results   Component Value Date    PSA 1.41 03/22/2017            Health Maintenance Due   Topic Date Due    COVID-19 Vaccine (1) Never done    Pneumococcal 0-64 years Vaccine (1 of 2 - PCV) Never done    DTaP/Tdap/Td vaccine (1 - Tdap) Never done    Shingles vaccine (1 of 2) Never done    Respiratory Syncytial Virus (RSV) Pregnant or age 60 yrs+ (1 - 1-dose 60+ series) Never done    Flu vaccine (1) 08/01/2024    Annual Wellness Visit (Medicare)  Never done        Interests:   Cars.music family,    Fam HX:   Family History   Problem Relation Age of Onset    Colon Cancer Mother     Heart Disease Father     Cancer Brother         lung        Hospitalizations:   2/10/22    Allergies:  Allergies   Allergen Reactions    Paclitaxel Other (See Comments)     Severe lower back pain- able to resume after medications given    Aleve [Naproxen] Hives    Promethazine Rash        Adult

## 2024-08-13 NOTE — PLAN OF CARE
Problem: Chronic Conditions and Co-morbidities  Goal: Patient's chronic conditions and co-morbidity symptoms are monitored and maintained or improved  Outcome: Adequate for Discharge  Flowsheets (Taken 8/13/2024 1455)  Care Plan - Patient's Chronic Conditions and Co-Morbidity Symptoms are Monitored and Maintained or Improved: Monitor and assess patient's chronic conditions and comorbid symptoms for stability, deterioration, or improvement     Problem: Infection - Adult  Goal: Absence of infection at discharge  Outcome: Adequate for Discharge  Flowsheets (Taken 8/13/2024 1453)  Absence of infection at discharge: Monitor all insertion sites i.e., indwelling lines, tubes and drains  Note: Mediport site with no redness or warmth. Skin over port site intact with no signs of breakdown noted. Patient verbalizes signs/symptoms of port infection and when to notify the physician.    Goal: Will show no infection signs and symptoms  Description: Will show no infection signs and symptoms  Outcome: Adequate for Discharge     Problem: Discharge Planning  Goal: Discharge to home or other facility with appropriate resources  Outcome: Adequate for Discharge  Flowsheets (Taken 8/13/2024 1453)  Discharge to home or other facility with appropriate resources: Arrange for needed discharge resources and transportation as appropriate     Problem: Safety - Adult  Goal: Free from fall injury  Outcome: Adequate for Discharge  Flowsheets (Taken 8/13/2024 1453)  Free From Fall Injury: Instruct family/caregiver on patient safety

## 2024-08-14 LAB — PSA SERPL-MCNC: 1.96 NG/ML (ref 0–1)

## 2024-09-04 ENCOUNTER — OFFICE VISIT (OUTPATIENT)
Dept: UROLOGY | Age: 64
End: 2024-09-04
Payer: MEDICARE

## 2024-09-04 VITALS — RESPIRATION RATE: 18 BRPM | HEIGHT: 67 IN | WEIGHT: 173 LBS | BODY MASS INDEX: 27.15 KG/M2

## 2024-09-04 DIAGNOSIS — N13.8 BENIGN PROSTATIC HYPERPLASIA WITH URINARY OBSTRUCTION: ICD-10-CM

## 2024-09-04 DIAGNOSIS — R39.198 DIFFICULTY URINATING: Primary | ICD-10-CM

## 2024-09-04 DIAGNOSIS — N40.1 BENIGN PROSTATIC HYPERPLASIA WITH URINARY OBSTRUCTION: ICD-10-CM

## 2024-09-04 LAB
BILIRUBIN, URINE: NEGATIVE
BLOOD URINE, POC: NEGATIVE
CHARACTER, URINE: CLEAR
COLOR, UA: ABNORMAL
GLUCOSE URINE: NEGATIVE MG/DL
KETONES, URINE: 15
LEUKOCYTE CLUMPS, URINE: NEGATIVE
NITRITE, URINE: NEGATIVE
PH, URINE: 5.5 (ref 5–9)
POST VOID RESIDUAL (PVR): 63 ML
PROTEIN, URINE: ABNORMAL MG/DL
SPECIFIC GRAVITY UA: >= 1.03 (ref 1–1.03)
UROBILINOGEN, URINE: 0.2 EU/DL (ref 0–1)

## 2024-09-04 PROCEDURE — 3017F COLORECTAL CA SCREEN DOC REV: CPT | Performed by: UROLOGY

## 2024-09-04 PROCEDURE — G8419 CALC BMI OUT NRM PARAM NOF/U: HCPCS | Performed by: UROLOGY

## 2024-09-04 PROCEDURE — 51798 US URINE CAPACITY MEASURE: CPT | Performed by: UROLOGY

## 2024-09-04 PROCEDURE — G8427 DOCREV CUR MEDS BY ELIG CLIN: HCPCS | Performed by: UROLOGY

## 2024-09-04 PROCEDURE — 1036F TOBACCO NON-USER: CPT | Performed by: UROLOGY

## 2024-09-04 PROCEDURE — 81003 URINALYSIS AUTO W/O SCOPE: CPT | Performed by: UROLOGY

## 2024-09-04 PROCEDURE — 99204 OFFICE O/P NEW MOD 45 MIN: CPT | Performed by: UROLOGY

## 2024-09-04 RX ORDER — TADALAFIL 20 MG/1
20 TABLET ORAL DAILY PRN
Qty: 30 TABLET | Refills: 3 | Status: ON HOLD | OUTPATIENT
Start: 2024-09-04

## 2024-09-04 RX ORDER — TAMSULOSIN HYDROCHLORIDE 0.4 MG/1
0.4 CAPSULE ORAL DAILY
Qty: 90 CAPSULE | Refills: 3 | Status: ON HOLD | OUTPATIENT
Start: 2024-09-04

## 2024-09-04 NOTE — PROGRESS NOTES
Dr. Niranjan Choudhary MD  [facility]  Urology Clinic  New Patient Visit      Patient:  Niranjan Yañez  YOB: 1960  Date: 9/4/2024    HISTORY OF PRESENT ILLNESS:   The patient is a 64 y.o. male who presents today for evaluation of the following problem(s): ED and BPH.     Overall the problem(s) : are worsening.  Associated Symptoms: No dysuria, gross hematuria.  Pain Severity: 0/10    Summary of old records:   Tadalafil 5mg for BPH and ED    Imaging/Labs reviewed during today's visit:  I have independently reviewed and verified the following films during today's visit.  PVR bladder US.     Last several PSA's:  Lab Results   Component Value Date    PSA 1.96 (H) 08/13/2024    PSA 1.41 03/22/2017       Last total testosterone:  No results found for: \"TESTOSTERONE\"    Urinalysis today:  Results for POC orders placed in visit on 09/04/24   POCT Urinalysis No Micro (Auto)   Result Value Ref Range    Glucose, Ur Negative NEGATIVE mg/dl    Bilirubin, Urine Negative     Ketones, Urine 15 (A) NEGATIVE    Specific Gravity, UA >=1.030 1.002 - 1.030    Blood, UA POC Negative NEGATIVE    pH, Urine 5.50 5.0 - 9.0    Protein, Urine Trace (A) NEGATIVE mg/dl    Urobilinogen, Urine 0.20 0.0 - 1.0 eu/dl    Nitrite, Urine Negative NEGATIVE    Leukocyte Clumps, Urine Negative NEGATIVE    Color, UA [place] (A) YELLOW-STRAW    Character, Urine Clear CLR-SL.CLOUD   poct post void residual   Result Value Ref Range    post void residual 63 ml         Last BUN and creatinine:  Lab Results   Component Value Date    BUN 8 08/31/2023     Lab Results   Component Value Date    CREATININE 0.6 08/13/2024       Imaging Reviewed during this Office Visit:   (results were independently reviewed by physician and radiology report verified)    PAST MEDICAL, FAMILY AND SOCIAL HISTORY:  Past Medical History:   Diagnosis Date    Atrial fibrillation (HCC)     Benign hypertension 11/03/2022    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

## 2024-09-07 ENCOUNTER — APPOINTMENT (OUTPATIENT)
Dept: CT IMAGING | Age: 64
DRG: 055 | End: 2024-09-07
Payer: MEDICARE

## 2024-09-07 ENCOUNTER — APPOINTMENT (OUTPATIENT)
Dept: GENERAL RADIOLOGY | Age: 64
DRG: 055 | End: 2024-09-07
Payer: MEDICARE

## 2024-09-07 ENCOUNTER — HOSPITAL ENCOUNTER (INPATIENT)
Age: 64
LOS: 1 days | Discharge: HOME HEALTH | DRG: 055 | End: 2024-09-10
Attending: EMERGENCY MEDICINE | Admitting: PHYSICIAN ASSISTANT
Payer: MEDICARE

## 2024-09-07 DIAGNOSIS — R41.82 ALTERED MENTAL STATUS, UNSPECIFIED ALTERED MENTAL STATUS TYPE: Primary | ICD-10-CM

## 2024-09-07 DIAGNOSIS — C79.31 METASTASIS TO BRAIN (HCC): ICD-10-CM

## 2024-09-07 DIAGNOSIS — I48.91 ATRIAL FIBRILLATION, UNSPECIFIED TYPE (HCC): ICD-10-CM

## 2024-09-07 DIAGNOSIS — R29.90 STROKE-LIKE SYMPTOMS: ICD-10-CM

## 2024-09-07 DIAGNOSIS — R40.4 TRANSIENT ALTERATION OF AWARENESS: ICD-10-CM

## 2024-09-07 LAB
ALBUMIN SERPL BCG-MCNC: 4.2 G/DL (ref 3.5–5.1)
ALP SERPL-CCNC: 76 U/L (ref 38–126)
ALT SERPL W/O P-5'-P-CCNC: 36 U/L (ref 11–66)
AMMONIA PLAS-MCNC: 24 UMOL/L (ref 11–60)
ANION GAP SERPL CALC-SCNC: 14 MEQ/L (ref 8–16)
APTT PPP: 30.7 SECONDS (ref 22–38)
AST SERPL-CCNC: 31 U/L (ref 5–40)
BASOPHILS ABSOLUTE: 0 THOU/MM3 (ref 0–0.1)
BASOPHILS NFR BLD AUTO: 0.3 %
BILIRUB SERPL-MCNC: 0.4 MG/DL (ref 0.3–1.2)
BUN SERPL-MCNC: 12 MG/DL (ref 7–22)
CALCIUM SERPL-MCNC: 9.4 MG/DL (ref 8.5–10.5)
CHLORIDE SERPL-SCNC: 99 MEQ/L (ref 98–111)
CO2 SERPL-SCNC: 23 MEQ/L (ref 23–33)
CREAT SERPL-MCNC: 0.7 MG/DL (ref 0.4–1.2)
DEPRECATED RDW RBC AUTO: 43.8 FL (ref 35–45)
EKG ATRIAL RATE: 86 BPM
EKG P AXIS: 41 DEGREES
EKG P-R INTERVAL: 198 MS
EKG Q-T INTERVAL: 362 MS
EKG QRS DURATION: 74 MS
EKG QTC CALCULATION (BAZETT): 433 MS
EKG R AXIS: -9 DEGREES
EKG T AXIS: 20 DEGREES
EKG VENTRICULAR RATE: 86 BPM
EOSINOPHIL NFR BLD AUTO: 0.7 %
EOSINOPHILS ABSOLUTE: 0 THOU/MM3 (ref 0–0.4)
ERYTHROCYTE [DISTWIDTH] IN BLOOD BY AUTOMATED COUNT: 12.6 % (ref 11.5–14.5)
FLUAV RNA RESP QL NAA+PROBE: NOT DETECTED
FLUBV RNA RESP QL NAA+PROBE: NOT DETECTED
GFR SERPL CREATININE-BSD FRML MDRD: > 90 ML/MIN/1.73M2
GLUCOSE BLD STRIP.AUTO-MCNC: 105 MG/DL (ref 70–108)
GLUCOSE SERPL-MCNC: 94 MG/DL (ref 70–108)
HCT VFR BLD AUTO: 38.2 % (ref 42–52)
HGB BLD-MCNC: 13 GM/DL (ref 14–18)
IMM GRANULOCYTES # BLD AUTO: 0.03 THOU/MM3 (ref 0–0.07)
IMM GRANULOCYTES NFR BLD AUTO: 0.5 %
INR PPP: 0.93 (ref 0.85–1.13)
LIPASE SERPL-CCNC: 37.9 U/L (ref 5.6–51.3)
LYMPHOCYTES ABSOLUTE: 0.7 THOU/MM3 (ref 1–4.8)
LYMPHOCYTES NFR BLD AUTO: 12 %
MCH RBC QN AUTO: 32.2 PG (ref 26–33)
MCHC RBC AUTO-ENTMCNC: 34 GM/DL (ref 32.2–35.5)
MCV RBC AUTO: 94.6 FL (ref 80–94)
MONOCYTES ABSOLUTE: 0.5 THOU/MM3 (ref 0.4–1.3)
MONOCYTES NFR BLD AUTO: 8.9 %
NEUTROPHILS ABSOLUTE: 4.7 THOU/MM3 (ref 1.8–7.7)
NEUTROPHILS NFR BLD AUTO: 77.6 %
NRBC BLD AUTO-RTO: 0 /100 WBC
OSMOLALITY SERPL CALC.SUM OF ELEC: 271.5 MOSMOL/KG (ref 275–300)
PLATELET # BLD AUTO: 122 THOU/MM3 (ref 130–400)
PMV BLD AUTO: 8.5 FL (ref 9.4–12.4)
POC CREATININE WHOLE BLOOD: 0.8 MG/DL (ref 0.5–1.2)
POTASSIUM SERPL-SCNC: 3.8 MEQ/L (ref 3.5–5.2)
PROT SERPL-MCNC: 6.6 G/DL (ref 6.1–8)
RBC # BLD AUTO: 4.04 MILL/MM3 (ref 4.7–6.1)
SARS-COV-2 RNA RESP QL NAA+PROBE: NOT DETECTED
SODIUM SERPL-SCNC: 136 MEQ/L (ref 135–145)
TROPONIN, HIGH SENSITIVITY: 25 NG/L (ref 0–12)
TSH SERPL DL<=0.005 MIU/L-ACNC: 1.66 UIU/ML (ref 0.4–4.2)
WBC # BLD AUTO: 6.1 THOU/MM3 (ref 4.8–10.8)

## 2024-09-07 PROCEDURE — 96360 HYDRATION IV INFUSION INIT: CPT

## 2024-09-07 PROCEDURE — 84443 ASSAY THYROID STIM HORMONE: CPT

## 2024-09-07 PROCEDURE — 2580000003 HC RX 258: Performed by: FAMILY MEDICINE

## 2024-09-07 PROCEDURE — 70450 CT HEAD/BRAIN W/O DYE: CPT

## 2024-09-07 PROCEDURE — 80053 COMPREHEN METABOLIC PANEL: CPT

## 2024-09-07 PROCEDURE — 85025 COMPLETE CBC W/AUTO DIFF WBC: CPT

## 2024-09-07 PROCEDURE — 96361 HYDRATE IV INFUSION ADD-ON: CPT

## 2024-09-07 PROCEDURE — G0378 HOSPITAL OBSERVATION PER HR: HCPCS

## 2024-09-07 PROCEDURE — 2580000003 HC RX 258: Performed by: EMERGENCY MEDICINE

## 2024-09-07 PROCEDURE — 82565 ASSAY OF CREATININE: CPT

## 2024-09-07 PROCEDURE — 36415 COLL VENOUS BLD VENIPUNCTURE: CPT

## 2024-09-07 PROCEDURE — 83605 ASSAY OF LACTIC ACID: CPT

## 2024-09-07 PROCEDURE — 81001 URINALYSIS AUTO W/SCOPE: CPT

## 2024-09-07 PROCEDURE — 82948 REAGENT STRIP/BLOOD GLUCOSE: CPT

## 2024-09-07 PROCEDURE — 93010 ELECTROCARDIOGRAM REPORT: CPT | Performed by: INTERNAL MEDICINE

## 2024-09-07 PROCEDURE — 84484 ASSAY OF TROPONIN QUANT: CPT

## 2024-09-07 PROCEDURE — 6360000004 HC RX CONTRAST MEDICATION: Performed by: EMERGENCY MEDICINE

## 2024-09-07 PROCEDURE — 93005 ELECTROCARDIOGRAM TRACING: CPT | Performed by: FAMILY MEDICINE

## 2024-09-07 PROCEDURE — 85610 PROTHROMBIN TIME: CPT

## 2024-09-07 PROCEDURE — 70498 CT ANGIOGRAPHY NECK: CPT

## 2024-09-07 PROCEDURE — 82140 ASSAY OF AMMONIA: CPT

## 2024-09-07 PROCEDURE — 71045 X-RAY EXAM CHEST 1 VIEW: CPT

## 2024-09-07 PROCEDURE — 83690 ASSAY OF LIPASE: CPT

## 2024-09-07 PROCEDURE — 70496 CT ANGIOGRAPHY HEAD: CPT

## 2024-09-07 PROCEDURE — 74177 CT ABD & PELVIS W/CONTRAST: CPT

## 2024-09-07 PROCEDURE — 99285 EMERGENCY DEPT VISIT HI MDM: CPT

## 2024-09-07 PROCEDURE — 87636 SARSCOV2 & INF A&B AMP PRB: CPT

## 2024-09-07 PROCEDURE — 85730 THROMBOPLASTIN TIME PARTIAL: CPT

## 2024-09-07 RX ORDER — SODIUM CHLORIDE 0.9 % (FLUSH) 0.9 %
5-40 SYRINGE (ML) INJECTION PRN
Status: DISCONTINUED | OUTPATIENT
Start: 2024-09-07 | End: 2024-09-10 | Stop reason: HOSPADM

## 2024-09-07 RX ORDER — ONDANSETRON 4 MG/1
4 TABLET, ORALLY DISINTEGRATING ORAL EVERY 8 HOURS PRN
Status: DISCONTINUED | OUTPATIENT
Start: 2024-09-07 | End: 2024-09-10 | Stop reason: HOSPADM

## 2024-09-07 RX ORDER — POLYETHYLENE GLYCOL 3350 17 G/17G
17 POWDER, FOR SOLUTION ORAL DAILY PRN
Status: DISCONTINUED | OUTPATIENT
Start: 2024-09-07 | End: 2024-09-10 | Stop reason: HOSPADM

## 2024-09-07 RX ORDER — POTASSIUM CHLORIDE 1500 MG/1
40 TABLET, EXTENDED RELEASE ORAL PRN
Status: DISCONTINUED | OUTPATIENT
Start: 2024-09-07 | End: 2024-09-10 | Stop reason: HOSPADM

## 2024-09-07 RX ORDER — ONDANSETRON 2 MG/ML
4 INJECTION INTRAMUSCULAR; INTRAVENOUS EVERY 6 HOURS PRN
Status: DISCONTINUED | OUTPATIENT
Start: 2024-09-07 | End: 2024-09-10 | Stop reason: HOSPADM

## 2024-09-07 RX ORDER — IOPAMIDOL 755 MG/ML
80 INJECTION, SOLUTION INTRAVASCULAR
Status: COMPLETED | OUTPATIENT
Start: 2024-09-07 | End: 2024-09-07

## 2024-09-07 RX ORDER — ACETAMINOPHEN 325 MG/1
650 TABLET ORAL EVERY 6 HOURS PRN
Status: DISCONTINUED | OUTPATIENT
Start: 2024-09-07 | End: 2024-09-10 | Stop reason: HOSPADM

## 2024-09-07 RX ORDER — SODIUM CHLORIDE 0.9 % (FLUSH) 0.9 %
5-40 SYRINGE (ML) INJECTION EVERY 12 HOURS SCHEDULED
Status: DISCONTINUED | OUTPATIENT
Start: 2024-09-07 | End: 2024-09-10 | Stop reason: HOSPADM

## 2024-09-07 RX ORDER — 0.9 % SODIUM CHLORIDE 0.9 %
500 INTRAVENOUS SOLUTION INTRAVENOUS ONCE
Status: COMPLETED | OUTPATIENT
Start: 2024-09-07 | End: 2024-09-07

## 2024-09-07 RX ORDER — ENOXAPARIN SODIUM 100 MG/ML
40 INJECTION SUBCUTANEOUS DAILY
Status: DISCONTINUED | OUTPATIENT
Start: 2024-09-08 | End: 2024-09-10 | Stop reason: HOSPADM

## 2024-09-07 RX ORDER — MAGNESIUM SULFATE IN WATER 40 MG/ML
2000 INJECTION, SOLUTION INTRAVENOUS PRN
Status: DISCONTINUED | OUTPATIENT
Start: 2024-09-07 | End: 2024-09-10 | Stop reason: HOSPADM

## 2024-09-07 RX ORDER — SODIUM CHLORIDE 9 MG/ML
INJECTION, SOLUTION INTRAVENOUS PRN
Status: DISCONTINUED | OUTPATIENT
Start: 2024-09-07 | End: 2024-09-10 | Stop reason: HOSPADM

## 2024-09-07 RX ORDER — 0.9 % SODIUM CHLORIDE 0.9 %
500 INTRAVENOUS SOLUTION INTRAVENOUS ONCE
Status: COMPLETED | OUTPATIENT
Start: 2024-09-07 | End: 2024-09-08

## 2024-09-07 RX ORDER — ACETAMINOPHEN 650 MG/1
650 SUPPOSITORY RECTAL EVERY 6 HOURS PRN
Status: DISCONTINUED | OUTPATIENT
Start: 2024-09-07 | End: 2024-09-10 | Stop reason: HOSPADM

## 2024-09-07 RX ORDER — SODIUM CHLORIDE 9 MG/ML
INJECTION, SOLUTION INTRAVENOUS CONTINUOUS
Status: DISCONTINUED | OUTPATIENT
Start: 2024-09-07 | End: 2024-09-10

## 2024-09-07 RX ORDER — POTASSIUM CHLORIDE 7.45 MG/ML
10 INJECTION INTRAVENOUS PRN
Status: DISCONTINUED | OUTPATIENT
Start: 2024-09-07 | End: 2024-09-10 | Stop reason: HOSPADM

## 2024-09-07 RX ADMIN — IOPAMIDOL 80 ML: 755 INJECTION, SOLUTION INTRAVENOUS at 20:07

## 2024-09-07 RX ADMIN — SODIUM CHLORIDE: 9 INJECTION, SOLUTION INTRAVENOUS at 23:03

## 2024-09-07 RX ADMIN — SODIUM CHLORIDE 500 ML: 9 INJECTION, SOLUTION INTRAVENOUS at 23:46

## 2024-09-07 RX ADMIN — SODIUM CHLORIDE 500 ML: 9 INJECTION, SOLUTION INTRAVENOUS at 21:03

## 2024-09-07 ASSESSMENT — PAIN - FUNCTIONAL ASSESSMENT
PAIN_FUNCTIONAL_ASSESSMENT: NONE - DENIES PAIN
PAIN_FUNCTIONAL_ASSESSMENT: NONE - DENIES PAIN

## 2024-09-08 ENCOUNTER — APPOINTMENT (OUTPATIENT)
Dept: MRI IMAGING | Age: 64
DRG: 055 | End: 2024-09-08
Payer: MEDICARE

## 2024-09-08 PROBLEM — C79.31 METASTASIS TO BRAIN (HCC): Status: ACTIVE | Noted: 2024-09-08

## 2024-09-08 LAB
ANION GAP SERPL CALC-SCNC: 14 MEQ/L (ref 8–16)
BACTERIA URNS QL MICRO: ABNORMAL /HPF
BASOPHILS ABSOLUTE: 0 THOU/MM3 (ref 0–0.1)
BASOPHILS NFR BLD AUTO: 0.2 %
BILIRUB UR QL STRIP.AUTO: NEGATIVE
BUN SERPL-MCNC: 7 MG/DL (ref 7–22)
CALCIUM SERPL-MCNC: 8.8 MG/DL (ref 8.5–10.5)
CASTS #/AREA URNS LPF: ABNORMAL /LPF
CASTS 2: ABNORMAL /LPF
CHARACTER UR: CLEAR
CHLORIDE SERPL-SCNC: 103 MEQ/L (ref 98–111)
CO2 SERPL-SCNC: 21 MEQ/L (ref 23–33)
COLOR, UA: YELLOW
CREAT SERPL-MCNC: 0.5 MG/DL (ref 0.4–1.2)
CRYSTALS URNS MICRO: ABNORMAL
DEPRECATED RDW RBC AUTO: 43.2 FL (ref 35–45)
EOSINOPHIL NFR BLD AUTO: 0.3 %
EOSINOPHILS ABSOLUTE: 0 THOU/MM3 (ref 0–0.4)
EPITHELIAL CELLS, UA: ABNORMAL /HPF
ERYTHROCYTE [DISTWIDTH] IN BLOOD BY AUTOMATED COUNT: 12.5 % (ref 11.5–14.5)
GFR SERPL CREATININE-BSD FRML MDRD: > 90 ML/MIN/1.73M2
GLUCOSE SERPL-MCNC: 89 MG/DL (ref 70–108)
GLUCOSE UR QL STRIP.AUTO: NEGATIVE MG/DL
HCT VFR BLD AUTO: 36.3 % (ref 42–52)
HGB BLD-MCNC: 12.3 GM/DL (ref 14–18)
HGB UR QL STRIP.AUTO: ABNORMAL
IMM GRANULOCYTES # BLD AUTO: 0.03 THOU/MM3 (ref 0–0.07)
IMM GRANULOCYTES NFR BLD AUTO: 0.5 %
KETONES UR QL STRIP.AUTO: NEGATIVE
LACTATE SERPL-SCNC: 1.3 MMOL/L (ref 0.5–2)
LYMPHOCYTES ABSOLUTE: 0.6 THOU/MM3 (ref 1–4.8)
LYMPHOCYTES NFR BLD AUTO: 9.9 %
MCH RBC QN AUTO: 32 PG (ref 26–33)
MCHC RBC AUTO-ENTMCNC: 33.9 GM/DL (ref 32.2–35.5)
MCV RBC AUTO: 94.5 FL (ref 80–94)
MISCELLANEOUS 2: ABNORMAL
MONOCYTES ABSOLUTE: 0.5 THOU/MM3 (ref 0.4–1.3)
MONOCYTES NFR BLD AUTO: 8.6 %
NEUTROPHILS ABSOLUTE: 4.9 THOU/MM3 (ref 1.8–7.7)
NEUTROPHILS NFR BLD AUTO: 80.5 %
NITRITE UR QL STRIP: NEGATIVE
NRBC BLD AUTO-RTO: 0 /100 WBC
OSMOLALITY SERPL CALC.SUM OF ELEC: 273.1 MOSMOL/KG (ref 275–300)
PH UR STRIP.AUTO: 7 [PH] (ref 5–9)
PLATELET # BLD AUTO: 116 THOU/MM3 (ref 130–400)
PMV BLD AUTO: 9.1 FL (ref 9.4–12.4)
POTASSIUM SERPL-SCNC: 3.9 MEQ/L (ref 3.5–5.2)
PROT UR STRIP.AUTO-MCNC: NEGATIVE MG/DL
RBC # BLD AUTO: 3.84 MILL/MM3 (ref 4.7–6.1)
RBC URINE: ABNORMAL /HPF
RENAL EPI CELLS #/AREA URNS HPF: ABNORMAL /[HPF]
SODIUM SERPL-SCNC: 138 MEQ/L (ref 135–145)
SP GR UR REFRACT.AUTO: > 1.03 (ref 1–1.03)
TROPONIN, HIGH SENSITIVITY: 20 NG/L (ref 0–12)
TROPONIN, HIGH SENSITIVITY: 22 NG/L (ref 0–12)
TROPONIN, HIGH SENSITIVITY: 23 NG/L (ref 0–12)
UROBILINOGEN, URINE: 0.2 EU/DL (ref 0–1)
WBC # BLD AUTO: 6.1 THOU/MM3 (ref 4.8–10.8)
WBC #/AREA URNS HPF: ABNORMAL /HPF
WBC #/AREA URNS HPF: NEGATIVE /[HPF]
YEAST LIKE FUNGI URNS QL MICRO: ABNORMAL

## 2024-09-08 PROCEDURE — 84484 ASSAY OF TROPONIN QUANT: CPT

## 2024-09-08 PROCEDURE — 97166 OT EVAL MOD COMPLEX 45 MIN: CPT

## 2024-09-08 PROCEDURE — 6360000002 HC RX W HCPCS

## 2024-09-08 PROCEDURE — 96372 THER/PROPH/DIAG INJ SC/IM: CPT

## 2024-09-08 PROCEDURE — 36415 COLL VENOUS BLD VENIPUNCTURE: CPT

## 2024-09-08 PROCEDURE — 99223 1ST HOSP IP/OBS HIGH 75: CPT

## 2024-09-08 PROCEDURE — 6370000000 HC RX 637 (ALT 250 FOR IP)

## 2024-09-08 PROCEDURE — 80048 BASIC METABOLIC PNL TOTAL CA: CPT

## 2024-09-08 PROCEDURE — 70553 MRI BRAIN STEM W/O & W/DYE: CPT

## 2024-09-08 PROCEDURE — 99232 SBSQ HOSP IP/OBS MODERATE 35: CPT | Performed by: INTERNAL MEDICINE

## 2024-09-08 PROCEDURE — 96374 THER/PROPH/DIAG INJ IV PUSH: CPT

## 2024-09-08 PROCEDURE — 97162 PT EVAL MOD COMPLEX 30 MIN: CPT

## 2024-09-08 PROCEDURE — 96361 HYDRATE IV INFUSION ADD-ON: CPT

## 2024-09-08 PROCEDURE — G0378 HOSPITAL OBSERVATION PER HR: HCPCS

## 2024-09-08 PROCEDURE — A9579 GAD-BASE MR CONTRAST NOS,1ML: HCPCS

## 2024-09-08 PROCEDURE — 6360000004 HC RX CONTRAST MEDICATION

## 2024-09-08 PROCEDURE — 85025 COMPLETE CBC W/AUTO DIFF WBC: CPT

## 2024-09-08 PROCEDURE — 97530 THERAPEUTIC ACTIVITIES: CPT

## 2024-09-08 PROCEDURE — 2580000003 HC RX 258: Performed by: INTERNAL MEDICINE

## 2024-09-08 PROCEDURE — 2580000003 HC RX 258

## 2024-09-08 PROCEDURE — 2580000003 HC RX 258: Performed by: FAMILY MEDICINE

## 2024-09-08 RX ORDER — IBUPROFEN 400 MG/1
400 TABLET, FILM COATED ORAL EVERY 6 HOURS PRN
Status: DISCONTINUED | OUTPATIENT
Start: 2024-09-08 | End: 2024-09-10 | Stop reason: HOSPADM

## 2024-09-08 RX ORDER — TAMSULOSIN HYDROCHLORIDE 0.4 MG/1
0.4 CAPSULE ORAL DAILY
Status: DISCONTINUED | OUTPATIENT
Start: 2024-09-08 | End: 2024-09-10 | Stop reason: HOSPADM

## 2024-09-08 RX ORDER — ONDANSETRON 4 MG/1
4 TABLET, ORALLY DISINTEGRATING ORAL 3 TIMES DAILY PRN
Status: DISCONTINUED | OUTPATIENT
Start: 2024-09-08 | End: 2024-09-08 | Stop reason: SDUPTHER

## 2024-09-08 RX ORDER — PANTOPRAZOLE SODIUM 40 MG/1
40 TABLET, DELAYED RELEASE ORAL
Status: DISCONTINUED | OUTPATIENT
Start: 2024-09-08 | End: 2024-09-10 | Stop reason: HOSPADM

## 2024-09-08 RX ORDER — SCOLOPAMINE TRANSDERMAL SYSTEM 1 MG/1
1 PATCH, EXTENDED RELEASE TRANSDERMAL
Status: DISCONTINUED | OUTPATIENT
Start: 2024-09-08 | End: 2024-09-10 | Stop reason: HOSPADM

## 2024-09-08 RX ADMIN — ONDANSETRON 4 MG: 2 INJECTION INTRAMUSCULAR; INTRAVENOUS at 01:27

## 2024-09-08 RX ADMIN — SODIUM CHLORIDE, PRESERVATIVE FREE 10 ML: 5 INJECTION INTRAVENOUS at 10:21

## 2024-09-08 RX ADMIN — SODIUM CHLORIDE: 9 INJECTION, SOLUTION INTRAVENOUS at 18:08

## 2024-09-08 RX ADMIN — TAMSULOSIN HYDROCHLORIDE 0.4 MG: 0.4 CAPSULE ORAL at 19:46

## 2024-09-08 RX ADMIN — GADOTERIDOL 15 ML: 279.3 INJECTION, SOLUTION INTRAVENOUS at 11:33

## 2024-09-08 RX ADMIN — PANTOPRAZOLE SODIUM 40 MG: 40 TABLET, DELAYED RELEASE ORAL at 17:15

## 2024-09-08 RX ADMIN — ENOXAPARIN SODIUM 40 MG: 100 INJECTION SUBCUTANEOUS at 10:23

## 2024-09-08 RX ADMIN — SODIUM CHLORIDE: 9 INJECTION, SOLUTION INTRAVENOUS at 03:43

## 2024-09-08 RX ADMIN — PANTOPRAZOLE SODIUM 40 MG: 40 TABLET, DELAYED RELEASE ORAL at 06:40

## 2024-09-08 NOTE — PLAN OF CARE
Problem: Discharge Planning  Goal: Discharge to home or other facility with appropriate resources  Outcome: Progressing     Problem: Safety - Adult  Goal: Free from fall injury  9/8/2024 1218 by Sherrill Draper, RN  Outcome: Progressing  9/8/2024 0050 by Davin Grant, RN  Outcome: Progressing     Problem: Neurosensory - Adult  Goal: Achieves stable or improved neurological status  Outcome: Progressing  Goal: Achieves maximal functionality and self care  Outcome: Progressing     Problem: Cardiovascular - Adult  Goal: Absence of cardiac dysrhythmias or at baseline  Outcome: Progressing     Problem: Skin/Tissue Integrity - Adult  Goal: Skin integrity remains intact  Outcome: Progressing  Goal: Oral mucous membranes remain intact  Outcome: Progressing

## 2024-09-08 NOTE — ED PROVIDER NOTES
I performed a history and physical examination of the patient and discussed management with the resident. I reviewed the resident’s note and agree with the documented findings and plan of care. Any areas of disagreement are noted on the chart. I was personally present for the key portions of any procedures. I have documented in the chart those procedures where I was not present during the key portions. I have reviewed the emergency nurses triage note. I agree with the chief complaint, past medical history, past surgical history, allergies, medications, social and family history as documented unless otherwise noted below. Documentation of the HPI, Physical Exam and Medical Decision Making performed by medical students or scribes is based on my personal performance of the HPI, PE and MDM. For Phys Assistant/ Nurse Practitioner cases/documentation I have personally evaluated this patient and have completed at least one if not all key elements of the E/M (history, physical exam, and MDM). My findings are as noted below.    In other words, I personally saw and examined the patient I have reviewed and agreed with the resident findings including all diagnostic interpretations and treatment plans as written.  I was present for the key portion of any procedures performed and the inclusive time noted in any critical care statement.    Patient presents to ER with complaints of altered mental status.  64-year-old male who apparently lives with his wife walking around in [facility] and all of a sudden he did not know where he was.  He did not know how to open the car door he had a clot of mentation problems, no loss of function.  Patient does have a history of brain tumor.  Here today he is ANO x 1.  NIH is completed by the resident.  Please see resident's notes.  Patient is otherwise hemodynamically stable resting comfortably on the cot no apparent distress no other physical complaints at this time.    EKG reveals normal sinus  rhythm normal axis ventricular rate of 86 SD interval of 198 QRS duration 74 QT interval 362 QTc of 433.  When compared with August 30, 2023 no significant changes appreciated.      XR CHEST 1 VIEW   Final Result   Impression:   No acute findings.      This document has been electronically signed by: Grace Nolan MD    on 09/07/2024 09:00 PM      CTA HEAD W WO CONTRAST (CODE STROKE)         CT ABDOMEN PELVIS W IV CONTRAST Additional Contrast? None   Final Result   1. No acute bowel obstruction or acute inflammatory bowel process   2. A small abdominal wall hernia above the umbilicus containing loop of small   bowel.            **This report has been created using voice recognition software.  It may contain   minor errors which are inherent in voice recognition technology.**      Electronically signed by Dr. Janey Chavez      CTA NECK W WO CONTRAST (CODE STROKE)         CT HEAD WO CONTRAST   Final Result   1. No acute intracranial hemorrhage. The pertinent finding(s) was called to Dr. Guillermo at 2020 hours on 9/7/2024 by Dr. Chavez. Verbal acknowledgment and   readback was given.    2. Sequela of chronic microvascular changes.            **This report has been created using voice recognition software.  It may contain   minor errors which are inherent in voice recognition technology.**      Electronically signed by Dr. Janey Chavez      MRI BRAIN WO CONTRAST    (Results Pending)     Labs Reviewed   CBC WITH AUTO DIFFERENTIAL - Abnormal; Notable for the following components:       Result Value    RBC 4.04 (*)     Hemoglobin 13.0 (*)     Hematocrit 38.2 (*)     MCV 94.6 (*)     Platelets 122 (*)     MPV 8.5 (*)     Lymphocytes Absolute 0.7 (*)     All other components within normal limits   TROPONIN - Abnormal; Notable for the following components:    Troponin, High Sensitivity 25 (*)     All other components within normal limits   OSMOLALITY - Abnormal; Notable for the following components:

## 2024-09-08 NOTE — PROGRESS NOTES
[facility]  INPATIENT PHYSICAL THERAPY  EVALUATION  STRZ MED SURG 8AB - 8A-18/018-A    Discharge Recommendations: Continue to assess pending progress, 24 hour supervision or assist, Patient would benefit from continued therapy after discharge  Other: cont to assess      Time In: 702  Time Out: 718  Timed Code Treatment Minutes: 8 Minutes  Minutes: 16          Date: 2024  Patient Name: Niranjan Yañez,  Gender:  male        MRN: [id number]  : 1960  (64 y.o.)      Referring Practitioner: Dr. JOANIE Anderson  Diagnosis: stroke-like symptoms  Additional Pertinent Hx: 64 y.o. male with PMHx of hypertension, hyperlipidemia, cancer of distal third esophagus status post chemoradiation, GERD, A-fib who presents to [facility] with altered mental status.  Per son, patient was at supermarket with his wife and got  in the store.  Patient forgot how to use phone, forgetting where he was.  According to son, cognitive issues have been ongoing for several months now.  Patient increasingly forgetting things.  Currently patient is doing ADLs by himself with minimal help from family.  Patient has been little weak recently according to son.  Son states that patient had cancer of distal third esophagus that had metastasized to the brain, patient underwent brain surgery.  Per chart review patient had craniotomy at [facility], debulked largest cerebellar met.  Patient's PET scan on 2023 showed no met uptake. Brain MRI on 2024 was stable.  Patient denies any slurred speech, blurry vision, weakness.  Patient A&O X 2 on exam.  Per son, this has been baseline for past several months. MRI brain-IMPRESSION:  Motion degraded examination without evidence of an acute infarct. Stable treated  metastases. No new abnormalities.     Restrictions/Precautions:  Restrictions/Precautions: Fall Risk, General Precautions  Position Activity Restriction  Other position/activity restrictions: *peg  tube    Subjective:  Chart Reviewed: Yes  Patient assessed for rehabilitation services?: Yes  Subjective: pt with N&V limiting activity during session-RN aware. pleasant and cooperative.    General:     Vision: Impaired  Vision Exceptions: Wears glasses at all times          Pain: no c/o pain    Vitals: Vitals not assessed per clinical judgement, see nursing flowsheet    Social/Functional History:    Lives With: Spouse  Type of Home: House  Home Layout: Able to Live on Main level with bedroom/bathroom, Two level  Home Access: Stairs to enter with rails  Entrance Stairs - Number of Steps: 7  Entrance Stairs - Rails: Left  Home Equipment: Walker - 4-Wheeled, Cane     Bathroom Shower/Tub: Tub/Shower unit, Shower chair with back  Bathroom Toilet: Standard  Bathroom Equipment:  (ETS available)  Bathroom Accessibility: Accessible       ADL Assistance: Needs assistance  Homemaking Assistance: Needs assistance  Homemaking Responsibilities: No  Ambulation Assistance: Independent  Transfer Assistance: Independent    Active : No     Additional Comments: Per pt's wife, pt is typically non compliant with recommendations and will not use DME.    OBJECTIVE:  Range of Motion:  Bilateral Lower Extremity: WFL    Strength:  Bilateral Lower Extremity: WFL, per pt feels weak all over. Pt cold with shivers up to chair also-RN aware.    Balance:  Static Sitting Balance:  Stand By Assistance, at EOB, pt with N&V so slow progression  Static Standing Balance: Contact Guard Assistance, to SBAx1, no UE support    Bed Mobility:  Rolling to Left: Stand By Assistance   Supine to Sit: Contact Guard Assistance  Scooting: Stand By Assistance  HOB up 30 degrees  Transfers:  Sit to Stand: Contact Guard Assistance  Stand to Sit:Stand By Assistance    Ambulation:  Contact Guard Assistance, to SBA  Distance: 5'x1  Surface: Level Tile  Device: No Device  Gait Deviations: Forward Flexed Posture, Slow Siobhan, Mild Path Deviations, and limited due to  beyond hospital discharge for improvements in functional mobility and in order to decrease fall risk and return pt to PLOF.     Therapy Prognosis: Good    Requires PT Follow-Up: Yes    Patient Education:      .    Patient Education  Education Given To: Patient  Education Provided: Role of Therapy, Plan of Care  Education Method: Verbal  Barriers to Learning: Cognition  Education Outcome: Continued education needed, Verbalized understanding       Plan:  Current Treatment Recommendations: Strengthening, Balance training, Endurance training, Functional mobility training, Transfer training, Gait training, Stair training, Home exercise program, Patient/Caregiver education & training, Therapeutic activities, Equipment evaluation, education, & procurement, Safety education & training  General Plan:  (3-5X C)    Goals:  Patient Goals : feel better  Short Term Goals  Time Frame for Short Term Goals: by discharge  Short Term Goal 1: bed mobility with S to get in/out of bed  Short Term Goal 2: transfer with S to get in/out of chairs  Short Term Goal 3: amb >75'x1 without AD and S to walk safely in home  Short Term Goal 4: negotiate 7 steps with HR and SBA to enter home safely  Long Term Goals  Time Frame for Long Term Goals : no LTGs set secondary to short ELOS    Following session, patient left in safe position with all fall risk precautions in place.

## 2024-09-08 NOTE — ED NOTES
ED to inpatient nurses report      Chief Complaint:  Chief Complaint   Patient presents with    Altered Mental Status     Present to ED from: home     MOA:     LOC: alert and orientated to name and place  Mobility: Requires assistance * 1  Oxygen Baseline: RA    Current needs required: RA     Code Status:   Prior    What abnormal results were found and what did you give/do to treat them? See labs   Any procedures or intervention occur? See MAR     Mental Status:  Level of Consciousness: Alert (0)    Psych Assessment:        Vitals:  Patient Vitals for the past 24 hrs:   BP Temp Temp src Pulse Resp SpO2 Height Weight   09/07/24 2100 117/75 -- -- 75 18 93 % -- --   09/07/24 2002 -- -- -- -- -- -- -- 78.5 kg (173 lb)   09/07/24 2000 (!) 129/90 97.5 °F (36.4 °C) Oral 79 20 96 % 1.702 m (5' 7\") 78.5 kg (173 lb)        LDAs:   Peripheral IV 09/07/24 Left Antecubital (Active)   Site Assessment Clean, dry & intact 09/07/24 2002   Line Status Normal saline locked 09/07/24 2002   Phlebitis Assessment No symptoms 09/07/24 2002   Infiltration Assessment 0 09/07/24 2002       Ambulatory Status:  No data recorded    Diagnosis:  DISPOSITION Decision To Admit 09/07/2024 09:40:31 PM   Final diagnoses:   Altered mental status, unspecified altered mental status type   Stroke-like symptoms        Consults:  IP CONSULT TO STROKE TEAM  IP CONSULT TO NEUROLOGY     Pain Score:  Pain Assessment  Pain Assessment: None - Denies Pain    C-SSRS:        Sepsis Screening:       [place] Fall Risk:       Swallow Screening        Preferred Language:   English      ALLERGIES     Paclitaxel, Aleve [naproxen], and Promethazine    SURGICAL HISTORY       Past Surgical History:   Procedure Laterality Date    ABDOMEN SURGERY  08/16/2022    GASTRIC DEVASCULARIZATON-[facility]    ABDOMEN SURGERY  08/30/2022    ESOPHAGOGASTRODUODENOSCOPY TRANSORAL DIAGNOSTIC ESOPHAGECTOMY W/ THORACOTOMY-[facility]    DENTAL SURGERY      25 teeth removed    PORT SURGERY Left 12/1/2022

## 2024-09-08 NOTE — CONSULTS
Neurology Consult Note    Date:9/8/2024       Room:[facility]18/018-A  Patient Name:Niranjan Yañez     YOB: 1960     Age:64 y.o.    Requesting Physician: Lois Harrell MD     Reason for Consult:  Evaluate for stroke-like symptoms      Chief Complaint:   Chief Complaint   Patient presents with    Altered Mental Status       Subjective     Niranjan Yañez is a 64 y.o. male with a history of atrial fibrillation - not on anticoagulation, HTN, esophageal cancer s/p chemotherapy and radiation with known brain metastases s/p right cerebellum tumor resection December 2022 at [facility], GERD, HLD, MARTÍN, former smoker, who presented to [facility] ED last night for evaluation of altered mental status. Patient's wife states they were at [facility] when they got  and she could not find him or reach him by phone. Once found, he appeared more confused than normal and was unable to push the cart or answer his phone when she called him. She brought him to the ED due to concern for stroke. Patient's wife states at baseline he is somewhat withdrawn and does not always engage in detailed conversations, but is typically alert and oriented to self and location. Today, he is only alert to himself. Stroke alert activated on arrival to the ED last night at 2000. Last known well roughly 1700. Initial NIH documented in the ED 2 for missed orientation questions. POC glucose 105. Initial /79. Non-contrasted CTH negative for acute intracranial abnormalities, revealed encephalomalacia in the right cerebellum, consistent with prior tumor removal. CTA head and neck negative for LVO. MRI brain W/WO ordered for further evaluation. Wife denies noticing any seizure like activity, LOC, tongue bite, or urinary/bowel incontinence. No history of seizures. Wife reports patient has been in remission over the past 4 months. He is scheduled for an outpatient repeat MRI brain W/WO 9/17/24 for further evaluation of metastases. Unable to obtain formal  Tuesday and Thursday x 2 weeks then discontinue. 4 tablet 0    tadalafil (CIALIS) 5 MG tablet TAKE 1 TABLET BY MOUTH DAILY AS NEEDED FOR ERECTILE DYSFUNCTION (Patient not taking: Reported on 2024) 90 tablet 3    Misc. Devices (WALL GRAB BAR) MISC As directed. 2 each 0    Blood Pressure Monitoring (BP MONITOR-STETHOSCOPE) KIT Dx: HTN 1 kit 0    Misc. Devices (PULSE OXIMETER FOR FINGER) MISC Dx:  hypoxemia 1 each 0     Past History    Past Medical History:   has a past medical history of Atrial fibrillation (HCC), Benign hypertension, Cancer of distal third of esophagus (HCC), GERD (gastroesophageal reflux disease), HLD (hyperlipidemia), Obesity (BMI 30.0-34.9), and Sleep apnea.    Social History:   reports that he quit smoking about 11 years ago. His smoking use included cigarettes. He started smoking about 21 years ago. He has a 30 pack-year smoking history. He has never been exposed to tobacco smoke. He has never used smokeless tobacco. He reports current drug use. Drug: Marijuana (Weed). He reports that he does not drink alcohol.     Family History:   Family History   Problem Relation Age of Onset    Colon Cancer Mother     Heart Disease Father     Cancer Brother         lung       Physical Examination      Vitals:  /78   Pulse 76   Temp 97.9 °F (36.6 °C) (Oral)   Resp 18   Ht 1.702 m (5' 7\")   Wt 78.5 kg (173 lb)   SpO2 97%   BMI 27.10 kg/m²   Temp (24hrs), Av.9 °F (36.6 °C), Min:97.5 °F (36.4 °C), Max:98.2 °F (36.8 °C)      I/O (24Hr):  No intake or output data in the 24 hours ending 24 1025      Physical Exam  Vitals reviewed.   Constitutional:       General: He is not in acute distress.     Appearance: Normal appearance. He is not ill-appearing.   HENT:      Head: Normocephalic.      Right Ear: External ear normal.      Left Ear: External ear normal.      Nose: Nose normal.      Mouth/Throat:      Mouth: Mucous membranes are moist.      Pharynx: Oropharynx is clear. No oropharyngeal  the cervical spine. There is a gastric pull-through seen in the posterior right chest. There is a Port-A-Cath.     1. Mild atherosclerosis of each carotid bulb. There is no associated stenosis. 2. No intracranial stenoses or occlusions. **This report has been created using voice recognition software. It may contain minor errors which are inherent in voice recognition technology.** Electronically signed by Dr. Cynthia Vazquez    XR CHEST 1 VIEW    Result Date: 9/7/2024  Chest Radiographs, 2 views Comparison: CTs from 08/5/24 and 4/29/24 Findings: Left chest wall central venous catheter with the tip terminating in the superior vena cava. No cardiomegaly. Normal mediastinal contours. No pneumothorax. Calcified granuloma. No pleural effusion. Normal upper abdomen. Hiatal hernia. No acute fracture.     Impression: No acute findings. This document has been electronically signed by: Grace Nolan MD on 09/07/2024 09:00 PM    CT ABDOMEN PELVIS W IV CONTRAST Additional Contrast? None    Result Date: 9/7/2024  PROCEDURE: CT ABDOMEN PELVIS W IV CONTRAST CLINICAL INFORMATION: abdominal pain COMPARISON: CT abdomen pelvis 8/5/2024 TECHNIQUE: Helical CT acquisition of the abdomen and pelvis was performed with administration of intravenous contrast. Multiplanar reformats are provided. All CT scans at this facility use dose modulation, iterative reconstruction, and/or weight based dosing when appropriate to reduce the radiation dose to as low as reasonably achievable. CONTRAST: 80  cc of Isovue-370  intravenously FINDINGS: LOWER THORAX:  Status post placement of esophageal pull-up. Bibasilar subsegmental atelectasis. HEPATOBILIARY: No focal hepatic mass.  Unremarkable hepatic surface morphology. The gallbladder, and biliary tree are unremarkable. PANCREAS AND SPLEEN:  Moderate atrophy of the pancreas. No peripancreatic abnormality.  The spleen is not enlarged RETROPERITONEUM: The kidneys are unremarkable.  The adrenal glands

## 2024-09-08 NOTE — ED TRIAGE NOTES
Pt to ED c/o altered mental status. Wife states they were walking around in [facility] at 1700 and patient did not know where they were and did not know how to open the car door. Wife states the pt has a HX of a brain tumor. On arrival pt A&O x1. NIH completed. Provider at bedside. Code stroke paged.

## 2024-09-08 NOTE — ED PROVIDER NOTES
Pt Name: Niranjan Yañez  MRN: [id number]  Birthdate 1960  Date of evaluation: 9/7/2024  ED Resident: Rajwinder Guillermo MD  ED Attending: ***    CHIEF COMPLAINT       Chief Complaint   Patient presents with    Altered Mental Status     History obtained from {source of history:195307}.    HISTORY OF PRESENT ILLNESS    HPI  Niranjan Yañez is a 64 y.o. male who presents to the emergency department for evaluation of ***  The patient has no other acute complaints at this time.    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    GERD (gastroesophageal reflux disease)     HLD (hyperlipidemia) 08/15/2022    Obesity (BMI 30.0-34.9) 03/09/2022    Sleep apnea      Past Surgical History:   Procedure Laterality Date    ABDOMEN SURGERY  08/16/2022    GASTRIC DEVASCULARIZATON-[facility]    ABDOMEN SURGERY  08/30/2022    ESOPHAGOGASTRODUODENOSCOPY TRANSORAL DIAGNOSTIC ESOPHAGECTOMY W/ THORACOTOMY-[facility]    DENTAL SURGERY      25 teeth removed    PORT SURGERY Left 12/1/2022    LEFT SINGLE LUMEN MEDIPORT performed by Konstantin Melara MD at [facility] OR    STOMACH SURGERY N/A 5/1/2023    Open JEJUNOSTOMY TUBE INSERTION; repair of umbilical hernia performed by Elian Zaragoza MD at [facility] OR       MEDICATIONS     Current Facility-Administered Medications:     ibuprofen (ADVIL;MOTRIN) tablet 400 mg, 400 mg, Oral, Q6H PRN, Leroy Anderson,     pantoprazole (PROTONIX) tablet 40 mg, 40 mg, Oral, BID AC, Leroy Anderson,     tamsulosin (FLOMAX) capsule 0.4 mg, 0.4 mg, Oral, Daily, Leory Anderson DO    0.9 % sodium chloride infusion, , IntraVENous, Continuous, Luis Fernando Rogers MD, Last Rate: 100 mL/hr at 09/08/24 0343, New Bag at 09/08/24 0343    sodium chloride flush 0.9 % injection 5-40 mL, 5-40 mL, IntraVENous, 2 times per day, Leroy Anderson DO    sodium chloride flush 0.9 %  medical marijuana       ALLERGIES     Allergies   Allergen Reactions    Paclitaxel Other (See Comments)     Severe lower back pain- able to resume after medications given    Aleve [Naproxen] Hives    Promethazine Rash       FAMILY HISTORY     Family History   Problem Relation Age of Onset    Colon Cancer Mother     Heart Disease Father     Cancer Brother         lung       PHYSICAL EXAM     ED Triage Vitals [09/07/24 2000]   BP Systolic BP Percentile Diastolic BP Percentile Temp Temp Source Pulse Respirations SpO2   (!) 129/90 -- -- 97.5 °F (36.4 °C) Oral 79 20 96 %      Height Weight - Scale         1.702 m (5' 7\") 78.5 kg (173 lb)             Additional Vital Signs:  Vitals:    09/10/24 0814   BP: 119/77   Pulse: 83   Resp: 17   Temp: 97.8 °F (36.6 °C)   SpO2: 92%     Physical Exam  Constitutional:       Appearance: Normal appearance.   HENT:      Head: Normocephalic and atraumatic.   Eyes:      Extraocular Movements: Extraocular movements intact.      Pupils: Pupils are equal, round, and reactive to light.   Cardiovascular:      Rate and Rhythm: Normal rate and regular rhythm.      Pulses: Normal pulses.   Pulmonary:      Effort: Pulmonary effort is normal.      Breath sounds: Normal breath sounds.   Abdominal:      General: There is no distension.      Palpations: Abdomen is soft.      Tenderness: There is no abdominal tenderness. There is no guarding.   Musculoskeletal:         General: Normal range of motion.      Cervical back: Normal range of motion.   Skin:     General: Skin is warm and dry.      Capillary Refill: Capillary refill takes less than 2 seconds.   Neurological:      Mental Status: He is alert. He is confused.      GCS: GCS eye subscore is 4. GCS verbal subscore is 5. GCS motor subscore is 6.      Motor: Motor function is intact.      Comments: Pt has difficulty following complex commands and cannot repeat sentences         DIFFERENTIALS   Initial Assessment: Given the patient's above chief

## 2024-09-08 NOTE — PLAN OF CARE
Problem: Safety - Adult  Goal: Free from fall injury  Outcome: Progressing   Care plan reviewed with patient.  Patient verbalize understanding of the plan of care and contribute to goal setting.

## 2024-09-08 NOTE — PROGRESS NOTES
[facility]  INPATIENT OCCUPATIONAL THERAPY  STRZ MED SURG 8AB  EVALUATION      Discharge Recommendations: Continue to assess pending progress, 24 hour supervision or assist, Home with Home health OT  Equipment Recommendations: Equipment Needed: No  Other: per pt's wife, they have equipment, but pt will not use.    Time In: 1150  Time Out: 1207  Timed Code Treatment Minutes: 9 Minutes  Minutes: 17        Date: 2024  Patient Name: Niranjan Yañez,   Gender: male      MRN: [id number]  : 1960  (64 y.o.)  Referring Practitioner: Leroy Anderson DO  Diagnosis: AMS  Additional Pertinent Hx: 64 y.o. male with PMHx of hypertension, hyperlipidemia, cancer of distal third esophagus status post chemoradiation, GERD, A-fib who presents to [facility] with altered mental status.  Per son, patient was at supermarket with his wife and got  in the store.  Patient forgot how to use phone, forgetting where he was.  According to son, cognitive issues have been ongoing for several months now.  Patient increasingly forgetting things.  Currently patient is doing ADLs by himself with minimal help from family.  Patient has been little weak recently according to son.  Son states that patient had cancer of distal third esophagus that had metastasized to the brain, patient underwent brain surgery.  Per chart review patient had craniotomy at [facility], debulked largest cerebellar met.  Patient's PET scan on 2023 showed no met uptake. Brain MRI on 2024 was stable.  Patient denies any slurred speech, blurry vision, weakness.  Patient A&O X 2 on exam.  Per son, this has been baseline for past several months.    Restrictions/Precautions:  Restrictions/Precautions: Fall Risk, General Precautions  Position Activity Restriction  Other position/activity restrictions: *peg tube    Subjective  Chart Reviewed: Yes, Progress Notes, History and Physical, Imaging, Labs, Orders  Family / Caregiver

## 2024-09-08 NOTE — PROGRESS NOTES
[facility]--HOSPITALIST GROUP    Hospitalist PROGRESS NOTE dictated by Lois Harrell MD on 2024    Patient ID: Niranjan Yañez is 64 y.o. and [facility]  : 1960 MRN: [id number]    Admit date: 2024  Primary Care Physician: Konstantin Carpio DO   Patient Care Team:  Konstantin Carpio DO as PCP - General (Family Medicine)  Konstantin Carpio DO as PCP - Empaneled Provider  Dara Cordero, RN as Nurse Navigator (Oncology)  Tel: [phone]  IP CONSULT TO NEUROLOGY  IP CONSULT TO DIETITIAN  Admitting Physician: Jorge L Ramires MD   Code Status:  Full Code    Niranjan Yañez is a 64 y.o.  male who presented with Altered Mental Status    Admit date: 2024  Room: [address] [place] [address]-A    Chief Complaint: Altered mental status     History Of Present Illness:  Niranjan Yañez is a 64 y.o. male with PMHx of hypertension, hyperlipidemia, cancer of distal third esophagus status post chemoradiation, GERD, A-fib who presents to [facility] with altered mental status.  Per son, patient was at supermarket with his wife and got  in the store.  Patient forgot how to use phone, forgetting where he was.  According to son, cognitive issues have been ongoing for several months now.  Patient increasingly forgetting things.  Currently patient is doing ADLs by himself with minimal help from family.  Patient has been little weak recently according to son.  Son states that patient had cancer of distal third esophagus that had metastasized to the brain, patient underwent brain surgery.  Per chart review patient had craniotomy at [facility], debulked largest cerebellar met.  Patient's PET scan on 2023 showed no met uptake. Brain MRI on 2024 was stable.  Patient denies any slurred speech, blurry vision, weakness.  Patient A&O X 2 on exam.  Per son, this has been baseline for past several months.           ED course: Vital signs on  5-40 mL, 2 times per day  enoxaparin, 40 mg, Daily        Continuous Infusions:    sodium chloride 100 mL/hr at 09/08/24 0343    sodium chloride         PRN:  ibuprofen, 400 mg, Q6H PRN  sodium chloride flush, 5-40 mL, PRN  sodium chloride, , PRN  potassium chloride, 40 mEq, PRN   Or  potassium alternative oral replacement, 40 mEq, PRN   Or  potassium chloride, 10 mEq, PRN  magnesium sulfate, 2,000 mg, PRN  ondansetron, 4 mg, Q8H PRN   Or  ondansetron, 4 mg, Q6H PRN  polyethylene glycol, 17 g, Daily PRN  acetaminophen, 650 mg, Q6H PRN   Or  acetaminophen, 650 mg, Q6H PRN          HOME MEDICATIONS     Medications Prior to Admission: tamsulosin (FLOMAX) 0.4 MG capsule, Take 1 capsule by mouth daily  prochlorperazine (COMPAZINE) 10 MG tablet, TAKE 1 TABLET BY MOUTH EVERY 6 HOURS AS NEEDED FOR NAUSEA  gabapentin (NEURONTIN) 100 MG capsule, Take 1 capsule by mouth 2 times daily.  pantoprazole (PROTONIX) 40 MG tablet, TAKE 1 TABLET BY MOUTH IN THE MORNING AND AT BEDTIME  ondansetron (ZOFRAN-ODT) 4 MG disintegrating tablet, Take 1 tablet by mouth 3 times daily as needed for Nausea or Vomiting  lidocaine-prilocaine (EMLA) 2.5-2.5 % cream, Apply topically as needed.  ibuprofen (ADVIL;MOTRIN) 400 MG tablet, Take 1 tablet by mouth every 6 hours as needed for Pain  tadalafil (CIALIS) 20 MG tablet, Take 1 tablet by mouth daily as needed for Erectile Dysfunction (Patient not taking: Reported on 9/7/2024)  dexAMETHasone (DECADRON) 2 MG tablet, Take 1/2 tablet p.o. every Tuesday and Thursday x 2 weeks then discontinue.  tadalafil (CIALIS) 5 MG tablet, TAKE 1 TABLET BY MOUTH DAILY AS NEEDED FOR ERECTILE DYSFUNCTION (Patient not taking: Reported on 9/7/2024)  Misc. Devices (WALL GRAB BAR) MISC, As directed.  Blood Pressure Monitoring (BP MONITOR-STETHOSCOPE) KIT, Dx: HTN  Misc. Devices (PULSE OXIMETER FOR FINGER) MISC, Dx:  hypoxemia      ALLERGIES     is allergic to paclitaxel, aleve [naproxen], and promethazine.  Allergies  8/5/2024 TECHNIQUE: Helical CT acquisition of the abdomen and pelvis was performed with administration of intravenous contrast. Multiplanar reformats are provided. All CT scans at this facility use dose modulation, iterative reconstruction, and/or weight based dosing when appropriate to reduce the radiation dose to as low as reasonably achievable. CONTRAST: 80  cc of Isovue-370  intravenously FINDINGS: LOWER THORAX:  Status post placement of esophageal pull-up. Bibasilar subsegmental atelectasis. HEPATOBILIARY: No focal hepatic mass.  Unremarkable hepatic surface morphology. The gallbladder, and biliary tree are unremarkable. PANCREAS AND SPLEEN:  Moderate atrophy of the pancreas. No peripancreatic abnormality.  The spleen is not enlarged RETROPERITONEUM: The kidneys are unremarkable.  The adrenal glands are not enlarged BOWEL AND PERITONEUM: The appendix is unremarkable. A jejunal feeding tube is seen.  No bowel obstruction or acute inflammatory bowel process.    A small abdominal wall hernia above the umbilicus containing loop of small bowel.  . VASCULAR: Mild aortoiliac calcifications. The abdominal aorta is not aneurysmal.  The inferior vena cava is patent.  The main portal vein, superior mesenteric vein, and splenic vein are patent.  LYMPH NODES: No significantly enlarged lymph nodes are seen. BONES: Degenerative changes of the thoracolumbar spine. Grade 1 spondylolisthesis of L5. PELVIS: The bladder is unremarkable.. The prostate is not enlarged. Prostatic calcifications noted.     1. No acute bowel obstruction or acute inflammatory bowel process 2. A small abdominal wall hernia above the umbilicus containing loop of small bowel. **This report has been created using voice recognition software.  It may contain minor errors which are inherent in voice recognition technology.** Electronically signed by Dr. Janey Chavez    CTA HEAD W WO CONTRAST (CODE STROKE)    Result Date: 9/7/2024  WET READ: No

## 2024-09-08 NOTE — H&P
History & Physical  Internal Medicine Resident         Patient: Niranjan Yañez 64 y.o. male      : 1960  Date of Admission: 2024  Date of Service: Pt seen/examined on 24 and Admitted to Observation with expected LOS less than two midnights due to medical therapy.       ASSESSMENT AND PLAN  Encephalopathy: Per son, patient more forgetful past several months. Patient  in supermarket and forgot how to use cell phone, forgot where he was.  Family concerned for possible stroke.  On exam patient alert and oriented x 2.  Patient has history of esophageal cancer with mets to brain, had craniotomy at [facility].  Patient not on any chronic medications that would possibly cause encephalopathy.  Ammonia level 24.  WBC 6.1.  Ordered lactic acid  Ordered UA  BMP, CBC daily  Continue to monitor  Rule out stroke: Family was concerned for stroke after patient was forgetful today in supermarket.  CT head showed no acute intracranial hemorrhage.  CTA neck, head showed no significant stenosis in the extracranial carotid arteries and the intracranial cerebral arteries.  MRI brain without contrast ordered  Limited echo ordered  Bedside swallow ordered, pt placed on npo at this time  PT/OT order placed  HFpEF-last echo done on 2023 showed EF of 60%. Showed grade 1 diastolic dysfunction.  Patient not on any GDMT at home.  Limited echo ordered  History of esophageal cancer: Diagnosed on 2022.  Status post chemo, radiation.  Patient had mets to the brain, status post craniotomy at [facility]. Last PET scan showed no met uptake. Brain MRI on 2024 stable.  Patient recently completed 2-week course of Decadron 2 mg tablets on 2024. Per son patient has been cancer free for 4 months.  Chronic Conditions (reviewed and stable unless otherwise stated)   History of A-fib-noted in chart review.  Not on any anticoagulation at home.  EKG showed NSR  Continue to monitor with telemetry.  GERD-continue home Protonix 40 mg  showed no acute intracranial hemorrhage. CTA neck, head showed no significant stenosis in the extracranial carotid arteries, and intracranial cerebral arteries.    Review of Systems:   Pertinent positives and negatives as noted in the HPI. Otherwise complete ROS negative.    OBJECTIVE  Physical Exam:  /79   Pulse 71   Temp 97.5 °F (36.4 °C) (Oral)   Resp 18   Ht 1.702 m (5' 7\")   Wt 78.5 kg (173 lb)   SpO2 98%   BMI 27.10 kg/m²   General appearance: No apparent distress, appears stated age.  Eyes:  Pupils equal, round, and reactive to light. Conjunctivae/corneas clear.  HENT: Head normal in appearance. External nares normal. Hearing grossly intact.   Neck: Supple, with full range of motion. Trachea midline.  No gross JVD appreciated.  Respiratory:  Normal respiratory effort. Clear to auscultation, bilaterally without rales or wheezes or rhonchi.  Cardiovascular: Normal rate, regular rhythm with normal S1/S2 without murmurs.  No lower extremity edema.   Abdomen: Soft, non-tender, non-distended with normal bowel sounds. J-tube in place  Musculoskeletal: No joint swelling or tenderness. Normal tone. No abnormal movements.   Skin: Warm and dry. No rashes or lesions.  Neurologic:  No focal sensory/motor deficits in the upper and lower extremities. Cranial nerves:  grossly non-focal 2-12.     Psychiatric: Alert and oriented x2   Peripheral Pulses: +2 palpable, equal bilaterally.       Medications Prior to Admission:   Prior to Admission Medications   Prescriptions Last Dose Informant Patient Reported? Taking?   Blood Pressure Monitoring (BP MONITOR-STETHOSCOPE) KIT   No No   Sig: Dx: HTN   Misc. Devices (PULSE OXIMETER FOR FINGER) MISC   No No   Sig: Dx:  hypoxemia   Misc. Devices (WALL GRAB BAR) MISC   No No   Sig: As directed.   dexAMETHasone (DECADRON) 2 MG tablet   No No   Sig: Take 1/2 tablet p.o. every Tuesday and Thursday x 2 weeks then discontinue.   gabapentin (NEURONTIN) 100 MG capsule   No No

## 2024-09-09 ENCOUNTER — APPOINTMENT (OUTPATIENT)
Age: 64
DRG: 055 | End: 2024-09-09
Payer: MEDICARE

## 2024-09-09 LAB
ECHO AV CUSP MM: 2.3 CM
ECHO BSA: 1.93 M2
ECHO LA AREA 2C: 11.3 CM2
ECHO LA AREA 4C: 14.3 CM2
ECHO LA DIAMETER INDEX: 1.42 CM/M2
ECHO LA DIAMETER: 2.7 CM
ECHO LA MAJOR AXIS: 4.7 CM
ECHO LA MINOR AXIS: 4 CM
ECHO LA VOL BP: 31 ML (ref 18–58)
ECHO LA VOL MOD A2C: 24 ML (ref 18–58)
ECHO LA VOL MOD A4C: 34 ML (ref 18–58)
ECHO LA VOL/BSA BIPLANE: 16 ML/M2 (ref 16–34)
ECHO LA VOLUME INDEX MOD A2C: 13 ML/M2 (ref 16–34)
ECHO LA VOLUME INDEX MOD A4C: 18 ML/M2 (ref 16–34)
ECHO LV EJECTION FRACTION BIPLANE: 52 % (ref 55–100)
ECHO LV FRACTIONAL SHORTENING: 15 % (ref 28–44)
ECHO LV INTERNAL DIMENSION DIASTOLE INDEX: 2.42 CM/M2
ECHO LV INTERNAL DIMENSION DIASTOLIC: 4.6 CM (ref 4.2–5.9)
ECHO LV INTERNAL DIMENSION SYSTOLIC INDEX: 2.05 CM/M2
ECHO LV INTERNAL DIMENSION SYSTOLIC: 3.9 CM
ECHO LV IVSD: 0.9 CM (ref 0.6–1)
ECHO LV MASS 2D: 137.7 G (ref 88–224)
ECHO LV MASS INDEX 2D: 72.5 G/M2 (ref 49–115)
ECHO LV POSTERIOR WALL DIASTOLIC: 0.9 CM (ref 0.6–1)
ECHO LV RELATIVE WALL THICKNESS RATIO: 0.39
ECHO RV INTERNAL DIMENSION: 3.3 CM
ECHO RV TAPSE: 1.8 CM (ref 1.7–?)

## 2024-09-09 PROCEDURE — 96376 TX/PRO/DX INJ SAME DRUG ADON: CPT

## 2024-09-09 PROCEDURE — G0378 HOSPITAL OBSERVATION PER HR: HCPCS

## 2024-09-09 PROCEDURE — 97110 THERAPEUTIC EXERCISES: CPT

## 2024-09-09 PROCEDURE — 99232 SBSQ HOSP IP/OBS MODERATE 35: CPT | Performed by: PHYSICIAN ASSISTANT

## 2024-09-09 PROCEDURE — 2580000003 HC RX 258: Performed by: INTERNAL MEDICINE

## 2024-09-09 PROCEDURE — 96372 THER/PROPH/DIAG INJ SC/IM: CPT

## 2024-09-09 PROCEDURE — 95819 EEG AWAKE AND ASLEEP: CPT | Performed by: PSYCHIATRY & NEUROLOGY

## 2024-09-09 PROCEDURE — 6370000000 HC RX 637 (ALT 250 FOR IP)

## 2024-09-09 PROCEDURE — 6360000002 HC RX W HCPCS

## 2024-09-09 PROCEDURE — 93307 TTE W/O DOPPLER COMPLETE: CPT | Performed by: INTERNAL MEDICINE

## 2024-09-09 PROCEDURE — 93307 TTE W/O DOPPLER COMPLETE: CPT

## 2024-09-09 PROCEDURE — 95819 EEG AWAKE AND ASLEEP: CPT

## 2024-09-09 RX ORDER — PROCHLORPERAZINE MALEATE 10 MG
10 TABLET ORAL EVERY 6 HOURS PRN
Status: DISCONTINUED | OUTPATIENT
Start: 2024-09-09 | End: 2024-09-10 | Stop reason: HOSPADM

## 2024-09-09 RX ADMIN — ONDANSETRON 4 MG: 2 INJECTION INTRAMUSCULAR; INTRAVENOUS at 09:05

## 2024-09-09 RX ADMIN — ACETAMINOPHEN 650 MG: 325 TABLET ORAL at 20:33

## 2024-09-09 RX ADMIN — SODIUM CHLORIDE: 9 INJECTION, SOLUTION INTRAVENOUS at 20:38

## 2024-09-09 RX ADMIN — PANTOPRAZOLE SODIUM 40 MG: 40 TABLET, DELAYED RELEASE ORAL at 06:00

## 2024-09-09 RX ADMIN — IBUPROFEN 400 MG: 400 TABLET, FILM COATED ORAL at 11:47

## 2024-09-09 RX ADMIN — PANTOPRAZOLE SODIUM 40 MG: 40 TABLET, DELAYED RELEASE ORAL at 15:20

## 2024-09-09 RX ADMIN — TAMSULOSIN HYDROCHLORIDE 0.4 MG: 0.4 CAPSULE ORAL at 20:28

## 2024-09-09 RX ADMIN — ENOXAPARIN SODIUM 40 MG: 100 INJECTION SUBCUTANEOUS at 09:05

## 2024-09-09 ASSESSMENT — PAIN DESCRIPTION - LOCATION
LOCATION: HEAD
LOCATION: ABDOMEN

## 2024-09-09 ASSESSMENT — PAIN SCALES - GENERAL
PAINLEVEL_OUTOF10: 0
PAINLEVEL_OUTOF10: 7
PAINLEVEL_OUTOF10: 6

## 2024-09-09 ASSESSMENT — PAIN DESCRIPTION - DESCRIPTORS
DESCRIPTORS: ACHING;DISCOMFORT
DESCRIPTORS: ACHING

## 2024-09-09 ASSESSMENT — PAIN SCALES - WONG BAKER: WONGBAKER_NUMERICALRESPONSE: NO HURT

## 2024-09-09 NOTE — PROGRESS NOTES
Patient wife notified this RN that she gave patient his home dose of compazine. This RN educated wife the importance of staff administering medications. Patient Wife stated she understood and would take his medications home.

## 2024-09-09 NOTE — PROGRESS NOTES
[facility]  INPATIENT PHYSICAL THERAPY  DAILY NOTE  STRZ MED SURG 8AB - 8A-18/018-A      Discharge Recommendations: Continue to assess pending progress, 24 hour assistance or supervision, and Home with Home Health PT  Equipment Recommendations:Other: cont to assess      Time In: 740  Time Out: 08  Timed Code Treatment Minutes: 25 Minutes  Minutes: 25          Date: 2024  Patient Name: Niranjan Yañez,  Gender:  male        MRN: [id number]  : 1960  (64 y.o.)     Referring Practitioner: Dr. JOANIE Anderson  Diagnosis: stroke-like symptoms  Additional Pertinent Hx: 64 y.o. male with PMHx of hypertension, hyperlipidemia, cancer of distal third esophagus status post chemoradiation, GERD, A-fib who presents to [facility] with altered mental status.  Per son, patient was at supermarket with his wife and got  in the store.  Patient forgot how to use phone, forgetting where he was.  According to son, cognitive issues have been ongoing for several months now.  Patient increasingly forgetting things.  Currently patient is doing ADLs by himself with minimal help from family.  Patient has been little weak recently according to son.  Son states that patient had cancer of distal third esophagus that had metastasized to the brain, patient underwent brain surgery.  Per chart review patient had craniotomy at [facility], debulked largest cerebellar met.  Patient's PET scan on 2023 showed no met uptake. Brain MRI on 2024 was stable.  Patient denies any slurred speech, blurry vision, weakness.  Patient A&O X 2 on exam.  Per son, this has been baseline for past several months. MRI brain-IMPRESSION:  Motion degraded examination without evidence of an acute infarct. Stable treated  metastases. No new abnormalities.     Prior Level of Function:  Lives With: Spouse  Type of Home: House  Home Layout: Able to Live on Main level with bedroom/bathroom, Two level  Home Access: Stairs to enter with  rails  Entrance Stairs - Number of Steps: 7  Entrance Stairs - Rails: Left  Home Equipment: Walker - 4-Wheeled, Cane   Bathroom Shower/Tub: Tub/Shower unit, Shower chair with back  Bathroom Toilet: Standard  Bathroom Equipment:  (ETS available)  Bathroom Accessibility: Accessible    ADL Assistance: Needs assistance  Homemaking Assistance: Needs assistance  Homemaking Responsibilities: No  Ambulation Assistance: Independent  Transfer Assistance: Independent  Active : No  Additional Comments: Per pt's wife, pt is typically non compliant with recommendations and will not use DME.    Restrictions/Precautions:  Restrictions/Precautions: Fall Risk, General Precautions  Position Activity Restriction  Other position/activity restrictions: *peg tube     SUBJECTIVE: Pt. Laying in his bed and pleasantly agrees to therapy session. Pt. Agreeable to remain in BS chair post session. Last evenings dinner tray noted to still present in room, untouched upon arrival.        PAIN: None indicated    Vitals: Vitals not assessed per clinical judgement, see nursing flowsheet    OBJECTIVE:  Bed Mobility:  Rolling to Left: Supervision   Supine to Sit: Supervision    Transfers:  Sit to Stand: Stand By Assistance  Stand to Sit:Stand By Assistance    Ambulation:  Stand By Assistance  Distance: ~25' x 1  Surface: Level Tile  Device: No Device  Gait Deviations: Slow Siobhan, Decreased Step Length Bilaterally, Decreased Arm Swing, Decreased Trunk Rotation, Decreased Gait Speed, Decreased Foot Clearance Right, and Decreased Foot Clearance Left   Ambulation completed to increase B LE strength and maintain mobility and endurance.     Stairs:  Not Tested    Balance:  Static Sitting Balance:  Modified Independent, on EOB  Dynamic Standing Balance: Stand By Assistance while tying shorts' drawstrings.    Exercise:  Patient was guided in 1 set(s) 10 reps of exercises to both lower extremities: Glut sets, Seated marches, Seated heel/toe raises, Long

## 2024-09-09 NOTE — PROGRESS NOTES
Hospitalist Progress Note      Patient:  Niranjan Yañez 64 y.o. male     : 1960  Unit/Bed:[address] [place] [address]-A  Date of Admission: 2024        ASSESSMENT AND PLAN    Active Problems  Encephalopathy-suspect dementia with complications of brain metastases  Neurology consulted-stroke ruled out.  No acute findings on CT head and MRI brain, negative CTA head and neck  EEG performed with mild nonspecific encephalopathy  Neurology signed off with recommendations to follow-up outpatient with Dr. Sheehan  Cognitive eval ordered and pending  Metabolic workup negative  Also recommend neuropsychiatry referral on discharge  Echocardiogram ordered and pending  HFpEF-without exacerbation  Last echo 2023 with an EF of 60% and grade 1 diastolic dysfunction  Not on any GDMT at this time  Updated echo ordered and pending  Esophageal cancer with metastasis to the brain  Diagnosed 2022.  S/p chemotherapy, radiation, s/p craniotomy with right cerebellum tumor resection at [facility]  Brain MRI 2024 stable  Recently completed 2-week course of Decadron 2 mg tablets on 2024.  Continue follow-up with oncology outpatient  Malnutrition with inability for adequate p.o. intake secondary esophageal cancer  Continue with J-tube supplemental tube feedings  Resolved Problems    Chronic Conditions (reviewed and stable unless otherwise stated)  Former smoker  GERD  Hyperlipidemia  History of atrial fibrillation - not on any blood thinner    LDA: []CVC / []PICC / []Midline / []Haines / []Drains / []Mediport / [x]None  Antibiotics: NONE  Steroids: NONE  Labs (still needed?): []Yes / [x]No  IVF (still needed?): []Yes / []No    Level of care: []Step Down / []Med-Surg  Bed Status: []Inpatient / []Observation  Telemetry: []Yes / []No  PT/OT: []Yes / []No    DVT Prophylaxis: [x] Lovenox / [] Heparin / [] SCDs / [] Already on Systemic Anticoagulation / [] None     Expected discharge date:  tomorrow   Disposition: home with    Code

## 2024-09-09 NOTE — PROGRESS NOTES
[facility]     Neurodiagnostic Laboratory Technician Worksheet      EEG Date: 2024    Name: Niranjan Yañez  : 1960  Age: 64 y.o.  Sex: male  MRN: [id number]  CSN: [id number]    Room/Location: [facility]18/018-A  Ordering Provider: MARTHA Villalba    EEG Number: [id number]    Time In: 0944  Time Out: 1005  Total Treatment Time: 21    Clinical History: altered mental status,  esophageal cancer mets to brain  Mri  IMPRESSION:  Motion degraded examination without evidence of an acute infarct. Stable treated  metastases. No new abnormalities.    Hand Dominance: Left   Sedation: No   H.V. Completed: No, age protocol   Photic: Yes   Sleep: Yes   Drowsy: Yes   Sleep Deprived: No   Seizures Observed: No   Mentality: alert     Technician: Brigette Castro 2024

## 2024-09-09 NOTE — CARE COORDINATION
Case Management Assessment Initial Evaluation    Date/Time of Evaluation: 2024 8:03 AM  Assessment Completed by: Prabha Cummings RN    If patient is discharged prior to next notation, then this note serves as note for discharge by case management.    Patient Name: Niranjan Yañez                   YOB: 1960  Diagnosis: Stroke-like symptoms [R29.90]  Altered mental status, unspecified altered mental status type [R41.82]  Atrial fibrillation, unspecified type (HCC) [I48.91]  AMS (altered mental status) [R41.82]                   Date / Time: 2024  7:50 PM  [facility]     Patient Admission Status: Observation   Readmission Risk Low 0-14, Mod 15-19), High > 20: No data recorded  Current PCP: Konstantin Carpio,   Health Care Decision Makers:   Primary Decision Maker: Jocelyne Yañez - Spouse - [phone]    Secondary Decision Maker: PariNabeel - Child - [phone]    Supplemental (Other) Decision Maker: Renetta Juarez - Child - [phone]    Additional Case Management Notes: To ED w/ AMS. Per son, patient was at supermarket with his wife and got  in the store. Patient forgot how to use phone, forgetting where he was. Hospitalist and Neurology following. PT/OT. Dietician. Telemetry. IVF@75. Lovenox. Zofran prn.     Procedures:    ECHO: ordered    EEG: ordered     Imagin/7 CT Head: No acute intracranial hemorrhage. The pertinent finding(s) was called to Dr. Guillermo at 2020 hours on 2024 by Dr. Chavez. Verbal acknowledgment and readback was given. 2. Sequela of chronic microvascular changes   CTA Head/Neck: Mild atherosclerosis of each carotid bulb. There is no associated stenosis. 2. No intracranial stenoses or occlusions   CT A/P: No acute bowel obstruction or acute inflammatory bowel process 2. A small abdominal wall hernia above the umbilicus containing loop of small  bowel.   CXR: Negative   MRI Brain: Motion degraded examination without

## 2024-09-09 NOTE — PROCEDURES
PROCEDURE NOTE  Date: 9/9/2024   Name: Niranjan Yañez  YOB: 1960    Procedures            Date: 9/9/2024  Referring physician: Dr. Townsend     Indication  Patient aged 64 y with encephalopathy. EEG done to assess for epileptiform activity.    Introduction  This routine 20-minute EEG was recorded using the International 10-20 System on a [employer] workstation at 256 samples/s. Automated spike and seizure detection algorithms were applied.    Description  During the maximal alert state, a well-regulated, symmetric, and reactive 7 Hz posterior dominant rhythm was seen. No consistent focal slowing or interhemispheric asymmetry was noted. Stage I and stage II sleep were observed. There were no interictal epileptiform discharges or electrographic seizures.    Activations  Hyperventilation was not performed. Intermittent photic stimulation was performed and demonstrated no posterior driving response.    Impression  Abnormal awake EEG. The slowing mentioned above suggests mild non specific encephalopathy.     No epileptiform discharges were identified. Please note the absence of such activity on this record cannot conclusively rule out an epileptic disorder. If such is still clinically suspected, a repeat study with sleep deprivation and/or prolonged sampling may be helpful.    Lois Portillo MD  Epilepsy Board Certified.  Neurology Board Certified.    Electronically Signed

## 2024-09-09 NOTE — CONSULTS
Comprehensive Nutrition Assessment    Type and Reason for Visit: Initial, Consult (Order and Manage Tube Feeds)    Nutrition Recommendations/Plan:   Continue home J-tube feeding regimen of Peptamen 1.5 at 60 ml/hr.   Water flushes of 150 ml q4hr to provide an additional 900 ml of water per day- reviewed plan with wife.      Malnutrition Assessment:  Malnutrition Status: At risk for malnutrition (Comment)  Context: Chronic Illness       Findings of the 6 clinical characteristics of malnutrition:  Energy Intake:  No significant decrease in energy intake (tolerating tube feeds at goal rate)  Weight Loss:  No significant weight loss (8 lb (4.4% ) in the last 4 months which is not significant)     Body Fat Loss:  Mild body fat loss Orbital   Muscle Mass Loss:  No significant muscle mass loss    Fluid Accumulation:  No significant fluid accumulation     Strength:  Not Performed    Nutrition Assessment:    Pt. nutritionally compromised AEB esophageal cancer with mets to brain- Jtube in place and 100% of nutrition given via EN. At risk for further nutrition compromise r/t admitted d/t increased confusion and increased weakness. Noted underlying medical condition (PMHx Afib, esophageal cancer follows with [facility], GERD, HLD, sleep apnea).    Nutrition Related Findings:    Pt. Report/Treatments/Miscellaneous: Patient seen, sitting up in chair with wife at bedside. Per patient his appetite has not been very good. He has been weight stable and has not noticed any changes in his appearance. Wife reports that she brought in her own TF pump and started Peptamen 1.5 tube feeds at 60 ml/hr yesterday when they were admitted. She reports that she usually does around 120-180 ml of free water 3-4 times per day (~360-720 ml free water/day), but she knows that she needs to start doing more free water. Discussed with wife plan for 900 ml additional water per day per OP RD recommendations. Per wife they follow with María outpatient RD. Per

## 2024-09-09 NOTE — PLAN OF CARE
Neurology following peripherally, pending EEG.  EEG negative for epileptiform discharges, revealed mild nonspecific encephalopathy.    Inpatient neurologic workup completed at this time.  Neurology will sign off.  Recommend following up with [facility] for continued workup for esophageal cancer with metastases to the brain.  Recommend outpatient neurology follow-up with Dr. Sheehan for further evaluation of potential seizure activity.  Please refer to neurology note dated 9/8/2024 for further recommendations.  Please call with any other questions or concerns.  Thank you for this consult.    This case was discussed with Dr. Grant and he is in agreement with the assessment and plan.    Electronically signed by William Villalba PA-C on 9/9/24 at 12:54 PM EDT

## 2024-09-09 NOTE — PROGRESS NOTES
[facility]  OCCUPATIONAL THERAPY MISSED TREATMENT NOTE  STRZ MED SURG 8AB  8A-18/018-A      Date: 2024  Patient Name: Niranjan Yañez        CSN: [id number]   : 1960  (64 y.o.)  Gender: male   Referring Practitioner: Leroy Anderson DO  Diagnosis: AMS         REASON FOR MISSED TREATMENT: Attempt x 1 patient having EEG, Attempt x 2 patient with . Will check back as time allows

## 2024-09-10 VITALS
HEART RATE: 83 BPM | OXYGEN SATURATION: 92 % | HEIGHT: 67 IN | RESPIRATION RATE: 17 BRPM | BODY MASS INDEX: 27.15 KG/M2 | WEIGHT: 173 LBS | DIASTOLIC BLOOD PRESSURE: 77 MMHG | SYSTOLIC BLOOD PRESSURE: 119 MMHG | TEMPERATURE: 97.8 F

## 2024-09-10 PROCEDURE — 97116 GAIT TRAINING THERAPY: CPT

## 2024-09-10 PROCEDURE — G0378 HOSPITAL OBSERVATION PER HR: HCPCS

## 2024-09-10 PROCEDURE — 6370000000 HC RX 637 (ALT 250 FOR IP)

## 2024-09-10 PROCEDURE — 97110 THERAPEUTIC EXERCISES: CPT

## 2024-09-10 PROCEDURE — 1200000000 HC SEMI PRIVATE

## 2024-09-10 PROCEDURE — 96372 THER/PROPH/DIAG INJ SC/IM: CPT

## 2024-09-10 PROCEDURE — 6370000000 HC RX 637 (ALT 250 FOR IP): Performed by: PHYSICIAN ASSISTANT

## 2024-09-10 PROCEDURE — 99239 HOSP IP/OBS DSCHRG MGMT >30: CPT | Performed by: PHYSICIAN ASSISTANT

## 2024-09-10 PROCEDURE — 6360000002 HC RX W HCPCS

## 2024-09-10 RX ADMIN — ENOXAPARIN SODIUM 40 MG: 100 INJECTION SUBCUTANEOUS at 08:18

## 2024-09-10 RX ADMIN — PROCHLORPERAZINE MALEATE 10 MG: 10 TABLET ORAL at 09:29

## 2024-09-10 RX ADMIN — PANTOPRAZOLE SODIUM 40 MG: 40 TABLET, DELAYED RELEASE ORAL at 03:33

## 2024-09-10 ASSESSMENT — PAIN SCALES - GENERAL: PAINLEVEL_OUTOF10: 0

## 2024-09-10 ASSESSMENT — PAIN SCALES - WONG BAKER: WONGBAKER_NUMERICALRESPONSE: NO HURT

## 2024-09-10 NOTE — DISCHARGE SUMMARY
Hospital Medicine Discharge Summary      Patient Identification:   Niranjan Yañez   : 1960  MRN: [id number]   Account: [id number]      Patient's PCP: Konstantin Carpio DO    Admit Date: 2024     Discharge Date: 9/10/2024      Admitting Physician: Karlie Townsend PA-C     Discharging Physician Assistant: Karlie Townsend PA-C     Discharge Diagnoses with Assessment/Plan:    Encephalopathy-suspect dementia with complications of brain metastases  Neurology consulted-stroke ruled out.  No acute findings on CT head and MRI brain, negative CTA head and neck  EEG performed with mild nonspecific encephalopathy  Neurology signed off with recommendations to follow-up outpatient with Dr. Sheehan  SLP cog eval ordered but was unable to be completed prior to discharge.  Referral to neuropsychology for outpatient follow-up  Metabolic workup negative  Echocardiogram stable without any contributory diagnoses  HFpEF-without exacerbation  Last echo 2023 with an EF of 60% and grade 1 diastolic dysfunction  Not on any GDMT at this time  Echo  with LVEF of 50 to 55% and no noted diastolic dysfunction  Esophageal cancer with metastasis to the brain  Diagnosed 2022.  S/p chemotherapy, radiation, s/p craniotomy with right cerebellum tumor resection at [facility]  Brain MRI 2024 stable  Recently completed 2-week course of Decadron 2 mg tablets on 2024.  Continue follow-up with oncology outpatient  Malnutrition with inability for adequate p.o. intake secondary esophageal cancer  Continue with J-tube supplemental tube feedings    Chronic Conditions (reviewed and stable unless otherwise stated)  Former smoker  GERD  Hyperlipidemia  History of atrial fibrillation -reported postoperatively in 2022 after brain surgery.  Was temporarily on amiodarone but has not been placed on any further antiarrhythmics or anticoagulants.  Discussed with patient's significant other.  She will review further with PCP  TABLET BY MOUTH EVERY 6 HOURS AS NEEDED FOR NAUSEA     * Pulse Oximeter For Finger Misc  Dx:  hypoxemia     * Wall Grab Bar Misc  As directed.     tamsulosin 0.4 MG capsule  Commonly known as: Flomax  Take 1 capsule by mouth daily           * This list has 2 medication(s) that are the same as other medications prescribed for you. Read the directions carefully, and ask your doctor or other care provider to review them with you.                STOP taking these medications      dexAMETHasone 2 MG tablet  Commonly known as: DECADRON     tadalafil 20 MG tablet  Commonly known as: CIALIS     tadalafil 5 MG tablet  Commonly known as: CIALIS              Time Spent on discharge is more than 1 hour in the examination, evaluation, counseling and review of medications and discharge plan.        Signed:    Thank you Konstantin Carpio DO for the opportunity to be involved in this patient's care.    Electronically signed by Karlie Townsend PA-C on 9/10/2024 at 4:53 PM

## 2024-09-10 NOTE — CARE COORDINATION
9/10/24, 11:25 AM EDT    Patient goals/plan/ treatment preferences discussed by  and .  Patient goals/plan/ treatment preferences reviewed with patient/ family.  Patient/ family verbalize understanding of discharge plan and are in agreement with goal/plan/treatment preferences.  Understanding was demonstrated using the teach back method.  AVS provided by RN at time of discharge, which includes all necessary medical information pertaining to the patients current course of illness, treatment, post-discharge goals of care, and treatment preferences.     Services At/After Discharge: Home Health, Outpatient, Nursing service, OT, and PT    Met w/ Momo and his wife Jocelyne. They are agreeable with discharge. Plan to return home w/ his wife who cares for him. Current [facility] services (CHARITO Israel [phone]) Current [facility] for TF supplies. [facility].     Updated Jessica at [facility], Kristi at [facility] and Lindy at [facility] on plan for discharge today. They have Epic access and don't need any clinicals faxed.

## 2024-09-10 NOTE — PROGRESS NOTES
[facility]  INPATIENT PHYSICAL THERAPY  DAILY NOTE  STRZ MED SURG 8AB - 8A-18/018-A      Discharge Recommendations: 24 hour assistance or supervision and Home with Home Health PT  Equipment Recommendations:Other: cont to assess      Time In: 816  Time Out: 08  Timed Code Treatment Minutes: 23 Minutes  Minutes: 23          Date: 9/10/2024  Patient Name: Niranjan Yañez,  Gender:  male        MRN: [id number]  : 1960  (64 y.o.)     Referring Practitioner: Dr. JOANIE Anderson  Diagnosis: stroke-like symptoms  Additional Pertinent Hx: 64 y.o. male with PMHx of hypertension, hyperlipidemia, cancer of distal third esophagus status post chemoradiation, GERD, A-fib who presents to [facility] with altered mental status.  Per son, patient was at supermarket with his wife and got  in the store.  Patient forgot how to use phone, forgetting where he was.  According to son, cognitive issues have been ongoing for several months now.  Patient increasingly forgetting things.  Currently patient is doing ADLs by himself with minimal help from family.  Patient has been little weak recently according to son.  Son states that patient had cancer of distal third esophagus that had metastasized to the brain, patient underwent brain surgery.  Per chart review patient had craniotomy at [facility], debulked largest cerebellar met.  Patient's PET scan on 2023 showed no met uptake. Brain MRI on 2024 was stable.  Patient denies any slurred speech, blurry vision, weakness.  Patient A&O X 2 on exam.  Per son, this has been baseline for past several months. MRI brain-IMPRESSION:  Motion degraded examination without evidence of an acute infarct. Stable treated  metastases. No new abnormalities.     Prior Level of Function:  Lives With: Spouse  Type of Home: House  Home Layout: Able to Live on Main level with bedroom/bathroom, Two level  Home Access: Stairs to enter with rails  Entrance Stairs - Number of  Steps: 7  Entrance Stairs - Rails: Left  Home Equipment: Walker - 4-Wheeled, Cane   Bathroom Shower/Tub: Tub/Shower unit, Shower chair with back  Bathroom Toilet: Standard  Bathroom Equipment:  (ETS available)  Bathroom Accessibility: Accessible    ADL Assistance: Needs assistance  Homemaking Assistance: Needs assistance  Homemaking Responsibilities: No  Ambulation Assistance: Independent  Transfer Assistance: Independent  Active : No  Additional Comments: Per pt's wife, pt is typically non compliant with recommendations and will not use DME.    Restrictions/Precautions:  Restrictions/Precautions: Fall Risk, General Precautions  Position Activity Restriction  Other position/activity restrictions: *peg tube     SUBJECTIVE: Pt in bed upon arrival, and agrees to therapy, RN approved session, Pt A&O X 3/4, Pt pleasant and cooperative, pt's wife present during session, pt reported he slept well last night and was agreeable to physical therapy this morning.       PAIN: 0/10: pt denied any pain     Vitals: Vitals not assessed per clinical judgement, see nursing flowsheet    OBJECTIVE:  Bed Mobility:  Rolling to Left: Supervision, X 1, with head of bed flat, with rail   Supine to Sit: Supervision, X 1, with head of bed flat, with rail    Transfers:  Sit to Stand: Supervision, Stand By Assistance, X 1  Stand to Sit:Supervision, Stand By Assistance, X 1  *transfers performed from EOB and recliner    Ambulation:  Stand By Assistance, X 1  Distance: 100 feet  Surface: Level Tile  Device: No Device  Gait Deviations: Slow Siobhan, Decreased Step Length Bilaterally, Decreased Gait Speed, Decreased Heel Strike Bilaterally, and Wide Base of Support     Stairs:  Not Tested    Balance:  Static Sitting Balance:  Modified Independent  Static Standing Balance: Supervision, Stand By Assistance, X 1    Exercise:  Patient was guided in 1 set(s) x10-15 reps of exercises to both lower extremities: Ankle pumps, Glut sets, Quad sets,

## 2024-09-10 NOTE — PROGRESS NOTES
[facility]  STRZ MED SURG 8AB  Occupational Therapy  Daily Note    Discharge Recommendations: Home with Home Health OT  Equipment Recommendations: Equipment Needed: No  Other: per pt's wife, they have equipment, but pt will not use.      Time In: 1000  Time Out: 1030  Timed Code Treatment Minutes: 30 Minutes  Minutes: 30          Date: 9/10/2024  Patient Name: Niranjan Yañez,   Gender: male      Room: [facility]18/018-A  MRN: [id number]  : 1960  (64 y.o.)  Referring Practitioner: Leroy nAderson DO  Diagnosis: AMS  Additional Pertinent Hx: 64 y.o. male with PMHx of hypertension, hyperlipidemia, cancer of distal third esophagus status post chemoradiation, GERD, A-fib who presents to [facility] with altered mental status.  Per son, patient was at supermarket with his wife and got  in the store.  Patient forgot how to use phone, forgetting where he was.  According to son, cognitive issues have been ongoing for several months now.  Patient increasingly forgetting things.  Currently patient is doing ADLs by himself with minimal help from family.  Patient has been little weak recently according to son.  Son states that patient had cancer of distal third esophagus that had metastasized to the brain, patient underwent brain surgery.  Per chart review patient had craniotomy at [facility], debulked largest cerebellar met.  Patient's PET scan on 2023 showed no met uptake. Brain MRI on 2024 was stable.  Patient denies any slurred speech, blurry vision, weakness.  Patient A&O X 2 on exam.  Per son, this has been baseline for past several months.    Restrictions/Precautions:  Restrictions/Precautions: Fall Risk, General Precautions  Position Activity Restriction  Other position/activity restrictions: *peg tube     Social/Functional History:  Lives With: Spouse  Type of Home: House  Home Layout: Able to Live on Main level with bedroom/bathroom, Two level  Home Access: Stairs to enter  with rails  Entrance Stairs - Number of Steps: 7  Entrance Stairs - Rails: Left  Home Equipment: Walker - 4-Wheeled, Cane   Bathroom Shower/Tub: Tub/Shower unit, Shower chair with back  Bathroom Toilet: Standard  Bathroom Equipment:  (ETS available)  Bathroom Accessibility: Accessible       ADL Assistance: Needs assistance  Homemaking Assistance: Needs assistance  Homemaking Responsibilities: No  Ambulation Assistance: Independent  Transfer Assistance: Independent    Active : No  Patient's  Info: wife Jocelyne     Additional Comments: Per pt's wife, pt is typically non compliant with recommendations and will not use DME.    SUBJECTIVE: Nurse ok'd session. Patient seated in bedside chair upon arrival. Agreeable to OT session. Wife present during session    PAIN: Denies    Vitals: Vitals not assessed per clinical judgement, see nursing flowsheet    COGNITION: Decreased Problem Solving and Decreased Safety Awareness    ADL:   No ADL's completed this session..    IADL:   Not Tested    BALANCE:  Sitting Balance:  Modified Independent.    Standing Balance: Stand By Assistance.        BED MOBILITY:  Not Tested    TRANSFERS:  Sit to Stand:  Stand By Assistance. From bedside chair  Stand to Sit: Stand By Assistance. To bedside chair with 1 cue provided for hand placement    FUNCTIONAL MOBILITY:  Assistive Device: None  Assist Level:  Stand By Assistance.   Distance:  1 lap around unit   Fair pace, no LOB noted. Slightly unsteady. Seated rest break required after mobility     ADDITIONAL ACTIVITIES:  Patient completed BUE strengthening exercises with skilled education on HEP (handout provided with education provided on completing 2-3 times daily): completed x10 reps x1 set with a mod resistance band in all joints and all planes in order to improve UE strength and activity tolerance required for BADL routine and toilet / shower transfers. Patient tolerated fair, requiring rest breaks. Patient also required min

## 2024-09-10 NOTE — PROGRESS NOTES
Patient discharge instructions reviewed with patient and wife at bedside. All questions answered- IV removed. Belongings reviewed. Patient left facility via private transportation.

## 2024-09-10 NOTE — PROGRESS NOTES
[facility]  SPEECH THERAPY MISSED TREATMENT NOTE  STRZ MED SURG 8AB      Date: 9/10/2024  Patient Name: Niranjan Yañez        MRN: [id number]    : 1960  (64 y.o.)    REASON FOR MISSED TREATMENT:   attempted to see patient at 1300 for completion of cyyfly-yjyouvrp-yezepaqox evaluation following novel order placed by Karlie Townsend PA-C. Upon  arrival to patient room, patient discharged.      Elian Gutierrez M.S., CCC-SLP [id number]

## 2024-09-10 NOTE — PROGRESS NOTES
Physician Progress Note      PATIENT:               ANDRZEJ PENA  CSN #:                  [id number]  :                       1960  ADMIT DATE:       2024 7:50 PM  DISCH DATE:  RESPONDING  PROVIDER #:        Karlie Townsend PA-C          QUERY TEXT:    Pt admitted with AMS and has encephalopathy documented. If possible, please   document in progress notes and discharge summary further specificity regarding   the type of encephalopathy:    The medical record reflects the following:  Risk Factors: atrial fibrillation - not on anticoagulation, HTN, esophageal   cancer s/p chemotherapy and radiation with known brain metastases s/p right   cerebellum tumor resection 2022 at [facility], GERD, HLD, MARTÍN, former   smoker, who presented to [facility] ED last night for evaluation of altered mental   status.  Clinical Indicators: 64-year-old male who apparently lives with his wife   walking around in [facility] and all of a sudden he did not know where he was.    He did not know how to open the car door he had a clot of mentation problems,   no loss of function.  Patient does have a history of brain tumor.  9-7: Here   today he is ANO x 1.  9-8 Carbon Dioxide 21, Trops 25/23/20/22, Osmolality 271.5 and 273.1. RBC   3.84, Platelet 116  Treatment: Neuro checks, CT, MRI EEG and bed alarms.    Thank you.  Anabelle Waters RN, Clinical Documentation Integrity, [employer], [facility], CRCR  Options provided:  -- Encephalopathy due to brain tumor, with known brain metastases  -- Metabolic encephalopathy  -- Anoxic encephalopathy  -- Toxic encephalopathy  -- Other - I will add my own diagnosis  -- Disagree - Not applicable / Not valid  -- Disagree - Clinically unable to determine / Unknown  -- Refer to Clinical Documentation Reviewer    PROVIDER RESPONSE TEXT:    This patient has encephalopathy due to Encephalopathy due to brain tumor, with   known brain metastases    Query created by: Anabelle Waters on 9/10/2024  12:01 PM      Electronically signed by:  Karlie Townsend PA-C 9/10/2024 12:05 PM

## 2024-09-11 ENCOUNTER — TELEPHONE (OUTPATIENT)
Dept: FAMILY MEDICINE CLINIC | Age: 64
End: 2024-09-11

## 2024-09-16 ENCOUNTER — OFFICE VISIT (OUTPATIENT)
Dept: FAMILY MEDICINE CLINIC | Age: 64
End: 2024-09-16

## 2024-09-16 VITALS
HEART RATE: 80 BPM | RESPIRATION RATE: 18 BRPM | HEIGHT: 67 IN | WEIGHT: 170.3 LBS | SYSTOLIC BLOOD PRESSURE: 110 MMHG | DIASTOLIC BLOOD PRESSURE: 66 MMHG | BODY MASS INDEX: 26.73 KG/M2

## 2024-09-16 DIAGNOSIS — D64.9 ANEMIA, UNSPECIFIED TYPE: ICD-10-CM

## 2024-09-16 DIAGNOSIS — Z09 HOSPITAL DISCHARGE FOLLOW-UP: ICD-10-CM

## 2024-09-16 DIAGNOSIS — G93.40 ENCEPHALOPATHY ACUTE: Primary | ICD-10-CM

## 2024-09-16 DIAGNOSIS — R53.1 GENERAL WEAKNESS: ICD-10-CM

## 2024-09-16 DIAGNOSIS — D69.6 THROMBOCYTOPENIA (HCC): ICD-10-CM

## 2024-09-16 DIAGNOSIS — C15.9 METASTASIS FROM ESOPHAGEAL CANCER (HCC): ICD-10-CM

## 2024-09-16 DIAGNOSIS — R41.3 MEMORY DIFFICULTIES: ICD-10-CM

## 2024-09-16 DIAGNOSIS — C79.9 METASTASIS FROM ESOPHAGEAL CANCER (HCC): ICD-10-CM

## 2024-09-16 DIAGNOSIS — E43 UNSPECIFIED SEVERE PROTEIN-CALORIE MALNUTRITION (HCC): ICD-10-CM

## 2024-09-16 DIAGNOSIS — I50.32 CHRONIC HEART FAILURE WITH PRESERVED EJECTION FRACTION (HCC): ICD-10-CM

## 2024-09-16 PROBLEM — K94.23 GASTROSTOMY TUBE DYSFUNCTION (HCC): Status: RESOLVED | Noted: 2023-08-30 | Resolved: 2024-09-16

## 2024-09-16 PROBLEM — K94.13 MALFUNCTIONING JEJUNOSTOMY TUBE (HCC): Status: RESOLVED | Noted: 2023-08-30 | Resolved: 2024-09-16

## 2024-09-16 RX ORDER — DEXAMETHASONE 1 MG
1 TABLET ORAL 2 TIMES DAILY WITH MEALS
COMMUNITY

## 2024-09-24 ENCOUNTER — HOSPITAL ENCOUNTER (OUTPATIENT)
Dept: RADIATION ONCOLOGY | Age: 64
Discharge: HOME | End: 2024-09-24
Payer: MEDICARE

## 2024-09-24 VITALS
HEART RATE: 94 BPM | OXYGEN SATURATION: 97 % | BODY MASS INDEX: 26.63 KG/M2 | DIASTOLIC BLOOD PRESSURE: 71 MMHG | WEIGHT: 170 LBS | TEMPERATURE: 98.2 F | SYSTOLIC BLOOD PRESSURE: 117 MMHG | RESPIRATION RATE: 16 BRPM

## 2024-09-24 DIAGNOSIS — C15.9 PRIMARY CANCER OF ESOPHAGUS WITH METASTASIS TO OTHER SITE (HCC): Primary | ICD-10-CM

## 2024-09-24 DIAGNOSIS — C79.31 METASTASIS TO BRAIN (HCC): ICD-10-CM

## 2024-09-24 DIAGNOSIS — C15.5 CANCER OF DISTAL THIRD OF ESOPHAGUS (HCC): ICD-10-CM

## 2024-09-24 PROCEDURE — 99214 OFFICE O/P EST MOD 30 MIN: CPT

## 2024-09-24 PROCEDURE — 99212 OFFICE O/P EST SF 10 MIN: CPT

## 2024-09-24 ASSESSMENT — ENCOUNTER SYMPTOMS
ABDOMINAL PAIN: 0
SORE THROAT: 0
FACIAL SWELLING: 0
NAUSEA: 0
SHORTNESS OF BREATH: 0
RECTAL PAIN: 0
BLOOD IN STOOL: 0
TROUBLE SWALLOWING: 0
COUGH: 0

## 2024-10-07 RX ORDER — PANTOPRAZOLE SODIUM 40 MG/1
40 TABLET, DELAYED RELEASE ORAL 2 TIMES DAILY
Qty: 180 TABLET | Refills: 1 | Status: SHIPPED | OUTPATIENT
Start: 2024-10-07

## 2024-10-08 ENCOUNTER — HOSPITAL ENCOUNTER (OUTPATIENT)
Dept: INFUSION THERAPY | Age: 64
End: 2024-10-08

## 2024-11-04 ENCOUNTER — HOSPITAL ENCOUNTER (OUTPATIENT)
Dept: CT IMAGING | Age: 64
Discharge: HOME | End: 2024-11-04
Attending: INTERNAL MEDICINE
Payer: MEDICARE

## 2024-11-04 DIAGNOSIS — C15.5 CANCER OF DISTAL THIRD OF ESOPHAGUS (HCC): ICD-10-CM

## 2024-11-04 DIAGNOSIS — Z12.5 PROSTATE CANCER SCREENING: ICD-10-CM

## 2024-11-04 LAB — POC CREATININE WHOLE BLOOD: 0.8 MG/DL (ref 0.5–1.2)

## 2024-11-04 PROCEDURE — 6360000004 HC RX CONTRAST MEDICATION: Performed by: INTERNAL MEDICINE

## 2024-11-04 PROCEDURE — 71260 CT THORAX DX C+: CPT

## 2024-11-04 PROCEDURE — 82565 ASSAY OF CREATININE: CPT

## 2024-11-04 PROCEDURE — 74177 CT ABD & PELVIS W/CONTRAST: CPT

## 2024-11-04 RX ORDER — IOPAMIDOL 755 MG/ML
80 INJECTION, SOLUTION INTRAVASCULAR
Status: COMPLETED | OUTPATIENT
Start: 2024-11-04 | End: 2024-11-04

## 2024-11-04 RX ADMIN — IOPAMIDOL 80 ML: 755 INJECTION, SOLUTION INTRAVENOUS at 10:51

## 2024-11-06 ENCOUNTER — LAB (OUTPATIENT)
Dept: LAB | Age: 64
End: 2024-11-06

## 2024-11-06 ENCOUNTER — OFFICE VISIT (OUTPATIENT)
Dept: NEUROLOGY | Age: 64
End: 2024-11-06
Payer: MEDICARE

## 2024-11-06 VITALS
BODY MASS INDEX: 26.68 KG/M2 | HEART RATE: 58 BPM | WEIGHT: 170 LBS | HEIGHT: 67 IN | SYSTOLIC BLOOD PRESSURE: 110 MMHG | DIASTOLIC BLOOD PRESSURE: 65 MMHG | OXYGEN SATURATION: 96 %

## 2024-11-06 DIAGNOSIS — M89.9 DISORDER OF BONE, UNSPECIFIED: ICD-10-CM

## 2024-11-06 DIAGNOSIS — G93.41 METABOLIC ENCEPHALOPATHY: Primary | ICD-10-CM

## 2024-11-06 DIAGNOSIS — G93.41 METABOLIC ENCEPHALOPATHY: ICD-10-CM

## 2024-11-06 LAB
25(OH)D3 SERPL-MCNC: 26 NG/ML (ref 30–100)
FOLATE SERPL-MCNC: > 20 NG/ML (ref 4.8–24.2)
VIT B12 SERPL-MCNC: 766 PG/ML (ref 211–911)

## 2024-11-06 PROCEDURE — 3017F COLORECTAL CA SCREEN DOC REV: CPT | Performed by: PSYCHIATRY & NEUROLOGY

## 2024-11-06 PROCEDURE — 1036F TOBACCO NON-USER: CPT | Performed by: PSYCHIATRY & NEUROLOGY

## 2024-11-06 PROCEDURE — G8419 CALC BMI OUT NRM PARAM NOF/U: HCPCS | Performed by: PSYCHIATRY & NEUROLOGY

## 2024-11-06 PROCEDURE — G8427 DOCREV CUR MEDS BY ELIG CLIN: HCPCS | Performed by: PSYCHIATRY & NEUROLOGY

## 2024-11-06 PROCEDURE — 3074F SYST BP LT 130 MM HG: CPT | Performed by: PSYCHIATRY & NEUROLOGY

## 2024-11-06 PROCEDURE — G8484 FLU IMMUNIZE NO ADMIN: HCPCS | Performed by: PSYCHIATRY & NEUROLOGY

## 2024-11-06 PROCEDURE — 99204 OFFICE O/P NEW MOD 45 MIN: CPT | Performed by: PSYCHIATRY & NEUROLOGY

## 2024-11-06 PROCEDURE — 3078F DIAST BP <80 MM HG: CPT | Performed by: PSYCHIATRY & NEUROLOGY

## 2024-11-06 NOTE — PATIENT INSTRUCTIONS
Vitamin B12, folate  Vitamin B6 level  Vitamin D level  Call with any new symptoms or concerns.   Follow up as needed

## 2024-11-07 ENCOUNTER — TELEPHONE (OUTPATIENT)
Dept: NEUROLOGY | Age: 64
End: 2024-11-07

## 2024-11-07 NOTE — TELEPHONE ENCOUNTER
Jocelyne informed of Vit D lab results per HIPAA and verbalized understanding. Vitamin D lab results forwarded to PCP for further management.

## 2024-11-07 NOTE — TELEPHONE ENCOUNTER
----- Message from Paige Leopold, APRN - CNP sent at 11/7/2024  7:45 AM EST -----  Please let patient know his vitamin D level is low=26. Please ask him to follow up with his PCP regarding this  Paige Leopold, CNP

## 2024-11-11 NOTE — PROGRESS NOTES
Hospitalizations:   2/10/22    Allergies:  Allergies   Allergen Reactions    Paclitaxel Other (See Comments)     Severe lower back pain- able to resume after medications given    Aleve [Naproxen] Hives    Promethazine Rash        Adult Illness:  Patient Active Problem List   Diagnosis    Abdominal pain    Cancer of distal third of esophagus (HCC)    Acute postoperative pain    Chronic anemia    Primary cancer of esophagus with metastasis to other site (HCC)    GERD (gastroesophageal reflux disease)    HLD (hyperlipidemia)    Obesity (BMI 30.0-34.9)    Postoperative atrial fibrillation (HCC)    Thrombocytopenia (HCC)    Brain lesion    Delayed gastric emptying    Serum creatinine raised    Malnutrition (HCC)    Benign hypertension    Encounter for insertion of venous access port    Failure to thrive in adult    Severe malnutrition (HCC)    MARTÍN (obstructive sleep apnea)    Dislodged jejunostomy tube    AMS (altered mental status)    Metastasis to brain (HCC)        Surgery:  Past Surgical History:   Procedure Laterality Date    ABDOMEN SURGERY  08/16/2022    GASTRIC DEVASCULARIZATON-[facility]    ABDOMEN SURGERY  08/30/2022    ESOPHAGOGASTRODUODENOSCOPY TRANSORAL DIAGNOSTIC ESOPHAGECTOMY W/ THORACOTOMY-[facility]    BRAIN SURGERY      DENTAL SURGERY      25 teeth removed    GASTROSTOMY-JEJEUNOSTOMY TUBE CHANGE/PLACEMENT      PORT SURGERY Left 12/01/2022    LEFT SINGLE LUMEN MEDIPORT performed by Konstantin Melara MD at [facility] OR    STOMACH SURGERY N/A 05/01/2023    Open JEJUNOSTOMY TUBE INSERTION; repair of umbilical hernia performed by Elian Zaragoza MD at [facility] OR        Medications:  Current Outpatient Medications   Medication Sig Dispense Refill    pantoprazole (PROTONIX) 40 MG tablet TAKE 1 TABLET BY MOUTH IN THE MORNING AND AT BEDTIME 180 tablet 1    dexAMETHasone (DECADRON) 1 MG tablet Take 1 tablet by mouth See Admin Instructions Tuesday and Thursday per wife      Tadalafil (CIALIS PO) Take by mouth      tamsulosin

## 2024-11-12 ENCOUNTER — OFFICE VISIT (OUTPATIENT)
Dept: ONCOLOGY | Age: 64
End: 2024-11-12
Payer: MEDICARE

## 2024-11-12 ENCOUNTER — HOSPITAL ENCOUNTER (OUTPATIENT)
Dept: INFUSION THERAPY | Age: 64
Discharge: HOME | End: 2024-11-12
Payer: MEDICARE

## 2024-11-12 ENCOUNTER — HOSPITAL ENCOUNTER (OUTPATIENT)
Dept: INFUSION THERAPY | Age: 64
Discharge: HOME | End: 2024-11-12

## 2024-11-12 VITALS
RESPIRATION RATE: 18 BRPM | WEIGHT: 170.8 LBS | DIASTOLIC BLOOD PRESSURE: 80 MMHG | TEMPERATURE: 98 F | SYSTOLIC BLOOD PRESSURE: 119 MMHG | HEIGHT: 67 IN | BODY MASS INDEX: 26.81 KG/M2 | HEART RATE: 85 BPM | OXYGEN SATURATION: 95 %

## 2024-11-12 VITALS
WEIGHT: 170.8 LBS | BODY MASS INDEX: 26.81 KG/M2 | TEMPERATURE: 98 F | HEIGHT: 67 IN | SYSTOLIC BLOOD PRESSURE: 119 MMHG | DIASTOLIC BLOOD PRESSURE: 80 MMHG | HEART RATE: 85 BPM | RESPIRATION RATE: 18 BRPM | OXYGEN SATURATION: 95 %

## 2024-11-12 DIAGNOSIS — C15.5 CANCER OF DISTAL THIRD OF ESOPHAGUS (HCC): Primary | ICD-10-CM

## 2024-11-12 DIAGNOSIS — Z12.5 PROSTATE CANCER SCREENING: ICD-10-CM

## 2024-11-12 LAB
ALBUMIN SERPL BCG-MCNC: 4.2 G/DL (ref 3.5–5.1)
ALP SERPL-CCNC: 88 U/L (ref 38–126)
ALT SERPL W/O P-5'-P-CCNC: 45 U/L (ref 11–66)
AST SERPL-CCNC: 27 U/L (ref 5–40)
BASOPHILS ABSOLUTE: 0 THOU/MM3 (ref 0–0.1)
BASOPHILS NFR BLD AUTO: 0 % (ref 0–3)
BILIRUB CONJ SERPL-MCNC: 0.2 MG/DL (ref 0.1–13.8)
BILIRUB SERPL-MCNC: 0.4 MG/DL (ref 0.3–1.2)
BUN BLDP-MCNC: 12 MG/DL (ref 8–26)
CHLORIDE BLD-SCNC: 104 MEQ/L (ref 98–109)
CREAT BLD-MCNC: 0.7 MG/DL (ref 0.5–1.2)
EOSINOPHIL NFR BLD AUTO: 0 % (ref 0–4)
EOSINOPHILS ABSOLUTE: 0 THOU/MM3 (ref 0–0.4)
ERYTHROCYTE [DISTWIDTH] IN BLOOD BY AUTOMATED COUNT: 12.9 % (ref 11.5–14.5)
GFR SERPL CREATININE-BSD FRML MDRD: > 90 ML/MIN/1.73M2
GLUCOSE BLD-MCNC: 102 MG/DL (ref 70–108)
HCT VFR BLD AUTO: 42 % (ref 42–52)
HGB BLD-MCNC: 14.3 GM/DL (ref 14–18)
IMMATURE GRANULOCYTES %: 0 %
IMMATURE GRANULOCYTES ABSOLUTE: 0.02 THOU/MM3 (ref 0–0.07)
IONIZED CALCIUM, WHOLE BLOOD: 1.22 MMOL/L (ref 1.12–1.32)
LYMPHOCYTES ABSOLUTE: 0.7 THOU/MM3 (ref 1–4.8)
LYMPHOCYTES NFR BLD AUTO: 7 % (ref 15–47)
MCH RBC QN AUTO: 31.5 PG (ref 26–33)
MCHC RBC AUTO-ENTMCNC: 34 GM/DL (ref 32.2–35.5)
MCV RBC AUTO: 93 FL (ref 80–94)
MONOCYTES ABSOLUTE: 0.7 THOU/MM3 (ref 0.4–1.3)
MONOCYTES NFR BLD AUTO: 7 % (ref 0–12)
NEUTROPHILS ABSOLUTE: 8.3 THOU/MM3 (ref 1.8–7.7)
NEUTROPHILS NFR BLD AUTO: 85 % (ref 43–75)
PLATELET # BLD AUTO: 128 THOU/MM3 (ref 130–400)
PMV BLD AUTO: 8.6 FL (ref 9.4–12.4)
POTASSIUM BLD-SCNC: 4.4 MEQ/L (ref 3.5–4.9)
PROT SERPL-MCNC: 7.2 G/DL (ref 6.1–8)
PYRIDOXAL PHOS SERPL-SCNC: 195.6 NMOL/L (ref 20–125)
RBC # BLD AUTO: 4.54 MILL/MM3 (ref 4.7–6.1)
SODIUM BLD-SCNC: 138 MEQ/L (ref 138–146)
TOTAL CO2, WHOLE BLOOD: 29 MEQ/L (ref 23–33)
WBC # BLD AUTO: 9.8 THOU/MM3 (ref 4.8–10.8)

## 2024-11-12 PROCEDURE — 3074F SYST BP LT 130 MM HG: CPT | Performed by: INTERNAL MEDICINE

## 2024-11-12 PROCEDURE — 36591 DRAW BLOOD OFF VENOUS DEVICE: CPT

## 2024-11-12 PROCEDURE — 85025 COMPLETE CBC W/AUTO DIFF WBC: CPT

## 2024-11-12 PROCEDURE — 3017F COLORECTAL CA SCREEN DOC REV: CPT | Performed by: INTERNAL MEDICINE

## 2024-11-12 PROCEDURE — 6360000002 HC RX W HCPCS: Performed by: INTERNAL MEDICINE

## 2024-11-12 PROCEDURE — 99211 OFF/OP EST MAY X REQ PHY/QHP: CPT

## 2024-11-12 PROCEDURE — 80076 HEPATIC FUNCTION PANEL: CPT

## 2024-11-12 PROCEDURE — G8419 CALC BMI OUT NRM PARAM NOF/U: HCPCS | Performed by: INTERNAL MEDICINE

## 2024-11-12 PROCEDURE — G8484 FLU IMMUNIZE NO ADMIN: HCPCS | Performed by: INTERNAL MEDICINE

## 2024-11-12 PROCEDURE — G8427 DOCREV CUR MEDS BY ELIG CLIN: HCPCS | Performed by: INTERNAL MEDICINE

## 2024-11-12 PROCEDURE — 80047 BASIC METABLC PNL IONIZED CA: CPT

## 2024-11-12 PROCEDURE — 3079F DIAST BP 80-89 MM HG: CPT | Performed by: INTERNAL MEDICINE

## 2024-11-12 PROCEDURE — 99213 OFFICE O/P EST LOW 20 MIN: CPT | Performed by: INTERNAL MEDICINE

## 2024-11-12 PROCEDURE — 2580000003 HC RX 258: Performed by: INTERNAL MEDICINE

## 2024-11-12 PROCEDURE — 1036F TOBACCO NON-USER: CPT | Performed by: INTERNAL MEDICINE

## 2024-11-12 RX ORDER — HEPARIN 100 UNIT/ML
500 SYRINGE INTRAVENOUS PRN
Status: DISCONTINUED | OUTPATIENT
Start: 2024-11-12 | End: 2024-11-13 | Stop reason: HOSPADM

## 2024-11-12 RX ORDER — SODIUM CHLORIDE 0.9 % (FLUSH) 0.9 %
5-40 SYRINGE (ML) INJECTION PRN
Status: DISCONTINUED | OUTPATIENT
Start: 2024-11-12 | End: 2024-11-13 | Stop reason: HOSPADM

## 2024-11-12 RX ADMIN — SODIUM CHLORIDE, PRESERVATIVE FREE 30 ML: 5 INJECTION INTRAVENOUS at 11:09

## 2024-11-12 RX ADMIN — HEPARIN 500 UNITS: 100 SYRINGE at 11:43

## 2024-11-12 SDOH — HEALTH STABILITY: PHYSICAL HEALTH: ON AVERAGE, HOW MANY DAYS PER WEEK DO YOU ENGAGE IN MODERATE TO STRENUOUS EXERCISE (LIKE A BRISK WALK)?: 2 DAYS

## 2024-11-12 ASSESSMENT — LIFESTYLE VARIABLES
HOW MANY STANDARD DRINKS CONTAINING ALCOHOL DO YOU HAVE ON A TYPICAL DAY: PATIENT DECLINED
HOW OFTEN DO YOU HAVE A DRINK CONTAINING ALCOHOL: 1
HOW OFTEN DO YOU HAVE A DRINK CONTAINING ALCOHOL: NEVER
HOW OFTEN DO YOU HAVE SIX OR MORE DRINKS ON ONE OCCASION: 98
HOW MANY STANDARD DRINKS CONTAINING ALCOHOL DO YOU HAVE ON A TYPICAL DAY: 98

## 2024-11-12 ASSESSMENT — PATIENT HEALTH QUESTIONNAIRE - PHQ9
SUM OF ALL RESPONSES TO PHQ QUESTIONS 1-9: 1
SUM OF ALL RESPONSES TO PHQ9 QUESTIONS 1 & 2: 1
SUM OF ALL RESPONSES TO PHQ QUESTIONS 1-9: 1
SUM OF ALL RESPONSES TO PHQ QUESTIONS 1-9: 1
1. LITTLE INTEREST OR PLEASURE IN DOING THINGS: SEVERAL DAYS
SUM OF ALL RESPONSES TO PHQ QUESTIONS 1-9: 1
2. FEELING DOWN, DEPRESSED OR HOPELESS: NOT AT ALL

## 2024-11-12 NOTE — PATIENT INSTRUCTIONS
-Confirm if Radiation oncology has ordered the next brain MRI to be done .  Patient's wife believes that they have already ordered the next brain MRI to be done approximately January 2025    -  Port flush every 2 months    -Next CT chest abdomen pelvis in 3-4  months ordered to be done 2/25/25    -Please schedule follow-up with me 1 week later.

## 2024-11-12 NOTE — PLAN OF CARE
Problem: Discharge Planning  Goal: Discharge to home or other facility with appropriate resources  Outcome: Adequate for Discharge  Flowsheets (Taken 11/12/2024 1640)  Discharge to home or other facility with appropriate resources: Identify barriers to discharge with patient and caregiver     Problem: Safety - Adult  Goal: Free from fall injury  Outcome: Adequate for Discharge  Flowsheets (Taken 11/12/2024 1640)  Free From Fall Injury: Instruct family/caregiver on patient safety     Problem: Chronic Conditions and Co-morbidities  Goal: Patient's chronic conditions and co-morbidity symptoms are monitored and maintained or improved  Outcome: Adequate for Discharge  Flowsheets (Taken 11/12/2024 1640)  Care Plan - Patient's Chronic Conditions and Co-Morbidity Symptoms are Monitored and Maintained or Improved: Monitor and assess patient's chronic conditions and comorbid symptoms for stability, deterioration, or improvement     Problem: Infection - Adult  Goal: Absence of infection at discharge  Outcome: Adequate for Discharge  Flowsheets (Taken 11/12/2024 1640)  Absence of infection at discharge: Monitor all insertion sites i.e., indwelling lines, tubes and drains  Note: Mediport site with no redness or warmth. Skin over port site intact with no signs of breakdown noted. Patient verbalizes signs/symptoms of port infection and when to notify the physician.

## 2024-11-12 NOTE — PROGRESS NOTES
Patient tolerated line/lab without any complications.    Last vital signs:   /80   Pulse 85   Temp 98 °F (36.7 °C) (Oral)   Resp 18   Ht 1.702 m (5' 7.01\")   Wt 77.5 kg (170 lb 12.8 oz)   SpO2 95%   BMI 26.74 kg/m²     Physician's instructions:  -Confirm if Radiation oncology has ordered the next brain MRI to be done .  Patient's wife believes that they have already ordered the next brain MRI to be done approximately January 2025  -  Port flush every 2 months  -Next CT chest abdomen pelvis in 3-4  months ordered to be done 2/25/25  -Please schedule follow-up with me 1 week later.        Patient instructed if experience any of the above symptoms following today's line/lab, he is to notify MD immediately or go to the emergency department.    Discharge instructions given to patient. Verbalizes understanding. Ambulated off unit per self, accompanied spouse, with belongings.

## 2024-11-12 NOTE — DISCHARGE INSTRUCTIONS
Instructions    -Confirm if Radiation oncology has ordered the next brain MRI to be done .  Patient's wife believes that they have already ordered the next brain MRI to be done approximately January 2025     -  Port flush every 2 months     -Next CT chest abdomen pelvis in 3-4  months ordered to be done 2/25/25     -Please schedule follow-up with me 1 week later.        Follow up January 7, 2025 at 11 am for port flush.

## 2024-11-13 ENCOUNTER — CLINICAL DOCUMENTATION (OUTPATIENT)
Dept: NUTRITION | Age: 64
End: 2024-11-13

## 2024-11-13 ENCOUNTER — TELEPHONE (OUTPATIENT)
Dept: NEUROLOGY | Age: 64
End: 2024-11-13

## 2024-11-13 NOTE — TELEPHONE ENCOUNTER
Jocelyne/patient informed and verbalized understanding. Jocelyne reports patient is not on Vitamin B6 supplements but is on tube feedings via J tube. Jocelyne reports the name of the tube feedings is Paptamen 1.5 and his dietician is Isabel at [facility]

## 2024-11-13 NOTE — TELEPHONE ENCOUNTER
I am not familiar with the tube feedings and ingredients. Recommend follow up with dietician regarding this  Paige Leopold, KENROY

## 2024-11-13 NOTE — TELEPHONE ENCOUNTER
----- Message from Paige Leopold, APRN - CNP sent at 11/13/2024  7:52 AM EST -----  Please let patient know his vitamin B6 level is elevated=195.6. Elevated vitamin B6 level can be neurotoxic. Please ask him to stop vitamin B6 supplements  Paige Leopold, CNP

## 2024-11-13 NOTE — PROGRESS NOTES
Nutrition Assessment     Reason for Call:   11/13/2024 - EN for 100% of nutrition, patient with elevated B6 levels  *esophageal cancer s/p esophagectomy (2022), brain mets s/p craniotomy, current with treatment, J-tube placed 5/1/23     Nutrition Recommendations:   -EN for 100% of nutrition - Peptamen 1.5 with a goal of 6 cartons/day, continuous feeds via J-tube = 1500ml, 2250 kcals, 282 grams CHO, 102 grams protein, 1152 ml water, 5.4 mg B6, and 30 mcg Vitamin D  -Recommend an additional 900ml water per day  -PO ad corrina for pleasure and to maintain swallow function  -Stop Hemp +Iron supplement which was providing patient an additional 8 mg B6/day  -Additional vitamin D3 supplement as per provider recommendations  -If no improvement in B6 level will consider switching EN formula to [employer] 1.5 Peptide with a goal of 4.6 cartons/day or 1500ml = 2300 kcals, 207 grams CHO, 111 grams protein, 1125 ml water, 3.3 mg B6, and 33 mcg vitamin D.      Malnutrition Assessment: (5/31/2024)  Malnutrition Status: no malnutrition  Context: chronic illness  Findings of the 6 clinical characteristics of malnutrition (minimum of 2 out of 6 clinical characteristics is required to make the dx of moderate or severe Protein Calorie Malnutrition based on AND/ASPEN Guidelines):              1. Energy Intake: 100% of needs met with EN              2. Weight Loss: no loss              3. Fat Loss: not able to assess              4. Muscle Loss: not able to assess              5. Fluid Accumulation: not able to assess              6.  Strength: not measured     Nutrition Diagnosis:   Problem: altered GI function  Etiology: esophageal cancer  Signs and Symptoms: need for 100% of nutrition via J-tube     Nutrition Assessment:   History: esophageal cancer with brain mets, HTN, GERD, HLD  Subjective:   11/13/2024 - I received a call from the patient's wife, Jocelyne. Jocelyne reports that the patient had labs done and his B6 level is elevated.

## 2024-11-14 ENCOUNTER — OFFICE VISIT (OUTPATIENT)
Dept: FAMILY MEDICINE CLINIC | Age: 64
End: 2024-11-14

## 2024-11-14 VITALS
WEIGHT: 168.3 LBS | RESPIRATION RATE: 20 BRPM | HEART RATE: 72 BPM | BODY MASS INDEX: 28.04 KG/M2 | HEIGHT: 65 IN | SYSTOLIC BLOOD PRESSURE: 112 MMHG | DIASTOLIC BLOOD PRESSURE: 60 MMHG

## 2024-11-14 DIAGNOSIS — C15.9 METASTASIS FROM ESOPHAGEAL CANCER (HCC): ICD-10-CM

## 2024-11-14 DIAGNOSIS — E67.2 HYPERVITAMINOSIS B6: ICD-10-CM

## 2024-11-14 DIAGNOSIS — C79.9 METASTASIS FROM ESOPHAGEAL CANCER (HCC): ICD-10-CM

## 2024-11-14 DIAGNOSIS — Z00.00 WELCOME TO MEDICARE PREVENTIVE VISIT: Primary | ICD-10-CM

## 2024-11-14 DIAGNOSIS — I50.32 CHRONIC HEART FAILURE WITH PRESERVED EJECTION FRACTION (HCC): ICD-10-CM

## 2024-11-14 DIAGNOSIS — R53.1 GENERAL WEAKNESS: ICD-10-CM

## 2024-11-14 DIAGNOSIS — E55.9 VITAMIN D INSUFFICIENCY: ICD-10-CM

## 2024-11-14 DIAGNOSIS — D64.9 ANEMIA, UNSPECIFIED TYPE: ICD-10-CM

## 2024-11-14 DIAGNOSIS — R41.3 MEMORY DIFFICULTIES: ICD-10-CM

## 2024-11-14 ASSESSMENT — ENCOUNTER SYMPTOMS
GASTROINTESTINAL NEGATIVE: 1
RESPIRATORY NEGATIVE: 1

## 2024-11-14 NOTE — PATIENT INSTRUCTIONS
You may receive a survey regarding the care you received during your visit.  Your input is valuable to us.  We encourage you to complete and return your survey.  We hope you will choose us in the future for your healthcare needs.        Learning About Mild Cognitive Impairment (MCI)  What is mild cognitive impairment (MCI)?     It's common to forget things sometimes as we get older. But some older people have memory loss that's more than normal aging. It's called mild cognitive impairment, or MCI. It is not the same as dementia.  People with the condition often know that their memory or mental function has changed. Tests may show some loss. But their minds work well overall. They can carry out daily tasks that are normal for them.  People with MCI have a higher chance of one day getting dementia. But not all people who have it will get dementia. Some people may stay the same over time.  What are the symptoms?  People with MCI have more memory loss than what occurs with normal aging. They may have increasing trouble with recalling words and keeping up with conversations. They may also have trouble remembering important events and making decisions.  What puts you at risk?  The risk of getting MCI increases with age. Having high blood pressure or having a family history of MCI may also increase your risk.  How is it diagnosed?  Your doctor will do a physical exam.  You may be asked questions to check your memory and other mental skills. Your doctor may also talk to close friends and family members. This can help the doctor figure out how your memory and other mental skills have changed.  You may get blood tests and tests that look at your brain.  These questions and tests can make sure you don't have other conditions that can cause symptoms like MCI. These include depression, sleep problems, and side effects from medicines.  How is it treated?  There are no medicines to treat MCI or to keep it from progressing to

## 2024-11-14 NOTE — PROGRESS NOTES
Niranjan Yañez (:  1960) is a 64 y.o. male,Established patient, here for evaluation of the following chief complaint(s):  Medicare AWV          Subjective   SUBJECTIVE/OBJECTIVE:  HPI  Chief Complaint   Patient presents with    Medicare AWV     AWV.    Recently seen by Neuro.  B6 elevated and Vit D low.  Stopped MVIs and called nutrition regarding his feeds.      BP Readings from Last 3 Encounters:   24 112/60   24 119/80   24 119/80     Wt Readings from Last 3 Encounters:   24 76.3 kg (168 lb 4.8 oz)   24 77.5 kg (170 lb 12.8 oz)   24 77.5 kg (170 lb 12.8 oz)       Patient Active Problem List   Diagnosis    Abdominal pain    Cancer of distal third of esophagus (HCC)    Acute postoperative pain    Chronic anemia    Primary cancer of esophagus with metastasis to other site (HCC)    GERD (gastroesophageal reflux disease)    HLD (hyperlipidemia)    Obesity (BMI 30.0-34.9)    Postoperative atrial fibrillation (HCC)    Thrombocytopenia (HCC)    Brain lesion    Delayed gastric emptying    Serum creatinine raised    Malnutrition (HCC)    Benign hypertension    Encounter for insertion of venous access port    Failure to thrive in adult    Severe malnutrition (HCC)    MARTÍN (obstructive sleep apnea)    Dislodged jejunostomy tube    AMS (altered mental status)    Metastasis to brain (HCC)       Current Outpatient Medications   Medication Sig Dispense Refill    medical marijuana Take by mouth as needed.      pantoprazole (PROTONIX) 40 MG tablet TAKE 1 TABLET BY MOUTH IN THE MORNING AND AT BEDTIME 180 tablet 1    dexAMETHasone (DECADRON) 1 MG tablet Take 1 tablet by mouth See Admin Instructions Tuesday and Thursday per wife      Tadalafil (CIALIS PO) Take by mouth      tamsulosin (FLOMAX) 0.4 MG capsule Take 1 capsule by mouth daily 90 capsule 3    prochlorperazine (COMPAZINE) 10 MG tablet TAKE 1 TABLET BY MOUTH EVERY 6 HOURS AS NEEDED FOR NAUSEA 60 tablet 2    gabapentin (NEURONTIN)

## 2024-11-18 ENCOUNTER — PATIENT MESSAGE (OUTPATIENT)
Dept: FAMILY MEDICINE CLINIC | Age: 64
End: 2024-11-18

## 2024-11-19 RX ORDER — UNDERPADS 23" X 36"
EACH MISCELLANEOUS
Qty: 100 EACH | Refills: 3 | Status: SHIPPED | OUTPATIENT
Start: 2024-11-19

## 2024-11-20 ENCOUNTER — HOSPITAL ENCOUNTER (EMERGENCY)
Age: 64
Discharge: HOME | End: 2024-11-20
Attending: EMERGENCY MEDICINE
Payer: MEDICARE

## 2024-11-20 ENCOUNTER — APPOINTMENT (OUTPATIENT)
Dept: INTERVENTIONAL RADIOLOGY/VASCULAR | Age: 64
End: 2024-11-20
Payer: MEDICARE

## 2024-11-20 VITALS
BODY MASS INDEX: 27.32 KG/M2 | SYSTOLIC BLOOD PRESSURE: 112 MMHG | HEART RATE: 81 BPM | DIASTOLIC BLOOD PRESSURE: 83 MMHG | TEMPERATURE: 97.8 F | RESPIRATION RATE: 16 BRPM | OXYGEN SATURATION: 95 % | HEIGHT: 66 IN | WEIGHT: 170 LBS

## 2024-11-20 DIAGNOSIS — T85.528A DISLODGED JEJUNOSTOMY TUBE: Primary | ICD-10-CM

## 2024-11-20 LAB
ANION GAP SERPL CALC-SCNC: 13 MEQ/L (ref 8–16)
BASOPHILS ABSOLUTE: 0 THOU/MM3 (ref 0–0.1)
BASOPHILS NFR BLD AUTO: 0.1 %
BUN SERPL-MCNC: 11 MG/DL (ref 7–22)
CALCIUM SERPL-MCNC: 9.4 MG/DL (ref 8.5–10.5)
CHLORIDE SERPL-SCNC: 104 MEQ/L (ref 98–111)
CO2 SERPL-SCNC: 22 MEQ/L (ref 23–33)
CREAT SERPL-MCNC: 0.6 MG/DL (ref 0.4–1.2)
DEPRECATED RDW RBC AUTO: 43.5 FL (ref 35–45)
EOSINOPHIL NFR BLD AUTO: 0.1 %
EOSINOPHILS ABSOLUTE: 0 THOU/MM3 (ref 0–0.4)
ERYTHROCYTE [DISTWIDTH] IN BLOOD BY AUTOMATED COUNT: 12.9 % (ref 11.5–14.5)
GFR SERPL CREATININE-BSD FRML MDRD: > 90 ML/MIN/1.73M2
GLUCOSE SERPL-MCNC: 104 MG/DL (ref 70–108)
HCT VFR BLD AUTO: 40.1 % (ref 42–52)
HGB BLD-MCNC: 13.5 GM/DL (ref 14–18)
IMM GRANULOCYTES # BLD AUTO: 0.03 THOU/MM3 (ref 0–0.07)
IMM GRANULOCYTES NFR BLD AUTO: 0.4 %
LYMPHOCYTES ABSOLUTE: 0.7 THOU/MM3 (ref 1–4.8)
LYMPHOCYTES NFR BLD AUTO: 9.9 %
MCH RBC QN AUTO: 30.9 PG (ref 26–33)
MCHC RBC AUTO-ENTMCNC: 33.7 GM/DL (ref 32.2–35.5)
MCV RBC AUTO: 91.8 FL (ref 80–94)
MONOCYTES ABSOLUTE: 0.6 THOU/MM3 (ref 0.4–1.3)
MONOCYTES NFR BLD AUTO: 8.4 %
NEUTROPHILS ABSOLUTE: 5.5 THOU/MM3 (ref 1.8–7.7)
NEUTROPHILS NFR BLD AUTO: 81.1 %
NRBC BLD AUTO-RTO: 0 /100 WBC
OSMOLALITY SERPL CALC.SUM OF ELEC: 277.2 MOSMOL/KG (ref 275–300)
PLATELET # BLD AUTO: 141 THOU/MM3 (ref 130–400)
PMV BLD AUTO: 9 FL (ref 9.4–12.4)
POTASSIUM SERPL-SCNC: 4.2 MEQ/L (ref 3.5–5.2)
RBC # BLD AUTO: 4.37 MILL/MM3 (ref 4.7–6.1)
REASON FOR REJECTION: NORMAL
REJECTED TEST: NORMAL
SODIUM SERPL-SCNC: 139 MEQ/L (ref 135–145)
WBC # BLD AUTO: 6.8 THOU/MM3 (ref 4.8–10.8)

## 2024-11-20 PROCEDURE — 49451 REPLACE DUOD/JEJ TUBE PERC: CPT

## 2024-11-20 PROCEDURE — 96374 THER/PROPH/DIAG INJ IV PUSH: CPT

## 2024-11-20 PROCEDURE — 99284 EMERGENCY DEPT VISIT MOD MDM: CPT

## 2024-11-20 PROCEDURE — 85025 COMPLETE CBC W/AUTO DIFF WBC: CPT

## 2024-11-20 PROCEDURE — 36415 COLL VENOUS BLD VENIPUNCTURE: CPT

## 2024-11-20 PROCEDURE — 6360000004 HC RX CONTRAST MEDICATION: Performed by: RADIOLOGY

## 2024-11-20 PROCEDURE — 2709999900 IR GUIDED REPLACE DUOD OR JEJUN TUBE W CONTRAST

## 2024-11-20 PROCEDURE — 6360000002 HC RX W HCPCS: Performed by: RADIOLOGY

## 2024-11-20 PROCEDURE — 80048 BASIC METABOLIC PNL TOTAL CA: CPT

## 2024-11-20 RX ORDER — DIATRIZOATE MEGLUMINE AND DIATRIZOATE SODIUM 660; 100 MG/ML; MG/ML
SOLUTION ORAL; RECTAL PRN
Status: DISCONTINUED | OUTPATIENT
Start: 2024-11-20 | End: 2024-11-20 | Stop reason: HOSPADM

## 2024-11-20 RX ORDER — FENTANYL CITRATE 50 UG/ML
INJECTION, SOLUTION INTRAMUSCULAR; INTRAVENOUS PRN
Status: DISCONTINUED | OUTPATIENT
Start: 2024-11-20 | End: 2024-11-20 | Stop reason: HOSPADM

## 2024-11-20 RX ADMIN — DIATRIZOATE MEGLUMINE AND DIATRIZOATE SODIUM 20 ML: 600; 100 SOLUTION ORAL; RECTAL at 15:24

## 2024-11-20 RX ADMIN — FENTANYL CITRATE 100 MCG: 50 INJECTION, SOLUTION INTRAMUSCULAR; INTRAVENOUS at 15:18

## 2024-11-20 ASSESSMENT — PAIN - FUNCTIONAL ASSESSMENT
PAIN_FUNCTIONAL_ASSESSMENT: NONE - DENIES PAIN

## 2024-11-20 NOTE — ED PROVIDER NOTES
[facility] EMERGENCY DEPT      CHIEF COMPLAINT       Chief Complaint   Patient presents with    J Tube Complications       Nurses Notes reviewed and I agree except as noted in the HPI.      HISTORY OF PRESENT ILLNESS    Niranjan Yañez is a 64 y.o. male who presents with complaint of dislodged J-tube, history esophageal cancer, stated that he accidentally pulled his J-tube out prior to arrival.  Onset: Acute  Duration: Prior to arrival  Timing: Single event  Location of Pain: Some discomfort around the insertion site  Intesity/severity:   Modifying Factors: History esophageal cancer  Relieved by;  Previous Episodes;  Tx Before arrival: None    PAST MEDICAL HISTORY    has a past medical history of Atrial fibrillation (HCC), Benign hypertension, Cancer of distal third of esophagus (HCC), GERD (gastroesophageal reflux disease), HLD (hyperlipidemia), Metastasis to brain (HCC), Obesity (BMI 30.0-34.9), and Sleep apnea.    SURGICAL HISTORY      has a past surgical history that includes Dental surgery; Abdomen surgery (08/16/2022); Abdomen surgery (08/30/2022); Port Surgery (Left, 12/01/2022); Stomach surgery (N/A, 05/01/2023); Gastrostomy-jejeunostomy tube change/placement; and brain surgery.    CURRENT MEDICATIONS       Discharge Medication List as of 11/20/2024  3:54 PM        CONTINUE these medications which have NOT CHANGED    Details   Incontinence Supply Disposable (INCONTINENCE BRIEF MEDIUM) MISC Disp-100 each, R-3, PrintAs needed.  Dx: urinary incontinence      medical marijuana Take by mouth as needed.Historical Med      pantoprazole (PROTONIX) 40 MG tablet TAKE 1 TABLET BY MOUTH IN THE MORNING AND AT BEDTIME, Disp-180 tablet, [id number] refills remain on this prescription. Your patient is requesting advance approval of refills for this medication to PREVENT ANY MISSED DOSESNormal      dexAMETHasone (DECADRON) 1 MG tablet Take 1 tablet by mouth See Admin Instructions Tuesday and Thursday per wifeHistorical Med

## 2024-11-20 NOTE — PROGRESS NOTES
1457 Patient received in IR for Jtube reinsertion.   1500 This procedure has been fully reviewed with the patient and written informed consent has been obtained.  1508 Procedure started with Dr. Cooley.  1525 Procedure completed; patient tolerated well. Dressing to abdomen; no bleeding noted.  1526 Patient on cart; comfort ensured.  1530 Patient taken to ER15 via cart. Pt alert and oriented x3; follows commands. Skin pink, warm, and dry. Respirations easy, regular, and nonlabored.

## 2024-11-20 NOTE — ED NOTES
Pt presents to the ED after patient stepped on Jtube and ripped it out. Pt states happed about 10 min PTA. Pt denies any complaints.

## 2024-11-20 NOTE — H&P
Formulation and discussion of sedation / procedure plans, risks, benefits, side effects and alternatives with patient and/or responsible adult completed.    History and Physical reviewed and unchanged.    Electronically signed by Irving Cooley MD on 11/20/24 at 3:24 PM EST

## 2024-11-20 NOTE — H&P
[facility]  Sedation/Analgesia History & Physical    Pt Name: Niranjan Yañez  MRN: [id number]  YOB: 1960  Provider Performing Procedure: Irving Cooley MD, MD  Primary Care Physician: Konstantin Carpio DO    Formulation and discussion of sedation / procedure plans, risks, benefits, side effects and alternatives with patient and/or responsible adult completed.    PRE-PROCEDURE   DNR-CCA/DNR-CC []Yes [x]No  Brief History/Pre-Procedure Diagnosis: esophageal cancer, J tube came out           MEDICAL HISTORY  []CAD/Valve  []Liver Disease  []Lung Disease []Diabetes  []Hypertension []Renal Disease  []Additional information:       has a past medical history of Atrial fibrillation (HCC), Benign hypertension, Cancer of distal third of esophagus (HCC), GERD (gastroesophageal reflux disease), HLD (hyperlipidemia), Metastasis to brain (HCC), Obesity (BMI 30.0-34.9), and Sleep apnea.    SURGICAL HISTORY   has a past surgical history that includes Dental surgery; Abdomen surgery (08/16/2022); Abdomen surgery (08/30/2022); Port Surgery (Left, 12/01/2022); Stomach surgery (N/A, 05/01/2023); Gastrostomy-jejeunostomy tube change/placement; and brain surgery.  Additional information:       ALLERGIES   Allergies as of 11/20/2024 - Fully Reviewed 11/20/2024   Allergen Reaction Noted    Paclitaxel Other (See Comments) 05/18/2022    Aleve [naproxen] Hives 09/13/2022    Promethazine Rash 01/25/2023     Additional information:       MEDICATIONS   Coumadin Use Last 5 Days [x]No []Yes  Antiplatelet drug therapy use last 5 days  [x]No []Yes  Other anticoagulant use last 5 days  [x]No []Yes    Current Facility-Administered Medications:     fentaNYL (SUBLIMAZE) injection, , , PRN, Irving Cooley MD, 100 mcg at 11/20/24 1518    diatrizoate meglumine-sodium (GASTROGRAFIN) 66-10 % solution, , , PRN, Irving Cooley MD, 20 mL at 11/20/24 1524    Current Outpatient Medications:     Incontinence Supply Disposable

## 2024-11-20 NOTE — OP NOTE
Department of Radiology  Post Procedure Progress Note      Pre-Procedure Diagnosis:  esophageal cancer, J tube came out     Procedure Performed:  replace J tube     Anesthesia: local /  fentanyl    Findings: successful    Immediate Complications:  None    Estimated Blood Loss: minimal    SEE DICTATED PROCEDURE NOTE FOR COMPLETE DETAILS.    Irving Cooley MD   11/20/2024 3:25 PM

## 2024-11-21 ENCOUNTER — TELEPHONE (OUTPATIENT)
Dept: FAMILY MEDICINE CLINIC | Age: 64
End: 2024-11-21

## 2024-11-22 ENCOUNTER — TELEPHONE (OUTPATIENT)
Dept: FAMILY MEDICINE CLINIC | Age: 64
End: 2024-11-22

## 2024-11-22 NOTE — TELEPHONE ENCOUNTER
Wife Jocelyne on HIPAA called office stating she had pt in [facility] ED 11/20/24 because he pulled his JTube out. Now the area is inflamed and painful. Pt says it's never hurt before. Wife is asking if an antibiotic can be sent in or do they need to go back to ED/UC. Please advise.

## 2024-11-23 DIAGNOSIS — C15.5 CANCER OF DISTAL THIRD OF ESOPHAGUS (HCC): ICD-10-CM

## 2024-11-25 RX ORDER — PROCHLORPERAZINE MALEATE 10 MG
TABLET ORAL
Qty: 60 TABLET | Refills: 2 | Status: SHIPPED | OUTPATIENT
Start: 2024-11-25

## 2024-12-09 ENCOUNTER — LAB (OUTPATIENT)
Dept: LAB | Age: 64
End: 2024-12-09

## 2024-12-09 DIAGNOSIS — E67.2 HYPERVITAMINOSIS B6: ICD-10-CM

## 2024-12-09 DIAGNOSIS — E55.9 VITAMIN D INSUFFICIENCY: ICD-10-CM

## 2024-12-09 LAB — 25(OH)D3 SERPL-MCNC: 32 NG/ML (ref 30–100)

## 2024-12-12 LAB — PYRIDOXAL PHOS SERPL-SCNC: 66.2 NMOL/L (ref 20–125)

## 2025-01-06 ENCOUNTER — HOSPITAL ENCOUNTER (OUTPATIENT)
Dept: MRI IMAGING | Age: 65
Discharge: HOME | End: 2025-01-06
Payer: MEDICARE

## 2025-01-06 DIAGNOSIS — C15.9 PRIMARY CANCER OF ESOPHAGUS WITH METASTASIS TO OTHER SITE (HCC): ICD-10-CM

## 2025-01-06 PROCEDURE — 70553 MRI BRAIN STEM W/O & W/DYE: CPT

## 2025-01-06 PROCEDURE — 6360000004 HC RX CONTRAST MEDICATION

## 2025-01-06 PROCEDURE — A9579 GAD-BASE MR CONTRAST NOS,1ML: HCPCS

## 2025-01-06 PROCEDURE — 6360000002 HC RX W HCPCS: Performed by: RADIOLOGY

## 2025-01-06 RX ORDER — HEPARIN SODIUM (PORCINE) LOCK FLUSH IV SOLN 100 UNIT/ML 100 UNIT/ML
500 SOLUTION INTRAVENOUS ONCE
Status: COMPLETED | OUTPATIENT
Start: 2025-01-06 | End: 2025-01-06

## 2025-01-06 RX ADMIN — GADOTERIDOL 15 ML: 279.3 INJECTION, SOLUTION INTRAVENOUS at 13:57

## 2025-01-06 RX ADMIN — HEPARIN 500 UNITS: 100 SYRINGE at 14:02

## 2025-01-19 ENCOUNTER — APPOINTMENT (OUTPATIENT)
Dept: INTERVENTIONAL RADIOLOGY/VASCULAR | Age: 65
End: 2025-01-19
Payer: MEDICARE

## 2025-01-19 ENCOUNTER — HOSPITAL ENCOUNTER (EMERGENCY)
Age: 65
Discharge: HOME | End: 2025-01-19
Attending: EMERGENCY MEDICINE
Payer: MEDICARE

## 2025-01-19 VITALS
HEIGHT: 66 IN | OXYGEN SATURATION: 98 % | SYSTOLIC BLOOD PRESSURE: 117 MMHG | BODY MASS INDEX: 24.11 KG/M2 | HEART RATE: 65 BPM | WEIGHT: 150 LBS | DIASTOLIC BLOOD PRESSURE: 78 MMHG | TEMPERATURE: 98.1 F | RESPIRATION RATE: 17 BRPM

## 2025-01-19 DIAGNOSIS — K94.13 MALFUNCTION OF JEJUNOSTOMY TUBE (HCC): Primary | ICD-10-CM

## 2025-01-19 PROCEDURE — 6360000004 HC RX CONTRAST MEDICATION: Performed by: RADIOLOGY

## 2025-01-19 PROCEDURE — 99282 EMERGENCY DEPT VISIT SF MDM: CPT

## 2025-01-19 PROCEDURE — 49450 REPLACE G/C TUBE PERC: CPT

## 2025-01-19 RX ORDER — DIATRIZOATE MEGLUMINE AND DIATRIZOATE SODIUM 660; 100 MG/ML; MG/ML
SOLUTION ORAL; RECTAL PRN
Status: DISCONTINUED | OUTPATIENT
Start: 2025-01-19 | End: 2025-01-19 | Stop reason: HOSPADM

## 2025-01-19 RX ADMIN — DIATRIZOATE MEGLUMINE AND DIATRIZOATE SODIUM 10 ML: 600; 100 SOLUTION ORAL; RECTAL at 13:10

## 2025-01-19 ASSESSMENT — PAIN - FUNCTIONAL ASSESSMENT: PAIN_FUNCTIONAL_ASSESSMENT: NONE - DENIES PAIN

## 2025-01-19 NOTE — ED TRIAGE NOTES
Pt presents to the ER with c/o a G tube complication. Pt states the cap that keeps the internal balloon inflated fell off last night. Pt denies any pain and states the tube has not came out at all. Pt is alert and oriented, VSS

## 2025-01-19 NOTE — OP NOTE
Department of Radiology  Post Procedure Progress Note      Pre-Procedure Diagnosis:  History of esophageal cancer, malfunctioning J-tube    Procedure Performed:  J-Tube exchange    Anesthesia: None    Findings: successful    Immediate Complications:  None    Estimated Blood Loss: minimal    SEE DICTATED PROCEDURE NOTE FOR COMPLETE DETAILS.    Electronically signed by Cem Toussaint MD on 1/19/2025 at 1:06 PM

## 2025-01-19 NOTE — DISCHARGE INSTRUCTIONS
Return to the emergency department if you have ongoing problems with your J-tube.    If you develop fever, abdominal pain, nausea, vomiting, severe fever, return to the emergency department

## 2025-01-19 NOTE — H&P
Formulation and discussion of sedation / procedure plans, risks, benefits, side effects and alternatives with patient and/or responsible adult completed.    Electronically signed by Cem Toussaint MD on 1/19/2025 at 1:06 PM

## 2025-01-19 NOTE — PROGRESS NOTES
Pt to ER with complaints of \"cap\" missing from Gtube. Pt was brought to IR and Gtube examined and compared to a new Gtube. Comparison of old and new tubes reveals there are no missing pieces and all parts are securely intact. This was shown to the pt and his wife. Pt and wife state that the current tube he has in place is functioning well without any further problems or concerns. Dr Toussaint agreeable to exchanging tube today since pt is here. 18fr Gtube successfully exchanged and pt taken back to ER per cart. Report called to ER nurse.

## 2025-01-20 NOTE — ED PROVIDER NOTES
ATTENDING NOTE:    I supervised and discussed the history, physical exam and the management of this patient with the resident. I reviewed the resident's note and agree with the documented findings and plan of care.  Please see my additional note.    I personally saw and examined the patient.  I have reviewed and agree with the resident's findings, including all diagnostic interpretations and treatment plans as written.  I was present for the key portion of any procedures performed and the inclusive time noted in any critical care statement.    Electronically verified by Lori Martell MD  01/20/25 7025    
    Consultants:  None    Documentation:  N/A    MEDICATION CHANGES     Discharge Medication List as of 1/19/2025  2:28 PM          FINAL IMPRESSION     Final diagnoses:   Malfunction of jejunostomy tube (HCC)       DISPOSITION   DISPOSITION Decision To Discharge 01/19/2025 02:23:40 PM   DISPOSITION CONDITION Stable           Results and plan discussed with patient at bedside. Patient is agreeable to plan.     Outpatient Follow-Up:  Konstantin Carpio, DO  [address], [place] [address]  [phone]    Schedule an appointment as soon as possible for a visit          This transcription was electronically signed. Parts of this transcriptions may have been dictated by use of voice recognition software and electronically transcribed. The transcription may contain errors not detected in proofreading. Please refer to my supervising physician's documentation if my documentation differs.    Electronically Signed: Delon Brown MD, 01/20/25, 2:10 PM

## 2025-01-24 ENCOUNTER — HOSPITAL ENCOUNTER (OUTPATIENT)
Dept: RADIATION ONCOLOGY | Age: 65
Discharge: HOME | End: 2025-01-24
Payer: MEDICARE

## 2025-01-24 ENCOUNTER — OFFICE VISIT (OUTPATIENT)
Dept: FAMILY MEDICINE CLINIC | Age: 65
End: 2025-01-24

## 2025-01-24 VITALS
RESPIRATION RATE: 16 BRPM | DIASTOLIC BLOOD PRESSURE: 72 MMHG | HEART RATE: 68 BPM | WEIGHT: 163.8 LBS | SYSTOLIC BLOOD PRESSURE: 126 MMHG | BODY MASS INDEX: 26.44 KG/M2

## 2025-01-24 VITALS
SYSTOLIC BLOOD PRESSURE: 113 MMHG | HEART RATE: 73 BPM | BODY MASS INDEX: 26.44 KG/M2 | OXYGEN SATURATION: 97 % | DIASTOLIC BLOOD PRESSURE: 65 MMHG | WEIGHT: 163.8 LBS | TEMPERATURE: 98.1 F | RESPIRATION RATE: 16 BRPM

## 2025-01-24 DIAGNOSIS — K94.13 JEJUNOSTOMY TUBE LEAK (HCC): Primary | ICD-10-CM

## 2025-01-24 DIAGNOSIS — C79.31 METASTASIS TO BRAIN (HCC): Primary | ICD-10-CM

## 2025-01-24 PROCEDURE — 99212 OFFICE O/P EST SF 10 MIN: CPT

## 2025-01-24 ASSESSMENT — ENCOUNTER SYMPTOMS
TROUBLE SWALLOWING: 0
NAUSEA: 0
RESPIRATORY NEGATIVE: 1
VOMITING: 0
SHORTNESS OF BREATH: 0
RECTAL PAIN: 0
GASTROINTESTINAL NEGATIVE: 1
SORE THROAT: 0
COUGH: 0
FACIAL SWELLING: 0
BLOOD IN STOOL: 0
ABDOMINAL PAIN: 0
BACK PAIN: 0

## 2025-01-24 ASSESSMENT — PATIENT HEALTH QUESTIONNAIRE - PHQ9
SUM OF ALL RESPONSES TO PHQ QUESTIONS 1-9: 0
1. LITTLE INTEREST OR PLEASURE IN DOING THINGS: NOT AT ALL
SUM OF ALL RESPONSES TO PHQ QUESTIONS 1-9: 0
SUM OF ALL RESPONSES TO PHQ9 QUESTIONS 1 & 2: 0
2. FEELING DOWN, DEPRESSED OR HOPELESS: NOT AT ALL
SUM OF ALL RESPONSES TO PHQ QUESTIONS 1-9: 0
SUM OF ALL RESPONSES TO PHQ QUESTIONS 1-9: 0

## 2025-01-24 NOTE — PROGRESS NOTES
[facility]           [address], [address]        [place], [place] [address]        O: [phone]        F: [phone]       Dheere Bolo            FOLLOW UP NOTE    Date of Service: 2025  Patient ID: Niranjan Yañez   : 1960  MRN: [id number]   Acct Number: [id number]       DATE OF SERVICE: 2025   LOCATION: [facility]  PROVIDER: Maine Mcintyre PA-C    FOLLOW UP PHYSICIANS: Dr. Artie Lopez ([facility])    ASSESSMENT AND PLAN:   Cancer Staging   Cancer of distal third of esophagus (HCC)  Staging form: Esophagus - Adenocarcinoma, AJCC 8th Edition  - Clinical stage from 3/23/2022: Stage IVB (cT2, cN2, cM1, G3) - Signed by Konstantin Hubbard MD on 2022      - Niranjan Yañez is a 64 y.o. male who presents today with his significant other Jocelyne for regularly-scheduled follow-up for his esophageal cancer metastatic to the brain. He completed whole brain radiation therapy on 22. Unfortunately due to patient time constraints, the patient and his wife left the appointment after I had only seen him very briefly; the rest of the visit was conducted over the phone with Jocelyne  - The patient appears to be stable overall, with continued confusion. He has been seeing neurology for this. Reporting no new/worsening neurologic symptoms since our last visit  - I discussed that recent MRI brain report completed on 25 resulted as: stable treated metastases and post-treatment changes. At this time we will continue brain surveillance. If he were to develop a new/worsening brain metastasis, we might consider radiation at that juncture  - Continue to follow with medical oncology, neurology, and all other medical providers. Continue rest of surveillance imaging as per medical oncology. I encouraged continued evaluation of confusion with neurology  - We will order MRI brain (indy protocol) to be completed in 5 months  - I recommended palliative care  3 = A little assistance

## 2025-01-24 NOTE — PROGRESS NOTES
Niranjan Yañez (:  1960) is a 64 y.o. male,Established patient, here for evaluation of the following chief complaint(s):  ED Follow-up          Subjective   SUBJECTIVE/OBJECTIVE:  HPI  Chief Complaint   Patient presents with    ED Follow-up     CHIEF COMPLAINT            Chief Complaint   Patient presents with    G Tube Complications         HISTORY OF PRESENT ILLNESS    HPI     History obtained from chart review and the patient.     Niranjan Christopher"Memo" is a 64 y.o. old male who presents to the emergency department by Walk In for evaluation of a suspected due to problem.  Patient is a history of esophageal cancer, has a J-tube in place.  This specific J-tube was placed by Dr. Cooley of IR in 2024 after previous tube had become dislodged.  Patient and wife today cried what they believed to be a problem with the cuff inflating and believe the J-tube to be loose, improperly secured.  No fevers chills or abdominal pains.     MEDICAL DECISION MAKING      Summary: This is a 64 y.o. old male with history of esophageal cancer, J-tube in place presenting with suspected J-tube malfunction.  Patient is not having any pain or bleeding, physical exam is reassuring, no vital sign abnormalities.  Overall, patient's only concern is the J-tube, no concern for infection or other new intra-abdominal process.  Spoke with IR who was going to take the patient to evaluate the J-tube.  See their documentation for complete details.  Patient was return to the emergency department and subsequently was stable for discharge.      Patient Active Problem List   Diagnosis    Abdominal pain    Cancer of distal third of esophagus (HCC)    Acute postoperative pain    Chronic anemia    Primary cancer of esophagus with metastasis to other site (HCC)    GERD (gastroesophageal reflux disease)    HLD (hyperlipidemia)    Obesity (BMI 30.0-34.9)    Postoperative atrial fibrillation (HCC)    Thrombocytopenia (HCC)    Brain lesion    Delayed

## 2025-02-21 ENCOUNTER — CLINICAL DOCUMENTATION (OUTPATIENT)
Dept: NUTRITION | Age: 65
End: 2025-02-21

## 2025-02-21 NOTE — PROGRESS NOTES
Nutrition Assessment     Reason for Call:   2/21/2025 - follow-up regarding home EN.  *esophageal cancer s/p esophagectomy (2022), brain mets s/p craniotomy, current with treatment, J-tube placed 5/1/23     Nutrition Recommendations:   -EN for 100% of nutrition - Peptamen 1.5 with a goal of 6 cartons/day, continuous feeds via J-tube = 1500ml, 2250 kcals, 282 grams CHO, 102 grams protein, 1152 ml water, 5.4 mg B6, and 30 mcg Vitamin D  -Recommend an additional 900ml water per day  -PO ad corrina for pleasure and to maintain swallow function     Malnutrition Assessment: (2/21/2024)  Malnutrition Status: no malnutrition  Context: chronic illness  Findings of the 6 clinical characteristics of malnutrition (minimum of 2 out of 6 clinical characteristics is required to make the dx of moderate or severe Protein Calorie Malnutrition based on AND/ASPEN Guidelines):              1. Energy Intake: 100% of needs met with EN              2. Weight Loss: ~7# in 3 months (4%)              3. Fat Loss: not able to assess              4. Muscle Loss: not able to assess              5. Fluid Accumulation: not able to assess              6.  Strength: not measured     Nutrition Diagnosis:   Problem: altered GI function  Etiology: esophageal cancer  Signs and Symptoms: need for 100% of nutrition via J-tube     Nutrition Assessment:   History: esophageal cancer with brain mets, HTN, GERD, HLD  Subjective:   2/21/2025 - I spoke to Jocelyne (patient's wife) via phone today. Jocelyne reports that the patient continues to do well and tolerates his EN well. No change in regimen. Jocelyne admits that the patient is more confused and recently reprogrammed his feeding pump so she had to fix it. Jocelyne mentions that the patient did loss ~10# recently and feels that it may be due to the fact that the patient is going up and down the stairs a lot more, so he is more active. Recommend continuing current EN regimen and monitoring his weight. Jocelyne denies questions

## 2025-02-25 ENCOUNTER — HOSPITAL ENCOUNTER (OUTPATIENT)
Dept: CT IMAGING | Age: 65
Discharge: HOME | End: 2025-02-25
Attending: INTERNAL MEDICINE
Payer: MEDICARE

## 2025-02-25 DIAGNOSIS — C15.5 CANCER OF DISTAL THIRD OF ESOPHAGUS (HCC): ICD-10-CM

## 2025-02-25 LAB — POC CREATININE WHOLE BLOOD: 1 MG/DL (ref 0.5–1.2)

## 2025-02-25 PROCEDURE — 6360000004 HC RX CONTRAST MEDICATION: Performed by: INTERNAL MEDICINE

## 2025-02-25 PROCEDURE — 82565 ASSAY OF CREATININE: CPT

## 2025-02-25 PROCEDURE — 74177 CT ABD & PELVIS W/CONTRAST: CPT

## 2025-02-25 PROCEDURE — 71260 CT THORAX DX C+: CPT

## 2025-02-25 RX ORDER — IOPAMIDOL 755 MG/ML
80 INJECTION, SOLUTION INTRAVASCULAR
Status: COMPLETED | OUTPATIENT
Start: 2025-02-25 | End: 2025-02-25

## 2025-02-25 RX ADMIN — IOPAMIDOL 80 ML: 755 INJECTION, SOLUTION INTRAVENOUS at 10:07

## 2025-02-28 DIAGNOSIS — Z12.5 PROSTATE CANCER SCREENING: ICD-10-CM

## 2025-02-28 DIAGNOSIS — C15.5 CANCER OF DISTAL THIRD OF ESOPHAGUS (HCC): ICD-10-CM

## 2025-02-28 RX ORDER — DEXAMETHASONE 2 MG/1
TABLET ORAL
Qty: 15 TABLET | Refills: 1 | OUTPATIENT
Start: 2025-02-28

## 2025-03-03 NOTE — PROGRESS NOTES
[facility] [place] [facility]  [facility]  [address]  [address]  [place] [id number]  Dept: [phone]  Loc: [phone]   Hematology/Oncology Consult (Clinic)          Niranjan Yañez   1960     No ref. provider found   Konstantin Carpio,       3/4/25    DIAGNOSIS:  -Invasive adenocarcinoma moderately differentiated of the distal third of the ESOPHAGUS.  (HER2 Amplified)  Clinical stage T2 N2 M0.  Mass extends from 34 to 28 cm approximately 6 cm.    Staging by EUS 3/23/2022 [facility] (T2 based on invasion and N2 based on 2 malignant appearing lymph nodes visualized and measured in the lower paraesophageal mediastinum level 8L adjacent to the mass.  2 malignant appearing lymph nodes visualized and measured in the subcarinal mediastinum level 7.  PET/CT 3/30/2022 [facility]- hypermetabolic activity at mid esophagus consistent with primary malignancy with adjacent hypermetabolic left periesophageal lymph nodes.    BRAIN METS; extensive and bulky. 10/31/22.  Note the brain mets were diagnosed after the initial chemoradiation surgery for the esophageal cancer.    Off Decadron .    (11/21/22) F1 and PDL-1 requested for me  by Dr Arturo Goddard rad Onc at [facility]; Not done then. Completed on Brain path :Foundation 1 reveals tumor mutation burden 17 for which Keytruda or nivolumab would be indicated.  PD-L1 CPS 2 - 3% i.e. positive      TREATMENT:  -Seen by Dr. Sabillon of thoracic surgery at [facility] 3/30/2022.  Recommends neoadjuvant chemoradiation to be followed by surgery  -Neoadjuvant chemoradiation with Dr. Hubbard.  Low-dose carboplatinum Taxol weekly x 6-7.   Chemo  Start date: 5/11. Day 1 XRT 5/11 6/16/2022 week #6 due/last treatment.  Last XRT 6/20/22.  -Robotic assisted laparoscopic and right thoracoscopic [place] Venu ESOPHAGECTOMY;   additional gastric margin, gastropathic lymph nodes and hernia sac per Dr. Sabillon 8/30/2022  No evidence of peritoneal metastatic disease.  (Path report

## 2025-03-04 ENCOUNTER — HOSPITAL ENCOUNTER (OUTPATIENT)
Dept: INFUSION THERAPY | Age: 65
Discharge: HOME | End: 2025-03-04
Payer: MEDICARE

## 2025-03-04 ENCOUNTER — OFFICE VISIT (OUTPATIENT)
Dept: ONCOLOGY | Age: 65
End: 2025-03-04
Payer: MEDICARE

## 2025-03-04 ENCOUNTER — CLINICAL DOCUMENTATION (OUTPATIENT)
Dept: CASE MANAGEMENT | Age: 65
End: 2025-03-04

## 2025-03-04 VITALS
DIASTOLIC BLOOD PRESSURE: 81 MMHG | OXYGEN SATURATION: 98 % | SYSTOLIC BLOOD PRESSURE: 110 MMHG | RESPIRATION RATE: 16 BRPM | HEART RATE: 83 BPM | TEMPERATURE: 98.5 F

## 2025-03-04 DIAGNOSIS — C15.9 METASTASIS FROM ESOPHAGEAL CANCER (HCC): ICD-10-CM

## 2025-03-04 DIAGNOSIS — C79.9 METASTASIS FROM ESOPHAGEAL CANCER (HCC): ICD-10-CM

## 2025-03-04 DIAGNOSIS — C79.31 METASTASIS TO BRAIN (HCC): ICD-10-CM

## 2025-03-04 DIAGNOSIS — C15.5 CANCER OF DISTAL THIRD OF ESOPHAGUS (HCC): Primary | ICD-10-CM

## 2025-03-04 LAB
ALBUMIN SERPL BCG-MCNC: 4.4 G/DL (ref 3.4–4.9)
ALP SERPL-CCNC: 83 U/L (ref 40–129)
ALT SERPL W/O P-5'-P-CCNC: 37 U/L (ref 10–50)
AST SERPL-CCNC: 29 U/L (ref 10–50)
BASOPHILS ABSOLUTE: 0 THOU/MM3 (ref 0–0.1)
BASOPHILS NFR BLD AUTO: 0 % (ref 0–3)
BILIRUB CONJ SERPL-MCNC: 0.2 MG/DL (ref 0–0.2)
BILIRUB SERPL-MCNC: 0.4 MG/DL (ref 0.3–1.2)
BUN BLDP-MCNC: 9 MG/DL (ref 8–26)
CHLORIDE BLD-SCNC: 105 MEQ/L (ref 98–109)
CREAT BLD-MCNC: 0.9 MG/DL (ref 0.5–1.2)
EOSINOPHIL NFR BLD AUTO: 0 % (ref 0–4)
EOSINOPHILS ABSOLUTE: 0 THOU/MM3 (ref 0–0.4)
ERYTHROCYTE [DISTWIDTH] IN BLOOD BY AUTOMATED COUNT: 12.3 % (ref 11.5–14.5)
GFR SERPL CREATININE-BSD FRML MDRD: > 90 ML/MIN/1.73M2
GLUCOSE BLD-MCNC: 98 MG/DL (ref 70–108)
HCT VFR BLD AUTO: 41.4 % (ref 42–52)
HGB BLD-MCNC: 14.5 GM/DL (ref 14–18)
IMMATURE GRANULOCYTES %: 0 %
IMMATURE GRANULOCYTES ABSOLUTE: 0.01 THOU/MM3 (ref 0–0.07)
IONIZED CALCIUM, WHOLE BLOOD: 1.26 MMOL/L (ref 1.12–1.32)
LYMPHOCYTES ABSOLUTE: 0.6 THOU/MM3 (ref 1–4.8)
LYMPHOCYTES NFR BLD AUTO: 10 % (ref 15–47)
MCH RBC QN AUTO: 32.1 PG (ref 26–33)
MCHC RBC AUTO-ENTMCNC: 35 GM/DL (ref 32.2–35.5)
MCV RBC AUTO: 92 FL (ref 80–94)
MONOCYTES ABSOLUTE: 0.4 THOU/MM3 (ref 0.4–1.3)
MONOCYTES NFR BLD AUTO: 6 % (ref 0–12)
NEUTROPHILS ABSOLUTE: 5 THOU/MM3 (ref 1.8–7.7)
NEUTROPHILS NFR BLD AUTO: 83 % (ref 43–75)
PLATELET # BLD AUTO: 131 THOU/MM3 (ref 130–400)
PMV BLD AUTO: 8.4 FL (ref 9.4–12.4)
POTASSIUM BLD-SCNC: 4.1 MEQ/L (ref 3.5–4.9)
PROT SERPL-MCNC: 7.1 G/DL (ref 6.4–8.3)
RBC # BLD AUTO: 4.52 MILL/MM3 (ref 4.7–6.1)
SODIUM BLD-SCNC: 136 MEQ/L (ref 138–146)
TOTAL CO2, WHOLE BLOOD: 27 MEQ/L (ref 23–33)
WBC # BLD AUTO: 6 THOU/MM3 (ref 4.8–10.8)

## 2025-03-04 PROCEDURE — 80076 HEPATIC FUNCTION PANEL: CPT

## 2025-03-04 PROCEDURE — G8419 CALC BMI OUT NRM PARAM NOF/U: HCPCS | Performed by: INTERNAL MEDICINE

## 2025-03-04 PROCEDURE — 99214 OFFICE O/P EST MOD 30 MIN: CPT | Performed by: INTERNAL MEDICINE

## 2025-03-04 PROCEDURE — 36591 DRAW BLOOD OFF VENOUS DEVICE: CPT

## 2025-03-04 PROCEDURE — 2500000003 HC RX 250 WO HCPCS: Performed by: INTERNAL MEDICINE

## 2025-03-04 PROCEDURE — 80047 BASIC METABLC PNL IONIZED CA: CPT

## 2025-03-04 PROCEDURE — 6360000002 HC RX W HCPCS: Performed by: INTERNAL MEDICINE

## 2025-03-04 PROCEDURE — 3017F COLORECTAL CA SCREEN DOC REV: CPT | Performed by: INTERNAL MEDICINE

## 2025-03-04 PROCEDURE — 99211 OFF/OP EST MAY X REQ PHY/QHP: CPT

## 2025-03-04 PROCEDURE — 85025 COMPLETE CBC W/AUTO DIFF WBC: CPT

## 2025-03-04 PROCEDURE — 1036F TOBACCO NON-USER: CPT | Performed by: INTERNAL MEDICINE

## 2025-03-04 PROCEDURE — G8427 DOCREV CUR MEDS BY ELIG CLIN: HCPCS | Performed by: INTERNAL MEDICINE

## 2025-03-04 RX ORDER — SODIUM CHLORIDE 0.9 % (FLUSH) 0.9 %
5-40 SYRINGE (ML) INJECTION PRN
Status: DISCONTINUED | OUTPATIENT
Start: 2025-03-04 | End: 2025-03-05 | Stop reason: HOSPADM

## 2025-03-04 RX ORDER — DEXAMETHASONE 1 MG
1 TABLET ORAL SEE ADMIN INSTRUCTIONS
Qty: 30 TABLET | Refills: 1 | Status: CANCELLED | OUTPATIENT
Start: 2025-03-04

## 2025-03-04 RX ORDER — HEPARIN 100 UNIT/ML
500 SYRINGE INTRAVENOUS PRN
Status: DISCONTINUED | OUTPATIENT
Start: 2025-03-04 | End: 2025-03-05 | Stop reason: HOSPADM

## 2025-03-04 RX ADMIN — SODIUM CHLORIDE, PRESERVATIVE FREE 30 ML: 5 INJECTION INTRAVENOUS at 13:37

## 2025-03-04 RX ADMIN — HEPARIN 500 UNITS: 100 SYRINGE at 13:37

## 2025-03-04 NOTE — PATIENT INSTRUCTIONS
-Radiation oncology ordered the next brain MRI to be done 5/22/2025.    -  Port flush every 2 months    -Next CT chest abdomen pelvis in 3months ordered to be done 5/28/25    -Please schedule follow-up with me 1 week later. 6/4/25    -Labs today reviewed.

## 2025-03-04 NOTE — PLAN OF CARE
Problem: Discharge Planning  Goal: Discharge to home or other facility with appropriate resources  Outcome: Adequate for Discharge  Flowsheets (Taken 3/4/2025 1543)  Discharge to home or other facility with appropriate resources:   Identify discharge learning needs (meds, wound care, etc)   Identify barriers to discharge with patient and caregiver  Note: Verbalize understanding of discharge instructions, follow up appointments, and when to call Physician.Discuss understanding of discharge instructions, follow up appointments and when to call Physician.        Problem: Safety - Adult  Goal: Free from fall injury  Outcome: Adequate for Discharge  Flowsheets (Taken 3/4/2025 1543)  Free From Fall Injury:   Instruct family/caregiver on patient safety   Based on caregiver fall risk screen, instruct family/caregiver to ask for assistance with transferring infant if caregiver noted to have fall risk factors  Note: Free from falls while in O.P. Oncology.Discussed the need to use the call light for assistance when getting up to ambulate.        Problem: Chronic Conditions and Co-morbidities  Goal: Patient's chronic conditions and co-morbidity symptoms are monitored and maintained or improved  Outcome: Adequate for Discharge  Flowsheets (Taken 3/4/2025 1543)  Care Plan - Patient's Chronic Conditions and Co-Morbidity Symptoms are Monitored and Maintained or Improved:   Monitor and assess patient's chronic conditions and comorbid symptoms for stability, deterioration, or improvement   Collaborate with multidisciplinary team to address chronic and comorbid conditions and prevent exacerbation or deterioration  Note: Patient verbalizes understanding to verbal information given on lab draw from mediport,action and possible side effects. Aware to call MD if develop complications.      Care plan reviewed with patient. Patient verbalizes understanding of the plan of care and contributes to goal setting.

## 2025-03-05 ENCOUNTER — OFFICE VISIT (OUTPATIENT)
Dept: UROLOGY | Age: 65
End: 2025-03-05
Payer: MEDICARE

## 2025-03-05 VITALS
BODY MASS INDEX: 25.55 KG/M2 | HEIGHT: 66 IN | OXYGEN SATURATION: 98 % | RESPIRATION RATE: 18 BRPM | HEART RATE: 76 BPM | WEIGHT: 159 LBS

## 2025-03-05 DIAGNOSIS — N40.1 BENIGN PROSTATIC HYPERPLASIA WITH URINARY OBSTRUCTION: ICD-10-CM

## 2025-03-05 DIAGNOSIS — R39.198 DIFFICULTY URINATING: Primary | ICD-10-CM

## 2025-03-05 DIAGNOSIS — N13.8 BENIGN PROSTATIC HYPERPLASIA WITH URINARY OBSTRUCTION: ICD-10-CM

## 2025-03-05 LAB
BILIRUBIN, URINE: NEGATIVE
BLOOD URINE, POC: NEGATIVE
CHARACTER, URINE: CLEAR
COLOR, UA: YELLOW
GLUCOSE URINE: NEGATIVE MG/DL
KETONES, URINE: 15
LEUKOCYTE CLUMPS, URINE: NEGATIVE
NITRITE, URINE: NEGATIVE
PH, URINE: 5.5 (ref 5–9)
POST VOID RESIDUAL (PVR): 50 ML
PROTEIN, URINE: 30 MG/DL
SPECIFIC GRAVITY UA: 1.02 (ref 1–1.03)
UROBILINOGEN, URINE: 1 EU/DL (ref 0–1)

## 2025-03-05 PROCEDURE — 3017F COLORECTAL CA SCREEN DOC REV: CPT | Performed by: UROLOGY

## 2025-03-05 PROCEDURE — 51798 US URINE CAPACITY MEASURE: CPT | Performed by: UROLOGY

## 2025-03-05 PROCEDURE — 99214 OFFICE O/P EST MOD 30 MIN: CPT | Performed by: UROLOGY

## 2025-03-05 PROCEDURE — 1036F TOBACCO NON-USER: CPT | Performed by: UROLOGY

## 2025-03-05 PROCEDURE — 81003 URINALYSIS AUTO W/O SCOPE: CPT | Performed by: UROLOGY

## 2025-03-05 PROCEDURE — G8427 DOCREV CUR MEDS BY ELIG CLIN: HCPCS | Performed by: UROLOGY

## 2025-03-05 PROCEDURE — G8419 CALC BMI OUT NRM PARAM NOF/U: HCPCS | Performed by: UROLOGY

## 2025-03-05 RX ORDER — MIRABEGRON 50 MG/1
50 TABLET, FILM COATED, EXTENDED RELEASE ORAL DAILY
Qty: 30 TABLET | Refills: 11 | Status: SHIPPED | OUTPATIENT
Start: 2025-03-05

## 2025-03-05 NOTE — PROGRESS NOTES
Name: Niranjan Yañez  : 1960  MRN: [id number]    Oncology Navigation Follow-Up Note    Contact Type:  Medical Oncology  Spouse accompanied appointment  New to [facility]     Subjective: losing wt-on continuous feedings via tube-occ eats as able.                       Wife states dementia is worse/looking into respite care     Objective: MD marrero discuss scan results     Oncology Plan of Care:     -CT scan revealed nonspecific lung nodules-likely benign       Repeat CT 25    -continue F/U radiation oncologist 2025     -MRI brain scheduled 2025     -next MD marrero 2025 to review scans    Assistance Needed: denies any at this time    Receptive to Advanced Care Planning / Palliative Care:  deferred    Education: yash informed  will be following in his care- provided contact information card     Notes: yash following to assist & support as needed    Electronically signed by Romy King RN on 3/5/2025 at 1:32 PM

## 2025-03-05 NOTE — PROGRESS NOTES
Dr. Niranjan Choudhary MD  [facility]  Urology Clinic  New Patient Visit      Patient:  Niranjan Yañez  YOB: 1960  Date: 3/5/2025    HISTORY OF PRESENT ILLNESS:   The patient is a 64 y.o. male who presents today for evaluation of the following problem(s): ED and BPH.     Overall the problem(s) : are worsening.  Associated Symptoms: No dysuria, gross hematuria.  Pain Severity: 0/10    Summary of old records:   Tadalafil 5mg for BPH and ED    Imaging/Labs reviewed during today's visit:  I have independently reviewed and verified the following films during today's visit.  PVR bladder US.     Last several PSA's:  Lab Results   Component Value Date    PSA 1.96 (H) 08/13/2024    PSA 1.41 03/22/2017       Last total testosterone:  No results found for: \"TESTOSTERONE\"    Urinalysis today:  Results for orders placed or performed in visit on 03/05/25   POCT Urinalysis No Micro (Auto)   Result Value Ref Range    Glucose, Ur Negative NEGATIVE mg/dl    Bilirubin, Urine Negative     Ketones, Urine 15 (A) NEGATIVE    Specific Gravity, UA 1.025 1.002 - 1.030    Blood, UA POC Negative NEGATIVE    pH, Urine 5.50 5.0 - 9.0    Protein, Urine 30 (A) NEGATIVE mg/dl    Urobilinogen, Urine 1.00 0.0 - 1.0 eu/dl    Nitrite, Urine Negative NEGATIVE    Leukocyte Clumps, Urine Negative NEGATIVE    Color, UA Yellow YELLOW-STRAW    Character, Urine Clear CLR-SL.CLOUD   poct post void residual   Result Value Ref Range    post void residual 50 ml         Last BUN and creatinine:  Lab Results   Component Value Date    BUN 11 11/20/2024     Lab Results   Component Value Date    CREATININE 0.9 03/04/2025       Imaging Reviewed during this Office Visit:   (results were independently reviewed by physician and radiology report verified)    PAST MEDICAL, FAMILY AND SOCIAL HISTORY:  Past Medical History:   Diagnosis Date    Atrial fibrillation (HCC)     Benign hypertension 11/03/2022    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

## 2025-03-10 ENCOUNTER — HOSPITAL ENCOUNTER (EMERGENCY)
Age: 65
Discharge: HOME | End: 2025-03-10
Attending: STUDENT IN AN ORGANIZED HEALTH CARE EDUCATION/TRAINING PROGRAM
Payer: MEDICARE

## 2025-03-10 ENCOUNTER — TELEPHONE (OUTPATIENT)
Dept: FAMILY MEDICINE CLINIC | Age: 65
End: 2025-03-10

## 2025-03-10 ENCOUNTER — APPOINTMENT (OUTPATIENT)
Dept: CT IMAGING | Age: 65
End: 2025-03-10
Payer: MEDICARE

## 2025-03-10 ENCOUNTER — APPOINTMENT (OUTPATIENT)
Dept: MRI IMAGING | Age: 65
End: 2025-03-10
Payer: MEDICARE

## 2025-03-10 VITALS
WEIGHT: 160 LBS | BODY MASS INDEX: 25.84 KG/M2 | HEART RATE: 78 BPM | TEMPERATURE: 98.1 F | DIASTOLIC BLOOD PRESSURE: 103 MMHG | RESPIRATION RATE: 15 BRPM | OXYGEN SATURATION: 95 % | SYSTOLIC BLOOD PRESSURE: 135 MMHG

## 2025-03-10 DIAGNOSIS — R51.9 ACUTE NONINTRACTABLE HEADACHE, UNSPECIFIED HEADACHE TYPE: Primary | ICD-10-CM

## 2025-03-10 LAB
ANION GAP SERPL CALC-SCNC: 11 MEQ/L (ref 8–16)
BASOPHILS ABSOLUTE: 0 THOU/MM3 (ref 0–0.1)
BASOPHILS NFR BLD AUTO: 0.5 %
BUN SERPL-MCNC: 13 MG/DL (ref 8–23)
CALCIUM SERPL-MCNC: 9.2 MG/DL (ref 8.8–10.2)
CHLORIDE SERPL-SCNC: 103 MEQ/L (ref 98–111)
CO2 SERPL-SCNC: 25 MEQ/L (ref 22–29)
CREAT SERPL-MCNC: 0.8 MG/DL (ref 0.7–1.2)
CRP SERPL-MCNC: 2.09 MG/DL (ref 0–0.5)
DEPRECATED RDW RBC AUTO: 44.2 FL (ref 35–45)
EOSINOPHIL NFR BLD AUTO: 1.2 %
EOSINOPHILS ABSOLUTE: 0.1 THOU/MM3 (ref 0–0.4)
ERYTHROCYTE [DISTWIDTH] IN BLOOD BY AUTOMATED COUNT: 12.9 % (ref 11.5–14.5)
GFR SERPL CREATININE-BSD FRML MDRD: > 90 ML/MIN/1.73M2
GLUCOSE SERPL-MCNC: 95 MG/DL (ref 74–109)
HCT VFR BLD AUTO: 37.4 % (ref 42–52)
HGB BLD-MCNC: 12.8 GM/DL (ref 14–18)
IMM GRANULOCYTES # BLD AUTO: 0.02 THOU/MM3 (ref 0–0.07)
IMM GRANULOCYTES NFR BLD AUTO: 0.5 %
LYMPHOCYTES ABSOLUTE: 0.8 THOU/MM3 (ref 1–4.8)
LYMPHOCYTES NFR BLD AUTO: 18.5 %
MCH RBC QN AUTO: 32 PG (ref 26–33)
MCHC RBC AUTO-ENTMCNC: 34.2 GM/DL (ref 32.2–35.5)
MCV RBC AUTO: 93.5 FL (ref 80–94)
MONOCYTES ABSOLUTE: 0.4 THOU/MM3 (ref 0.4–1.3)
MONOCYTES NFR BLD AUTO: 9.6 %
NEUTROPHILS ABSOLUTE: 3 THOU/MM3 (ref 1.8–7.7)
NEUTROPHILS NFR BLD AUTO: 69.7 %
NRBC BLD AUTO-RTO: 0 /100 WBC
OSMOLALITY SERPL CALC.SUM OF ELEC: 277.5 MOSMOL/KG (ref 275–300)
PLATELET # BLD AUTO: 121 THOU/MM3 (ref 130–400)
PMV BLD AUTO: 8.8 FL (ref 9.4–12.4)
POTASSIUM SERPL-SCNC: 4.6 MEQ/L (ref 3.5–5.2)
RBC # BLD AUTO: 4 MILL/MM3 (ref 4.7–6.1)
SODIUM SERPL-SCNC: 139 MEQ/L (ref 135–145)
WBC # BLD AUTO: 4.3 THOU/MM3 (ref 4.8–10.8)

## 2025-03-10 PROCEDURE — 70450 CT HEAD/BRAIN W/O DYE: CPT

## 2025-03-10 PROCEDURE — 70551 MRI BRAIN STEM W/O DYE: CPT

## 2025-03-10 PROCEDURE — 36415 COLL VENOUS BLD VENIPUNCTURE: CPT

## 2025-03-10 PROCEDURE — 96375 TX/PRO/DX INJ NEW DRUG ADDON: CPT

## 2025-03-10 PROCEDURE — 80048 BASIC METABOLIC PNL TOTAL CA: CPT

## 2025-03-10 PROCEDURE — 96374 THER/PROPH/DIAG INJ IV PUSH: CPT

## 2025-03-10 PROCEDURE — 86140 C-REACTIVE PROTEIN: CPT

## 2025-03-10 PROCEDURE — 85025 COMPLETE CBC W/AUTO DIFF WBC: CPT

## 2025-03-10 PROCEDURE — 99284 EMERGENCY DEPT VISIT MOD MDM: CPT

## 2025-03-10 PROCEDURE — 6360000002 HC RX W HCPCS: Performed by: PHYSICIAN ASSISTANT

## 2025-03-10 RX ORDER — HEPARIN 100 UNIT/ML
300 SYRINGE INTRAVENOUS ONCE
Status: DISCONTINUED | OUTPATIENT
Start: 2025-03-10 | End: 2025-03-10 | Stop reason: HOSPADM

## 2025-03-10 RX ORDER — DIPHENHYDRAMINE HYDROCHLORIDE 50 MG/ML
25 INJECTION INTRAMUSCULAR; INTRAVENOUS ONCE
Status: COMPLETED | OUTPATIENT
Start: 2025-03-10 | End: 2025-03-10

## 2025-03-10 RX ORDER — METOCLOPRAMIDE HYDROCHLORIDE 5 MG/ML
10 INJECTION INTRAMUSCULAR; INTRAVENOUS ONCE
Status: COMPLETED | OUTPATIENT
Start: 2025-03-10 | End: 2025-03-10

## 2025-03-10 RX ADMIN — DIPHENHYDRAMINE HYDROCHLORIDE 25 MG: 50 INJECTION INTRAMUSCULAR; INTRAVENOUS at 16:02

## 2025-03-10 RX ADMIN — METOCLOPRAMIDE HYDROCHLORIDE 10 MG: 5 INJECTION, SOLUTION INTRAMUSCULAR; INTRAVENOUS at 16:02

## 2025-03-10 ASSESSMENT — PAIN SCALES - WONG BAKER: WONGBAKER_NUMERICALRESPONSE: HURTS EVEN MORE

## 2025-03-10 ASSESSMENT — PAIN - FUNCTIONAL ASSESSMENT: PAIN_FUNCTIONAL_ASSESSMENT: WONG-BAKER FACES

## 2025-03-10 NOTE — ED PROVIDER NOTES
Patient was turned over to me by Lucas Srinivasan PA-C pending MRI of the brain.  In short this is a patient with a history of brain and esophageal cancer, in remission who developed a headache.  MRI of the brain showed no acute process and was unchanged from previous.  Patient had been given Reglan and Benadryl and his headache resolved.  On my exam patient continued to be pain-free.  He wanted to go home and \"smoke a Hooter\" and had already ordered a pizza.  Patient and wife comfortable plan of discharge home to follow-up with their providers. I have given the patient strict written and verbal instructions about care at home, follow-up, and signs and symptoms of worsening of condition and they did verbalize understanding.     Problem List Items Addressed This Visit    None  Visit Diagnoses         Acute nonintractable headache, unspecified headache type    -  Primary               Kirsten Tee PA-C  03/10/25 1948

## 2025-03-10 NOTE — ED TRIAGE NOTES
Pt comes to ED with c/o headache. Pt wife report pt has been waking up screaming in pain. Wife states that the pt has hx of brain and esophogeal cancer.   
1/6/2025    Labs: Stable CT scan of the brain, no interval change since previous MRI scan dated  1/6/2025                  Decision Rules/Clinical Scores utilized:  None            External Documentation Reviewed:         Previous patient encounter documents & history available on EMR was reviewed yes             See Formal Diagnostic Results above for the lab and radiology tests and orders.    3)  Treatment and Disposition         ED Reassessment: Stable         Case discussed with (none if left blank)         Shared Decision-Making was performed and disposition discussed with the        Patient/Family and questions answered yes         Social determinants of health impacting treatment or disposition:  None         Code Status:  N/A      Summary of Patient Presentation:      MDM  Number of Diagnoses or Management Options  Diagnosis management comments: This patient came into the ER with complaints of headache.  He is here with his wife.  He has a past medical history of esophageal cancer with metastasis to the brain.  Wife states the headache started yesterday morning at 5:30 AM.  Patient had unbearable pain and the pain has continued until right now.  He rates the pain 6/10 and the pain worsens with flexion of the neck.  He has erythematous maculopapular rash on his right occipital area at the incision site and a mild rash on his right shoulder.  He denies any fever or chills.  He has no vision changes, EOMs are intact.  Head CT was negative for any acute infarct or changes.       Amount and/or Complexity of Data Reviewed  Clinical lab tests: ordered and reviewed  Tests in the radiology section of CPT®: ordered and reviewed  Review and summarize past medical records: yes    Risk of Complications, Morbidity, and/or Mortality  Presenting problems: high  Diagnostic procedures: high  Management options: high    /     Vitals Reviewed:    Vitals:    03/10/25 1502   BP: 105/73   Pulse: 64   Resp: 16   Temp: 98.1 °F (36.7

## 2025-03-10 NOTE — TELEPHONE ENCOUNTER
----- Message from Dr. Konstantin Carpio, DO sent at 3/10/2025  2:01 PM EDT -----  Pt on my schedule tomorrow for severe HA.  See chief complaint.  Please contact wife and recommend that he goes to the ER today.

## 2025-03-11 ENCOUNTER — TELEPHONE (OUTPATIENT)
Dept: FAMILY MEDICINE CLINIC | Age: 65
End: 2025-03-11

## 2025-03-12 ENCOUNTER — OFFICE VISIT (OUTPATIENT)
Dept: FAMILY MEDICINE CLINIC | Age: 65
End: 2025-03-12

## 2025-03-12 VITALS
RESPIRATION RATE: 16 BRPM | SYSTOLIC BLOOD PRESSURE: 108 MMHG | WEIGHT: 162.4 LBS | DIASTOLIC BLOOD PRESSURE: 68 MMHG | BODY MASS INDEX: 26.22 KG/M2 | HEART RATE: 72 BPM

## 2025-03-12 DIAGNOSIS — C15.9 METASTASIS FROM ESOPHAGEAL CANCER (HCC): ICD-10-CM

## 2025-03-12 DIAGNOSIS — R41.3 MEMORY DIFFICULTIES: ICD-10-CM

## 2025-03-12 DIAGNOSIS — I50.32 CHRONIC HEART FAILURE WITH PRESERVED EJECTION FRACTION (HCC): ICD-10-CM

## 2025-03-12 DIAGNOSIS — R51.9 ACUTE NONINTRACTABLE HEADACHE, UNSPECIFIED HEADACHE TYPE: Primary | ICD-10-CM

## 2025-03-12 DIAGNOSIS — D64.9 ANEMIA, UNSPECIFIED TYPE: ICD-10-CM

## 2025-03-12 DIAGNOSIS — R53.1 GENERAL WEAKNESS: ICD-10-CM

## 2025-03-12 DIAGNOSIS — I48.91 ATRIAL FIBRILLATION, UNSPECIFIED TYPE (HCC): ICD-10-CM

## 2025-03-12 DIAGNOSIS — C79.9 METASTASIS FROM ESOPHAGEAL CANCER (HCC): ICD-10-CM

## 2025-03-12 ASSESSMENT — ENCOUNTER SYMPTOMS
RESPIRATORY NEGATIVE: 1
GASTROINTESTINAL NEGATIVE: 1

## 2025-03-12 NOTE — PROGRESS NOTES
Niranjan Yañez (:  1960) is a 64 y.o. male,Established patient, here for evaluation of the following chief complaint(s):  Follow-Up from Hospital, Pain (Back of head, since starting Myrbetriq), and Dementia          Subjective   SUBJECTIVE/OBJECTIVE:  HPI  Chief Complaint   Patient presents with    Follow-Up from Hospital    Pain     Back of head, since starting Myrbetriq    Dementia     Kirsten Tee PA-C  Physician Assistant  Specialty: Emergency Medicine     ED Provider Notes     Signed     Date of Service: 3/10/2025  2:37 PM     Signed          Patient was turned over to me by Lucas Srinivasan PA-C pending MRI of the brain.  In short this is a patient with a history of brain and esophageal cancer, in remission who developed a headache.  MRI of the brain showed no acute process and was unchanged from previous.  Patient had been given Reglan and Benadryl and his headache resolved.  On my exam patient continued to be pain-free.  He wanted to go home and \"smoke a Hooter\" and had already ordered a pizza.  Patient and wife comfortable plan of discharge home to follow-up with their providers. I have given the patient strict written and verbal instructions about care at home, follow-up, and signs and symptoms of worsening of condition and they did verbalize understanding.     Problem List Items Addressed This Visit    None  Visit Diagnoses           Acute nonintractable headache, unspecified headache type    -  Primary                 BP Readings from Last 3 Encounters:   25 108/68   03/10/25 (!) 135/103   25 110/81     Wt Readings from Last 3 Encounters:   25 73.7 kg (162 lb 6.4 oz)   03/10/25 72.6 kg (160 lb)   25 72.1 kg (159 lb)       Patient Active Problem List   Diagnosis    Abdominal pain    Cancer of distal third of esophagus (HCC)    Acute postoperative pain    Chronic anemia    Primary cancer of esophagus with metastasis to other site (HCC)    GERD (gastroesophageal

## 2025-03-14 NOTE — ED PROVIDER NOTES
[facility] EMERGENCY DEPARTMENT        EMERGENCY MEDICINE      Pt Name: Niranjan Yañez  MRN: [id number]  Birthdate 1960  Date of evaluation: 3/10/2025  Provider: VERONICA Howard     CHIEF COMPLAINT     No chief complaint on file.     HISTORY OF PRESENT ILLNESS   Niranjan Yañez is a pleasant 64 y.o. male who presents to the emergency department  from home, by private vehicle for evaluation of headache.  This patient Has a past medical history of esophageal cancer and mets to the brain.  He underwent brain surgery 2 to 3 years back.  Patient's wife is here with him.  She states he woke up today at 5:30 AM screaming of headache.  The headache has been constant he rates the pain 6/10.  Wife states he has not taken any pain medications.  He also started having a rash along the incision site on the occipital region and around his right shoulder.  He has been taking corticosteroids once a week.  Patient denies any history of falls, trauma, or injury, fever, chills, nausea, vomiting, abdominal pain or vision changes.     PASTMEDICAL HISTORY      Past Medical History        Past Medical History:   Diagnosis Date    Atrial fibrillation (HCC)      Benign hypertension 11/03/2022    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    GERD (gastroesophageal reflux disease)      HLD (hyperlipidemia) 08/15/2022    Metastasis to brain (HCC)      Obesity (BMI 30.0-34.9) 03/09/2022    Sleep apnea              Problem List        Patient Active Problem List   Diagnosis Code    Abdominal pain R10.9    Cancer of distal third of esophagus (HCC) C15.5    Acute postoperative pain G89.18    Chronic anemia D64.9    Primary cancer of esophagus with metastasis to other site (HCC) C15.9    GERD (gastroesophageal reflux disease) K21.9    HLD (hyperlipidemia) E78.5    Obesity (BMI 30.0-34.9) E66.811    Postoperative atrial fibrillation (HCC) I97.89, I48.91    Thrombocytopenia D69.6    Brain lesion G93.9    Delayed gastric

## 2025-04-09 DIAGNOSIS — N13.8 BENIGN PROSTATIC HYPERPLASIA WITH URINARY OBSTRUCTION: ICD-10-CM

## 2025-04-09 DIAGNOSIS — R39.198 DIFFICULTY URINATING: Primary | ICD-10-CM

## 2025-04-09 DIAGNOSIS — N40.1 BENIGN PROSTATIC HYPERPLASIA WITH URINARY OBSTRUCTION: ICD-10-CM

## 2025-04-09 RX ORDER — MIRABEGRON 50 MG/1
50 TABLET, FILM COATED, EXTENDED RELEASE ORAL DAILY
Qty: 30 TABLET | Refills: 3 | Status: SHIPPED | OUTPATIENT
Start: 2025-04-09 | End: 2025-04-16 | Stop reason: SDUPTHER

## 2025-04-09 NOTE — TELEPHONE ENCOUNTER
Niranjan Yañez called requesting a refill on the following medications:  Requested Prescriptions     Pending Prescriptions Disp Refills    MYRBETRIQ 50 MG TB24 30 tablet 11     Sig: Take 50 mg by mouth daily     Pharmacy verified:    [id number] - [place], [place] - [address] - P [phone] - F [phone]     Date of last visit: 03/05/2025  Date of next visit (if applicable): 04/16/2025

## 2025-04-16 ENCOUNTER — OFFICE VISIT (OUTPATIENT)
Dept: UROLOGY | Age: 65
End: 2025-04-16
Payer: MEDICARE

## 2025-04-16 VITALS — RESPIRATION RATE: 10 BRPM | WEIGHT: 162 LBS | HEIGHT: 66 IN | BODY MASS INDEX: 26.03 KG/M2

## 2025-04-16 DIAGNOSIS — R39.198 DIFFICULTY URINATING: ICD-10-CM

## 2025-04-16 DIAGNOSIS — N40.1 BENIGN PROSTATIC HYPERPLASIA WITH URINARY OBSTRUCTION: Primary | ICD-10-CM

## 2025-04-16 DIAGNOSIS — N13.8 BENIGN PROSTATIC HYPERPLASIA WITH URINARY OBSTRUCTION: Primary | ICD-10-CM

## 2025-04-16 DIAGNOSIS — R39.15 URINARY URGENCY: ICD-10-CM

## 2025-04-16 PROCEDURE — G8419 CALC BMI OUT NRM PARAM NOF/U: HCPCS

## 2025-04-16 PROCEDURE — 1036F TOBACCO NON-USER: CPT

## 2025-04-16 PROCEDURE — G8427 DOCREV CUR MEDS BY ELIG CLIN: HCPCS

## 2025-04-16 PROCEDURE — 3017F COLORECTAL CA SCREEN DOC REV: CPT

## 2025-04-16 PROCEDURE — 99213 OFFICE O/P EST LOW 20 MIN: CPT

## 2025-04-16 RX ORDER — MIRABEGRON 50 MG/1
50 TABLET, FILM COATED, EXTENDED RELEASE ORAL DAILY
Qty: 90 TABLET | Refills: 3 | Status: SHIPPED | OUTPATIENT
Start: 2025-04-16

## 2025-04-16 NOTE — PROGRESS NOTES
[facility] [place] [facility]  [facility]  [address]  [address]  [facility] [address]  Dept: [phone]  Loc: [phone]  Visit Date: 4/16/2025      HPI  Niranjan Yañez is a 64 y.o. male that presents to the urology clinic for BPH and nocturia follow-up.    BPH and Nocturia  Flomax 0.4 mg QD + Myrbetriq 50 mg. Combination is working well. Helping with UUI and nocturia. Voiding without straining or feelings of incomplete emptying.    Medical history of A-Fib, Esophageal CA with brain metastasis and resultant memory impairment. Wife is present and aiding in relay of symptoms.    PSA Trend  1.96   (08/13/24)  1.41   (03/22/17)      Last BUN and Creatinine:  Lab Results   Component Value Date    BUN 13 03/10/2025     Lab Results   Component Value Date    CREATININE 0.8 03/10/2025           PAST MEDICAL, FAMILY AND SOCIAL HISTORY UPDATE:  Past Medical History:   Diagnosis Date    Atrial fibrillation (HCC)     Benign hypertension 11/03/2022    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    GERD (gastroesophageal reflux disease)     HLD (hyperlipidemia) 08/15/2022    Metastasis to brain (HCC)     Obesity (BMI 30.0-34.9) 03/09/2022    Sleep apnea      Past Surgical History:   Procedure Laterality Date    ABDOMEN SURGERY  08/16/2022    GASTRIC DEVASCULARIZATON-[facility]    ABDOMEN SURGERY  08/30/2022    ESOPHAGOGASTRODUODENOSCOPY TRANSORAL DIAGNOSTIC ESOPHAGECTOMY W/ THORACOTOMY-[facility]    BRAIN SURGERY      DENTAL SURGERY      25 teeth removed    GASTROSTOMY-JEJEUNOSTOMY TUBE CHANGE/PLACEMENT      PORT SURGERY Left 12/01/2022    LEFT SINGLE LUMEN MEDIPORT performed by Konstantin Melara MD at [facility] OR    STOMACH SURGERY N/A 05/01/2023    Open JEJUNOSTOMY TUBE INSERTION; repair of umbilical hernia performed by Elian Zaragoza MD at [facility] OR     Family History   Problem Relation Age of Onset    Colon Cancer Mother 78    Heart Disease Father     Cancer Brother         lung    No Known

## 2025-04-28 NOTE — PROGRESS NOTES
[facility], [address] [place] [address], 65 y.o.  [phone]    Nurses Notes   HST result follow up  Study Results  Initial Study Date -  10/14/22  AHI -  9.8    Total Events - 73  (Apneas  5  Hypopneas 68  Central  0)  Total Sleep Time - 448.4 min  Time with Sats below 88% - 2.1 min  Interval History       Eyad Arzola is a 65 y.o. old malewho comes in to review the results of his recent sleep study, to answer questions and to explore options for treatment, history of esophageal cancer with mets to brain     Meds  Current Outpatient Medications   Medication Sig Dispense Refill    escitalopram (LEXAPRO) 10 MG tablet Take 1 tablet by mouth daily 30 tablet 1    mirtazapine (REMERON SOL-TAB) 15 MG disintegrating tablet Take 15 mg by mouth nightly      oxyCODONE (ROXICODONE) 5 MG immediate release tablet Take 5 mg by mouth every 4 hours as needed. polyethylene glycol (GLYCOLAX) 17 GM/SCOOP powder Take 17 g by mouth nightly      Blood Pressure Monitoring (BP MONITOR-STETHOSCOPE) KIT Dx: HTN 1 kit 0    Misc. Devices (PULSE OXIMETER FOR FINGER) MISC Dx:  hypoxemia 1 each 0    ibuprofen (ADVIL;MOTRIN) 400 MG tablet Take 400 mg by mouth every 6 hours as needed for Pain      pantoprazole (PROTONIX) 40 MG tablet Take 1 tablet by mouth in the morning and at bedtime 90 tablet 3    ondansetron (ZOFRAN-ODT) 8 MG TBDP disintegrating tablet Place 1 tablet under the tongue every 8 hours as needed for Nausea or Vomiting 10 tablet 1    prochlorperazine (COMPAZINE) 10 MG tablet Take 1 tablet by mouth every 6 hours as needed (nausea) 30 tablet 1    famotidine (PEPCID) 20 MG tablet Take 20 mg by mouth 2 times daily       NONFORMULARY Hema-Plex (Iron Multi-vitamin)       No current facility-administered medications for this visit. ROS  Review of Systems   Constitutional:  Positive for fatigue. Negative for activity change, appetite change, chills, fever and unexpected weight change.    HENT: Negative. Snoring    Eyes: Negative. Respiratory:  Positive for apnea. Negative for cough, chest tightness, shortness of breath and wheezing. Cardiovascular:  Negative for chest pain, palpitations and leg swelling. Gastrointestinal:  Negative for abdominal pain, diarrhea, nausea and vomiting. Genitourinary: Negative. Musculoskeletal: Negative. Skin: Negative. Neurological:  Positive for headaches. Hematological: Negative. Psychiatric/Behavioral: Negative. Negative for sleep disturbance. Emotional     Exam  /66 (Site: Left Upper Arm, Position: Sitting, Cuff Size: Medium Adult)   Pulse 68   Temp 97.6 °F (36.4 °C) (Oral)   Ht 5' 7\" (1.702 m)   Wt 155 lb 6.4 oz (70.5 kg)   SpO2 100% Comment: r/a  BMI 24.34 kg/m²    Body mass index is 24.34 kg/m². Oxygen level - Room Air  Physical Exam  Vitals and nursing note reviewed. Constitutional:       Appearance: Normal appearance. He is overweight. HENT:      Head: Normocephalic and atraumatic. Mouth/Throat:      Dentition: Has dentures. Pharynx: Oropharynx is clear. Eyes:      Conjunctiva/sclera: Conjunctivae normal.   Pulmonary:      Effort: Pulmonary effort is normal. No tachypnea, bradypnea or respiratory distress. Skin:     Findings: No erythema or rash. Comments: Staples back of head    Neurological:      Mental Status: He is alert and oriented to person, place, and time. Psychiatric:         Attention and Perception: Attention normal.         Mood and Affect: Mood normal.         Speech: Speech normal.         Behavior: Behavior normal.         Thought Content: Thought content normal.         Cognition and Memory: Cognition normal.         Judgment: Judgment normal.        Assessment   Diagnosis Orders   1. MARTÍN (obstructive sleep apnea) New Problem DME Order for CPAP as OP    mild MARÍTN AHI 9.8      2. Esophageal adenocarcinoma ([facility] [address]      following with oncology       3.  Severe protein-calorie malnutrition ([facility] [address]        4. Brain lesion Uncertain Status     recent surgical excision, following with oncology and neuro surgery            Recommendations  Several options for treatment were discussed. The benefits and limitations of each were discussed. After addressing his concerns, Tiffany Farfan decided to move ahead with a CPAP .  Tiffany Farfan has chosen [facility] for DME, orders for APAP faxed     Follow up 6 -8 weeks after set up    billing based on medical decision making     Electronically signed by BHARTI Olson CNP on 11/10/2022 at 9:55 AM Female

## 2025-05-05 ENCOUNTER — HOSPITAL ENCOUNTER (EMERGENCY)
Age: 65
Discharge: HOME | End: 2025-05-05
Attending: FAMILY MEDICINE
Payer: MEDICARE

## 2025-05-05 ENCOUNTER — APPOINTMENT (OUTPATIENT)
Dept: INTERVENTIONAL RADIOLOGY/VASCULAR | Age: 65
End: 2025-05-05
Payer: MEDICARE

## 2025-05-05 VITALS
HEART RATE: 73 BPM | BODY MASS INDEX: 26.03 KG/M2 | HEIGHT: 66 IN | OXYGEN SATURATION: 95 % | SYSTOLIC BLOOD PRESSURE: 125 MMHG | RESPIRATION RATE: 14 BRPM | DIASTOLIC BLOOD PRESSURE: 82 MMHG | TEMPERATURE: 98 F | WEIGHT: 162 LBS

## 2025-05-05 DIAGNOSIS — K94.13 JEJUNOSTOMY MALFUNCTION (HCC): Primary | ICD-10-CM

## 2025-05-05 PROCEDURE — 99282 EMERGENCY DEPT VISIT SF MDM: CPT

## 2025-05-05 PROCEDURE — 99284 EMERGENCY DEPT VISIT MOD MDM: CPT

## 2025-05-05 PROCEDURE — 49451 REPLACE DUOD/JEJ TUBE PERC: CPT

## 2025-05-05 PROCEDURE — 6360000004 HC RX CONTRAST MEDICATION: Performed by: RADIOLOGY

## 2025-05-05 PROCEDURE — C1769 GUIDE WIRE: HCPCS

## 2025-05-05 PROCEDURE — 6360000002 HC RX W HCPCS: Performed by: RADIOLOGY

## 2025-05-05 RX ORDER — DIATRIZOATE MEGLUMINE AND DIATRIZOATE SODIUM 660; 100 MG/ML; MG/ML
SOLUTION ORAL; RECTAL PRN
Status: COMPLETED | OUTPATIENT
Start: 2025-05-05 | End: 2025-05-05

## 2025-05-05 RX ORDER — LIDOCAINE HYDROCHLORIDE 20 MG/ML
INJECTION, SOLUTION EPIDURAL; INFILTRATION; INTRACAUDAL; PERINEURAL PRN
Status: COMPLETED | OUTPATIENT
Start: 2025-05-05 | End: 2025-05-05

## 2025-05-05 RX ADMIN — DIATRIZOATE MEGLUMINE AND DIATRIZOATE SODIUM 12 ML: 600; 100 SOLUTION ORAL; RECTAL at 14:27

## 2025-05-05 RX ADMIN — LIDOCAINE HYDROCHLORIDE 8 ML: 20 INJECTION, SOLUTION EPIDURAL; INFILTRATION; INTRACAUDAL; PERINEURAL at 14:12

## 2025-05-05 ASSESSMENT — PAIN - FUNCTIONAL ASSESSMENT: PAIN_FUNCTIONAL_ASSESSMENT: NONE - DENIES PAIN

## 2025-05-05 NOTE — DISCHARGE INSTRUCTIONS
If you develop any abdominal pain, nausea, vomiting or any other concerning symptoms please return to the ED.  Otherwise this time I recommend following up with your primary care provider.

## 2025-05-05 NOTE — OP NOTE
Department of Radiology  Post Procedure Progress Note      Pre-Procedure Diagnosis:  J tube came out , esophageal cancer     Procedure Performed:  replace J tube     Anesthesia: local     Findings: successful    Immediate Complications:  None    Estimated Blood Loss: minimal    SEE DICTATED PROCEDURE NOTE FOR COMPLETE DETAILS.    Irving Cooley MD   5/5/2025 2:23 PM

## 2025-05-05 NOTE — ED TRIAGE NOTES
Pt to ED from home c/c j tube complications. Wife at bedside reports she is unsure when tube got pulled out this morning between 0630 and now. States pt gets continuous feeds. Reports giving pt Tylenol PTA. Pt reports area where tube is is sore. Tube is a 3 way 18fr J tube.

## 2025-05-05 NOTE — PROGRESS NOTES
1351 Patient received in IR for J-tube replacement.   1354 This procedure has been fully reviewed with the patient and written informed consent has been obtained.  1410 Procedure started with .   1422 Procedure completed; patient tolerated well. Dressing to exit site; no bleeding noted.  1226 Patient on cart; comfort ensured.  1228 Patient taken to ED via Suzy RN. Pt alert and oriented x3; follows commands. Skin pink, warm, and dry. Respirations easy, regular, and nonlabored.

## 2025-05-05 NOTE — ED PROVIDER NOTES
Findings: No bruising, erythema, lesion or rash.   Neurological:      General: No focal deficit present.      Mental Status: He is alert and oriented to person, place, and time. Mental status is at baseline.   Psychiatric:         Mood and Affect: Mood normal.         Behavior: Behavior normal.         Thought Content: Thought content normal.         Judgment: Judgment normal.          FORMAL DIAGNOSTIC RESULTS     RADIOLOGY: Interpretation per the Radiologist below, if available at the time of this note (none if blank):    IR GUIDED INS DUOD OR JEJUN TUBE PERC   Final Result   Status post successful jejunostomy tube insertion through existing tract.            **This report has been created using voice recognition software.  It may contain   minor errors which are inherent in voice recognition technology.**         Electronically signed by Dr. Irving Cooley          LABS: (none if blank)  Labs Reviewed - No data to display    (Any cultures that may have been sent were not resulted at the time of this patient visit)    MEDICAL DECISION MAKING     1) Number and Complexity of Problems            Problem List This Visit:         Chief Complaint   Patient presents with    J Tube Complications     Pulled out          Differential Diagnosis includes (but not limited to):  J-tube complication             Pertinent Comorbid Conditions:        2)  Data Reviewed (none if blank or additional interpretation can be found in ED Course)          My Independent interpretations:     EKG:          Imaging:     Labs:                       Decision Rules/Clinical Scores utilized:             External Documentation Reviewed:   Previous patient encounter documents & history available on EMR was reviewed as available.      3)  Treatment and Disposition         ED Reassessment:  Patient stable throughout time in the ED.         Case discussed with consulting clinician:         Shared Decision-Making was performed and disposition discussed

## 2025-05-05 NOTE — H&P
Formulation and discussion of sedation / procedure plans, risks, benefits, side effects and alternatives with patient and/or responsible adult completed.    History and Physical reviewed and unchanged.    Electronically signed by Irving Cooley MD on 5/5/25 at 2:23 PM EDT

## 2025-05-28 ENCOUNTER — HOSPITAL ENCOUNTER (OUTPATIENT)
Dept: CT IMAGING | Age: 65
Discharge: HOME | End: 2025-05-28
Attending: INTERNAL MEDICINE
Payer: MEDICARE

## 2025-05-28 DIAGNOSIS — C79.31 METASTASIS TO BRAIN (HCC): ICD-10-CM

## 2025-05-28 DIAGNOSIS — C79.9 METASTASIS FROM ESOPHAGEAL CANCER (HCC): ICD-10-CM

## 2025-05-28 DIAGNOSIS — C15.9 METASTASIS FROM ESOPHAGEAL CANCER (HCC): ICD-10-CM

## 2025-05-28 DIAGNOSIS — C15.5 CANCER OF DISTAL THIRD OF ESOPHAGUS (HCC): ICD-10-CM

## 2025-05-28 LAB — POC CREATININE WHOLE BLOOD: 0.8 MG/DL (ref 0.5–1.2)

## 2025-05-28 PROCEDURE — 6360000004 HC RX CONTRAST MEDICATION: Performed by: INTERNAL MEDICINE

## 2025-05-28 PROCEDURE — 74177 CT ABD & PELVIS W/CONTRAST: CPT

## 2025-05-28 PROCEDURE — 71260 CT THORAX DX C+: CPT

## 2025-05-28 PROCEDURE — 2500000003 HC RX 250 WO HCPCS: Performed by: RADIOLOGY

## 2025-05-28 PROCEDURE — 6360000002 HC RX W HCPCS: Performed by: RADIOLOGY

## 2025-05-28 PROCEDURE — 82565 ASSAY OF CREATININE: CPT

## 2025-05-28 RX ORDER — HEPARIN SODIUM (PORCINE) LOCK FLUSH IV SOLN 100 UNIT/ML 100 UNIT/ML
500 SOLUTION INTRAVENOUS ONCE
Status: COMPLETED | OUTPATIENT
Start: 2025-05-28 | End: 2025-05-28

## 2025-05-28 RX ORDER — IOPAMIDOL 755 MG/ML
80 INJECTION, SOLUTION INTRAVASCULAR
Status: COMPLETED | OUTPATIENT
Start: 2025-05-28 | End: 2025-05-28

## 2025-05-28 RX ORDER — SODIUM CHLORIDE 0.9 % (FLUSH) 0.9 %
10 SYRINGE (ML) INJECTION 2 TIMES DAILY
Status: DISCONTINUED | OUTPATIENT
Start: 2025-05-28 | End: 2025-05-29 | Stop reason: HOSPADM

## 2025-05-28 RX ADMIN — IOPAMIDOL 80 ML: 755 INJECTION, SOLUTION INTRAVENOUS at 11:46

## 2025-05-28 RX ADMIN — SODIUM CHLORIDE, PRESERVATIVE FREE 10 ML: 5 INJECTION INTRAVENOUS at 10:07

## 2025-05-28 RX ADMIN — HEPARIN 500 UNITS: 100 SYRINGE at 10:05

## 2025-05-29 DIAGNOSIS — C15.5 CANCER OF DISTAL THIRD OF ESOPHAGUS (HCC): ICD-10-CM

## 2025-05-29 RX ORDER — PROCHLORPERAZINE MALEATE 10 MG
10 TABLET ORAL EVERY 6 HOURS PRN
Qty: 60 TABLET | Refills: 2 | Status: SHIPPED | OUTPATIENT
Start: 2025-05-29

## 2025-06-02 NOTE — PROGRESS NOTES
, texture and cognitive issues prefers not to take food p.o. patient prefers to keep this as it is.  Dietitian aware ,María .  Wife admits good and bad days with his dementia.    Patient does have some nausea related to increased mucus production but seems to be alleviated by taking 1 Decadron twice weekly.  Plan at this point however is to stop the Decadron.  His wife notes that his dementia is slowly declining.     HPI: Momo is a 61-year-old gentleman with a long history of GERD who presented to the ER on February 10 was admitted for 1 day because of chest heaviness.  No cardiology because GI was consulted ultimately showed a mass in the distal esophagus.  Outpatient EGD requested.  Diagnosed with adenocarcinoma the distal esophagus and referred to Dr. Sabillon of thoracic surgery at [facility].  EUS shows regional tone involvement.  See scans below and ultrasound.  No distant mets.  Patient has no significant dysphagia and no weight loss.  Patient referred back locally for chemoradiation neoadjuvant treatment before surgery.    ROS:  Review of Systems 14 point negative except as above.    PMH:   Past Medical History:   Diagnosis Date    Atrial fibrillation (HCC)     Benign hypertension 11/03/2022    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    GERD (gastroesophageal reflux disease)     HLD (hyperlipidemia) 08/15/2022    Metastasis to brain (HCC)     Obesity (BMI 30.0-34.9) 03/09/2022    Sleep apnea         Social HX:   Social History     Socioeconomic History    Marital status:      Spouse name: Jocelyne    Number of children: 5    Years of education: 12    Highest education level: High school graduate   Occupational History    Occupation: VENDOR     Comment: [employer]   Tobacco Use    Smoking status: Former     Current packs/day: 0.00     Average packs/day: 3.0 packs/day for 10.0 years (30.0 ttl pk-yrs)     Types: Cigarettes     Start date: 5/1/2003     Quit date: 5/1/2013     Years since

## 2025-06-03 ENCOUNTER — OFFICE VISIT (OUTPATIENT)
Dept: ONCOLOGY | Age: 65
End: 2025-06-03
Payer: MEDICARE

## 2025-06-03 ENCOUNTER — HOSPITAL ENCOUNTER (OUTPATIENT)
Dept: INFUSION THERAPY | Age: 65
Discharge: HOME | End: 2025-06-03
Payer: MEDICARE

## 2025-06-03 VITALS
OXYGEN SATURATION: 95 % | WEIGHT: 165 LBS | BODY MASS INDEX: 26.52 KG/M2 | DIASTOLIC BLOOD PRESSURE: 83 MMHG | RESPIRATION RATE: 16 BRPM | HEIGHT: 66 IN | HEART RATE: 83 BPM | SYSTOLIC BLOOD PRESSURE: 110 MMHG | TEMPERATURE: 98.3 F

## 2025-06-03 VITALS
RESPIRATION RATE: 16 BRPM | OXYGEN SATURATION: 95 % | HEART RATE: 83 BPM | WEIGHT: 165 LBS | HEIGHT: 66 IN | DIASTOLIC BLOOD PRESSURE: 83 MMHG | BODY MASS INDEX: 26.52 KG/M2 | SYSTOLIC BLOOD PRESSURE: 110 MMHG | TEMPERATURE: 98.3 F

## 2025-06-03 DIAGNOSIS — C15.5 CANCER OF DISTAL THIRD OF ESOPHAGUS (HCC): Primary | ICD-10-CM

## 2025-06-03 DIAGNOSIS — C79.9 METASTASIS FROM ESOPHAGEAL CANCER (HCC): Primary | ICD-10-CM

## 2025-06-03 DIAGNOSIS — C15.9 METASTASIS FROM ESOPHAGEAL CANCER (HCC): Primary | ICD-10-CM

## 2025-06-03 DIAGNOSIS — C15.9 METASTASIS FROM ESOPHAGEAL CANCER (HCC): ICD-10-CM

## 2025-06-03 DIAGNOSIS — C79.9 METASTASIS FROM ESOPHAGEAL CANCER (HCC): ICD-10-CM

## 2025-06-03 LAB
ALBUMIN SERPL BCG-MCNC: 4.1 G/DL (ref 3.4–4.9)
ALP SERPL-CCNC: 83 U/L (ref 40–129)
ALT SERPL W/O P-5'-P-CCNC: 25 U/L (ref 10–50)
AST SERPL-CCNC: 25 U/L (ref 10–50)
BASOPHILS ABSOLUTE: 0 THOU/MM3 (ref 0–0.1)
BASOPHILS NFR BLD AUTO: 0 % (ref 0–3)
BILIRUB CONJ SERPL-MCNC: < 0.1 MG/DL (ref 0–0.2)
BILIRUB SERPL-MCNC: 0.2 MG/DL (ref 0.3–1.2)
BUN BLDP-MCNC: 13 MG/DL (ref 8–26)
CHLORIDE BLD-SCNC: 103 MEQ/L (ref 98–109)
CREAT BLD-MCNC: 1 MG/DL (ref 0.5–1.2)
EOSINOPHIL NFR BLD AUTO: 1 % (ref 0–4)
EOSINOPHILS ABSOLUTE: 0 THOU/MM3 (ref 0–0.4)
ERYTHROCYTE [DISTWIDTH] IN BLOOD BY AUTOMATED COUNT: 11.7 % (ref 11.5–14.5)
GFR SERPL CREATININE-BSD FRML MDRD: 84 ML/MIN/1.73M2
GLUCOSE BLD-MCNC: 100 MG/DL (ref 70–108)
HCT VFR BLD AUTO: 38 % (ref 42–52)
HGB BLD-MCNC: 12.5 GM/DL (ref 14–18)
IMMATURE GRANULOCYTES %: 0 %
IMMATURE GRANULOCYTES ABSOLUTE: 0.01 THOU/MM3 (ref 0–0.07)
IONIZED CALCIUM, WHOLE BLOOD: 1.24 MMOL/L (ref 1.12–1.32)
LYMPHOCYTES ABSOLUTE: 1 THOU/MM3 (ref 1–4.8)
LYMPHOCYTES NFR BLD AUTO: 21 % (ref 15–47)
MCH RBC QN AUTO: 30.2 PG (ref 26–33)
MCHC RBC AUTO-ENTMCNC: 32.9 GM/DL (ref 32.2–35.5)
MCV RBC AUTO: 92 FL (ref 80–94)
MONOCYTES ABSOLUTE: 0.5 THOU/MM3 (ref 0.4–1.3)
MONOCYTES NFR BLD AUTO: 10 % (ref 0–12)
NEUTROPHILS ABSOLUTE: 3.3 THOU/MM3 (ref 1.8–7.7)
NEUTROPHILS NFR BLD AUTO: 68 % (ref 43–75)
PLATELET # BLD AUTO: 140 THOU/MM3 (ref 130–400)
PMV BLD AUTO: 8.6 FL (ref 9.4–12.4)
POTASSIUM BLD-SCNC: 4.3 MEQ/L (ref 3.5–4.9)
PROT SERPL-MCNC: 6.6 G/DL (ref 6.4–8.3)
RBC # BLD AUTO: 4.14 MILL/MM3 (ref 4.7–6.1)
SODIUM BLD-SCNC: 140 MEQ/L (ref 138–146)
TOTAL CO2, WHOLE BLOOD: 29 MEQ/L (ref 23–33)
WBC # BLD AUTO: 4.8 THOU/MM3 (ref 4.8–10.8)

## 2025-06-03 PROCEDURE — G8419 CALC BMI OUT NRM PARAM NOF/U: HCPCS | Performed by: INTERNAL MEDICINE

## 2025-06-03 PROCEDURE — 99211 OFF/OP EST MAY X REQ PHY/QHP: CPT

## 2025-06-03 PROCEDURE — 80076 HEPATIC FUNCTION PANEL: CPT

## 2025-06-03 PROCEDURE — 3074F SYST BP LT 130 MM HG: CPT | Performed by: INTERNAL MEDICINE

## 2025-06-03 PROCEDURE — G8427 DOCREV CUR MEDS BY ELIG CLIN: HCPCS | Performed by: INTERNAL MEDICINE

## 2025-06-03 PROCEDURE — 2500000003 HC RX 250 WO HCPCS: Performed by: INTERNAL MEDICINE

## 2025-06-03 PROCEDURE — 6360000002 HC RX W HCPCS: Performed by: INTERNAL MEDICINE

## 2025-06-03 PROCEDURE — 3079F DIAST BP 80-89 MM HG: CPT | Performed by: INTERNAL MEDICINE

## 2025-06-03 PROCEDURE — 1036F TOBACCO NON-USER: CPT | Performed by: INTERNAL MEDICINE

## 2025-06-03 PROCEDURE — 80047 BASIC METABLC PNL IONIZED CA: CPT

## 2025-06-03 PROCEDURE — 36591 DRAW BLOOD OFF VENOUS DEVICE: CPT

## 2025-06-03 PROCEDURE — 99213 OFFICE O/P EST LOW 20 MIN: CPT | Performed by: INTERNAL MEDICINE

## 2025-06-03 PROCEDURE — 85025 COMPLETE CBC W/AUTO DIFF WBC: CPT

## 2025-06-03 PROCEDURE — 3017F COLORECTAL CA SCREEN DOC REV: CPT | Performed by: INTERNAL MEDICINE

## 2025-06-03 RX ORDER — HEPARIN 100 UNIT/ML
500 SYRINGE INTRAVENOUS PRN
Status: DISCONTINUED | OUTPATIENT
Start: 2025-06-03 | End: 2025-06-04 | Stop reason: HOSPADM

## 2025-06-03 RX ORDER — SODIUM CHLORIDE 0.9 % (FLUSH) 0.9 %
5-40 SYRINGE (ML) INJECTION PRN
Status: DISCONTINUED | OUTPATIENT
Start: 2025-06-03 | End: 2025-06-04 | Stop reason: HOSPADM

## 2025-06-03 RX ADMIN — SODIUM CHLORIDE, PRESERVATIVE FREE 30 ML: 5 INJECTION INTRAVENOUS at 15:10

## 2025-06-03 RX ADMIN — Medication 500 UNITS: at 15:10

## 2025-06-03 NOTE — DISCHARGE INSTRUCTIONS
Please contact your Oncologist if you have any questions regarding the lie/lab that you received today.    You are instructed to call the office or go to the Emergency Dept. If you experience any of the following symptoms:    Chills,temperature of 100.4 or higher or any symptoms of infection.  Dizziness/lightheadedness   Acute nausea or vomiting-not relieved by medications  Headaches-not relieved by medications  New chest pain or pressure  New rash /itching  New shortness of breath      Make sure you are drinking 48 to 64 ounces of water daily-if you are unable to drink fluids let us know right away.

## 2025-06-03 NOTE — PROGRESS NOTES
Patient tolerated line/lab without any complications.    Last vital signs:   /83   Pulse 83   Temp 98.3 °F (36.8 °C) (Oral)   Resp 16   Ht 1.676 m (5' 5.98\")   Wt 74.8 kg (165 lb)   SpO2 95%   BMI 26.64 kg/m²     Physician's Instructions    Patient instructed if experience any of the above symptoms following today's line/lab, he is to notify MD immediately or go to the emergency department.    Discharge instructions given to patient. Verbalizes understanding. Ambulated off unit per self, accompanied by spouse, with belongings.

## 2025-06-03 NOTE — PATIENT INSTRUCTIONS
Keep follow-up with radiation oncology 6/23 at which time he will likely order the next brain MRI  Today's labs reviewed  Port flush every 2 months  Ordered next CT chest abdomen pelvis and labs to be drawn the week of 9/17/2025  Please schedule follow-up with me 1 week later on 9/24/2025

## 2025-06-03 NOTE — PLAN OF CARE
Problem: Discharge Planning  Goal: Discharge to home or other facility with appropriate resources  Outcome: Adequate for Discharge  Flowsheets (Taken 6/3/2025 1522)  Discharge to home or other facility with appropriate resources:   Identify barriers to discharge with patient and caregiver   Identify discharge learning needs (meds, wound care, etc)  Note: Verbalize understanding of discharge instructions, follow up appointments, and when to call Physician.       Problem: Safety - Adult  Goal: Free from fall injury  Outcome: Adequate for Discharge  Flowsheets (Taken 6/3/2025 1522)  Free From Fall Injury: Instruct family/caregiver on patient safety  Note: Free from falls while in [facility].       Problem: Chronic Conditions and Co-morbidities  Goal: Patient's chronic conditions and co-morbidity symptoms are monitored and maintained or improved  Outcome: Adequate for Discharge  Flowsheets (Taken 6/3/2025 1522)  Care Plan - Patient's Chronic Conditions and Co-Morbidity Symptoms are Monitored and Maintained or Improved:   Collaborate with multidisciplinary team to address chronic and comorbid conditions and prevent exacerbation or deterioration   Monitor and assess patient's chronic conditions and comorbid symptoms for stability, deterioration, or improvement  Note: Patient verbalizes understanding to verbal information given on lab draw via mediport. Aware to call MD if develop complications.       Problem: Infection - Adult  Goal: Absence of infection at discharge  Outcome: Adequate for Discharge  Flowsheets (Taken 6/3/2025 1522)  Absence of infection at discharge:   Assess and monitor for signs and symptoms of infection   Monitor lab/diagnostic results   Monitor all insertion sites i.e., indwelling lines, tubes and drains   Administer medications as ordered   Instruct and encourage patient and family to use good hand hygiene technique  Note: Mediport site with no redness or warmth. Skin over port site intact with  no signs of breakdown noted. Patient verbalizes signs/symptoms of port infection and when to notify the physician.         Care plan reviewed with patient.  Patient verbalizes understanding of the plan of care and contributes to goal setting.

## 2025-06-09 ENCOUNTER — TELEPHONE (OUTPATIENT)
Dept: RADIATION ONCOLOGY | Age: 65
End: 2025-06-09

## 2025-06-09 NOTE — TELEPHONE ENCOUNTER
Reason for Call:   6/9/25- Follow up regarding home EN  2/21/2025 - follow-up regarding home EN.  *esophageal cancer s/p esophagectomy (2022), brain mets s/p craniotomy, current with treatment, J-tube placed 5/1/23     Nutrition Recommendations:   -EN for 100% of nutrition - Peptamen 1.5 with a goal of 6 cartons/day, continuous feeds via J-tube = 1500ml, 2250 kcals, 282 grams CHO, 102 grams protein, 1152 ml water, 5.4 mg B6, and 30 mcg Vitamin D  -Recommend an additional 900ml water per day  -PO ad corrina for pleasure and to maintain swallow function     Malnutrition Assessment: (2/21/2024)  Malnutrition Status: no malnutrition  Context: chronic illness  Findings of the 6 clinical characteristics of malnutrition (minimum of 2 out of 6 clinical characteristics is required to make the dx of moderate or severe Protein Calorie Malnutrition based on AND/ASPEN Guidelines):              1. Energy Intake: 100% of needs met with EN              2. Weight Loss: ~7# in 3 months (4%)              3. Fat Loss: not able to assess              4. Muscle Loss: not able to assess              5. Fluid Accumulation: not able to assess              6.  Strength: not measured     Nutrition Diagnosis:   Problem: altered GI function  Etiology: esophageal cancer  Signs and Symptoms: need for 100% of nutrition via J-tube     Nutrition Assessment:   History: esophageal cancer with brain mets, HTN, GERD, HLD  Subjective:   6/9/25- Called number in chart for Jocelyne, [phone], and received message that call could not be completed. No other number is available. Will send a message via [employer].   2/21/2025 - I spoke to Jocelyne (patient's wife) via phone today. Jocelyne reports that the patient continues to do well and tolerates his EN well. No change in regimen. Jocelyne admits that the patient is more confused and recently reprogrammed his feeding pump so she had to fix it. Jocelyne mentions that the patient did loss ~10# recently and feels that it may be

## 2025-06-10 ENCOUNTER — HOSPITAL ENCOUNTER (OUTPATIENT)
Dept: MRI IMAGING | Age: 65
Discharge: HOME | End: 2025-06-10
Payer: MEDICARE

## 2025-06-10 DIAGNOSIS — C79.31 METASTASIS TO BRAIN (HCC): ICD-10-CM

## 2025-06-10 PROCEDURE — A9579 GAD-BASE MR CONTRAST NOS,1ML: HCPCS

## 2025-06-10 PROCEDURE — 6360000004 HC RX CONTRAST MEDICATION

## 2025-06-10 PROCEDURE — 70553 MRI BRAIN STEM W/O & W/DYE: CPT

## 2025-06-10 RX ORDER — GADOTERIDOL 279.3 MG/ML
15 INJECTION INTRAVENOUS
Status: COMPLETED | OUTPATIENT
Start: 2025-06-10 | End: 2025-06-10

## 2025-06-10 RX ADMIN — GADOTERIDOL 15 ML: 279.3 INJECTION, SOLUTION INTRAVENOUS at 11:38

## 2025-06-23 DIAGNOSIS — C79.31 METASTASIS TO BRAIN (HCC): Primary | ICD-10-CM

## 2025-07-06 ENCOUNTER — APPOINTMENT (OUTPATIENT)
Dept: GENERAL RADIOLOGY | Age: 65
End: 2025-07-06
Payer: MEDICARE

## 2025-07-06 ENCOUNTER — HOSPITAL ENCOUNTER (EMERGENCY)
Age: 65
Discharge: HOME | End: 2025-07-07
Attending: EMERGENCY MEDICINE
Payer: MEDICARE

## 2025-07-06 VITALS
OXYGEN SATURATION: 94 % | HEART RATE: 89 BPM | WEIGHT: 160 LBS | BODY MASS INDEX: 26.66 KG/M2 | HEIGHT: 65 IN | DIASTOLIC BLOOD PRESSURE: 75 MMHG | RESPIRATION RATE: 16 BRPM | TEMPERATURE: 98.5 F | SYSTOLIC BLOOD PRESSURE: 116 MMHG

## 2025-07-06 DIAGNOSIS — T85.528A DISLODGED JEJUNOSTOMY TUBE: Primary | ICD-10-CM

## 2025-07-06 PROCEDURE — 49465 FLUORO EXAM OF G/COLON TUBE: CPT

## 2025-07-06 PROCEDURE — 6360000004 HC RX CONTRAST MEDICATION: Performed by: EMERGENCY MEDICINE

## 2025-07-06 PROCEDURE — 99283 EMERGENCY DEPT VISIT LOW MDM: CPT

## 2025-07-06 RX ORDER — DIATRIZOATE MEGLUMINE AND DIATRIZOATE SODIUM 660; 100 MG/ML; MG/ML
30 SOLUTION ORAL; RECTAL
Status: DISCONTINUED | OUTPATIENT
Start: 2025-07-06 | End: 2025-07-07 | Stop reason: HOSPADM

## 2025-07-06 RX ADMIN — DIATRIZOATE MEGLUMINE AND DIATRIZOATE SODIUM 30 ML: 600; 100 SOLUTION ORAL; RECTAL at 23:32

## 2025-07-06 ASSESSMENT — PAIN SCALES - GENERAL: PAINLEVEL_OUTOF10: 8

## 2025-07-06 ASSESSMENT — PAIN - FUNCTIONAL ASSESSMENT: PAIN_FUNCTIONAL_ASSESSMENT: 0-10

## 2025-07-07 NOTE — ED NOTES
Dr. Woodard at bedside to insert carlisle into PEG stoma. Pt remains alert and oriented. RR regular and unlabored.

## 2025-07-07 NOTE — ED TRIAGE NOTES
Pt to ED c/o his feeding tube getting pulled out. Pt states it got caught on something and ripped it out. Pt states his pain is a 8/10. Pt A&O. VSS

## 2025-07-07 NOTE — PROCEDURES
PROCEDURE NOTE  Date: 7/7/2025   Name: Niranjan Yañez  YOB: 1960    Jejunostomy tube placement      Indication: A 64-year-old male with history of esophageal cancer and malnutrition on jejunostomy tube feeding.  Jejunostomy tube was pulled out accidentally tonight.    Details procedure: Informed consent was obtained including but not limited to bleeding, infection, and bowel perforation.  Patient has a matured jejunostomy tract but the tract is closing.  I was unable to pass through the 20 [place] jejunostomy tube which the patient brought in.  I then used the 0.035 hydrophilic soft tip straight guidewire to insert into the tract without difficulty about 6 cm deep according to the most recent CT abdomen pelvis.  First a 14 [place] Haines catheter was passed over the indwelling guidewire, followed by 16 [place] Haines catheter and 18 Northern Irish Haines catheter to dilate the tract.  Eventually I was able to pass the 20 [place] jejunostomy tube the patient brought in over the antibiotic guidewire.  Contrast material was then injected into the tube by radiology staff confirming appropriate positioning of the tube within the jejunum.  No complications during the procedures.

## 2025-07-10 NOTE — PATIENT INSTRUCTIONS
Request the February 14 Dr. Maite Farris, EGD  CBC, CMP today. Please print patient's EGD path report for me    Chemoradiation plan with Dr. Chapin Earl. Tentative start date week of 5/9  Plan low-dose weekly carboplatin Taxol x7. Consult nurse PICC line team for PICC line  Schedule chemotherapy teaching. Prescription for Compazine and Zofran ODT  His first chemotherapy week 1 CarboTaxol will be Wednesday, May 11. No provider that day.   Follow-up with me May 18 for labs and week 2 of low-dose chemotherapy Scheduled procedure with PT OVER THE PHONE    DOS: 7/17/25   Procedure time: 2:20 PM    Confirmed arrival time: 1:20 PM   Procedure duration: 20 minutes    Location :[facility]   Patient Admission Status: Day Surgery / Outpatient      Insurance confirmed as Payor: [facility] EMPLOYEE / Plan:  [facility] EMP PREF / Product Type:  EMPLOYEE-PREFERRED , will be the same at time of procedure: Yes     Note: Standard MA not to be done at [facility]    IV SED/LOCAL: local   Reminded to be NPO 6 hours: No    Reminded to have drive check in with them: No        The following have been confirmed:   Latex Allergy No   Diabetic No   Diagnosed with Sleep Apnea No   Use of Oxygen at home No    Diuretic/Water pill No   Pacemaker or Defibrillator No   ASA No    Prescribed Blood Thinner No    NSAIDS Yes      Any ambulation issues? No      PLT (K/mcL)   Date Value   11/27/2024 306       BMI Estimated body mass index is 21.7 kg/m² as calculated from the following:    Height as of 7/8/25: 6' (1.829 m).    Weight as of 7/8/25: 72.6 kg (160 lb). (if greater than 50 cannot be at [facility] or [address] [place] [address])               Noe Dsouza MA2 hours ago (10:41 AM)     PV  Bilateral TF TONYA L4-5, L5-S1 level, local    - Blood thinners: NSAIDS  - Pregnancy: NO  - Diabetic: NO   - Contrast allergy: NO  - Last Platelet Count:   PLT (K/mcL)  Date Value  11/27/2024 306      SURGERY SCHEDULING REQUIREMENTS INCLUDE:     Facility: [facility]/ [facility]/ [facility]  Admission Type: Day Surgery   Time Needed: 15 minutes  Anesthesia: Local  Special Equipment: n/a      Procedure:   Epidural: (94535) L/S injection- Trans single level , (74529) L/S injection additional level x 1   Levels and laterality Bilateral TF TONYA L4-5, L5-S1      Dx Code: M54.16-Lumbar Radiculopathy   BLOOD THINNER:   Is the patient on Coumadin/Warfarin, Lovenox, Plavix, etc.? No   Is the patient on any Aspirin/Excedrin? No   Last platelet count:       PLT (K/mcL)   Date Value   11/27/2024 306          NSAIDS  Yes   Diabetic: No   Pre-Op Visit: No    Special Instructions: Under Fluroscopy  BMI Estimated body mass index is 21.7 kg/m² as calculated from the following:    Height as of 7/8/25: 6' (1.829 m).    Weight as of 7/8/25: 72.6 kg (160 lb).     Current pain score: 3/10  Pain level at worst: 7/10

## 2025-07-23 DIAGNOSIS — C15.5 CANCER OF DISTAL THIRD OF ESOPHAGUS (HCC): ICD-10-CM

## 2025-07-23 RX ORDER — PROCHLORPERAZINE MALEATE 10 MG
10 TABLET ORAL EVERY 6 HOURS PRN
Qty: 60 TABLET | Refills: 2 | Status: SHIPPED | OUTPATIENT
Start: 2025-07-23

## 2025-08-02 DIAGNOSIS — R39.198 DIFFICULTY URINATING: ICD-10-CM

## 2025-08-02 DIAGNOSIS — N13.8 BENIGN PROSTATIC HYPERPLASIA WITH URINARY OBSTRUCTION: ICD-10-CM

## 2025-08-02 DIAGNOSIS — N40.1 BENIGN PROSTATIC HYPERPLASIA WITH URINARY OBSTRUCTION: ICD-10-CM

## 2025-08-04 RX ORDER — MIRABEGRON 50 MG/1
50 TABLET, FILM COATED, EXTENDED RELEASE ORAL DAILY
Qty: 30 TABLET | Refills: 2 | Status: SHIPPED | OUTPATIENT
Start: 2025-08-04

## 2025-08-15 RX ORDER — PANTOPRAZOLE SODIUM 40 MG/1
40 TABLET, DELAYED RELEASE ORAL 2 TIMES DAILY
Qty: 180 TABLET | Refills: 1 | Status: SHIPPED | OUTPATIENT
Start: 2025-08-15

## (undated) DEVICE — PLUG,CATHETER,DRAINAGE PROTECTOR,TUBE: Brand: MEDLINE

## (undated) DEVICE — BREAST HERNIA PACK: Brand: MEDLINE INDUSTRIES, INC.

## (undated) DEVICE — PENCIL SMK EVAC 15FT BLADE ELECTRD ROCKER F/TELSCP

## (undated) DEVICE — SYRINGE MED 10ML LUERLOCK TIP W/O SFTY DISP

## (undated) DEVICE — BASIC SINGLE BASIN BTC-LF: Brand: MEDLINE INDUSTRIES, INC.

## (undated) DEVICE — GLOVE ORANGE PI 7 1/2   MSG9075

## (undated) DEVICE — HYPODERMIC SAFETY NEEDLE: Brand: MAGELLAN

## (undated) DEVICE — SUTURE PROL SZ 2-0 L30IN NONABSORBABLE BLU L26MM CT-1 1/2 8423H

## (undated) DEVICE — SYRINGE, LUER LOCK, 60ML: Brand: MEDLINE

## (undated) DEVICE — SPONGE GZ W4XL4IN COT 12 PLY TYP VII WVN C FLD DSGN STERILE

## (undated) DEVICE — GOWN,SIRUS,NONRNF,SETINSLV,XL,20/CS: Brand: MEDLINE

## (undated) DEVICE — SPONGE DRN W4XL4IN RAYON/POLYESTER 6 PLY NONWOVEN PRECUT 2 PER PK

## (undated) DEVICE — ADHESIVE SKIN CLSR 0.7ML TOP DERMBND ADV

## (undated) DEVICE — APPLICATOR MEDICATED 26 CC SOLUTION HI LT ORNG CHLORAPREP

## (undated) DEVICE — SUTURE VCRL + SZ 4-0 L27IN ABSRB WHT FS-2 3/8 CIR REV CUT VCP422H

## (undated) DEVICE — SUTURE VCRL SZ 1 L18IN ABSRB VLT CTX L48MM 1/2 CIR J765D

## (undated) DEVICE — Device

## (undated) DEVICE — BAG,BANDED,W/RUBBERBAND,STERILE,30X36: Brand: MEDLINE

## (undated) DEVICE — CATHETER,FOLEY,SILI-ELAST,LTX,20FR,10ML: Brand: MEDLINE

## (undated) DEVICE — BREAST HERNIA: Brand: MEDLINE INDUSTRIES, INC.

## (undated) DEVICE — SUTURE NRLN SZ 0 L18IN NONABSORBABLE BLK L22MM MO-7 1/2 CIR C541D

## (undated) DEVICE — SPONGE LAP W18XL18IN WHT COT 4 PLY FLD STRUNG RADPQ DISP ST 2 PER PACK

## (undated) DEVICE — SHEET, T, LAPAROTOMY, STERILE: Brand: MEDLINE

## (undated) DEVICE — TOWEL,OR,DSP,ST,WHITE,DLX,XR,4/PK,20PK/C: Brand: MEDLINE

## (undated) DEVICE — GLOVE SURG SZ 7.5 L11.73IN FNGR THK9.8MIL STRW LTX POLYMER

## (undated) DEVICE — APPLICATOR PREP 26ML 0.7% IOD POVACRYLEX 74% ISO ALC ST

## (undated) DEVICE — SUTURE VCRL SZ 3-0 L18IN ABSRB VLT L26MM SH 1/2 CIR J774D

## (undated) DEVICE — SUTURE VCRL + SZ 0 L36IN ABSRB VLT L36MM CT-1 1/2 CIR VCP346H